# Patient Record
Sex: FEMALE | Race: WHITE | Employment: OTHER | URBAN - METROPOLITAN AREA
[De-identification: names, ages, dates, MRNs, and addresses within clinical notes are randomized per-mention and may not be internally consistent; named-entity substitution may affect disease eponyms.]

---

## 2017-02-02 ENCOUNTER — OFFICE VISIT (OUTPATIENT)
Dept: CARDIOLOGY CLINIC | Age: 82
End: 2017-02-02

## 2017-02-02 VITALS
WEIGHT: 107 LBS | OXYGEN SATURATION: 95 % | HEART RATE: 90 BPM | BODY MASS INDEX: 18.27 KG/M2 | SYSTOLIC BLOOD PRESSURE: 116 MMHG | HEIGHT: 64 IN | DIASTOLIC BLOOD PRESSURE: 72 MMHG

## 2017-02-02 DIAGNOSIS — R00.2 PALPITATIONS: Primary | ICD-10-CM

## 2017-02-02 RX ORDER — METOPROLOL TARTRATE 25 MG/1
25 TABLET, FILM COATED ORAL 2 TIMES DAILY
Qty: 60 TAB | Refills: 5 | Status: SHIPPED | OUTPATIENT
Start: 2017-02-02 | End: 2017-05-02 | Stop reason: SDUPTHER

## 2017-02-02 RX ORDER — ASPIRIN 81 MG/1
81 TABLET ORAL DAILY
Qty: 30 TAB | Refills: 6 | Status: SHIPPED | OUTPATIENT
Start: 2017-02-02 | End: 2018-11-09

## 2017-02-02 NOTE — LETTER
2/28/2017 Patient:  William Herndon YOB: 1927 Date of Visit: 2/2/2017 Dear Luis Aguilar MD 
[address] [facility] [id number] VIA In Basket 
 : Thank you for referring Ms. Deepti Quiles to me for evaluation/treatment. Below are the relevant portions of my assessment and plan of care. As you know, Rachel Biswas is a pleasant, 80 y.o. female with known history of coronary artery disease, non-ST-elevation myocardial infarction, status post left anterior descending and left circumflex bare-metal stents. She also has hypertension, hyperlipidemia, and ischemic cardiomyopathy, a.fib Ms. Adrian Yuan is here today for follow up appointment. She was doing quite well up until probably 2-3 weeks ago when she started having some shortness of breath. She did not have any lower extremity swelling, however she would feel short of breath with exertion and even at nighttime she was not able to sleep and she had to sleep in a recliner. She started taking Lasix initially twice a day with improvement in her symptoms. She did not have any significant chest pain or chest tightness. She denied any presyncope or syncope. She says that she feels like she occasionally has palpitations and heart racing whenever she exerts herself. Other than that she denies any specific complaint. Past Medical History Diagnosis Date  A-fib ([facility] 01/2017  Arthritis, degenerative  CAD (coronary artery disease) 04/2012 S/P 2.75 X 15 mm BMS of OM (04/2012), Two LAD BMS (07/2012)  Elevated liver enzymes Likely from statin  HLD (hyperlipidemia)  Hyperkalemia 06/2012 Likely from lisinopril  Ischemic cardiomyopathy LVEF  45% (11/2012) & 30% echo (04/2012)  NSTEMI (non-ST elevated myocardial infarction) ([facility] [address] 04/2012  Vertigo Past Surgical History Procedure Laterality Date  Hx coronary stent placement  4/23/12 bare metal stent to obtuse marginal branch  Hx heart catheterization Current Outpatient Prescriptions Medication Sig  Calcium-Vitamin D3-Vitamin K 299-252-06 mg-unit-mcg chew Take 2 Units by mouth daily.  furosemide (LASIX) 20 mg tablet Take 1 Tab by mouth daily as needed.  clopidogrel (PLAVIX) 75 mg tablet take 1 tablet by mouth once daily  CYANOCOBALAMIN, VITAMIN B-12, (VITAMIN B-12 PO) Take  by mouth daily.  mirtazapine (REMERON) 15 mg tablet Take  by mouth nightly as needed.  meclizine (ANTIVERT) 12.5 mg tablet Take  by mouth daily as needed.  ALPRAZolam (XANAX) 0.25 mg tablet Take  by mouth three (3) times daily as needed. No current facility-administered medications for this visit. Allergies and Sensitivities: 
Allergies Allergen Reactions  Codeine Anaphylaxis  Flu Vaccine 2011 (36 Mos+)(Pf) Anaphylaxis  Pcn [Penicillins] Hives Family History: 
Family History Problem Relation Age of Onset  Diabetes Mother Social History: 
Social History Substance Use Topics  Smoking status: Never Smoker  Smokeless tobacco: Never Used  Alcohol use No  
 
She  reports that she has never smoked. She has never used smokeless tobacco.  She  reports that she does not drink alcohol. Review of Systems: 
 
Physical Exam: 
BP Readings from Last 3 Encounters:  
02/02/17 116/72  
07/05/16 134/70  
04/26/16 135/64 Pulse Readings from Last 3 Encounters:  
02/02/17 90  
07/05/16 62  
04/26/16 (!) 57 Wt Readings from Last 3 Encounters:  
02/02/17 107 lb (48.5 kg) 07/05/16 110 lb (49.9 kg) 04/26/16 114 lb (51.7 kg) Constitutional: Oriented to person, place, and time. HENT: Head: Normocephalic and atraumatic. Neck: No JVD present. Cardiovascular: Irregular rhythm. No murmur, gallop or rubs appriciated Lung[de-identified] Breath sounds normal. No respiratory distress. No Rales appriciated Abdominal: No tenderness. No rebound and no guarding. Musculoskeletal: There is no edema. No cynosis Review of Data: 
LABS:  
Lab Results Component Value Date/Time Sodium 131 07/07/2012 03:21 AM  
 Potassium 4.5 07/07/2012 03:21 AM  
 Chloride 98 07/07/2012 03:21 AM  
 CO2 27 07/07/2012 03:21 AM  
 Glucose 88 07/07/2012 03:21 AM  
 BUN 24 07/07/2012 03:21 AM  
 Creatinine 1.3 07/07/2012 03:21 AM  
 
Lab Results Component Value Date/Time Cholesterol, total 165 07/07/2012 03:21 AM  
 HDL Cholesterol 42 07/07/2012 03:21 AM  
 LDL, calculated 114.2 07/07/2012 03:21 AM  
 Triglyceride 44 07/07/2012 03:21 AM  
 CHOL/HDL Ratio 3.9 07/07/2012 03:21 AM  
 
No results found for: GPT No results found for: HBA1C, [id number] 
 
EKG (07/2012) Sinus rhythm at 74 beats per minute. No dynamic ST-T changes of ischemia. No Q waves noted. (01/17) A.fib at CATHETERIZATION:(04/2012) 1. LM: Short, but angiographically normal.  
2. LAD: Proximal at D1 level, 40% to 50% moderate disease, mid left anterior descending at D2 level 80% tubular stenosis. 3. LCx/OM: Proximal diffuse 40%, Mid OM 95-99% subtotal occlusion with ruptured plaque and with NISH 2 flow. 4. RCA: Anatomically dominant; 30- 40%  proximal to mid, otherwise normal.  
5. Markedly reduced LVEF with estimated ejection fraction of 25% with evidence of severe hypokinesis of entire anterior apical and inferoapical wall. Diaphragmatic and inferobasilar wall is the most mary segment. Successful PCI and stenting of the OM, 95-99% subtotal occlusion with 2.75 x 15 mm bare metal Vision stent. CATH (07/2012) LM: Normal 
LAD: 70% calcified prox-mid, Mid LAD 90% LCx/OM1: proximal 50% moderate disease, Patent mid stent Successful PCI and stenting of LAD using 2.5 X 18 and 2.5X15 mm BMS 
 
ECHO (11/2012) 1. Left ventricular systolic function was mildly reduced with ejection fraction of 45 to 50%.   There was mild diffuse hypokinesis with more pronounced hypokinesis of inferolateral wall. Diastolic dysfunction present. 2.  Right ventricular function is normal.  It was mildly dilated. Estimated peak pressure of 40 mmHg. 3.  Mild mitral regurgitation and aortic regurgitation. No significant valvular pathology noted. 4.  Comparison was made with previous study in April 2012. Overall, left ventricular function has increased from 30 to 45%. Impression / Plan: 
 
Coronary artery disease:  She had an LAD and circumflex stent in 2012. Currently she is on Plavix. No angina. Ischemic cardiomyopathy:  Last ejection fraction was noted to be 45-50% in November, 2012. Will ask her to reduce lasix to once a day. Been taking twice a day Offered to repeat echo for a.fib and SOB, but does not want to repeat echo and want just medical management for symptoms. Hypertension: DC coreg and use metoprolol for a.fib. They will keep checking BP and HR at home A.fib: EKG confirmed this. new diagnosis. Change coreg to metoprolol. BP check in two weeks. Discussed about a.fib and stroke prophylaxis. ASA vs. Anticoagulation ( with coumadin / newer agent ) patient and daughter. RIsk, benefit, side effects of medications and alternatives were discussed with patient regarding each medications. After lengthy discussion, they would prefer ASA and plavix over coumadin or NOAC. Hyperlipidemia:  She has a history of statin induced elevation of LFTs every time she has tried different kinds of statins. She is currently not on any lipid lowering agent. Diet controlled. Defer to PCP This plan was discussed with Ms. Kb in detail who is in agreement. Thank you for allowing me to participate in the patient's care. Feel free to call me with any questions or concerns. Sincerely, Johnson Goodwin MD

## 2017-02-02 NOTE — MR AVS SNAPSHOT
Visit Information Date & Time Provider Department Dept. Phone Encounter #  
 2/2/2017  1:00 PM Hitesh Mascorro MD [address] [place] [address] Specialist at [facility] [phone] [id number] Follow-up Instructions Return in about 6 months (around 8/2/2017). Upcoming Health Maintenance Date Due DTaP/Tdap/Td series (1 - Tdap) 8/10/1948 ZOSTER VACCINE AGE 60> 8/10/1987 GLAUCOMA SCREENING Q2Y 8/10/1992 Pneumococcal 65+ Low/Medium Risk (1 of 2 - PCV13) 8/10/1992 MEDICARE YEARLY EXAM 8/10/1992 INFLUENZA AGE 9 TO ADULT 8/1/2016 Allergies as of 2/2/2017  Review Complete On: 2/2/2017 By: Ramila Champagne LPN Severity Noted Reaction Type Reactions Codeine  05/03/2012    Anaphylaxis Flu Vaccine 2011 (36 Mos+)(pf)  05/03/2012    Anaphylaxis Pcn [Penicillins]  05/03/2012    Hives Current Immunizations  Never Reviewed No immunizations on file. Not reviewed this visit You Were Diagnosed With   
  
 Codes Comments Palpitations    -  Primary ICD-10-CM: R00.2 ICD-9-CM: 785.1 Vitals BP Pulse Height(growth percentile) Weight(growth percentile) SpO2 BMI  
 116/72 90 5' 4\" (1.626 m) 107 lb (48.5 kg) 95% 18.37 kg/m2 OB Status Smoking Status Postmenopausal Never Smoker BMI and BSA Data Body Mass Index Body Surface Area  
 18.37 kg/m 2 1.48 m 2 Preferred Pharmacy Pharmacy Name Phone [facility] [address]  [facility] [phone] Your Updated Medication List  
  
   
This list is accurate as of: 2/2/17  1:50 PM.  Always use your most recent med list.  
  
  
  
  
 ANTIVERT 12.5 mg tablet Generic drug:  meclizine Take  by mouth daily as needed. Calcium-Vitamin D3-Vitamin K 356-116-36 mg-unit-mcg Chew Take 2 Units by mouth daily. clopidogrel 75 mg Tab Commonly known as:  PLAVIX  
take 1 tablet by mouth once daily  
  
 furosemide 20 mg tablet Commonly known as:  LASIX Take 1 Tab by mouth daily as needed. REMERON 15 mg tablet Generic drug:  mirtazapine Take  by mouth nightly as needed. VITAMIN B-12 PO Take  by mouth daily. XANAX 0.25 mg tablet Generic drug:  ALPRAZolam  
Take  by mouth three (3) times daily as needed. We Performed the Following AMB POC EKG ROUTINE W/ 12 LEADS, INTER & REP [76636 CPT(R)] Follow-up Instructions Return in about 6 months (around 8/2/2017). Patient Instructions Start Aspirin 81mg Daily Stop Coreg Start Lopressor 25mg twice per day Call [phone] with BP readings Introducing \[facility]\"" & HEALTH SERVICES! Dear Elba: Thank you for requesting a [employer] account. Our records indicate that you have previously registered for a [employer] account but its currently inactive. Please call our [employer] support line at [phone]. Additional Information If you have questions, please visit the Frequently Asked Questions section of the [employer] website at [email or web address] Remember, [employer] is NOT to be used for urgent needs. For medical emergencies, dial 911. Now available from your iPhone and Android! Please provide this summary of care documentation to your next provider. Your primary care clinician is listed as Lorelei Nation. If you have any questions after today's visit, please call [phone].

## 2017-02-02 NOTE — PATIENT INSTRUCTIONS
Start Aspirin 81mg Daily   Stop Coreg   Start Lopressor 25mg twice per day    Call [phone] with BP readings

## 2017-02-02 NOTE — PROGRESS NOTES
1. Have you been to the ER, urgent care clinic since your last visit? Hospitalized since your last visit? No    2. Have you seen or consulted any other health care providers outside of the [address] [place] [address] since your last visit? Include any pap smears or colon screening.  No

## 2017-02-02 NOTE — PROGRESS NOTES
As you know, Meryle Hayward is a pleasant, 80 y.o. female with known history of coronary artery disease, non-ST-elevation myocardial infarction, status post left anterior descending and left circumflex bare-metal stents. She also has hypertension, hyperlipidemia, and ischemic cardiomyopathy, a.fib    Ms. Myesha Henry is here today for follow up appointment. She was doing quite well up until probably 2-3 weeks ago when she started having some shortness of breath. She did not have any lower extremity swelling, however she would feel short of breath with exertion and even at nighttime she was not able to sleep and she had to sleep in a recliner. She started taking Lasix initially twice a day with improvement in her symptoms. She did not have any significant chest pain or chest tightness. She denied any presyncope or syncope. She says that she feels like she occasionally has palpitations and heart racing whenever she exerts herself. Other than that she denies any specific complaint. Past Medical History   Diagnosis Date    A-fib [facility]) 01/2017    Arthritis, degenerative     CAD (coronary artery disease) 04/2012     S/P 2.75 X 15 mm BMS of OM (04/2012), Two LAD BMS (07/2012)    Elevated liver enzymes      Likely from statin    HLD (hyperlipidemia)     Hyperkalemia 06/2012     Likely from lisinopril    Ischemic cardiomyopathy      LVEF  45% (11/2012) & 30% echo (04/2012)    NSTEMI (non-ST elevated myocardial infarction) ([facility] [address] 04/2012    Vertigo        Past Surgical History   Procedure Laterality Date    Hx coronary stent placement  4/23/12     bare metal stent to obtuse marginal branch    Hx heart catheterization         Current Outpatient Prescriptions   Medication Sig    Calcium-Vitamin D3-Vitamin K 500-500-40 mg-unit-mcg chew Take 2 Units by mouth daily.  furosemide (LASIX) 20 mg tablet Take 1 Tab by mouth daily as needed.     clopidogrel (PLAVIX) 75 mg tablet take 1 tablet by mouth once daily    CYANOCOBALAMIN, VITAMIN B-12, (VITAMIN B-12 PO) Take  by mouth daily.  mirtazapine (REMERON) 15 mg tablet Take  by mouth nightly as needed.  meclizine (ANTIVERT) 12.5 mg tablet Take  by mouth daily as needed.  ALPRAZolam (XANAX) 0.25 mg tablet Take  by mouth three (3) times daily as needed. No current facility-administered medications for this visit. Allergies and Sensitivities:  Allergies   Allergen Reactions    Codeine Anaphylaxis    Flu Vaccine 2011 (36 Mos+)(Pf) Anaphylaxis    Pcn [Penicillins] Hives     Family History:  Family History   Problem Relation Age of Onset    Diabetes Mother      Social History:  Social History   Substance Use Topics    Smoking status: Never Smoker    Smokeless tobacco: Never Used    Alcohol use No     She  reports that she has never smoked. She has never used smokeless tobacco.  She  reports that she does not drink alcohol. Review of Systems:    Physical Exam:  BP Readings from Last 3 Encounters:   02/02/17 116/72   07/05/16 134/70   04/26/16 135/64         Pulse Readings from Last 3 Encounters:   02/02/17 90   07/05/16 62   04/26/16 (!) 57          Wt Readings from Last 3 Encounters:   02/02/17 107 lb (48.5 kg)   07/05/16 110 lb (49.9 kg)   04/26/16 114 lb (51.7 kg)     Constitutional: Oriented to person, place, and time. HENT: Head: Normocephalic and atraumatic. Neck: No JVD present. Cardiovascular: Irregular rhythm. No murmur, gallop or rubs appriciated  Lung[de-identified] Breath sounds normal. No respiratory distress. No Rales appriciated  Abdominal: No tenderness. No rebound and no guarding. Musculoskeletal: There is no edema.  No cynosis    Review of Data:  LABS:   Lab Results   Component Value Date/Time    Sodium 131 07/07/2012 03:21 AM    Potassium 4.5 07/07/2012 03:21 AM    Chloride 98 07/07/2012 03:21 AM    CO2 27 07/07/2012 03:21 AM    Glucose 88 07/07/2012 03:21 AM    BUN 24 07/07/2012 03:21 AM    Creatinine 1.3 07/07/2012 03:21 AM     Lab Results   Component Value Date/Time    Cholesterol, total 165 07/07/2012 03:21 AM    HDL Cholesterol 42 07/07/2012 03:21 AM    LDL, calculated 114.2 07/07/2012 03:21 AM    Triglyceride 44 07/07/2012 03:21 AM    CHOL/HDL Ratio 3.9 07/07/2012 03:21 AM     No results found for: GPT  No results found for: HBA1C, [id number]    EKG (07/2012) Sinus rhythm at 74 beats per minute. No dynamic ST-T changes of ischemia. No Q waves noted. (01/17) A.fib at   CATHETERIZATION:(04/2012)  1. LM: Short, but angiographically normal.   2. LAD: Proximal at D1 level, 40% to 50% moderate disease, mid left anterior descending at D2 level 80% tubular stenosis. 3. LCx/OM: Proximal diffuse 40%, Mid OM 95-99% subtotal occlusion with ruptured plaque and with NISH 2 flow. 4. RCA: Anatomically dominant; 30- 40%  proximal to mid, otherwise normal.   5. Markedly reduced LVEF with estimated ejection fraction of 25% with evidence of severe hypokinesis of entire anterior apical and inferoapical wall. Diaphragmatic and inferobasilar wall is the most mary segment. Successful PCI and stenting of the OM, 95-99% subtotal occlusion with 2.75 x 15 mm bare metal Vision stent. CATH (07/2012)   LM: Normal  LAD: 70% calcified prox-mid, Mid LAD 90%  LCx/OM1: proximal 50% moderate disease, Patent mid stent  Successful PCI and stenting of LAD using 2.5 X 18 and 2.5X15 mm BMS    ECHO (11/2012)   1. Left ventricular systolic function was mildly reduced with ejection fraction of 45 to 50%. There was mild diffuse hypokinesis with more pronounced hypokinesis of inferolateral wall. Diastolic dysfunction present. 2.  Right ventricular function is normal.  It was mildly dilated. Estimated peak pressure of 40 mmHg. 3.  Mild mitral regurgitation and aortic regurgitation. No significant valvular pathology noted. 4.  Comparison was made with previous study in April 2012. Overall, left ventricular function has increased from 30 to 45%.     Impression / Plan:    Coronary artery disease:  She had an LAD and circumflex stent in 2012. Currently she is on Plavix. No angina. Ischemic cardiomyopathy:  Last ejection fraction was noted to be 45-50% in November, 2012. Will ask her to reduce lasix to once a day. Been taking twice a day    Offered to repeat echo for a.fib and SOB, but does not want to repeat echo and want just medical management for symptoms. Hypertension: DC coreg and use metoprolol for a.fib. They will keep checking BP and HR at home    A.fib: EKG confirmed this. new diagnosis. Change coreg to metoprolol. BP check in two weeks. Discussed about a.fib and stroke prophylaxis. ASA vs. Anticoagulation ( with coumadin / newer agent ) patient and daughter. RIsk, benefit, side effects of medications and alternatives were discussed with patient regarding each medications. After lengthy discussion, they would prefer ASA and plavix over coumadin or NOAC. Hyperlipidemia:  She has a history of statin induced elevation of LFTs every time she has tried different kinds of statins. She is currently not on any lipid lowering agent. Diet controlled. Defer to PCP    This plan was discussed with Ms. Quiles in detail who is in agreement. Thank you for allowing me to participate in the patient's care. Feel free to call me with any questions or concerns.

## 2017-02-03 ENCOUNTER — TELEPHONE (OUTPATIENT)
Dept: CARDIOLOGY CLINIC | Age: 82
End: 2017-02-03

## 2017-02-03 NOTE — TELEPHONE ENCOUNTER
Pt called office to advise she is not comfortable starting Lopressor that was prescribed yesterday over the weekend while our office is closed and is worried her b/p will drop too low after starting medication? Pt advised in the past when on blood pressure medication that her b/p will drop to 60's/40's. Pt would like to monitor b/p over weekend and start Lopressor on Monday. Pt advised she took 1 tab of Lopressor this morning, but would not take anymore until next week. I advised pt to monitor blood pressure over the weekend and call office on Monday with readings. Pt verbalized understanding at this time.

## 2017-02-28 NOTE — COMMUNICATION BODY
As you know, Diego Flower is a pleasant, 80 y.o. female with known history of coronary artery disease, non-ST-elevation myocardial infarction, status post left anterior descending and left circumflex bare-metal stents. She also has hypertension, hyperlipidemia, and ischemic cardiomyopathy, a.fib    Ms. Deion Vazquez is here today for follow up appointment. She was doing quite well up until probably 2-3 weeks ago when she started having some shortness of breath. She did not have any lower extremity swelling, however she would feel short of breath with exertion and even at nighttime she was not able to sleep and she had to sleep in a recliner. She started taking Lasix initially twice a day with improvement in her symptoms. She did not have any significant chest pain or chest tightness. She denied any presyncope or syncope. She says that she feels like she occasionally has palpitations and heart racing whenever she exerts herself. Other than that she denies any specific complaint. Past Medical History   Diagnosis Date    A-fib [facility]) 01/2017    Arthritis, degenerative     CAD (coronary artery disease) 04/2012     S/P 2.75 X 15 mm BMS of OM (04/2012), Two LAD BMS (07/2012)    Elevated liver enzymes      Likely from statin    HLD (hyperlipidemia)     Hyperkalemia 06/2012     Likely from lisinopril    Ischemic cardiomyopathy      LVEF  45% (11/2012) & 30% echo (04/2012)    NSTEMI (non-ST elevated myocardial infarction) ([facility] [address] 04/2012    Vertigo        Past Surgical History   Procedure Laterality Date    Hx coronary stent placement  4/23/12     bare metal stent to obtuse marginal branch    Hx heart catheterization         Current Outpatient Prescriptions   Medication Sig    Calcium-Vitamin D3-Vitamin K 500-500-40 mg-unit-mcg chew Take 2 Units by mouth daily.  furosemide (LASIX) 20 mg tablet Take 1 Tab by mouth daily as needed.     clopidogrel (PLAVIX) 75 mg tablet take 1 tablet by mouth once daily    CYANOCOBALAMIN, VITAMIN B-12, (VITAMIN B-12 PO) Take  by mouth daily.  mirtazapine (REMERON) 15 mg tablet Take  by mouth nightly as needed.  meclizine (ANTIVERT) 12.5 mg tablet Take  by mouth daily as needed.  ALPRAZolam (XANAX) 0.25 mg tablet Take  by mouth three (3) times daily as needed. No current facility-administered medications for this visit. Allergies and Sensitivities:  Allergies   Allergen Reactions    Codeine Anaphylaxis    Flu Vaccine 2011 (36 Mos+)(Pf) Anaphylaxis    Pcn [Penicillins] Hives     Family History:  Family History   Problem Relation Age of Onset    Diabetes Mother      Social History:  Social History   Substance Use Topics    Smoking status: Never Smoker    Smokeless tobacco: Never Used    Alcohol use No     She  reports that she has never smoked. She has never used smokeless tobacco.  She  reports that she does not drink alcohol. Review of Systems:    Physical Exam:  BP Readings from Last 3 Encounters:   02/02/17 116/72   07/05/16 134/70   04/26/16 135/64         Pulse Readings from Last 3 Encounters:   02/02/17 90   07/05/16 62   04/26/16 (!) 57          Wt Readings from Last 3 Encounters:   02/02/17 107 lb (48.5 kg)   07/05/16 110 lb (49.9 kg)   04/26/16 114 lb (51.7 kg)     Constitutional: Oriented to person, place, and time. HENT: Head: Normocephalic and atraumatic. Neck: No JVD present. Cardiovascular: Irregular rhythm. No murmur, gallop or rubs appriciated  Lung[de-identified] Breath sounds normal. No respiratory distress. No Rales appriciated  Abdominal: No tenderness. No rebound and no guarding. Musculoskeletal: There is no edema.  No cynosis    Review of Data:  LABS:   Lab Results   Component Value Date/Time    Sodium 131 07/07/2012 03:21 AM    Potassium 4.5 07/07/2012 03:21 AM    Chloride 98 07/07/2012 03:21 AM    CO2 27 07/07/2012 03:21 AM    Glucose 88 07/07/2012 03:21 AM    BUN 24 07/07/2012 03:21 AM    Creatinine 1.3 07/07/2012 03:21 AM     Lab Results   Component Value Date/Time    Cholesterol, total 165 07/07/2012 03:21 AM    HDL Cholesterol 42 07/07/2012 03:21 AM    LDL, calculated 114.2 07/07/2012 03:21 AM    Triglyceride 44 07/07/2012 03:21 AM    CHOL/HDL Ratio 3.9 07/07/2012 03:21 AM     No results found for: GPT  No results found for: HBA1C, [id number]    EKG (07/2012) Sinus rhythm at 74 beats per minute. No dynamic ST-T changes of ischemia. No Q waves noted. (01/17) A.fib at   CATHETERIZATION:(04/2012)  1. LM: Short, but angiographically normal.   2. LAD: Proximal at D1 level, 40% to 50% moderate disease, mid left anterior descending at D2 level 80% tubular stenosis. 3. LCx/OM: Proximal diffuse 40%, Mid OM 95-99% subtotal occlusion with ruptured plaque and with NISH 2 flow. 4. RCA: Anatomically dominant; 30- 40%  proximal to mid, otherwise normal.   5. Markedly reduced LVEF with estimated ejection fraction of 25% with evidence of severe hypokinesis of entire anterior apical and inferoapical wall. Diaphragmatic and inferobasilar wall is the most mary segment. Successful PCI and stenting of the OM, 95-99% subtotal occlusion with 2.75 x 15 mm bare metal Vision stent. CATH (07/2012)   LM: Normal  LAD: 70% calcified prox-mid, Mid LAD 90%  LCx/OM1: proximal 50% moderate disease, Patent mid stent  Successful PCI and stenting of LAD using 2.5 X 18 and 2.5X15 mm BMS    ECHO (11/2012)   1. Left ventricular systolic function was mildly reduced with ejection fraction of 45 to 50%. There was mild diffuse hypokinesis with more pronounced hypokinesis of inferolateral wall. Diastolic dysfunction present. 2.  Right ventricular function is normal.  It was mildly dilated. Estimated peak pressure of 40 mmHg. 3.  Mild mitral regurgitation and aortic regurgitation. No significant valvular pathology noted. 4.  Comparison was made with previous study in April 2012. Overall, left ventricular function has increased from 30 to 45%.     Impression / Plan:    Coronary artery disease:  She had an LAD and circumflex stent in 2012. Currently she is on Plavix. No angina. Ischemic cardiomyopathy:  Last ejection fraction was noted to be 45-50% in November, 2012. Will ask her to reduce lasix to once a day. Been taking twice a day    Offered to repeat echo for a.fib and SOB, but does not want to repeat echo and want just medical management for symptoms. Hypertension: DC coreg and use metoprolol for a.fib. They will keep checking BP and HR at home    A.fib: EKG confirmed this. new diagnosis. Change coreg to metoprolol. BP check in two weeks. Discussed about a.fib and stroke prophylaxis. ASA vs. Anticoagulation ( with coumadin / newer agent ) patient and daughter. RIsk, benefit, side effects of medications and alternatives were discussed with patient regarding each medications. After lengthy discussion, they would prefer ASA and plavix over coumadin or NOAC. Hyperlipidemia:  She has a history of statin induced elevation of LFTs every time she has tried different kinds of statins. She is currently not on any lipid lowering agent. Diet controlled. Defer to PCP    This plan was discussed with Ms. Quiles in detail who is in agreement. Thank you for allowing me to participate in the patient's care. Feel free to call me with any questions or concerns.

## 2017-04-17 ENCOUNTER — TELEPHONE (OUTPATIENT)
Dept: CARDIOLOGY CLINIC | Age: 82
End: 2017-04-17

## 2017-04-17 NOTE — TELEPHONE ENCOUNTER
Patient calling and states that since switching to metoprolol she is feeling so tired and not having an energy to do her usual activies blood pressure today is 91/50 heart rate 85. She feels like since starting metoprolol that she just hasn't felt well. Verbal order and read back per Mario Quesada MD  Decrease Metoprolol to 12.5mg twice per day for the next few days. Monitor blood pressure and heart rate. Call Thursday to report how she is feeling. If symptoms worsen or do not improve she may need to go to the ER. Patient verbalized understanding.

## 2017-05-02 ENCOUNTER — OFFICE VISIT (OUTPATIENT)
Dept: CARDIOLOGY CLINIC | Age: 82
End: 2017-05-02

## 2017-05-02 VITALS
WEIGHT: 112 LBS | SYSTOLIC BLOOD PRESSURE: 93 MMHG | DIASTOLIC BLOOD PRESSURE: 57 MMHG | BODY MASS INDEX: 19.84 KG/M2 | HEART RATE: 78 BPM | HEIGHT: 63 IN | OXYGEN SATURATION: 95 %

## 2017-05-02 DIAGNOSIS — I10 ESSENTIAL HYPERTENSION WITH GOAL BLOOD PRESSURE LESS THAN 140/90: ICD-10-CM

## 2017-05-02 DIAGNOSIS — E78.00 PURE HYPERCHOLESTEROLEMIA: ICD-10-CM

## 2017-05-02 DIAGNOSIS — I48.20 CHRONIC A-FIB (HCC): ICD-10-CM

## 2017-05-02 DIAGNOSIS — I48.0 PAF (PAROXYSMAL ATRIAL FIBRILLATION) (HCC): Primary | ICD-10-CM

## 2017-05-02 RX ORDER — METOPROLOL TARTRATE 25 MG/1
12.5 TABLET, FILM COATED ORAL 2 TIMES DAILY
Qty: 60 TAB | Refills: 5 | Status: SHIPPED | OUTPATIENT
Start: 2017-05-02 | End: 2017-07-24 | Stop reason: SDUPTHER

## 2017-05-02 RX ORDER — DIGOXIN 125 MCG
0.12 TABLET ORAL DAILY
Qty: 35 TAB | Refills: 6 | Status: SHIPPED | OUTPATIENT
Start: 2017-05-02 | End: 2017-07-24 | Stop reason: SDUPTHER

## 2017-05-02 RX ORDER — FUROSEMIDE 20 MG/1
20 TABLET ORAL 2 TIMES DAILY
COMMUNITY
End: 2017-10-31 | Stop reason: SDUPTHER

## 2017-05-02 NOTE — PATIENT INSTRUCTIONS
Decrease Lopressor to 12.5mg twice per day   Start Digoxin 0.125mg Daily - for the first 2 days take 2 tabs daily

## 2017-05-02 NOTE — MR AVS SNAPSHOT
Visit Information Date & Time Provider Department Dept. Phone Encounter #  
 5/2/2017  2:30 PM Hitesh Chapman MD Cardio Specialist at [facility] [phone] [id number] Follow-up Instructions Return in about 6 months (around 11/2/2017). Upcoming Health Maintenance Date Due DTaP/Tdap/Td series (1 - Tdap) 8/10/1948 ZOSTER VACCINE AGE 60> 8/10/1987 GLAUCOMA SCREENING Q2Y 8/10/1992 Pneumococcal 65+ Low/Medium Risk (1 of 2 - PCV13) 8/10/1992 MEDICARE YEARLY EXAM 8/10/1992 INFLUENZA AGE 9 TO ADULT 8/1/2017 Allergies as of 5/2/2017  Review Complete On: 5/2/2017 By: Jennifer Rosario LPN Severity Noted Reaction Type Reactions Codeine  05/03/2012    Anaphylaxis Flu Vaccine 2011 (36 Mos+)(pf)  05/03/2012    Anaphylaxis Pcn [Penicillins]  05/03/2012    Hives Current Immunizations  Never Reviewed No immunizations on file. Not reviewed this visit Vitals BP Pulse Height(growth percentile) Weight(growth percentile) SpO2 BMI  
 93/57 78 5' 2.5\" (1.588 m) 112 lb (50.8 kg) 95% 20.16 kg/m2 OB Status Smoking Status Postmenopausal Never Smoker BMI and BSA Data Body Mass Index Body Surface Area  
 20.16 kg/m 2 1.5 m 2 Preferred Pharmacy Pharmacy Name Phone [facility] [address] [facility] [phone] Your Updated Medication List  
  
   
This list is accurate as of: 5/2/17  3:03 PM.  Always use your most recent med list.  
  
  
  
  
 ANTIVERT 12.5 mg tablet Generic drug:  meclizine Take  by mouth daily as needed. aspirin delayed-release 81 mg tablet Take 1 Tab by mouth daily. Calcium-Vitamin D3-Vitamin K 527-005-06 mg-unit-mcg Chew Take 2 Units by mouth daily. clopidogrel 75 mg Tab Commonly known as:  PLAVIX  
take 1 tablet by mouth once daily LASIX 20 mg tablet Generic drug:  furosemide Take 20 mg by mouth two (2) times a day. metoprolol tartrate 25 mg tablet Commonly known as:  LOPRESSOR Take 1 Tab by mouth two (2) times a day. REMERON 15 mg tablet Generic drug:  mirtazapine Take  by mouth nightly as needed. VITAMIN B-12 PO Take  by mouth daily. XANAX 0.25 mg tablet Generic drug:  ALPRAZolam  
Take  by mouth three (3) times daily as needed. Follow-up Instructions Return in about 6 months (around 11/2/2017). Patient Instructions Decrease Lopressor to 12.5mg twice per day Start Digoxin 0.125mg Daily - for the first 2 days take 2 tabs daily Introducing [facility]! Dear Meño Morgan: Thank you for requesting a [employer]reet[employer] account. Our records indicate that you have previously registered for a Greetz account but its currently inactive. Please call our Greetz support line at [phone]. Additional Information If you have questions, please visit the Frequently Asked Questions section of the Greetz website at [email or web address] Remember, Greetz is NOT to be used for urgent needs. For medical emergencies, dial 911. Now available from your iPhone and Android! Please provide this summary of care documentation to your next provider. Your primary care clinician is listed as Stevie Portillo. If you have any questions after today's visit, please call [phone].

## 2017-05-02 NOTE — PROGRESS NOTES
1. Have you been to the ER, urgent care clinic since your last visit? Hospitalized since your last visit? No    2. Have you seen or consulted any other health care providers outside of the [address] [place] [address] since your last visit? Include any pap smears or colon screening.  No

## 2017-05-02 NOTE — PROGRESS NOTES
As you know, Gaby Bhatia is a pleasant, 80 y.o. female with known history of coronary artery disease, non-ST-elevation myocardial infarction, status post left anterior descending and left circumflex bare-metal stents. She also has hypertension, hyperlipidemia, and ischemic cardiomyopathy, a.fib    Ms. Leida Davidson is here today for follow up appointment. Ms. Leida Davidson had significant fatigue and tiredness after starting Metoprolol for the atrial fibrillation. Her blood pressure at home at times runs anywhere from 85 to 951 mmHg systolic. She has been less fatigued lately. She denies any presyncope or syncope. She denies any awareness of any palpitations. She denies any chest pain or chest tightness. She has been taking Lasix twice daily for the last 7-10 days because of ankle edema, which is getting better after taking Lasix according to patient. Past Medical History:   Diagnosis Date    A-fib [facility]) 01/2017    Arthritis, degenerative     CAD (coronary artery disease) 04/2012    S/P 2.75 X 15 mm BMS of OM (04/2012), Two LAD BMS (07/2012)    Elevated liver enzymes     Likely from statin    HLD (hyperlipidemia)     Hyperkalemia 06/2012    Likely from lisinopril    Ischemic cardiomyopathy     LVEF  45% (11/2012) & 30% echo (04/2012)    NSTEMI (non-ST elevated myocardial infarction) ([facility] [address] 04/2012    Vertigo        Past Surgical History:   Procedure Laterality Date    HX CORONARY STENT PLACEMENT  4/23/12    bare metal stent to obtuse marginal branch    HX HEART CATHETERIZATION         Current Outpatient Prescriptions   Medication Sig    metoprolol tartrate (LOPRESSOR) 25 mg tablet Take 1 Tab by mouth two (2) times a day.  aspirin delayed-release 81 mg tablet Take 1 Tab by mouth daily.  Calcium-Vitamin D3-Vitamin K 457-605-43 mg-unit-mcg chew Take 2 Units by mouth daily.  furosemide (LASIX) 20 mg tablet Take 1 Tab by mouth daily as needed.     clopidogrel (PLAVIX) 75 mg tablet take 1 tablet by mouth once daily    CYANOCOBALAMIN, VITAMIN B-12, (VITAMIN B-12 PO) Take  by mouth daily.  mirtazapine (REMERON) 15 mg tablet Take  by mouth nightly as needed.  meclizine (ANTIVERT) 12.5 mg tablet Take  by mouth daily as needed.  ALPRAZolam (XANAX) 0.25 mg tablet Take  by mouth three (3) times daily as needed. No current facility-administered medications for this visit. Allergies and Sensitivities:  Allergies   Allergen Reactions    Codeine Anaphylaxis    Flu Vaccine 2011 (36 Mos+)(Pf) Anaphylaxis    Pcn [Penicillins] Hives     Family History:  Family History   Problem Relation Age of Onset    Diabetes Mother      Social History:  Social History   Substance Use Topics    Smoking status: Never Smoker    Smokeless tobacco: Never Used    Alcohol use No     She  reports that she has never smoked. She has never used smokeless tobacco.  She  reports that she does not drink alcohol. Review of Systems:    Physical Exam:  BP Readings from Last 3 Encounters:   02/02/17 116/72   07/05/16 134/70   04/26/16 135/64         Pulse Readings from Last 3 Encounters:   02/02/17 90   07/05/16 62   04/26/16 (!) 57          Wt Readings from Last 3 Encounters:   02/02/17 107 lb (48.5 kg)   07/05/16 110 lb (49.9 kg)   04/26/16 114 lb (51.7 kg)     Constitutional: Oriented to person, place, and time. HENT: Head: Normocephalic and atraumatic. Neck: No JVD present. Cardiovascular: Irregular rhythm. No murmur, gallop or rubs appriciated  Lung[de-identified] Breath sounds normal. No respiratory distress. No Rales appriciated  Abdominal: No tenderness. No rebound and no guarding. Musculoskeletal: There is Trace ankle edema.  No cynosis    Review of Data:  LABS:   Lab Results   Component Value Date/Time    Sodium 131 07/07/2012 03:21 AM    Potassium 4.5 07/07/2012 03:21 AM    Chloride 98 07/07/2012 03:21 AM    CO2 27 07/07/2012 03:21 AM    Glucose 88 07/07/2012 03:21 AM    BUN 24 07/07/2012 03:21 AM    Creatinine 1.3 07/07/2012 03:21 AM     Lab Results   Component Value Date/Time    Cholesterol, total 165 07/07/2012 03:21 AM    HDL Cholesterol 42 07/07/2012 03:21 AM    LDL, calculated 114.2 07/07/2012 03:21 AM    Triglyceride 44 07/07/2012 03:21 AM    CHOL/HDL Ratio 3.9 07/07/2012 03:21 AM     No results found for: GPT  No results found for: HBA1C, [id number]    EKG (07/2012) Sinus rhythm at 74 beats per minute. No dynamic ST-T changes of ischemia. No Q waves noted. (01/17) A.fib at      CATHETERIZATION:(04/2012)  1. LM: Short, but angiographically normal.   2. LAD: Proximal at D1 level, 40% to 50% moderate disease, mid left anterior descending at D2 level 80% tubular stenosis. 3. LCx/OM: Proximal diffuse 40%, Mid OM 95-99% subtotal occlusion with ruptured plaque and with NISH 2 flow. 4. RCA: Anatomically dominant; 30- 40%  proximal to mid, otherwise normal.   5. Markedly reduced LVEF with estimated ejection fraction of 25% with evidence of severe hypokinesis of entire anterior apical and inferoapical wall. Diaphragmatic and inferobasilar wall is the most mary segment. Successful PCI and stenting of the OM, 95-99% subtotal occlusion with 2.75 x 15 mm bare metal Vision stent. CATH (07/2012)   LM: Normal  LAD: 70% calcified prox-mid, Mid LAD 90%  LCx/OM1: proximal 50% moderate disease, Patent mid stent  Successful PCI and stenting of LAD using 2.5 X 18 and 2.5X15 mm BMS    ECHO (11/2012)   1. Left ventricular systolic function was mildly reduced with ejection fraction of 45 to 50%. There was mild diffuse hypokinesis with more pronounced hypokinesis of inferolateral wall. Diastolic dysfunction present. 2.  Right ventricular function is normal.  It was mildly dilated. Estimated peak pressure of 40 mmHg. 3.  Mild mitral regurgitation and aortic regurgitation. No significant valvular pathology noted. 4.  Comparison was made with previous study in April 2012.   Overall, left ventricular function has increased from 30 to 45%. Impression / Plan:    Coronary artery disease:    She had an LAD and circumflex stent in 2012. Currently she is on asa, BB and Plavix. No angina. Ischemic cardiomyopathy:    Last ejection fraction was noted to be 45-50% in November, 2012. Ankle edema+ improving after taking lasix twice a day for last 7 days. Offered to repeat echo for a.fib and edema on last visit and today, but does not want to repeat echo and want just medical management for symptoms. Hypertension:   Fatigue and tiredness since starting metoprolol. Will Reduce metoprolol to 12.5 mg BID with home to improve BP reading and to improve symptoms. Start digoxin 0.125 mg daily for HR control for a.fib    A.fib:   Still in a.fib on exam. HR at home  bpm.  Will Reduce metoprolol to 12.5 mg BID with home to improve BP reading and to improve symptoms. Start digoxin 0.125 mg daily for HR control for a.fib  On DAPT for stroke prophylaxis. ASA and plavix. Has decided against coumadin or NOAC in past.     Hyperlipidemia:  She has a history of statin induced elevation of LFTs every time she has tried different kinds of statins. She is currently not on any lipid lowering agent. Diet controlled. Defer to PCP. Stable per patient and daughter    This plan was discussed with Ms. Quiles in detail who is in agreement. Thank you for allowing me to participate in the patient's care. Feel free to call me with any questions or concerns.

## 2017-05-02 NOTE — LETTER
5/9/2017 Patient:  Danisha Boston YOB: 1927 Date of Visit: 5/2/2017 Dear Zara Irwin MD 
[address] [facility] [address] [id number] VIA In Basket 
 : 
 
 
 
As you know, Bj Summers is a pleasant, 80 y.o. female with known history of coronary artery disease, non-ST-elevation myocardial infarction, status post left anterior descending and left circumflex bare-metal stents. She also has hypertension, hyperlipidemia, and ischemic cardiomyopathy, a.fib Ms. Miles Blankenship is here today for follow up appointment. Ms. Miles Blankenship had significant fatigue and tiredness after starting Metoprolol for the atrial fibrillation. Her blood pressure at home at times runs anywhere from 85 to 316 mmHg systolic. She has been less fatigued lately. She denies any presyncope or syncope. She denies any awareness of any palpitations. She denies any chest pain or chest tightness. She has been taking Lasix twice daily for the last 7-10 days because of ankle edema, which is getting better after taking Lasix according to patient. Past Medical History:  
Diagnosis Date  [facility] 01/2017  Arthritis, degenerative  CAD (coronary artery disease) 04/2012 S/P 2.75 X 15 mm BMS of OM (04/2012), Two LAD BMS (07/2012)  Elevated liver enzymes Likely from statin  HLD (hyperlipidemia)  Hyperkalemia 06/2012 Likely from lisinopril  Ischemic cardiomyopathy LVEF  45% (11/2012) & 30% echo (04/2012)  NSTEMI (non-ST elevated myocardial infarction) ([facility] 04/2012  Vertigo Past Surgical History:  
Procedure Laterality Date  HX CORONARY STENT PLACEMENT  4/23/12  
 bare metal stent to obtuse marginal branch  HX HEART CATHETERIZATION Current Outpatient Prescriptions Medication Sig  
 metoprolol tartrate (LOPRESSOR) 25 mg tablet Take 1 Tab by mouth two (2) times a day.  aspirin delayed-release 81 mg tablet Take 1 Tab by mouth daily.  Calcium-Vitamin D3-Vitamin K 972-091-88 mg-unit-mcg chew Take 2 Units by mouth daily.  furosemide (LASIX) 20 mg tablet Take 1 Tab by mouth daily as needed.  clopidogrel (PLAVIX) 75 mg tablet take 1 tablet by mouth once daily  CYANOCOBALAMIN, VITAMIN B-12, (VITAMIN B-12 PO) Take  by mouth daily.  mirtazapine (REMERON) 15 mg tablet Take  by mouth nightly as needed.  meclizine (ANTIVERT) 12.5 mg tablet Take  by mouth daily as needed.  ALPRAZolam (XANAX) 0.25 mg tablet Take  by mouth three (3) times daily as needed. No current facility-administered medications for this visit. Allergies and Sensitivities: 
Allergies Allergen Reactions  Codeine Anaphylaxis  Flu Vaccine 2011 (36 Mos+)(Pf) Anaphylaxis  Pcn [Penicillins] Hives Family History: 
Family History Problem Relation Age of Onset  Diabetes Mother Social History: 
Social History Substance Use Topics  Smoking status: Never Smoker  Smokeless tobacco: Never Used  Alcohol use No  
 
She  reports that she has never smoked. She has never used smokeless tobacco.  She  reports that she does not drink alcohol. Review of Systems: 
 
Physical Exam: 
BP Readings from Last 3 Encounters:  
02/02/17 116/72  
07/05/16 134/70  
04/26/16 135/64 Pulse Readings from Last 3 Encounters:  
02/02/17 90  
07/05/16 62  
04/26/16 (!) 57 Wt Readings from Last 3 Encounters:  
02/02/17 107 lb (48.5 kg) 07/05/16 110 lb (49.9 kg) 04/26/16 114 lb (51.7 kg) Constitutional: Oriented to person, place, and time. HENT: Head: Normocephalic and atraumatic. Neck: No JVD present. Cardiovascular: Irregular rhythm. No murmur, gallop or rubs appriciated Lung[de-identified] Breath sounds normal. No respiratory distress. No Rales appriciated Abdominal: No tenderness. No rebound and no guarding. Musculoskeletal: There is Trace ankle edema. No cynosis Review of Data: 
LABS:  
Lab Results Component Value Date/Time Sodium 131 07/07/2012 03:21 AM  
 Potassium 4.5 07/07/2012 03:21 AM  
 Chloride 98 07/07/2012 03:21 AM  
 CO2 27 07/07/2012 03:21 AM  
 Glucose 88 07/07/2012 03:21 AM  
 BUN 24 07/07/2012 03:21 AM  
 Creatinine 1.3 07/07/2012 03:21 AM  
 
Lab Results Component Value Date/Time Cholesterol, total 165 07/07/2012 03:21 AM  
 HDL Cholesterol 42 07/07/2012 03:21 AM  
 LDL, calculated 114.2 07/07/2012 03:21 AM  
 Triglyceride 44 07/07/2012 03:21 AM  
 CHOL/HDL Ratio 3.9 07/07/2012 03:21 AM  
 
No results found for: GPT No results found for: HBA1C, [id number] 
 
EKG (07/2012) Sinus rhythm at 74 beats per minute. No dynamic ST-T changes of ischemia. No Q waves noted. (01/17) A.fib at CATHETERIZATION:(04/2012) 1. LM: Short, but angiographically normal.  
2. LAD: Proximal at D1 level, 40% to 50% moderate disease, mid left anterior descending at D2 level 80% tubular stenosis. 3. LCx/OM: Proximal diffuse 40%, Mid OM 95-99% subtotal occlusion with ruptured plaque and with NISH 2 flow. 4. RCA: Anatomically dominant; 30- 40%  proximal to mid, otherwise normal.  
5. Markedly reduced LVEF with estimated ejection fraction of 25% with evidence of severe hypokinesis of entire anterior apical and inferoapical wall. Diaphragmatic and inferobasilar wall is the most mary segment. Successful PCI and stenting of the OM, 95-99% subtotal occlusion with 2.75 x 15 mm bare metal Vision stent. CATH (07/2012) LM: Normal 
LAD: 70% calcified prox-mid, Mid LAD 90% LCx/OM1: proximal 50% moderate disease, Patent mid stent Successful PCI and stenting of LAD using 2.5 X 18 and 2.5X15 mm BMS 
 
ECHO (11/2012) 1. Left ventricular systolic function was mildly reduced with ejection fraction of 45 to 50%. There was mild diffuse hypokinesis with more pronounced hypokinesis of inferolateral wall. Diastolic dysfunction present. 2.  Right ventricular function is normal.  It was mildly dilated. Estimated peak pressure of 40 mmHg. 3.  Mild mitral regurgitation and aortic regurgitation. No significant valvular pathology noted. 4.  Comparison was made with previous study in April 2012. Overall, left ventricular function has increased from 30 to 45%. Impression / Plan: 
 
Coronary artery disease: She had an LAD and circumflex stent in 2012. Currently she is on asa, BB and Plavix. No angina. Ischemic cardiomyopathy:   
Last ejection fraction was noted to be 45-50% in November, 2012. Ankle edema+ improving after taking lasix twice a day for last 7 days. Offered to repeat echo for a.fib and edema on last visit and today, but does not want to repeat echo and want just medical management for symptoms. Hypertension:  
Fatigue and tiredness since starting metoprolol. Will Reduce metoprolol to 12.5 mg BID with home to improve BP reading and to improve symptoms. Start digoxin 0.125 mg daily for HR control for a.fib 
 
A.fib:  
Still in a.fib on exam. HR at home  bpm. 
Will Reduce metoprolol to 12.5 mg BID with home to improve BP reading and to improve symptoms. Start digoxin 0.125 mg daily for HR control for a.fib On DAPT for stroke prophylaxis. ASA and plavix. Has decided against coumadin or NOAC in past.  
 
Hyperlipidemia:  She has a history of statin induced elevation of LFTs every time she has tried different kinds of statins. She is currently not on any lipid lowering agent. Diet controlled. Defer to PCP. Stable per patient and daughter This plan was discussed with Ms. Quiles in detail who is in agreement. Thank you for allowing me to participate in the patient's care. Feel free to call me with any questions or concerns. Sincerely, Guillermo Ling MD

## 2017-05-09 NOTE — COMMUNICATION BODY
As you know, Tomás Espinosa is a pleasant, 80 y.o. female with known history of coronary artery disease, non-ST-elevation myocardial infarction, status post left anterior descending and left circumflex bare-metal stents. She also has hypertension, hyperlipidemia, and ischemic cardiomyopathy, a.fib    Ms. Malcolm Calle is here today for follow up appointment. Ms. Malcolm Calle had significant fatigue and tiredness after starting Metoprolol for the atrial fibrillation. Her blood pressure at home at times runs anywhere from 85 to 388 mmHg systolic. She has been less fatigued lately. She denies any presyncope or syncope. She denies any awareness of any palpitations. She denies any chest pain or chest tightness. She has been taking Lasix twice daily for the last 7-10 days because of ankle edema, which is getting better after taking Lasix according to patient. Past Medical History:   Diagnosis Date    A-fib [facility]) 01/2017    Arthritis, degenerative     CAD (coronary artery disease) 04/2012    S/P 2.75 X 15 mm BMS of OM (04/2012), Two LAD BMS (07/2012)    Elevated liver enzymes     Likely from statin    HLD (hyperlipidemia)     Hyperkalemia 06/2012    Likely from lisinopril    Ischemic cardiomyopathy     LVEF  45% (11/2012) & 30% echo (04/2012)    NSTEMI (non-ST elevated myocardial infarction) ([facility] [address] 04/2012    Vertigo        Past Surgical History:   Procedure Laterality Date    HX CORONARY STENT PLACEMENT  4/23/12    bare metal stent to obtuse marginal branch    HX HEART CATHETERIZATION         Current Outpatient Prescriptions   Medication Sig    metoprolol tartrate (LOPRESSOR) 25 mg tablet Take 1 Tab by mouth two (2) times a day.  aspirin delayed-release 81 mg tablet Take 1 Tab by mouth daily.  Calcium-Vitamin D3-Vitamin K 165-299-33 mg-unit-mcg chew Take 2 Units by mouth daily.  furosemide (LASIX) 20 mg tablet Take 1 Tab by mouth daily as needed.     clopidogrel (PLAVIX) 75 mg tablet take 1 tablet by mouth once daily    CYANOCOBALAMIN, VITAMIN B-12, (VITAMIN B-12 PO) Take  by mouth daily.  mirtazapine (REMERON) 15 mg tablet Take  by mouth nightly as needed.  meclizine (ANTIVERT) 12.5 mg tablet Take  by mouth daily as needed.  ALPRAZolam (XANAX) 0.25 mg tablet Take  by mouth three (3) times daily as needed. No current facility-administered medications for this visit. Allergies and Sensitivities:  Allergies   Allergen Reactions    Codeine Anaphylaxis    Flu Vaccine 2011 (36 Mos+)(Pf) Anaphylaxis    Pcn [Penicillins] Hives     Family History:  Family History   Problem Relation Age of Onset    Diabetes Mother      Social History:  Social History   Substance Use Topics    Smoking status: Never Smoker    Smokeless tobacco: Never Used    Alcohol use No     She  reports that she has never smoked. She has never used smokeless tobacco.  She  reports that she does not drink alcohol. Review of Systems:    Physical Exam:  BP Readings from Last 3 Encounters:   02/02/17 116/72   07/05/16 134/70   04/26/16 135/64         Pulse Readings from Last 3 Encounters:   02/02/17 90   07/05/16 62   04/26/16 (!) 57          Wt Readings from Last 3 Encounters:   02/02/17 107 lb (48.5 kg)   07/05/16 110 lb (49.9 kg)   04/26/16 114 lb (51.7 kg)     Constitutional: Oriented to person, place, and time. HENT: Head: Normocephalic and atraumatic. Neck: No JVD present. Cardiovascular: Irregular rhythm. No murmur, gallop or rubs appriciated  Lung[de-identified] Breath sounds normal. No respiratory distress. No Rales appriciated  Abdominal: No tenderness. No rebound and no guarding. Musculoskeletal: There is Trace ankle edema.  No cynosis    Review of Data:  LABS:   Lab Results   Component Value Date/Time    Sodium 131 07/07/2012 03:21 AM    Potassium 4.5 07/07/2012 03:21 AM    Chloride 98 07/07/2012 03:21 AM    CO2 27 07/07/2012 03:21 AM    Glucose 88 07/07/2012 03:21 AM    BUN 24 07/07/2012 03:21 AM    Creatinine 1.3 07/07/2012 03:21 AM     Lab Results   Component Value Date/Time    Cholesterol, total 165 07/07/2012 03:21 AM    HDL Cholesterol 42 07/07/2012 03:21 AM    LDL, calculated 114.2 07/07/2012 03:21 AM    Triglyceride 44 07/07/2012 03:21 AM    CHOL/HDL Ratio 3.9 07/07/2012 03:21 AM     No results found for: GPT  No results found for: HBA1C, [id number]    EKG (07/2012) Sinus rhythm at 74 beats per minute. No dynamic ST-T changes of ischemia. No Q waves noted. (01/17) A.fib at      CATHETERIZATION:(04/2012)  1. LM: Short, but angiographically normal.   2. LAD: Proximal at D1 level, 40% to 50% moderate disease, mid left anterior descending at D2 level 80% tubular stenosis. 3. LCx/OM: Proximal diffuse 40%, Mid OM 95-99% subtotal occlusion with ruptured plaque and with NISH 2 flow. 4. RCA: Anatomically dominant; 30- 40%  proximal to mid, otherwise normal.   5. Markedly reduced LVEF with estimated ejection fraction of 25% with evidence of severe hypokinesis of entire anterior apical and inferoapical wall. Diaphragmatic and inferobasilar wall is the most mary segment. Successful PCI and stenting of the OM, 95-99% subtotal occlusion with 2.75 x 15 mm bare metal Vision stent. CATH (07/2012)   LM: Normal  LAD: 70% calcified prox-mid, Mid LAD 90%  LCx/OM1: proximal 50% moderate disease, Patent mid stent  Successful PCI and stenting of LAD using 2.5 X 18 and 2.5X15 mm BMS    ECHO (11/2012)   1. Left ventricular systolic function was mildly reduced with ejection fraction of 45 to 50%. There was mild diffuse hypokinesis with more pronounced hypokinesis of inferolateral wall. Diastolic dysfunction present. 2.  Right ventricular function is normal.  It was mildly dilated. Estimated peak pressure of 40 mmHg. 3.  Mild mitral regurgitation and aortic regurgitation. No significant valvular pathology noted. 4.  Comparison was made with previous study in April 2012.   Overall, left ventricular function has increased from 30 to 45%. Impression / Plan:    Coronary artery disease:    She had an LAD and circumflex stent in 2012. Currently she is on asa, BB and Plavix. No angina. Ischemic cardiomyopathy:    Last ejection fraction was noted to be 45-50% in November, 2012. Ankle edema+ improving after taking lasix twice a day for last 7 days. Offered to repeat echo for a.fib and edema on last visit and today, but does not want to repeat echo and want just medical management for symptoms. Hypertension:   Fatigue and tiredness since starting metoprolol. Will Reduce metoprolol to 12.5 mg BID with home to improve BP reading and to improve symptoms. Start digoxin 0.125 mg daily for HR control for a.fib    A.fib:   Still in a.fib on exam. HR at home  bpm.  Will Reduce metoprolol to 12.5 mg BID with home to improve BP reading and to improve symptoms. Start digoxin 0.125 mg daily for HR control for a.fib  On DAPT for stroke prophylaxis. ASA and plavix. Has decided against coumadin or NOAC in past.     Hyperlipidemia:  She has a history of statin induced elevation of LFTs every time she has tried different kinds of statins. She is currently not on any lipid lowering agent. Diet controlled. Defer to PCP. Stable per patient and daughter    This plan was discussed with Ms. Quiles in detail who is in agreement. Thank you for allowing me to participate in the patient's care. Feel free to call me with any questions or concerns.

## 2017-07-24 RX ORDER — CLOPIDOGREL BISULFATE 75 MG/1
75 TABLET ORAL DAILY
Qty: 90 TAB | Refills: 3 | Status: SHIPPED | OUTPATIENT
Start: 2017-07-24 | End: 2018-07-23 | Stop reason: SDUPTHER

## 2017-07-24 RX ORDER — DIGOXIN 125 MCG
0.12 TABLET ORAL DAILY
Qty: 90 TAB | Refills: 3 | Status: SHIPPED | OUTPATIENT
Start: 2017-07-24 | End: 2018-07-16 | Stop reason: SDUPTHER

## 2017-07-24 RX ORDER — METOPROLOL TARTRATE 25 MG/1
12.5 TABLET, FILM COATED ORAL 2 TIMES DAILY
Qty: 90 TAB | Refills: 3 | Status: SHIPPED | OUTPATIENT
Start: 2017-07-24 | End: 2017-10-31 | Stop reason: SDUPTHER

## 2017-09-13 ENCOUNTER — TELEPHONE (OUTPATIENT)
Dept: CARDIOLOGY CLINIC | Age: 82
End: 2017-09-13

## 2017-09-13 NOTE — TELEPHONE ENCOUNTER
Patient would like to speak with nurse regarding clarification of medication Metoprolol. Patient has been taking 2 tablets daily and was told by pharmacy she should be taking a 1/2 tablet 2 twice daily. Please confirm dosage and contact patient.

## 2017-09-13 NOTE — TELEPHONE ENCOUNTER
I called pt back to address the medication dose below and pt advised that she read label wrong on her Metoprolol 25 mg. She was taking 1 tab in the morning and 1 tab at night. I advised her Rx was written for Metoprolol 25 mg tab 1/2 tab am and 1/2 tab pm. Pt advised she will start the correct dose tomorrow. Pt had no other concerns at this time. I advised pt that if she had any other questions or concerns to call office. Pt verbalized understanding and agrees with plan at this time.

## 2017-10-31 ENCOUNTER — OFFICE VISIT (OUTPATIENT)
Dept: CARDIOLOGY CLINIC | Age: 82
End: 2017-10-31

## 2017-10-31 VITALS
WEIGHT: 103 LBS | HEART RATE: 72 BPM | HEIGHT: 63 IN | DIASTOLIC BLOOD PRESSURE: 67 MMHG | BODY MASS INDEX: 18.25 KG/M2 | SYSTOLIC BLOOD PRESSURE: 139 MMHG | OXYGEN SATURATION: 96 %

## 2017-10-31 DIAGNOSIS — I25.10 CORONARY ARTERY DISEASE DUE TO LIPID RICH PLAQUE: ICD-10-CM

## 2017-10-31 DIAGNOSIS — I10 ESSENTIAL HYPERTENSION WITH GOAL BLOOD PRESSURE LESS THAN 140/90: Primary | ICD-10-CM

## 2017-10-31 DIAGNOSIS — I48.0 PAF (PAROXYSMAL ATRIAL FIBRILLATION) (HCC): ICD-10-CM

## 2017-10-31 DIAGNOSIS — E78.00 PURE HYPERCHOLESTEROLEMIA: ICD-10-CM

## 2017-10-31 DIAGNOSIS — I25.83 CORONARY ARTERY DISEASE DUE TO LIPID RICH PLAQUE: ICD-10-CM

## 2017-10-31 RX ORDER — NITROGLYCERIN 0.4 MG/1
0.4 TABLET SUBLINGUAL
Qty: 25 TAB | Refills: 3 | Status: SHIPPED | OUTPATIENT
Start: 2017-10-31

## 2017-10-31 RX ORDER — FLUOXETINE 10 MG/1
CAPSULE ORAL
COMMUNITY
Start: 2017-09-18 | End: 2018-10-09

## 2017-10-31 RX ORDER — FUROSEMIDE 20 MG/1
20 TABLET ORAL
Qty: 90 TAB | Refills: 3 | Status: SHIPPED | OUTPATIENT
Start: 2017-10-31 | End: 2018-11-09

## 2017-10-31 RX ORDER — METOPROLOL TARTRATE 25 MG/1
12.5 TABLET, FILM COATED ORAL 2 TIMES DAILY
Qty: 180 TAB | Refills: 3 | Status: SHIPPED | OUTPATIENT
Start: 2017-10-31 | End: 2018-07-16 | Stop reason: SDUPTHER

## 2017-10-31 NOTE — PROGRESS NOTES
As you know, Ada Max is a pleasant, 80 y.o. female with known history of coronary artery disease, non-ST-elevation myocardial infarction, status post left anterior descending and left circumflex bare-metal stents. She also has hypertension, hyperlipidemia, and ischemic cardiomyopathy, a.fib    Ms. Yuri Esteves is here today for follow up appointment. She is doing very well since last visit. She denies any chest pain or chest tightness. She denies any anginal episodes. She denies any palpitations, presyncope or syncope. Overall she is doing okay. She has less strength than before, however she is able to perform ADLs without any problem. Past Medical History:   Diagnosis Date    A-fib [facility]) 01/2017    Arthritis, degenerative     CAD (coronary artery disease) 04/2012    S/P 2.75 X 15 mm BMS of OM (04/2012), Two LAD BMS (07/2012)    Elevated liver enzymes     Likely from statin    HLD (hyperlipidemia)     Hyperkalemia 06/2012    Likely from lisinopril    Ischemic cardiomyopathy     LVEF  45% (11/2012) & 30% echo (04/2012)    NSTEMI (non-ST elevated myocardial infarction) ([facility] [address] 04/2012    Vertigo        Past Surgical History:   Procedure Laterality Date    HX CORONARY STENT PLACEMENT  4/23/12    bare metal stent to obtuse marginal branch    HX HEART CATHETERIZATION         Current Outpatient Prescriptions   Medication Sig    FLUoxetine (PROZAC) 10 mg capsule     clopidogrel (PLAVIX) 75 mg tab Take 1 Tab by mouth daily.  digoxin (LANOXIN) 0.125 mg tablet Take 1 Tab by mouth daily.  metoprolol tartrate (LOPRESSOR) 25 mg tablet Take 0.5 Tabs by mouth two (2) times a day.  furosemide (LASIX) 20 mg tablet Take 20 mg by mouth two (2) times a day.  aspirin delayed-release 81 mg tablet Take 1 Tab by mouth daily.  Calcium-Vitamin D3-Vitamin K 401-175-80 mg-unit-mcg chew Take 2 Units by mouth daily.  CYANOCOBALAMIN, VITAMIN B-12, (VITAMIN B-12 PO) Take  by mouth daily.     mirtazapine (REMERON) 15 mg tablet Take  by mouth nightly as needed.  meclizine (ANTIVERT) 12.5 mg tablet Take  by mouth daily as needed.  ALPRAZolam (XANAX) 0.25 mg tablet Take  by mouth three (3) times daily as needed. No current facility-administered medications for this visit. Allergies and Sensitivities:  Allergies   Allergen Reactions    Codeine Anaphylaxis    Flu Vaccine 2011 (36 Mos+)(Pf) Anaphylaxis    Pcn [Penicillins] Hives     Family History:  Family History   Problem Relation Age of Onset    Diabetes Mother      Social History:  Social History   Substance Use Topics    Smoking status: Never Smoker    Smokeless tobacco: Never Used    Alcohol use No     She  reports that she has never smoked. She has never used smokeless tobacco.  She  reports that she does not drink alcohol. Review of Systems:    Physical Exam:  BP Readings from Last 3 Encounters:   10/31/17 139/67   05/02/17 93/57   02/02/17 116/72         Pulse Readings from Last 3 Encounters:   10/31/17 72   05/02/17 78   02/02/17 90          Wt Readings from Last 3 Encounters:   10/31/17 103 lb (46.7 kg)   05/02/17 112 lb (50.8 kg)   02/02/17 107 lb (48.5 kg)     Constitutional: Oriented to person, place, and time. HENT: Head: Normocephalic and atraumatic. Neck: No JVD present. Cardiovascular: Irregular rhythm. No murmur, gallop or rubs appriciated  Lung[de-identified] Breath sounds normal. No respiratory distress. No Rales appriciated  Abdominal: No tenderness. No rebound and no guarding. Musculoskeletal: There is no ankle edema.  No cynosis    Review of Data:  LABS:   Lab Results   Component Value Date/Time    Sodium 131 07/07/2012 03:21 AM    Potassium 4.5 07/07/2012 03:21 AM    Chloride 98 07/07/2012 03:21 AM    CO2 27 07/07/2012 03:21 AM    Glucose 88 07/07/2012 03:21 AM    BUN 24 07/07/2012 03:21 AM    Creatinine 1.3 07/07/2012 03:21 AM     Lab Results   Component Value Date/Time    Cholesterol, total 165 07/07/2012 03:21 AM    HDL Cholesterol 42 07/07/2012 03:21 AM    LDL, calculated 114.2 07/07/2012 03:21 AM    Triglyceride 44 07/07/2012 03:21 AM    CHOL/HDL Ratio 3.9 07/07/2012 03:21 AM     No results found for: GPT  No results found for: HBA1C, [id number]    EKG (07/2012) Sinus rhythm at 74 beats per minute. No dynamic ST-T changes of ischemia. No Q waves noted. (01/17) A.fib at      CATHETERIZATION:(04/2012)  1. LM: Short, but angiographically normal.   2. LAD: Proximal at D1 level, 40% to 50% moderate disease, mid left anterior descending at D2 level 80% tubular stenosis. 3. LCx/OM: Proximal diffuse 40%, Mid OM 95-99% subtotal occlusion with ruptured plaque and with NISH 2 flow. 4. RCA: Anatomically dominant; 30- 40%  proximal to mid, otherwise normal.   5. Markedly reduced LVEF with estimated ejection fraction of 25% with evidence of severe hypokinesis of entire anterior apical and inferoapical wall. Diaphragmatic and inferobasilar wall is the most mary segment. Successful PCI and stenting of the OM, 95-99% subtotal occlusion with 2.75 x 15 mm bare metal Vision stent. CATH (07/2012)   LM: Normal  LAD: 70% calcified prox-mid, Mid LAD 90%  LCx/OM1: proximal 50% moderate disease, Patent mid stent  Successful PCI and stenting of LAD using 2.5 X 18 and 2.5X15 mm BMS    ECHO (11/2012)   1. Left ventricular systolic function was mildly reduced with ejection fraction of 45 to 50%. There was mild diffuse hypokinesis with more pronounced hypokinesis of inferolateral wall. Diastolic dysfunction present. 2.  Right ventricular function is normal.  It was mildly dilated. Estimated peak pressure of 40 mmHg. 3.  Mild mitral regurgitation and aortic regurgitation. No significant valvular pathology noted. 4.  Comparison was made with previous study in April 2012. Overall, left ventricular function has increased from 30 to 45%. Impression / Plan:    Coronary artery disease:    She had an LAD and circumflex stent in 2012.   Currently she is on asa, BB and Plavix. No angina. Will provide S/L NTG as needed. Ischemic cardiomyopathy:    Last ejection fraction was noted to be 45-50% in November, 2012. Lasix only as needed. ALready on digoxin, lopressor. Hypertension:   Stable. BP today 139/67 mm Hg  Continue digoxin and BB    A.fib:   Still in a.fib on exam.   HR 72 bpm today. Appears a.fib  Continue BB and digoxin    Hyperlipidemia:  She has a history of statin induced elevation of LFTs every time she has tried different kinds of statins. She is currently not on any lipid lowering agent. Diet controlled. Defer to PCP. This plan was discussed with Ms. Quiles in detail who is in agreement. Thank you for allowing me to participate in the patient's care. Feel free to call me with any questions or concerns.

## 2017-10-31 NOTE — PROGRESS NOTES
1. Have you been to the ER, urgent care clinic since your last visit? Hospitalized since your last visit? No    2. Have you seen or consulted any other health care providers outside of the [address] [place] [address] since your last visit? Include any pap smears or colon screening.  No

## 2017-10-31 NOTE — MR AVS SNAPSHOT
Visit Information Date & Time Provider Department Dept. Phone Encounter #  
 10/31/2017  1:00 PM Hitesh Hernandez MD [address] [place] [address] Specialist at [facility] [place] [phone] [id number] Follow-up Instructions Return in about 6 months (around 4/30/2018). Your Appointments 4/3/2018  1:15 PM  
Follow Up with Hitesh Hernandez MD  
Cardio Specialist at [facility] Appt Note: 6 months [facility] [address] [address] [address] Upcoming Health Maintenance Date Due DTaP/Tdap/Td series (1 - Tdap) 8/10/1948 ZOSTER VACCINE AGE 60> 6/10/1987 GLAUCOMA SCREENING Q2Y 8/10/1992 Pneumococcal 65+ Low/Medium Risk (1 of 2 - PCV13) 8/10/1992 MEDICARE YEARLY EXAM 8/10/1992 INFLUENZA AGE 9 TO ADULT 8/1/2017 Allergies as of 10/31/2017  Review Complete On: 10/31/2017 By: Renea Porras LPN Severity Noted Reaction Type Reactions Codeine  05/03/2012    Anaphylaxis Flu Vaccine 2011 (36 Mos+)(pf)  05/03/2012    Anaphylaxis Pcn [Penicillins]  05/03/2012    Hives Current Immunizations  Never Reviewed No immunizations on file. Not reviewed this visit Vitals BP Pulse Height(growth percentile) Weight(growth percentile) SpO2 BMI  
 139/67 72 5' 2.5\" (1.588 m) 103 lb (46.7 kg) 96% 18.54 kg/m2 OB Status Smoking Status Postmenopausal Never Smoker BMI and BSA Data Body Mass Index Body Surface Area 18.54 kg/m 2 1.44 m 2 Preferred Pharmacy Pharmacy Name Phone [facility], [address] [facility] Vianney Rodriguez. [phone] Your Updated Medication List  
  
   
This list is accurate as of: 10/31/17  1:30 PM.  Always use your most recent med list.  
  
  
  
  
 ANTIVERT 12.5 mg tablet Generic drug:  meclizine Take  by mouth daily as needed. aspirin delayed-release 81 mg tablet Take 1 Tab by mouth daily. Calcium-Vitamin D3-Vitamin K [id number] mg-unit-mcg Chew Take 2 Units by mouth daily. clopidogrel 75 mg Tab Commonly known as:  PLAVIX Take 1 Tab by mouth daily. digoxin 0.125 mg tablet Commonly known as:  LANOXIN Take 1 Tab by mouth daily. FLUoxetine 10 mg capsule Commonly known as:  PROzac LASIX 20 mg tablet Generic drug:  furosemide Take 20 mg by mouth two (2) times a day. metoprolol tartrate 25 mg tablet Commonly known as:  LOPRESSOR Take 0.5 Tabs by mouth two (2) times a day. REMERON 15 mg tablet Generic drug:  mirtazapine Take  by mouth nightly as needed. VITAMIN B-12 PO Take  by mouth daily. XANAX 0.25 mg tablet Generic drug:  ALPRAZolam  
Take  by mouth three (3) times daily as needed. Follow-up Instructions Return in about 6 months (around 4/30/2018). Patient Instructions Lasix 20mg daily as needed Introducing \[facility] [place] [facility]\"" & [facility] SERVICES! Dear Sunday Friend: Thank you for requesting a [facility] account. Our records indicate that you have previously registered for a [facility] account but its currently inactive. Please call our [employer] support line at [phone]. Additional Information If you have questions, please visit the Frequently Asked Questions section of the [employer] website at [email or web address] Remember, [facility] is NOT to be used for urgent needs. For medical emergencies, dial 911. Now available from your iPhone and Android! Please provide this summary of care documentation to your next provider. Your primary care clinician is listed as Nany Rosales. If you have any questions after today's visit, please call [phone].

## 2018-04-30 ENCOUNTER — OFFICE VISIT (OUTPATIENT)
Dept: CARDIOLOGY CLINIC | Age: 83
End: 2018-04-30

## 2018-04-30 VITALS
WEIGHT: 100 LBS | DIASTOLIC BLOOD PRESSURE: 65 MMHG | SYSTOLIC BLOOD PRESSURE: 141 MMHG | OXYGEN SATURATION: 96 % | HEIGHT: 62 IN | HEART RATE: 60 BPM | BODY MASS INDEX: 18.4 KG/M2

## 2018-04-30 DIAGNOSIS — I25.10 CORONARY ARTERY DISEASE DUE TO LIPID RICH PLAQUE: Primary | ICD-10-CM

## 2018-04-30 DIAGNOSIS — I10 ESSENTIAL HYPERTENSION WITH GOAL BLOOD PRESSURE LESS THAN 140/90: ICD-10-CM

## 2018-04-30 DIAGNOSIS — E78.00 PURE HYPERCHOLESTEROLEMIA: ICD-10-CM

## 2018-04-30 DIAGNOSIS — I42.9 CARDIOMYOPATHY, UNSPECIFIED TYPE (HCC): ICD-10-CM

## 2018-04-30 DIAGNOSIS — I25.83 CORONARY ARTERY DISEASE DUE TO LIPID RICH PLAQUE: Primary | ICD-10-CM

## 2018-04-30 DIAGNOSIS — I48.0 PAF (PAROXYSMAL ATRIAL FIBRILLATION) (HCC): ICD-10-CM

## 2018-04-30 RX ORDER — ONDANSETRON 4 MG/1
TABLET, FILM COATED ORAL
Refills: 1 | COMMUNITY
Start: 2018-04-03 | End: 2018-10-09 | Stop reason: SDUPTHER

## 2018-04-30 NOTE — LETTER
Patient:  Yohana Christie YOB: 1927 Date of Visit: 4/30/2018 Dear Sima Gregg MD 
[address] [facility] [address] [address] VIA Facsimile: [phone] 
 : Thank you for referring Ms. Deepti Quiles to me for evaluation/treatment. Below are the relevant portions of my assessment and plan of care. As you know, Gertrudis Knox is a pleasant, 80 y.o. female with known history of coronary artery disease, non-ST-elevation myocardial infarction, status post left anterior descending and left circumflex bare-metal stents. She also has hypertension, hyperlipidemia, and ischemic cardiomyopathy, a.fib Ms. Claudia Flores is here today for follow up appointment. She is accompanied by the daughter. She has no new symptoms to report. She denies any chest pain or chest tightness. She has some stable dyspnea on moderate exertion. She denies any presyncope or syncope. She is taking her medication regularly. She takes Lasix only on an as needed basis. Overall she has no new symptoms to report. Past Medical History:  
Diagnosis Date  A-fib ([facility] 01/2017  Arthritis, degenerative  CAD (coronary artery disease) 04/2012 S/P 2.75 X 15 mm BMS of OM (04/2012), Two LAD BMS (07/2012)  Elevated liver enzymes Likely from statin  HLD (hyperlipidemia)  Hyperkalemia 06/2012 Likely from lisinopril  Ischemic cardiomyopathy LVEF  45% (11/2012) & 30% echo (04/2012)  NSTEMI (non-ST elevated myocardial infarction) ([facility] [address] 04/2012  Vertigo Past Surgical History:  
Procedure Laterality Date  HX CORONARY STENT PLACEMENT  4/23/12  
 bare metal stent to obtuse marginal branch  HX HEART CATHETERIZATION Current Outpatient Prescriptions Medication Sig  
 ondansetron hcl (ZOFRAN) 4 mg tablet  FLUoxetine (PROZAC) 10 mg capsule  furosemide (LASIX) 20 mg tablet Take 1 Tab by mouth daily as needed.  metoprolol tartrate (LOPRESSOR) 25 mg tablet Take 0.5 Tabs by mouth two (2) times a day.  nitroglycerin (NITROSTAT) 0.4 mg SL tablet 1 Tab by SubLINGual route every five (5) minutes as needed for Chest Pain.  clopidogrel (PLAVIX) 75 mg tab Take 1 Tab by mouth daily.  digoxin (LANOXIN) 0.125 mg tablet Take 1 Tab by mouth daily.  aspirin delayed-release 81 mg tablet Take 1 Tab by mouth daily.  Calcium-Vitamin D3-Vitamin K [id number] mg-unit-mcg chew Take 2 Units by mouth daily.  CYANOCOBALAMIN, VITAMIN B-12, (VITAMIN B-12 PO) Take  by mouth daily.  mirtazapine (REMERON) 15 mg tablet Take  by mouth nightly as needed.  meclizine (ANTIVERT) 12.5 mg tablet Take  by mouth daily as needed.  ALPRAZolam (XANAX) 0.25 mg tablet Take  by mouth three (3) times daily as needed. No current facility-administered medications for this visit. Allergies and Sensitivities: 
Allergies Allergen Reactions  Codeine Anaphylaxis  Flu Vaccine 2011 (36 Mos+)(Pf) Anaphylaxis  Pcn [Penicillins] Hives Family History: 
Family History Problem Relation Age of Onset  Diabetes Mother Social History: 
Social History Substance Use Topics  Smoking status: Never Smoker  Smokeless tobacco: Never Used  Alcohol use No  
 
She  reports that she has never smoked. She has never used smokeless tobacco.  She  reports that she does not drink alcohol. Review of Systems: 
 
Physical Exam: 
BP Readings from Last 3 Encounters:  
04/30/18 141/65  
10/31/17 139/67  
05/02/17 93/57 Pulse Readings from Last 3 Encounters:  
04/30/18 60  
10/31/17 72  
05/02/17 78 Wt Readings from Last 3 Encounters:  
04/30/18 100 lb (45.4 kg) 10/31/17 103 lb (46.7 kg) 05/02/17 112 lb (50.8 kg) Constitutional: Oriented to person, place, and time. HENT: Head: Normocephalic and atraumatic. Neck: No JVD present. Cardiovascular: Regular rhythm. No murmur, gallop or rubs appriciated Lung[de-identified] Breath sounds normal. No respiratory distress. No Rales appriciated Abdominal: No tenderness. No rebound and no guarding. Musculoskeletal: There is no ankle edema. No cynosis Review of Data: 
LABS:  
Lab Results Component Value Date/Time Sodium 131 (L) 07/07/2012 03:21 AM  
 Potassium 4.5 07/07/2012 03:21 AM  
 Chloride 98 (L) 07/07/2012 03:21 AM  
 CO2 27 07/07/2012 03:21 AM  
 Glucose 88 07/07/2012 03:21 AM  
 BUN 24 (H) 07/07/2012 03:21 AM  
 Creatinine 1.3 07/07/2012 03:21 AM  
 
Lab Results Component Value Date/Time Cholesterol, total 165 07/07/2012 03:21 AM  
 HDL Cholesterol 42 07/07/2012 03:21 AM  
 LDL, calculated 114.2 (H) 07/07/2012 03:21 AM  
 Triglyceride 44 07/07/2012 03:21 AM  
 CHOL/HDL Ratio 3.9 07/07/2012 03:21 AM  
 
No results found for: GPT No results found for: HBA1C, [id number] 
 
EKG (07/2012) Sinus rhythm at 74 beats per minute. No dynamic ST-T changes of ischemia. No Q waves noted. (01/17) A.fib at CATHETERIZATION:(04/2012) 1. LM: Short, but angiographically normal.  
2. LAD: Proximal at D1 level, 40% to 50% moderate disease, mid left anterior descending at D2 level 80% tubular stenosis. 3. LCx/OM: Proximal diffuse 40%, Mid OM 95-99% subtotal occlusion with ruptured plaque and with NSIH 2 flow. 4. RCA: Anatomically dominant; 30- 40%  proximal to mid, otherwise normal.  
5. Markedly reduced LVEF with estimated ejection fraction of 25% with evidence of severe hypokinesis of entire anterior apical and inferoapical wall. Diaphragmatic and inferobasilar wall is the most mary segment. Successful PCI and stenting of the OM, 95-99% subtotal occlusion with 2.75 x 15 mm bare metal Vision stent. CATH (07/2012) LM: Normal 
LAD: 70% calcified prox-mid, Mid LAD 90% LCx/OM1: proximal 50% moderate disease, Patent mid stent Successful PCI and stenting of LAD using 2.5 X 18 and 2.5X15 mm BMS 
 
ECHO (11/2012) 1.  Left ventricular systolic function was mildly reduced with ejection fraction of 45 to 50%. There was mild diffuse hypokinesis with more pronounced hypokinesis of inferolateral wall. Diastolic dysfunction present. 2.  Right ventricular function is normal.  It was mildly dilated. Estimated peak pressure of 40 mmHg. 3.  Mild mitral regurgitation and aortic regurgitation. No significant valvular pathology noted. 4.  Comparison was made with previous study in April 2012. Overall, left ventricular function has increased from 30 to 45%. Impression / Plan: 
 
Coronary artery disease: She had an LAD and circumflex stent in 2012. Currently she is on asa, BB and Plavix. No angina. Will provide S/L NTG as needed. No use of S/L NTG since last visit Ischemic cardiomyopathy:   
Last ejection fraction was noted to be 45-50% in November, 2012. Lasix only as needed. ALready on digoxin, lopressor. Continue same. Hypertension:  
Stable. BP today 140/65 mm Hg Continue digoxin and BB 
 
A.fib:  
Appears to be in sinus rhythm on exam today. HR 60 bpm today. Continue BB and digoxin On ASA and plavix for stroke prophylaxis. Hyperlipidemia:  She has a history of statin induced elevation of LFTs every time she has tried different kinds of statins. She is currently not on any lipid lowering agent. Diet controlled. Defer to PCP. This plan was discussed with Ms. Quiles in detail who is in agreement. Thank you for allowing me to participate in the patient's care. Feel free to call me with any questions or concerns. Sincerely, Sean De Leon MD

## 2018-04-30 NOTE — MR AVS SNAPSHOT
[address] [facility] [address] [place] [address] [address] 
[phone] Patient: Valeri Felder MRN: [id number] :8/10/1927 Visit Information Date & Time Provider Department Dept. Phone Encounter #  
 2018  1:30 PM Hitesh Robison  [facility] Specialist at [place] [phone] [id number] Your Appointments 10/30/2018  1:15 PM  
Follow Up with Hitesh Robison MD  
Cardio Special[facility] at [facility] Appt Note: 6 months [facility] [address] [address] [address] Upcoming Health Maintenance Date Due DTaP/Tdap/Td series (1 - Tdap) 8/10/1948 ZOSTER VACCINE AGE 60> 6/10/1987 GLAUCOMA SCREENING Q2Y 8/10/1992 Pneumococcal 65+ Low/Medium Risk (1 of 2 - PCV13) 8/10/1992 MEDICARE YEARLY EXAM 3/14/2018 Influenza Age 5 to Adult 2018 Allergies as of 2018  Review Complete On: 2018 By: Eloisa Tian RN Severity Noted Reaction Type Reactions Codeine  2012    Anaphylaxis Flu Vaccine  (36 Mos+)(pf)  2012    Anaphylaxis Pcn [Penicillins]  2012    Hives Current Immunizations  Never Reviewed No immunizations on file. Not reviewed this visit Vitals BP Pulse Height(growth percentile) Weight(growth percentile) SpO2 BMI  
 141/65 60 5' 2\" (1.575 m) 100 lb (45.4 kg) 96% 18.29 kg/m2 OB Status Smoking Status Postmenopausal Never Smoker Vitals History BMI and BSA Data Body Mass Index Body Surface Area  
 18.29 kg/m 2 1.41 m 2 Preferred Pharmacy Pharmacy Name Phone [facility], [facility] [phone] Your Updated Medication List  
  
   
This list is accurate as of 18  2:06 PM.  Always use your most recent med list.  
  
  
  
  
 ANTIVERT 12.5 mg tablet Generic drug:  meclizine Take  by mouth daily as needed. aspirin delayed-release 81 mg tablet Take 1 Tab by mouth daily. Calcium-Vitamin D3-Vitamin K [id number] mg-unit-mcg Chew Take 2 Units by mouth daily. clopidogrel 75 mg Tab Commonly known as:  PLAVIX Take 1 Tab by mouth daily. digoxin 0.125 mg tablet Commonly known as:  LANOXIN Take 1 Tab by mouth daily. FLUoxetine 10 mg capsule Commonly known as:  PROzac  
  
 furosemide 20 mg tablet Commonly known as:  LASIX Take 1 Tab by mouth daily as needed. metoprolol tartrate 25 mg tablet Commonly known as:  LOPRESSOR Take 0.5 Tabs by mouth two (2) times a day. nitroglycerin 0.4 mg SL tablet Commonly known as:  NITROSTAT  
1 Tab by SubLINGual route every five (5) minutes as needed for Chest Pain. ondansetron hcl 4 mg tablet Commonly known as:  ZOFRAN  
  
 REMERON 15 mg tablet Generic drug:  mirtazapine Take  by mouth nightly as needed. VITAMIN B-12 PO Take  by mouth daily. XANAX 0.25 mg tablet Generic drug:  ALPRAZolam  
Take  by mouth three (3) times daily as needed. Introducing [facility] & HEALTH SERVICES! Dear Nancy Covert: Thank you for requesting a [employer] account. Our records indicate that you have previously registered for a Mirimus account but its currently inactive. Please call our Mirimus support line at [phone]. Additional Information If you have questions, please visit the Frequently Asked Questions section of the [employer] website at [email or web address] Remember, [employer]imus is NOT to be used for urgent needs. For medical emergencies, dial 911. Now available from your iPhone and Android! Please provide this summary of care documentation to your next provider. Your primary care clinician is listed as Danielle Duane. If you have any questions after today's visit, please call [phone].

## 2018-04-30 NOTE — PROGRESS NOTES
1. Have you been to the ER, urgent care clinic since your last visit? Hospitalized since your last visit? No    2. Have you seen or consulted any other health care providers outside of the [address] [place] [address] since your last visit? Include any pap smears or colon screening.  No

## 2018-04-30 NOTE — PROGRESS NOTES
As you know, Jeaneth Sharma is a pleasant, 80 y.o. female with known history of coronary artery disease, non-ST-elevation myocardial infarction, status post left anterior descending and left circumflex bare-metal stents. She also has hypertension, hyperlipidemia, and ischemic cardiomyopathy, a.fib    Ms. Elisa Garza is here today for follow up appointment. She is accompanied by the daughter. She has no new symptoms to report. She denies any chest pain or chest tightness. She has some stable dyspnea on moderate exertion. She denies any presyncope or syncope. She is taking her medication regularly. She takes Lasix only on an as needed basis. Overall she has no new symptoms to report. Past Medical History:   Diagnosis Date    A-[facility]) 01/2017    Arthritis, degenerative     CAD (coronary artery disease) 04/2012    S/P 2.75 X 15 mm BMS of OM (04/2012), Two LAD BMS (07/2012)    Elevated liver enzymes     Likely from statin    HLD (hyperlipidemia)     Hyperkalemia 06/2012    Likely from lisinopril    Ischemic cardiomyopathy     LVEF  45% (11/2012) & 30% echo (04/2012)    NSTEMI (non-ST elevated myocardial infarction) ([facility] [address] 04/2012    Vertigo        Past Surgical History:   Procedure Laterality Date    HX CORONARY STENT PLACEMENT  4/23/12    bare metal stent to obtuse marginal branch    HX HEART CATHETERIZATION         Current Outpatient Prescriptions   Medication Sig    ondansetron hcl (ZOFRAN) 4 mg tablet     FLUoxetine (PROZAC) 10 mg capsule     furosemide (LASIX) 20 mg tablet Take 1 Tab by mouth daily as needed.  metoprolol tartrate (LOPRESSOR) 25 mg tablet Take 0.5 Tabs by mouth two (2) times a day.  nitroglycerin (NITROSTAT) 0.4 mg SL tablet 1 Tab by SubLINGual route every five (5) minutes as needed for Chest Pain.  clopidogrel (PLAVIX) 75 mg tab Take 1 Tab by mouth daily.  digoxin (LANOXIN) 0.125 mg tablet Take 1 Tab by mouth daily.     aspirin delayed-release 81 mg tablet Take 1 Tab by mouth daily.  Calcium-Vitamin D3-Vitamin K [id number] mg-unit-mcg chew Take 2 Units by mouth daily.  CYANOCOBALAMIN, VITAMIN B-12, (VITAMIN B-12 PO) Take  by mouth daily.  mirtazapine (REMERON) 15 mg tablet Take  by mouth nightly as needed.  meclizine (ANTIVERT) 12.5 mg tablet Take  by mouth daily as needed.  ALPRAZolam (XANAX) 0.25 mg tablet Take  by mouth three (3) times daily as needed. No current facility-administered medications for this visit. Allergies and Sensitivities:  Allergies   Allergen Reactions    Codeine Anaphylaxis    Flu Vaccine 2011 (36 Mos+)(Pf) Anaphylaxis    Pcn [Penicillins] Hives     Family History:  Family History   Problem Relation Age of Onset    Diabetes Mother      Social History:  Social History   Substance Use Topics    Smoking status: Never Smoker    Smokeless tobacco: Never Used    Alcohol use No     She  reports that she has never smoked. She has never used smokeless tobacco.  She  reports that she does not drink alcohol. Review of Systems:    Physical Exam:  BP Readings from Last 3 Encounters:   04/30/18 141/65   10/31/17 139/67   05/02/17 93/57         Pulse Readings from Last 3 Encounters:   04/30/18 60   10/31/17 72   05/02/17 78          Wt Readings from Last 3 Encounters:   04/30/18 100 lb (45.4 kg)   10/31/17 103 lb (46.7 kg)   05/02/17 112 lb (50.8 kg)     Constitutional: Oriented to person, place, and time. HENT: Head: Normocephalic and atraumatic. Neck: No JVD present. Cardiovascular: Regular rhythm. No murmur, gallop or rubs appriciated  Lung[de-identified] Breath sounds normal. No respiratory distress. No Rales appriciated  Abdominal: No tenderness. No rebound and no guarding. Musculoskeletal: There is no ankle edema.  No cynosis    Review of Data:  LABS:   Lab Results   Component Value Date/Time    Sodium 131 (L) 07/07/2012 03:21 AM    Potassium 4.5 07/07/2012 03:21 AM    Chloride 98 (L) 07/07/2012 03:21 AM    CO2 27 07/07/2012 03:21 AM    Glucose 88 07/07/2012 03:21 AM    BUN 24 (H) 07/07/2012 03:21 AM    Creatinine 1.3 07/07/2012 03:21 AM     Lab Results   Component Value Date/Time    Cholesterol, total 165 07/07/2012 03:21 AM    HDL Cholesterol 42 07/07/2012 03:21 AM    LDL, calculated 114.2 (H) 07/07/2012 03:21 AM    Triglyceride 44 07/07/2012 03:21 AM    CHOL/HDL Ratio 3.9 07/07/2012 03:21 AM     No results found for: GPT  No results found for: HBA1C, [id number]    EKG (07/2012) Sinus rhythm at 74 beats per minute. No dynamic ST-T changes of ischemia. No Q waves noted. (01/17) A.fib at      CATHETERIZATION:(04/2012)  1. LM: Short, but angiographically normal.   2. LAD: Proximal at D1 level, 40% to 50% moderate disease, mid left anterior descending at D2 level 80% tubular stenosis. 3. LCx/OM: Proximal diffuse 40%, Mid OM 95-99% subtotal occlusion with ruptured plaque and with NISH 2 flow. 4. RCA: Anatomically dominant; 30- 40%  proximal to mid, otherwise normal.   5. Markedly reduced LVEF with estimated ejection fraction of 25% with evidence of severe hypokinesis of entire anterior apical and inferoapical wall. Diaphragmatic and inferobasilar wall is the most mary segment. Successful PCI and stenting of the OM, 95-99% subtotal occlusion with 2.75 x 15 mm bare metal Vision stent. CATH (07/2012)   LM: Normal  LAD: 70% calcified prox-mid, Mid LAD 90%  LCx/OM1: proximal 50% moderate disease, Patent mid stent  Successful PCI and stenting of LAD using 2.5 X 18 and 2.5X15 mm BMS    ECHO (11/2012)   1. Left ventricular systolic function was mildly reduced with ejection fraction of 45 to 50%. There was mild diffuse hypokinesis with more pronounced hypokinesis of inferolateral wall. Diastolic dysfunction present. 2.  Right ventricular function is normal.  It was mildly dilated. Estimated peak pressure of 40 mmHg. 3.  Mild mitral regurgitation and aortic regurgitation. No significant valvular pathology noted.   4.  Comparison was made with previous study in April 2012. Overall, left ventricular function has increased from 30 to 45%. Impression / Plan:    Coronary artery disease:    She had an LAD and circumflex stent in 2012. Currently she is on asa, BB and Plavix. No angina. Will provide S/L NTG as needed. No use of S/L NTG since last visit    Ischemic cardiomyopathy:    Last ejection fraction was noted to be 45-50% in November, 2012. Lasix only as needed. ALready on digoxin, lopressor. Continue same. Hypertension:   Stable. BP today 140/65 mm Hg  Continue digoxin and BB    A.fib:   Appears to be in sinus rhythm on exam today. HR 60 bpm today. Continue BB and digoxin  On ASA and plavix for stroke prophylaxis. Hyperlipidemia:  She has a history of statin induced elevation of LFTs every time she has tried different kinds of statins. She is currently not on any lipid lowering agent. Diet controlled. Defer to PCP. This plan was discussed with Ms. Quiles in detail who is in agreement. Thank you for allowing me to participate in the patient's care. Feel free to call me with any questions or concerns.

## 2018-06-07 NOTE — COMMUNICATION BODY
As you know, Roel Palacios is a pleasant, 80 y.o. female with known history of coronary artery disease, non-ST-elevation myocardial infarction, status post left anterior descending and left circumflex bare-metal stents. She also has hypertension, hyperlipidemia, and ischemic cardiomyopathy, a.allison    Ms. Alyssa Tamayo is here today for follow up appointment. She is accompanied by the daughter. She has no new symptoms to report. She denies any chest pain or chest tightness. She has some stable dyspnea on moderate exertion. She denies any presyncope or syncope. She is taking her medication regularly. She takes Lasix only on an as needed basis. Overall she has no new symptoms to report. Past Medical History:   Diagnosis Date    A-allison [facility]) 01/2017    Arthritis, degenerative     CAD (coronary artery disease) 04/2012    S/P 2.75 X 15 mm BMS of OM (04/2012), Two LAD BMS (07/2012)    Elevated liver enzymes     Likely from statin    HLD (hyperlipidemia)     Hyperkalemia 06/2012    Likely from lisinopril    Ischemic cardiomyopathy     LVEF  45% (11/2012) & 30% echo (04/2012)    NSTEMI (non-ST elevated myocardial infarction) ([facility] [address] 04/2012    Vertigo        Past Surgical History:   Procedure Laterality Date    HX CORONARY STENT PLACEMENT  4/23/12    bare metal stent to obtuse marginal branch    HX HEART CATHETERIZATION         Current Outpatient Prescriptions   Medication Sig    ondansetron hcl (ZOFRAN) 4 mg tablet     FLUoxetine (PROZAC) 10 mg capsule     furosemide (LASIX) 20 mg tablet Take 1 Tab by mouth daily as needed.  metoprolol tartrate (LOPRESSOR) 25 mg tablet Take 0.5 Tabs by mouth two (2) times a day.  nitroglycerin (NITROSTAT) 0.4 mg SL tablet 1 Tab by SubLINGual route every five (5) minutes as needed for Chest Pain.  clopidogrel (PLAVIX) 75 mg tab Take 1 Tab by mouth daily.  digoxin (LANOXIN) 0.125 mg tablet Take 1 Tab by mouth daily.     aspirin delayed-release 81 mg tablet Take 1 Tab by mouth daily.  Calcium-Vitamin D3-Vitamin K 171-508-43 mg-unit-mcg chew Take 2 Units by mouth daily.  CYANOCOBALAMIN, VITAMIN B-12, (VITAMIN B-12 PO) Take  by mouth daily.  mirtazapine (REMERON) 15 mg tablet Take  by mouth nightly as needed.  meclizine (ANTIVERT) 12.5 mg tablet Take  by mouth daily as needed.  ALPRAZolam (XANAX) 0.25 mg tablet Take  by mouth three (3) times daily as needed. No current facility-administered medications for this visit. Allergies and Sensitivities:  Allergies   Allergen Reactions    Codeine Anaphylaxis    Flu Vaccine 2011 (36 Mos+)(Pf) Anaphylaxis    Pcn [Penicillins] Hives     Family History:  Family History   Problem Relation Age of Onset    Diabetes Mother      Social History:  Social History   Substance Use Topics    Smoking status: Never Smoker    Smokeless tobacco: Never Used    Alcohol use No     She  reports that she has never smoked. She has never used smokeless tobacco.  She  reports that she does not drink alcohol. Review of Systems:    Physical Exam:  BP Readings from Last 3 Encounters:   04/30/18 141/65   10/31/17 139/67   05/02/17 93/57         Pulse Readings from Last 3 Encounters:   04/30/18 60   10/31/17 72   05/02/17 78          Wt Readings from Last 3 Encounters:   04/30/18 100 lb (45.4 kg)   10/31/17 103 lb (46.7 kg)   05/02/17 112 lb (50.8 kg)     Constitutional: Oriented to person, place, and time. HENT: Head: Normocephalic and atraumatic. Neck: No JVD present. Cardiovascular: Regular rhythm. No murmur, gallop or rubs appriciated  Lung[de-identified] Breath sounds normal. No respiratory distress. No Rales appriciated  Abdominal: No tenderness. No rebound and no guarding. Musculoskeletal: There is no ankle edema.  No cynosis    Review of Data:  LABS:   Lab Results   Component Value Date/Time    Sodium 131 (L) 07/07/2012 03:21 AM    Potassium 4.5 07/07/2012 03:21 AM    Chloride 98 (L) 07/07/2012 03:21 AM    CO2 27 07/07/2012 03:21 AM    Glucose 88 07/07/2012 03:21 AM    BUN 24 (H) 07/07/2012 03:21 AM    Creatinine 1.3 07/07/2012 03:21 AM     Lab Results   Component Value Date/Time    Cholesterol, total 165 07/07/2012 03:21 AM    HDL Cholesterol 42 07/07/2012 03:21 AM    LDL, calculated 114.2 (H) 07/07/2012 03:21 AM    Triglyceride 44 07/07/2012 03:21 AM    CHOL/HDL Ratio 3.9 07/07/2012 03:21 AM     No results found for: GPT  No results found for: HBA1C, PPR4ASFF    EKG (07/2012) Sinus rhythm at 74 beats per minute. No dynamic ST-T changes of ischemia. No Q waves noted. (01/17) A.fib at      CATHETERIZATION:(04/2012)  1. LM: Short, but angiographically normal.   2. LAD: Proximal at D1 level, 40% to 50% moderate disease, mid left anterior descending at D2 level 80% tubular stenosis. 3. LCx/OM: Proximal diffuse 40%, Mid OM 95-99% subtotal occlusion with ruptured plaque and with NISH 2 flow. 4. RCA: Anatomically dominant; 30- 40%  proximal to mid, otherwise normal.   5. Markedly reduced LVEF with estimated ejection fraction of 25% with evidence of severe hypokinesis of entire anterior apical and inferoapical wall. Diaphragmatic and inferobasilar wall is the most mary segment. Successful PCI and stenting of the OM, 95-99% subtotal occlusion with 2.75 x 15 mm bare metal Vision stent. CATH (07/2012)   LM: Normal  LAD: 70% calcified prox-mid, Mid LAD 90%  LCx/OM1: proximal 50% moderate disease, Patent mid stent  Successful PCI and stenting of LAD using 2.5 X 18 and 2.5X15 mm BMS    ECHO (11/2012)   1. Left ventricular systolic function was mildly reduced with ejection fraction of 45 to 50%. There was mild diffuse hypokinesis with more pronounced hypokinesis of inferolateral wall. Diastolic dysfunction present. 2.  Right ventricular function is normal.  It was mildly dilated. Estimated peak pressure of 40 mmHg. 3.  Mild mitral regurgitation and aortic regurgitation. No significant valvular pathology noted.   4.  Comparison was made with previous study in April 2012. Overall, left ventricular function has increased from 30 to 45%. Impression / Plan:    Coronary artery disease:    She had an LAD and circumflex stent in 2012. Currently she is on asa, BB and Plavix. No angina. Will provide S/L NTG as needed. No use of S/L NTG since last visit    Ischemic cardiomyopathy:    Last ejection fraction was noted to be 45-50% in November, 2012. Lasix only as needed. ALready on digoxin, lopressor. Continue same. Hypertension:   Stable. BP today 140/65 mm Hg  Continue digoxin and BB    A.fib:   Appears to be in sinus rhythm on exam today. HR 60 bpm today. Continue BB and digoxin  On ASA and plavix for stroke prophylaxis. Hyperlipidemia:  She has a history of statin induced elevation of LFTs every time she has tried different kinds of statins. She is currently not on any lipid lowering agent. Diet controlled. Defer to PCP. This plan was discussed with Ms. Quiles in detail who is in agreement. Thank you for allowing me to participate in the patient's care. Feel free to call me with any questions or concerns.

## 2018-07-16 NOTE — TELEPHONE ENCOUNTER
Last appt: 4/30/2018  Future Appointments  Date Time Provider George Che   10/30/2018 1:15 PM Twyla Patel  [facility]       Requested Prescriptions     Pending Prescriptions Disp Refills    metoprolol tartrate (LOPRESSOR) 25 mg tablet 180 Tab 3     Sig: Take 0.5 Tabs by mouth two (2) times a day.  digoxin (LANOXIN) 0.125 mg tablet 90 Tab 3     Sig: Take 1 Tab by mouth daily.      Pharm[facility] confirmed: [facility]

## 2018-07-18 RX ORDER — DIGOXIN 125 MCG
0.12 TABLET ORAL DAILY
Qty: 90 TAB | Refills: 3 | Status: SHIPPED | OUTPATIENT
Start: 2018-07-18 | End: 2018-11-09

## 2018-07-18 RX ORDER — METOPROLOL TARTRATE 25 MG/1
12.5 TABLET, FILM COATED ORAL 2 TIMES DAILY
Qty: 180 TAB | Refills: 3 | Status: SHIPPED | OUTPATIENT
Start: 2018-07-18 | End: 2018-11-09

## 2018-07-23 RX ORDER — CLOPIDOGREL BISULFATE 75 MG/1
75 TABLET ORAL DAILY
Qty: 90 TAB | Refills: 3 | Status: SHIPPED | OUTPATIENT
Start: 2018-07-23 | End: 2019-10-04

## 2018-07-23 NOTE — TELEPHONE ENCOUNTER
Contacted pt on vm line. Two patient Identifiers confirmed. Pt request refill of plavix.          Last appt: 4/30/2018  Future Appointments  Date Time Provider George Buii   10/30/2018 1:15 PM Hitesh Alston MD [address] [place] [address]       Requested Prescriptions      No prescriptions requested or ordered in this encounter         Pharmacy: [facility]

## 2018-09-24 ENCOUNTER — OFFICE VISIT (OUTPATIENT)
Dept: INTERNAL MEDICINE CLINIC | Age: 83
End: 2018-09-24

## 2018-09-24 VITALS
OXYGEN SATURATION: 96 % | RESPIRATION RATE: 22 BRPM | HEART RATE: 67 BPM | BODY MASS INDEX: 19.21 KG/M2 | SYSTOLIC BLOOD PRESSURE: 146 MMHG | TEMPERATURE: 96.1 F | DIASTOLIC BLOOD PRESSURE: 82 MMHG | WEIGHT: 104.4 LBS | HEIGHT: 62 IN

## 2018-09-24 DIAGNOSIS — R63.0 DECREASED APPETITE: ICD-10-CM

## 2018-09-24 DIAGNOSIS — I10 ESSENTIAL HYPERTENSION: ICD-10-CM

## 2018-09-24 DIAGNOSIS — K58.0 IRRITABLE BOWEL SYNDROME WITH DIARRHEA: Primary | ICD-10-CM

## 2018-09-24 DIAGNOSIS — I25.10 CORONARY ARTERY DISEASE INVOLVING NATIVE CORONARY ARTERY OF NATIVE HEART WITHOUT ANGINA PECTORIS: ICD-10-CM

## 2018-09-24 RX ORDER — FLUTICASONE PROPIONATE 50 MCG
SPRAY, SUSPENSION (ML) NASAL
Refills: 5 | COMMUNITY
Start: 2018-07-18 | End: 2018-11-09

## 2018-09-24 RX ORDER — CHLORPHENIRAMINE MALEATE 4 MG
TABLET ORAL 2 TIMES DAILY
Qty: 15 G | Refills: 1 | Status: SHIPPED | OUTPATIENT
Start: 2018-09-24 | End: 2019-10-04

## 2018-09-24 NOTE — ACP (ADVANCE CARE PLANNING)
Advance Care Planning (ACP) Provider Conversation Snapshot    Date of ACP Conversation: 09/24/18  Persons included in Conversation:  patient and family  Length of ACP Conversation in minutes:  <16 minutes (Non-Billable)    Authorized Decision Maker (if patient is incapable of making informed decisions): This person is:   Healthcare Agent/Medical Power of  under Advance Directive          For Patients with Decision Making Capacity:   Values/Goals: Exploration of values, goals, and preferences if recovery is not expected, even with continued medical treatment in the event of:  Imminent death  Severe, permanent brain injury    Conversation Outcomes / Follow-Up Plan:   Recommended communicating the plan and making copies for the healthcare agent, personal physician, and others as appropriate (e.g., health system)  She has durable DNR at home. Would not desire CPR, artificial feedings, intubation.  Does not desire aggressive work ups

## 2018-09-24 NOTE — PATIENT INSTRUCTIONS
Apply barrier cream (diaper rash ointment) after each time you wipe. Twice a day, you can mix lotrimin cream with this and apply. Try metamucil daily and be sure to stay hydrated. Diet for Irritable Bowel Syndrome: Care Instructions Your Care Instructions Irritable bowel syndrome, or IBS, is a problem with the intestines. IBS can cause belly pain, bloating, gas, constipation, and diarrhea. Most people can control their symptoms by changing their diet and easing stress. No specific foods cause everyone with IBS to have symptoms. Doctors don't offer a specific diet to manage symptoms. But many people find that they feel better when they stop eating certain foods. A high-fiber diet may help if you have constipation. Follow-up care is a key part of your treatment and safety. Be sure to make and go to all appointments, and call your doctor if you are having problems. It's also a good idea to know your test results and keep a list of the medicines you take. How can you care for yourself at home? To reduce constipation · Include fruits, vegetables, beans, and whole grains in your diet each day. These foods are high in fiber. Slowly increase the amount of fiber you eat. This helps you avoid a lot of gas. · Drink plenty of fluids, enough so that your urine is light yellow or clear like water. If you have kidney, heart, or liver disease and have to limit fluids, talk with your doctor before you increase the amount of fluids you drink. · Get some exercise every day. Build up slowly to 30 to 60 minutes a day on 5 or more days of the week. · Take a fiber supplement, such as Citrucel or Metamucil, every day if needed. Read and follow all instructions on the label. · Schedule time each day for a bowel movement. Having a daily routine may help. Take your time and do not strain when having a bowel movement.  
· Check with your doctor before you increase the amount of fiber in your diet. For some people who have IBS, eating more fiber may make some symptoms worse. This includes bloating. To reduce diarrhea You may try giving up foods or drinks one at a time to see whether symptoms improve. Limit or avoid the following: · Alcohol · Caffeine, which is found in coffee, tea, cola drinks, and chocolate · Nicotine, from smoking or chewing tobacco 
· Gas-producing foods, such as beans, broccoli, cabbage, and apples · Dairy products that contain lactose (milk sugar), such as ice cream, milk, cheese, and sour cream 
· Foods and drinks high in sugar, especially fruit juice, soda, candy, and other packaged sweets (such as cookies) · Foods high in fat, including jackson, sausage, butter, oils, and anything deep-fried · Sorbitol and xylitol, artificial sweeteners found in some sugarless candies and chewing gum Keep track of foods · Some people with IBS use a daily food diary to keep track of what they eat and whether they have any symptoms after eating certain foods. The diary also can be a good way to record what is going on in your life. · Stress plays a role in IBS. So if you are aware that certain stresses bring on symptoms, you can try to reduce those stresses. Keep mealtimes pleasant · Try to maintain a pleasant environment when you eat. This may reduce stress that can make symptoms likely to occur. · Give yourself plenty of time to eat, rather than eating on the go. Chew your food slowly. Try not to swallow air, which can cause bloating. Where can you learn more? Go to http://colin-david.info/. Enter J392 in the search box to learn more about \"Diet for Irritable Bowel Syndrome: Care Instructions. \" Current as of: May 12, 2017 Content Version: 11.7 © 7758-9106 [employer], [employer]. Care instructions adapted under license by [employer] (which disclaims liability or warranty for this information).  If you have questions about a medical condition or this instruction, always ask your healthcare professional. [place] [address] any warranty or liability for your use of this information.

## 2018-09-24 NOTE — MR AVS SNAPSHOT
[facility] 
 
 
 [address] [address] 
[phone] Patient: Deanna Patrick MRN: [id number] :8/10/1927 Visit Information Date & Time Provider Department Dept. Phone Encounter #  
 2018  1:15 PM Jae Figueredo, [address] [phone] [id number] Follow-up Instructions Return in about 2 weeks (around 10/8/2018), or if symptoms worsen or fail to improve. Your Appointments 10/8/2018  2:00 PM  
New Patient with MD TRA [facility]) Appt Note: np est care please be sure to bring pic id insurance info and any list of meds [address]  
  
    
 10/30/2018  1:15 PM  
Follow Up with Nasreen King MD  
Cardio Specialist at [facility]) Appt Note: 6 months [facility] [address] Upcoming Health Maintenance Date Due DTaP/Tdap/Td series (1 - Tdap) 8/10/1948 Shingrix Vaccine Age 50> (1 of 2) 8/10/1977 GLAUCOMA SCREENING Q2Y 8/10/1992 Pneumococcal 65+ Low/Medium Risk (1 of 2 - PCV13) 8/10/1992 MEDICARE YEARLY EXAM 3/14/2018 Influenza Age 5 to Adult 2018 Allergies as of 2018  Review Complete On: 2018 By: Kevin Bartholomew Severity Noted Reaction Type Reactions Codeine  2012    Anaphylaxis Flu Vaccine  (36 Mos+)(pf)  2012    Anaphylaxis Pcn [Penicillins]  2012    Hives Current Immunizations  Never Reviewed No immunizations on file. Not reviewed this visit You Were Diagnosed With   
  
 Codes Comments Irritable bowel syndrome with diarrhea    -  Primary ICD-10-CM: K58.0 ICD-9-CM: 268.3 Coronary artery disease involving native coronary artery of native heart without angina pectoris     ICD-10-CM: I25.10 ICD-9-CM: 414.01 Essential hypertension     ICD-10-CM: I10 
ICD-9-CM: 401.9 Decreased appetite     ICD-10-CM: R63.0 ICD-9-CM: 516. 0 Vitals BP Pulse Temp Resp Height(growth percentile) Weight(growth percentile) 146/82 (BP 1 Location: Right arm, BP Patient Position: Sitting) 67 96.1 °F (35.6 °C) (Oral) 22 5' 2\" (1.575 m) 104 lb 6.4 oz (47.4 kg) SpO2 BMI OB Status Smoking Status 96% 19.1 kg/m2 Postmenopausal Never Smoker BMI and BSA Data Body Mass Index Body Surface Area  
 19.1 kg/m 2 1.44 m 2 Preferred Pharmacy Pharmacy Name Phone [facility]. [phone] Your Updated Medication List  
  
   
This list is accurate as of 9/24/18  2:18 PM.  Always use your most recent med list.  
  
  
  
  
 ANTIVERT 12.5 mg tablet Generic drug:  meclizine Take  by mouth daily as needed. aspirin delayed-release 81 mg tablet Take 1 Tab by mouth daily. Calcium-Vitamin D3-Vitamin K 897-993-72 mg-unit-mcg Chew Take 2 Units by mouth daily. clopidogrel 75 mg Tab Commonly known as:  PLAVIX Take 1 Tab by mouth daily. clotrimazole 1 % topical cream  
Commonly known as:  Debria Keely Apply  to affected area two (2) times a day. Apply with barrier cream to affected area. digoxin 0.125 mg tablet Commonly known as:  LANOXIN Take 1 Tab by mouth daily. FLUoxetine 10 mg capsule Commonly known as:  PROzac  
  
 fluticasone 50 mcg/actuation nasal spray Commonly known as:  FLONASE  
  
 furosemide 20 mg tablet Commonly known as:  LASIX Take 1 Tab by mouth daily as needed. metoprolol tartrate 25 mg tablet Commonly known as:  LOPRESSOR Take 0.5 Tabs by mouth two (2) times a day. nitroglycerin 0.4 mg SL tablet Commonly known as:  NITROSTAT 1 Tab by SubLINGual route every five (5) minutes as needed for Chest Pain. ondansetron hcl 4 mg tablet Commonly known as:  ZOFRAN  
  
 psyllium Powd Commonly known as:  METAMUCIL Use as directed daily  Indications: Irritable Bowel Syndrome REMERON 15 mg tablet Generic drug:  mirtazapine Take  by mouth nightly as needed. VITAMIN B-12 PO Take  by mouth daily. Prescriptions Sent to Pharmacy Refills  
 psyllium (METAMUCIL) powd 1 Sig: Use as directed daily  Indications: Irritable Bowel Syndrome Class: Normal  
 Pharmacy: [address] [place] [address]. Ph #: [phone]  
 clotrimazole (LOTRIMIN) 1 % topical cream 1 Sig: Apply  to affected area two (2) times a day. Apply with barrier cream to affected area. Class: Normal  
 Pharmacy: [address] [place] [address]. Ph #: [phone] Route: Topical  
  
Follow-up Instructions Return in about 2 weeks (around 10/8/2018), or if symptoms worsen or fail to improve. Patient Instructions Apply barrier cream (diaper rash ointment) after each time you wipe. Twice a day, you can mix lotrimin cream with this and apply. Try metamucil daily and be sure to stay hydrated. Diet for Irritable Bowel Syndrome: Care Instructions Your Care Instructions Irritable bowel syndrome, or IBS, is a problem with the intestines. IBS can cause belly pain, bloating, gas, constipation, and diarrhea. Most people can control their symptoms by changing their diet and easing stress. No specific foods cause everyone with IBS to have symptoms. Doctors don't offer a specific diet to manage symptoms. But many people find that they feel better when they stop eating certain foods. A high-fiber diet may help if you have constipation. Follow-up care is a key part of your treatment and safety.  Be sure to make and go to all appointments, and call your doctor if you are having problems. It's also a good idea to know your test results and keep a list of the medicines you take. How can you care for yourself at home? To reduce constipation · Include fruits, vegetables, beans, and whole grains in your diet each day. These foods are high in fiber. Slowly increase the amount of fiber you eat. This helps you avoid a lot of gas. · Drink plenty of fluids, enough so that your urine is light yellow or clear like water. If you have kidney, heart, or liver disease and have to limit fluids, talk with your doctor before you increase the amount of fluids you drink. · Get some exercise every day. Build up slowly to 30 to 60 minutes a day on 5 or more days of the week. · Take a fiber supplement, such as Citrucel or Metamucil, every day if needed. Read and follow all instructions on the label. · Schedule time each day for a bowel movement. Having a daily routine may help. Take your time and do not strain when having a bowel movement. · Check with your doctor before you increase the amount of fiber in your diet. For some people who have IBS, eating more fiber may make some symptoms worse. This includes bloating. To reduce diarrhea You may try giving up foods or drinks one at a time to see whether symptoms improve. Limit or avoid the following: · Alcohol · Caffeine, which is found in coffee, tea, cola drinks, and chocolate · Nicotine, from smoking or chewing tobacco 
· Gas-producing foods, such as beans, broccoli, cabbage, and apples · Dairy products that contain lactose (milk sugar), such as ice cream, milk, cheese, and sour cream 
· Foods and drinks high in sugar, especially fruit juice, soda, candy, and other packaged sweets (such as cookies) · Foods high in fat, including jackson, sausage, butter, oils, and anything deep-fried · Sorbitol and xylitol, artificial sweeteners found in some sugarless candies and chewing gum Keep track of foods · Some people with IBS use a daily food diary to keep track of what they eat and whether they have any symptoms after eating certain foods. The diary also can be a good way to record what is going on in your life. · Stress plays a role in IBS. So if you are aware that certain stresses bring on symptoms, you can try to reduce those stresses. Keep mealtimes pleasant · Try to maintain a pleasant environment when you eat. This may reduce stress that can make symptoms likely to occur. · Give yourself plenty of time to eat, rather than eating on the go. Chew your food slowly. Try not to swallow air, which can cause bloating. Where can you learn more? Go to http://colin-david.info/. Enter E738 in the search box to learn more about \"Diet for Irritable Bowel Syndrome: Care Instructions. \" Current as of: May 12, 2017 Content Version: 11.7 © 7841-1618 [employer]. Care instructions adapted under license by [employer] (which disclaims liability or warranty for this information). If you have questions about a medical condition or this instruction, always ask your healthcare professional. [place] [address] any warranty or liability for your use of this information. Introducing [facility]! Dear [place]: Thank you for requesting a [employer] account. Our records indicate that you have previously registered for a [employer] account but its currently inactive. Please call our [employer] support line at [phone]. Additional Information If you have questions, please visit the Frequently Asked Questions section of the [employer] website at [email or web address] Remember, [employer] is NOT to be used for urgent needs. For medical emergencies, dial 911. Now available from your iPhone and Android! Please provide this summary of care documentation to your next provider. Your primary care clinician is listed as Adrian Summers. If you have any questions after today's visit, please call [phone].

## 2018-09-24 NOTE — PROGRESS NOTES
ROOM #  [id number] Identified pt with two pt identifiers(name and ). Reviewed record in preparation for visit and have obtained necessary documentation. Chief Complaint Patient presents with [place] [facility] Establish Care Deepti Quiles preferred language for health care discussion is english/other. Is the patient using any DME equipment during OV? NO Essence Armstrong is due for: 
Health Maintenance Due Topic  DTaP/Tdap/Td series (1 - Tdap)  Shingrix Vaccine Age 50> (1 of 2)  GLAUCOMA SCREENING Q2Y  Pneumococcal 65+ Low/Medium Risk (1 of 2 - PCV13)  MEDICARE YEARLY EXAM   
 Influenza Age 5 to Adult Health Maintenance reviewed and discussed per provider Please order/place referral if appropriate. Advance Directive: 1. Do you have an advance directive in place? Patient Reply: NO 
 
2. If not, would you like material regarding how to put one in place? NO Coordination of Care: 1. Have you been to the ER, urgent care clinic since your last visit? Hospitalized since your last visit? NO 
 
2. Have you seen or consulted any other health care providers outside of the [address] [place] [address] since your last visit? Include any pap smears or colon screening. YES Patient is accompanied by son in law I have received verbal consent from Essence Armstrong to discuss any/all medical information while they are present in the room. Learning Assessment: 
Learning Assessment 2016 3/27/2014 PRIMARY LEARNER Patient Patient PRIMARY LANGUAGE ENGLISH ENGLISH  
LEARNER PREFERENCE PRIMARY READING READING  
ANSWERED BY Patient -  
RELATIONSHIP SELF -  
 
Depression Screening: PHQ over the last two weeks 2018 3/27/2014 Little interest or pleasure in doing things Not at all Not at all Feeling down, depressed, irritable, or hopeless Not at all Not at all Total Score PHQ 2 0 0 Abuse Screening: No flowsheet data found. Fall Risk Fall Risk Assessment, last 12 mths 2018 3/27/2014 Able to walk? Yes Yes Fall in past 12 months?  No No

## 2018-09-24 NOTE — PROGRESS NOTES
HISTORY OF PRESENT ILLNESS Laz Hatfield is a 80 y.o. female. HPI Comments: 81 yo female here to establish care, evaluation of rectal pain, fecal leakage. She has h/o IBS diarrhea predominant (though no prior GI evaluation or colo). She reports last week having constipation which was unusual for her. She took stool softener x 3. Since that time she has had increased diarrhea. Has been wearing depends due to leakage. Perianal area has become painful. Trying preparation H pads which she puts in place in depends. She has chronic lump in area after episiotomy 70 years ago but feels area is more swollen. Denies melena, BRBPR. Decreased appetite is chronic. Some weight loss. Bread hard to swallow 'gums up' but otherwise denies dysphagia or odynophagia. Lives alone with grandchildren living close by. No longer drives. Accompanied by her son-in-law today. CAD MI age 80. H/o stents 4 total. Denies CP. BP stable She reports she has DDNR on her fridge at home. Is adamant that she would not want artificial feeding, CPR, intubation. Review of Systems Constitutional: Negative for chills, fever and weight loss. HENT: Negative for congestion and ear pain. Eyes: Negative for blurred vision and pain. Respiratory: Negative for cough and shortness of breath. Cardiovascular: Negative for chest pain, palpitations and leg swelling. Gastrointestinal: Positive for diarrhea. Negative for abdominal pain, blood in stool, melena, nausea and vomiting. Genitourinary: Negative for frequency and urgency. Musculoskeletal: Negative for joint pain and myalgias. Skin: Negative for itching and rash. Neurological: Negative for dizziness, tingling and headaches. Psychiatric/Behavioral: Negative for depression. The patient is not nervous/anxious. Past Medical History:  
Diagnosis Date  [facility]  Arthritis, degenerative  CAD (coronary artery disease) 04/2012 S/P 2.75 X 15 mm BMS of OM (04/2012), Two LAD BMS (07/2012)  Elevated liver enzymes Likely from statin  HLD (hyperlipidemia)  Hyperkalemia 06/2012 Likely from lisinopril  Ischemic cardiomyopathy LVEF  45% (11/2012) & 30% echo (04/2012)  NSTEMI (non-ST elevated myocardial infarction) ([facility] 04/2012  Vertigo Past Surgical History:  
Procedure Laterality Date  HX CORONARY STENT PLACEMENT  4/23/12  
 bare metal stent to obtuse marginal branch  HX HEART CATHETERIZATION Current Outpatient Prescriptions on File Prior to Visit Medication Sig Dispense Refill  clopidogrel (PLAVIX) 75 mg tab Take 1 Tab by mouth daily. 90 Tab 3  
 metoprolol tartrate (LOPRESSOR) 25 mg tablet Take 0.5 Tabs by mouth two (2) times a day. 180 Tab 3  
 digoxin (LANOXIN) 0.125 mg tablet Take 1 Tab by mouth daily. 90 Tab 3  
 ondansetron hcl (ZOFRAN) 4 mg tablet   1  
 FLUoxetine (PROZAC) 10 mg capsule  furosemide (LASIX) 20 mg tablet Take 1 Tab by mouth daily as needed. 90 Tab 3  
 nitroglycerin (NITROSTAT) 0.4 mg SL tablet 1 Tab by SubLINGual route every five (5) minutes as needed for Chest Pain. 25 Tab 3  
 aspirin delayed-release 81 mg tablet Take 1 Tab by mouth daily. 30 Tab 6  
 Calcium-Vitamin D3-Vitamin K 683-326-93 mg-unit-mcg chew Take 2 Units by mouth daily.  CYANOCOBALAMIN, VITAMIN B-12, (VITAMIN B-12 PO) Take  by mouth daily.  mirtazapine (REMERON) 15 mg tablet Take  by mouth nightly as needed.  meclizine (ANTIVERT) 12.5 mg tablet Take  by mouth daily as needed.  ALPRAZolam (XANAX) 0.25 mg tablet Take  by mouth three (3) times daily as needed. No current facility-administered medications on file prior to visit. Allergies Allergen Reactions  Codeine Anaphylaxis  Flu Vaccine 2011 (36 Mos+)(Pf) Anaphylaxis  Pcn [Penicillins] Hives Family History Problem Relation Age of Onset  Diabetes Mother Social History Substance Use Topics  Smoking status: Never Smoker  Smokeless tobacco: Never Used  Alcohol use No  
 
Physical Exam  
Constitutional: She appears well-developed and well-nourished. No distress. /82 (BP 1 Location: Right arm, BP Patient Position: Sitting)  Pulse 67  Temp 96.1 °F (35.6 °C) (Oral)   Resp 22  Ht 5' 2\" (1.575 m)  Wt 104 lb 6.4 oz (47.4 kg)  SpO2 96%  BMI 19.1 kg/m2 HENT:  
+ cerumen debris B Eyes: EOM are normal. Right eye exhibits no discharge. Left eye exhibits no discharge. No scleral icterus. Neck: Neck supple. Cardiovascular: Normal rate, regular rhythm and normal heart sounds. Exam reveals no gallop and no friction rub. No murmur heard. Pulmonary/Chest: Effort normal and breath sounds normal. No respiratory distress. She has no wheezes. She has no rales. Abdominal: Soft. She exhibits no distension. There is no tenderness. There is no rebound and no guarding. Genitourinary: Rectal exam shows external hemorrhoid and tenderness. Genitourinary Comments: Perianal erythema Musculoskeletal: She exhibits no edema or tenderness. Lymphadenopathy:  
  She has no cervical adenopathy. Neurological: She is alert. She exhibits normal muscle tone. Skin: Skin is warm and dry. Psychiatric: She has a normal mood and affect. Lab Results Component Value Date/Time Sodium 131 (L) 07/07/2012 03:21 AM  
 Potassium 4.5 07/07/2012 03:21 AM  
 Chloride 98 (L) 07/07/2012 03:21 AM  
 CO2 27 07/07/2012 03:21 AM  
 Anion gap 6 07/07/2012 03:21 AM  
 Glucose 88 07/07/2012 03:21 AM  
 BUN 24 (H) 07/07/2012 03:21 AM  
 Creatinine 1.3 07/07/2012 03:21 AM  
 BUN/Creatinine ratio 18 07/07/2012 03:21 AM  
 GFR est AA 50 (L) 07/07/2012 03:21 AM  
 GFR est non-AA 41 (L) 07/07/2012 03:21 AM  
 Calcium 8.5 07/07/2012 03:21 AM  
 Bilirubin, total 0.3 06/28/2012 12:00 AM  
 AST (SGOT) 30 06/28/2012 12:00 AM  
 Alk.  phosphatase 371 (H) 06/28/2012 12:00 AM  
 Protein, total 6.6 06/28/2012 12:00 AM  
 Albumin 3.9 06/28/2012 12:00 AM  
 Globulin 3.4 06/15/2010 12:16 PM  
 A-G Ratio 1.4 06/28/2012 12:00 AM  
 ALT (SGPT) 52 (H) 06/28/2012 12:00 AM  
 
Lab Results Component Value Date/Time WBC 9.3 07/07/2012 03:21 AM  
 WBC 9.4 06/28/2012 12:00 AM  
 HGB 11.8 (L) 07/07/2012 03:21 AM  
 HCT 35.8 07/07/2012 03:21 AM  
 PLATELET 486 32/03/5036 03:21 AM  
 MCV 86.5 07/07/2012 03:21 AM  
 
 
ASSESSMENT and PLAN 
  ICD-10-CM ICD-9-CM 1. Irritable bowel syndrome with diarrhea K58.0 564.1 2. Coronary artery disease involving native coronary artery of native heart without angina pectoris I25.10 414.01   
3. Essential hypertension I10 401.9 4. Decreased appetite R63.0 783.0 Will obtain old records. Discussed possibility of obstruction. No prolapse noted. Will try metamucil daily to add bulk to stool. Discussed barrier cream with lotrimin BID. Discussed maintaining hydration. Okay to drink Ensure if desired, but after meals. F/u as scheduled in 2 weeks Continue with current medication for now.

## 2018-10-09 ENCOUNTER — OFFICE VISIT (OUTPATIENT)
Dept: INTERNAL MEDICINE CLINIC | Age: 83
End: 2018-10-09

## 2018-10-09 ENCOUNTER — HOSPITAL ENCOUNTER (OUTPATIENT)
Dept: LAB | Age: 83
Discharge: HOME | End: 2018-10-09
Payer: MEDICARE

## 2018-10-09 VITALS
BODY MASS INDEX: 19.03 KG/M2 | RESPIRATION RATE: 18 BRPM | SYSTOLIC BLOOD PRESSURE: 133 MMHG | DIASTOLIC BLOOD PRESSURE: 49 MMHG | OXYGEN SATURATION: 96 % | HEART RATE: 70 BPM | HEIGHT: 62 IN | TEMPERATURE: 96.6 F | WEIGHT: 103.4 LBS

## 2018-10-09 DIAGNOSIS — R19.7 DIARRHEA, UNSPECIFIED TYPE: ICD-10-CM

## 2018-10-09 DIAGNOSIS — H61.23 CERUMEN DEBRIS ON TYMPANIC MEMBRANE, BILATERAL: ICD-10-CM

## 2018-10-09 DIAGNOSIS — R53.82 CHRONIC FATIGUE: ICD-10-CM

## 2018-10-09 DIAGNOSIS — R63.0 DECREASED APPETITE: ICD-10-CM

## 2018-10-09 DIAGNOSIS — R35.0 URINARY FREQUENCY: ICD-10-CM

## 2018-10-09 DIAGNOSIS — F32.89 OTHER DEPRESSION: ICD-10-CM

## 2018-10-09 DIAGNOSIS — R53.82 CHRONIC FATIGUE: Primary | ICD-10-CM

## 2018-10-09 LAB
ALBUMIN SERPL-MCNC: 2.6 G/DL (ref 3.4–5)
ALBUMIN/GLOB SERPL: 0.7 {RATIO} (ref 0.8–1.7)
ALP SERPL-CCNC: 150 U/L (ref 45–117)
ALT SERPL-CCNC: 22 U/L (ref 13–56)
ANION GAP SERPL CALC-SCNC: 7 MMOL/L (ref 3–18)
APPEARANCE UR: CLEAR
AST SERPL-CCNC: 19 U/L (ref 15–37)
BACTERIA URNS QL MICRO: ABNORMAL /HPF
BASOPHILS # BLD: 0.1 K/UL (ref 0–0.1)
BASOPHILS NFR BLD: 1 % (ref 0–2)
BILIRUB SERPL-MCNC: 0.4 MG/DL (ref 0.2–1)
BILIRUB UR QL: NEGATIVE
BUN SERPL-MCNC: 21 MG/DL (ref 7–18)
BUN/CREAT SERPL: 18 (ref 12–20)
CALCIUM SERPL-MCNC: 8.3 MG/DL (ref 8.5–10.1)
CHLORIDE SERPL-SCNC: 95 MMOL/L (ref 100–108)
CO2 SERPL-SCNC: 30 MMOL/L (ref 21–32)
COLOR UR: YELLOW
CREAT SERPL-MCNC: 1.14 MG/DL (ref 0.6–1.3)
DIFFERENTIAL METHOD BLD: ABNORMAL
EOSINOPHIL # BLD: 0.9 K/UL (ref 0–0.4)
EOSINOPHIL NFR BLD: 8 % (ref 0–5)
EPITH CASTS URNS QL MICRO: ABNORMAL /LPF (ref 0–5)
ERYTHROCYTE [DISTWIDTH] IN BLOOD BY AUTOMATED COUNT: 15.5 % (ref 11.6–14.5)
GLOBULIN SER CALC-MCNC: 4 G/DL (ref 2–4)
GLUCOSE SERPL-MCNC: 84 MG/DL (ref 74–99)
GLUCOSE UR STRIP.AUTO-MCNC: NEGATIVE MG/DL
HCT VFR BLD AUTO: 36.7 % (ref 35–45)
HGB BLD-MCNC: 11.4 G/DL (ref 12–16)
HGB UR QL STRIP: NEGATIVE
KETONES UR QL STRIP.AUTO: NEGATIVE MG/DL
LEUKOCYTE ESTERASE UR QL STRIP.AUTO: ABNORMAL
LYMPHOCYTES # BLD: 1.3 K/UL (ref 0.9–3.6)
LYMPHOCYTES NFR BLD: 11 % (ref 21–52)
MCH RBC QN AUTO: 27.6 PG (ref 24–34)
MCHC RBC AUTO-ENTMCNC: 31.1 G/DL (ref 31–37)
MCV RBC AUTO: 88.9 FL (ref 74–97)
MONOCYTES # BLD: 1.5 K/UL (ref 0.05–1.2)
MONOCYTES NFR BLD: 13 % (ref 3–10)
NEUTS SEG # BLD: 7.5 K/UL (ref 1.8–8)
NEUTS SEG NFR BLD: 67 % (ref 40–73)
NITRITE UR QL STRIP.AUTO: POSITIVE
PH UR STRIP: 5.5 [PH] (ref 5–8)
PLATELET # BLD AUTO: 400 K/UL (ref 135–420)
PMV BLD AUTO: 9.5 FL (ref 9.2–11.8)
POTASSIUM SERPL-SCNC: 5.3 MMOL/L (ref 3.5–5.5)
PROT SERPL-MCNC: 6.6 G/DL (ref 6.4–8.2)
PROT UR STRIP-MCNC: NEGATIVE MG/DL
RBC # BLD AUTO: 4.13 M/UL (ref 4.2–5.3)
RBC #/AREA URNS HPF: 0 /HPF (ref 0–5)
SODIUM SERPL-SCNC: 132 MMOL/L (ref 136–145)
SP GR UR REFRACTOMETRY: 1.01 (ref 1–1.03)
T4 FREE SERPL-MCNC: 1.3 NG/DL (ref 0.7–1.5)
TSH SERPL DL<=0.05 MIU/L-ACNC: 1.5 UIU/ML (ref 0.36–3.74)
UROBILINOGEN UR QL STRIP.AUTO: 0.2 EU/DL (ref 0.2–1)
WBC # BLD AUTO: 11.2 K/UL (ref 4.6–13.2)
WBC URNS QL MICRO: ABNORMAL /HPF (ref 0–4)

## 2018-10-09 PROCEDURE — 36415 COLL VENOUS BLD VENIPUNCTURE: CPT | Performed by: INTERNAL MEDICINE

## 2018-10-09 PROCEDURE — 85025 COMPLETE CBC W/AUTO DIFF WBC: CPT | Performed by: INTERNAL MEDICINE

## 2018-10-09 PROCEDURE — 81001 URINALYSIS AUTO W/SCOPE: CPT | Performed by: INTERNAL MEDICINE

## 2018-10-09 PROCEDURE — 84439 ASSAY OF FREE THYROXINE: CPT | Performed by: INTERNAL MEDICINE

## 2018-10-09 PROCEDURE — 80053 COMPREHEN METABOLIC PANEL: CPT | Performed by: INTERNAL MEDICINE

## 2018-10-09 PROCEDURE — 84443 ASSAY THYROID STIM HORMONE: CPT | Performed by: INTERNAL MEDICINE

## 2018-10-09 RX ORDER — ONDANSETRON 4 MG/1
4 TABLET, FILM COATED ORAL
Qty: 30 TAB | Refills: 1 | Status: SHIPPED | OUTPATIENT
Start: 2018-10-09 | End: 2019-02-19

## 2018-10-09 RX ORDER — SERTRALINE HYDROCHLORIDE 50 MG/1
50 TABLET, FILM COATED ORAL DAILY
Qty: 90 TAB | Refills: 0 | Status: SHIPPED | OUTPATIENT
Start: 2018-10-09 | End: 2018-10-20

## 2018-10-09 NOTE — MR AVS SNAPSHOT
[address] [address] 
[phone] Patient: Florencio Villarreal MRN: [id number] :8/10/1927 Visit Information Date & Time Provider Department Dept. Phone Encounter #  
 10/9/2018 12:45 PM [address] [phone] [id number] Follow-up Instructions Return in about 3 months (around 2019), or if symptoms worsen or fail to improve. Your Appointments 10/30/2018  1:15 PM  
Follow Up with Hitesh Cassidy MD  
Cardio Specialist at [facility]) Appt Note: 6 months [id number] [facility] [address]  
  
   
 [id number] [place] [address] [facility] Upcoming Health Maintenance Date Due DTaP/Tdap/Td series (1 - Tdap) 8/10/1948 Shingrix Vaccine Age 50> (1 of 2) 8/10/1977 GLAUCOMA SCREENING Q2Y 8/10/1992 Pneumococcal 65+ Low/Medium Risk (1 of 2 - PCV13) 8/10/1992 MEDICARE YEARLY EXAM 3/14/2018 Influenza Age 5 to Adult 2018 Allergies as of 10/9/2018  Review Complete On: 10/9/2018 By: Stacy Eye Severity Noted Reaction Type Reactions Codeine  2012    Anaphylaxis Flu Vaccine  (36 Mos+)(pf)  2012    Anaphylaxis Pcn [Penicillins]  2012    Hives Current Immunizations  Never Reviewed No immunizations on file. Not reviewed this visit You Were Diagnosed With   
  
 Codes Comments Chronic fatigue    -  Primary ICD-10-CM: R53.82 
ICD-9-CM: 780.79 Diarrhea, unspecified type     ICD-10-CM: R19.7 ICD-9-CM: 787.91 Decreased appetite     ICD-10-CM: R63.0 ICD-9-CM: 783.0 Other depression     ICD-10-CM: F32.89 ICD-9-CM: 982 Urinary frequency     ICD-10-CM: R35.0 ICD-9-CM: 788.41 Cerumen debris on tympanic membrane, bilateral     ICD-10-CM: H61.23 
ICD-9-CM: 380.4 Vitals BP Pulse Temp Resp Height(growth percentile) Weight(growth percentile) 133/49 (BP 1 Location: Left arm, BP Patient Position: Sitting) 70 96.6 °F (35.9 °C) (Oral) 18 5' 2\" (1.575 m) 103 lb 6.4 oz (46.9 kg) SpO2 BMI OB Status Smoking Status 96% 18.91 kg/m2 Postmenopausal Never Smoker BMI and BSA Data Body Mass Index Body Surface Area  
 18.91 kg/m 2 1.43 m 2 Preferred Pharmacy Pharmacy Name Phone [facility] [address] [facility] [place]. [phone] Your Updated Medication List  
  
   
This list is accurate as of 10/9/18  1:19 PM.  Always use your most recent med list.  
  
  
  
  
 ANTIVERT 12.5 mg tablet Generic drug:  meclizine Take  by mouth daily as needed. aspirin delayed-release 81 mg tablet Take 1 Tab by mouth daily. Calcium-Vitamin D3-Vitamin K 311-884-22 mg-unit-mcg Chew Take 2 Units by mouth daily. clopidogrel 75 mg Tab Commonly known as:  PLAVIX Take 1 Tab by mouth daily. clotrimazole 1 % topical cream  
Commonly known as:  Lennette Castor Apply  to affected area two (2) times a day. Apply with barrier cream to affected area. digoxin 0.125 mg tablet Commonly known as:  LANOXIN Take 1 Tab by mouth daily. fluticasone 50 mcg/actuation nasal spray Commonly known as:  FLONASE  
  
 furosemide 20 mg tablet Commonly known as:  LASIX Take 1 Tab by mouth daily as needed. metoprolol tartrate 25 mg tablet Commonly known as:  LOPRESSOR Take 0.5 Tabs by mouth two (2) times a day. nitroglycerin 0.4 mg SL tablet Commonly known as:  NITROSTAT  
1 Tab by SubLINGual route every five (5) minutes as needed for Chest Pain. ondansetron hcl 4 mg tablet Commonly known as:  Lyle Pont Take 1 Tab by mouth every eight (8) hours as needed for Nausea. psyllium Powd Commonly known as:  METAMUCIL Use as directed daily  Indications: Irritable Bowel Syndrome REMERON 15 mg tablet Generic drug:  mirtazapine Take  by mouth nightly as needed. sertraline 50 mg tablet Commonly known as:  ZOLOFT Take 1 Tab by mouth daily. Indications: ANXIETY WITH DEPRESSION  
  
 VITAMIN B-12 PO Take  by mouth daily. Prescriptions Sent to Pharmacy Refills  
 ondansetron hcl (ZOFRAN) 4 mg tablet 1 Sig: Take 1 Tab by mouth every eight (8) hours as needed for Nausea. Class: Normal  
 Pharmacy: [address] [place] [address]. Ph #: [phone] Route: Oral  
 sertraline (ZOLOFT) 50 mg tablet 0 Sig: Take 1 Tab by mouth daily. Indications: ANXIETY WITH DEPRESSION Class: Normal  
 Pharmacy: [address] [place] [address]. Ph #: [phone] Route: Oral  
  
We Performed the Following REMOVAL IMPACTED CERUMEN IRRIGATION/LVG UNILAT [id number] CPT(R)] Follow-up Instructions Return in about 3 months (around 1/9/2019), or if symptoms worsen or fail to improve. To-Do List   
 10/09/2018 Lab:  CBC WITH AUTOMATED DIFF   
  
 10/09/2018 Lab:  METABOLIC PANEL, COMPREHENSIVE   
  
 10/09/2018 Lab:  T4, FREE   
  
 10/09/2018 Lab:  TSH 3RD GENERATION   
  
 10/09/2018 Lab:  URINALYSIS W/ RFLX MICROSCOPIC Patient Instructions Stop Prozac and start Zoloft (sertraline). Please monitor your food intake and let me know if this continues to be poor. We can consider adding a medication to stimulate this. Fatigue: Care Instructions Your Care Instructions Fatigue is a feeling of tiredness, exhaustion, or lack of energy. You may feel fatigue because of too much or not enough activity. It can also come from stress, lack of sleep, boredom, and poor diet. Many medical problems, such as viral infections, can cause fatigue. Emotional problems, especially depression, are often the cause of fatigue. Fatigue is most often a symptom of another problem. Treatment for fatigue depends on the cause. For example, if you have fatigue because you have a certain health problem, treating this problem also treats your fatigue. If depression or anxiety is the cause, treatment may help. Follow-up care is a key part of your treatment and safety. Be sure to make and go to all appointments, and call your doctor if you are having problems. It's also a good idea to know your test results and keep a list of the medicines you take. How can you care for yourself at home? · Get regular exercise. But don't overdo it. Go back and forth between rest and exercise. · Get plenty of rest. 
· Eat a healthy diet. Do not skip meals, especially breakfast. 
· Reduce your use of caffeine, tobacco, and alcohol. Caffeine is most often found in coffee, tea, cola drinks, and chocolate. · Limit medicines that can cause fatigue. This includes tranquilizers and cold and allergy medicines. When should you call for help? Watch closely for changes in your health, and be sure to contact your doctor if: 
  · You have new symptoms such as fever or a rash.  
  · Your fatigue gets worse.  
  · You have been feeling down, depressed, or hopeless. Or you may have lost interest in things that you usually enjoy.  
  · You are not getting better as expected. Where can you learn more? Go to http://colin-david.info/. Enter A773 in the search box to learn more about \"Fatigue: Care Instructions. \" Current as of: November 20, 2017 Content Version: 11.8 © 8138-3176 [employer]. Care instructions adapted under license by [employer] (which disclaims liability or warranty for this information). If you have questions about a medical condition or this instruction, always ask your healthcare professional. [address] any warranty or liability for your use of this information. Introducing [facility]! Dear Jarek Best: Thank you for requesting a [employer] account. Our records indicate that you already have an active [employer] account. You can access your account anytime at [email or web address] Did you know that you can access your hospital and ER discharge instructions at any time in [employer]? You can also review all of your test results from your hospital stay or ER visit. Additional Information If you have questions, please visit the Frequently Asked Questions section of the [employer] website at [email or web address] Remember, [employer] is NOT to be used for urgent needs. For medical emergencies, dial 911. Now available from your iPhone and Android! Please provide this summary of care documentation to your next provider. Your primary care clinician is listed as Adrian Summers. If you have any questions after today's visit, please call [phone].

## 2018-10-09 NOTE — PROGRESS NOTES
HISTORY OF PRESENT ILLNESS Gal Gates is a 80 y.o. female. HPI Comments: 81 yo female here for f/u of diarrhea, decreased energy. She reports poor appetite. Family prepares meals that are kept in fridge/freezer for reheating. Stress/anxiety: noted to be increased by pt and her family. No energy 3-4 month (Aug) when diarrhea started [employer] 2 times a week started recently to assist with home needs Diarrhea much better and perianal rash improved. Abdominal Pain Associated symptoms include abdominal pain. Pertinent negatives include no chest pain, no headaches and no shortness of breath. Review of Systems Constitutional: Negative for chills, fever and weight loss. HENT: Negative for congestion and ear pain. Eyes: Negative for blurred vision and pain. Respiratory: Negative for cough and shortness of breath. Cardiovascular: Negative for chest pain, palpitations and leg swelling. Gastrointestinal: Positive for abdominal pain. Negative for nausea and vomiting. Genitourinary: Negative for frequency and urgency. Musculoskeletal: Negative for joint pain and myalgias. Skin: Negative for itching and rash. Neurological: Negative for dizziness, tingling and headaches. Psychiatric/Behavioral: Negative for depression and suicidal ideas. The patient is nervous/anxious. Past Medical History:  
Diagnosis Date  [facility]  Arthritis, degenerative  CAD (coronary artery disease) 04/2012 S/P 2.75 X 15 mm BMS of OM (04/2012), Two LAD BMS (07/2012)  Elevated liver enzymes Likely from statin  HLD (hyperlipidemia)  Hyperkalemia 06/2012 Likely from lisinopril  Ischemic cardiomyopathy LVEF  45% (11/2012) & 30% echo (04/2012)  NSTEMI (non-ST elevated myocardial infarction) ([facility] 04/2012  Vertigo Current Outpatient Prescriptions on File Prior to Visit Medication Sig Dispense Refill  fluticasone (FLONASE) 50 mcg/actuation nasal spray   5  psyllium (METAMUCIL) powd Use as directed daily  Indications: Irritable Bowel Syndrome 1 Bottle 1  
 clotrimazole (LOTRIMIN) 1 % topical cream Apply  to affected area two (2) times a day. Apply with barrier cream to affected area. 15 g 1  
 clopidogrel (PLAVIX) 75 mg tab Take 1 Tab by mouth daily. 90 Tab 3  
 metoprolol tartrate (LOPRESSOR) 25 mg tablet Take 0.5 Tabs by mouth two (2) times a day. 180 Tab 3  
 digoxin (LANOXIN) 0.125 mg tablet Take 1 Tab by mouth daily. 90 Tab 3  furosemide (LASIX) 20 mg tablet Take 1 Tab by mouth daily as needed. 90 Tab 3  
 nitroglycerin (NITROSTAT) 0.4 mg SL tablet 1 Tab by SubLINGual route every five (5) minutes as needed for Chest Pain. 25 Tab 3  
 aspirin delayed-release 81 mg tablet Take 1 Tab by mouth daily. 30 Tab 6  
 Calcium-Vitamin D3-Vitamin K [id number] mg-unit-mcg chew Take 2 Units by mouth daily.  CYANOCOBALAMIN, VITAMIN B-12, (VITAMIN B-12 PO) Take  by mouth daily.  mirtazapine (REMERON) 15 mg tablet Take  by mouth nightly as needed.  meclizine (ANTIVERT) 12.5 mg tablet Take  by mouth daily as needed. No current facility-administered medications on file prior to visit. Physical Exam  
Constitutional: She appears well-developed and well-nourished. No distress. /49 (BP 1 Location: Left arm, BP Patient Position: Sitting)  Pulse 70  Temp 96.6 °F (35.9 °C) (Oral)   Resp 18  Ht 5' 2\" (1.575 m)  Wt 103 lb 6.4 oz (46.9 kg)  SpO2 96%  BMI 18.91 kg/m2 Eyes: EOM are normal. Right eye exhibits no discharge. Left eye exhibits no discharge. No scleral icterus. Neck: Neck supple. Cardiovascular: Normal rate, regular rhythm and normal heart sounds. Exam reveals no gallop and no friction rub. No murmur heard. Pulmonary/Chest: Effort normal and breath sounds normal. No respiratory distress. She has no wheezes. She has no rales. Musculoskeletal: She exhibits no edema or tenderness. Lymphadenopathy:  
  She has no cervical adenopathy. Neurological: She is alert. She exhibits normal muscle tone. Skin: Skin is warm and dry. Psychiatric: She has a normal mood and affect. Lab Results Component Value Date/Time Sodium 131 (L) 07/07/2012 03:21 AM  
 Potassium 4.5 07/07/2012 03:21 AM  
 Chloride 98 (L) 07/07/2012 03:21 AM  
 CO2 27 07/07/2012 03:21 AM  
 Anion gap 6 07/07/2012 03:21 AM  
 Glucose 88 07/07/2012 03:21 AM  
 BUN 24 (H) 07/07/2012 03:21 AM  
 Creatinine 1.3 07/07/2012 03:21 AM  
 BUN/Creatinine ratio 18 07/07/2012 03:21 AM  
 GFR est AA 50 (L) 07/07/2012 03:21 AM  
 GFR est non-AA 41 (L) 07/07/2012 03:21 AM  
 Calcium 8.5 07/07/2012 03:21 AM  
 Bilirubin, total 0.3 06/28/2012 12:00 AM  
 AST (SGOT) 30 06/28/2012 12:00 AM  
 Alk. phosphatase 371 (H) 06/28/2012 12:00 AM  
 Protein, total 6.6 06/28/2012 12:00 AM  
 Albumin 3.9 06/28/2012 12:00 AM  
 Globulin 3.4 06/15/2010 12:16 PM  
 A-G Ratio 1.4 06/28/2012 12:00 AM  
 ALT (SGPT) 52 (H) 06/28/2012 12:00 AM  
 
Lab Results Component Value Date/Time WBC 9.3 07/07/2012 03:21 AM  
 WBC 9.4 06/28/2012 12:00 AM  
 HGB 11.8 (L) 07/07/2012 03:21 AM  
 HCT 35.8 07/07/2012 03:21 AM  
 PLATELET 427 99/39/9987 03:21 AM  
 MCV 86.5 07/07/2012 03:21 AM  
 
ASSESSMENT and PLAN 
  ICD-10-CM ICD-9-CM 1. Chronic fatigue R53.82 780.79 CBC WITH AUTOMATED DIFF  
   METABOLIC PANEL, COMPREHENSIVE  
   TSH 3RD GENERATION  
   T4, FREE 2. Diarrhea, unspecified type R19.7 787.91 CBC WITH AUTOMATED DIFF  
   METABOLIC PANEL, COMPREHENSIVE 3. Decreased appetite R63.0 783.0 4. Other depression F32.89 311   
5. Urinary frequency R35.0 788.41 URINALYSIS W/ RFLX MICROSCOPIC 6. Cerumen debris on tympanic membrane, bilateral H61.23 380.4 REMOVAL IMPACTED CERUMEN IRRIGATION/LVG UNILAT Will repeat labs today Diarrhea improved at this time. Discussed using metamucil if needed Discussed liberal diet, supplemental drinks, bars between meals. Continue remeron and will try switching prozac to zoloft. Cerumen irrigated today.

## 2018-10-09 NOTE — PROGRESS NOTES
ROOM # 17 Identified pt with two pt identifiers(name and ). Reviewed record in preparation for visit and have obtained necessary documentation. Chief Complaint Patient presents with  Irritable Bowel Syndrome Deepti Quiles preferred language for health care discussion is english/other. Is the patient using any DME equipment during OV? YES Carlyn Fee is due for: 
Health Maintenance Due Topic  DTaP/Tdap/Td series (1 - Tdap)  Shingrix Vaccine Age 50> (1 of 2)  GLAUCOMA SCREENING Q2Y  Pneumococcal 65+ Low/Medium Risk (1 of 2 - PCV13)  MEDICARE YEARLY EXAM   
 Influenza Age 5 to Adult Health Maintenance reviewed and discussed per provider Please order/place referral if appropriate. Advance Directive: 1. Do you have an advance directive in place? Patient Reply: NO 
 
2. If not, would you like material regarding how to put one in place? NO Coordination of Care: 1. Have you been to the ER, urgent care clinic since your last visit? Hospitalized since your last visit? NO 
 
2. Have you seen or consulted any other health care providers outside of the [address] [place] [address] since your last visit? Include any pap smears or colon screening. NO Patient is accompanied by son and daughter I have received verbal consent from Carlyn Shaw to discuss any/all medical information while they are present in the room. Learning Assessment: 
Learning Assessment 2016 3/27/2014 PRIMARY LEARNER Patient Patient PRIMARY LANGUAGE ENGLISH ENGLISH  
LEARNER PREFERENCE PRIMARY READING READING  
ANSWERED BY Patient -  
RELATIONSHIP SELF -  
 
Depression Screening: PHQ over the last two weeks 10/9/2018 2018 3/27/2014 Little interest or pleasure in doing things Not at all Not at all Not at all Feeling down, depressed, irritable, or hopeless Not at all Not at all Not at all Total Score PHQ 2 0 0 0 Abuse Screening: No flowsheet data found. Fall Risk Fall Risk Assessment, last 12 mths 10/9/2018 9/24/2018 3/27/2014 Able to walk? Yes Yes Yes Fall in past 12 months?  No No No

## 2018-10-09 NOTE — PATIENT INSTRUCTIONS
Stop Prozac and start Zoloft (sertraline). Please monitor your food intake and let me know if this continues to be poor. We can consider adding a medication to stimulate this. Fatigue: Care Instructions Your Care Instructions Fatigue is a feeling of tiredness, exhaustion, or lack of energy. You may feel fatigue because of too much or not enough activity. It can also come from stress, lack of sleep, boredom, and poor diet. Many medical problems, such as viral infections, can cause fatigue. Emotional problems, especially depression, are often the cause of fatigue. Fatigue is most often a symptom of another problem. Treatment for fatigue depends on the cause. For example, if you have fatigue because you have a certain health problem, treating this problem also treats your fatigue. If depression or anxiety is the cause, treatment may help. Follow-up care is a key part of your treatment and safety. Be sure to make and go to all appointments, and call your doctor if you are having problems. It's also a good idea to know your test results and keep a list of the medicines you take. How can you care for yourself at home? · Get regular exercise. But don't overdo it. Go back and forth between rest and exercise. · Get plenty of rest. 
· Eat a healthy diet. Do not skip meals, especially breakfast. 
· Reduce your use of caffeine, tobacco, and alcohol. Caffeine is most often found in coffee, tea, cola drinks, and chocolate. · Limit medicines that can cause fatigue. This includes tranquilizers and cold and allergy medicines. When should you call for help? Watch closely for changes in your health, and be sure to contact your doctor if: 
  · You have new symptoms such as fever or a rash.  
  · Your fatigue gets worse.  
  · You have been feeling down, depressed, or hopeless. Or you may have lost interest in things that you usually enjoy.  
  · You are not getting better as expected. Where can you learn more? Go to http://colin-david.info/. Enter N992 in the search box to learn more about \"Fatigue: Care Instructions. \" Current as of: November 20, 2017 Content Version: 11.8 © 2724-1437 [employer]wise, [employer]. Care instructions adapted under license by [employer] (which disclaims liability or warranty for this information). If you have questions about a medical condition or this instruction, always ask your healthcare professional. [address] any warranty or liability for your use of this information.

## 2018-10-10 RX ORDER — CIPROFLOXACIN 500 MG/1
500 TABLET ORAL 2 TIMES DAILY
Qty: 6 TAB | Refills: 0 | Status: SHIPPED | OUTPATIENT
Start: 2018-10-10 | End: 2018-10-13

## 2018-10-10 NOTE — PROGRESS NOTES
Please let her know her urine study is consistent with a UTI. I would like her to take an antibiotic for this which I have ordered.

## 2018-10-12 ENCOUNTER — DOCUMENTATION ONLY (OUTPATIENT)
Dept: INTERNAL MEDICINE CLINIC | Age: 83
End: 2018-10-12

## 2018-10-12 NOTE — PROGRESS NOTES
Reviewed old records. Complaint of decreased appetite, weight loss dating back to [facility] 2016. Marinol tried in the past. C/o fatigue also noted in APR 2017.

## 2018-10-20 ENCOUNTER — APPOINTMENT (OUTPATIENT)
Dept: GENERAL RADIOLOGY | Age: 83
DRG: 193 | End: 2018-10-20
Attending: EMERGENCY MEDICINE
Payer: MEDICARE

## 2018-10-20 ENCOUNTER — HOSPITAL ENCOUNTER (INPATIENT)
Age: 83
LOS: 3 days | Discharge: NURSING, REHAB OR LONG-TERM CARE | DRG: 193 | End: 2018-10-23
Attending: EMERGENCY MEDICINE | Admitting: INTERNAL MEDICINE
Payer: MEDICARE

## 2018-10-20 ENCOUNTER — TELEPHONE (OUTPATIENT)
Dept: INTERNAL MEDICINE CLINIC | Age: 83
End: 2018-10-20

## 2018-10-20 DIAGNOSIS — R41.82 ALTERED MENTAL STATUS, UNSPECIFIED ALTERED MENTAL STATUS TYPE: Primary | ICD-10-CM

## 2018-10-20 DIAGNOSIS — N39.0 URINARY TRACT INFECTION WITHOUT HEMATURIA, SITE UNSPECIFIED: ICD-10-CM

## 2018-10-20 DIAGNOSIS — E86.0 DEHYDRATION: ICD-10-CM

## 2018-10-20 DIAGNOSIS — E87.1 HYPONATREMIA: ICD-10-CM

## 2018-10-20 LAB
ALBUMIN SERPL-MCNC: 2.5 G/DL (ref 3.4–5)
ALBUMIN/GLOB SERPL: 0.7 {RATIO} (ref 0.8–1.7)
ALP SERPL-CCNC: 139 U/L (ref 45–117)
ALT SERPL-CCNC: 18 U/L (ref 13–56)
ANION GAP SERPL CALC-SCNC: 7 MMOL/L (ref 3–18)
APPEARANCE UR: CLEAR
AST SERPL-CCNC: 15 U/L (ref 15–37)
BACTERIA URNS QL MICRO: ABNORMAL /HPF
BASOPHILS # BLD: 0 K/UL (ref 0–0.1)
BASOPHILS NFR BLD: 0 % (ref 0–2)
BILIRUB SERPL-MCNC: 0.3 MG/DL (ref 0.2–1)
BILIRUB UR QL: NEGATIVE
BNP SERPL-MCNC: 4123 PG/ML (ref 0–1800)
BUN SERPL-MCNC: 19 MG/DL (ref 7–18)
BUN/CREAT SERPL: 18 (ref 12–20)
CALCIUM SERPL-MCNC: 8 MG/DL (ref 8.5–10.1)
CHLORIDE SERPL-SCNC: 86 MMOL/L (ref 100–108)
CO2 SERPL-SCNC: 30 MMOL/L (ref 21–32)
COLOR UR: YELLOW
CREAT SERPL-MCNC: 1.07 MG/DL (ref 0.6–1.3)
DIFFERENTIAL METHOD BLD: ABNORMAL
DIGOXIN SERPL-MCNC: 1.4 NG/ML (ref 0.9–2)
EOSINOPHIL # BLD: 0.4 K/UL (ref 0–0.4)
EOSINOPHIL NFR BLD: 5 % (ref 0–5)
EPITH CASTS URNS QL MICRO: ABNORMAL /LPF (ref 0–5)
ERYTHROCYTE [DISTWIDTH] IN BLOOD BY AUTOMATED COUNT: 15.4 % (ref 11.6–14.5)
GLOBULIN SER CALC-MCNC: 3.6 G/DL (ref 2–4)
GLUCOSE SERPL-MCNC: 122 MG/DL (ref 74–99)
GLUCOSE UR STRIP.AUTO-MCNC: NEGATIVE MG/DL
HCT VFR BLD AUTO: 31.7 % (ref 35–45)
HGB BLD-MCNC: 10.2 G/DL (ref 12–16)
HGB UR QL STRIP: NEGATIVE
KETONES UR QL STRIP.AUTO: NEGATIVE MG/DL
LACTATE BLD-SCNC: 1.2 MMOL/L (ref 0.4–2)
LEUKOCYTE ESTERASE UR QL STRIP.AUTO: ABNORMAL
LYMPHOCYTES # BLD: 1.6 K/UL (ref 0.9–3.6)
LYMPHOCYTES NFR BLD: 18 % (ref 21–52)
MCH RBC QN AUTO: 27.6 PG (ref 24–34)
MCHC RBC AUTO-ENTMCNC: 32.2 G/DL (ref 31–37)
MCV RBC AUTO: 85.7 FL (ref 74–97)
MONOCYTES # BLD: 1.4 K/UL (ref 0.05–1.2)
MONOCYTES NFR BLD: 15 % (ref 3–10)
NEUTS SEG # BLD: 5.7 K/UL (ref 1.8–8)
NEUTS SEG NFR BLD: 62 % (ref 40–73)
NITRITE UR QL STRIP.AUTO: POSITIVE
PH UR STRIP: 5 [PH] (ref 5–8)
PLATELET # BLD AUTO: 338 K/UL (ref 135–420)
PMV BLD AUTO: 8.2 FL (ref 9.2–11.8)
POTASSIUM SERPL-SCNC: 4.8 MMOL/L (ref 3.5–5.5)
PROT SERPL-MCNC: 6.1 G/DL (ref 6.4–8.2)
PROT UR STRIP-MCNC: NEGATIVE MG/DL
RBC # BLD AUTO: 3.7 M/UL (ref 4.2–5.3)
RBC #/AREA URNS HPF: 0 /HPF (ref 0–5)
SODIUM SERPL-SCNC: 123 MMOL/L (ref 136–145)
SP GR UR REFRACTOMETRY: 1.01 (ref 1–1.03)
UROBILINOGEN UR QL STRIP.AUTO: 0.2 EU/DL (ref 0.2–1)
WBC # BLD AUTO: 9.2 K/UL (ref 4.6–13.2)
WBC URNS QL MICRO: ABNORMAL /HPF (ref 0–4)

## 2018-10-20 PROCEDURE — 85025 COMPLETE CBC W/AUTO DIFF WBC: CPT | Performed by: NURSE PRACTITIONER

## 2018-10-20 PROCEDURE — 99285 EMERGENCY DEPT VISIT HI MDM: CPT

## 2018-10-20 PROCEDURE — 81001 URINALYSIS AUTO W/SCOPE: CPT | Performed by: NURSE PRACTITIONER

## 2018-10-20 PROCEDURE — 65660000000 HC RM CCU STEPDOWN

## 2018-10-20 PROCEDURE — 87186 SC STD MICRODIL/AGAR DIL: CPT | Performed by: EMERGENCY MEDICINE

## 2018-10-20 PROCEDURE — 96365 THER/PROPH/DIAG IV INF INIT: CPT

## 2018-10-20 PROCEDURE — 74011000258 HC RX REV CODE- 258: Performed by: EMERGENCY MEDICINE

## 2018-10-20 PROCEDURE — 80053 COMPREHEN METABOLIC PANEL: CPT | Performed by: NURSE PRACTITIONER

## 2018-10-20 PROCEDURE — 74018 RADEX ABDOMEN 1 VIEW: CPT

## 2018-10-20 PROCEDURE — 83605 ASSAY OF LACTIC ACID: CPT

## 2018-10-20 PROCEDURE — 87086 URINE CULTURE/COLONY COUNT: CPT | Performed by: EMERGENCY MEDICINE

## 2018-10-20 PROCEDURE — 71045 X-RAY EXAM CHEST 1 VIEW: CPT

## 2018-10-20 PROCEDURE — 83880 ASSAY OF NATRIURETIC PEPTIDE: CPT | Performed by: EMERGENCY MEDICINE

## 2018-10-20 PROCEDURE — 51701 INSERT BLADDER CATHETER: CPT

## 2018-10-20 PROCEDURE — 96368 THER/DIAG CONCURRENT INF: CPT

## 2018-10-20 PROCEDURE — 74011250636 HC RX REV CODE- 250/636: Performed by: EMERGENCY MEDICINE

## 2018-10-20 PROCEDURE — 80162 ASSAY OF DIGOXIN TOTAL: CPT | Performed by: EMERGENCY MEDICINE

## 2018-10-20 PROCEDURE — 87040 BLOOD CULTURE FOR BACTERIA: CPT | Performed by: EMERGENCY MEDICINE

## 2018-10-20 PROCEDURE — 87077 CULTURE AEROBIC IDENTIFY: CPT | Performed by: EMERGENCY MEDICINE

## 2018-10-20 PROCEDURE — 87493 C DIFF AMPLIFIED PROBE: CPT | Performed by: EMERGENCY MEDICINE

## 2018-10-20 PROCEDURE — 93005 ELECTROCARDIOGRAM TRACING: CPT

## 2018-10-20 RX ORDER — FUROSEMIDE 10 MG/ML
20 INJECTION INTRAMUSCULAR; INTRAVENOUS ONCE
Status: DISCONTINUED | OUTPATIENT
Start: 2018-10-21 | End: 2018-10-21

## 2018-10-20 RX ORDER — ONDANSETRON 2 MG/ML
4 INJECTION INTRAMUSCULAR; INTRAVENOUS
Status: DISCONTINUED | OUTPATIENT
Start: 2018-10-20 | End: 2018-10-23 | Stop reason: HOSPADM

## 2018-10-20 RX ORDER — METOPROLOL TARTRATE 25 MG/1
12.5 TABLET, FILM COATED ORAL 2 TIMES DAILY
Status: DISCONTINUED | OUTPATIENT
Start: 2018-10-21 | End: 2018-10-23 | Stop reason: HOSPADM

## 2018-10-20 RX ORDER — CLOPIDOGREL BISULFATE 75 MG/1
75 TABLET ORAL DAILY
Status: DISCONTINUED | OUTPATIENT
Start: 2018-10-21 | End: 2018-10-23 | Stop reason: HOSPADM

## 2018-10-20 RX ORDER — MIRTAZAPINE 15 MG/1
15 TABLET, FILM COATED ORAL
Status: DISCONTINUED | OUTPATIENT
Start: 2018-10-21 | End: 2018-10-23 | Stop reason: HOSPADM

## 2018-10-20 RX ORDER — ENOXAPARIN SODIUM 100 MG/ML
30 INJECTION SUBCUTANEOUS EVERY 24 HOURS
Status: DISCONTINUED | OUTPATIENT
Start: 2018-10-21 | End: 2018-10-23 | Stop reason: HOSPADM

## 2018-10-20 RX ORDER — SODIUM CHLORIDE 0.9 % (FLUSH) 0.9 %
5-10 SYRINGE (ML) INJECTION AS NEEDED
Status: DISCONTINUED | OUTPATIENT
Start: 2018-10-20 | End: 2018-10-23 | Stop reason: HOSPADM

## 2018-10-20 RX ORDER — ASPIRIN 81 MG/1
81 TABLET ORAL DAILY
Status: DISCONTINUED | OUTPATIENT
Start: 2018-10-21 | End: 2018-10-23 | Stop reason: HOSPADM

## 2018-10-20 RX ORDER — DIGOXIN 125 MCG
0.12 TABLET ORAL DAILY
Status: DISCONTINUED | OUTPATIENT
Start: 2018-10-21 | End: 2018-10-23 | Stop reason: HOSPADM

## 2018-10-20 RX ADMIN — SODIUM CHLORIDE 500 MG: 900 INJECTION, SOLUTION INTRAVENOUS at 19:21

## 2018-10-20 RX ADMIN — SODIUM CHLORIDE 1000 ML: 900 INJECTION, SOLUTION INTRAVENOUS at 19:33

## 2018-10-20 RX ADMIN — CEFTRIAXONE SODIUM 2 G: 2 INJECTION, POWDER, FOR SOLUTION INTRAMUSCULAR; INTRAVENOUS at 19:42

## 2018-10-20 NOTE — ED PROVIDER NOTES
EMERGENCY DEPARTMENT HISTORY AND PHYSICAL EXAM 
 
6:33 PM 
 
 
Date: 10/20/2018 Patient Name: Genoveva Motley History of Presenting Illness Chief Complaint Patient presents with  Diarrhea  Fatigue  Anorexia  Altered mental status History Provided By: patient and family Chief Complaint: nausea Duration: 2 days Timing: constant Location: GI  
Quality: cannot keep anything down Severity: not reported Modifying Factors: 10 days worth of Zofran she took within the last 48 hours with no relief Associated Symptoms: diarrhea, mild shortness of breath. Additional History (Context): Genoveva Motley is a 80 y.o. female presenting to the ED for the evaluation of constant nausea over the last 2 days. Patient states that she is not able to keep anything down and hs taken 10 days worth of Zofran within the last 48 hours without relief per family. Patient also noted to have a history of IBS and is complaining of 5-10 episodes of diarrhea a day. She also reports mild shortness of breath. No vomiting, abdominal pain, melena, hematochezia, fevers, chills, dysuria, recent falls or head injury. Patient is noted to be a DNR. No other complaints or concerns at this time, PCP: Lacy Calhoun MD 
 
Current Facility-Administered Medications Medication Dose Route Frequency Provider Last Rate Last Dose  sodium chloride (NS) flush 5-10 mL  5-10 mL IntraVENous PRN Karina Acevedo MD      
 cefTRIAXone (ROCEPHIN) 2 g in 0.9% sodium chloride (MBP/ADV) 50 mL MBP  2 g IntraVENous Q24H Karina Acevedo  mL/hr at 10/20/18 1942 2 g at 10/20/18 1942  
 azithromycin (ZITHROMAX) 500 mg in 0.9% sodium chloride (MBP/ADV) 250 mL adv  500 mg IntraVENous Q24H Karina Acevedo  mL/hr at 10/20/18 1921 500 mg at 10/20/18 1921 Current Outpatient Medications Medication Sig Dispense Refill  ondansetron hcl (ZOFRAN) 4 mg tablet Take 1 Tab by mouth every eight (8) hours as needed for Nausea. 30 Tab 1  
 sertraline (ZOLOFT) 50 mg tablet Take 1 Tab by mouth daily. Indications: ANXIETY WITH DEPRESSION 90 Tab 0  
 fluticasone (FLONASE) 50 mcg/actuation nasal spray   5  psyllium (METAMUCIL) powd Use as directed daily  Indications: Irritable Bowel Syndrome 1 Bottle 1  
 clotrimazole (LOTRIMIN) 1 % topical cream Apply  to affected area two (2) times a day. Apply with barrier cream to affected area. 15 g 1  
 clopidogrel (PLAVIX) 75 mg tab Take 1 Tab by mouth daily. 90 Tab 3  
 metoprolol tartrate (LOPRESSOR) 25 mg tablet Take 0.5 Tabs by mouth two (2) times a day. 180 Tab 3  
 digoxin (LANOXIN) 0.125 mg tablet Take 1 Tab by mouth daily. 90 Tab 3  furosemide (LASIX) 20 mg tablet Take 1 Tab by mouth daily as needed. 90 Tab 3  
 nitroglycerin (NITROSTAT) 0.4 mg SL tablet 1 Tab by SubLINGual route every five (5) minutes as needed for Chest Pain. 25 Tab 3  
 aspirin delayed-release 81 mg tablet Take 1 Tab by mouth daily. 30 Tab 6  
 Calcium-Vitamin D3-Vitamin K 921-979-90 mg-unit-mcg chew Take 2 Units by mouth daily.  CYANOCOBALAMIN, VITAMIN B-12, (VITAMIN B-12 PO) Take  by mouth daily.  mirtazapine (REMERON) 15 mg tablet Take  by mouth nightly as needed.  meclizine (ANTIVERT) 12.5 mg tablet Take  by mouth daily as needed. Past History Past Medical History: 
Past Medical History:  
Diagnosis Date  [facility]2017  Arthritis, degenerative  CAD (coronary artery disease) 04/2012 S/P 2.75 X 15 mm BMS of OM (04/2012), Two LAD BMS (07/2012)  Elevated liver enzymes Likely from statin  HLD (hyperlipidemia)  Hyperkalemia 06/2012 Likely from lisinopril  Ischemic cardiomyopathy LVEF  45% (11/2012) & 30% echo (04/2012)  NSTEMI (non-ST elevated myocardial infarction) ([facility] 04/2012  Vertigo Past Surgical History: 
Past Surgical History:  
Procedure Laterality Date  HX CORONARY STENT PLACEMENT  4/23/12 bare metal stent to obtuse marginal branch  HX HEART CATHETERIZATION Family History: 
Family History Problem Relation Age of Onset  Diabetes Mother Social History: 
Social History Tobacco Use  Smoking status: Never Smoker  Smokeless tobacco: Never Used Substance Use Topics  Alcohol use: No  
 Drug use: No  
 
 
Allergies: Allergies Allergen Reactions  Codeine Anaphylaxis  Flu Vaccine 2011 (36 Mos+)(Pf) Anaphylaxis  Pcn [Penicillins] Hives Review of Systems Review of Systems Constitutional: Negative for chills and fever. Respiratory: Positive for shortness of breath. Gastrointestinal: Positive for diarrhea and nausea. Negative for abdominal pain, blood in stool and vomiting. Genitourinary: Negative for dysuria and hematuria. All other systems reviewed and are negative. Visit Vitals /80 Pulse 89 Temp 98.5 °F (36.9 °C) Resp 18 SpO2 (!) 87% 95% on RA. Interpretation: non-hypoxic Physical Exam / Medical Decision Making I am the first provider for this patient. I reviewed the vital signs, available nursing notes, past medical history, past surgical history, family history and social history. Vital Signs-Reviewed the patient's vital signs. Physical exam: 
General:  Well-developed, well-nourished, no apparent distress. Head:  Normocephalic atraumatic. Eyes:  Pupils midrange extraocular movements intact. No pallor or conjunctival injection. Nose:  No rhinorrhea, inspection grossly normal.   
Ears:  Grossly normal to inspection, no discharge. Mouth:  Mucous membranes dry , no appreciable intraoral lesion. Neck/Back:  Trachea midline, no asymmetry. Chest:  Grossly normal inspection, symmetric chest rise. Pulmonary: Crackles LEFT lower purse lip breathing. Cardiovascular:  S1-S2 no murmurs rubs or gallops.    
Abdomen: Soft, nontender, nondistended no guarding rebound or peritoneal signs.   
Extremities:  Grossly normal to inspection, peripheral pulses intact Neurologic:  Alert and oriented ×3, slight difficulty remembering the proper date  no appreciable focal neurologic deficit Skin:  Warm and dry Psychiatric:  Grossly normal mood and affect Nursing note reviewed, vital signs reviewed. ED course: 
Patient presents with altered mental status, recently treated for urinary tract infection. She does have similar shortness of breath and a oxygen saturation 89% in triage primary consideration for pneumonia decompensated heart failure rule out atypical ACS, persistent nausea but abdomen is benign we'll proceed with the x-ray she does have a recent exposure to antibiotic and persistent diarrhea although this seems to be constant with her IBS will rule out C. difficile and urinary tract infection. Clinically dehydrated we'll hydrate her here she is not tachycardic not tachypneic not febrile. She did take a large amount of Zofran over the last few days, will evaluate her QT interval 
 
 
EKG done in 1924 hrs. atrial fibrillation heart rate 74 intervals within normal limits no ST changes no ectopy. Urinalysis concerning for urinary tract infection Chest x-ray with no pneumothorax, questionable LEFT retro cardiac infiltrate Started on ceftriaxone and azithromycin urine culture pending, blood cultures pending, no SIRS criteria, she had negative lactic acid, she is monitored here, remains tachypneic, borderline tachycardic saturation is normal on supplemental oxygen, she has an elevated BNP but was quickly dehydrated and mildly altered with a low sodium, we will monitor closely and hospital family inpatient workup with admission Patient's presentation, history, physical exam and laboratory evaluations were reviewed. I felt the patient would benefit from inpatient management and treatment. Consult:  Discussed care with Dr. Gold.  Standard discussion; including history of patients chief complaint, available diagnostic results, and treatment course. Patient was accepted to their service. Disposition: 
 
Admitted to medicine service Portions of this chart were created with Dragon medical speech to text program.   Unrecognized errors may be present. Diagnostic Study Results Labs - Recent Results (from the past 12 hour(s)) NT-PRO BNP Collection Time: 10/20/18  5:40 PM  
Result Value Ref Range NT pro-BNP 4,123 (H) 0 - 1,800 PG/ML  
CBC WITH AUTOMATED DIFF Collection Time: 10/20/18  5:50 PM  
Result Value Ref Range WBC 9.2 4.6 - 13.2 K/uL  
 RBC 3.70 (L) 4.20 - 5.30 M/uL  
 HGB 10.2 (L) 12.0 - 16.0 g/dL HCT 31.7 (L) 35.0 - 45.0 % MCV 85.7 74.0 - 97.0 FL  
 MCH 27.6 24.0 - 34.0 PG  
 MCHC 32.2 31.0 - 37.0 g/dL  
 RDW 15.4 (H) 11.6 - 14.5 % PLATELET 555 131 - 292 K/uL MPV 8.2 (L) 9.2 - 11.8 FL  
 NEUTROPHILS 62 40 - 73 % LYMPHOCYTES 18 (L) 21 - 52 % MONOCYTES 15 (H) 3 - 10 % EOSINOPHILS 5 0 - 5 % BASOPHILS 0 0 - 2 %  
 ABS. NEUTROPHILS 5.7 1.8 - 8.0 K/UL  
 ABS. LYMPHOCYTES 1.6 0.9 - 3.6 K/UL  
 ABS. MONOCYTES 1.4 (H) 0.05 - 1.2 K/UL  
 ABS. EOSINOPHILS 0.4 0.0 - 0.4 K/UL  
 ABS. BASOPHILS 0.0 0.0 - 0.1 K/UL  
 DF AUTOMATED METABOLIC PANEL, COMPREHENSIVE Collection Time: 10/20/18  5:50 PM  
Result Value Ref Range Sodium 123 (L) 136 - 145 mmol/L Potassium 4.8 3.5 - 5.5 mmol/L Chloride 86 (L) 100 - 108 mmol/L  
 CO2 30 21 - 32 mmol/L Anion gap 7 3.0 - 18 mmol/L Glucose 122 (H) 74 - 99 mg/dL BUN 19 (H) 7.0 - 18 MG/DL Creatinine 1.07 0.6 - 1.3 MG/DL  
 BUN/Creatinine ratio 18 12 - 20 GFR est AA 58 (L) >60 ml/min/1.73m2 GFR est non-AA 48 (L) >60 ml/min/1.73m2 Calcium 8.0 (L) 8.5 - 10.1 MG/DL Bilirubin, total 0.3 0.2 - 1.0 MG/DL  
 ALT (SGPT) 18 13 - 56 U/L  
 AST (SGOT) 15 15 - 37 U/L Alk. phosphatase 139 (H) 45 - 117 U/L Protein, total 6.1 (L) 6.4 - 8.2 g/dL Albumin 2.5 (L) 3.4 - 5.0 g/dL Globulin 3.6 2.0 - 4.0 g/dL A-G Ratio 0.7 (L) 0.8 - 1.7 URINALYSIS W/ RFLX MICROSCOPIC Collection Time: 10/20/18  6:52 PM  
Result Value Ref Range Color YELLOW Appearance CLEAR Specific gravity 1.015 1.005 - 1.030    
 pH (UA) 5.0 5.0 - 8.0 Protein NEGATIVE  NEG mg/dL Glucose NEGATIVE  NEG mg/dL Ketone NEGATIVE  NEG mg/dL Bilirubin NEGATIVE  NEG Blood NEGATIVE  NEG Urobilinogen 0.2 0.2 - 1.0 EU/dL Nitrites POSITIVE (A) NEG Leukocyte Esterase SMALL (A) NEG URINE MICROSCOPIC ONLY Collection Time: 10/20/18  6:52 PM  
Result Value Ref Range WBC 4 to 6 0 - 4 /hpf  
 RBC 0 0 - 5 /hpf Epithelial cells FEW 0 - 5 /lpf Bacteria 2+ (A) NEG /hpf POC LACTIC ACID Collection Time: 10/20/18  7:16 PM  
Result Value Ref Range Lactic Acid (POC) 1.2 0.4 - 2.0 mmol/L  
EKG, 12 LEAD, INITIAL Collection Time: 10/20/18  7:24 PM  
Result Value Ref Range Ventricular Rate 74 BPM  
 Atrial Rate 91 BPM  
 QRS Duration 88 ms Q-T Interval 336 ms  
 QTC Calculation (Bezet) 372 ms Calculated R Axis 78 degrees Calculated T Axis 89 degrees Diagnosis Atrial fibrillation Abnormal ECG When compared with ECG of 07-JUL-2012 05:04, Atrial fibrillation has replaced Sinus rhythm Nonspecific T wave abnormality has replaced inverted T waves in Lateral leads Radiologic Studies -  
XR CHEST PORT    (Results Pending) XR ABD PORT  1 V    (Results Pending) Diagnosis Clinical Impression: 1. Altered mental status, unspecified altered mental status type 2. Urinary tract infection without hematuria, site unspecified 3. Hyponatremia 4. Dehydration Follow-up Information None Medication List  
  
ASK your doctor about these medications ANTIVERT 12.5 mg tablet Generic drug:  meclizine 
  
aspirin delayed-release 81 mg tablet Take 1 Tab by mouth daily. Calcium-Vitamin D3-Vitamin K 408-230-69 mg-unit-mcg Chew 
  
clopidogrel 75 mg Tab Commonly known as:  PLAVIX Take 1 Tab by mouth daily. clotrimazole 1 % topical cream 
Commonly known as:  Kimani Mod[place] Apply  to affected area two (2) times a day. Apply with barrier cream to affected area. digoxin 0.125 mg tablet Commonly known as:  LANOXIN Take 1 Tab by mouth daily. fluticasone 50 mcg/actuation nasal spray Commonly known as:  FLONASE 
  
furosemide 20 mg tablet Commonly known as:  LASIX Take 1 Tab by mouth daily as needed. metoprolol tartrate 25 mg tablet Commonly known as:  LOPRESSOR Take 0.5 Tabs by mouth two (2) times a day. nitroglycerin 0.4 mg SL tablet Commonly known as:  NITROSTAT 
1 Tab by SubLINGual route every five (5) minutes as needed for Chest Pain. ondansetron hcl 4 mg tablet Commonly known as:  Nadyne Stark Take 1 Tab by mouth every eight (8) hours as needed for Nausea. psyllium Powd Commonly known as:  METAMUCIL Use as directed daily  Indications: Irritable Bowel Syndrome REMERON 15 mg tablet Generic drug:  mirtazapine 
  
sertraline 50 mg tablet Commonly known as:  ZOLOFT Take 1 Tab by mouth daily. Indications: ANXIETY WITH DEPRESSION 
  
VITAMIN B-12 PO 
  
  
 
_______________________________ Attestations: 
Scribe Attestation Blane Lou acting as a scribe for and in the presence of Kirstin Leal MD     
October 20, 2018 at 6:50 PM 
    
Provider Attestation:     
I personally performed the services described in the documentation, reviewed the documentation, as recorded by the scribe in my presence, and it accurately and completely records my words and actions. October 20, 2018 at 6:50 PM - Kirstin Leal MD 
_______________________________

## 2018-10-20 NOTE — TELEPHONE ENCOUNTER
Patient's son-in-law Jovan Whitehead called the office. Jovan Whitehead stated the patient was diagnosed with a UTI at her last office visit and was prescribed Cipro for 3 days. Jovan Whitehead stated the patient completed the Cipro and possibly still has a UTI after trying an over-the-counter UTI test. Jovan Whitehead states the patient also has a loss of appetite and nausea. Jovan Whitehead was advised Dr. Stiven Moseley is currently out of the office and will return on Monday. Jovan Whitehead was advised he can go to an urgent care for immediate treatment or he can wait until Monday when Dr. Stiven Moseley returns for her recommendations. Jovan Whitehead verbalized understanding and stated he would probably go to [facility].

## 2018-10-21 ENCOUNTER — APPOINTMENT (OUTPATIENT)
Dept: GENERAL RADIOLOGY | Age: 83
DRG: 193 | End: 2018-10-21
Attending: INTERNAL MEDICINE
Payer: MEDICARE

## 2018-10-21 PROBLEM — G93.41 ACUTE METABOLIC ENCEPHALOPATHY: Status: ACTIVE | Noted: 2018-10-21

## 2018-10-21 LAB
ANION GAP SERPL CALC-SCNC: 5 MMOL/L (ref 3–18)
ANION GAP SERPL CALC-SCNC: 5 MMOL/L (ref 3–18)
ANION GAP SERPL CALC-SCNC: 7 MMOL/L (ref 3–18)
ATRIAL RATE: 91 BPM
BUN SERPL-MCNC: 14 MG/DL (ref 7–18)
BUN SERPL-MCNC: 14 MG/DL (ref 7–18)
BUN SERPL-MCNC: 15 MG/DL (ref 7–18)
BUN/CREAT SERPL: 16 (ref 12–20)
BUN/CREAT SERPL: 18 (ref 12–20)
BUN/CREAT SERPL: 19 (ref 12–20)
CALCIUM SERPL-MCNC: 7.2 MG/DL (ref 8.5–10.1)
CALCIUM SERPL-MCNC: 7.3 MG/DL (ref 8.5–10.1)
CALCIUM SERPL-MCNC: 7.4 MG/DL (ref 8.5–10.1)
CALCULATED R AXIS, ECG10: 78 DEGREES
CALCULATED T AXIS, ECG11: 89 DEGREES
CHLORIDE SERPL-SCNC: 91 MMOL/L (ref 100–108)
CHLORIDE SERPL-SCNC: 91 MMOL/L (ref 100–108)
CHLORIDE SERPL-SCNC: 95 MMOL/L (ref 100–108)
CO2 SERPL-SCNC: 26 MMOL/L (ref 21–32)
CO2 SERPL-SCNC: 27 MMOL/L (ref 21–32)
CO2 SERPL-SCNC: 28 MMOL/L (ref 21–32)
CREAT SERPL-MCNC: 0.77 MG/DL (ref 0.6–1.3)
CREAT SERPL-MCNC: 0.8 MG/DL (ref 0.6–1.3)
CREAT SERPL-MCNC: 0.85 MG/DL (ref 0.6–1.3)
DIAGNOSIS, 93000: NORMAL
ERYTHROCYTE [DISTWIDTH] IN BLOOD BY AUTOMATED COUNT: 15.3 % (ref 11.6–14.5)
GLUCOSE SERPL-MCNC: 73 MG/DL (ref 74–99)
GLUCOSE SERPL-MCNC: 82 MG/DL (ref 74–99)
GLUCOSE SERPL-MCNC: 99 MG/DL (ref 74–99)
HCT VFR BLD AUTO: 31 % (ref 35–45)
HGB BLD-MCNC: 9.8 G/DL (ref 12–16)
MCH RBC QN AUTO: 26.9 PG (ref 24–34)
MCHC RBC AUTO-ENTMCNC: 31.6 G/DL (ref 31–37)
MCV RBC AUTO: 85.2 FL (ref 74–97)
PLATELET # BLD AUTO: 356 K/UL (ref 135–420)
PMV BLD AUTO: 8.4 FL (ref 9.2–11.8)
POTASSIUM SERPL-SCNC: 4.6 MMOL/L (ref 3.5–5.5)
POTASSIUM SERPL-SCNC: 4.7 MMOL/L (ref 3.5–5.5)
POTASSIUM SERPL-SCNC: 4.7 MMOL/L (ref 3.5–5.5)
Q-T INTERVAL, ECG07: 336 MS
QRS DURATION, ECG06: 88 MS
QTC CALCULATION (BEZET), ECG08: 372 MS
RBC # BLD AUTO: 3.64 M/UL (ref 4.2–5.3)
SODIUM SERPL-SCNC: 122 MMOL/L (ref 136–145)
SODIUM SERPL-SCNC: 125 MMOL/L (ref 136–145)
SODIUM SERPL-SCNC: 128 MMOL/L (ref 136–145)
VENTRICULAR RATE, ECG03: 74 BPM
WBC # BLD AUTO: 10.6 K/UL (ref 4.6–13.2)

## 2018-10-21 PROCEDURE — 74011250637 HC RX REV CODE- 250/637: Performed by: INTERNAL MEDICINE

## 2018-10-21 PROCEDURE — 74011250636 HC RX REV CODE- 250/636: Performed by: HOSPITALIST

## 2018-10-21 PROCEDURE — 97162 PT EVAL MOD COMPLEX 30 MIN: CPT

## 2018-10-21 PROCEDURE — 65660000000 HC RM CCU STEPDOWN

## 2018-10-21 PROCEDURE — 80048 BASIC METABOLIC PNL TOTAL CA: CPT | Performed by: HOSPITALIST

## 2018-10-21 PROCEDURE — 85027 COMPLETE CBC AUTOMATED: CPT | Performed by: INTERNAL MEDICINE

## 2018-10-21 PROCEDURE — 74011250636 HC RX REV CODE- 250/636: Performed by: INTERNAL MEDICINE

## 2018-10-21 PROCEDURE — 97530 THERAPEUTIC ACTIVITIES: CPT

## 2018-10-21 PROCEDURE — 74011250636 HC RX REV CODE- 250/636: Performed by: EMERGENCY MEDICINE

## 2018-10-21 PROCEDURE — 74011000258 HC RX REV CODE- 258: Performed by: EMERGENCY MEDICINE

## 2018-10-21 PROCEDURE — 80048 BASIC METABOLIC PNL TOTAL CA: CPT | Performed by: INTERNAL MEDICINE

## 2018-10-21 PROCEDURE — 71046 X-RAY EXAM CHEST 2 VIEWS: CPT

## 2018-10-21 PROCEDURE — 36415 COLL VENOUS BLD VENIPUNCTURE: CPT | Performed by: INTERNAL MEDICINE

## 2018-10-21 RX ORDER — SODIUM CHLORIDE 9 MG/ML
75 INJECTION, SOLUTION INTRAVENOUS CONTINUOUS
Status: DISPENSED | OUTPATIENT
Start: 2018-10-21 | End: 2018-10-22

## 2018-10-21 RX ORDER — SODIUM CHLORIDE AND POTASSIUM CHLORIDE .9; .15 G/100ML; G/100ML
SOLUTION INTRAVENOUS CONTINUOUS
Status: DISCONTINUED | OUTPATIENT
Start: 2018-10-21 | End: 2018-10-21

## 2018-10-21 RX ADMIN — ONDANSETRON 4 MG: 2 INJECTION INTRAMUSCULAR; INTRAVENOUS at 03:05

## 2018-10-21 RX ADMIN — CLOPIDOGREL BISULFATE 75 MG: 75 TABLET ORAL at 09:46

## 2018-10-21 RX ADMIN — MIRTAZAPINE 15 MG: 15 TABLET, FILM COATED ORAL at 01:56

## 2018-10-21 RX ADMIN — CEFTRIAXONE SODIUM 2 G: 2 INJECTION, POWDER, FOR SOLUTION INTRAMUSCULAR; INTRAVENOUS at 20:52

## 2018-10-21 RX ADMIN — SODIUM CHLORIDE 75 ML/HR: 900 INJECTION, SOLUTION INTRAVENOUS at 11:24

## 2018-10-21 RX ADMIN — METOPROLOL TARTRATE 12.5 MG: 25 TABLET, FILM COATED ORAL at 18:49

## 2018-10-21 RX ADMIN — ENOXAPARIN SODIUM 30 MG: 100 INJECTION SUBCUTANEOUS at 23:37

## 2018-10-21 RX ADMIN — ASPIRIN 81 MG: 81 TABLET, COATED ORAL at 09:46

## 2018-10-21 RX ADMIN — MIRTAZAPINE 15 MG: 15 TABLET, FILM COATED ORAL at 22:00

## 2018-10-21 RX ADMIN — ENOXAPARIN SODIUM 30 MG: 100 INJECTION SUBCUTANEOUS at 01:56

## 2018-10-21 RX ADMIN — DIGOXIN 0.12 MG: 125 TABLET ORAL at 09:46

## 2018-10-21 RX ADMIN — SODIUM CHLORIDE 500 MG: 900 INJECTION, SOLUTION INTRAVENOUS at 18:49

## 2018-10-21 RX ADMIN — FUROSEMIDE 20 MG: 10 INJECTION, SOLUTION INTRAMUSCULAR; INTRAVENOUS at 09:46

## 2018-10-21 RX ADMIN — METOPROLOL TARTRATE 12.5 MG: 25 TABLET, FILM COATED ORAL at 09:46

## 2018-10-21 NOTE — PROGRESS NOTES
Reason for Admission:  Hyponatermia RRAT Score:  14 Do you (patient/family) have any concerns for transition/discharge? Plan for utilizing home health:  As indicated Likelihood of readmission?   mod Transition of Care Plan:    HH vs SNF Interviewed patient, she agrees to share her discharge information with Kaley Sotelodevaughn , her daughter. Demographic information verified. She was moderately indepndent prior to admission has a walker and sees Dr Kwesi Gilbert for her primary care needs. Discussed rehab, provided SNF list. Daughter visiting. SNF list provided. Daughter stated she would rather have the patient return home with home care. Her discharge plan is home with home care. Care Management Interventions PCP Verified by CM: Yes 
Palliative Care Criteria Met (RRAT>21 & CHF Dx)?: No 
Mode of Transport at Discharge: Other (see comment)(family) Transition of Care Consult (CM Consult): Discharge Planning MyChart Signup: No 
Discharge Durable Medical Equipment: No 
Health Maintenance Reviewed: Yes Physical Therapy Consult: Yes Occupational Therapy Consult: Yes Speech Therapy Consult: No 
Current Support Network: Lives Alone Confirm Follow Up Transport: Family Plan discussed with Pt/Family/Caregiver: Yes Freedom of Choice Offered: Yes [employer] Information Provided?: No 
Discharge Location Discharge Placement: Other:(HH vs home)

## 2018-10-21 NOTE — PROGRESS NOTES
(3521) Received report from Nae Moore, [facility]. Assumed care of patient.  
 
(6449) Spoke with xray about needing the morning xray. They are sending for transport now. (3535) Spoke with patient's granddaughter at bedside. Updated on care and questions answered. [phone] with patient's family at beside. Updated on care and questions answered. (0572) Physical therapy at bedside. Patient participated well. (5840) Bedside and Verbal shift change report given to Nae Moore RN (oncoming nurse) by Andria Espinal RN (offgoing nurse). Report included the following information SBAR, Intake/Output, MAR, Recent Results, Cardiac Rhythm AFib and Alarm Parameters .

## 2018-10-21 NOTE — ED NOTES
TRANSFER - ED to INPATIENT REPORT: 
 
SBAR report made available to receiving floor on this patient being transferred to [address] [place] [address] (TELE)  for routine progression of care Admitting diagnosis Hyponatremia Information from the following report(s) SBAR, Kardex, ED Summary and MAR was made available to receiving floor. Lines:  
Peripheral IV 10/20/18 Anterior;Right Antecubital (Active) Site Assessment Clean, dry, & intact 10/20/2018  6:00 PM  
Phlebitis Assessment 0 10/20/2018  6:00 PM  
Infiltration Assessment 0 10/20/2018  6:00 PM  
Dressing Status Clean, dry, & intact 10/20/2018  6:00 PM  
Dressing Type Transparent 10/20/2018  6:00 PM  
Hub Color/Line Status Green;Patent 10/20/2018  6:00 PM  
  
 
Medication list unable to confirm Opportunity for questions and clarification was provided. Patient is oriented to time, place, person and situation Patient is  continent and non-ambulatory Valuables transported with patient Patient transported with: 
 Monitor O2 @ 2 liters Registered Nurse

## 2018-10-21 NOTE — PROGRESS NOTES
Nutrition initial assessment/Plan of care RECOMMENDATIONS:  
1. Regular Diet (liberalized to increase PO intake) 2. SF CIB TID 3. Monitor weight, labs and PO intake 4. RD to follow GOALS:  
1. PO intake meets >75% of protein/calorie needs by 10/26 2. Weight Maintenance (+/- 1-2 lb) by 10/28 ASSESSMENT:  
Wt status is classified as normal per BMI of 20.1. However Pt at nutrition risk d/t BMI <23 and age >65 years. Poor PO intake x 3 days. SF CIB TID for additional calories/protein. Labs noted. Pt w/ hyponatremia, hypocalcemia and hypoalbuminemia. Nutrition recommendations listed. RD to follow. Nutrition Diagnoses:  
Inadequate energy intake related to decreased appetite with N/V/D  as evidenced by <10% energy needs met x 3 days. Nutrition Risk:  [] High  [x] Moderate []  Low SUBJECTIVE/OBJECTIVE:  
 Pt admitted for hyponatremia. PMHx including CHF, Afib, CAD and IBS-d. Noted Pt w/ N/V/D and poor PO intake x 3 days. Pt seen in room with family at bedside. Denies having any problems chewing/swallowing and unsure of weight loss. Per documented weight records weight is stable. Stated she has an allergy to eggs. Reports normally having a good appetite but family stated poor x 3-4 days now. Stated she consumes strawberry CIB at home. Consumed ~25% of lunch per family but didn't like the soup at dinner. Provided a menu to assist with food choice selection and obtained some preferences as well. Liberalized diet to increase intake and ordered SF CIB TID for additional calories/protein. Encouraged intake and will monitor. Information Obtained from:  
 [x] Chart Review [x] Patient [x] Family/Caregiver 
 [] Nurse/Physician 
 [] Interdisciplinary Meeting/Rounds Diet: Cardiac Diet Medications: [x] Reviewed IV: NS @75 mL/hr Plavix, Lopressor, Remeron Allergies: [x] Reviewed (Eggs) Encounter Diagnoses ICD-10-CM ICD-9-CM 1. Altered mental status, unspecified altered mental status type R41.82 780.97  
2. Urinary tract infection without hematuria, site unspecified N39.0 599.0 3. Hyponatremia E87.1 276.1 4. Dehydration E86.0 276.51 Past Medical History:  
Diagnosis Date  A-fib ([facility] 01/2017  Arthritis, degenerative  CAD (coronary artery disease) 04/2012 S/P 2.75 X 15 mm BMS of OM (04/2012), Two LAD BMS (07/2012)  Elevated liver enzymes Likely from statin  HLD (hyperlipidemia)  Hyperkalemia 06/2012 Likely from lisinopril  Ischemic cardiomyopathy LVEF  45% (11/2012) & 30% echo (04/2012)  NSTEMI (non-ST elevated myocardial infarction) ([facility] 04/2012  Vertigo Labs:   
Lab Results Component Value Date/Time Sodium 122 (L) 10/21/2018 05:30 AM  
 Potassium 4.7 10/21/2018 05:30 AM  
 Chloride 91 (L) 10/21/2018 05:30 AM  
 CO2 26 10/21/2018 05:30 AM  
 Anion gap 5 10/21/2018 05:30 AM  
 Glucose 82 10/21/2018 05:30 AM  
 BUN 15 10/21/2018 05:30 AM  
 Creatinine 0.77 10/21/2018 05:30 AM  
 Calcium 7.2 (L) 10/21/2018 05:30 AM  
 Magnesium 1.7 (L) 04/24/2012 05:00 AM  
 Phosphorus 2.2 (L) 04/24/2012 05:00 AM  
 Albumin 2.5 (L) 10/20/2018 05:50 PM  
 
Anthropometrics: BMI (calculated): 20.1 Last 3 Recorded Weights in this Encounter 10/21/18 0421 Weight: 49.9 kg (110 lb) Ht Readings from Last 1 Encounters:  
10/09/18 5' 2\" (1.575 m) Weight Metrics 10/21/2018 10/20/2018 10/9/2018 9/24/2018 4/30/2018 10/31/2017 5/2/2017 Weight 110 lb - 103 lb 6.4 oz 104 lb 6.4 oz 100 lb 103 lb 112 lb BMI - 20.12 kg/m2 18.91 kg/m2 19.1 kg/m2 18.29 kg/m2 18.54 kg/m2 20.16 kg/m2 Patient Vitals for the past 100 hrs: 
 % Diet Eaten 10/21/18 1431 10 % 10/21/18 0959 0 % IBW: 110 lb %IBW: 100% UBW: 104-110 lb 
[] Weight Loss [] Weight Gain [x] Weight Stable Estimated Nutrition Needs: [x] MSJ  [] Other: 
Calories: 8025-0219 kcal Based on:   [x] Actual BW   
 Protein:   60-65g Based on:   [x] Actual BW Fluid:       4370-1056 ml Based on:   [x] Actual BW  
 
 [x] No Cultural, Pentecostal or ethnic dietary need identified. [] Cultural, Pentecostal and ethnic food preferences identified and addressed Wt Status:  [x] Normal (18.6 - 24.9) [] Underweight (<18.5) [] Overweight (25 - 29.9) [] Mild Obesity (30 - 34.9)  [] Moderate Obesity (35 - 39.9) [] Morbid Obesity (40+) Nutrition Problems Identified:  
[x] Suboptimal PO intake [x] Food Allergies (Eggs) [] Difficulty chewing/swallowing/poor dentition 
[x] Constipation/Diarrhea [x] Nausea/Vomiting  
[] None 
[] Other:  
 
Plan:  
[x] Therapeutic Diet [x]  Obtained/adjusted food preferences/tolerances and/or snacks options [x]  Supplements added  
[] Occupational therapy following for feeding techniques []  HS snack added  
[]  Modify diet texture  
[x]  Modify diet for food allergies []  Educate patient  
[]  Assist with menu selection  
[x]  Monitor PO intake on meal rounds  
[x]  Continue inpatient monitoring and intervention  
[]  Participated in discharge planning/Interdisciplinary rounds/Team meetings  
[]  Other:  
 
Education Needs: 
 [] Not appropriate for teaching at this time due to: 
 [x] Identified and addressed Nutrition Monitoring and Evaluation: 
[x] Continue ongoing monitoring and intervention 
[] Other [facility]

## 2018-10-21 NOTE — PROGRESS NOTES
Internal Medicine Progress Note Patient's Name: Holden Abdalla Admit Date: 10/20/2018 Length of Stay: 1 Assessment/Plan Active Hospital Problems Diagnosis Date Noted  Hyponatremia 10/20/2018  Irritable bowel syndrome with diarrhea 09/24/2018  Chronic a-fib ([address] 05/02/2017  CAD (coronary artery disease) S/P 2.75 X 15 mm BMS of Obtuse marginal 
  
 HLD (hyperlipidemia) - Na down this w/o any fluids, will start NS w/ thought process this is hypovolemic hyponatremia 
- Urine Na, osm pending - Trend BMP q4h - Keep Na from increasing > 8mEq/24h 
- Cont rocephin/azithro for now for possible CAP/UTI - F/u PA/Lat CXR 
- Will hold on lasix dose for now as pt tachy w/ BP overnight 111/63 and attempt low flow fluid resuscitation 
- PT/OT 
- Cont acceptable home medications for chronic conditions  
- DVT protocol I have personally reviewed all pertinent labs and films that have officially resulted over the last 24 hours. I have personally checked for all pending labs that are awaiting final results. Subjective Pt s/e @ bedside No major events overnight Feeling a little better today Hasn't had much diarrhea this morning Mild SOB Denies CP Objective Visit Vitals /72 (BP 1 Location: Left arm, BP Patient Position: At rest) Pulse (!) 108 Temp 97.5 °F (36.4 °C) Resp 18 Wt 49.9 kg (110 lb) SpO2 98% BMI 20.12 kg/m² Physical Exam: 
General Appearance: NAD, conversant, frail appearing Lungs: CTA with normal respiratory effort CV: IRIR, no m/r/g Abdomen: soft, non-tender, normal bowel sounds Extremities: no cyanosis, no peripheral edema Neuro: No focal deficits, motor/sensory intact Lab/Data Reviewed: 
BMP:  
Lab Results Component Value Date/Time   (L) 10/21/2018 05:30 AM  
 K 4.7 10/21/2018 05:30 AM  
 CL 91 (L) 10/21/2018 05:30 AM  
 CO2 26 10/21/2018 05:30 AM  
 AGAP 5 10/21/2018 05:30 AM  
 GLU 82 10/21/2018 05:30 AM  
 BUN 15 10/21/2018 05:30 AM  
 CREA 0.77 10/21/2018 05:30 AM  
 GFRAA >60 10/21/2018 05:30 AM  
 GFRNA >60 10/21/2018 05:30 AM  
 
CBC:  
Lab Results Component Value Date/Time WBC 10.6 10/21/2018 05:30 AM  
 HGB 9.8 (L) 10/21/2018 05:30 AM  
 HCT 31.0 (L) 10/21/2018 05:30 AM  
  10/21/2018 05:30 AM  
 
 
Imaging Reviewed: 
[facility] Result Date: 10/21/2018 EXAM: XR CHEST PORT CLINICAL INDICATION/HISTORY: meets SIRS criteria   > Additional: Sepsis, altered mental status, confusion and loss of appetite COMPARISON: 4/1/2015   > Reference Exam: None. TECHNIQUE: Portable upright AP view of the chest was obtained. _______________ FINDINGS: SUPPORT DEVICES: None. HEART AND MEDIASTINUM: Cardiomediastinal silhouette appears within normal limits. Acro aorta LUNGS AND PLEURAL SPACES: Left and right pleural effusions, each with adjacent basilar patchy opacity. Unchanged asymmetric right hilar convexity. No pneumothorax. BONY THORAX AND SOFT TISSUES: No acute osseous abnormality. _______________ IMPRESSION: 1. Small left and right pleural effusions, each with adjacent basilar atelectasis/infiltrate. 2. Unchanged asymmetric right hilar convexity, typically either lymphadenopathy or secondary findings of chronic pulmonary arterial hypertension. Xr Abd Port  1 V Result Date: 10/21/2018 Abdomen AP single view History: Abdominal pain and loss of appetite Comparison: Abdominal ultrasound 6/20/2012 Findings: There is normal bowel gas pattern. No evidence of bowel obstruction or bowel dilatation. The soft tissue planes and bony structures appear normal.  
 
Impression: No bowel obstruction or other acute findings seen. Medications Reviewed: 
Current Facility-Administered Medications Medication Dose Route Frequency  0.9% sodium chloride with KCl 20 mEq/L infusion   IntraVENous CONTINUOUS  
 0.9% sodium chloride infusion  75 mL/hr IntraVENous CONTINUOUS  
  sodium chloride (NS) flush 5-10 mL  5-10 mL IntraVENous PRN  
 cefTRIAXone (ROCEPHIN) 2 g in 0.9% sodium chloride (MBP/ADV) 50 mL MBP  2 g IntraVENous Q24H  
 azithromycin (ZITHROMAX) 500 mg in 0.9% sodium chloride (MBP/ADV) 250 mL adv  500 mg IntraVENous Q24H  
 ondansetron (ZOFRAN) injection 4 mg  4 mg IntraVENous Q4H PRN  
 aspirin delayed-release tablet 81 mg  81 mg Oral DAILY  clopidogrel (PLAVIX) tablet 75 mg  75 mg Oral DAILY  digoxin (LANOXIN) tablet 0.125 mg  0.125 mg Oral DAILY  metoprolol tartrate (LOPRESSOR) tablet 12.5 mg  12.5 mg Oral BID  acetaminophen (TYLENOL) solution 650 mg  650 mg Oral Q6H PRN  
 enoxaparin (LOVENOX) injection 30 mg  30 mg SubCUTAneous Q24H  
 mirtazapine (REMERON) tablet 15 mg  15 mg Oral QHS Chayo Mariee DO Internal Medicine, Hospitalist 
Pager: [phone] [facility]

## 2018-10-21 NOTE — PROGRESS NOTES
2215 - Pt arrived from the ED. Pt in bed, alert and oriented x 4. Not in any form of distress. Denies pain. Frequent use items and call bell within reach. Verbalized understanding to call for assistance. Bed locked in lowest position. 0480 - Pt has clear emesis and nausea. Administered 4 mg Zofran IV.  
 
0320 - Pt denies any nausea. 18 - Discussed PTA med list with pt's son in law. 1252 - Bedside and Verbal shift change report given to CINTHYA Payton (oncoming nurse) by Lula Scruggs RN (offgoing nurse). Report included the following information SBAR, Kardex, Intake/Output, MAR and Recent Results.

## 2018-10-21 NOTE — PROGRESS NOTES
Problem: Falls - Risk of 
Goal: *Absence of Falls Document Edis Bright Fall Risk and appropriate interventions in the flowsheet. Outcome: Progressing Towards Goal 
Fall Risk Interventions: 
  
 
  
 
Medication Interventions: Assess postural VS orthostatic hypotension Elimination Interventions: Patient to call for help with toileting needs Problem: Pressure Injury - Risk of 
Goal: *Prevention of pressure injury Document Allen Scale and appropriate interventions in the flowsheet. Outcome: Progressing Towards Goal 
Pressure Injury Interventions: 
Sensory Interventions: Assess changes in LOC Activity Interventions: Increase time out of bed Mobility Interventions: HOB 30 degrees or less Nutrition Interventions: Document food/fluid/supplement intake Friction and Shear Interventions: Foam dressings/transparent film/skin sealants

## 2018-10-21 NOTE — H&P
History and Physical 
 
Patient: Krishan Abraham               Sex: female          DOA: 10/20/2018 YOB: 1927      Age:  80 y.o. Assessment/Plan Acute: AMS- likely infection and low NA Hyponatremia-acute on chronic- Na123-check q8h x3, urine Na, Osm - likely SIADH from nausea, not eating. SOB w/ mild hypoxia-CAP vs ADHF. Anxiety playing a role with tachypnea A. On rocephin + Azithro w/ LLL crackles, need PA/LAT in am 
B. ADHF - baseline NTBNP unk. 4123 on admission, check xray in am likely need to start lasix UTI-Rocephin. Recent UTI on Cipro only tolerated 2d. Some leukouria, would complete 5d course. Chronic Afib-on BB, Dig 
CAD/ NSTEMI - on BB, Plavix HLD - not on statin 
combined HF - baseline EF 45% in 2012 w/dd, on lasix only prn Anxiety - failed zoloft IBS- may need prn lomotil in hospital 
 
Code status: DNR confirmed PPX:  DVT: LMWH low dose  GI: None indicated HPI:  
 
Chief Complaint Patient presents with  Diarrhea  Fatigue  Anorexia  Altered mental status Krishan Abraham is a 80 y.o. female with PMHX CHF, Afib, CAD, who presents via son in law for AMS, persistent N/V/D and dehydration. She has been slightly altered today. She lives at home but family come check on her 3-4x week. She had a UTI 1 week ago and was given Cipro but had N/V with it and only tolerated 2 days. She also has IBS-d and it was inclear whether her IBS was flaring or intolerance to the cipro. She has not eat for nearly 3 days. She is not hungry. She was mildly hypoxic in ER, tachypnic even after 02 given. CXR did not show sangeetha pulm edema, UA few leukocytes Na123, NTBNP 4123. Given IVF and Rocephin + Azithro in ER. She appears dehydrated but she also has faint b/l pulm crackles and trace LE edema. Past Medical History:  
Diagnosis Date  A-fib ([facility] 01/2017  Arthritis, degenerative  CAD (coronary artery disease) 04/2012 S/P 2.75 X 15 mm BMS of OM (2012), Two LAD BMS (2012)  Elevated liver enzymes Likely from statin  HLD (hyperlipidemia)  Hyperkalemia 2012 Likely from lisinopril  Ischemic cardiomyopathy LVEF  45% (2012) & 30% echo (2012)  NSTEMI (non-ST elevated myocardial infarction) ([address] 2012  Vertigo Prior to Admission Medications Prescriptions Last Dose Informant Patient Reported? Taking? CYANOCOBALAMIN, VITAMIN B-12, (VITAMIN B-12 PO)   Yes No  
Sig: Take  by mouth daily. Calcium-Vitamin D3-Vitamin K [id number] mg-unit-mcg chew   Yes No  
Sig: Take 2 Units by mouth daily. aspirin delayed-release 81 mg tablet   No No  
Sig: Take 1 Tab by mouth daily. clopidogrel (PLAVIX) 75 mg tab   No No  
Sig: Take 1 Tab by mouth daily. clotrimazole (LOTRIMIN) 1 % topical cream   No No  
Sig: Apply  to affected area two (2) times a day. Apply with barrier cream to affected area. digoxin (LANOXIN) 0.125 mg tablet   No No  
Sig: Take 1 Tab by mouth daily. fluticasone (FLONASE) 50 mcg/actuation nasal spray   Yes No  
furosemide (LASIX) 20 mg tablet   No No  
Sig: Take 1 Tab by mouth daily as needed. meclizine (ANTIVERT) 12.5 mg tablet   Yes No  
Sig: Take  by mouth daily as needed. metoprolol tartrate (LOPRESSOR) 25 mg tablet   No No  
Sig: Take 0.5 Tabs by mouth two (2) times a day. mirtazapine (REMERON) 15 mg tablet   Yes No  
Sig: Take  by mouth nightly as needed. nitroglycerin (NITROSTAT) 0.4 mg SL tablet   No No  
Si Tab by SubLINGual route every five (5) minutes as needed for Chest Pain. ondansetron hcl (ZOFRAN) 4 mg tablet   No No  
Sig: Take 1 Tab by mouth every eight (8) hours as needed for Nausea. psyllium (METAMUCIL) powd   No No  
Sig: Use as directed daily  Indications: Irritable Bowel Syndrome  
sertraline (ZOLOFT) 50 mg tablet   No No  
Sig: Take 1 Tab by mouth daily.  Indications: ANXIETY WITH DEPRESSION  
  
 Facility-Administered Medications: None Social History: 
Social History Socioeconomic History  Marital status:  Spouse name: Not on file  Number of children: Not on file  Years of education: Not on file  Highest education level: Not on file Social Needs  Financial resource strain: Not on file  Food insecurity - worry: Not on file  Food insecurity - inability: Not on file  Transportation needs - medical: Not on file  Transportation needs - non-medical: Not on file Occupational History  Not on file Tobacco Use  Smoking status: Never Smoker  Smokeless tobacco: Never Used Substance and Sexual Activity  Alcohol use: No  
 Drug use: No  
 Sexual activity: No  
Other Topics Concern  Not on file Social History Narrative  Not on file Family History: 
Family History Problem Relation Age of Onset  Diabetes Mother Surgical History: 
Past Surgical History:  
Procedure Laterality Date  HX CORONARY STENT PLACEMENT  4/23/12  
 bare metal stent to obtuse marginal branch  HX HEART CATHETERIZATION Review of Systems Constitutional:  No fever or weight loss HEENT:  No headache or visual changes Cardiovascular:  No chest pain or diap+ shortness of breath. GI:  No nausea or vomitting. No diarrhea :  No hematuria or dysuria Skin:  No rashes or moles Neuro:  No seizures or syncope Hematological:  No bruising or bleeding Endocrine:  No diabetes or thyroid disease Physical Exam:  
  
Visit Vitals /80 Pulse 89 Temp 98.5 °F (36.9 °C) Resp 18 SpO2 (!) 87% Physical Exam: 
Gen:  No distress, alert Ox2 HEENT:  Normal cephalic atraumatic, extra-occular movements are intact. Neck:  Supple, No JVD Lungs:  Bibasilar crackles, faint exp wheeze, tachypnea Heart:  Regular Rate and Rhythm, normal S1 and S2, Abdomen:  Soft, non tender, normal bowel sounds, no guarding. Extremities:  Well perfused, no cyanosis , trace edema Neurological:  CN's are intact, normal strength throughout extremities Skin:  No rashes or moles, dry skin all over. Psych:  anxious Laboratory Studies: All lab results for the last 24 hours reviewed. Recent Results (from the past 12 hour(s)) NT-PRO BNP Collection Time: 10/20/18  5:40 PM  
Result Value Ref Range NT pro-BNP 4,123 (H) 0 - 1,800 PG/ML  
CBC WITH AUTOMATED DIFF Collection Time: 10/20/18  5:50 PM  
Result Value Ref Range WBC 9.2 4.6 - 13.2 K/uL  
 RBC 3.70 (L) 4.20 - 5.30 M/uL  
 HGB 10.2 (L) 12.0 - 16.0 g/dL HCT 31.7 (L) 35.0 - 45.0 % MCV 85.7 74.0 - 97.0 FL  
 MCH 27.6 24.0 - 34.0 PG  
 MCHC 32.2 31.0 - 37.0 g/dL  
 RDW 15.4 (H) 11.6 - 14.5 % PLATELET 102 564 - 645 K/uL MPV 8.2 (L) 9.2 - 11.8 FL  
 NEUTROPHILS 62 40 - 73 % LYMPHOCYTES 18 (L) 21 - 52 % MONOCYTES 15 (H) 3 - 10 % EOSINOPHILS 5 0 - 5 % BASOPHILS 0 0 - 2 %  
 ABS. NEUTROPHILS 5.7 1.8 - 8.0 K/UL  
 ABS. LYMPHOCYTES 1.6 0.9 - 3.6 K/UL  
 ABS. MONOCYTES 1.4 (H) 0.05 - 1.2 K/UL  
 ABS. EOSINOPHILS 0.4 0.0 - 0.4 K/UL  
 ABS. BASOPHILS 0.0 0.0 - 0.1 K/UL  
 DF AUTOMATED METABOLIC PANEL, COMPREHENSIVE Collection Time: 10/20/18  5:50 PM  
Result Value Ref Range Sodium 123 (L) 136 - 145 mmol/L Potassium 4.8 3.5 - 5.5 mmol/L Chloride 86 (L) 100 - 108 mmol/L  
 CO2 30 21 - 32 mmol/L Anion gap 7 3.0 - 18 mmol/L Glucose 122 (H) 74 - 99 mg/dL BUN 19 (H) 7.0 - 18 MG/DL Creatinine 1.07 0.6 - 1.3 MG/DL  
 BUN/Creatinine ratio 18 12 - 20 GFR est AA 58 (L) >60 ml/min/1.73m2 GFR est non-AA 48 (L) >60 ml/min/1.73m2 Calcium 8.0 (L) 8.5 - 10.1 MG/DL Bilirubin, total 0.3 0.2 - 1.0 MG/DL  
 ALT (SGPT) 18 13 - 56 U/L  
 AST (SGOT) 15 15 - 37 U/L Alk. phosphatase 139 (H) 45 - 117 U/L Protein, total 6.1 (L) 6.4 - 8.2 g/dL Albumin 2.5 (L) 3.4 - 5.0 g/dL Globulin 3.6 2.0 - 4.0 g/dL A-G Ratio 0.7 (L) 0.8 - 1.7 URINALYSIS W/ RFLX MICROSCOPIC Collection Time: 10/20/18  6:52 PM  
Result Value Ref Range Color YELLOW Appearance CLEAR Specific gravity 1.015 1.005 - 1.030    
 pH (UA) 5.0 5.0 - 8.0 Protein NEGATIVE  NEG mg/dL Glucose NEGATIVE  NEG mg/dL Ketone NEGATIVE  NEG mg/dL Bilirubin NEGATIVE  NEG Blood NEGATIVE  NEG Urobilinogen 0.2 0.2 - 1.0 EU/dL Nitrites POSITIVE (A) NEG Leukocyte Esterase SMALL (A) NEG URINE MICROSCOPIC ONLY Collection Time: 10/20/18  6:52 PM  
Result Value Ref Range WBC 4 to 6 0 - 4 /hpf  
 RBC 0 0 - 5 /hpf Epithelial cells FEW 0 - 5 /lpf Bacteria 2+ (A) NEG /hpf POC LACTIC ACID Collection Time: 10/20/18  7:16 PM  
Result Value Ref Range Lactic Acid (POC) 1.2 0.4 - 2.0 mmol/L  
EKG, 12 LEAD, INITIAL Collection Time: 10/20/18  7:24 PM  
Result Value Ref Range Ventricular Rate 74 BPM  
 Atrial Rate 91 BPM  
 QRS Duration 88 ms Q-T Interval 336 ms  
 QTC Calculation (Bezet) 372 ms Calculated R Axis 78 degrees Calculated T Axis 89 degrees Diagnosis Atrial fibrillation Abnormal ECG When compared with ECG of 07-JUL-2012 05:04, Atrial fibrillation has replaced Sinus rhythm Nonspecific T wave abnormality has replaced inverted T waves in Lateral leads Rad: CXR - no sangeetha opacities or pulm edema

## 2018-10-21 NOTE — PROGRESS NOTES
Problem: Mobility Impaired (Adult and Pediatric) Goal: *Acute Goals and Plan of Care (Insert Text) Physical Therapy Goals Initiated 10/21/2018 and to be accomplished within 7 day(s) 1. Patient will move from supine to sit and sit to supine , scoot up and down and roll side to side in bed with modified independence. 2.  Patient will transfer from bed to chair and chair to bed with modified independence using the least restrictive device. 3.  Patient will perform sit to stand with modified independence. 4.  Patient will ambulate with modified independence for >/= 150 feet with the least restrictive device. 5.  Patient will ascend/descend 4 stairs with handrail(s) with modified independence. physical Therapy EVALUATION Patient: Sadaf Gamez (80 y.o. female) Date: 10/21/2018 Primary Diagnosis: Hyponatremia Precautions:   
 PLOF: Independent gait with no device. Caregiver comes 4 hours per day to assist as needed. ASSESSMENT : 
Patient requires between minimal assistance/contact guard assist for bed mobility, transfers and ambulation. Will benefit from skilled PT intervention to increase overall functional mobility independence and safety. Per grandchildren present, patient has enough family members who can stay with her 24/7 if discharged to home with home health PT Patient presents with deficits in:  
Bed Mobility, Transfers, Gait, Strength and Balance Patient will benefit from skilled intervention to address the above impairments. Patients rehabilitation potential is considered to be Good Factors which may influence rehabilitation potential include:  
[]         None noted 
[]         Mental ability/status [x]         Medical condition 
[]         Home/family situation and support systems 
[]         Safety awareness 
[]         Pain tolerance/management 
[]         Other:  
 
Recommendations for nursing:  
Written on communication board: OOB with assist of 1 
 Verbally communicated to: nurse Coley PLAN : 
Recommendations and Planned Interventions: 
[x]           Bed Mobility Training             []    Neuromuscular Re-Education 
[x]           Transfer Training                   []    Orthotic/Prosthetic Training 
[x]           Gait Training                          []    Modalities []           Therapeutic Exercises          []    Edema Management/Control 
[]           Therapeutic Activities            [x]    Patient and Family Training/Education* [x]           Other (comment): Plan of care, bed mobility, transfers and gait with rolling walker Frequency/Duration: Patient will be followed by physical therapy 1 time per day/3-5 days per week to address goals. Discharge Recommendations: Home Health Further Equipment Recommendations for Discharge: NA, has a rollator at home but was not using it. SUBJECTIVE:  
Patient stated \"I am hard of hearing. \"  I need to go to the restroom to pee.  OBJECTIVE DATA SUMMARY:  
 
Past Medical History:  
Diagnosis Date  [facility]2017  Arthritis, degenerative  CAD (coronary artery disease) 04/2012 S/P 2.75 X 15 mm BMS of OM (04/2012), Two LAD BMS (07/2012)  Elevated liver enzymes Likely from statin  HLD (hyperlipidemia)  Hyperkalemia 06/2012 Likely from lisinopril  Ischemic cardiomyopathy LVEF  45% (11/2012) & 30% echo (04/2012)  NSTEMI (non-ST elevated myocardial infarction) ([facility] 04/2012  Vertigo Past Surgical History:  
Procedure Laterality Date  HX CORONARY STENT PLACEMENT  4/23/12  
 bare metal stent to obtuse marginal branch  HX HEART CATHETERIZATION Barriers to Learning/Limitations: yes;  sensory deficits-vision/hearing/speech Compensate with: visual, verbal, tactile, kinesthetic cues/model G CODE:Mobility [id number] Current  CL= 60-79%   Goal  CJ= 20-39%. The severity rating is based on the Other [facility] Balance Scale Eval Complexity: History: MEDIUM  Complexity : 1-2 comorbidities / personal factors will impact the outcome/ POC Exam:MEDIUM Complexity : 3 Standardized tests and measures addressing body structure, function, activity limitation and / or participation in recreation  Presentation: MEDIUM Complexity : Evolving with changing characteristics  Clinical Decision Making:Medium Complexity [facility] Standing Balance Scale Overall Complexity:MEDIUM [address] Standing Balance Scale 
0: Pt performs 25% or less of standing activity (Max assist) CN, 100% impaired. 1: Pt supports self with upper extremities but requires therapist assistance. Pt performs 25-50% of effort (Mod assist) CM, 80% to <100% impaired. 1+: Pt supports self with upper extremities but requires therapist assistance. Pt performs >50% effort. (Min assist). CL, 60% to <80% impaired. 2: Pt supports self independently with both upper extremities (walker, crutches, parallel bars). CL, 60% to <80% impaired. 2+: Pt support self independently with 1 upper extremity (cane, crutch, 1 parallel bar). CK, 40% to <60% impaired. 3: Pt stands without upper extremity support for up to 30 seconds. CK, 40% to <60% impaired. 3+: Pt stands without upper extremity support for 30 seconds or greater. CJ, 20% to <40% impaired. 4: Pt independently moves and returns center of gravity 1-2 inches in one plane. CJ, 20% to <40% impaired. 4+: Pt independently moves and returns center of gravity 1-2 inches in multiple planes. CI, 1% to <20% impaired. 5: Pt independently moves and returns center of gravity in all planes greater than 2 inches. CH, 0% impaired. Prior Level of Function/Home Situation: Independent gait with no device. Caregiver comes 4 hours per day to assist as needed. Home Situation Home Environment: Private residence # Steps to Enter: 4 Rails to Enter: Yes Hand Rails : Bilateral 
One/Two Story Residence: Two story Living Alone:  Yes 
 Support Systems: Family member(s), Child(lola) Patient Expects to be Discharged to[de-identified] Private residence Current DME Used/Available at Home: tarun NolanatorCritical Behavior: 
Neurologic State: Alert Orientation Level: Oriented X4 Cognition: Follows commands Safety/Judgement: Awareness of environment; Fall prevention Psychosocial 
Patient Behaviors: Calm; Cooperative Family  Behaviors: Calm;Supportive Purposeful Interaction: Yes Pt Identified Daily Priority: Clinical issues (comment) Caritas Process: Nurture loving kindness;Nurture spiritual self;Teaching/learning; Attend basic human needs Caring Interventions: Reassure; Therapeutic modalities Reassure: Informing Therapeutic Modalities: Intentional therapeutic touch;HumorSkin Condition/Temp: Warm Family  Behaviors: Calm;Supportive Skin Integrity: Intact Skin Integumentary Skin Color: Appropriate for ethnicity Skin Condition/Temp: Warm Skin Integrity: Intact Turgor: Non-tenting Hair Growth: Sparce Varicosities: Absent Strength:   
Strength: Generally decreased, functional(both LE) Tone & Sensation:  
Tone: Normal(both LE) Range Of Motion: 
AROM: Generally decreased, functional(both LE) Functional Mobility: 
Bed Mobility: 
Supine to Sit: Contact guard assistance;Minimum assistance Sit to Supine: Stand-by assistance Transfers: 
Sit to Stand: Minimum assistance from bed, min/mod assist from low toilet seat. Stand to Sit: Contact guard assistance Balance:  
Sitting - Static: Good (unsupported) Sitting - Dynamic: Good (unsupported) Standing: With support Standing - Static: Fair Standing - Dynamic : FairAmbulation/Gait Training: 
Distance (ft): 7 Feet (ft)(x 2 sit rest between) Assistive Device: Cane, straight;Walker, rolling Ambulation - Level of Assistance: Contact guard assistance Gait Abnormalities: Decreased step clearance;Trunk sway increased Base of Support: Center of gravity altered;Narrowed Speed/Sarah: Pace decreased (<100 feet/min) Step Length: Left shortened;Right shortened Pain: 
Pre treatment pain level:  0Post treatment pain level:  0 Pain Scale 1: Numeric (0 - 10) Pain Intensity 1: 0 Activity Tolerance: 
Fair Please refer to the flowsheet for vital signs taken during this treatment. After treatment:  
[]         Patient left in no apparent distress sitting up in chair 
[x]         Patient left in no apparent distress in bed 
[x]         Call bell left within reach [x]         Nursing notified 
[]         Caregiver present 
[]         Bed alarm activated COMMUNICATION/EDUCATION:  
[x]         Fall prevention education was provided and the patient/caregiver indicated understanding. [x]         Patient/family have participated as able in goal setting and plan of care. [x]         Patient/family agree to work toward stated goals and plan of care. []         Patient understands intent and goals of therapy, but is neutral about his/her participation. []         Patient is unable to participate in goal setting and plan of care. Thank you for this referral. 
Karla Persaud, PT Time Calculation: 26 mins

## 2018-10-22 LAB
ANION GAP SERPL CALC-SCNC: 8 MMOL/L (ref 3–18)
ANION GAP SERPL CALC-SCNC: 9 MMOL/L (ref 3–18)
BACTERIA SPEC CULT: NORMAL
BUN SERPL-MCNC: 12 MG/DL (ref 7–18)
BUN SERPL-MCNC: 15 MG/DL (ref 7–18)
BUN/CREAT SERPL: 15 (ref 12–20)
BUN/CREAT SERPL: 19 (ref 12–20)
CALCIUM SERPL-MCNC: 7.3 MG/DL (ref 8.5–10.1)
CALCIUM SERPL-MCNC: 7.6 MG/DL (ref 8.5–10.1)
CHLORIDE SERPL-SCNC: 97 MMOL/L (ref 100–108)
CHLORIDE SERPL-SCNC: 98 MMOL/L (ref 100–108)
CO2 SERPL-SCNC: 24 MMOL/L (ref 21–32)
CO2 SERPL-SCNC: 26 MMOL/L (ref 21–32)
CREAT SERPL-MCNC: 0.8 MG/DL (ref 0.6–1.3)
CREAT SERPL-MCNC: 0.81 MG/DL (ref 0.6–1.3)
ERYTHROCYTE [DISTWIDTH] IN BLOOD BY AUTOMATED COUNT: 15.5 % (ref 11.6–14.5)
GLUCOSE SERPL-MCNC: 58 MG/DL (ref 74–99)
GLUCOSE SERPL-MCNC: 76 MG/DL (ref 74–99)
HCT VFR BLD AUTO: 33.1 % (ref 35–45)
HGB BLD-MCNC: 9.9 G/DL (ref 12–16)
MCH RBC QN AUTO: 25.9 PG (ref 24–34)
MCHC RBC AUTO-ENTMCNC: 29.9 G/DL (ref 31–37)
MCV RBC AUTO: 86.6 FL (ref 74–97)
PLATELET # BLD AUTO: 405 K/UL (ref 135–420)
PMV BLD AUTO: 8.8 FL (ref 9.2–11.8)
POTASSIUM SERPL-SCNC: 4.6 MMOL/L (ref 3.5–5.5)
POTASSIUM SERPL-SCNC: 5 MMOL/L (ref 3.5–5.5)
RBC # BLD AUTO: 3.82 M/UL (ref 4.2–5.3)
SERVICE CMNT-IMP: NORMAL
SODIUM SERPL-SCNC: 131 MMOL/L (ref 136–145)
SODIUM SERPL-SCNC: 131 MMOL/L (ref 136–145)
WBC # BLD AUTO: 7.6 K/UL (ref 4.6–13.2)

## 2018-10-22 PROCEDURE — 80048 BASIC METABOLIC PNL TOTAL CA: CPT | Performed by: HOSPITALIST

## 2018-10-22 PROCEDURE — 36415 COLL VENOUS BLD VENIPUNCTURE: CPT | Performed by: HOSPITALIST

## 2018-10-22 PROCEDURE — 74011250636 HC RX REV CODE- 250/636: Performed by: HOSPITALIST

## 2018-10-22 PROCEDURE — 74011250637 HC RX REV CODE- 250/637: Performed by: INTERNAL MEDICINE

## 2018-10-22 PROCEDURE — 85027 COMPLETE CBC AUTOMATED: CPT | Performed by: INTERNAL MEDICINE

## 2018-10-22 PROCEDURE — 74011000258 HC RX REV CODE- 258: Performed by: EMERGENCY MEDICINE

## 2018-10-22 PROCEDURE — 77010033678 HC OXYGEN DAILY

## 2018-10-22 PROCEDURE — 74011250636 HC RX REV CODE- 250/636: Performed by: EMERGENCY MEDICINE

## 2018-10-22 PROCEDURE — 65660000000 HC RM CCU STEPDOWN

## 2018-10-22 RX ADMIN — METOPROLOL TARTRATE 12.5 MG: 25 TABLET, FILM COATED ORAL at 05:15

## 2018-10-22 RX ADMIN — MIRTAZAPINE 15 MG: 15 TABLET, FILM COATED ORAL at 21:01

## 2018-10-22 RX ADMIN — SODIUM CHLORIDE 75 ML/HR: 900 INJECTION, SOLUTION INTRAVENOUS at 03:45

## 2018-10-22 RX ADMIN — ASPIRIN 81 MG: 81 TABLET, COATED ORAL at 08:32

## 2018-10-22 RX ADMIN — SODIUM CHLORIDE 500 MG: 900 INJECTION, SOLUTION INTRAVENOUS at 19:21

## 2018-10-22 RX ADMIN — METOPROLOL TARTRATE 12.5 MG: 25 TABLET, FILM COATED ORAL at 17:03

## 2018-10-22 RX ADMIN — CLOPIDOGREL BISULFATE 75 MG: 75 TABLET ORAL at 08:32

## 2018-10-22 RX ADMIN — DIGOXIN 0.12 MG: 125 TABLET ORAL at 05:16

## 2018-10-22 RX ADMIN — CEFTRIAXONE SODIUM 2 G: 2 INJECTION, POWDER, FOR SOLUTION INTRAMUSCULAR; INTRAVENOUS at 18:35

## 2018-10-22 NOTE — PROGRESS NOTES
0800-Received pt resting in bed with eyes closed, easily awakened, A+Ox4, calm and cooperative, assisted to bathroom, one person assist, son in law at bedside, pt requesting nasal cannula after getting back to bed, SOB with exertion, 2LNC applied, will continue to monitor 1930-Bedside and Verbal shift change report given to Chema Lau (oncoming nurse) by Cathy Solomon RN (offgoing nurse). Report included the following information SBAR, Kardex, Intake/Output and MAR.

## 2018-10-22 NOTE — PROGRESS NOTES
3700 [facility] pt care from [facility]. Pt in bed, alert and oriented x 4. Pt needs redirection. Not in any form of distress. Denies pain. Frequent use items and call bell within reach. Verbalized understanding to call for assistance. Bed locked in lowest position. 1950 - Stool specimen for C.diff test sent to Lab. 
 
2330 - Bedside and Verbal shift change report given to [place] Saniya (oncoming nurse) by Chapo Wolff RN (offgoing nurse). Report included the following information SBAR, Kardex, Intake/Output, MAR and Recent Results.

## 2018-10-22 NOTE — ROUTINE PROCESS
Bedside and Verbal shift change report given to Danitza Palm (oncoming nurse) by Sonia Dexter RN 
 (offgoing nurse). Report included the following information SBAR, Kardex, MAR and Recent Results.

## 2018-10-22 NOTE — PROGRESS NOTES
Internal Medicine Progress Note Patient's Name: Vincenzo Quach Admit Date: 10/20/2018 Length of Stay: 2 Assessment/Plan Active Hospital Problems Diagnosis Date Noted  Acute metabolic encephalopathy 11/94/8801  Hyponatremia 10/20/2018  Irritable bowel syndrome with diarrhea 09/24/2018  Chronic a-fib ([address] 05/02/2017  CAD (coronary artery disease) S/P 2.75 X 15 mm BMS of Obtuse marginal 
  
 HLD (hyperlipidemia) - Na down this w/o any fluids, will start NS w/ thought process this is hypovolemic hyponatremia - Trend BMP q4h - Keep Na from increasing > 8mEq/24h 
- Cont rocephin/azithro for now for possible CAP/UTI - F/u PA/Lat CXR 
- Will hold on lasix dose for now as pt tachy w/ BP overnight 111/63 and attempt low flow fluid resuscitation 
- PT/OT recommend HH 
- Cont acceptable home medications for chronic conditions  
- DVT protocol - Disposition - home with HH x 1- 2 days I have personally reviewed all pertinent labs and films that have officially resulted over the last 24 hours. I have personally checked for all pending labs that are awaiting final results. Subjective Patient improved at baseline . Afebrile , no CP/SOB. Objective Visit Vitals /51 (BP 1 Location: Left arm, BP Patient Position: Head of bed elevated (Comment degrees)) Pulse 93 Temp 97.4 °F (36.3 °C) Resp 18 Ht 5' 2\" (1.575 m) Wt 49.9 kg (110 lb) SpO2 96% BMI 20.12 kg/m² Physical Exam: 
General Appearance: NAD, conversant Lungs: CTAB 
CV: IRIR, no m/r/g Abdomen: soft, non-tender, normal bowel sounds Extremities: no cyanosis, no peripheral edema Neuro: No focal deficits, motor/sensory intact Lab/Data Reviewed: 
BMP:  
Lab Results Component Value Date/Time   (L) 10/22/2018 09:55 AM  
 K 4.6 10/22/2018 09:55 AM  
 CL 98 (L) 10/22/2018 09:55 AM  
 CO2 24 10/22/2018 09:55 AM  
 AGAP 9 10/22/2018 09:55 AM  
 GLU 76 10/22/2018 09:55 AM  
 BUN 15 10/22/2018 09:55 AM  
 CREA 0.81 10/22/2018 09:55 AM  
 GFRAA >60 10/22/2018 09:55 AM  
 GFRNA >60 10/22/2018 09:55 AM  
 
CBC:  
Lab Results Component Value Date/Time WBC 7.6 10/22/2018 06:19 AM  
 HGB 9.9 (L) 10/22/2018 06:19 AM  
 HCT 33.1 (L) 10/22/2018 06:19 AM  
  10/22/2018 06:19 AM  
 
 
Imaging Reviewed: 
No results found. Medications Reviewed: 
Current Facility-Administered Medications Medication Dose Route Frequency  sodium chloride (NS) flush 5-10 mL  5-10 mL IntraVENous PRN  
 cefTRIAXone (ROCEPHIN) 2 g in 0.9% sodium chloride (MBP/ADV) 50 mL MBP  2 g IntraVENous Q24H  
 azithromycin (ZITHROMAX) 500 mg in 0.9% sodium chloride (MBP/ADV) 250 mL adv  500 mg IntraVENous Q24H  
 ondansetron (ZOFRAN) injection 4 mg  4 mg IntraVENous Q4H PRN  
 aspirin delayed-release tablet 81 mg  81 mg Oral DAILY  clopidogrel (PLAVIX) tablet 75 mg  75 mg Oral DAILY  digoxin (LANOXIN) tablet 0.125 mg  0.125 mg Oral DAILY  metoprolol tartrate (LOPRESSOR) tablet 12.5 mg  12.5 mg Oral BID  acetaminophen (TYLENOL) solution 650 mg  650 mg Oral Q6H PRN  
 enoxaparin (LOVENOX) injection 30 mg  30 mg SubCUTAneous Q24H  
 mirtazapine (REMERON) tablet 15 mg  15 mg Oral QHS

## 2018-10-22 NOTE — ROUTINE PROCESS
Bedside and Verbal shift change report given to Danitza Palm (oncoming nurse) by Eliud Appiah RN 
 (offgoing nurse). Report included the following information SBAR, Kardex, MAR and Recent Results.

## 2018-10-22 NOTE — PROGRESS NOTES
Spoke to MD Bonnie Watts about patient's heart rate going from 90\"s to 130. Instructed to give digoxin and lopressor dose early. 6:54 AM 
Patient continues to rest. No c/o chest pain, some shortness of breath when ambulating to bathroom. Remains on telemetry, call bell within reach. Patient Vitals for the past 12 hrs: 
 Temp Pulse Resp BP SpO2  
10/22/18 0447 97.8 °F (36.6 °C) 93 18 120/53 97 % 10/21/18 2123 97.8 °F (36.6 °C) 80 18 121/61 98 % 10/21/18 1914 98 °F (36.7 °C) 96 18 114/53 96 %

## 2018-10-22 NOTE — CDMP QUERY
Please clarify if pt is being treated for  
 
=>Metabolic Encephalopathy sec to infection and Hyponatremia 
=>Other Explanation of clinical findings =>Unable to Determine (no explanation of clinical findings) The medical record reflects the following: 
 
Risk: age 80, hyponatremia, sats on admission 87%, uti, Clinical Indicators:  pt brought in by family with confusion Treatment:  abx, iv ns, Please clarify and document your clinical opinion in the progress notes and discharge summary including the definitive and/or presumptive diagnosis, (suspected or probable), related to the above clinical findings. Please include clinical findings supporting your diagnosis. If you DECLINE this query or would like to communicate with [facility], please utilize the \"[facility] message box\" at the TOP of the Progress Note on the right.    
 
Thank you, 
Brittany Oviedo RN/JOSIASS

## 2018-10-22 NOTE — MANAGEMENT PLAN
Discharge/Transition Planning Spoke with patient with permission of family to be in room and participate. Pt lives alone in single family home. Has Life Alert button with four stations in home with button as well. Home is handicap accessible. 2 grandsons live in neighborhood and other family nearby. Has [employer] coming 8 hours a week. Goal is to keep pt independent at home and use outside services as needed. Gave [facility] Services brochure, son in law stated he will check with any insurance for any other aid service. Discharge Plan : SNF rehab at [facility] or [facility]. Son in law states family will pay any difference for private room. Matched in CC link and [employer]Knows to lani Thrasher RN BSN Outcomes Manager Pager # [phone]

## 2018-10-23 ENCOUNTER — HOSPITAL ENCOUNTER (INPATIENT)
Age: 83
LOS: 17 days | Discharge: NURSING, REHAB OR LONG-TERM CARE | End: 2018-11-09
Attending: INTERNAL MEDICINE | Admitting: INTERNAL MEDICINE

## 2018-10-23 VITALS
RESPIRATION RATE: 18 BRPM | HEART RATE: 72 BPM | TEMPERATURE: 98.4 F | HEIGHT: 62 IN | DIASTOLIC BLOOD PRESSURE: 68 MMHG | BODY MASS INDEX: 20.24 KG/M2 | OXYGEN SATURATION: 90 % | WEIGHT: 110 LBS | SYSTOLIC BLOOD PRESSURE: 138 MMHG

## 2018-10-23 PROBLEM — E87.5 HYPERKALEMIA: Status: ACTIVE | Noted: 2018-10-23

## 2018-10-23 LAB
ANION GAP SERPL CALC-SCNC: 6 MMOL/L (ref 3–18)
BACTERIA SPEC CULT: ABNORMAL
BUN SERPL-MCNC: 17 MG/DL (ref 7–18)
BUN/CREAT SERPL: 18 (ref 12–20)
CALCIUM SERPL-MCNC: 7.7 MG/DL (ref 8.5–10.1)
CHLORIDE SERPL-SCNC: 99 MMOL/L (ref 100–108)
CO2 SERPL-SCNC: 25 MMOL/L (ref 21–32)
CREAT SERPL-MCNC: 0.93 MG/DL (ref 0.6–1.3)
ERYTHROCYTE [DISTWIDTH] IN BLOOD BY AUTOMATED COUNT: 15.7 % (ref 11.6–14.5)
GLUCOSE SERPL-MCNC: 66 MG/DL (ref 74–99)
HCT VFR BLD AUTO: 33.8 % (ref 35–45)
HGB BLD-MCNC: 10.5 G/DL (ref 12–16)
MCH RBC QN AUTO: 27.3 PG (ref 24–34)
MCHC RBC AUTO-ENTMCNC: 31.1 G/DL (ref 31–37)
MCV RBC AUTO: 88 FL (ref 74–97)
PLATELET # BLD AUTO: 382 K/UL (ref 135–420)
PMV BLD AUTO: 8.7 FL (ref 9.2–11.8)
POTASSIUM SERPL-SCNC: 5.7 MMOL/L (ref 3.5–5.5)
RBC # BLD AUTO: 3.84 M/UL (ref 4.2–5.3)
SERVICE CMNT-IMP: ABNORMAL
SODIUM SERPL-SCNC: 130 MMOL/L (ref 136–145)
TSH SERPL DL<=0.05 MIU/L-ACNC: 1.2 UIU/ML (ref 0.36–3.74)
WBC # BLD AUTO: 9.4 K/UL (ref 4.6–13.2)

## 2018-10-23 PROCEDURE — 97116 GAIT TRAINING THERAPY: CPT

## 2018-10-23 PROCEDURE — 74011250637 HC RX REV CODE- 250/637: Performed by: INTERNAL MEDICINE

## 2018-10-23 PROCEDURE — 80048 BASIC METABOLIC PNL TOTAL CA: CPT | Performed by: FAMILY MEDICINE

## 2018-10-23 PROCEDURE — 97165 OT EVAL LOW COMPLEX 30 MIN: CPT

## 2018-10-23 PROCEDURE — 85027 COMPLETE CBC AUTOMATED: CPT | Performed by: FAMILY MEDICINE

## 2018-10-23 PROCEDURE — 36415 COLL VENOUS BLD VENIPUNCTURE: CPT | Performed by: FAMILY MEDICINE

## 2018-10-23 PROCEDURE — 74011250636 HC RX REV CODE- 250/636: Performed by: INTERNAL MEDICINE

## 2018-10-23 PROCEDURE — 84443 ASSAY THYROID STIM HORMONE: CPT | Performed by: FAMILY MEDICINE

## 2018-10-23 PROCEDURE — 97535 SELF CARE MNGMENT TRAINING: CPT

## 2018-10-23 RX ORDER — SODIUM POLYSTYRENE SULFONATE 15 G/60ML
15 SUSPENSION ORAL; RECTAL ONCE
Status: COMPLETED | OUTPATIENT
Start: 2018-10-24 | End: 2018-10-24

## 2018-10-23 RX ORDER — CHLORPHENIRAMINE MALEATE 4 MG
TABLET ORAL 2 TIMES DAILY
Status: DISCONTINUED | OUTPATIENT
Start: 2018-10-23 | End: 2018-11-09 | Stop reason: HOSPADM

## 2018-10-23 RX ORDER — POLYETHYLENE GLYCOL 3350 17 G/17G
17 POWDER, FOR SOLUTION ORAL DAILY
Status: DISCONTINUED | OUTPATIENT
Start: 2018-10-24 | End: 2018-11-09 | Stop reason: HOSPADM

## 2018-10-23 RX ORDER — NITROGLYCERIN 0.4 MG/1
0.4 TABLET SUBLINGUAL AS NEEDED
Status: DISCONTINUED | OUTPATIENT
Start: 2018-10-23 | End: 2018-11-09 | Stop reason: HOSPADM

## 2018-10-23 RX ORDER — FLUTICASONE PROPIONATE 50 MCG
1 SPRAY, SUSPENSION (ML) NASAL
Status: DISCONTINUED | OUTPATIENT
Start: 2018-10-23 | End: 2018-11-09 | Stop reason: HOSPADM

## 2018-10-23 RX ORDER — GUAIFENESIN 100 MG/5ML
81 LIQUID (ML) ORAL DAILY
Status: DISCONTINUED | OUTPATIENT
Start: 2018-10-24 | End: 2018-11-09 | Stop reason: HOSPADM

## 2018-10-23 RX ORDER — FERROUS SULFATE, DRIED 160(50) MG
1 TABLET, EXTENDED RELEASE ORAL DAILY
Status: DISCONTINUED | OUTPATIENT
Start: 2018-10-24 | End: 2018-11-09 | Stop reason: HOSPADM

## 2018-10-23 RX ORDER — UREA 10 %
100 LOTION (ML) TOPICAL DAILY
Status: DISCONTINUED | OUTPATIENT
Start: 2018-10-24 | End: 2018-11-09 | Stop reason: HOSPADM

## 2018-10-23 RX ORDER — MIRTAZAPINE 15 MG/1
15 TABLET, FILM COATED ORAL
Status: DISCONTINUED | OUTPATIENT
Start: 2018-10-23 | End: 2018-11-09 | Stop reason: HOSPADM

## 2018-10-23 RX ORDER — CEFDINIR 300 MG/1
300 CAPSULE ORAL 2 TIMES DAILY
Qty: 8 CAP | Refills: 0 | Status: SHIPPED | OUTPATIENT
Start: 2018-10-23 | End: 2018-11-09

## 2018-10-23 RX ORDER — FUROSEMIDE 20 MG/1
20 TABLET ORAL DAILY
Status: DISCONTINUED | OUTPATIENT
Start: 2018-10-24 | End: 2018-10-31

## 2018-10-23 RX ORDER — CEFUROXIME AXETIL 250 MG/1
300 TABLET ORAL EVERY 12 HOURS
Status: CANCELLED | OUTPATIENT
Start: 2018-10-23 | End: 2018-10-27

## 2018-10-23 RX ORDER — ONDANSETRON 4 MG/1
4 TABLET, ORALLY DISINTEGRATING ORAL
Status: DISCONTINUED | OUTPATIENT
Start: 2018-10-23 | End: 2018-11-09 | Stop reason: HOSPADM

## 2018-10-23 RX ORDER — DIGOXIN 125 MCG
0.12 TABLET ORAL DAILY
Status: DISCONTINUED | OUTPATIENT
Start: 2018-10-24 | End: 2018-11-09 | Stop reason: HOSPADM

## 2018-10-23 RX ORDER — CLOPIDOGREL BISULFATE 75 MG/1
75 TABLET ORAL DAILY
Status: DISCONTINUED | OUTPATIENT
Start: 2018-10-24 | End: 2018-11-09 | Stop reason: HOSPADM

## 2018-10-23 RX ORDER — MECLIZINE HCL 12.5 MG 12.5 MG/1
12.5 TABLET ORAL DAILY PRN
Status: DISCONTINUED | OUTPATIENT
Start: 2018-10-23 | End: 2018-10-25

## 2018-10-23 RX ORDER — METOPROLOL TARTRATE 25 MG/1
12.5 TABLET, FILM COATED ORAL EVERY 12 HOURS
Status: DISCONTINUED | OUTPATIENT
Start: 2018-10-23 | End: 2018-11-09 | Stop reason: HOSPADM

## 2018-10-23 RX ORDER — CEFUROXIME AXETIL 250 MG/1
250 TABLET ORAL EVERY 12 HOURS
Status: COMPLETED | OUTPATIENT
Start: 2018-10-23 | End: 2018-10-27

## 2018-10-23 RX ORDER — SODIUM POLYSTYRENE SULFONATE 4.1 MEQ/G
15 POWDER, FOR SUSPENSION ORAL; RECTAL
Qty: 15 G | Refills: 0 | Status: SHIPPED | OUTPATIENT
Start: 2018-10-23 | End: 2018-11-09

## 2018-10-23 RX ADMIN — MIRTAZAPINE 15 MG: 15 TABLET, FILM COATED ORAL at 21:40

## 2018-10-23 RX ADMIN — DIGOXIN 0.12 MG: 125 TABLET ORAL at 10:12

## 2018-10-23 RX ADMIN — ENOXAPARIN SODIUM 30 MG: 100 INJECTION SUBCUTANEOUS at 00:01

## 2018-10-23 RX ADMIN — CLOTRIMAZOLE: 1 CREAM TOPICAL at 22:00

## 2018-10-23 RX ADMIN — ASPIRIN 81 MG: 81 TABLET, COATED ORAL at 10:12

## 2018-10-23 RX ADMIN — CLOPIDOGREL BISULFATE 75 MG: 75 TABLET ORAL at 10:12

## 2018-10-23 RX ADMIN — METOPROLOL TARTRATE 12.5 MG: 25 TABLET, FILM COATED ORAL at 21:41

## 2018-10-23 RX ADMIN — METOPROLOL TARTRATE 12.5 MG: 25 TABLET, FILM COATED ORAL at 10:12

## 2018-10-23 RX ADMIN — CEFUROXIME AXETIL 250 MG: 250 TABLET ORAL at 21:40

## 2018-10-23 NOTE — PROGRESS NOTES
Problem: Falls - Risk of 
Goal: *Absence of Falls Document April Divina Fall Risk and appropriate interventions in the flowsheet. Outcome: Progressing Towards Goal 
Fall Risk Interventions: 
Mobility Interventions: Patient to call before getting OOB Medication Interventions: Patient to call before getting OOB, Teach patient to arise slowly Elimination Interventions: Call light in reach, Patient to call for help with toileting needs, Toilet paper/wipes in reach Problem: Pressure Injury - Risk of 
Goal: *Prevention of pressure injury Document Allen Scale and appropriate interventions in the flowsheet. Outcome: Progressing Towards Goal 
Pressure Injury Interventions: 
Sensory Interventions: Pressure redistribution bed/mattress (bed type) Moisture Interventions: Limit adult briefs, Absorbent underpads Activity Interventions: Increase time out of bed, Pressure redistribution bed/mattress(bed type), PT/OT evaluation Mobility Interventions: Pressure redistribution bed/mattress (bed type), Turn and reposition approx. every two hours(pillow and wedges) Nutrition Interventions: Document food/fluid/supplement intake Friction and Shear Interventions: HOB 30 degrees or less

## 2018-10-23 NOTE — DISCHARGE INSTRUCTIONS
DISCHARGE SUMMARY from Nurse    PATIENT INSTRUCTIONS:    After general anesthesia or intravenous sedation, for 24 hours or while taking prescription Narcotics:  · Limit your activities  · Do not drive and operate hazardous machinery  · Do not make important personal or business decisions  · Do  not drink alcoholic beverages  · If you have not urinated within 8 hours after discharge, please contact your surgeon on call. Report the following to your surgeon:  · Excessive pain, swelling, redness or odor of or around the surgical area  · Temperature over 100.5  · Nausea and vomiting lasting longer than 4 hours or if unable to take medications  · Any signs of decreased circulation or nerve impairment to extremity: change in color, persistent  numbness, tingling, coldness or increase pain  · Any questions    What to do at Home:  Recommended activity: Activity as tolerated. If you experience any of the following symptoms chest pain or shortness of breath or any concerns, please follow up with Primary Care Physician. .    *  Please give a list of your current medications to your Primary Care Provider. *  Please update this list whenever your medications are discontinued, doses are      changed, or new medications (including over-the-counter products) are added. *  Please carry medication information at all times in case of emergency situations. These are general instructions for a healthy lifestyle:    No smoking/ No tobacco products/ Avoid exposure to second hand smoke  Surgeon General's Warning:  Quitting smoking now greatly reduces serious risk to your health.     Obesity, smoking, and sedentary lifestyle greatly increases your risk for illness    A healthy diet, regular physical exercise & weight monitoring are important for maintaining a healthy lifestyle    You may be retaining fluid if you have a history of heart failure or if you experience any of the following symptoms:  Weight gain of 3 pounds or more overnight or 5 pounds in a week, increased swelling in our hands or feet or shortness of breath while lying flat in bed. Please call your doctor as soon as you notice any of these symptoms; do not wait until your next office visit. Recognize signs and symptoms of STROKE:    F-face looks uneven    A-arms unable to move or move unevenly    S-speech slurred or non-existent    T-time-call 911 as soon as signs and symptoms begin-DO NOT go       Back to bed or wait to see if you get better-TIME IS BRAIN. Warning Signs of HEART ATTACK     Call 911 if you have these symptoms:   Chest discomfort. Most heart attacks involve discomfort in the center of the chest that lasts more than a few minutes, or that goes away and comes back. It can feel like uncomfortable pressure, squeezing, fullness, or pain.  Discomfort in other areas of the upper body. Symptoms can include pain or discomfort in one or both arms, the back, neck, jaw, or stomach.  Shortness of breath with or without chest discomfort.  Other signs may include breaking out in a cold sweat, nausea, or lightheadedness. Don't wait more than five minutes to call 911 - MINUTES MATTER! Fast action can save your life. Calling 911 is almost always the fastest way to get lifesaving treatment. Emergency Medical Services staff can begin treatment when they arrive -- up to an hour sooner than if someone gets to the hospital by car. The discharge information has been reviewed with the patient. The patient verbalized understanding. Discharge medications reviewed with the patient and appropriate educational materials and side effects teaching were provided. Patient armband removed and shredded    MyChart Activation    Thank you for requesting access to [employer]. Please follow the instructions below to securely access and download your online medical record.  [facility] allows you to send messages to your doctor, view your test results, renew your prescriptions, schedule appointments, and more. How Do I Sign Up? 1. In your internet browser, go to [email or web address]  2. Click on the First Time User? Click Here link in the Sign In box. You will be redirect to the New Member Sign Up page. 3. Enter your [employer] Access Code exactly as it appears below. You will not need to use this code after youve completed the sign-up process. If you do not sign up before the expiration date, you must request a new code. [employer] Access Code: Activation code not generated  Current [employer] Status: Active (This is the date your [employer] access code will )    4. Enter the last four digits of your Social Security Number (xxxx) and Date of Birth (mm/dd/yyyy) as indicated and click Submit. You will be taken to the next sign-up page. 5. Create a [employer] ID. This will be your [employer] login ID and cannot be changed, so think of one that is secure and easy to remember. 6. Create a [employer] password. You can change your password at any time. 7. Enter your Password Reset Question and Answer. This can be used at a later time if you forget your password. 8. Enter your e-mail address. You will receive e-mail notification when new information is available in [address] 9. Click Sign Up. You can now view and download portions of your medical record. 10. Click the Download Summary menu link to download a portable copy of your medical information. Additional Information    If you have questions, please visit the Frequently Asked Questions section of the [employer] website at [email or web address] Remember, [employer] is NOT to be used for urgent needs.  For medical emergencies, dial 911.      ___________________________________________________________________________________________________________________________________

## 2018-10-23 NOTE — PROGRESS NOTES
At approximately [phone], this  made our second attempt to conduct an initial consultation and spiritual assessment for [address], who is a 80 y. o.female. However, patient was again unavailable. Patients primary language is: [place]. According to the patients EMR, her Baptism affiliation is: Soledad Olivares.  
 
The  provided the following interventions: 
Offered silent prayer on patient's behalf. Reviewed chart. Plan: 
Chaplains will continue our attempts to connect with patient and then provide pastoral care on an as-needed/requested basis. [facility] 
Board Certified  [address] [facility]  
[phone]

## 2018-10-23 NOTE — DISCHARGE SUMMARY
DISCHARGE SUMMARY        PATIENT ID: Anali Quiles  MRN: [id number]   YOB: 1927   AGE: 80 y.o. DATE OF ADMISSION: 10/20/2018  5:34 PM    DATE OF DISCHARGE: 10/23/2018  PRIMARY CARE PROVIDER: Xavier Wolfe MD     Procedure Performed:  NONE       DISCHARGING PHYSICIAN: Contreras Castaneda MD    Call hospitalist office [phone]. Discharge Diagnosis:   Patient Active Problem List    Diagnosis Date Noted    Hyperkalemia 10/23/2018    Acute metabolic encephalopathy 55/85/3994    Hyponatremia 10/20/2018    Chronic fatigue 10/09/2018    Irritable bowel syndrome with diarrhea 09/24/2018    Chronic a-fib ([facility] [address] 05/02/2017    CAD (coronary artery disease)     Ischemic cardiomyopathy     Arthritis, degenerative     Vertigo     HLD (hyperlipidemia)     SOB (shortness of breath) 04/27/2012    NSTEMI (non-ST elevated myocardial infarction) ([facility] [address] 04/01/2012              CONSULTATIONS: IP CONSULT TO HOSPITALIST    History of Present Ilness:  Marissa Stubbs is a 80 y.o. female with PMHX CHF, Afib, CAD, who presents via son in law for AMS, persistent N/V/D and dehydration. She has been slightly altered today. She lives at home but family come check on her 3-4x week. She had a UTI 1 week ago and was given Cipro but had N/V with it and only tolerated 2 days. She also has IBS-d and it was inclear whether her IBS was flaring or intolerance to the cipro. She has not eat for nearly 3 days. She is not hungry. She was mildly hypoxic in ER, tachypnic even after 02 given. CXR did not show sangeetha pulm edema, UA few leukocytes Na123, NTBNP 4123. Given IVF and Rocephin + Azithro in ER. She appears dehydrated but she also has faint b/l pulm crackles and trace LE edema.              Hospital Course:     Patient was admitted for AMS 2/2 hyponatremia and pneumonia and UTI. She was started on IV Zithromycin and rocephin and blood and urine cx followed. She also was treated for hyponatremia.  Her Na level improved and plateaued at 323. Her blood cx was negative for growth but urine cx + E coli. She reports improvement and is now at her baseline. She denies any concerns now except poor appetite . She is on supplements with meals. Patient was seen by PT and recommended [facility] however family preferred snf and will pay difference if any. Patient K 5.7 this morning , Na 130. At time of discharge she was stable. Will be discharged with oral Omnicef and a dose of Kayexalate. Patient to have K and Na level rechecked on discharge tomorrow with a bmp     Physical Exam on Discharge:  Visit Vitals  /66   Pulse 74   Temp 97.6 °F (36.4 °C)   Resp 18   Ht 5' 2\" (1.575 m)   Wt 49.9 kg (110 lb)   SpO2 90%   BMI 20.12 kg/m²       General:  Alert, cooperative, no distress, appears stated age. Lungs:   Clear to auscultation bilaterally. Chest wall:  No tenderness or deformity. Heart:  Regular rate and rhythm, S1, S2 normal, no murmur, click, rub or gallop. Abdomen:   Soft, non-tender. Bowel sounds normal. No masses,  No organomegaly. Extremities: Extremities normal, atraumatic, no cyanosis or edema. Pulses: 2+ and symmetric all extremities. Skin: Skin color, texture, turgor normal. No rashes or lesions   Neurologic: CNII-XII intact.         Pertinent Results:    Recent Labs     10/23/18  0550   BUN 17   *   CO2 25     Recent Labs     10/23/18  0550   WBC 9.4   RBC 3.84*   HCT 33.8*   MCV 88.0   MCH 27.3   MCHC 31.1   RDW 15.7*     All Micro Results     Procedure Component Value Units Date/Time    CULTURE, BLOOD [[id number]] Collected:  10/20/18 1900    Order Status:  Completed Specimen:  Blood Updated:  10/23/18 0945     Special Requests: NO SPECIAL REQUESTS        Culture result: NO GROWTH 3 DAYS       CULTURE, BLOOD [[id number]] Collected:  10/20/18 1919    Order Status:  Completed Specimen:  Blood Updated:  10/23/18 0945     Special Requests: NO SPECIAL REQUESTS        Culture result: NO GROWTH 3 DAYS CULTURE, URINE [[id number]]  (Abnormal)  (Susceptibility) Collected:  10/20/18 1852    Order Status:  Completed Specimen:  Clean catch Updated:  10/23/18 0950     Special Requests: NO SPECIAL REQUESTS        Culture result:       >100,000 COLONIES/mL ESCHERICHIA COLI          C. DIFFICILE/EPI PCR [[id number]] Collected:  10/21/18 1928    Order Status:  Completed Specimen:  Stool Updated:  10/22/18 0804     Special Requests: NO SPECIAL REQUESTS        Culture result:       Toxigenic C. difficile NEGATIVE                         C. difficile target DNA sequences are not detected. CT Results  (Last 48 hours)    None          EXAM: XR CHEST PA LAT     CLINICAL INDICATION/HISTORY: PNA vs  CHF     > Additional: Shortness of breath, cough     COMPARISON: 10/20/2018     > Reference Exam: None. TECHNIQUE: Upright PA and lateral views of the chest was obtained.      _______________     FINDINGS:     SUPPORT DEVICES: None. HEART AND MEDIASTINUM: Cardiomediastinal silhouette appears within normal   limits.  Acro aorta     LUNGS AND PLEURAL SPACES: Mild central bronchiectasis. Patchy right greater than   left perihilar opacity. There is also retrocardiac left lower lobe patchy   opacity.  Small layering left and right pleural effusions. No pneumothorax.       BONY THORAX AND SOFT TISSUES: No acute osseous abnormality. Degenerative changes   of the thoracic spine.     _______________      Impression     IMPRESSION:     Mild central bronchiectasis and adjacent perihilar atelectasis. Small left and   right layering pleural effusions with retrocardiac left lower lobe            DISCHARGE MEDICATIONS:   Current Discharge Medication List      START taking these medications    Details   cefdinir (OMNICEF) 300 mg capsule Take 1 Cap by mouth two (2) times a day.   Qty: 8 Cap, Refills: 0    Associated Diagnoses: Urinary tract infection without hematuria, site unspecified      sodium polystyrene (KAYEXALATE) powder Take 15 g by mouth now for 1 dose. Qty: 15 g, Refills: 0         CONTINUE these medications which have NOT CHANGED    Details   ondansetron hcl (ZOFRAN) 4 mg tablet Take 1 Tab by mouth every eight (8) hours as needed for Nausea. Qty: 30 Tab, Refills: 1      clopidogrel (PLAVIX) 75 mg tab Take 1 Tab by mouth daily. Qty: 90 Tab, Refills: 3      metoprolol tartrate (LOPRESSOR) 25 mg tablet Take 0.5 Tabs by mouth two (2) times a day. Qty: 180 Tab, Refills: 3    Comments: Please deliver all 3 medications to patient. She is requesting a 90 day supply. digoxin (LANOXIN) 0.125 mg tablet Take 1 Tab by mouth daily. Qty: 90 Tab, Refills: 3      furosemide (LASIX) 20 mg tablet Take 1 Tab by mouth daily as needed. Qty: 90 Tab, Refills: 3      aspirin delayed-release 81 mg tablet Take 1 Tab by mouth daily. Qty: 30 Tab, Refills: 6      meclizine (ANTIVERT) 12.5 mg tablet Take  by mouth daily as needed. fluticasone (FLONASE) 50 mcg/actuation nasal spray Refills: 5      psyllium (METAMUCIL) powd Use as directed daily  Indications: Irritable Bowel Syndrome  Qty: 1 Bottle, Refills: 1      clotrimazole (LOTRIMIN) 1 % topical cream Apply  to affected area two (2) times a day. Apply with barrier cream to affected area. Qty: 15 g, Refills: 1      nitroglycerin (NITROSTAT) 0.4 mg SL tablet 1 Tab by SubLINGual route every five (5) minutes as needed for Chest Pain. Qty: 25 Tab, Refills: 3      Calcium-Vitamin D3-Vitamin K 847-337-21 mg-unit-mcg chew Take 2 Units by mouth daily. CYANOCOBALAMIN, VITAMIN B-12, (VITAMIN B-12 PO) Take  by mouth daily. mirtazapine (REMERON) 15 mg tablet Take  by mouth nightly as needed.              Condition at discharge:  Afrebrile  Eating, Drinking, Voiding  Improved  Stable    FOLLOW UP APPOINTMENTS:    Follow-up Information     Follow up With Specialties Details Why Contact Info    Josette Holliday MD Internal Medicine   [facility]   [facility] [address] [id number]  [phone] Disposition:  SNF      Total discharge preparation time was  30  minutes.

## 2018-10-23 NOTE — PROGRESS NOTES
Problem: Mobility Impaired (Adult and Pediatric) Goal: *Acute Goals and Plan of Care (Insert Text) Physical Therapy Goals Initiated 10/21/2018 and to be accomplished within 7 day(s) 1. Patient will move from supine to sit and sit to supine , scoot up and down and roll side to side in bed with modified independence. 2.  Patient will transfer from bed to chair and chair to bed with modified independence using the least restrictive device. 3.  Patient will perform sit to stand with modified independence. 4.  Patient will ambulate with modified independence for >/= 150 feet with the least restrictive device. 5.  Patient will ascend/descend 4 stairs with handrail(s) with modified independence. Outcome: Progressing Towards Goal 
 
PHYSICAL THERAPY: Daily TREATMENT Note INPATIENT: Medicare: Hospital Day: 4 Patient: Vincenzo Quach (67 y.o. female)    Date: 10/23/2018 Primary Diagnosis: Hyponatremia Precautions: Fall Chart, physical therapy assessment, plan of care and goals were reviewed. PLOF: Independent ASSESSMENT: 
Supervision for supine to sit. Min A for sit to stand. Handheld assistance for amb 8ft in/from bathroom. Completed low toilet transfer with min A. Supervision for self care post toileting. Donned housecoat with supervision for dynamic balance. Amb 50ft x2 with supervision and ww. Kyphotic posture and downward gaze with amb. Decreased gait speed. Returned to seated in chair with call bell. Educated on need for RN assistance with mobility. All needs in reach. Supportive family at bedside. Discharge rec to rehab d/t increased level of physical assistance and set-up required for mobility as patient lives alone. Progression toward goals: 
      Improving appropriately and progressing toward goals PLAN: 
Patient continues to benefit from skilled intervention to address the above impairments. Continue treatment per established plan of care.  
 
EDUCATION:  
 Education:  Patient was educated on the following topics: Bed mobility, transfers, ADLs, balance, amb, safety, exercise, role of PT, plan of care, cognition, skin integrity, vitals Barriers to Learning/Limitations: None Compensate with: visual, verbal, tactile, kinesthetic cues/model Discharge Recommendations:  Rehab Further Equipment Recommendations for Discharge:  rolling walker Factors which may impact discharge planning: Lives alone SUBJECTIVE:  
Patient stated I have to walk to go home.  OBJECTIVE DATA SUMMARY:  
Critical Behavior: 
Neurologic State: Alert Orientation Level: Oriented to person, Oriented to place Cognition: Follows commands Safety/Judgement: Awareness of environment, Fall prevention G CODE:Mobility [id number] Current  CK= 40-59%   Goal  CI= 1-19%. The severity rating is based on the Other [facility] Balance Scale 3/5 [address] Standing Balance Scale 3/5 
0: Pt performs 25% or less of standing activity (Max assist) CN, 100% impaired. 1: Pt supports self with upper extremities but requires therapist assistance. Pt performs 25-50% of effort (Mod assist) CM, 80% to <100% impaired. 1+: Pt supports self with upper extremities but requires therapist assistance. Pt performs >50% effort. (Min assist). CL, 60% to <80% impaired. 2: Pt supports self independently with both upper extremities (walker, crutches, parallel bars). CL, 60% to <80% impaired. 2+: Pt support self independently with 1 upper extremity (cane, crutch, 1 parallel bar). CK, 40% to <60% impaired. 3: Pt stands without upper extremity support for up to 30 seconds. CK, 40% to <60% impaired. 3+: Pt stands without upper extremity support for 30 seconds or greater. CJ, 20% to <40% impaired. 4: Pt independently moves and returns center of gravity 1-2 inches in one plane. CJ, 20% to <40% impaired.  
4+: Pt independently moves and returns center of gravity 1-2 inches in multiple planes. CI, 1% to <20% impaired. 5: Pt independently moves and returns center of gravity in all planes greater than 2 inches. CH, 0% impaired. Functional Mobility: 
 
 
Functional Status Indep (I) Mod I Super-vision Min A Mod A Max A Total A Assist x2 Verbal cues Additional time Not tested Comments Rolling []  []  [] []    []    []  []  [] [] [] [x] Supine to sit []  []  [x] []  []  []  []  [] [] [] [] Sit to supine []  []  [] []  []  []  []  [] [] [] [x] Sit to stand []  []  [] [x]  []  []  []  [] [] [] []   
Stand to sit []  []  [x] []  []  []  []  [] [] [] [] Bed to chair transfers []  []  [] []  []  []  []  [] [] [] [x] Balance Good Simeon Lauren Poor Unable Not tested Comments Sitting static [x]  []  []  []  [] Sitting dynamic [x]  []  []  []  []   
Standing static [x]  []  []  []  []   
Standing dynamic [x]  []  []  []  [] Mobility/Gait:  
Level of Assistance: Supervision Assistive Device: rolling walker Distance Ambulated: 50 feet x2 Neuro Re-Education: 
Dynamic standing 3 minutesTherapeutic Exercises: 
Sit to stand x2 Pain:Pre treatment pain level: 0 Post treatment pain level: 0Pain Scale 1: Numeric (0 - 10) Activity Tolerance:  
Fair After treatment:  
Patient left in no apparent distress sitting up in chair Call bell left within reach Nursing notified Justin Shore PT Time Calculation: 17 mins

## 2018-10-23 NOTE — PROGRESS NOTES
9231- Received report from off going RN. Oxygen at 2L n/c. Taken off oxygen to go to bathroom. Will check oxygen saturation on room air. IV lock intact right arm . Patient assisted to bathroom to have BM. Moves fairly well with assist. Back to bed. Callbell within reach. 1010- AM meds given. Patient assisted OOB to chair. Family at bedside. 1130- Patient back to bed. Moves fairly well. [address] Boyce Speaks to inquire if kaexylate to be given prior to discharge. States patient can take after discharge. Will call report to [address] [facility]- Patient discharged to [facility] via wheelchair with belongings and son in law. IV lock and telemetry discontinued. Report called to Patricia Mercado LPN earlier with questions answered.

## 2018-10-23 NOTE — PROGRESS NOTES
conducted an initial consultation and Spiritual Assessment for [address] [facility] [address], who is a 80 y.o.,female. Patients Primary Language is: [place]. According to the patients EMR Confucianism Affiliation is: Neelam Ortiz.  
 
The reason the Patient came to the hospital is:  
Patient Active Problem List  
 Diagnosis Date Noted  Hyperkalemia 10/23/2018  Acute metabolic encephalopathy 61/60/0834  Hyponatremia 10/20/2018  Chronic fatigue 10/09/2018  Irritable bowel syndrome with diarrhea 09/24/2018  Chronic a-fib ([facility] [address] 05/02/2017  CAD (coronary artery disease)  Ischemic cardiomyopathy  Arthritis, degenerative  Vertigo  HLD (hyperlipidemia)  SOB (shortness of breath) 04/27/2012  
 NSTEMI (non-ST elevated myocardial infarction) ([facility] 04/01/2012 The  provided the following Interventions: 
Initiated a relationship of care and support. Explored issues of sandra, spirituality and/or Mosque needs while hospitalized. Listened empathically. Provided chaplaincy education. Provided information about Spiritual Care Services. Offered prayer and assurance of continued prayers on patient's behalf. Chart reviewed. The following outcomes were achieved: 
Patient shared some information about their medical narrative and spiritual journey/beliefs. Patient processed feeling about current hospitalization. Patient expressed gratitude for the 's visit. Assessment: 
Patient did not indicate any spiritual or Mosque issues which require Spiritual Care Services interventions at this time. Patient does not have any Mosque/cultural needs that will affect patients preferences in health care. Plan: 
Chaplains will continue to follow and will provide pastoral care on an as needed or requested basis.  recommends bedside caregivers page  on duty if patient shows signs of acute spiritual or emotional distress. [facility] Staff  Spiritual Care  
[phone]

## 2018-10-23 NOTE — PROGRESS NOTES
Problem: Falls - Risk of 
Goal: *Absence of Falls Document Terence Viksara Fall Risk and appropriate interventions in the flowsheet. Outcome: Progressing Towards Goal 
Fall Risk Interventions: 
Mobility Interventions: Patient to call before getting OOB Medication Interventions: Evaluate medications/consider consulting pharmacy, Patient to call before getting OOB Elimination Interventions: Call light in reach Problem: Pressure Injury - Risk of 
Goal: *Prevention of pressure injury Document Allen Scale and appropriate interventions in the flowsheet. Pressure Injury Interventions: 
Sensory Interventions: Pressure redistribution bed/mattress (bed type) Moisture Interventions: Absorbent underpads Activity Interventions: Pressure redistribution bed/mattress(bed type), PT/OT evaluation Mobility Interventions: Pressure redistribution bed/mattress (bed type) Nutrition Interventions: Document food/fluid/supplement intake Friction and Shear Interventions: Apply protective barrier, creams and emollients

## 2018-10-23 NOTE — MANAGEMENT PLAN
Discharge/Transition Planning 1330: Notified by Dr Daquan Tovar pt medically ready for discharge. Called [facility] admission director to see if pt can be accepted today. T[facility] will review. 1400: Pt accepted to [facility] for SNF rehab. [facility] letter signed by pt and copy given. Updated family. Confirmed placement in [place]. RN to call report. Care Management Interventions PCP Verified by CM: Yes 
Palliative Care Criteria Met (RRAT>21 & CHF Dx)?: No 
Mode of Transport at Discharge: Other (see comment)(family) Transition of Care Consult (CM Consult): SNF Partner SNF: Yes MyChart Signup: No 
Discharge Durable Medical Equipment: No 
Health Maintenance Reviewed: Yes Physical Therapy Consult: Yes Occupational Therapy Consult: Yes Speech Therapy Consult: No 
Current Support Network: Has Personal Caregivers, Family Lives [place], Own Home, Lives Alone Confirm Follow Up Transport: Family Plan discussed with Pt/Family/Caregiver: Yes Freedom of Choice Offered: Yes [employer] Information Provided?: No 
Discharge Location Discharge Placement: Skilled nursing facility Gerardo Cat RN BSN Outcomes Manager Pager # [phone]

## 2018-10-23 NOTE — PROGRESS NOTES
Problem: Self Care Deficits Care Plan (Adult) Goal: *Acute Goals and Plan of Care (Insert Text) Occupational Therapy Goals Initiated 10/23/2018 within 7 day(s). 1.  Patient will perform grooming with supervision/set-up standing at sink. 2.  Patient will perform bathing with supervision/set-up. 3.  Patient will perform upper body dressing and lower body dressing with supervision/set-up. 4.  Patient will perform toilet transfers with supervision/set-up. 5.  Patient will perform all aspects of toileting with supervision/set-up. 6.  Patient will participate in upper extremity therapeutic exercise/activities with supervision/set-up for 10 minutes. 7.  Patient will utilize energy conservation techniques during functional activities with verbal, visual and tactile cues. Occupational Therapy EVALUATION Patient: Nasir Alfred (80 y.o. female) Date: 10/23/2018 Primary Diagnosis: Hyponatremia Precautions:   Fall PLOF: independent with ADLs and transfers ASSESSMENT : 
Based on the objective data described below, the patient presents with decreased strength, endurance and balance for carryover of ADLs and transfers. Pt participated with toilet transfers and LB adls this session. Pt was left seated in chair at the end of session in NAD. Patient will benefit from skilled intervention to address the above impairments. Patients rehabilitation potential is considered to be Good Factors which may influence rehabilitation potential include:  
[]             None noted []             Mental ability/status [x]             Medical condition []             Home/family situation and support systems []             Safety awareness []             Pain tolerance/management 
[]             Other:  
 
Recommendations for nursing: 
Written on communication board:  
Verbally communicated to: PLAN : 
Recommendations and Planned Interventions: [x]               Self Care Training                  [x]        Therapeutic Activities [x]               Functional Mobility Training    []        Cognitive Retraining 
[x]               Therapeutic Exercises           [x]        Endurance Activities [x]               Balance Training                   []        Neuromuscular Re-Education []               Visual/Perceptual Training     [x]   Home Safety Training 
[x]               Patient Education                 []        Family Training/Education []               Other (comment): Frequency/Duration: Patient will be followed by occupational therapy 1-2 times per day/4-7 days per week to address goals. Discharge Recommendations: Home Health and [place] Further Equipment Recommendations for Discharge: rolling walker and TBD SUBJECTIVE:  
Patient stated I am doing alright.  OBJECTIVE DATA SUMMARY:  
 
Past Medical History:  
Diagnosis Date  A-fib ([facility] [address] 01/2017  Arthritis, degenerative  CAD (coronary artery disease) 04/2012 S/P 2.75 X 15 mm BMS of OM (04/2012), Two LAD BMS (07/2012)  Elevated liver enzymes Likely from statin  HLD (hyperlipidemia)  Hyperkalemia 06/2012 Likely from lisinopril  Ischemic cardiomyopathy LVEF  45% (11/2012) & 30% echo (04/2012)  NSTEMI (non-ST elevated myocardial infarction) ([facility] [address] 04/2012  Vertigo Past Surgical History:  
Procedure Laterality Date  HX CORONARY STENT PLACEMENT  4/23/12  
 bare metal stent to obtuse marginal branch  HX HEART CATHETERIZATION Barriers to Learning/Limitations: None Compensate with: visual, verbal, tactile, kinesthetic cues/model GCODES:  Self Care  Current  CK= 40-59%  Goal  CI= 1-19%. The severity rating is based on the Other participation with ADLs and transfers. Eval Complexity: History: LOW Complexity : Brief history review ;  Examination: LOW Complexity : 1-3 performance deficits relating to physical, cognitive , or psychosocial skils that result in activity limitations and / or participation restrictions ; Decision Making:MEDIUM Complexity : Patient may present with comorbidities that affect occupational performnce. Miniml to moderate modification of tasks or assistance (eg, physical or verbal ) with assesment(s) is necessary to enable patient to complete evaluation Prior Level of Function/Home Situation:  
Home Situation Home Environment: Private residence # Steps to Enter: 4 Rails to Enter: Yes Hand Rails : Bilateral 
One/Two Story Residence: One story Living Alone: Yes Support Systems: Child(lola), Family member(s) Patient Expects to be Discharged to[de-identified] Rehabilitation facility Current DME Used/Available at Home: None Tub or Shower Type: Tub/Shower combination 
[x]  Right hand dominant   []  Left hand dominantCognitive/Behavioral Status: 
Neurologic State: Alert Orientation Level: Oriented to person;Oriented to place;Oriented to situation Cognition: Follows commands Safety/Judgement: Fall prevention Skin: intact Edema: none Vision/Perceptual:   
Tracking: Able to track stimulus in all quadrants w/o difficulty Coordination: 
Coordination: Within functional limits Fine Motor Skills-Upper: Left Intact; Right Intact Gross Motor Skills-Upper: Left Intact; Right Intact Balance: 
Sitting: Intact Sitting - Static: Good (unsupported) Sitting - Dynamic: Fair (occasional) Standing: With support Standing - Static: Good Standing - Dynamic : FairStrength: 
Strength: Generally decreased, functional 
Tone & Sensation: 
Tone: Normal 
Sensation: Intact Range of Motion: 
AROM: Generally decreased, functional 
Functional Mobility and Transfers for ADLs: 
Bed Mobility: 
Transfers: 
Sit to Stand: Minimum assistance Toilet Transfer : Minimum assistance;Contact guard assistance ADL Assessment: 
Feeding: Independent Oral Facial Hygiene/Grooming: Setup Bathing: Minimum assistance Upper Body Dressing: Minimum assistance Lower Body Dressing: Minimum assistance Toileting: Minimum assistance ADL Intervention: Lower Body Dressing Assistance Dressing Assistance: Minimum assistance Socks: Minimum assistance Leg Crossed Method Used: Yes Position Performed: Seated in chair Toileting Toileting Assistance: Minimum assistance Bladder Hygiene: Minimum assistance Clothing Management: Minimum assistance Cognitive Retraining Safety/Judgement: Fall prevention Therapeutic Exercise: 
 
Pain: 
Pre treatment pain level:   
Post treatment pain level:  
Pain Scale 1: Numeric (0 - 10) Pain Intensity 1: 0 Activity Tolerance:  
Fair Please refer to the flowsheet for vital signs taken during this treatment. After treatment:  
[x] Patient left in no apparent distress sitting up in chair 
[] Patient left in no apparent distress in bed 
[x] Call bell left within reach 
[] Nursing notified 
[x] Caregiver present 
[] Bed alarm activated COMMUNICATION/EDUCATION:  
[x] Home safety education was provided and the patient/caregiver indicated understanding. [x] Patient/family have participated as able in goal setting and plan of care. [x] Patient/family agree to work toward stated goals and plan of care. [] Patient understands intent and goals of therapy, but is neutral about his/her participation. [] Patient is unable to participate in goal setting and plan of care. Thank you for this referral. 
Rimma Valencia, OTR/L Time Calculation: 20 mins

## 2018-10-24 ENCOUNTER — PATIENT OUTREACH (OUTPATIENT)
Dept: INTERNAL MEDICINE CLINIC | Age: 83
End: 2018-10-24

## 2018-10-24 PROCEDURE — 74011250637 HC RX REV CODE- 250/637: Performed by: INTERNAL MEDICINE

## 2018-10-24 PROCEDURE — 74011000250 HC RX REV CODE- 250: Performed by: INTERNAL MEDICINE

## 2018-10-24 RX ORDER — SODIUM POLYSTYRENE SULFONATE 15 G/60ML
30 SUSPENSION ORAL; RECTAL
Status: DISCONTINUED | OUTPATIENT
Start: 2018-10-24 | End: 2018-10-24

## 2018-10-24 RX ADMIN — CLOTRIMAZOLE: 1 CREAM TOPICAL at 13:52

## 2018-10-24 RX ADMIN — ASPIRIN 81 MG CHEWABLE TABLET 81 MG: 81 TABLET CHEWABLE at 10:25

## 2018-10-24 RX ADMIN — CEFUROXIME AXETIL 250 MG: 250 TABLET ORAL at 10:25

## 2018-10-24 RX ADMIN — METOPROLOL TARTRATE 12.5 MG: 25 TABLET, FILM COATED ORAL at 21:11

## 2018-10-24 RX ADMIN — METOPROLOL TARTRATE 12.5 MG: 25 TABLET, FILM COATED ORAL at 10:24

## 2018-10-24 RX ADMIN — CLOPIDOGREL BISULFATE 75 MG: 75 TABLET ORAL at 10:24

## 2018-10-24 RX ADMIN — CLOTRIMAZOLE: 1 CREAM TOPICAL at 21:13

## 2018-10-24 RX ADMIN — DIGOXIN 0.12 MG: 125 TABLET ORAL at 10:30

## 2018-10-24 RX ADMIN — MIRTAZAPINE 15 MG: 15 TABLET, FILM COATED ORAL at 21:11

## 2018-10-24 RX ADMIN — CALCIUM CARBONATE-VITAMIN D TAB 500 MG-200 UNIT 1 TABLET: 500-200 TAB at 10:25

## 2018-10-24 RX ADMIN — CEFUROXIME AXETIL 250 MG: 250 TABLET ORAL at 21:11

## 2018-10-24 RX ADMIN — VITAM B12 100 MCG: 100 TAB at 10:24

## 2018-10-24 RX ADMIN — SODIUM POLYSTYRENE SULFONATE 15 G: 15 SUSPENSION ORAL; RECTAL at 10:25

## 2018-10-24 RX ADMIN — FUROSEMIDE 20 MG: 20 TABLET ORAL at 10:24

## 2018-10-24 RX ADMIN — POLYETHYLENE GLYCOL 3350 17 G: 17 POWDER, FOR SOLUTION ORAL at 10:26

## 2018-10-24 RX ADMIN — ONDANSETRON 4 MG: 4 TABLET, ORALLY DISINTEGRATING ORAL at 18:06

## 2018-10-24 NOTE — ROUTINE PROCESS
X  Physical Therapy Functional goals: ?   Maintain current functional status X    Improvement Functional interventions: 
              - to improve walking - to improve strength 
              - to improve balance - to improve endurance - to improve ability to use stairs - to improve general movement Frequency:  1-2X/ day for 3-6 visits/ week ? Occupational Therapy Functional goals: ?  Maintain current functional status 
           ?  ]   Improvement Functional interventions: 
  
        Frequency:   
 
 
 ?    Speech Therapy Functional goals: 
           ?  ] Maintain current functional status ? Improvement Functional interventions: 
  
        Frequency:

## 2018-10-24 NOTE — PROGRESS NOTES
Nurse Navigator Hospital Follow Up Note 
-10/20/18 Admitted at [facility]/[address] for [address], UTI 
-10/23/18 Discharged to [place] [id number]. Transition of Care Coordination/Hospital to Post Acute Facility: 
  
Date/Time:  10/24/2018 9:51 AM 
 
Patient was admitted to [facility] on 10/20/18 and discharged on 10/23/18 for AMS, UTI. Patient was transferred to [address] for continuation of care. Inpatient RRAT score: 14 Top Challenges reviewed   
-UTI, on ABx 
-lab draw order Na/K/bmp Method of communication with care team :none Reviewed inpatient notes/meds. ACP:  
Does the patient have a current ACP (including DDNR):  yes , noted per note patient and a current order for DNR Does the post acute facility have a copy of the patients ACP:  Unable to view in patient's chart patient's advance directives/DNR Called [facility] ([phone]) and spoke to [place]. Introduced self. Informed her regarding active order for DNR. Upon checking she reports they do not have a copy of advance directives. She reports she will inform the nurse manager. I verbalized understanding. Medication(s):  
New Medications at Discharge: omnicef, ceftin, kayexalate Changed Medications at Discharge:  
Discontinued Medications at Discharge: PCP/Specialist follow up:  
Future Appointments Date Time Provider George Che 11/20/2018  1:30 PM Chapin Sullivan MD [address] [place] [address] Will continue to monitor

## 2018-10-24 NOTE — ROUTINE PROCESS
Bedside and Verbal shift change report given to Rosey Gibbs RN (oncoming nurse) by Blanca Fonseca LPN (offgoing nurse). Report included the following information SBAR, Kardex, MAR and Recent Results.

## 2018-10-24 NOTE — PROGRESS NOTES
I have reviewed this patient's current medication list and recent laboratory results. At this time, I do not suggest any drug therapy adjustments or additional laboratory monitoring. Thank you, 
Pancho BASSETT Ph. M. S. 
10/24/2018

## 2018-10-24 NOTE — H&P
[facility] DIVISION HISTORY AND PHYSICAL Name:Lyle QUEEN 
MR#: [id number] : 08/10/1927 ACCOUNT #: [de-identified] ADMIT DATE: 10/23/2018 REASON FOR ADMISSION:  Pneumonia, UTI and hyponatremia. HISTORY OF PRESENT ILLNESS:  The patient is a pleasant 77-year-old female with past medical history significant for congestive heart failure, coronary artery disease, AFib, who is admitted to the hospital secondary to nausea, vomiting and dehydration. Patient was recently treated with Cipro for UTI. She does have chronic history of irritable bowel syndrome with diarrhea. Patient was diagnosed with hyponatremia and pneumonia and UTI, initially started on Rocephin and Zithromax with blood and urine cultures pending. Blood culture was negative. Urine culture showed E. coli. The patient returned to baseline after treatment with antibiotics. The patient was noted to have hyponatremia that improved slowly. She had a potassium of 5.7 and a dose of Kayexalate was received. At time of my evaluation, the patient is alert, awake, oriented to place, person and year. She denies any chest pain, shortness of breath or palpitation. She states she does not have very good p.o. intake, but she denies any nausea and vomiting. She denies any dysuria, polyuria or cough, has occasional dyspnea with exertion. PAST MEDICAL HISTORY:  AFib, coronary artery disease, congestive heart failure, hypertension, hyperlipidemia, irritable bowel syndrome, osteoarthritis, hyperkalemia, hyponatremia. PAST SURGICAL HISTORY:  Cardiac catheterization with stent placement. SOCIAL HISTORY:  She is a nonsmoker, nondrinker. FAMILY HISTORY:  Positive for diabetes. ALLERGIES:  CODEINE, EGGS, FLU SHOT AND PENICILLIN. MEDICATIONS:  List reviewed. REVIEW OF SYSTEMS:  No fever or chills, no weight loss, no headache. No sore throat. No chest wall palpitation, no shortness of breath or cough. Occasional dyspnea with exertion. No nausea, vomiting, occasional diarrhea. No dysuria or polyuria. No back pain or leg pain. No heat or cold intolerance. No anxiety or depression. PHYSICAL EXAMINATION: 
GENERAL:  This is a thin female, alert and oriented, in no distress. VITAL SIGNS:  Temperature is 96.8, heart rate 88, respiratory rate is 15, blood pressure is 146/65, satting 94%. HEENT:  Normocephalic, atraumatic. Sclerae are anicteric. Oropharynx is clear. Mucous membrane moist. 
NECK:  No JVD or thyromegaly. HEART:  S1, S2, irregular rhythm. Positive for III/VI systolic ejection murmur. LUNGS:  Clear to auscultation bilaterally. No wheezing. ABDOMEN:  Nontender, nondistended, normoactive bowel sounds. EXTREMITIES:  No edema or clubbing. Motor strength is 5/5 in all 4 extremities. NEUROLOGIC:  Cranial nerves are grossly intact. ASSESSMENT AND PLAN: 
1. Pneumonia. Patient has been switched to Ceftin for additional 4 days. 2.  Urinary tract infection as above. 3.  Hyponatremia. Continue monitoring. The patient may need to be placed on fluid restriction. 4.  Hyperkalemia in the setting of hyponatremia. We will repeat BMP. Also add random cortisol level to rule out adrenal insufficiency. 5.  Atrial fibrillation, currently on beta blocker and digoxin for rate control, not on  anticoagulation; however, she is on aspirin daily. 6.  Coronary artery disease. 7.  Congestive heart failure. Continue with beta blocker and Lasix. ACE inhibitor has been discontinued secondary to hyperkalemia. 8.  Deconditioning. Continue physical therapy and occupational therapy. Patient received a dose of Kayexalate for potassium of 5.7 prior to discharge. We will repeat BMP tomorrow in addition to random cortisol level. MD MOSHE Joshi/GWEN 
D: 10/24/2018 18:53    
T: 10/24/2018 19:48 JOB #: [id number]

## 2018-10-24 NOTE — PROGRESS NOTES
The patient was offered 2 activities on October 24, 2018. The patient was offered a group activity of working on a jigsaw puzzle at 11:00 and a group activity of coloring at 1:30. The patient declined both activities. The Recreation Therapist will continue to offer the patient group and 1:1 activities.

## 2018-10-24 NOTE — ROUTINE PROCESS
?  Physical Therapy Functional goals: ?   Maintain current functional status ? Improvement Functional interventions: 
  
        Frequency:   
 
 x? Occupational Therapy Functional goals: ?  Maintain current functional status ?x  ]   Improvement Functional interventions:        Functional interventions: Improve bathing, dressing, self feeding, toileting and functional transfers during self care tasks. Frequency:   3 to 6 times per week ? Speech Therapy Functional goals: 
           ?  ] Maintain current functional status ? Improvement Functional interventions: 
  
        Frequency:

## 2018-10-24 NOTE — PROGRESS NOTES
Nutrition initial assessment/Plan of care tcc RECOMMENDATIONS:  
1. Regular Diet (liberalized to increase PO intake) 2. SF CIB TID 3. Monitor weight, labs and PO intake 4. RD to follow GOALS:  
1. PO intake meets >75% of protein/calorie needs by 10/31 2. Weight Maintenance (+/- 1-2 lb) by 10/31 ASSESSMENT: 
Wt status is classified as normal per BMI of 21. However Pt at nutrition risk d/t BMI <23 and age >65 years. Poor PO intake slowly improving. SF CIB TID for additional calories/protein. Labs noted. Pt w/ hyponatremia, hypocalcemia and hypokalemia. Nutrition recommendations listed. RD to follow. Nutrition Diagnoses:  
Inadequate energy intake related to decreased appetite  as evidenced by <50% of lunch tray consumed Nutrition Risk:  [] High  [x] Moderate []  Low SUBJECTIVE/OBJECTIVE:  
(10/21):  Pt admitted for hyponatremia. PMHx including CHF, Afib, CAD and IBS-d. Noted Pt w/ N/V/D and poor PO intake x 3 days. Pt seen in room with family at bedside. Denies having any problems chewing/swallowing and unsure of weight loss. Per documented weight records weight is stable. Stated she has an allergy to eggs. Reports normally having a good appetite but family stated poor x 3-4 days now. Stated she consumes strawberry CIB at home. Consumed ~25% of lunch per family but didn't like the soup at dinner. Provided a menu to assist with food choice selection and obtained some preferences as well. Liberalized diet to increase intake and ordered SF CIB TID for additional calories/protein. Encouraged intake and will monitor.   
 
(10/24): Pt transferred from  to [address] [place] [address],[address] on 10/23/2018. Pt seen in dining room this morning with dietary assisting with menu selection choices. PO intake improving over past couple of days per vitals (%). Last BM (10/23). Pt seen in room after lunch; observed only 40% of meal consumed. Reports she just doesn't have much of an appetite.  Stated she is consuming her supplements. Encouraged intake and involvement with menu selections. Will continue to monitor. Information Obtained from:  
 [x] Chart Review [x] Patient [x] Family/Caregiver 
 [] Nurse/Physician 
 [] Interdisciplinary Meeting/Rounds Diet: Regular Diet Medications: [x] Reviewed Plavix, Lopressor, Remeron Allergies: [x] Reviewed (Eggs) Patient Active Problem List  
Diagnosis Code  SOB (shortness of breath) R06.02  
 NSTEMI (non-ST elevated myocardial infarction) ([facility]) I21.4  CAD (coronary artery disease) I25.10  
 Ischemic cardiomyopathy I25.5  Arthritis, degenerative M19.90  Vertigo R42  
 HLD (hyperlipidemia) E78.5  Chronic a-fib ([facility]) I48.2  Irritable bowel syndrome with diarrhea K58.0  Chronic fatigue R53.82  
 Hyponatremia E87.1  Acute metabolic encephalopathy V03.16  
 Hyperkalemia E87.5 Past Medical History:  
Diagnosis Date  A-fib ([facility] 01/2017  Arthritis, degenerative  CAD (coronary artery disease) 04/2012 S/P 2.75 X 15 mm BMS of OM (04/2012), Two LAD BMS (07/2012)  Elevated liver enzymes Likely from statin  HLD (hyperlipidemia)  Hyperkalemia 06/2012 Likely from lisinopril  Ischemic cardiomyopathy LVEF  45% (11/2012) & 30% echo (04/2012)  NSTEMI (non-ST elevated myocardial infarction) ([facility] 04/2012  Vertigo Labs:   
Lab Results Component Value Date/Time Sodium 130 (L) 10/23/2018 05:50 AM  
 Potassium 5.7 (H) 10/23/2018 05:50 AM  
 Chloride 99 (L) 10/23/2018 05:50 AM  
 CO2 25 10/23/2018 05:50 AM  
 Anion gap 6 10/23/2018 05:50 AM  
 Glucose 66 (L) 10/23/2018 05:50 AM  
 BUN 17 10/23/2018 05:50 AM  
 Creatinine 0.93 10/23/2018 05:50 AM  
 Calcium 7.7 (L) 10/23/2018 05:50 AM  
 Magnesium 1.7 (L) 04/24/2012 05:00 AM  
 Phosphorus 2.2 (L) 04/24/2012 05:00 AM  
 Albumin 2.5 (L) 10/20/2018 05:50 PM  
 
Anthropometrics: BMI (calculated): 21 Last 3 Recorded Weights in this Encounter 10/23/18 0145 Weight: 52 kg (114 lb 9.6 oz) Ht Readings from Last 1 Encounters:  
10/23/18 5' 2\" (1.575 m) Weight Metrics 10/23/2018 10/21/2018 10/20/2018 10/9/2018 9/24/2018 4/30/2018 10/31/2017 Weight 114 lb 9.6 oz 110 lb - 103 lb 6.4 oz 104 lb 6.4 oz 100 lb 103 lb BMI 20.96 kg/m2 - 20.12 kg/m2 18.91 kg/m2 19.1 kg/m2 18.29 kg/m2 18.54 kg/m2 No data found. IBW: 110 lb %IBW: 100% UBW: 104-110 lb 
[] Weight Loss [] Weight Gain [x] Weight Stable Estimated Nutrition Needs: [x] MSJ  [] Other: 
Calories: 7315-5249 kcal Based on:   [x] Actual BW   
Protein:   60-65g Based on:   [x] Actual BW Fluid:       3611-6375 ml Based on:   [x] Actual BW  
 
 [x] No Cultural, Yarsani or ethnic dietary need identified. [] Cultural, Yarsani and ethnic food preferences identified and addressed Wt Status:  [x] Normal (18.6 - 24.9) [] Underweight (<18.5) [] Overweight (25 - 29.9) [] Mild Obesity (30 - 34.9)  [] Moderate Obesity (35 - 39.9) [] Morbid Obesity (40+) Nutrition Problems Identified:  
[x] Suboptimal PO intake (improving) [x] Food Allergies (Eggs) [] Difficulty chewing/swallowing/poor dentition 
[] Constipation/Diarrhea  
[] Nausea/Vomiting  
[] None 
[] Other:  
 
Plan:  
[x] Therapeutic Diet [x]  Obtained/adjusted food preferences/tolerances and/or snacks options [x]  Supplements added  
[] Occupational therapy following for feeding techniques []  HS snack added  
[]  Modify diet texture  
[x]  Modify diet for food allergies []  Educate patient  
[]  Assist with menu selection  
[x]  Monitor PO intake on meal rounds  
[x]  Continue inpatient monitoring and intervention  
[x]  Participated in discharge planning/Interdisciplinary rounds/Team meetings  
[]  Other:  
 
Education Needs: 
 [] Not appropriate for teaching at this time due to: 
 [x] Identified and addressed Nutrition Monitoring and Evaluation: 
[x] Continue ongoing monitoring and intervention 
[] Other Gwendlyn Sizer

## 2018-10-24 NOTE — PROGRESS NOTES
Problem: Mobility Impaired (Adult and Pediatric) Goal: *Acute Goals and Plan of Care (Insert Text) PHYSICAL THERAPY STG GOALS : 
Initiated 10/24/2018 and to be accomplished within 1-2 Weeks 1. Patient will move from supine to sit and sit to supine  and roll side to side in bed with modified independence. 2.  Patient will transfer from bed to chair and chair to bed with supervision/set-up using LRAD. 3.  Patient will perform sit to stand with supervision/set-up with Good balance and safety awareness. 4.  Patient will ambulate with supervision/set-up for 125 feet with LRAD on level surfaces with 2 turns. 5.  Patient will ascend/descend 5 stairs with left handrail(s) with stand by assistance to allow for safe home access/exit. 6.  Patient will improve standardized test score for [place] Standing Balance Scale score of 4 to reduce fall risk. PHYSICAL THERAPY LTG GOALS : 
Initiated 10/24/2018 and to be accomplished within 3-4 Weeks 1. Patient will transfer from bed to chair and chair to bed with modified independence using LRAD. 2.  Patient will perform sit to stand with modified independence with Good balance and safety awareness. 3.  Patient will ambulate with modified independence for 250 feet with LRAD on level surfaces and be able to maneuver through narrow spaces and obstacles without loss of balance. 4.  Patient will ascend/descend 5 stairs with left handrail(s) with supervision/set-up to allow for safe home access/exit. 5.  Patient will improve standardized test score for [place] Standing Balance Scale score of 5 to reduce fall risk. Physical Therapist:   Sophia Harrell PT  [facility]  
physical Therapy EVALUATION Patient: Jackie Haney (80 y.o. female)                Start of Care Date: 10/24/2018 Referred by : Epi Bettencourt MD                
Precautions: Arnold Grayson Treatment Diagnosis: Difficulty in Walking, Muscle Weakness History:  Patient was admitted to [facility] on 10/20/18 with c/o loss of appetite, weakness, and confusion. Dx of hypokalemina, dehydration, and UTI. Upon acute d/c, she was unsafe to return home and was transferred to [facility]. History of present problem reveals HIGH Complexity :3+ comorbidities / personal factors will impact the outcome/ POC . Past Medical history includes:  
Past Medical History:  
Diagnosis Date  A-fib ([facility] [address] 01/2017  Arthritis, degenerative  CAD (coronary artery disease) 04/2012 S/P 2.75 X 15 mm BMS of OM (04/2012), Two LAD BMS (07/2012)  Elevated liver enzymes Likely from statin  HLD (hyperlipidemia)  Hyperkalemia 06/2012 Likely from lisinopril  Ischemic cardiomyopathy LVEF  45% (11/2012) & 30% echo (04/2012)  NSTEMI (non-ST elevated myocardial infarction) ([facility] [address] 04/2012  Vertigo Past Surgical History:  
Procedure Laterality Date  HX CORONARY STENT PLACEMENT  4/23/12  
 bare metal stent to obtuse marginal branch  HX HEART CATHETERIZATION Cognitive Status:Mental Status Neurologic State: Alert Orientation Level: Oriented to time;Oriented to person Cognition: Follows commands Perception: Appears intact Perseveration: No perseveration noted Safety/Judgement: Fall prevention Barriers to Learning/Limitations: yes;  sensory deficits-vision/hearing/speech and altered mental status (i.e.Sedation, Confusion) Compensate with: visual, verbal, tactile, kinesthetic cues/model Prior Level of Function/Home Situation and Assitance recieved:Home Situation Home Environment: Private residence # Steps to Enter: 4 Rails to Enter: Yes Hand Rails : Bilateral(uses one rail to ascend/descend) One/Two Story Residence: One story Living Alone: Yes Support Systems: Child(lola), Family member(s) Patient Expects to be Discharged to[de-identified] Private residence Current DME Used/Available at Home: Grab bars, Shower chair, Robbin Sins, straight, Raised toilet seat, Walker, rollator Tub or Shower Type: Shower Level of Assistance received at Home:   Prior to this current hospitalization, the patient required hired assistance for grocery shopping and household chores, otherwise mod (I) without use of AD. Justification for Evaluation complexity:  Patient is referred to Skilled Physical Therapy services due a functional decline in strength, endurance, mobility, gait, balance and required an overall HIGH  complexity evaluation examination. Eval Complexity: History: HIGH Complexity :3+ comorbidities / personal factors will impact the outcome/ POC Exam:HIGH Complexity : 4+ Standardized tests and measures addressing body structure, function, activity limitation and / or participation in recreation  Presentation: MEDIUM Complexity : Evolving with changing characteristics OBJECTIVE DATA SUMMARY:  
 
Vital Signs: 
Resting BP:  BP: 146/65  HR: Pulse (Heart Rate): 88   
Pain: Gross Assessment: 
Gross Assessment AROM: Generally decreased, functional 
Strength: Generally decreased, functional(BLEs grossly 4-/5) Coordination: Generally decreased, functional 
Tone: Normal 
Sensation: Intact(BUEs) Bed Mobility: 
Bed Mobility Supine to Sit: Supervision Sit to Supine: Supervision Scooting: Contact guard assistance Balance:Balance Sitting: Without support Sitting - Static: Good (unsupported) Sitting - Dynamic: Fair (occasional)(+) Standing: Without support Standing - Static: Fair(+) Standing - Dynamic : Fair Transfers: 
Sit to Stand: Contact guard assistance Gait: 
Gait Base of Support: Center of gravity altered Speed/Sarah: Pace decreased (<100 feet/min) Step Length: Left shortened;Right shortened Gait Abnormalities: Decreased step clearance Distance (ft): 58 Feet (ft) Assistive Device: Gait belt(no AD) Gait Description (WDL): Exceptions to [facility] With 4 turns. Wheelchair Management:  N/A Assessment: Clinical Decision Making:Medium Complexity , using standardized patient assessment instrument and /or measurable assessement of functional outcome of [facility] Standing Balance Scale, score of 3+.  
0: Pt performs 25% or less of standing activity (Max assist) CN, 100% impaired. 1: Pt supports self with upper extremities but requires therapist assistance. Pt performs 25-50% of effort (Mod assist) CM, 80% to <100% impaired. 1+: Pt supports self with upper extremities but requires therapist assistance. Pt performs >50% effort. (Min assist). CL, 60% to <80% impaired. 2: Pt supports self independently with both upper extremities (walker, crutches, parallel bars). CL, 60% to <80% impaired. 2+: Pt support self independently with 1 upper extremity (cane, crutch, 1 parallel bar). CK, 40% to <60% impaired. 3: Pt stands without upper extremity support for up to 30 seconds. CK, 40% to <60% impaired. 3+: Pt stands without upper extremity support for 30 seconds or greater. CJ, 20% to <40% impaired. 4: Pt independently moves and returns center of gravity 1-2 inches in one plane. CJ, 20% to <40% impaired. 4+: Pt independently moves and returns center of gravity 1-2 inches in multiple planes. CI, 1% to <20% impaired. 5: Pt independently moves and returns center of gravity in all planes greater than 2 inches. CH, 0% impaired. Raymond Hyde Positioning needs/Additional Comments :  Pt presents in bed, alert and oriented to self and time. Bed mobility with supervision, transfers with CGA. Gait training x 58 ft without AD, decreased arm swing and floor clearance noted; CGA required. TE rendered to address strength, endurance, mobility and included: heel raises, toe raises, LAQ, ankle pumps x 10 reps each with visual and verbal cueing for proper techniques. HEP administered to include ankle pumps to address circulation. Pt requested to return to bed as she was cold. Daughter and son-in-law presented.  Education on role of PT, pt and family denied having any questions. Required clarification when asked about her personal goals, agreeable to work on balance and walking and stated, \"I want to stay as far away as possible from stair wells. \" Education on safety awareness, pt verbalized understanding. Recommend skilled physical therapy services to address deficits, restore function, and for safe return home. TREATMENT PLAN: Rehab Potential : Good Skilled Physical Therapy Services may include:  
[x]           Bed Mobility Training             [x]    Neuromuscular Re-Education 
[x]           Transfer Training                   [x]    Orthotic/Prosthetic Training 
[x]           Gait Training                          [x]    Modalities [x]           Therapeutic Procedures        [x]    Manual Therapy [x]           Therapeutic Activities            [x]    Patient and Family Training/Education 
[]           Other (comment): Frequency/Duration: Patient will be followed by physical therapy for 1-2 times a day for a minimum of 3 days per week for 4 weeks to address goals. Discharge Recommendations: Home Health Patient's expected Discharge Location:  [x]Private Residence  [] correction/ILF  []LTC  []Other: COMMUNICATION/EDUCATION:  Patient/Family Agreement with Treatment Plan :  
 
[x]         The PT evaluation/POC has been reviewed with PT assistant. [x]         Fall prevention education was provided and the patient/caregiver indicated understanding. [x]         Patient/family have participated as able in goal setting and plan of care and Patient/family agree to work toward stated goals and plan of care. []         Patient is unable to participate in goal setting and plan of care. Therapist/SW will contact family/POA. Treatment session: 
Overall Complexity: MEDIUM Evaluation:  19 mins./ Treatment:  37 mins. Physical Therapist:   Walt Whittington, PT       10/24/2018 Thank you for this referral.

## 2018-10-24 NOTE — ROUTINE PROCESS
Skin Assessment: Bruise left anticubital, skin is intact overall shiny and flaky appearance. Mole on left side buttocks raised.

## 2018-10-24 NOTE — PROGRESS NOTES
Dietary Orders: X    Regular ? Diabetic: ?    Cardiac: 
   X    CIB TID (strawberry) ? Tube feeding ? IV fluids for hydration ? Fluid restrictions:  
 
Residents dietary preferences: 
Likes soups, english muffins, grits, mandarin oranges and hot tea. Reason for modified diet: 
 
 Dietary Risks X     Weight loss ? Swallowing problems ? Chewing problems ? Missing teeth/poor dentition ? Edentulous ? Mouth pain/discomfort    
    ? Other Residents dietary goal: X      Maintain current weight ? Prevent weight loss 
   ? Other Dietary interventions ? Eats in dining room ? Eats in room ? Dentures/partials ? Specialty utensils or devices: 
   ?     Other

## 2018-10-24 NOTE — PROGRESS NOTES
Problem: Self Care Deficits Care Plan (Adult) Goal: *Acute Goals and Plan of Care (Insert Text) OCCUPATIONAL THERAPY SHORT TERM GOALS Initiated 10/24/2018 and to be accomplished within 2 [facility]) 1. Patient will perform Upper body ADL's without adaptive equipment with Supv-set up. 2.  Patient will perform Lower body ADL's without adaptive equipment with standby assist.. 3.  Patient will perform toileting task with standby assist with Good safety to reduce falls risk. 4.  Patient will perform toilet transfers with Rolling Walker and contact guard assist. 
5.  Patient will perform standing static/dynamic balance activities for improved ADL/IADL function with contact guard assist and Good balance and safety awareness. 6.  Patient will improve Barthel index scores to atleast 55/100 to improve functional mobility. 7.  Patient will utilize energy conservation techniques during functional activities with minimal verbal cues. OCCUPATIONAL THERAPY LONG TERM GOALS Initiated 10/24/2018 and to be accomplished within 4 Week(s) 1. Patient will perform ADL's & IADLs with adaptive equipment as needed with modified independence. 2.  Patient will perform toileting task with modified independence with Good safety to reduce falls risk. 3.  Patient will perform all functional transfers utilizing least restrictive device and durable medical equipment as needed with modified independence and Good balance and safety awareness. 4.  Patient will perform standing static/dynamic activity for improved ADL/IADL function with modified independence and Good balance and safety awareness. 5.  Patient will improve Barthel index score to 95/100 to improve independence with mobility. 6. Patient will perform home making tasks and simple meal prep Mod I utilizing energy conservation techniques and good safety awareness. Therapist: Mike Lance    10/24/2018 [address] [place] [address] Occupational Therapy EVALUATION Patient: Marissa Stubbs (80 y.o. female) Start of Care Date: 10/24/2018                         Onset Date: 10/20/18 Referred by : Nahomy Ambrosio MD        
Primary Diagnosis: hypokalemina      Treatment Diagnosis Treatment Diagnosis: increased need for assist with self care Treatment Diagnosis 2: muscle weakness Patient is a 80 y.o. Female admitted to [facility] 10/20/18 with c/o weakness, loss of appetite/anorexia, nausea, diarrhea with IBS, confusion/AMS-patient took 10 days worth of Zofran in 48 hours with relief per family report. Patient diagnosed with UTI, dehydration, metabolic encephalopathy and hyponatremia. Patient unsafe to discharge home and transferred to [facility] and referred to skilled Occupational therapy in order to reach maximal functional potential for safe discharge home at [facility]. Precautions: Fall Eval Complexity: History: HIGH Complexity : Extensive review of history including physical, cognitive and psychosocial history . History of present problem reveals HIGH Complexity :3+ comorbidities / personal factors will impact the outcome/ POC . Past Medical history includes:  
Past Medical History:  
Diagnosis Date  A-fib ([facility] [address] 01/2017  Arthritis, degenerative  CAD (coronary artery disease) 04/2012 S/P 2.75 X 15 mm BMS of OM (04/2012), Two LAD BMS (07/2012)  Elevated liver enzymes Likely from statin  HLD (hyperlipidemia)  Hyperkalemia 06/2012 Likely from lisinopril  Ischemic cardiomyopathy LVEF  45% (11/2012) & 30% echo (04/2012)  NSTEMI (non-ST elevated myocardial infarction) ([facility] [address] 04/2012  Vertigo Past Surgical History:  
Procedure Laterality Date  HX CORONARY STENT PLACEMENT  4/23/12  
 bare metal stent to obtuse marginal branch  HX HEART CATHETERIZATION Cognitive Status:Mental Status Neurologic State: Alert Orientation Level: Oriented to time;Oriented to person Cognition: Follows commands Perception: Appears intact Perseveration: No perseveration noted Safety/Judgement: Fall prevention Barriers to Learning/Limitations: yes;  cognitive and sensory deficits-vision/hearing/speech Compensate with: visual, verbal, tactile, kinesthetic cues/model Justification for Evaluation complexity:   HIGH Complexity : Patient presents with comorbidities that affect occupational performance. Signifigant modification of tasks or assistance (eg, physical or verbal) with assessment (s) is necessary to enable patient to complete evaluation . Patient is referred to [address] [place] [address] due to a functional decline in strength, balance, coordination, safety awareness and functional activity tolerance, which is inhibiting independence w/ self-care performance skills and functional mobility. Prior Level of Function/Home Situation:  
Home Situation Home Environment: Private residence # Steps to Enter: 4 Rails to Enter: Yes Hand Rails : Bilateral(uses one rail to ascend/descend) One/Two Story Residence: One story Living Alone: Yes Support Systems: Child(lola), Family member(s) Patient Expects to be Discharged to[de-identified] Private residence Current DME Used/Available at Home: Grab bars, Shower chair, Cane, straight, Raised toilet seat, Walker, rollator Tub or Shower Type: Shower Level of Assistance received at Home: Prior to this current hospitalization, the patient reports she was Mod I w/ ADLs, IADLs, meal prep and (I) w/o AD for functional mobility. Hired . OBJECTIVE DATA SUMMARY:  
Vital Signs: 
Resting BP:  BP: 146/65  HR: Pulse (Heart Rate): 88    
Pain: Auditory:Auditory Auditory Impairment: Hard of hearing, bilateral 
Examination:  
HIGH Complexity : 5 or more performance deficits relating to physical, cognitive , or psychosocial skils that result in activity limitations and / or participation restrictions; Tone and Sensation: 
Tone: Normal(BUEs) Sensation: Intact(BUEs) Visual Perceptual:Vision Corrective Lenses: Glasses Coordination:Coordination Fine Motor Skills-Upper: Left Intact; Right Intact Gross Motor Skills-Upper: Left Intact; Right Intact Coordination: Within functional limits(BUEs) Fine Motor Skills-Upper: Left Intact; Right Intact Gross Motor Skills-Upper: Left Intact; Right IntactBed Mobility:Bed Mobility Supine to Sit: Supervision Sit to Supine: Supervision Scooting: Contact guard assistance Transfers:Functional Transfers Sit to Stand: Contact guard assistance Toilet Transfer : Minimum assistance(BSC transfer and toilet transfer) Balance:Balance Sitting: Intact Sitting - Static: Good (unsupported) Sitting - Dynamic: Fair (occasional) Standing: With support Standing - Static: Good Standing - Dynamic : Fair Gross Assesment: 
Gross Assessment AROM: Within functional limits(BUEs) Strength: (BUEs 3+/5) Coordination: Within functional limits(BUEs) Tone: Normal(BUEs) Sensation: Intact(BUEs) ADL self care: ADL Intervention:Upper Body Bathing Bathing Assistance: Stand-by assistance Upper Body Dressing Assistance Dressing Assistance: Stand-by assistance Lower Body Bathing Bathing Assistance: Contact guard assistance Toileting Toileting Assistance: Contact guard assistance Bladder Hygiene: Contact guard assistance Bowel Hygiene: Contact guard assistance Clothing Management: Contact guard assistance Adaptive Equipment: [place] Grooming Grooming Assistance: Supervision/set up Feeding Feeding Assistance: Supervision/set-up Wheelchair Management:  not assessed Instrumental ADL's: 
   not assessedASSESSMENT:  
A clinical decision making of HIGH  complexity was conducted, which includes an analysis of the Occupational profile, standardized assessments, data and treatment options. THE BARTHEL INDEXACTIVITY 
 SCORE FEEDING 
0=unable 5=needs help cutting,spreading butter,etc., or modified diet 10= independent  
5 BATHING 
0=dependent 5=independent (or in shower  
0  
GROOMING 
0=needs help 5=independent face/hair/teeth/shaving (implements provided) 5 DRESSING 
0=dependent 5=needs help but can do about half unaided 10=independent(including buttons, zips,laces etc.) 5 BOWELS 
0=incontinent 5=occasional accident 10=continent 5  
BLADDER 
0=incontinent, or catheterized and unable to manage alone 5=occasional accident 10=continent  
5  
TOILET USE 
0=dependent 5=needs some help, but can do something alone 10=independent (on and off, dressing, wiping) 5 TRANSFER (BED TO CHAIR AND BACK) 0=unable, no sitting balance 5=major help(one or two people,physical), can sit 10=minor help(verbal or physical) 15=independent 10 MOBILITY (ON LEVEL SURFACES) 0=immobile or <50 yards 5=wheelchair independent,including corners,>50 yards 10=walkes with help of one person (verbal or physical) >50 yards 15=independent(but may use any aid; for example, stick) >50 yards  
0 STAIRS 
0=unable 5=needs help (verbal, physical, carrying aid) 10=independent  
0  
   
      TOTAL:             40/100 Additional comments:  Patient A & O x 2- self & date, re-orientated to place & situation, no c/o pain. Patient reports incontinence of B/B and chronic 2/2 IBS. Supv supine <-> sit eob. CGA toilet tasks: perihygeine and clothing mgmt in stance w/ RW and seated on edge of bed. BSC transfer and toilet transfer: Stevie Steveata w/ RW. Patient perseverative re: dtr;s whereabouts-nursing notified, attempt to call son in law (phone # on dry erase communication board)-unsuccessful & notified nursing, nursing reports they will assist.Patient educated on role of OT and POC; patient verbalized understanding. Pt.  Instructed on safety awareness with importance to utilize call bell to request assist with functional transfers to prevent falls, patient verbalized understanding and provided accurate demonstration. Dry erase communication board updated for accuracy w/ carryover for toilet & BSC transfers: Min A w/ RW & gait belt, therapist placed  3 in 1 commode over toilet. TREATMENT PLAN : Rehab Potential : Good Skilled Occupational Therapy Services may include:  
[x] Self Care/Home Mgmt                    []Cognitive Perceptual Re-training                             
[x] Adaptive Equip Training                 [x]Therapeutic Exercise []Sensory Integration                           []Community Work Integration 
[x]Therapeutic Activities                     []Other/modalities [x]Neuromuscular Re-education         [x] Wheelchair Mgmt/propulsion   
[x]Patient/Family/Staff Instruction      : 
[]Orthotics/Prosthetic Fitting & training Frequency/Duration: Patient will be followed by Occupational therapy for 1-2 times a day for a minimum of 3 days per week for 4 weeks to address goals. Discharge Recommendations: Home Health Patient expected Discharge Location: [x]Private Residence  [] [facility]  []LTC  [] [facility] COMMUNICATION/EDUCATION:  Patient/Family Agreement with Treatment Plan :  
 
[x]   OT Evaluation/POC has been reviewed with the [facility]. [x]        Fall prevention education was provided and the patient/caregiver indicated understanding 
[x]        Patient/family have participated as able in goal setting and plan of care. Patient/family agree to work toward stated goals and plan of care. []        Patient is unable to participate in goal setting and plan of care. Therapist will contact SW/POA. Treatment session:  
Overall Complexity:HIGH  Evaluation: 38 mins. Treatment :  50 mins. Occupational Therapist:   Debbie Murillo          10/24/2018 Thank You for this referral.

## 2018-10-24 NOTE — ROUTINE PROCESS
Bedside and verbal shift report given to JOE Choi LPN (oncoming nurse) by YUMIKO Barrera LPN (off going nurse). Report includes SBAR, MAR and Kardex.

## 2018-10-25 ENCOUNTER — PATIENT OUTREACH (OUTPATIENT)
Dept: INTERNAL MEDICINE CLINIC | Age: 83
End: 2018-10-25

## 2018-10-25 LAB
ANION GAP SERPL CALC-SCNC: 7 MMOL/L (ref 3–18)
BUN SERPL-MCNC: 12 MG/DL (ref 7–18)
BUN/CREAT SERPL: 17 (ref 12–20)
CALCIUM SERPL-MCNC: 7.8 MG/DL (ref 8.5–10.1)
CHLORIDE SERPL-SCNC: 99 MMOL/L (ref 100–108)
CO2 SERPL-SCNC: 28 MMOL/L (ref 21–32)
CORTIS SERPL-MCNC: 18.7 UG/DL (ref 3.09–22.4)
CREAT SERPL-MCNC: 0.72 MG/DL (ref 0.6–1.3)
GLUCOSE SERPL-MCNC: 79 MG/DL (ref 74–99)
POTASSIUM SERPL-SCNC: 4.6 MMOL/L (ref 3.5–5.5)
SODIUM SERPL-SCNC: 134 MMOL/L (ref 136–145)

## 2018-10-25 PROCEDURE — 80048 BASIC METABOLIC PNL TOTAL CA: CPT | Performed by: INTERNAL MEDICINE

## 2018-10-25 PROCEDURE — 36415 COLL VENOUS BLD VENIPUNCTURE: CPT | Performed by: INTERNAL MEDICINE

## 2018-10-25 PROCEDURE — 74011250637 HC RX REV CODE- 250/637: Performed by: INTERNAL MEDICINE

## 2018-10-25 PROCEDURE — 82533 TOTAL CORTISOL: CPT | Performed by: INTERNAL MEDICINE

## 2018-10-25 RX ORDER — MECLIZINE HCL 12.5 MG 12.5 MG/1
12.5 TABLET ORAL DAILY
Status: DISCONTINUED | OUTPATIENT
Start: 2018-10-26 | End: 2018-11-09 | Stop reason: HOSPADM

## 2018-10-25 RX ADMIN — CLOTRIMAZOLE: 1 CREAM TOPICAL at 18:00

## 2018-10-25 RX ADMIN — MIRTAZAPINE 15 MG: 15 TABLET, FILM COATED ORAL at 21:09

## 2018-10-25 RX ADMIN — CALCIUM CARBONATE-VITAMIN D TAB 500 MG-200 UNIT 1 TABLET: 500-200 TAB at 09:01

## 2018-10-25 RX ADMIN — CEFUROXIME AXETIL 250 MG: 250 TABLET ORAL at 21:09

## 2018-10-25 RX ADMIN — FUROSEMIDE 20 MG: 20 TABLET ORAL at 08:59

## 2018-10-25 RX ADMIN — CLOPIDOGREL BISULFATE 75 MG: 75 TABLET ORAL at 08:59

## 2018-10-25 RX ADMIN — CEFUROXIME AXETIL 250 MG: 250 TABLET ORAL at 09:01

## 2018-10-25 RX ADMIN — FLUTICASONE PROPIONATE 1 SPRAY: 50 SPRAY, METERED NASAL at 09:04

## 2018-10-25 RX ADMIN — DIGOXIN 0.12 MG: 125 TABLET ORAL at 08:59

## 2018-10-25 RX ADMIN — ASPIRIN 81 MG CHEWABLE TABLET 81 MG: 81 TABLET CHEWABLE at 08:59

## 2018-10-25 RX ADMIN — METOPROLOL TARTRATE 12.5 MG: 25 TABLET, FILM COATED ORAL at 21:08

## 2018-10-25 RX ADMIN — VITAM B12 100 MCG: 100 TAB at 09:00

## 2018-10-25 RX ADMIN — FLUTICASONE PROPIONATE 1 SPRAY: 50 SPRAY, METERED NASAL at 19:32

## 2018-10-25 RX ADMIN — METOPROLOL TARTRATE 12.5 MG: 25 TABLET, FILM COATED ORAL at 09:00

## 2018-10-25 RX ADMIN — CLOTRIMAZOLE: 1 CREAM TOPICAL at 09:06

## 2018-10-25 NOTE — ROUTINE PROCESS
Bedside and Verbal shift change report given to agustina rn (oncoming nurse) by Rogelio Henderson (offgoing nurse). Report included the following information SBAR, Intake/Output and Recent Results.

## 2018-10-25 NOTE — PROGRESS NOTES
Late entry:  MACK met with pt, daughter Douglas Roberto and her  to complete the social evaluation. Pt lives alone  And was independent with mobility, adl's , meals and medications. Her family members live in [facility] and would come to visit pt  every 10 days. Pt was receiving care from [facility] 4 hours a day 2 days per  week an will resume at d/c. Pt's family stated that the services can be increased if needed. MACK informed pt and family of the rehab process and MD visits. Pt's goal for rehab is\" to get stronger\". MACK informed pt and family that pt has 30 days for rehab and will have a copayment of $ 167.50 per day if she stays beyond 30 days. Pt family spoke with NP re: medical concerns. MACK will follow.

## 2018-10-25 NOTE — ROUTINE PROCESS
Bedside and Verbal shift change report given to Yuli Pollack RN (oncoming nurse) by SERJIO Grove RN (offgoing nurse). Report included the following information SBAR, Kardex and MAR.

## 2018-10-25 NOTE — PROGRESS NOTES
Problem: Self Care Deficits Care Plan (Adult) Goal: *Acute Goals and Plan of Care (Insert Text) OCCUPATIONAL THERAPY SHORT TERM GOALS Initiated 10/24/2018 and to be accomplished within 2 [facility]) 1. Patient will perform Upper body ADL's without adaptive equipment with Supv-set up. 2.  Patient will perform Lower body ADL's without adaptive equipment with standby assist.. 3.  Patient will perform toileting task with standby assist with Good safety to reduce falls risk. 4.  Patient will perform toilet transfers with Rolling Walker and contact guard assist. 
5.  Patient will perform standing static/dynamic balance activities for improved ADL/IADL function with contact guard assist and Good balance and safety awareness. 6.  Patient will improve Barthel index scores to atleast 55/100 to improve functional mobility. 7.  Patient will utilize energy conservation techniques during functional activities with minimal verbal cues. OCCUPATIONAL THERAPY LONG TERM GOALS Initiated 10/24/2018 and to be accomplished within 4 Week(s) 1. Patient will perform ADL's & IADLs with adaptive equipment as needed with modified independence. 2.  Patient will perform toileting task with modified independence with Good safety to reduce falls risk. 3.  Patient will perform all functional transfers utilizing least restrictive device and durable medical equipment as needed with modified independence and Good balance and safety awareness. 4.  Patient will perform standing static/dynamic activity for improved ADL/IADL function with modified independence and Good balance and safety awareness. 5.  Patient will improve Barthel index score to 95/100 to improve independence with mobility. 6. Patient will perform home making tasks and simple meal prep Mod I utilizing energy conservation techniques and good safety awareness. Therapist: Lorena Mayer    10/24/2018 [address] [place] [address] Occupational Therapy Daily Treatment note Patient: Alexandra Hernandez (81 y.o. female)       Date: 10/25/2018 Attending Physician: Laura Reece MD 
Primary Diagnosis: hypokalemina Treatment Diagnosis Treatment Diagnosis: difficulty in walking Treatment Diagnosis 2: muscle weakness Precautions : Precautions at Admission: Fall Vital Signs: 
Vital Signs Temp: 97.1 °F (36.2 °C) Temp Source: Oral 
Pulse (Heart Rate): 98 
Resp Rate: 14 
O2 Sat (%): 90 % BP: 155/80 MAP (Calculated): 105 Cognitive Status: 
Mental Status Neurologic State: Confused Orientation Level: Oriented to person;Oriented to place Cognition: Follows commands Pain:Pain Screen Pain Scale 1: Visual 
Pain Intensity 1: 0 Patient Stated Pain Goal: 0 Pain Reassessment 1: Patient sleeping Pain Scale 1: Visual 
Transfers:Functional Transfers Sit to Stand: Contact guard assistance Stand to Sit: Contact guard assistance Balance:Balance Sitting: With support Sitting - Static: Good (unsupported) Sitting - Dynamic: Fair (occasional)(+) Standing: Without support Standing - Static: Fair(+) Standing - Dynamic : Fair Therapeutic Activities: CGA fnxl hallway mobility with no AD ~ 65 ft to increase activity tolerance and endurance, pt noted to wait to hold on to hand rails in hallway. Pt completed FM game with 2# dowel bar to increase dexterity and shoulder ROM to improve overall fnxl performance. Pt requires much encouragement for progress with therapy reporting she did a lot for herself at home. Discussed with pt's daughter and son in law about PLOF, recommend shower chair. Pt reported feeling dizzy majority of tx session 2/2 vertigo, BP taken 151/78 HR fluctuating 64-84 bpm. Returned pt to dining room area to have meeting with SW. Therapeutic Exercises: BUE ex with arm bike x 3 mins and 1# dowel bar 2 x 10 chest press and curls to increase strength and endurance for ADL carryover. Patient/Caregiver Education: Pt educated on fall prevention and safety to reduce fall risk. ASSESSMENT: 
Patient continues to demonstrate the need for skilled Occupational Therapy services to improve strength, balance, and endurance. Progression toward goals: 
[]      Improving appropriately and progressing toward goals [x]      Improving slowly and progressing toward goals 
[]      Not making progress toward goals and plan of care will be adjusted Treatment session:   50 minutes, partial PT co tx to maximize safety and fnxl gains. Therapist:    EMANUEL Jefferson,  10/25/2018

## 2018-10-25 NOTE — PROGRESS NOTES
Follow up Called [facility] rehab ([phone]). Spoke to Wendy. She updated that patient's daughter has a copy of patient's advance directives and will be providing a copy to the [facility] facility later today. I thanked her for the information. NN contact info provided Will continue to monitor

## 2018-10-25 NOTE — PROGRESS NOTES
SLP screened pt upon admission to [facility]. Chart reviewed and upon initial interview, it is deemed that a formal evaluation is not indicated. Pt A&Ox4; effective communicator. Pt's basic cognitive-linguistic function appears intact at this time. Observed pt with serial swallows of thin liquids; 0 overt distress noted. SLP available for formal evaluation if further indicated by MD. 
 
SLP educated pt on role of speech therapist in current setting with re: speech/swallow; verbalized comprehension. Clementina Kowalski M.S. CF-SLP Office: [phone]

## 2018-10-25 NOTE — ROUTINE PROCESS
Bedside and Verbal shift change report given to Junaid Landis rn (oncoming nurse) by edgard arteaga (offgoing nurse). Report included the following information Kardex, MAR and Recent Results.

## 2018-10-25 NOTE — PROGRESS NOTES
[facility] Daily progress Note Patient: Brayden Cat MRN: [id number]  CSN: [id number] YOB: 1927  Age: 80 y.o. Sex: female DOA: 10/23/2018 LOS:  LOS: 2 days Subjective:  
 
CC: nurse ask to see patient regarding c/o of dizziness Ms Tasha Youssef is  A 80 yr  Old pleasant elderly patient that was recently hospitalized between 10/20-10/23 for eval of AMS and persistent NVD and dehydration. Noted patient was tx 1 week prior to hospitalization for UTI w/ Cipro, however wasn't able to tolerate med due to N/V. She reported that she does had IBS-d, probable intolerant to Cipro. Report poor po intake. Patient had work up, she was found to have mild hyponatremia and hyperkalemia. Patient improved and was sent to [facility] to complete her Omnicef and to have bmp check for eval of K and NA level. Orders also for patient to received a dose of Kayexalate. Since admitted to facility, dietitian is still following patient, she does have poor po intake. Today patient reported that she does have dizziness, family report that she does take Antivert at home scheduled. Patient also report that its appears that's he is having flare of IBS, no report of diarrhea or constipation, report of nausea, in which Zofran is helping. Family report that she as seen by GI in past for this c/o. No report of NVD, fever or chills. PAST MEDICAL Hx: Afib, CAD, NSTEMI, HLD, Combined HF, IBS, UTI, hyponatremia, Ischemic Cardiomyopathy, Vertigo PAST SURGICAL hx : Hx of Coronary stent placement, hx of hr cath SOCIAL hx : , lives with family, never smoked, never drink FAMILY hx: DM - mother ALLERGIES. Codeine, eggs Review of Systems Constitutional:  No fever or weight loss HEENT:  No headache or visual changes Cardiovascular:  No chest pain or diap+ shortness of breath. GI:  + N no vomiting, no diarrhea no constipation. Last bm 10/24/2018 :  No hematuria or dysuria Skin:  No rashes or moles Neuro:  No seizures or syncope Hematological:  No bruising or bleeding Endocrine:  No diabetes or thyroid disease Objective:  
  
Visit Vitals /80 Pulse 98 Temp 97.1 °F (36.2 °C) Resp 14 Ht 5' 2\" (1.575 m) Wt 52 kg (114 lb 9.6 oz) SpO2 90% Breastfeeding? No  
BMI 20.96 kg/m² Physical Exam: 
General appearance: alert, cooperative, no distress, appears stated age. Thin frail HEENT: negative Neck: supple, symmetrical, trachea midline, no adenopathy, thyroid: not enlarged, symmetric, no tenderness/mass/nodules, no carotid bruit and no JVD Lungs: clear to auscultation bilaterally Heart: regular rate and rhythm, S1, S2 normal, no murmur, click, rub or gallop Abdomen: soft, non-tender. Bowel sounds normal. No masses,  no organomegaly Pulses: 2+ and symmetric Skin: Skin color, texture, turgor normal. No rashes or lesions Intake and Output: 
Current Shift:  No intake/output data recorded. Last three shifts:  No intake/output data recorded. Recent Results (from the past 24 hour(s)) METABOLIC PANEL, BASIC Collection Time: 10/25/18  2:30 AM  
Result Value Ref Range Sodium 134 (L) 136 - 145 mmol/L Potassium 4.6 3.5 - 5.5 mmol/L Chloride 99 (L) 100 - 108 mmol/L  
 CO2 28 21 - 32 mmol/L Anion gap 7 3.0 - 18 mmol/L Glucose 79 74 - 99 mg/dL BUN 12 7.0 - 18 MG/DL Creatinine 0.72 0.6 - 1.3 MG/DL  
 BUN/Creatinine ratio 17 12 - 20 GFR est AA >60 >60 ml/min/1.73m2 GFR est non-AA >60 >60 ml/min/1.73m2 Calcium 7.8 (L) 8.5 - 10.1 MG/DL CORTISOL Collection Time: 10/25/18  2:30 AM  
Result Value Ref Range Cortisol, random 18.7 3.09 - 22.40 ug/dL Current Facility-Administered Medications:  
  cefUROXime (CEFTIN) tablet 250 mg, 250 mg, Oral, Q12H, Dalia Helms MD, 250 mg at 10/25/18 0901 
  ondansetron (ZOFRAN ODT) tablet 4 mg, 4 mg, Oral, Q8H PRN, Rosaura Madrigal MD, 4 mg at 10/24/18 1801   clopidogrel (PLAVIX) tablet 75 mg, 75 mg, Oral, DAILY, Dalia Helms MD, 75 mg at 10/25/18 3046   metoprolol tartrate (LOPRESSOR) tablet 12.5 mg, 12.5 mg, Oral, Q12H, Dalia Helms MD, 12.5 mg at 10/25/18 0900   digoxin (LANOXIN) tablet 0.125 mg, 0.125 mg, Oral, DAILY, Dalia Helms MD, 0.125 mg at 10/25/18 5030   furosemide (LASIX) tablet 20 mg, 20 mg, Oral, DAILY, Dalia Helms MD, 20 mg at 10/25/18 7906   aspirin chewable tablet 81 mg, 81 mg, Oral, DAILY, Dalia Helms MD, 81 mg at 10/25/18 0859 
  meclizine (ANTIVERT) tablet 12.5 mg, 12.5 mg, Oral, DAILY PRN, Dalia Helms MD 
  fluticasone (FLONASE) 50 mcg/actuation nasal spray 1 Spray, 1 Spray, Both Nostrils, DAILY PRN, Nu Brannon MD, 1 [place] at 10/25/18 9022   polyethylene glycol (MIRALAX) packet 17 g, 17 g, Oral, DAILY, Dalia Helms MD, 17 g at 10/24/18 1026   clotrimazole (LOTRIMIN) 1 % cream, , Topical, BID, Dalia Helms MD 
  nitroglycerin (NITROSTAT) tablet 0.4 mg, 0.4 mg, SubLINGual, PRN, Nu Brannon MD 
  cyanocobalamin (VITAMIN B12) tablet 100 mcg, 100 mcg, Oral, DAILY, Dalia Helms MD, 100 mcg at 10/25/18 0900   mirtazapine (REMERON) tablet 15 mg, 15 mg, Oral, QHS, Dalia Helms MD, 15 mg at 10/24/18 2111   calcium-vitamin D (OS-JOVAN) 500 mg-200 unit tablet, 1 Tab, Oral, DAILY, Nu Brannon MD, 1 Tab at 10/25/18 7400 Lab Results Component Value Date/Time Glucose 79 10/25/2018 02:30 AM  
 Glucose 66 (L) 10/23/2018 05:50 AM  
 Glucose 76 10/22/2018 09:55 AM  
 Glucose 58 (L) 10/22/2018 06:19 AM  
 Glucose 99 10/21/2018 07:35 PM  
  
 
Assessment/Plan 1. PNA: she has po abx 2. UTI: completed abx 3. Hyperkalemia: her k has improved 4. AFIB: she is on ASA and Plavix 5. Dizziness: she states that she takes Antivert at home, will monitor urinary retention. 6.CHF : continue diuretic + BB, no signs of fluid overload 7. IBS w/d: she denies any diarrhea at this time. will restart her Metamucil 8. CAD: continue ASA 9. Hyponatremia: improving, will continue encourage increase po fluids. 10. Poor po intake, will closely monitor weight, dietitian following, continue appetite stimulant 11. [place]: continue PT/OT Labs reviewed and abnormal noted Win Robles NP 
10/25/2018, 12:26 PM

## 2018-10-25 NOTE — PROGRESS NOTES
Problem: Mobility Impaired (Adult and Pediatric) Goal: *Acute Goals and Plan of Care (Insert Text) PHYSICAL THERAPY STG GOALS : 
Initiated 10/24/2018 and to be accomplished within 1-2 Weeks 1. Patient will move from supine to sit and sit to supine  and roll side to side in bed with modified independence. 2.  Patient will transfer from bed to chair and chair to bed with supervision/set-up using LRAD. 3.  Patient will perform sit to stand with supervision/set-up with Good balance and safety awareness. 4.  Patient will ambulate with supervision/set-up for 125 feet with LRAD on level surfaces with 2 turns. 5.  Patient will ascend/descend 5 stairs with left handrail(s) with stand by assistance to allow for safe home access/exit. 6.  Patient will improve standardized test score for [place] Standing Balance Scale score of 4 to reduce fall risk. PHYSICAL THERAPY LTG GOALS : 
Initiated 10/24/2018 and to be accomplished within 3-4 Weeks 1. Patient will transfer from bed to chair and chair to bed with modified independence using LRAD. 2.  Patient will perform sit to stand with modified independence with Good balance and safety awareness. 3.  Patient will ambulate with modified independence for 250 feet with LRAD on level surfaces and be able to maneuver through narrow spaces and obstacles without loss of balance. 4.  Patient will ascend/descend 5 stairs with left handrail(s) with supervision/set-up to allow for safe home access/exit. 5.  Patient will improve standardized test score for [place] Standing Balance Scale score of 5 to reduce fall risk. Physical Therapist:   Sohail Gunn PT  on 10/24/2018 [facility]  
physical Therapy Daily Treatment note Patient: Steph Hansen (18 y.o. female)               Date: 10/25/2018 Physician: Elvia Charles MD 
Primary Diagnosis: hypokalemina          Treatment Diagnosis Treatment Diagnosis: difficulty in walking Treatment Diagnosis 2: muscle weakness Precautions: Fall Vital SignsVital Signs Temp: 97.1 °F (36.2 °C) Temp Source: Oral 
Pulse (Heart Rate): 98 
Resp Rate: 14 
O2 Sat (%): 90 % BP: 155/80 MAP (Calculated): 105 Cognitive Status:Mental Status Neurologic State: Confused Orientation Level: Oriented to person;Oriented to place Cognition: Follows commands PainPain Screen Pain Scale 1: Visual 
Pain Intensity 1: 0 Patient Stated Pain Goal: 0 Pain Reassessment 1: Patient sleeping Bed Mobility TrainingBalanceSitting: With support Sitting - Static: Good (unsupported) Sitting - Dynamic: Fair (occasional)(((+))) Standing: Without support Standing - Static: Fair((+)) Standing - Dynamic : Fair Transfer Training Transfer Training Sit to Stand: Contact guard assistance Stand to Sit: Contact guard assistance Sit to Stand: Contact guard assistance Gait TrainingGait Base of Support: Center of gravity altered Speed/Sarah: Pace decreased (<100 feet/min) Step Length: Right shortened;Left shortened Gait Abnormalities: Decreased step clearance Ambulation - Level of Assistance: Contact guard assistance Distance (ft): 65 Feet (ft) Assistive Device: Gait belt; Other (comment)(No AD ) Gait Abnormalities: Decreased step clearance With 1 turns. Therapeutic Exercise: Co-treat with OT to maximize functioinal gains with sit<>stand, standing balance and ambulatory activities. Pt completed sit<>stand from w/c with CGA. Pt reported dizziness. Pt stated \"I have vertigo and have for years\". BP: 151/78, HR:64-84 bpm, SpO2 98% (On room air). Pt had just finished UE TE's with OT and may be why systolic BP slightly elevated. Pt agreeable to gait training, but declined use of RW. Pt stated \"I don't use one at home and I don't want to start the habit\". Gait training with abovementioned details. While walking in hallway, pt attempted holding onto hand rail.  Pt stated \"Sometimes I have to hold onto things to steady myself\". Pt again declined ambulation with RW. Seated B LE TE's rendered to promote strength and endurance for improved functional mobility. Pt completed 2 sets of 10 LAQ and stated \"I'm done\". I don't thing you'll get anymore out of me today\". Therapist discussed pt's progress with pt's progress and limitations with pt's son-in-law. Per pt's son-in-law pt's did not use RW, but did some furniture walking at home. Pt is mostly alone during the day, but was more independent at home. Pt's son-in-law reported pt even walked outside to do her gardening. Therapist explained when pt is doing better and the weather is cooperative that therapist can assess how pt ambulates outdoors. Pt's son-in-law also reported that pt doesn't eat much and has IBS that effects her energy levels. Therapist advised to discuss with dietician in regards to eating. Patient/Caregiver Education:  
Pt /Caregiver Education on safety and fall prevention, and discussed safety at home with pt's Son-in-law ( see note above) was provided to reduce risk of falls. ASSESSMENT: 
Patient continues to benefit from Skilled PT services to improve bed mobility, sit<>stand, strength, balance, transfers, gait and stair negotiation. Progression toward goals: 
[]      Improving appropriately and progressing toward goals [x]      Improving slowly and progressing toward goals 
[]      Not making progress toward goals and plan of care will be adjusted Treatment session: 38 minutes. Therapist:   Lennie Guzman PTA,          10/25/2018

## 2018-10-26 LAB
BACTERIA SPEC CULT: NORMAL
BACTERIA SPEC CULT: NORMAL
SERVICE CMNT-IMP: NORMAL
SERVICE CMNT-IMP: NORMAL

## 2018-10-26 PROCEDURE — 74011250637 HC RX REV CODE- 250/637: Performed by: NURSE PRACTITIONER

## 2018-10-26 PROCEDURE — 74011250637 HC RX REV CODE- 250/637: Performed by: INTERNAL MEDICINE

## 2018-10-26 PROCEDURE — 74011000250 HC RX REV CODE- 250: Performed by: INTERNAL MEDICINE

## 2018-10-26 PROCEDURE — 74011250636 HC RX REV CODE- 250/636: Performed by: NURSE PRACTITIONER

## 2018-10-26 RX ADMIN — ASPIRIN 81 MG CHEWABLE TABLET 81 MG: 81 TABLET CHEWABLE at 08:35

## 2018-10-26 RX ADMIN — CLOPIDOGREL BISULFATE 75 MG: 75 TABLET ORAL at 08:35

## 2018-10-26 RX ADMIN — MECLIZINE 12.5 MG: 12.5 TABLET ORAL at 09:05

## 2018-10-26 RX ADMIN — METOPROLOL TARTRATE 12.5 MG: 25 TABLET, FILM COATED ORAL at 08:35

## 2018-10-26 RX ADMIN — MIRTAZAPINE 15 MG: 15 TABLET, FILM COATED ORAL at 23:05

## 2018-10-26 RX ADMIN — VITAM B12 100 MCG: 100 TAB at 08:35

## 2018-10-26 RX ADMIN — DIGOXIN 0.12 MG: 125 TABLET ORAL at 08:35

## 2018-10-26 RX ADMIN — CALCIUM CARBONATE-VITAMIN D TAB 500 MG-200 UNIT 1 TABLET: 500-200 TAB at 08:35

## 2018-10-26 RX ADMIN — CEFUROXIME AXETIL 250 MG: 250 TABLET ORAL at 23:05

## 2018-10-26 RX ADMIN — CEFUROXIME AXETIL 250 MG: 250 TABLET ORAL at 08:35

## 2018-10-26 RX ADMIN — METOPROLOL TARTRATE 12.5 MG: 25 TABLET, FILM COATED ORAL at 23:06

## 2018-10-26 RX ADMIN — FUROSEMIDE 20 MG: 20 TABLET ORAL at 08:35

## 2018-10-26 RX ADMIN — PSYLLIUM HUSK 1 PACKET: 3.4 POWDER ORAL at 09:05

## 2018-10-26 RX ADMIN — POLYETHYLENE GLYCOL 3350 17 G: 17 POWDER, FOR SOLUTION ORAL at 08:35

## 2018-10-26 NOTE — PROGRESS NOTES
Problem: Mobility Impaired (Adult and Pediatric) Goal: *Acute Goals and Plan of Care (Insert Text) PHYSICAL THERAPY STG GOALS : 
Initiated 10/24/2018 and to be accomplished within 1-2 Weeks 1. Patient will move from supine to sit and sit to supine  and roll side to side in bed with modified independence. 2.  Patient will transfer from bed to chair and chair to bed with supervision/set-up using LRAD. 3.  Patient will perform sit to stand with supervision/set-up with Good balance and safety awareness. 4.  Patient will ambulate with supervision/set-up for 125 feet with LRAD on level surfaces with 2 turns. 5.  Patient will ascend/descend 5 stairs with left handrail(s) with stand by assistance to allow for safe home access/exit. 6.  Patient will improve standardized test score for [place] Standing Balance Scale score of 4 to reduce fall risk. PHYSICAL THERAPY LTG GOALS : 
Initiated 10/24/2018 and to be accomplished within 3-4 Weeks 1. Patient will transfer from bed to chair and chair to bed with modified independence using LRAD. 2.  Patient will perform sit to stand with modified independence with Good balance and safety awareness. 3.  Patient will ambulate with modified independence for 250 feet with LRAD on level surfaces and be able to maneuver through narrow spaces and obstacles without loss of balance. 4.  Patient will ascend/descend 5 stairs with left handrail(s) with supervision/set-up to allow for safe home access/exit. 5.  Patient will improve standardized test score for [place] Standing Balance Scale score of 5 to reduce fall risk. Physical Therapist:   Navedep Moreau PT  on 10/24/2018 [facility]  
physical Therapy Daily Treatment note Patient: Brayden Cat (68 y.o. female)               Date: 10/26/2018 Physician: Jaci Alas MD 
Primary Diagnosis: hypokalemina          Treatment Diagnosis Treatment Diagnosis: difficulty in walking Treatment Diagnosis 2: muscle weakness Precautions: Fall Vital SignsVital Signs Cardiac Rhythm: Atrial fibrillation Cognitive Status:Mental Status Neurologic State: Alert;Confused Orientation Level: Oriented to person Cognition: Follows commands;Decreased attention/concentration; Impulsive;Poor safety awareness; Impaired decision making PainPain Screen Pain Scale 1: Visual 
Pain Intensity 1: 0 Patient Stated Pain Goal: 0 Pain Reassessment 1: Patient sleeping Bed Mobility TrainingBed Mobility Training Supine to Sit: Supervision Scooting: Stand-by assistance BalanceSitting: With support Sitting - Static: Good (unsupported) Sitting - Dynamic: Fair (occasional)(((+))) Standing: With support Standing - Static: Good Standing - Dynamic : Fair Transfer Training Transfer Training Sit to Stand: Stand-by assistance Stand to Sit: Stand-by assistance Sit to Stand: Stand-by assistance Gait TrainingGait Base of Support: Center of gravity altered Speed/Sarah: Slow Step Length: Right shortened;Left shortened Gait Abnormalities: Decreased step clearance Ambulation - Level of Assistance: Stand-by assistance Distance (ft): 105 Feet (ft) Assistive Device: Gait belt;Walker, rolling Rail Use: Both Stairs - Level of Assistance: Stand-by assistance Number of Stairs Trained: 5 Interventions: Safety awareness training;Verbal cues Gait Abnormalities: Decreased step clearance With 1 turns. Therapeutic Exercise: Pt presented supine in bed. Pt agreeable to OOB therapy session. Bed mobility as noted above. Pt demonstrated good unsupported seated balance at EOB. Pt with intermittent UE support with dynamic seated balance. Sit<>stand throughout session with SBA. Gait training with abovementioned details and w/c follow for safety. Pt with no report of dizziness today. Pt appeared SOB following ambulation, SpO2 96%.  Pt stated \"I always breath like this, I think it's just a habit\" Stair training provided as noted above with verbal cues for sequencing and safety. Dynamic standing balance challenged with balloon toss game. Pt initially stood with 1 UE support and progressed to no UE support with SBA. Pt with no noted LOB or sway with reaching slightly out of SAM, widened SAM noted. Patient/Caregiver Education:  
Pt /Caregiver Education on safety and fall prevention, and use of RW to minimize furniture walking for reduced risk of falls. Pt stated \"I did walk pretty good with it\". ASSESSMENT: 
Patient continues to benefit from Skilled PT services to improve sit<>stand, strength, balance, tranfers, gait and stair negotiation. Progression toward goals: 
[x]      Improving appropriately and progressing toward goals 
[]      Improving slowly and progressing toward goals 
[]      Not making progress toward goals and plan of care will be adjusted Treatment session: 52 minutes. Therapist:   Vishal Perez PTA,          10/26/2018

## 2018-10-26 NOTE — PROGRESS NOTES
The patient was offered group and 1:1 activities on October 26, 2018. The patient attended the group activity and participated in the game being played for 15 minutes of the group. The patient seemed confused while she was in attendance. The patient did not participate in any 1:1 activities. The patient will continue to be offered 1:1 and group activities and encouraged to participate.

## 2018-10-26 NOTE — ROUTINE PROCESS
Bedside and Verbal shift change report given to Ignacio Stewart RN (oncoming nurse) by Kendrick Dexter LPN  (offgoing nurse). Report included the following information SBAR, Kardex, MAR and Recent Results.

## 2018-10-26 NOTE — ROUTINE PROCESS
Bedside and Verbal shift change report given to Jodi (oncoming nurse) by Virginia Vila RN (offgoing nurse). Report included the following information SBAR, Kardex and MAR. Qh visual checks and 24h chart checks done.

## 2018-10-27 PROCEDURE — 74011250636 HC RX REV CODE- 250/636: Performed by: NURSE PRACTITIONER

## 2018-10-27 PROCEDURE — 74011250637 HC RX REV CODE- 250/637: Performed by: NURSE PRACTITIONER

## 2018-10-27 PROCEDURE — 74011250637 HC RX REV CODE- 250/637: Performed by: INTERNAL MEDICINE

## 2018-10-27 RX ADMIN — CLOTRIMAZOLE: 1 CREAM TOPICAL at 18:31

## 2018-10-27 RX ADMIN — CLOPIDOGREL BISULFATE 75 MG: 75 TABLET ORAL at 11:49

## 2018-10-27 RX ADMIN — CALCIUM CARBONATE-VITAMIN D TAB 500 MG-200 UNIT 1 TABLET: 500-200 TAB at 11:48

## 2018-10-27 RX ADMIN — METOPROLOL TARTRATE 12.5 MG: 25 TABLET, FILM COATED ORAL at 22:12

## 2018-10-27 RX ADMIN — MIRTAZAPINE 15 MG: 15 TABLET, FILM COATED ORAL at 22:12

## 2018-10-27 RX ADMIN — PSYLLIUM HUSK 1 PACKET: 3.4 POWDER ORAL at 09:00

## 2018-10-27 RX ADMIN — MECLIZINE 12.5 MG: 12.5 TABLET ORAL at 11:51

## 2018-10-27 RX ADMIN — ASPIRIN 81 MG CHEWABLE TABLET 81 MG: 81 TABLET CHEWABLE at 11:49

## 2018-10-27 RX ADMIN — DIGOXIN 0.12 MG: 125 TABLET ORAL at 11:50

## 2018-10-27 RX ADMIN — FUROSEMIDE 20 MG: 20 TABLET ORAL at 11:51

## 2018-10-27 RX ADMIN — METOPROLOL TARTRATE 12.5 MG: 25 TABLET, FILM COATED ORAL at 11:50

## 2018-10-27 RX ADMIN — VITAM B12 100 MCG: 100 TAB at 09:00

## 2018-10-27 RX ADMIN — CEFUROXIME AXETIL 250 MG: 250 TABLET ORAL at 09:00

## 2018-10-27 NOTE — ROUTINE PROCESS
Bedside and Verbal shift change report given to Ritchie thrasher (oncoming nurse) by Cj George (offgoing nurse). Report included the following information SBAR, Intake/Output and Recent Results.

## 2018-10-27 NOTE — PROGRESS NOTES
conducted a Follow up consultation and Spiritual Assessment for [address] [facility] [address], who is a 80 y.o.,female. The  provided the following Interventions: 
Continued the relationship of care and support. Listened empathically. Offered prayer and assurance of continued prayer on patients behalf. Chart reviewed. The following outcomes were achieved: 
Patient expressed gratitude for 's visit. Assessment: 
There are no spiritual or Yazidi issues which require intervention at this time. Plan: 
Chaplains will continue to follow and will provide pastoral care on an as needed/requested basis.  recommends bedside caregivers page  on duty if patient shows signs of acute spiritual or emotional distress. Demarcus Harp M.Div. , [address] [address]: [phone]/EPW: [phone]

## 2018-10-27 NOTE — PROGRESS NOTES
Bedside and Verbal shift change report given to [facility] lpn (oncoming nurse) by Dayron Arredondo (offgoing nurse). Report included the following information SBAR, Intake/Output and Recent Results.

## 2018-10-27 NOTE — ROUTINE PROCESS
Bedside and Verbal shift change report given to Jm Garnica LPN (oncoming nurse) by Ede Hunter LPN (offgoing nurse). Report included the following information SBAR, Kardex and MAR.

## 2018-10-28 PROCEDURE — 74011250636 HC RX REV CODE- 250/636: Performed by: NURSE PRACTITIONER

## 2018-10-28 PROCEDURE — 74011250637 HC RX REV CODE- 250/637: Performed by: INTERNAL MEDICINE

## 2018-10-28 PROCEDURE — 74011000250 HC RX REV CODE- 250: Performed by: INTERNAL MEDICINE

## 2018-10-28 PROCEDURE — 74011250637 HC RX REV CODE- 250/637: Performed by: NURSE PRACTITIONER

## 2018-10-28 RX ADMIN — CLOTRIMAZOLE: 1 CREAM TOPICAL at 21:40

## 2018-10-28 RX ADMIN — ASPIRIN 81 MG CHEWABLE TABLET 81 MG: 81 TABLET CHEWABLE at 11:32

## 2018-10-28 RX ADMIN — METOPROLOL TARTRATE 12.5 MG: 25 TABLET, FILM COATED ORAL at 21:40

## 2018-10-28 RX ADMIN — CLOTRIMAZOLE: 1 CREAM TOPICAL at 09:00

## 2018-10-28 RX ADMIN — CALCIUM CARBONATE-VITAMIN D TAB 500 MG-200 UNIT 1 TABLET: 500-200 TAB at 11:31

## 2018-10-28 RX ADMIN — DIGOXIN 0.12 MG: 125 TABLET ORAL at 11:31

## 2018-10-28 RX ADMIN — FUROSEMIDE 20 MG: 20 TABLET ORAL at 11:31

## 2018-10-28 RX ADMIN — PSYLLIUM HUSK 1 PACKET: 3.4 POWDER ORAL at 11:32

## 2018-10-28 RX ADMIN — CLOPIDOGREL BISULFATE 75 MG: 75 TABLET ORAL at 11:31

## 2018-10-28 RX ADMIN — VITAM B12 100 MCG: 100 TAB at 11:31

## 2018-10-28 RX ADMIN — MECLIZINE 12.5 MG: 12.5 TABLET ORAL at 11:31

## 2018-10-28 RX ADMIN — METOPROLOL TARTRATE 12.5 MG: 25 TABLET, FILM COATED ORAL at 11:33

## 2018-10-28 RX ADMIN — MIRTAZAPINE 15 MG: 15 TABLET, FILM COATED ORAL at 21:39

## 2018-10-28 RX ADMIN — POLYETHYLENE GLYCOL 3350 17 G: 17 POWDER, FOR SOLUTION ORAL at 11:32

## 2018-10-28 NOTE — PROGRESS NOTES
Problem: Mobility Impaired (Adult and Pediatric) Goal: *Acute Goals and Plan of Care (Insert Text) PHYSICAL THERAPY STG GOALS : 
Initiated 10/24/2018 and to be accomplished within 1-2 Weeks 1. Patient will move from supine to sit and sit to supine  and roll side to side in bed with modified independence. 2.  Patient will transfer from bed to chair and chair to bed with supervision/set-up using LRAD. 3.  Patient will perform sit to stand with supervision/set-up with Good balance and safety awareness. 4.  Patient will ambulate with supervision/set-up for 125 feet with LRAD on level surfaces with 2 turns. 5.  Patient will ascend/descend 5 stairs with left handrail(s) with stand by assistance to allow for safe home access/exit. 6.  Patient will improve standardized test score for [place] Standing Balance Scale score of 4 to reduce fall risk. PHYSICAL THERAPY LTG GOALS : 
Initiated 10/24/2018 and to be accomplished within 3-4 Weeks 1. Patient will transfer from bed to chair and chair to bed with modified independence using LRAD. 2.  Patient will perform sit to stand with modified independence with Good balance and safety awareness. 3.  Patient will ambulate with modified independence for 250 feet with LRAD on level surfaces and be able to maneuver through narrow spaces and obstacles without loss of balance. 4.  Patient will ascend/descend 5 stairs with left handrail(s) with supervision/set-up to allow for safe home access/exit. 5.  Patient will improve standardized test score for [place] Standing Balance Scale score of 5 to reduce fall risk. Physical Therapist:   Dang Flores PT  on 10/24/2018 Outcome: [address]   
physical Therapy Daily Treatment note Patient: Genoveva Motley (27 y.o. female)               Date: 10/28/2018 Physician: Shelley Mendoza MD 
Primary Diagnosis: hypokalemina          Treatment Diagnosis Treatment Diagnosis: difficulty in walking Treatment Diagnosis 2: muscle weakness Precautions: Fall Vital SignsVital Signs Pulse (Heart Rate): 90 
BP: 166/89 Cognitive Status:Mental Status Neurologic State: Alert;Confused Orientation Level: Oriented to person Cognition: Impaired decision making PainPain Screen Pain Scale 1: Visual 
Pain Intensity 1: 0 Patient Stated Pain Goal: 0 Bed Mobility TrainingBed Mobility Training Supine to Sit: Stand-by assistance Sit to Supine: Supervision Scooting: Stand-by assistance BalanceSitting: With support Sitting - Static: Good (unsupported) Sitting - Dynamic: Fair (occasional) Standing: With support Standing - Static: Good Standing - Dynamic : Fair Transfer Training Transfer Training Sit to Stand: Contact guard assistance Stand to Sit: Contact guard assistance Sit to Stand: Contact guard assistance Gait TrainingGait Base of Support: Center of gravity altered Speed/Nitin: Slow Step Length: Left shortened;Right shortened Gait Abnormalities: Decreased step clearance Ambulation - Level of Assistance: Stand-by assistance Distance (ft): 126 Feet (ft)(with 1-short standing rest break) Assistive Device: Gait belt;Walker, rolling Gait Abnormalities: Decreased step clearance Therapeutic Exercise:  
Pt tolerated treatment session well and amb 126ft (S) using FWW, demo slow nitin but with steady pace and no c/o SOB. Vitals after gait: /65, HR: 89 bpm, O2: 97%. Pt performed dynamic standing without B UE support during toileting tasks without LOB, requiring min VC's for safety sequencing and correct technique. Pt completed seated TE AROM BLE's 1#: ankle pumps, LAQ's, and hip marches 2x15 reps to improve strength/endurance.   
 
Patient/Caregiver Education:  
Pt /Caregiver Education on safety sequencing techniques during transfers/gait, importance of activity pacing, and benefits of exercises to improve overall LE strength and functional mobility upon d/c to home. Pt verbalized understanding. ASSESSMENT: 
Patient continues to benefit from Skilled PT services to improve strength, endurance, balance, and functional mobility upon return to home. Progression toward goals: 
[x]      Improving appropriately and progressing toward goals 
[]      Improving slowly and progressing toward goals 
[]      Not making progress toward goals and plan of care will be adjusted Treatment session: 31 minutes. Therapist:   Jovani Will PTA,          10/28/2018

## 2018-10-28 NOTE — ROUTINE PROCESS
Bedside and verbal shift report given to EMILY Etienne (oncoming nursing) by YUMIKO Elizabeth LPN (off going nurse). Report included SBAR, MAR and Kardex.

## 2018-10-28 NOTE — PROGRESS NOTES
Problem: Self Care Deficits Care Plan (Adult) Goal: *Acute Goals and Plan of Care (Insert Text) OCCUPATIONAL THERAPY SHORT TERM GOALS Initiated 10/24/2018 and to be accomplished within 2 [facility]) 1. Patient will perform Upper body ADL's without adaptive equipment with Supv-set up. 2.  Patient will perform Lower body ADL's without adaptive equipment with standby assist.. 3.  Patient will perform toileting task with standby assist with Good safety to reduce falls risk. 4.  Patient will perform toilet transfers with Rolling Walker and contact guard assist. 
5.  Patient will perform standing static/dynamic balance activities for improved ADL/IADL function with contact guard assist and Good balance and safety awareness. 6.  Patient will improve Barthel index scores to atleast 55/100 to improve functional mobility. 7.  Patient will utilize energy conservation techniques during functional activities with minimal verbal cues. OCCUPATIONAL THERAPY LONG TERM GOALS Initiated 10/24/2018 and to be accomplished within 4 Week(s) 1. Patient will perform ADL's & IADLs with adaptive equipment as needed with modified independence. 2.  Patient will perform toileting task with modified independence with Good safety to reduce falls risk. 3.  Patient will perform all functional transfers utilizing least restrictive device and durable medical equipment as needed with modified independence and Good balance and safety awareness. 4.  Patient will perform standing static/dynamic activity for improved ADL/IADL function with modified independence and Good balance and safety awareness. 5.  Patient will improve Barthel index score to 95/100 to improve independence with mobility. 6. Patient will perform home making tasks and simple meal prep Mod I utilizing energy conservation techniques and good safety awareness. Therapist: Rosalinda Bueno    10/24/2018 [address] [place] [address] Occupational Therapy Daily Treatment note Patient: Rosio Gardner (47 y.o. female)       Date: 10/28/2018 Attending Physician: Aminata Freeman MD 
Primary Diagnosis: hypokalemina Treatment Diagnosis Treatment Diagnosis: difficulty in walking Treatment Diagnosis 2: muscle weakness Precautions : Precautions at Admission: Fall Vital Signs: 
Vital Signs Cardiac Rhythm: Atrial fibrillation Cognitive Status: 
Mental Status Neurologic State: Alert;Confused Orientation Level: Oriented to person Cognition: Impaired decision making Pain:Pain Screen Pain Scale 1: Visual 
Pain Intensity 1: 0 Patient Stated Pain Goal: 0 Pain Scale 1: Visual 
Bed Mobility:Bed Mobility Supine to Sit: Stand-by assistance Sit to Supine: Minimum assistance(A with B LE's) Scooting: Stand-by assistance Transfers:Functional Transfers Sit to Stand: Contact guard assistance Stand to Sit: Contact guard assistance Balance:Balance Sitting: With support Sitting - Static: Good (unsupported) Sitting - Dynamic: Fair (occasional) Standing: With support Standing - Static: Fair Standing - Dynamic : Fair Therapeutic Activities: Pt presented supine in bed upon therapist arrival. Pt performed supine-sit SBA with increased time due to fatigue. CGA fnxl room and hallway mobility with RW ~ 70 ft to increase activity tolerance and endurance. Pt reported feeling dizzy 2/2 to vertigo, BP taken 159/79 . Graded clothespin activity with B UE's to increase bilateral coordination and pinch strength to improve (I) with clothing mgmt tasks. Therapeutic Exercises: BUE ex with arm bike x 6 mins to increase strength and endurance for ADL carryover. Patient/Caregiver Education:   
Pt educated on importance of OOB activity for increased strength and endurance. ASSESSMENT: 
Patient continues to demonstrate the need for skilled Occupational Therapy services to improve strength, balance, and endurance. Progression toward goals: 
[x]      Improving appropriately and progressing toward goals 
[]      Improving slowly and progressing toward goals 
[]      Not making progress toward goals and plan of care will be adjusted Treatment session:   41 minutes Therapist:    EMANUEL Lind,  10/28/2018

## 2018-10-28 NOTE — ROUTINE PROCESS
Bedside and Verbal shift change report given to JULIETA Perez LPN (oncoming nurse) by Ade Pearson RN (offgoing nurse). Report included the following information SBAR, Kardex and MAR. Qh visual checks and 24h chartchecks done.

## 2018-10-29 ENCOUNTER — PATIENT OUTREACH (OUTPATIENT)
Dept: INTERNAL MEDICINE CLINIC | Age: 83
End: 2018-10-29

## 2018-10-29 LAB
ANION GAP SERPL CALC-SCNC: 7 MMOL/L (ref 3–18)
BASOPHILS # BLD: 0.1 K/UL (ref 0–0.1)
BASOPHILS NFR BLD: 1 % (ref 0–2)
BUN SERPL-MCNC: 17 MG/DL (ref 7–18)
BUN/CREAT SERPL: 24 (ref 12–20)
CALCIUM SERPL-MCNC: 7.9 MG/DL (ref 8.5–10.1)
CHLORIDE SERPL-SCNC: 96 MMOL/L (ref 100–108)
CO2 SERPL-SCNC: 29 MMOL/L (ref 21–32)
CREAT SERPL-MCNC: 0.72 MG/DL (ref 0.6–1.3)
DIFFERENTIAL METHOD BLD: ABNORMAL
EOSINOPHIL # BLD: 0.6 K/UL (ref 0–0.4)
EOSINOPHIL NFR BLD: 5 % (ref 0–5)
ERYTHROCYTE [DISTWIDTH] IN BLOOD BY AUTOMATED COUNT: 16.1 % (ref 11.6–14.5)
GLUCOSE SERPL-MCNC: 69 MG/DL (ref 74–99)
HCT VFR BLD AUTO: 33.4 % (ref 35–45)
HGB BLD-MCNC: 10.7 G/DL (ref 12–16)
LYMPHOCYTES # BLD: 1.7 K/UL (ref 0.9–3.6)
LYMPHOCYTES NFR BLD: 15 % (ref 21–52)
MCH RBC QN AUTO: 26.9 PG (ref 24–34)
MCHC RBC AUTO-ENTMCNC: 32 G/DL (ref 31–37)
MCV RBC AUTO: 83.9 FL (ref 74–97)
MONOCYTES # BLD: 1.3 K/UL (ref 0.05–1.2)
MONOCYTES NFR BLD: 11 % (ref 3–10)
NEUTS SEG # BLD: 7.8 K/UL (ref 1.8–8)
NEUTS SEG NFR BLD: 68 % (ref 40–73)
PLATELET # BLD AUTO: 329 K/UL (ref 135–420)
PMV BLD AUTO: 8.5 FL (ref 9.2–11.8)
POTASSIUM SERPL-SCNC: 4.5 MMOL/L (ref 3.5–5.5)
RBC # BLD AUTO: 3.98 M/UL (ref 4.2–5.3)
SODIUM SERPL-SCNC: 132 MMOL/L (ref 136–145)
WBC # BLD AUTO: 11.3 K/UL (ref 4.6–13.2)

## 2018-10-29 PROCEDURE — 74011250637 HC RX REV CODE- 250/637: Performed by: INTERNAL MEDICINE

## 2018-10-29 PROCEDURE — 74011250637 HC RX REV CODE- 250/637: Performed by: NURSE PRACTITIONER

## 2018-10-29 PROCEDURE — 74011000250 HC RX REV CODE- 250: Performed by: INTERNAL MEDICINE

## 2018-10-29 PROCEDURE — 74011250636 HC RX REV CODE- 250/636: Performed by: NURSE PRACTITIONER

## 2018-10-29 PROCEDURE — 36415 COLL VENOUS BLD VENIPUNCTURE: CPT | Performed by: INTERNAL MEDICINE

## 2018-10-29 PROCEDURE — 85025 COMPLETE CBC W/AUTO DIFF WBC: CPT | Performed by: INTERNAL MEDICINE

## 2018-10-29 PROCEDURE — 80048 BASIC METABOLIC PNL TOTAL CA: CPT | Performed by: INTERNAL MEDICINE

## 2018-10-29 RX ADMIN — POLYETHYLENE GLYCOL 3350 17 G: 17 POWDER, FOR SOLUTION ORAL at 09:21

## 2018-10-29 RX ADMIN — ASPIRIN 81 MG CHEWABLE TABLET 81 MG: 81 TABLET CHEWABLE at 09:20

## 2018-10-29 RX ADMIN — DIGOXIN 0.12 MG: 125 TABLET ORAL at 09:20

## 2018-10-29 RX ADMIN — METOPROLOL TARTRATE 12.5 MG: 25 TABLET, FILM COATED ORAL at 09:15

## 2018-10-29 RX ADMIN — FUROSEMIDE 20 MG: 20 TABLET ORAL at 09:20

## 2018-10-29 RX ADMIN — PSYLLIUM HUSK 1 PACKET: 3.4 POWDER ORAL at 09:21

## 2018-10-29 RX ADMIN — MIRTAZAPINE 15 MG: 15 TABLET, FILM COATED ORAL at 21:16

## 2018-10-29 RX ADMIN — METOPROLOL TARTRATE 12.5 MG: 25 TABLET, FILM COATED ORAL at 21:16

## 2018-10-29 RX ADMIN — MECLIZINE 12.5 MG: 12.5 TABLET ORAL at 09:21

## 2018-10-29 RX ADMIN — CLOPIDOGREL BISULFATE 75 MG: 75 TABLET ORAL at 09:20

## 2018-10-29 RX ADMIN — CLOTRIMAZOLE: 1 CREAM TOPICAL at 18:00

## 2018-10-29 RX ADMIN — CLOTRIMAZOLE: 1 CREAM TOPICAL at 12:16

## 2018-10-29 RX ADMIN — CALCIUM CARBONATE-VITAMIN D TAB 500 MG-200 UNIT 1 TABLET: 500-200 TAB at 09:15

## 2018-10-29 RX ADMIN — VITAM B12 100 MCG: 100 TAB at 09:20

## 2018-10-29 NOTE — PROGRESS NOTES
Problem: Mobility Impaired (Adult and Pediatric) Goal: *Acute Goals and Plan of Care (Insert Text) PHYSICAL THERAPY STG GOALS : 
Initiated 10/24/2018 and to be accomplished within 1-2 Weeks 1. Patient will move from supine to sit and sit to supine  and roll side to side in bed with modified independence. 2.  Patient will transfer from bed to chair and chair to bed with supervision/set-up using LRAD. 3.  Patient will perform sit to stand with supervision/set-up with Good balance and safety awareness. 4.  Patient will ambulate with supervision/set-up for 125 feet with LRAD on level surfaces with 2 turns. 5.  Patient will ascend/descend 5 stairs with left handrail(s) with stand by assistance to allow for safe home access/exit. 6.  Patient will improve standardized test score for [place] Standing Balance Scale score of 4 to reduce fall risk. PHYSICAL THERAPY LTG GOALS : 
Initiated 10/24/2018 and to be accomplished within 3-4 Weeks 1. Patient will transfer from bed to chair and chair to bed with modified independence using LRAD. 2.  Patient will perform sit to stand with modified independence with Good balance and safety awareness. 3.  Patient will ambulate with modified independence for 250 feet with LRAD on level surfaces and be able to maneuver through narrow spaces and obstacles without loss of balance. 4.  Patient will ascend/descend 5 stairs with left handrail(s) with supervision/set-up to allow for safe home access/exit. 5.  Patient will improve standardized test score for [place] Standing Balance Scale score of 5 to reduce fall risk. Physical Therapist:   Ronald Ku, PT  on 10/24/2018 [facility]  
physical Therapy Daily Treatment note Patient: Tricia Armendariz (55 y.o. female)               Date: 10/29/2018 Physician: Marianela Leal MD 
Primary Diagnosis: hypokalemina          Treatment Diagnosis Treatment Diagnosis: difficulty in walking Treatment Diagnosis 2: muscle weakness Precautions: Fall Vital SignsVital Signs Pulse (Heart Rate): 90 Resp Rate: 18 
BP: 144/82 MAP (Calculated): 103 Cognitive Status:Mental Status Neurologic State: Alert;Confused Orientation Level: Oriented to person Cognition: Decreased attention/concentration;Poor safety awareness PainPain Screen Pain Scale 1: Visual 
Pain Intensity 1: 0 Patient Stated Pain Goal: 0 Bed Mobility TrainingBed Mobility Training Supine to Sit: Supervision Scooting: Stand-by assistance BalanceSitting: With support Sitting - Static: Good (unsupported) Sitting - Dynamic: Fair (occasional) Standing: With support Standing - Static: Good Standing - Dynamic : Fair Transfer Training Transfer Training Sit to Stand: Stand-by assistance Stand to Sit: Stand-by assistance Sit to Stand: Stand-by assistance Gait TrainingGait Base of Support: Center of gravity altered Step Length: Right shortened;Left shortened Gait Abnormalities: Decreased step clearance Ambulation - Level of Assistance: Stand-by assistance Distance (ft): 228 Feet (ft) Assistive Device: Gait belt;Walker, rolling Rail Use: Both Stairs - Level of Assistance: Stand-by assistance Number of Stairs Trained: 5 Interventions: Safety awareness training;Verbal cues Gait Abnormalities: Decreased step clearance With 3 turns. Therapeutic Exercise: Pt presented supine in bed. Pt reported needing to use the toilet. Supine>sit with (S) and ease. Pt completed sit<>stand throughout session with SBA, intermittent verbal cues for hand placement. Gait training rendered with abovementioned details and w/c follow for safety. Pt demonstrated improved step length with cues. Following prolonged seated rest break, pt negotiated steps as noted above with verbal cues for safety.  Seated B LE TE's rendered with 1# AW to promote strength and endurance for improved functional mobility: HR/TR 2x20, LAQ, hip flexion, ball squeezes, resisted hip abd x15. Pt's HR ranged between 66-78 with TE's. Pt with several rest breaks due to fatigue. Pt confessed throughout session as evidenced by continuing to ask the same question over and over and being unsure of why she was here. Patient/Caregiver Education:  
Pt /Caregiver Education on safety and fall prevention, and progress with therapy, pt continually asking if she was doing ok, was provided to maximize functional gains. ASSESSMENT: 
Patient continues to benefit from Skilled PT services to improve sit<>stand, transfers, strength, balance, gait and stair negotiation. Progression toward goals: 
[x]      Improving appropriately and progressing toward goals 
[]      Improving slowly and progressing toward goals 
[]      Not making progress toward goals and plan of care will be adjusted Treatment session: 51 minutes. Therapist:   Tadeo Lilly PTA,          10/29/2018

## 2018-10-29 NOTE — PROGRESS NOTES
I have reviewed this patient's current medication list and recent laboratory results. At this time, I do not suggest any drug therapy adjustments or additional laboratory monitoring. Thank you, 
Gucci BASSETT Ph. M. S. 
10/29/2018

## 2018-10-29 NOTE — ROUTINE PROCESS
Anne Marie Dacosta ;pn (offgoing nurse). Report included the following information Kardex, MAR and Recent Results.   
Bedside and Verbal shift change report given to Petra Ordonez (oncoming nurse) by

## 2018-10-29 NOTE — PROGRESS NOTES
Follow up Noted patient currently admitted [facility] rehab Noted patient's DNR scanned and available in Media Will continue to follow

## 2018-10-29 NOTE — PROGRESS NOTES
Problem: Self Care Deficits Care Plan (Adult) Goal: *Acute Goals and Plan of Care (Insert Text) OCCUPATIONAL THERAPY SHORT TERM GOALS Initiated 10/24/2018 and to be accomplished within 2 [facility]) 1. Patient will perform Upper body ADL's without adaptive equipment with Supv-set up. 2.  Patient will perform Lower body ADL's without adaptive equipment with standby assist.. 3.  Patient will perform toileting task with standby assist with Good safety to reduce falls risk. 4.  Patient will perform toilet transfers with Rolling Walker and contact guard assist. 
5.  Patient will perform standing static/dynamic balance activities for improved ADL/IADL function with contact guard assist and Good balance and safety awareness. 6.  Patient will improve Barthel index scores to atleast 55/100 to improve functional mobility. 7.  Patient will utilize energy conservation techniques during functional activities with minimal verbal cues. OCCUPATIONAL THERAPY LONG TERM GOALS Initiated 10/24/2018 and to be accomplished within 4 Week(s) 1. Patient will perform ADL's & IADLs with adaptive equipment as needed with modified independence. 2.  Patient will perform toileting task with modified independence with Good safety to reduce falls risk. 3.  Patient will perform all functional transfers utilizing least restrictive device and durable medical equipment as needed with modified independence and Good balance and safety awareness. 4.  Patient will perform standing static/dynamic activity for improved ADL/IADL function with modified independence and Good balance and safety awareness. 5.  Patient will improve Barthel index score to 95/100 to improve independence with mobility. 6. Patient will perform home making tasks and simple meal prep Mod I utilizing energy conservation techniques and good safety awareness. Therapist: Debbie uMrillo    10/24/2018 [address] [place] [address] Occupational Therapy Daily Treatment note Patient: Vince Fairbanks (74 y.o. female)       Date: 10/29/2018 Attending Physician: John Feliciano MD 
Primary Diagnosis: hypokalemina Treatment Diagnosis Treatment Diagnosis: difficulty in walking Treatment Diagnosis 2: muscle weakness Precautions : Precautions at Admission: Fall Vital Signs: 
Vital Signs Pulse (Heart Rate): 90 Resp Rate: 18 
BP: 144/82 MAP (Calculated): 103 Cognitive Status: 
Mental Status Neurologic State: Alert;Confused Orientation Level: Oriented to person Cognition: Decreased attention/concentration;Poor safety awareness Pain:Pain Screen Pain Scale 1: Visual 
Pain Intensity 1: 0 Patient Stated Pain Goal: 0 Pain Scale 1: Visual 
Gross Assessment:  
Coordination:  
Bed Mobility:  
Transfers:Functional Transfers Sit to Stand: Stand-by assistance Balance:Balance Sitting: With support Sitting - Static: Good (unsupported) Sitting - Dynamic: Fair (occasional) Standing: With support Standing - Static: Good Standing - Dynamic : Fair ADL Self Care: 
Basic ADL Type of Bath: Basin/Soap/Water Therapeutic Activities: 
Static standing activity with one and two hand release in order to assess safety and independence during task as well as increase standing balance and tolerance needed for ADLS. Patient was able to tolerate standing for 15 mins with SBA prior to requesting to sit. CASTELLANOS spoke with patient's daughter and son in law regarding patient's desire to return home, prior level of function and discharge planning. Patient's son in law stated they are currently deciding what to do however would like patient to be as independent as she can be. CASTELLANOS informed patient's family, she is currently progressing well and requires assistance for SBA due to occasional confusion. Patient's son in law argeed and understand. Patient/Caregiver Education:   
Richard Garcia Education on see above.    
 
ASSESSMENT: 
 Patient continues to demonstrate the need for skilled Occupational Therapy services to improve dynamic standing balance needed for simple home management Progression toward goals: 
[x]      Improving appropriately and progressing toward goals 
[]      Improving slowly and progressing toward goals 
[]      Not making progress toward goals and plan of care will be adjusted Treatment session:   55 minutes Therapist:    Benny Kilgore,  10/29/2018

## 2018-10-29 NOTE — PROGRESS NOTES
The patient was offered group and 1:1 activities but did not wish to participate in either due to having visitors. The Recreation Therapist will continue to offer the patient activities and encourage the patient to participate.

## 2018-10-30 LAB
APPEARANCE UR: CLEAR
BACTERIA URNS QL MICRO: ABNORMAL /HPF
BILIRUB UR QL: NEGATIVE
COLOR UR: YELLOW
EPITH CASTS URNS QL MICRO: ABNORMAL /LPF (ref 0–5)
GLUCOSE UR STRIP.AUTO-MCNC: NEGATIVE MG/DL
HGB UR QL STRIP: NEGATIVE
KETONES UR QL STRIP.AUTO: NEGATIVE MG/DL
LEUKOCYTE ESTERASE UR QL STRIP.AUTO: ABNORMAL
NITRITE UR QL STRIP.AUTO: NEGATIVE
PH UR STRIP: 5 [PH] (ref 5–8)
PROT UR STRIP-MCNC: NEGATIVE MG/DL
RBC #/AREA URNS HPF: 0 /HPF (ref 0–5)
SP GR UR REFRACTOMETRY: 1.01 (ref 1–1.03)
UROBILINOGEN UR QL STRIP.AUTO: 0.2 EU/DL (ref 0.2–1)
WBC URNS QL MICRO: ABNORMAL /HPF (ref 0–4)

## 2018-10-30 PROCEDURE — 74011250637 HC RX REV CODE- 250/637: Performed by: INTERNAL MEDICINE

## 2018-10-30 PROCEDURE — 74011000250 HC RX REV CODE- 250: Performed by: INTERNAL MEDICINE

## 2018-10-30 PROCEDURE — 81001 URINALYSIS AUTO W/SCOPE: CPT | Performed by: INTERNAL MEDICINE

## 2018-10-30 PROCEDURE — 74011250637 HC RX REV CODE- 250/637: Performed by: NURSE PRACTITIONER

## 2018-10-30 PROCEDURE — 87086 URINE CULTURE/COLONY COUNT: CPT | Performed by: INTERNAL MEDICINE

## 2018-10-30 PROCEDURE — 74011250636 HC RX REV CODE- 250/636: Performed by: NURSE PRACTITIONER

## 2018-10-30 RX ADMIN — DIGOXIN 0.12 MG: 125 TABLET ORAL at 09:48

## 2018-10-30 RX ADMIN — PSYLLIUM HUSK 1 PACKET: 3.4 POWDER ORAL at 09:46

## 2018-10-30 RX ADMIN — METOPROLOL TARTRATE 12.5 MG: 25 TABLET, FILM COATED ORAL at 21:26

## 2018-10-30 RX ADMIN — POLYETHYLENE GLYCOL 3350 17 G: 17 POWDER, FOR SOLUTION ORAL at 09:44

## 2018-10-30 RX ADMIN — MECLIZINE 12.5 MG: 12.5 TABLET ORAL at 09:46

## 2018-10-30 RX ADMIN — MIRTAZAPINE 15 MG: 15 TABLET, FILM COATED ORAL at 21:26

## 2018-10-30 RX ADMIN — VITAM B12 100 MCG: 100 TAB at 09:47

## 2018-10-30 RX ADMIN — CLOTRIMAZOLE: 1 CREAM TOPICAL at 18:00

## 2018-10-30 RX ADMIN — ASPIRIN 81 MG CHEWABLE TABLET 81 MG: 81 TABLET CHEWABLE at 09:46

## 2018-10-30 RX ADMIN — FUROSEMIDE 20 MG: 20 TABLET ORAL at 09:47

## 2018-10-30 RX ADMIN — CLOPIDOGREL BISULFATE 75 MG: 75 TABLET ORAL at 09:47

## 2018-10-30 RX ADMIN — CLOTRIMAZOLE: 1 CREAM TOPICAL at 09:44

## 2018-10-30 RX ADMIN — CALCIUM CARBONATE-VITAMIN D TAB 500 MG-200 UNIT 1 TABLET: 500-200 TAB at 09:48

## 2018-10-30 RX ADMIN — METOPROLOL TARTRATE 12.5 MG: 25 TABLET, FILM COATED ORAL at 09:00

## 2018-10-30 NOTE — PROGRESS NOTES
Problem: Mobility Impaired (Adult and Pediatric) Goal: *Acute Goals and Plan of Care (Insert Text) PHYSICAL THERAPY STG GOALS : 
Initiated 10/24/2018 and to be accomplished within 1-2 Weeks (Updated 10/30/18) 1. Patient will move from supine to sit and sit to supine  and roll side to side in bed with modified independence. (Progressing; (S)) 2.  Patient will transfer from bed to chair and chair to bed with supervision/set-up using LRAD. (Progressing; SBA) 3. Patient will perform sit to stand with supervision/set-up with Good balance and safety awareness. (Progressing; close (S)) 4. Patient will ambulate with supervision/set-up for 125 feet with LRAD on level surfaces with 2 turns. (Progressing; 228ft with close (S) with RW) 5.  Patient will ascend/descend 5 stairs with left handrail(s) with stand by assistance to allow for safe home access/exit. (Progressing; 5 steps with B HR and close (S)) 6. Patient will improve standardized test score for [place] Standing Balance Scale score of 4 to reduce fall risk. (Progressing; 3) PHYSICAL THERAPY LTG GOALS : 
Initiated 10/24/2018 and to be accomplished within 3-4 Weeks 1. Patient will transfer from bed to chair and chair to bed with modified independence using LRAD. 2.  Patient will perform sit to stand with modified independence with Good balance and safety awareness. 3.  Patient will ambulate with modified independence for 250 feet with LRAD on level surfaces and be able to maneuver through narrow spaces and obstacles without loss of balance. 4.  Patient will ascend/descend 5 stairs with left handrail(s) with supervision/set-up to allow for safe home access/exit. 5.  Patient will improve standardized test score for [place] Standing Balance Scale score of 5 to reduce fall risk. Physical Therapist:   Belkys Spears PT  on 10/24/2018 [address] [facility] PHYSICAL THERAPY WEEKLY PROGRESS REPORT 
 Reporting Period:  Date:  10/24/18  to 10/30/18 Patient: Ingrid Parikh (84 y.o. female)                         Date: 10/30/2018 Primary Diagnosis: hypokalemina Attending Physician: Justin Avila MD 
 Treatment Diagnosis Treatment Diagnosis: difficulty in walking Treatment Diagnosis 2: muscle weakness Precautions:  Fall Rehab Potential : Fair: 
 
Skill interventions and education provided with clinical rationale (include individualized treatment techniques and standardized tests):  
Skilled Physical Therapy services were provided with TA to promote Mod (I) with bed mobility and transfers,  
TE to build strength and endurance for improved functional mobility Gait training to address deficits and allow pt to return to PLOF Neuromuscular reeducation of movement, coordination and balance for reduced risk of falls Stair training to safely return home upon DC Using a comparative statement, summarize significant progress toward goals as a result of skilled intervention provided: 
Patient has made Good progress towards their Physical Therapy goals in the areas of bed mobility, sit<>stand, transfers, gait and stair negotiation. Identify remaining functional areas, impairments limiting progress and/or barriers to improvement: 
Patient would benefit from continues PT services to address the following functional deficits in dynamic balance. Pt is progressing well with therapy but is limited by decreased endurance and times of confusion. OBJECTIVE DATA SUMMARY:  
 
INITIAL ASSESSMENT WEEKLY ASSESSMENT  
COGNITIVE STATUS COGNITIVE STATUS Neurologic State: Alert Orientation Level: Oriented to situation, Oriented to place, Oriented to person Cognition: Follows commands Perception: Appears intact Perseveration: No perseveration noted Safety/Judgement: Fall preventionNeurologic State: Alert, Confused Orientation Level: Oriented to person Cognition: Decreased attention/concentration, Impaired decision making, Impulsive, Poor safety awareness, Memory loss Perception: Appears intact Perseveration: No perseveration noted Safety/Judgement: Fall prevention PAIN PAIN Pain Scale 1: Numeric (0 - 10) Pain Intensity 1: 0 Pain Onset 1: sudden 
Pain Location 1: Back Pain Orientation 1: Posterior Pain Description 1: Aching Pain Intervention(s) 1: Back rub Patient Stated Pain Goal: 0 Pain Reassessment 1: YesPain Scale 1: Visual 
Pain Intensity 1: 0 Pain Onset 1: chronic Pain Location 1: Back Pain Orientation 1: Posterior Pain Description 1: Aching Pain Intervention(s) 1: Repositioned, Therapeutic touch Patient Stated Pain Goal: 0 Pain Reassessment 1: Patient sleeping GROSS ASSESSMENT GROSS ASSESSMENT  
AROM: Within functional limits(BUEs) Strength: (BUEs 3+/5) Coordination: Within functional limits(BUEs) Tone: Normal(BUEs) Sensation: Intact(BUEs)AROM: Generally decreased, functional 
Strength: Generally decreased, functional(BLEs grossly 4-/5) Coordination: Generally decreased, functional 
Tone: Normal 
Sensation: Intact(BUEs) BED MOBILITY BED MOBILITY Supine to Sit: Supervision Sit to Supine: Supervision Scooting: Contact guard assistance Supine to Sit: Supervision Sit to Supine: Supervision Scooting: Stand-by assistance GAIT GAIT Base of Support: Center of gravity altered Speed/Sarah: Pace decreased (<100 feet/min) Step Length: Left shortened, Right shortened Gait Abnormalities: Decreased step clearance Ambulation - Level of Assistance: Contact guard assistance Distance (ft): 58 Feet (ft) Assistive Device: Gait belt(no AD) Rail Use: Both Stairs - Level of Assistance: Stand-by assistance Number of Stairs Trained: 5 Interventions: Safety awareness training, Verbal cues Base of Support: Center of gravity altered Speed/Sarah: Slow Step Length: Right shortened, Left shortened Gait Abnormalities: Decreased step clearance Ambulation - Level of Assistance: (close (S)) Distance (ft): 149 Feet (ft)(+80ft) Assistive Device: Gait belt, Walker, rolling Rail Use: Both Stairs - Level of Assistance: (close (S)) Number of Stairs Trained: 5 Interventions: Safety awareness training Exceptions to WDLExceptions to [facility] TRANSFERS TRANSFERS Sit to Stand: Contact guard assistance Stand to Sit: Contact guard assistance Sit to Stand: Stand-by assistance Stand to Sit: Stand-by assistance BALANCE BALANCE Sitting: Intact Sitting - Static: Good (unsupported) Sitting - Dynamic: Fair (occasional) Standing: With support Standing - Static: Good Standing - Dynamic : Fair Sitting: Without support Sitting - Static: Good (unsupported) Sitting - Dynamic: Fair (occasional) Standing: With support Standing - Static: Good Standing - Dynamic : Fair WHEELCHAIR MOBILITY/MGMT WHEELCHAIR MOBILITY/MGMT Activity Tolerance:  Fair Activity Tolerance: Fair Visual/Perceptual  
Corrective Lenses: Glasses Visual/Perceptual  
Vision Corrective Lenses: Glasses Auditory: Auditory Impairment: Hard of hearing, bilateral  
 Auditory: Auditory Auditory Impairment: Hard of hearing, bilateral  
  
 Steps: both Clinical Decision making:  [place] standing balance score: 3Clinical Decision making:  [place] standing balance score:3 Treatment:   Pt presented sitting in w/c at bedside. Pt requested assistance with ambulating to toilet. Pt ambulated ~10ft with RW to include doorway negotiation. Pt stood with close (S) for all self care. Pt stood at sink with no UE support and SBA for washing hands with no noted LOB. Gait training rendered with trails of 80ft and 142ft with RW and close (S). Pt required one seated rest break due to fatigue. Pt negotiated 5 steps with B HR and close (S), verbal cues for safety.  Pt more confused throughout session today as evidenced by pt asking therapist if they were \"christopher\". Pt also repeating same questions over and over. Seated B LE TE's with 1# AW to promote strength and endurance for improved functional mobility: LAQ, hip flexion, ball squeezes, resisted hip abd with orange band, HR/TR x15. Pt required extensive verbal and tactile cues for proper technique and to stay on task for TE's. Therapist informed NSG of pt's increased confusion. Patient's response to treatment rendered:  [place] Patient expected Discharge Location:  [x]Private Residence  [] INGRID/ILF  []LTC  []Other: 
 
Plan: Continue Skilled PT services as established by the Plan of Care for 3-6 times a week. PT and Assistant have had a weekly case conference regarding the above treatment:  [x] Yes     [] No    
 
Treatment session:  53 minutes. Therapist: Jacklyn Iglesias, PTA       Date:10/30/2018 Forward to PT for co-signature when completed.

## 2018-10-30 NOTE — ROUTINE PROCESS
Bedside and Verbal shift change report given to Bogdan Haile (oncoming nurse) by Swapna Chadwick RN (offgoing nurse). Report included the following information SBAR, Kardex and MAR. Qh visual checks and 24h chart checks done

## 2018-10-30 NOTE — PROGRESS NOTES
The patient participated in 2 group activities. The patient participated in a social group activity of discussing the news and an active group of playing [place] tennis. The patient was alert but seemed to be confused at times.

## 2018-10-30 NOTE — ROUTINE PROCESS
Bedside and Verbal shift change report given to Petra Ordonez (oncoming nurse) by Glenroy Alvarado (offgoing nurse). Report included the following information SBAR, Kardex, MAR and Recent Results.

## 2018-10-30 NOTE — PROGRESS NOTES
Pt's family approach this nurse to notify that they notify pt has an increased of confusion, and requested an urine analysis NP notified, received verbal order for U/A C&S, may straight cath.

## 2018-10-30 NOTE — PROGRESS NOTES
Nutrition follow-up/Plan of care tcc RECOMMENDATIONS:  
1. Regular Diet (liberalized to increase PO intake) 2. SF CIB TID 3. Monitor weight, labs and PO intake 4. RD to follow GOALS:  
1. PO intake meets >75% of protein/calorie needs by 11/6 
2. Weight Maintenance (+/- 1-2 lb) by 11/6 ASSESSMENT: 
Wt status is classified as normal per BMI of 19.3. However Pt at nutrition risk d/t BMI <23 and age >65 years. Poor PO intake, but slowly improving. SF CIB TID for additional calories/protein. Labs noted. Pt w/ hyponatremia and hypocalcemia. Nutrition recommendations listed. RD to follow. Nutrition Risk:  [] High  [x] Moderate []  Low SUBJECTIVE/OBJECTIVE: 
(10/21):  Pt admitted for hyponatremia. PMHx including CHF, Afib, CAD and IBS-d. Noted Pt w/ N/V/D and poor PO intake x 3 days. Pt seen in room with family at bedside. Denies having any problems chewing/swallowing and unsure of weight loss. Per documented weight records weight is stable. Stated she has an allergy to eggs. Reports normally having a good appetite but family stated poor x 3-4 days now. Stated she consumes strawberry CIB at home. Consumed ~25% of lunch per family but didn't like the soup at dinner. Provided a menu to assist with food choice selection and obtained some preferences as well. Liberalized diet to increase intake and ordered SF CIB TID for additional calories/protein. Encouraged intake and will monitor.   
 
(10/24): Pt transferred from [address] [place] [address],[address] on 10/23/2018. Pt seen in dining room this morning with dietary assisting with menu selection choices. PO intake improving over past couple of days per vitals (%). Last BM (10/23). Pt seen in room after lunch; observed only 40% of meal consumed. Reports she just doesn't have much of an appetite. Stated she is consuming her supplements. Encouraged intake and involvement with menu selections. Will continue to monitor. (10/30): Pt seen in room OOB in chair after lunch; seems more confused today. Pt stated: \"Well Srini Benitez said I have so much money I can have the TV on all of the time. \" Appetite appears to be slowly improving, but still variable; observed 35-40% of meal consumed. Noted 8.4 lb or 7.4% loss from admission weight recorded, but stable per documented weight record PTA. Encouraged intake and will monitor. Information Obtained from:  
 [x] Chart Review [x] Patient [x] Family/Caregiver 
 [] Nurse/Physician 
 [] Interdisciplinary Meeting/Rounds Diet: Regular Diet Medications: [x] Reviewed Plavix, Lopressor, Remeron Allergies: [x] Reviewed (Eggs) Patient Active Problem List  
Diagnosis Code  SOB (shortness of breath) R06.02  
 NSTEMI (non-ST elevated myocardial infarction) (HCC) I21.4  CAD (coronary artery disease) I25.10  
 Ischemic cardiomyopathy I25.5  Arthritis, degenerative M19.90  Vertigo R42  
 HLD (hyperlipidemia) E78.5  Chronic a-fib ([facility]) I48.2  Irritable bowel syndrome with diarrhea K58.0  Chronic fatigue R53.82  
 Hyponatremia E87.1  Acute metabolic encephalopathy H43.05  
 Hyperkalemia E87.5 Past Medical History:  
Diagnosis Date  [facility]  Arthritis, degenerative  CAD (coronary artery disease) 04/2012 S/P 2.75 X 15 mm BMS of OM (04/2012), Two LAD BMS (07/2012)  Elevated liver enzymes Likely from statin  HLD (hyperlipidemia)  Hyperkalemia 06/2012 Likely from lisinopril  Ischemic cardiomyopathy LVEF  45% (11/2012) & 30% echo (04/2012)  NSTEMI (non-ST elevated myocardial infarction) ([facility] 04/2012  Vertigo Labs:   
Lab Results Component Value Date/Time  Sodium 132 (L) 10/29/2018 05:40 AM  
 Potassium 4.5 10/29/2018 05:40 AM  
 Chloride 96 (L) 10/29/2018 05:40 AM  
 CO2 29 10/29/2018 05:40 AM  
 Anion gap 7 10/29/2018 05:40 AM  
 Glucose 69 (L) 10/29/2018 05:40 AM  
 BUN 17 10/29/2018 05:40 AM  
 Creatinine 0.72 10/29/2018 05:40 AM  
 Calcium 7.9 (L) 10/29/2018 05:40 AM  
 Magnesium 1.7 (L) 04/24/2012 05:00 AM  
 Phosphorus 2.2 (L) 04/24/2012 05:00 AM  
 Albumin 2.5 (L) 10/20/2018 05:50 PM  
 
Anthropometrics: BMI (calculated): 19.3 Last 3 Recorded Weights in this Encounter 10/23/18 1903 10/30/18 1554 Weight: 52 kg (114 lb 9.6 oz) 47.9 kg (105 lb 9.6 oz) Ht Readings from Last 1 Encounters:  
10/23/18 5' 2\" (1.575 m) Weight Metrics 10/30/2018 10/21/2018 10/20/2018 10/9/2018 9/24/2018 4/30/2018 10/31/2017 Weight 105 lb 9.6 oz 110 lb - 103 lb 6.4 oz 104 lb 6.4 oz 100 lb 103 lb BMI 19.31 kg/m2 - 20.12 kg/m2 18.91 kg/m2 19.1 kg/m2 18.29 kg/m2 18.54 kg/m2 Patient Vitals for the past 100 hrs: 
 % Diet Eaten 10/26/18 2032 100 % UBW: 104-110 lb [x] Weight Loss (8.4 lb or 7.4% from admission weight)? ? 
[] Weight Gain 
[x] Weight Stable per weight records PTA Estimated Nutrition Needs: [x] MSJ  [] Other: 
Calories: 4033-1921 kcal Based on:   [x] Actual BW   
Protein:   60-65g Based on:   [x] Actual BW Fluid:       9812-2706 ml Based on:   [x] Actual BW  
 
Nutrition Problems Identified:  
[x] Suboptimal PO intake (improving) [x] Food Allergies (Eggs) [] Difficulty chewing/swallowing/poor dentition 
[] Constipation/Diarrhea  
[] Nausea/Vomiting  
[] None 
[] Other:  
 
Plan:  
[x] Therapeutic Diet [x]  Obtained/adjusted food preferences/tolerances and/or snacks options [x]  Continue supplements added  
[] Occupational therapy following for feeding techniques []  HS snack added  
[]  Modify diet texture  
[x]  Modify diet for food allergies []  Educate patient  
[]  Assist with menu selection  
[x]  Monitor PO intake on meal rounds  
[x]  Continue inpatient monitoring and intervention  
[x]  Participated in discharge planning/Interdisciplinary rounds/Team meetings  
[]  Other:  
 
Education Needs: 
 [] Not appropriate for teaching at this time due to: [x] Identified and addressed Nutrition Monitoring and Evaluation: 
[x] Continue ongoing monitoring and intervention 
[] Georgette Ron

## 2018-10-30 NOTE — PROGRESS NOTES
Problem: Self Care Deficits Care Plan (Adult) Goal: *Acute Goals and Plan of Care (Insert Text) OCCUPATIONAL THERAPY SHORT TERM GOALS Initiated 10/30/2018 and to be accomplished within 2 [facility]) 1. Patient will perform Upper body ADL's without adaptive equipment with MOD I. 
2.  Patient will perform Lower body ADL's without adaptive equipment with standby assist. 
3.  Patient will perform toileting task with standby assist with Good safety to reduce falls risk. 4.  Patient will perform toilet transfers with Rolling Walker and contact guard assist. 
5.  Patient will perform standing static/dynamic balance activities for improved ADL/IADL function with SBA and Good balance and safety awareness. 6.  Patient will improve Barthel index scores to atleast 55/100 to improve functional mobility. 7.  Patient will utilize energy conservation techniques during functional activities with minimal verbal cues. OCCUPATIONAL THERAPY SHORT TERM GOALS  UPDATED 10/30/18 Initiated 10/24/2018 and to be accomplished within 2 [facility]) 1. Patient will perform Upper body ADL's without adaptive equipment with Supv-set up. SUPERVISION GM UPG MOD I 
2. Patient will perform Lower body ADL's without adaptive equipment with standby assist. SBA,  UPG SUPERVISION 3. Patient will perform toileting task with standby assist with Good safety to reduce falls risk. SBA,  UPG SUPERVISION 4. Patient will perform toilet transfers with Rolling Walker and contact guard assist. SBA,  UPG SUPERVISION 5. Patient will perform standing static/dynamic balance activities for improved ADL/IADL function with contact guard assist and Good balance and safety awareness. CGA WITH FAIR BALANCE, UPG TO SBA WITH G BALANCE 6. Patient will improve Barthel index scores to atleast 55/100 to improve functional mobility. 7.  Patient will utilize energy conservation techniques during functional activities with minimal verbal cues.  PROGRESSING 
 
 OCCUPATIONAL THERAPY LONG TERM GOALS Initiated 10/24/2018 and to be accomplished within 4 Week(s) 1. Patient will perform ADL's & IADLs with adaptive equipment as needed with modified independence. 2.  Patient will perform toileting task with modified independence with Good safety to reduce falls risk. 3.  Patient will perform all functional transfers utilizing least restrictive device and durable medical equipment as needed with modified independence and Good balance and safety awareness. 4.  Patient will perform standing static/dynamic activity for improved ADL/IADL function with modified independence and Good balance and safety awareness. 5.  Patient will improve Barthel index score to 95/100 to improve independence with mobility. 6. Patient will perform home making tasks and simple meal prep Mod I utilizing energy conservation techniques and good safety awareness. Therapist: Kassie Newman    10/24/2018 [facility] ([facility]3] 
OCCUPATIONAL THERAPY WEEKLY SUMMARY Reporting period:  from 10/24/18 through 10/30/18 Patient: Jose Angel Huerta (64 y.o. female)   Date: 10/30/2018 Primary Diagnosis: hypokalemina Attending Physician: Audelia Chappell MD Treatment Diagnosis Treatment Diagnosis: difficulty in walking Treatment Diagnosis 2: muscle weakness Precautions: Fall Rehab Potential : Good Skill interventions and education provided with clinical rationale (include individualized treatment techniques and standardized tests): 
Skilled Occupational services were provided utilizing ADL retraining, instruction on adaptive equipment training, safety awareness and energy conservation techniques incorporating balance retraining & UE ROM techniques, therapeutic exercise and activities incorporating strengthening in order to improve ADL performance skills and functional mobility.  
 
Using a comparative statement, summarize significant progress toward goals as a result of skilled intervention provided: 
Patient has made Good progress towards their Occupational Therapy goals in the following areas: grooming, bathing, upper body dressing, lower body dressing, toileting and toilet transfer. Continue OT skilled services in order for patient to reach maximal functional potential towards goals for safe d/c home. Identify remaining functional areas, impairments limiting progress and/or barriers to improvement: 
Patient would benefit from continued skilled Occupational Therapy Services to address the following functional deficits in balance, coordination, safety awareness, strength and functional activity tolerance, which is inhibiting independence with ADL performance skills and functional mobility. OBJECTIVE DATA SUMMARY:  
 
 
INITIAL ASSESSMENT WEEKLY PROGRESS  
COGNITIVE STATUS: COGNITIVE STATUS:  
Neurologic State: Alert Orientation Level: Oriented to situation, Oriented to place, Oriented to person Cognition: Follows commands Perception: Appears intact Perseveration: No perseveration noted Safety/Judgement: Fall prevention Neurologic State: Alert, Confused Orientation Level: Oriented to person Cognition: Decreased attention/concentration, Impaired decision making, Impulsive, Poor safety awareness, Memory loss Perception: Appears intact Perseveration: No perseveration noted Safety/Judgement: Fall prevention PAIN: PAIN:  
Pain Scale 1: Numeric (0 - 10) Pain Intensity 1: 0 Pain Onset 1: sudden 
Pain Location 1: Back Pain Orientation 1: Posterior Pain Description 1: Aching Pain Intervention(s) 1: Back rub Patient Stated Pain Goal: 0 Pain Reassessment 1: Yes 
 
 Pain Scale 1: Visual 
Pain Intensity 1: 0 Pain Onset 1: chronic Pain Location 1: Back Pain Orientation 1: Posterior Pain Description 1: Aching Pain Intervention(s) 1: Repositioned, Therapeutic touch Patient Stated Pain Goal: 0 Pain Reassessment 1: Patient sleeping BED MOBILITY BED MOBILITY Supine to Sit: Supervision Sit to Supine: Supervision Scooting: Contact guard assistance Supine to Sit: Supervision Sit to Supine: Supervision Scooting: Stand-by assistance ADL SELF CARE ADL SELF CARE Type of Bath: Basin/Soap/Water Upper Body Dressing: Supervision Lower Body Dressing: Stand-by assistance Toileting: Stand by assistance Bathing Assistance: Stand-by assistance Dressing Assistance: Stand-by assistance Bathing Assistance: Contact guard assistance Toileting Assistance: Contact guard assistance Bladder Hygiene: Contact guard assistance Bowel Hygiene: Contact guard assistance Clothing Management: Contact guard assistance Adaptive Equipment: Philippe Mcardle Grooming Assistance: Supervision/set up Dressing Assistance: Contact guard assistance Feeding Assistance: Supervision/set-up TRANSFERS TRANSFERS Sit to Stand: Contact guard assistance Stand to Sit: Contact guard assistance Toilet Transfer : Minimum assistance(BSC transfer and toilet transfer) Sit to Stand: Stand-by assistance Stand to Sit: Stand-by assistance Toilet Transfer : Stand-by assistance Toilet Transfer : Minimum assistance(BSC transfer and toilet transfer) Adaptive Equipment: Philippe Mcardle (comment) Toilet Transfer : Stand-by assistance Adaptive Equipment: Philippe Mcardle (comment) BALANCE BALANCE Sitting: Intact Sitting - Static: Good (unsupported) Sitting - Dynamic: Fair (occasional) Standing: With support Standing - Static: Good Standing - Dynamic : Fair Sitting: With support Sitting - Static: Good (unsupported) Sitting - Dynamic: Fair (occasional) Standing: With support Standing - Static: Good Standing - Dynamic : Fair GROSS ASSESSMENT GROSS ASSESSMENT 
AROM: Within functional limits(BUEs) Strength: (BUEs 3+/5) Coordination: Within functional limits(BUEs) Tone: Normal(BUEs) Sensation: Intact(BUEs) AROM: Generally decreased, functional 
 Strength: Generally decreased, functional(BLEs grossly 4-/5) Coordination: Generally decreased, functional 
Tone: Normal 
Sensation: Intact(BUEs) COORDINATION COORDINATION Fine Motor Skills-Upper: Left Intact, Right Intact Gross Motor Skills-Upper: Left Intact, Right Intact Fine Motor Skills-Upper: Left Intact, Right Intact Gross Motor Skills-Upper: Left Intact, Right Intact VISUAL/PERCEPTUALVISUAL/PERCEPTUAL Corrective Lenses: Glasses   Corrective Lenses: Glasses AUDITORY: AUDITORY:  
Auditory Impairment: Hard of hearing, bilateral Auditory Impairment: Hard of hearing, bilateral  
   
INSTRUMENTAL  ADL'S: 
  INSTRUMENTAL ADL'S: 
 NT  
 
THE BARTHEL INDEXACTIVITY 
 SCORE FEEDING 
0=unable 5=needs help cutting,spreading butter,etc., or modified diet 10= independent 10 BATHING 
0=dependent 5=independent (or in shower  
0  
GROOMING 
0=needs help 5=independent face/hair/teeth/shaving (implements provided) 5 DRESSING 
0=dependent 5=needs help but can do about half unaided 10=independent(including buttons, zips,laces etc.) 10 BOWELS 
0=incontinent 5=occasional accident 10=continent 10 BLADDER 
0=incontinent, or catheterized and unable to manage alone 5=occasional accident 10=continent 10 TOILET USE 
0=dependent 5=needs some help, but can do something alone 10=independent (on and off, dressing, wiping) 10 TRANSFER (BED TO CHAIR AND BACK) 0=unable, no sitting balance 5=major help(one or two people,physical), can sit 10=minor help(verbal or physical) 15=independent 10 MOBILITY (ON LEVEL SURFACES) 0=immobile or <50 yards 5=wheelchair independent,including corners,>50 yards 10=walkes with help of one person (verbal or physical) >50 yards 15=independent(but may use any aid; for example, stick) >50 yards 10 STAIRS 
0=unable 5=needs help (verbal, physical, carrying aid) 10=independent  
5  
   
     TOTAL:                  80/100 Treatment:  SBA fnxl room and bathroom mobility with RW requiring min cueing for proper safety. BUE ex with arm cycle x 7 mins with 3 rest breaks and 2# dowel bar 3 x 10 chest press, rows, and curls to increase strength and endurance. Pt performed UB dressing Supervision sitting in chair, LB dressing SBA with leg cross tech, and SBA with toileting routine utilizing grab bar. Pt with slight confusion this date requiring vc's to increased safety. Patient's response to Treatment rendered:  Good Patient expected Discharge Location:  [x]Private Residence  [] halfway/ILF  []LTC  []Other: 
 
Plan: Continue OT services as established on the Plan of Care for 3-6 days Treatment Minutes:  47 
OT and Assistant have had a weekly case conference regarding the above treatment:  [x] Yes     [] No   
Therapist:   EMANUEL Santana,         Date:10/30/2018 Forward to OT for co-signature when completed

## 2018-10-30 NOTE — ROUTINE PROCESS
Bedside and Verbal shift change report given to Jessica Matthew RN (oncoming nurse) by Timothy Self LPN  (offgoing nurse). Report included the following information SBAR, Kardex, MAR and Recent Results.

## 2018-10-31 PROCEDURE — 74011250637 HC RX REV CODE- 250/637: Performed by: INTERNAL MEDICINE

## 2018-10-31 PROCEDURE — 74011250636 HC RX REV CODE- 250/636: Performed by: NURSE PRACTITIONER

## 2018-10-31 PROCEDURE — 74011000250 HC RX REV CODE- 250: Performed by: INTERNAL MEDICINE

## 2018-10-31 PROCEDURE — 74011250637 HC RX REV CODE- 250/637: Performed by: NURSE PRACTITIONER

## 2018-10-31 RX ORDER — FUROSEMIDE 20 MG/1
20 TABLET ORAL
Status: DISCONTINUED | OUTPATIENT
Start: 2018-10-31 | End: 2018-11-07

## 2018-10-31 RX ADMIN — METOPROLOL TARTRATE 12.5 MG: 25 TABLET, FILM COATED ORAL at 10:02

## 2018-10-31 RX ADMIN — METOPROLOL TARTRATE 12.5 MG: 25 TABLET, FILM COATED ORAL at 22:06

## 2018-10-31 RX ADMIN — CLOTRIMAZOLE: 1 CREAM TOPICAL at 18:00

## 2018-10-31 RX ADMIN — DIGOXIN 0.12 MG: 125 TABLET ORAL at 10:02

## 2018-10-31 RX ADMIN — FUROSEMIDE 20 MG: 20 TABLET ORAL at 10:02

## 2018-10-31 RX ADMIN — CALCIUM CARBONATE-VITAMIN D TAB 500 MG-200 UNIT 1 TABLET: 500-200 TAB at 10:02

## 2018-10-31 RX ADMIN — MECLIZINE 12.5 MG: 12.5 TABLET ORAL at 10:02

## 2018-10-31 RX ADMIN — FLUTICASONE PROPIONATE 1 SPRAY: 50 SPRAY, METERED NASAL at 19:35

## 2018-10-31 RX ADMIN — CLOPIDOGREL BISULFATE 75 MG: 75 TABLET ORAL at 10:02

## 2018-10-31 RX ADMIN — MIRTAZAPINE 15 MG: 15 TABLET, FILM COATED ORAL at 22:07

## 2018-10-31 RX ADMIN — VITAM B12 100 MCG: 100 TAB at 10:02

## 2018-10-31 RX ADMIN — ASPIRIN 81 MG CHEWABLE TABLET 81 MG: 81 TABLET CHEWABLE at 10:02

## 2018-10-31 NOTE — PROGRESS NOTES
Problem: Self Care Deficits Care Plan (Adult) Goal: *Acute Goals and Plan of Care (Insert Text) OCCUPATIONAL THERAPY SHORT TERM GOALS Initiated 10/30/2018 and to be accomplished within 2 [facility]) 1. Patient will perform Upper body ADL's without adaptive equipment with MOD I. 
2.  Patient will perform Lower body ADL's without adaptive equipment with standby assist. 
3.  Patient will perform toileting task with standby assist with Good safety to reduce falls risk. 4.  Patient will perform toilet transfers with Rolling Walker and contact guard assist. 
5.  Patient will perform standing static/dynamic balance activities for improved ADL/IADL function with SBA and Good balance and safety awareness. 6.  Patient will improve Barthel index scores to atleast 90/100 to improve functional mobility. 7.  Patient will utilize energy conservation techniques during functional activities with minimal verbal cues. OCCUPATIONAL THERAPY SHORT TERM GOALS  UPDATED 10/30/18 Initiated 10/24/2018 and to be accomplished within 2 [facility]) 1. Patient will perform Upper body ADL's without adaptive equipment with Supv-set up. SUPERVISION GM UPG MOD I 
2. Patient will perform Lower body ADL's without adaptive equipment with standby assist. SBA,  UPG SUPERVISION 3. Patient will perform toileting task with standby assist with Good safety to reduce falls risk. SBA,  UPG SUPERVISION 4. Patient will perform toilet transfers with Rolling Walker and contact guard assist. SBA, GM UPG SUPERVISION 5. Patient will perform standing static/dynamic balance activities for improved ADL/IADL function with contact guard assist and Good balance and safety awareness. CGA WITH FAIR BALANCE, UPG TO SBA WITH G BALANCE 6. Patient will improve Barthel index scores to atleast 55/100 to improve functional mobility. 80/100 GM UPG 90/100 
7.   Patient will utilize energy conservation techniques during functional activities with minimal verbal cues. PROGRESSING 
 
OCCUPATIONAL THERAPY LONG TERM GOALS Initiated 10/24/2018 and to be accomplished within 4 Week(s) 1. Patient will perform ADL's & IADLs with adaptive equipment as needed with modified independence. 2.  Patient will perform toileting task with modified independence with Good safety to reduce falls risk. 3.  Patient will perform all functional transfers utilizing least restrictive device and durable medical equipment as needed with modified independence and Good balance and safety awareness. 4.  Patient will perform standing static/dynamic activity for improved ADL/IADL function with modified independence and Good balance and safety awareness. 5.  Patient will improve Barthel index score to 95/100 to improve independence with mobility. 6. Patient will perform home making tasks and simple meal prep Mod I utilizing energy conservation techniques and good safety awareness. Therapist: Burgess Torres    10/24/2018 [address] [place] [address] Occupational Therapy Daily Treatment note Patient: Carlyn Shaw (83 y.o. female)       Date: 10/31/2018 Attending Physician: Jensen Willis MD 
Primary Diagnosis: hypokalemina Treatment Diagnosis Treatment Diagnosis: difficulty in walking Treatment Diagnosis 2: muscle weakness Precautions : Precautions at Admission: Fall Vital Signs: 
Vital Signs Temp: 97.3 °F (36.3 °C) Pulse (Heart Rate): 83 Resp Rate: 18 
O2 Sat (%): 93 % BP: 158/78 MAP (Calculated): 105 Cognitive Status: 
Mental Status Neurologic State: Alert;Confused Orientation Level: Oriented to person Cognition: Decreased attention/concentration Pain:Pain Screen Pain Scale 1: Numeric (0 - 10) Pain Intensity 1: 0 Patient Stated Pain Goal: 0 Pain Reassessment 1: Patient sleeping Pain Scale 1: Numeric (0 - 10) Transfers:Functional Transfers Sit to Stand: Stand-by assistance Stand to Sit: Stand-by assistance Toilet Transfer : Stand-by assistance Shower Transfer: Contact guard assistance Functional Transfers Toilet Transfer : Stand-by assistance Shower Transfer: Contact guard assistance Balance:Balance Sitting: Without support Sitting - Static: Good (unsupported) Sitting - Dynamic: Fair (occasional) Standing: With support Standing - Static: Good Standing - Dynamic : Fair ADL Self Care: 
Basic ADL Type of Bath: Shower Toileting: Stand by assistance(with grab and elevated seat) ADL Intervention:Upper Body Bathing Bathing Assistance: Modified independent Position Performed: Seated in chair Adaptive Equipment: Shower chair Upper Body Dressing Assistance Dressing Assistance: Modified independent Pullover Shirt: Modified independent Lower Body Bathing Bathing Assistance: Stand-by assistance Perineal  : Stand-by assistance Position Performed: Standing Cues: Verbal cues provided Adaptive Equipment: Grab bar Lower Body : Stand-by assistance Position Performed: Bending forward method Cues: Verbal cues provided Adaptive Equipment: Shower chair Lower Body Dressing Assistance Dressing Assistance: Minimum assistance Protective [place]: Stand-by assistance Socks: Minimum assistance(2/2 b/l feet edema) Leg Crossed Method Used: No 
Position Performed: Seated in chair;Standing Cues: Verbal cues provided Adaptive Equipment Used: Grab bar Therapeutic Activities: 
Pt seen for early morning ADL's @ shower room, see above for level of A. CASTELLANOS recommends shower chair with support when d/c to home for increased (I) and safety. SBA fnxl room and bathroom mobility with RW to increase activity tolerance and endurance. Pt performed toileting routine SBA with grab bar, pt reports there are times she does not know when she has to urinate. Pt requires increased time with all ADL tasks due to fatigue. Patient/Caregiver Education:   
Pt educated on EC/WS tech with LB ADL's to achieve optimal gains. ASSESSMENT: 
Patient continues to demonstrate the need for skilled Occupational Therapy services to improve strength, balance, and endurance. Progression toward goals: 
[x]      Improving appropriately and progressing toward goals 
[]      Improving slowly and progressing toward goals 
[]      Not making progress toward goals and plan of care will be adjusted Treatment session:   61 minutes Therapist:    EMANUEL Jung,  10/31/2018

## 2018-10-31 NOTE — ROUTINE PROCESS
Bedside and Verbal shift change report given to Dariel Connors RN (oncoming nurse) by Everett Pereira LPN  (offgoing nurse). Report included the following information SBAR, Kardex, MAR and Recent Results.

## 2018-10-31 NOTE — PROGRESS NOTES
Problem: Mobility Impaired (Adult and Pediatric) Goal: *Acute Goals and Plan of Care (Insert Text) PHYSICAL THERAPY STG GOALS : 
Initiated 10/24/2018 and to be accomplished within 1-2 Weeks (Updated 10/30/18) 1. Patient will move from supine to sit and sit to supine  and roll side to side in bed with modified independence. (Progressing; (S)) 2.  Patient will transfer from bed to chair and chair to bed with supervision/set-up using LRAD. (Progressing; SBA) 3. Patient will perform sit to stand with supervision/set-up with Good balance and safety awareness. (Progressing; close (S)) 4. Patient will ambulate with supervision/set-up for 125 feet with LRAD on level surfaces with 2 turns. (Progressing; 228ft with close (S) with RW) 5.  Patient will ascend/descend 5 stairs with left handrail(s) with stand by assistance to allow for safe home access/exit. (Progressing; 5 steps with B HR and close (S)) 6. Patient will improve standardized test score for [place] Standing Balance Scale score of 4 to reduce fall risk. (Progressing; 3) PHYSICAL THERAPY LTG GOALS : 
Initiated 10/24/2018 and to be accomplished within 3-4 Weeks 1. Patient will transfer from bed to chair and chair to bed with modified independence using LRAD. 2.  Patient will perform sit to stand with modified independence with Good balance and safety awareness. 3.  Patient will ambulate with modified independence for 250 feet with LRAD on level surfaces and be able to maneuver through narrow spaces and obstacles without loss of balance. 4.  Patient will ascend/descend 5 stairs with left handrail(s) with supervision/set-up to allow for safe home access/exit. 5.  Patient will improve standardized test score for [place] Standing Balance Scale score of 5 to reduce fall risk. Physical Therapist:   Deland Duane, PT  on 10/24/2018 [facility]  
physical Therapy Daily Treatment note Patient: Kailyn Babcock (89 y.o. female)               Date: 10/31/2018 Physician: King Rosalinda MD 
Primary Diagnosis: hypokalemina          Treatment Diagnosis Treatment Diagnosis: difficulty in walking Treatment Diagnosis 2: muscle weakness Precautions: Fall Vital SignsVital Signs Temp: 97.3 °F (36.3 °C) Pulse (Heart Rate): 83 Resp Rate: 18 
O2 Sat (%): 93 % BP: 158/78 MAP (Calculated): 105 Cognitive Status:Mental Status Neurologic State: Alert;Confused Orientation Level: Oriented to person Cognition: Decreased attention/concentration PainPain Screen Pain Scale 1: Numeric (0 - 10) Pain Intensity 1: 0 Patient Stated Pain Goal: 0 Bed Mobility TrainingBalanceSitting: Without support Sitting - Static: Good (unsupported) Sitting - Dynamic: Fair (occasional)(((+))) Standing: With support Standing - Static: Good Standing - Dynamic : Fair Transfer Training Transfer Training Sit to Stand: Stand-by assistance Stand to Sit: Stand-by assistance Sit to Stand: Stand-by assistance Gait TrainingGait Base of Support: Center of gravity altered Speed/Sarah: Slow Step Length: Right shortened;Left shortened Gait Abnormalities: Decreased step clearance Ambulation - Level of Assistance: Stand-by assistance Distance (ft): 128 Feet (ft) Assistive Device: Gait belt;Walker, rolling Gait Abnormalities: Decreased step clearance With 3 turns. Therapeutic Exercise: Pt presented sitting at EOB with [place] present. Pt demonstrated good unsupported sitting balance. Pt sat EOB for ~5-6' with intermittent use of 1 UE, but no noted LOB. Sit<>stand throughout session with SBA. GAit training with trails of 125ft, 20ft and 22ft with w/c follow for safety and abovementioned details. Pt confused throughout session and required some redirection to stay on tasks. Pt continually asking if various people/pets had passed away.  Static standing balance with 1 UE support and SBA. Pt stood for 1x5', x13'20\" with no noted LOB or sway. Pt required assistance with toileting. Pt ambulated to bathroom ~20ft with RW and SBA. Pt able to complete all self care including doff/donning clothing with SBA. Seated B LE TE's rendered with 1# AW to promote strength and endurance for improved functional mobility: LAQ x20, hip flexion, resisted hip abd (0range band), ball squeezes, HR/TR x15. Pt requested to go back to bed. SPT with SBA. Sit>supine with SBA. Pt supine in bed with call bell at side. Patient/Caregiver Education:  
Pt /Caregiver Education on safety and fall prevention, and calling for assistance before attempting to get up. Pt verbalized understanding, though pt confused throughout session, was provided to reduce risk of falls. ASSESSMENT: 
Patient continues to benefit from Skilled PT services to improve sit<>stand, transfers, strength, balance, gait and stair negotiation. Progression toward goals: 
[]      Improving appropriately and progressing toward goals [x]      Improving slowly and progressing toward goals 
[]      Not making progress toward goals and plan of care will be adjusted Treatment session: 60 minutes. Therapist:   Patricia Calderon PTA,          10/31/2018

## 2018-10-31 NOTE — PROGRESS NOTES
conducted a Follow up consultation and Spiritual Assessment for [address], who is a 80 y.o.,female. The  provided the following Interventions: 
Continued the relationship of care and support during visit in room 3102. Listened empathically as patient shared about history of family and friends. Offered prayer and assurance of continued prayer on patients behalf. The following outcomes were achieved: 
Patient expressed gratitude for pastoral care visit. Assessment: 
There are no further spiritual or Scientologist issues which require Spiritual Care Services interventions at this time. Plan: 
Chaplains will continue to follow and will provide pastoral care on an as needed/requested basis.  recommends bedside caregivers page  on duty if patient shows signs of acute spiritual or emotional distress. Cece Poseyin Extern [facility] Spiritual Care 
[phone]

## 2018-10-31 NOTE — PROGRESS NOTES
[facility] Daily progress Note Patient: Carlyn Shaw MRN: [id number]  CSN: [id number] YOB: 1927  Age: 80 y.o. Sex: female DOA: 10/23/2018 LOS:  LOS: 8 days Subjective:  
 
CC: Nurse ask to see regarding period of confusion Patient is been seen for eval, nursing and family report patient is having episode of confusion. I examined patient today, she states that she is doing ok. No report of fever or chills. She had UA and C&S. Her UA lks negative and C&S is pending. Her NA level was 132, family report that patient takes lasix as needed at home it does seem like she needs it daily. Patient report that her diarrhea has improved. No report of fever or chills. PAST MEDICAL Hx: Afib, CAD, NSTEMI, HLD, Combined HF, IBS, UTI, hyponatremia, Ischemic Cardiomyopathy, Vertigo PAST SURGICAL hx : Hx of Coronary stent placement, hx of hr cath SOCIAL hx : , lives with family, never smoked, never drink FAMILY hx: DM - mother ALLERGIES. Codeine, eggs 
  
Review of Systems Constitutional:  No fever or weight loss HEENT:  No headache or visual changes Cardiovascular:  No chest pain or diap+ shortness of breath. GI:  + N no vomiting, no diarrhea no constipation. Last bm 10/24/2018 :  No hematuria or dysuria Skin:  No rashes or moles Neuro:  No seizures or syncope Hematological:  No bruising or bleeding Endocrine:  No diabetes or thyroid disease 
  
 
 
 
 
Objective:  
  
Visit Vitals /78 Pulse 83 Temp 97.3 °F (36.3 °C) Resp 18 Ht 5' 2\" (1.575 m) Wt 47.9 kg (105 lb 9.6 oz) SpO2 93% Breastfeeding? No  
BMI 19.31 kg/m² Physical Exam: 
General appearance: alert, cooperative, no distress, appears stated age HEENT: negative Neck: supple, symmetrical, trachea midline, no adenopathy, thyroid: not enlarged, symmetric, no tenderness/mass/nodules, no carotid bruit and no JVD Lungs: clear to auscultation bilaterally Heart: regular rate and rhythm, S1, S2 normal, no murmur, click, rub or gallop Abdomen: soft, non-tender. Bowel sounds normal. No masses,  no organomegaly Pulses: 2+ and symmetric Skin: Skin color, texture, turgor normal. No rashes or lesions Extremities: edema to BL ankles Intake and Output: 
Current Shift:  No intake/output data recorded. Last three shifts:  No intake/output data recorded. Recent Results (from the past 24 hour(s)) URINALYSIS W/MICROSCOPIC Collection Time: 10/30/18  7:45 PM  
Result Value Ref Range Color YELLOW Appearance CLEAR Specific gravity 1.013 1.005 - 1.030    
 pH (UA) 5.0 5.0 - 8.0 Protein NEGATIVE  NEG mg/dL Glucose NEGATIVE  NEG mg/dL Ketone NEGATIVE  NEG mg/dL Bilirubin NEGATIVE  NEG Blood NEGATIVE  NEG Urobilinogen 0.2 0.2 - 1.0 EU/dL Nitrites NEGATIVE  NEG Leukocyte Esterase MODERATE (A) NEG    
 WBC 8 to 12 0 - 4 /hpf  
 RBC 0 0 - 5 /hpf Epithelial cells 1+ 0 - 5 /lpf Bacteria FEW (A) NEG /hpf  
 
 
 
Current Facility-Administered Medications:  
  furosemide (LASIX) tablet 20 mg, 20 mg, Oral, DAILY PRN, Dereckon Handing, NP 
  [facility] ANESTHESIA, , Other, PRN, Holly Fung MD 
  [facility] EMERGENCY/STAT BOX, , Other, PRN, Holly Fung MD 
  meclizine (ANTIVERT) tablet 12.5 mg, 12.5 mg, Oral, DAILY, Padmaja Lynn NP, 12.5 mg at 10/31/18 1002   psyllium husk-aspartame (METAMUCIL FIBER) packet 1 Packet, 1 Packet, Oral, DAILY, Dereckon Handing, NP, 1 Packet at 10/30/18 0453   ondansetron (ZOFRAN ODT) tablet 4 mg, 4 mg, Oral, Q8H PRN, Holly Fung MD, 4 mg at 10/24/18 1806   clopidogrel (PLAVIX) tablet 75 mg, 75 mg, Oral, DAILY, Dalia Helms MD, 75 mg at 10/31/18 1002   metoprolol tartrate (LOPRESSOR) tablet 12.5 mg, 12.5 mg, Oral, Q12H, Dalia Helms MD, 12.5 mg at 10/31/18 1002   digoxin (LANOXIN) tablet 0.125 mg, 0.125 mg, Oral, DAILY, Analia, Merrill Kilpatrick MD, 0.125 mg at 10/31/18 1002   aspirin chewable tablet 81 mg, 81 mg, Oral, DAILY, Dalia Helms MD, 81 mg at 10/31/18 1002   fluticasone (FLONASE) 50 mcg/actuation nasal spray 1 Spray, 1 Spray, Both Nostrils, DAILY PRN, Imelda Nguyen MD, 1 [place] at 10/25/18 1932   polyethylene glycol (MIRALAX) packet 17 g, 17 g, Oral, DAILY, Dalia Helms MD, 17 g at 10/30/18 3619   clotrimazole (LOTRIMIN) 1 % cream, , Topical, BID, Dalia Helms MD 
  nitroglycerin (NITROSTAT) tablet 0.4 mg, 0.4 mg, SubLINGual, PRN, Imelda Nguyen MD 
  cyanocobalamin (VITAMIN B12) tablet 100 mcg, 100 mcg, Oral, DAILY, Dalia Helms MD, 100 mcg at 10/31/18 1002   mirtazapine (REMERON) tablet 15 mg, 15 mg, Oral, QHS, Dalia Helms MD, 15 mg at 10/30/18 2126   calcium-vitamin D (OS-JOVAN) 500 mg-200 unit tablet, 1 Tab, Oral, DAILY, Imelda Nguyen MD, 1 Tab at 10/31/18 1002 Lab Results Component Value Date/Time Glucose 69 (L) 10/29/2018 05:40 AM  
 Glucose 79 10/25/2018 02:30 AM  
 Glucose 66 (L) 10/23/2018 05:50 AM  
 Glucose 76 10/22/2018 09:55 AM  
 Glucose 58 (L) 10/22/2018 06:19 AM  
  
 
Assessment/Plan 1. AMS: stable, she doesn't have any fever or chills. Her UA lks negative so far, pending C&S 2. Dizziness: stable, improved her patient 2. CHF :BB, no signs of fluid overload, will change her lasix to as needed and monitor. Will order christine hose to apply on q AM and off q pm. 
4. IBS w/d: stable 5. CAD: continue ASA 6. Hyponatremia: improving, will continue encourage increase po fluids. [place]o: continue PT/OT Patti Gerber, NP10/31/2018, 10:58 AM

## 2018-11-01 ENCOUNTER — PATIENT OUTREACH (OUTPATIENT)
Dept: INTERNAL MEDICINE CLINIC | Age: 83
End: 2018-11-01

## 2018-11-01 LAB
BACTERIA SPEC CULT: NORMAL
SERVICE CMNT-IMP: NORMAL

## 2018-11-01 PROCEDURE — 74011250637 HC RX REV CODE- 250/637: Performed by: INTERNAL MEDICINE

## 2018-11-01 PROCEDURE — 74011000250 HC RX REV CODE- 250: Performed by: INTERNAL MEDICINE

## 2018-11-01 PROCEDURE — 74011250636 HC RX REV CODE- 250/636: Performed by: NURSE PRACTITIONER

## 2018-11-01 PROCEDURE — 74011250637 HC RX REV CODE- 250/637: Performed by: NURSE PRACTITIONER

## 2018-11-01 RX ADMIN — METOPROLOL TARTRATE 12.5 MG: 25 TABLET, FILM COATED ORAL at 21:14

## 2018-11-01 RX ADMIN — METOPROLOL TARTRATE 12.5 MG: 25 TABLET, FILM COATED ORAL at 09:49

## 2018-11-01 RX ADMIN — MECLIZINE 12.5 MG: 12.5 TABLET ORAL at 09:49

## 2018-11-01 RX ADMIN — PSYLLIUM HUSK 1 PACKET: 3.4 POWDER ORAL at 09:50

## 2018-11-01 RX ADMIN — CLOPIDOGREL BISULFATE 75 MG: 75 TABLET ORAL at 09:49

## 2018-11-01 RX ADMIN — CALCIUM CARBONATE-VITAMIN D TAB 500 MG-200 UNIT 1 TABLET: 500-200 TAB at 09:50

## 2018-11-01 RX ADMIN — MIRTAZAPINE 15 MG: 15 TABLET, FILM COATED ORAL at 21:14

## 2018-11-01 RX ADMIN — CLOTRIMAZOLE: 1 CREAM TOPICAL at 21:18

## 2018-11-01 RX ADMIN — DIGOXIN 0.12 MG: 125 TABLET ORAL at 09:50

## 2018-11-01 RX ADMIN — ASPIRIN 81 MG CHEWABLE TABLET 81 MG: 81 TABLET CHEWABLE at 09:50

## 2018-11-01 RX ADMIN — VITAM B12 100 MCG: 100 TAB at 09:49

## 2018-11-01 RX ADMIN — POLYETHYLENE GLYCOL 3350 17 G: 17 POWDER, FOR SOLUTION ORAL at 09:49

## 2018-11-01 RX ADMIN — CLOTRIMAZOLE: 1 CREAM TOPICAL at 09:51

## 2018-11-01 NOTE — ROUTINE PROCESS
Bedside and Verbal shift change report given to Garett Kauffman (oncoming nurse) by Yesica Yoo LPN (offgoing nurse). Report included the following information SBAR, Kardex, Intake/Output and Recent Results.

## 2018-11-01 NOTE — PROGRESS NOTES
The patient did not participate in group activities due to having a pet visit with her dog. The family brought her dog in and the patient expressed that it made her very happy to see her pet.

## 2018-11-01 NOTE — PROGRESS NOTES
Problem: Mobility Impaired (Adult and Pediatric) Goal: *Acute Goals and Plan of Care (Insert Text) PHYSICAL THERAPY STG GOALS : 
Initiated 10/24/2018 and to be accomplished within 1-2 Weeks (Updated 10/30/18) 1. Patient will move from supine to sit and sit to supine  and roll side to side in bed with modified independence. (Progressing; (S)) 2.  Patient will transfer from bed to chair and chair to bed with supervision/set-up using LRAD. (Progressing; SBA) 3. Patient will perform sit to stand with supervision/set-up with Good balance and safety awareness. (Progressing; close (S)) 4. Patient will ambulate with supervision/set-up for 125 feet with LRAD on level surfaces with 2 turns. (Progressing; 228ft with close (S) with RW) 5.  Patient will ascend/descend 5 stairs with left handrail(s) with stand by assistance to allow for safe home access/exit. (Progressing; 5 steps with B HR and close (S)) 6. Patient will improve standardized test score for [place] Standing Balance Scale score of 4 to reduce fall risk. (Progressing; 3) PHYSICAL THERAPY LTG GOALS : 
Initiated 10/24/2018 and to be accomplished within 3-4 Weeks 1. Patient will transfer from bed to chair and chair to bed with modified independence using LRAD. 2.  Patient will perform sit to stand with modified independence with Good balance and safety awareness. 3.  Patient will ambulate with modified independence for 250 feet with LRAD on level surfaces and be able to maneuver through narrow spaces and obstacles without loss of balance. 4.  Patient will ascend/descend 5 stairs with left handrail(s) with supervision/set-up to allow for safe home access/exit. 5.  Patient will improve standardized test score for [place] Standing Balance Scale score of 5 to reduce fall risk. Physical Therapist:   Pillo Hansen PT  [facility]  
physical Therapy Daily Treatment note Patient: Juliana Alonzo (48 y.o. female)               Date: 11/1/2018 Physician: Darby Ruff MD 
Primary Diagnosis: hypokalemina          Treatment Diagnosis Treatment Diagnosis: difficulty in walking Treatment Diagnosis 2: muscle weakness Precautions: Fall Vital SignsVital Signs Temp: 97.4 °F (36.3 °C) Pulse (Heart Rate): 94 
Resp Rate: 16 
O2 Sat (%): 93 % BP: 141/68 MAP (Calculated): 92 
Cognitive Status:Mental Status Neurologic State: Alert Orientation Level: Oriented to person Cognition: Decreased command following PainPain Screen Pain Scale 1: Numeric (0 - 10) Pain Intensity 1: 0 Patient Stated Pain Goal: 0 Bed Mobility TrainingBalanceSitting: With support Sitting - Static: Good (unsupported) Sitting - Dynamic: Fair (occasional)(((+))) Standing: With support Standing - Static: Good Standing - Dynamic : Fair Transfer Training Transfer Training Sit to Stand: Stand-by assistance Stand to Sit: Stand-by assistance Sit to Stand: Stand-by assistance Gait TrainingGait Base of Support: Center of gravity altered Speed/Sarah: Slow Step Length: Right shortened;Left shortened Gait Abnormalities: Decreased step clearance Ambulation - Level of Assistance: Stand-by assistance Distance (ft): 128 Feet (ft)(+100ft) Assistive Device: Gait belt;Walker, rolling Gait Abnormalities: Decreased step clearance With 2 turns. Therapeutic Exercise: Pt presented sitting in w/c at bedside, pt's daughter and son-in-law present. Pt ambulated ~100ft to conference room for family/pt care meeting. Pt care meeting with pt, pt's daughter (Chayo Smith), son-in-law, SW, DN and OT. Therapist reported on pt's progress with PT and continued limitations. Due to pt's level of confusion and intermittent need for cues, therapist recommended 24 hour (S) if pt to return home upon DC. Pt's family considering assisted living or respite care at [facility].  Additional gait training rendered with abovementioned details and w/c follow for safety. Seated B LE TE's rendered with 2.5# AW to promote strength and endurance for improved functional mobility: HR/TR, LAQ, hip flexion x15. Pt positioned in w/c at bedside with B LE's elevated on foot stool due to B LE edema. Patient/Caregiver Education:  
Pt /Caregiver Education on safety and fall prevention, progress with therapy and level of assistance needed (see note above) was provided in preparation for DC.  
 
ASSESSMENT: 
Patient continues to benefit from Skilled PT services to improve balance, strength, gait and stair negotiation. Progression toward goals: 
[]      Improving appropriately and progressing toward goals [x]      Improving slowly and progressing toward goals 
[]      Not making progress toward goals and plan of care will be adjusted Treatment session: 61 minutes. Therapist:   Chalino Marlow PTA,          11/1/2018

## 2018-11-01 NOTE — ROUTINE PROCESS
Bedside and Verbal shift change report given to Anitha Barbosa LPN (oncoming nurse) by Kendrick Dexter LPN  (offgoing nurse). Report included the following information SBAR, Kardex, MAR and Recent Results.

## 2018-11-01 NOTE — PROGRESS NOTES
Follow up 
  
  
  
Noted patient currently still admitted [facility] rehab. Reviewed notes. Will continue to follow

## 2018-11-01 NOTE — ROUTINE PROCESS
Bedside and Verbal shift change report given to Garett Kauffman (oncoming nurse) by Nayely Floyd LPN (offgoing nurse). Report included the following information SBAR, Kardex, MAR and Recent Results.

## 2018-11-01 NOTE — PROGRESS NOTES
Late entry: family conference was held today with pt, daughter and son-in-law with SW ,PTA, OT and DON to discuss pt's progress and d/c needs. Pt has progressed with therapy but will need 24 hour supervision at home. Pt's son-in-law informed MACK that he spoke with [place] at [address] [place] [address] re: respite since pt doesn't have anyone to stay with her 24/7 and hiring assistance will be too costly. Pt's family was given a d/c date of 11/9/18. Pt's son-in-law informed MACK that they have an appt with [facility] will meet them here before going to the facility to tour. MACK requested that pt's family get back with SW on Monday re: pt's d/c disposition to allow time for history and physical to be completed if pt will go to [address] [place] [address].

## 2018-11-01 NOTE — ROUTINE PROCESS
Bedside and Verbal shift change report given to Petra Ordonez (oncoming nurse) by William Mireles LPN (offgoing nurse). Report included the following information SBAR, Kardex, MAR and Recent Results.

## 2018-11-01 NOTE — PROGRESS NOTES
Problem: Self Care Deficits Care Plan (Adult) Goal: *Acute Goals and Plan of Care (Insert Text) OCCUPATIONAL THERAPY SHORT TERM GOALS Initiated 10/30/2018 and to be accomplished within 2 [facility]) 1. Patient will perform Upper body ADL's without adaptive equipment with MOD I. 
2.  Patient will perform Lower body ADL's without adaptive equipment with standby assist. 
3.  Patient will perform toileting task with standby assist with Good safety to reduce falls risk. 4.  Patient will perform toilet transfers with Rolling Walker and contact guard assist. 
5.  Patient will perform standing static/dynamic balance activities for improved ADL/IADL function with SBA and Good balance and safety awareness. 6.  Patient will improve Barthel index scores to atleast 90/100 to improve functional mobility. 7.  Patient will utilize energy conservation techniques during functional activities with minimal verbal cues. OCCUPATIONAL THERAPY SHORT TERM GOALS  UPDATED 10/30/18 Initiated 10/24/2018 and to be accomplished within 2 [facility]) 1. Patient will perform Upper body ADL's without adaptive equipment with Supv-set up. SUPERVISION GM UPG MOD I 
2. Patient will perform Lower body ADL's without adaptive equipment with standby assist. SBA,  UPG SUPERVISION 3. Patient will perform toileting task with standby assist with Good safety to reduce falls risk. SBA,  UPG SUPERVISION 4. Patient will perform toilet transfers with Rolling Walker and contact guard assist. SBA, GM UPG SUPERVISION 5. Patient will perform standing static/dynamic balance activities for improved ADL/IADL function with contact guard assist and Good balance and safety awareness. CGA WITH FAIR BALANCE, UPG TO SBA WITH G BALANCE 6. Patient will improve Barthel index scores to atleast 55/100 to improve functional mobility. 80/100 GM UPG 90/100 
7.   Patient will utilize energy conservation techniques during functional activities with minimal verbal cues. PROGRESSING 
 
OCCUPATIONAL THERAPY LONG TERM GOALS Initiated 10/24/2018 and to be accomplished within 4 Week(s) 1. Patient will perform ADL's & IADLs with adaptive equipment as needed with modified independence. 2.  Patient will perform toileting task with modified independence with Good safety to reduce falls risk. 3.  Patient will perform all functional transfers utilizing least restrictive device and durable medical equipment as needed with modified independence and Good balance and safety awareness. 4.  Patient will perform standing static/dynamic activity for improved ADL/IADL function with modified independence and Good balance and safety awareness. 5.  Patient will improve Barthel index score to 95/100 to improve independence with mobility. 6. Patient will perform home making tasks and simple meal prep Mod I utilizing energy conservation techniques and good safety awareness. Therapist: Rosalinda Bueno    10/24/2018 [address] [place] [address] Occupational Therapy Daily Treatment note Patient: Janice Dominguez (51 y.o. female)       Date: 11/2/2018 Attending Physician: Mily Tamayo MD 
Primary Diagnosis: hypokalemina Treatment Diagnosis Treatment Diagnosis: difficulty in walking Treatment Diagnosis 2: muscle weakness Precautions : Precautions at Admission: Fall Vital Signs: 
Vital Signs Temp: 97.5 °F (36.4 °C) Pulse (Heart Rate): (!) 50 Resp Rate: 18 
O2 Sat (%): 95 % BP: 124/75 MAP (Calculated): 91  
Cognitive Status: 
  
Pain:  
  
Gross Assessment:  
Coordination:  
Bed Mobility:Bed Mobility Supine to Sit: Supervision Scooting: Supervision Transfers:Functional Transfers Sit to Stand: (close (S) ) Stand to Sit: (close (S) ) Balance:Balance Sitting: Without support Sitting - Static: Good (unsupported) Sitting - Dynamic: Fair (occasional)(((+))) Standing: With support Standing - Static: Good Standing - Dynamic : Fair(((+))) ADL Self Care: ADL Intervention: Second tx session: Patient noted with improvement with toilet transfer & bathroom mobility w/ RW requiring 1 verbal cue to keep RW in front of self while in stance at sink to wash hands for balance and fall prevention, daughter/Eli present and reports that patient visiting [facility] and liked it with potential plan to d/c to [facility]. Therapeutic Activities: First tx session: attended Care plan meeting with patient and her daughter/Eli and son-in-law Chuy Pimentel, physical therapy assistant, MACK & DON. Therapist provided current level of assist w/ ADLs & functional transfers. Recommendation made for 24 hour supervision and assist e.g. ADLs, IADLs, cooking, cleaning & functional transfers for safe discharge. Family reports DME available: [facility], [facility], [employer], walk in shower with grab bars. Recommendation of DME for safe d/c home: shower chair w/ back. Patient agreeable to participate in simulated medicine mgmt using weekly pill organizer box and simple microwave meal prep in upcoming OT tx sessions to assess for patient's independence or make recommendation for assist w/ IADLs for safe d/c home. Patient/Caregiver Education:   
Richard Chou Education on recommendation for shower chair with back for safe d/c home with 24 hour supervision with assist e.g. ADLs, IADLs, meal prep and functional transfers. ASSESSMENT: 
Patient continues to demonstrate the need for skilled Occupational Therapy services to improve grooming, bathing, upper body dressing, lower body dressing, toileting, toilet transfer, shower transfer and simple home management Progression toward goals: 
[x]      Improving appropriately and progressing toward goals 
[]      Improving slowly and progressing toward goals 
[]      Not making progress toward goals and plan of care will be adjusted Treatment session:  62 minutes Therapist:    Ignacio Fuller,  11/2/2018

## 2018-11-02 PROCEDURE — 74011250637 HC RX REV CODE- 250/637: Performed by: INTERNAL MEDICINE

## 2018-11-02 PROCEDURE — 74011250637 HC RX REV CODE- 250/637: Performed by: NURSE PRACTITIONER

## 2018-11-02 PROCEDURE — 74011000250 HC RX REV CODE- 250: Performed by: INTERNAL MEDICINE

## 2018-11-02 PROCEDURE — 74011250636 HC RX REV CODE- 250/636: Performed by: NURSE PRACTITIONER

## 2018-11-02 RX ADMIN — MECLIZINE 12.5 MG: 12.5 TABLET ORAL at 08:54

## 2018-11-02 RX ADMIN — CALCIUM CARBONATE-VITAMIN D TAB 500 MG-200 UNIT 1 TABLET: 500-200 TAB at 08:51

## 2018-11-02 RX ADMIN — PSYLLIUM HUSK 1 PACKET: 3.4 POWDER ORAL at 08:59

## 2018-11-02 RX ADMIN — CLOPIDOGREL BISULFATE 75 MG: 75 TABLET ORAL at 08:54

## 2018-11-02 RX ADMIN — ASPIRIN 81 MG CHEWABLE TABLET 81 MG: 81 TABLET CHEWABLE at 08:51

## 2018-11-02 RX ADMIN — MIRTAZAPINE 15 MG: 15 TABLET, FILM COATED ORAL at 22:03

## 2018-11-02 RX ADMIN — VITAM B12 100 MCG: 100 TAB at 08:55

## 2018-11-02 RX ADMIN — CLOTRIMAZOLE: 1 CREAM TOPICAL at 19:07

## 2018-11-02 RX ADMIN — METOPROLOL TARTRATE 12.5 MG: 25 TABLET, FILM COATED ORAL at 22:03

## 2018-11-02 RX ADMIN — DIGOXIN 0.12 MG: 125 TABLET ORAL at 08:55

## 2018-11-02 RX ADMIN — CLOTRIMAZOLE: 1 CREAM TOPICAL at 09:00

## 2018-11-02 RX ADMIN — POLYETHYLENE GLYCOL 3350 17 G: 17 POWDER, FOR SOLUTION ORAL at 08:59

## 2018-11-02 NOTE — ROUTINE PROCESS
Bedside and Verbal shift change report given to CINTHYA Devi (oncoming nurse) by Jesus Patel LPN (offgoing nurse). Report included the following information SBAR, Kardex and MAR.
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
## 2018-11-02 NOTE — ROUTINE PROCESS
Bedside and verbal shift report given to Mary Jane Veras RN (oncoming nurse) by YUMIKO Kendall LPN (off going nurse) Report included SBAR, MAR and Kardex.

## 2018-11-02 NOTE — PROGRESS NOTES
Problem: Mobility Impaired (Adult and Pediatric) Goal: *Acute Goals and Plan of Care (Insert Text) PHYSICAL THERAPY STG GOALS : 
Initiated 10/24/2018 and to be accomplished within 1-2 Weeks (Updated 10/30/18) 1. Patient will move from supine to sit and sit to supine  and roll side to side in bed with modified independence. (Progressing; (S)) 2.  Patient will transfer from bed to chair and chair to bed with supervision/set-up using LRAD. (Progressing; SBA) 3. Patient will perform sit to stand with supervision/set-up with Good balance and safety awareness. (Progressing; close (S)) 4. Patient will ambulate with supervision/set-up for 125 feet with LRAD on level surfaces with 2 turns. (Progressing; 228ft with close (S) with RW) 5.  Patient will ascend/descend 5 stairs with left handrail(s) with stand by assistance to allow for safe home access/exit. (Progressing; 5 steps with B HR and close (S)) 6. Patient will improve standardized test score for [place] Standing Balance Scale score of 4 to reduce fall risk. (Progressing; 3) PHYSICAL THERAPY LTG GOALS : 
Initiated 10/24/2018 and to be accomplished within 3-4 Weeks 1. Patient will transfer from bed to chair and chair to bed with modified independence using LRAD. 2.  Patient will perform sit to stand with modified independence with Good balance and safety awareness. 3.  Patient will ambulate with modified independence for 250 feet with LRAD on level surfaces and be able to maneuver through narrow spaces and obstacles without loss of balance. 4.  Patient will ascend/descend 5 stairs with left handrail(s) with supervision/set-up to allow for safe home access/exit. 5.  Patient will improve standardized test score for [place] Standing Balance Scale score of 5 to reduce fall risk. Physical Therapist:   Giovanna Iniguez PT  on 10/24/[facility]  
physical Therapy Daily Treatment note Patient: Yonny Mendez (89 y.o. female)               Date: 11/2/2018 Physician: Candi Sosa MD 
Primary Diagnosis: hypokalemina          Treatment Diagnosis Treatment Diagnosis: difficulty in walking Treatment Diagnosis 2: muscle weakness Precautions: Fall Vital SignsVital Signs Temp: 97.5 °F (36.4 °C) Pulse (Heart Rate): (!) 50 Resp Rate: 18 
O2 Sat (%): 95 % BP: 124/75 MAP (Calculated): 91 
Cognitive Status:PainBed Mobility TrainingBed Mobility Training Supine to Sit: Supervision Scooting: Supervision BalanceSitting: Without support Sitting - Static: Good (unsupported) Sitting - Dynamic: Fair (occasional)(((+))) Standing: With support Standing - Static: Good Standing - Dynamic : Fair(((+))) Transfer Training Transfer Training Sit to Stand: (close (S) ) Stand to Sit: (close (S) ) Sit to Stand: (close (S) ) Gait TrainingGait Base of Support: Center of gravity altered Speed/Sarah: Slow Gait Abnormalities: Decreased step clearance Ambulation - Level of Assistance: (close (S) ) Distance (ft): 130 Feet (ft)(+125ft) Assistive Device: Gait belt;Walker, rolling Rail Use: Both Stairs - Level of Assistance: Stand-by assistance Number of Stairs Trained: 5 Interventions: Safety awareness training Gait Abnormalities: Decreased step clearance With 3 turns. Therapeutic Exercise: Pt presented supine in bed. Pt reported needing assistance with toileting and donning pants. Supine>sit with (S). Pt completed sit<>stand throughout session with close (S) and very minimal cues for hand placement and safety. Pt ambulated to toilet with RW and close (S) to include doorway negotiation and narrow spaces. Pt demonstrated (S) with all self care task with no noted LOB. Additional gait training rendered with two trails and abovementioned details with w/c follow for safety. Stair training rendered with close (S) and no cues required for sequencing, prompting to stay on task.  Standing static balance with 1 to no UE support and SBA, no noted LOB or sway, good safety awareness. Seated B LE TE's rendered with 1.5# AW to promote strength and endurance for improved functional mobility: LAQ x15. Pt's granddaughter arrived and pt returned to her room to visit at this time. Pt remained sitting in w/c at bedside, call bell at side. Patient/Caregiver Education:  
Pt /Caregiver Education on safety and fall prevention, and use of call bell, not attempting to get up unassisted. Pt verbalized understanding, was provided to reduce risk of falls. ASSESSMENT: 
Patient continues to benefit from Skilled PT services to improve sit<>stand, balance, strength, gait and stair negotiation. Progression toward goals: 
[]      Improving appropriately and progressing toward goals [x]      Improving slowly and progressing toward goals [x]      Not making progress toward goals and plan of care will be adjusted Treatment session: 60 minutes. Therapist:   Gladis Lindsey PTA,          11/2/2018

## 2018-11-02 NOTE — PROGRESS NOTES
The patient was offered a group activity of playing the card game SASHA the patient fully participated in the group. The patient was able to successfully play the game with moderate assistance and engaged in conversation with other patients in attendance.

## 2018-11-02 NOTE — PROGRESS NOTES
Problem: Self Care Deficits Care Plan (Adult) Goal: *Acute Goals and Plan of Care (Insert Text) OCCUPATIONAL THERAPY SHORT TERM GOALS Initiated 10/30/2018 and to be accomplished within 2 [facility]) 1. Patient will perform Upper body ADL's without adaptive equipment with MOD I. 
2.  Patient will perform Lower body ADL's without adaptive equipment with standby assist. 
3.  Patient will perform toileting task with standby assist with Good safety to reduce falls risk. 4.  Patient will perform toilet transfers with Rolling Walker and contact guard assist. 
5.  Patient will perform standing static/dynamic balance activities for improved ADL/IADL function with SBA and Good balance and safety awareness. 6.  Patient will improve Barthel index scores to atleast 90/100 to improve functional mobility. 7.  Patient will utilize energy conservation techniques during functional activities with minimal verbal cues. OCCUPATIONAL THERAPY SHORT TERM GOALS  UPDATED 10/30/18 Initiated 10/24/2018 and to be accomplished within 2 [facility]) 1. Patient will perform Upper body ADL's without adaptive equipment with Supv-set up. SUPERVISION GM UPG MOD I 
2. Patient will perform Lower body ADL's without adaptive equipment with standby assist. SBA,  UPG SUPERVISION 3. Patient will perform toileting task with standby assist with Good safety to reduce falls risk. SBA,  UPG SUPERVISION 4. Patient will perform toilet transfers with Rolling Walker and contact guard assist. SBA, GM UPG SUPERVISION 5. Patient will perform standing static/dynamic balance activities for improved ADL/IADL function with contact guard assist and Good balance and safety awareness. CGA WITH FAIR BALANCE, UPG TO SBA WITH G BALANCE 6. Patient will improve Barthel index scores to atleast 55/100 to improve functional mobility. 80/100 GM UPG 90/100 
7.   Patient will utilize energy conservation techniques during functional activities with minimal verbal cues. PROGRESSING 
 
OCCUPATIONAL THERAPY LONG TERM GOALS Initiated 10/24/2018 and to be accomplished within 4 Week(s) 1. Patient will perform ADL's & IADLs with adaptive equipment as needed with modified independence. 2.  Patient will perform toileting task with modified independence with Good safety to reduce falls risk. 3.  Patient will perform all functional transfers utilizing least restrictive device and durable medical equipment as needed with modified independence and Good balance and safety awareness. 4.  Patient will perform standing static/dynamic activity for improved ADL/IADL function with modified independence and Good balance and safety awareness. 5.  Patient will improve Barthel index score to 95/100 to improve independence with mobility. 6. Patient will perform home making tasks and simple meal prep Mod I utilizing energy conservation techniques and good safety awareness. Therapist: Leela Gillis    10/24/2018 [address] [place] [address] Occupational Therapy Daily Treatment note Patient: Gal Gates (84 y.o. female)       Date: 11/2/2018 Attending Physician: Holly Fung MD 
Primary Diagnosis: hypokalemina Treatment Diagnosis Treatment Diagnosis: difficulty in walking Treatment Diagnosis 2: muscle weakness Precautions : Precautions at Admission: Fall Vital Signs: 
Vital Signs Temp: 97.5 °F (36.4 °C) Pulse (Heart Rate): (!) 50 Resp Rate: 18 
O2 Sat (%): 95 % BP: 124/75 MAP (Calculated): 91  
Cognitive Status: 
  
Pain:  
  
Gross Assessment:  
Coordination:  
Bed Mobility:Bed Mobility Supine to Sit: Supervision Scooting: Supervision Transfers:Functional Transfers Sit to Stand: (close (S) ) Stand to Sit: (close (S) ) Balance:Balance Sitting: Without support Sitting - Static: Good (unsupported) Sitting - Dynamic: Fair (occasional)(((+))) Standing: With support Standing - Static: Good Standing - Dynamic : Fair(((+))) ADL Self Care: ADL Intervention:  
  
  
  
  
  
  
Instrumental ADL Medication Management: Minimum assistance(simulated med mgmt w/ weekly pill organizer box) Medicine management: Simulated medicine management utilizing weekly pill organizer x 5 meds w/ Min A and 80% accuracy, patient provided assist to problem solve errors for 2 medications for correction. Patient reports at [facility], she utilized weekly pill organizer box. Instrumental ADL's:Instrumental ADL Medication Management: Minimum assistance(simulated med mgmt w/ weekly pill organizer box) Patient/Caregiver Education:   
Pt. Education on safety awareness during medicine mgmt task was provided with therapist recommending patient to receive assist with medicine mgmt for safe discharge, patient verbalized understanding . ASSESSMENT: 
Patient continues to demonstrate the need for skilled Occupational Therapy services to improve grooming, bathing, upper body dressing, lower body dressing, toileting, toilet transfer, shower transfer and simple home management Progression toward goals: 
[x]      Improving appropriately and progressing toward goals 
[]      Improving slowly and progressing toward goals 
[]      Not making progress toward goals and plan of care will be adjusted Treatment session:   58 minutes Therapist:    Iva Simon,  11/2/2018

## 2018-11-03 PROCEDURE — 74011250637 HC RX REV CODE- 250/637: Performed by: NURSE PRACTITIONER

## 2018-11-03 PROCEDURE — 74011000250 HC RX REV CODE- 250: Performed by: INTERNAL MEDICINE

## 2018-11-03 PROCEDURE — 74011250637 HC RX REV CODE- 250/637: Performed by: INTERNAL MEDICINE

## 2018-11-03 PROCEDURE — 74011250636 HC RX REV CODE- 250/636: Performed by: NURSE PRACTITIONER

## 2018-11-03 RX ADMIN — PSYLLIUM HUSK 1 PACKET: 3.4 POWDER ORAL at 10:23

## 2018-11-03 RX ADMIN — MIRTAZAPINE 15 MG: 15 TABLET, FILM COATED ORAL at 22:15

## 2018-11-03 RX ADMIN — METOPROLOL TARTRATE 12.5 MG: 25 TABLET, FILM COATED ORAL at 10:23

## 2018-11-03 RX ADMIN — MECLIZINE 12.5 MG: 12.5 TABLET ORAL at 10:23

## 2018-11-03 RX ADMIN — POLYETHYLENE GLYCOL 3350 17 G: 17 POWDER, FOR SOLUTION ORAL at 10:23

## 2018-11-03 RX ADMIN — CALCIUM CARBONATE-VITAMIN D TAB 500 MG-200 UNIT 1 TABLET: 500-200 TAB at 10:23

## 2018-11-03 RX ADMIN — CLOPIDOGREL BISULFATE 75 MG: 75 TABLET ORAL at 10:23

## 2018-11-03 RX ADMIN — METOPROLOL TARTRATE 12.5 MG: 25 TABLET, FILM COATED ORAL at 20:39

## 2018-11-03 RX ADMIN — DIGOXIN 0.12 MG: 125 TABLET ORAL at 10:23

## 2018-11-03 RX ADMIN — ASPIRIN 81 MG CHEWABLE TABLET 81 MG: 81 TABLET CHEWABLE at 10:23

## 2018-11-03 RX ADMIN — VITAM B12 100 MCG: 100 TAB at 10:23

## 2018-11-03 RX ADMIN — CLOTRIMAZOLE: 1 CREAM TOPICAL at 20:42

## 2018-11-03 NOTE — ROUTINE PROCESS
Patient and family member tearful stating discussing things of her past that are painful and wished to speak with a .  paged and made aware of need for visit.

## 2018-11-03 NOTE — ROUTINE PROCESS
Bedside and Verbal shift change report given to Stephanie Kline RN (oncoming nurse) by SERJIO Grove RN (offgoing nurse). Report included the following information SBAR, Kardex and MAR.

## 2018-11-04 PROCEDURE — 74011000250 HC RX REV CODE- 250: Performed by: INTERNAL MEDICINE

## 2018-11-04 PROCEDURE — 74011250636 HC RX REV CODE- 250/636: Performed by: NURSE PRACTITIONER

## 2018-11-04 PROCEDURE — 74011250637 HC RX REV CODE- 250/637: Performed by: NURSE PRACTITIONER

## 2018-11-04 PROCEDURE — 74011250637 HC RX REV CODE- 250/637: Performed by: INTERNAL MEDICINE

## 2018-11-04 RX ADMIN — CLOTRIMAZOLE: 1 CREAM TOPICAL at 09:00

## 2018-11-04 RX ADMIN — CALCIUM CARBONATE-VITAMIN D TAB 500 MG-200 UNIT 1 TABLET: 500-200 TAB at 10:08

## 2018-11-04 RX ADMIN — POLYETHYLENE GLYCOL 3350 17 G: 17 POWDER, FOR SOLUTION ORAL at 09:00

## 2018-11-04 RX ADMIN — CLOPIDOGREL BISULFATE 75 MG: 75 TABLET ORAL at 10:08

## 2018-11-04 RX ADMIN — VITAM B12 100 MCG: 100 TAB at 10:08

## 2018-11-04 RX ADMIN — MIRTAZAPINE 15 MG: 15 TABLET, FILM COATED ORAL at 21:30

## 2018-11-04 RX ADMIN — METOPROLOL TARTRATE 12.5 MG: 25 TABLET, FILM COATED ORAL at 10:08

## 2018-11-04 RX ADMIN — PSYLLIUM HUSK 1 PACKET: 3.4 POWDER ORAL at 10:10

## 2018-11-04 RX ADMIN — ASPIRIN 81 MG CHEWABLE TABLET 81 MG: 81 TABLET CHEWABLE at 09:00

## 2018-11-04 RX ADMIN — METOPROLOL TARTRATE 12.5 MG: 25 TABLET, FILM COATED ORAL at 21:30

## 2018-11-04 RX ADMIN — MECLIZINE 12.5 MG: 12.5 TABLET ORAL at 10:08

## 2018-11-04 RX ADMIN — FUROSEMIDE 20 MG: 20 TABLET ORAL at 13:16

## 2018-11-04 RX ADMIN — CLOTRIMAZOLE: 1 CREAM TOPICAL at 18:00

## 2018-11-04 RX ADMIN — DIGOXIN 0.12 MG: 125 TABLET ORAL at 09:00

## 2018-11-04 NOTE — PROGRESS NOTES
Problem: Mobility Impaired (Adult and Pediatric) Goal: *Acute Goals and Plan of Care (Insert Text) PHYSICAL THERAPY STG GOALS : 
Initiated 10/24/2018 and to be accomplished within 1-2 Weeks (Updated 10/30/18) 1. Patient will move from supine to sit and sit to supine  and roll side to side in bed with modified independence. (Progressing; (S)) 2.  Patient will transfer from bed to chair and chair to bed with supervision/set-up using LRAD. (Progressing; SBA) 3. Patient will perform sit to stand with supervision/set-up with Good balance and safety awareness. (Progressing; close (S)) 4. Patient will ambulate with supervision/set-up for 125 feet with LRAD on level surfaces with 2 turns. (Progressing; 228ft with close (S) with RW) 5.  Patient will ascend/descend 5 stairs with left handrail(s) with stand by assistance to allow for safe home access/exit. (Progressing; 5 steps with B HR and close (S)) 6. Patient will improve standardized test score for [place] Standing Balance Scale score of 4 to reduce fall risk. (Progressing; 3) PHYSICAL THERAPY LTG GOALS : 
Initiated 10/24/2018 and to be accomplished within 3-4 Weeks 1. Patient will transfer from bed to chair and chair to bed with modified independence using LRAD. 2.  Patient will perform sit to stand with modified independence with Good balance and safety awareness. 3.  Patient will ambulate with modified independence for 250 feet with LRAD on level surfaces and be able to maneuver through narrow spaces and obstacles without loss of balance. 4.  Patient will ascend/descend 5 stairs with left handrail(s) with supervision/set-up to allow for safe home access/exit. 5.  Patient will improve standardized test score for [place] Standing Balance Scale score of 5 to reduce fall risk. Physical Therapist:   Judi Vasquez PT  on 10/24/2018 Outcome: Not [address] physical Therapy Daily Treatment note Patient: Addie Tavarez (8 y.o. female)               Date: 11/4/2018 Physician: Demond Delacruz MD 
Primary Diagnosis: hypokalemina          Treatment Diagnosis Treatment Diagnosis: difficulty in walking Treatment Diagnosis 2: muscle weakness Precautions: Fall Vital SignsVital Signs Temp: 97.4 °F (36.3 °C) Temp Source: Oral 
Pulse (Heart Rate): 90 Heart Rate Source: Monitor Resp Rate: 16 
BP: 138/81 MAP (Calculated): 100 Cognitive Status:Mental Status Neurologic State: Alert;Confused Orientation Level: Oriented to person;Oriented to place;Oriented to situation Cognition: Follows commands PainPain Screen Pain Scale 1: Numeric (0 - 10) Pain Intensity 1: 7 Pain Location 1: Back;Flank Pain Orientation 1: Right Bed Mobility TrainingBed Mobility Training Supine to Sit: Supervision Scooting: Supervision BalanceSitting: Intact Sitting - Static: Good (unsupported) Sitting - Dynamic: Fair (occasional) Standing: With support Standing - Static: Fair Standing - Dynamic : Fair Transfer Training Transfer Training Sit to Stand: Stand-by assistance Stand to Sit: Stand-by assistance Sit to Stand: Stand-by assistance Gait TrainingGait Ambulation - Level of Assistance: Stand-by assistance;Contact guard assistance Distance (ft): 210 Feet (ft) Assistive Device: Walker, rolling With 4 turns. Therapeutic Exercise:  
 Pt tolerated treatment showing fair LE strength being able to perform exercises in sitting and standing x 15 reps to to further increase LE strength, activity endurance and mobility, able to complete all exercises but did not tolerate any resistance. Exhibits good technique during sit to stand transition and has fair balance (staitc) during standing. Remains bothered by fatigued requiring rest breaks to recover.  Good pace and technique using walker during gait training showing no signs of instability or decreased dynamic balance. Patient/Caregiver Education:  
Pt education on safety and fall prevention was provided to ensure safety during tranfers. ASSESSMENT: 
Patient continues to benefit from Skilled PT services to improve LE strength Progression toward goals: 
[x]      Improving appropriately and progressing toward goals 
[]      Improving slowly and progressing toward goals 
[]      Not making progress toward goals and plan of care will be adjusted Treatment session: 57 minutes. Therapist:   Gabriel Burris PTA,          11/4/2018

## 2018-11-04 NOTE — PROGRESS NOTES
Problem: Self Care Deficits Care Plan (Adult) Goal: *Acute Goals and Plan of Care (Insert Text) OCCUPATIONAL THERAPY SHORT TERM GOALS Initiated 10/30/2018 and to be accomplished within 2 [facility]) 1. Patient will perform Upper body ADL's without adaptive equipment with MOD I. 
2.  Patient will perform Lower body ADL's without adaptive equipment with standby assist. 
3.  Patient will perform toileting task with standby assist with Good safety to reduce falls risk. 4.  Patient will perform toilet transfers with Rolling Walker and contact guard assist. 
5.  Patient will perform standing static/dynamic balance activities for improved ADL/IADL function with SBA and Good balance and safety awareness. 6.  Patient will improve Barthel index scores to atleast 90/100 to improve functional mobility. 7.  Patient will utilize energy conservation techniques during functional activities with minimal verbal cues. OCCUPATIONAL THERAPY SHORT TERM GOALS  UPDATED 10/30/18 Initiated 10/24/2018 and to be accomplished within 2 [facility]) 1. Patient will perform Upper body ADL's without adaptive equipment with Supv-set up. SUPERVISION GM UPG MOD I 
2. Patient will perform Lower body ADL's without adaptive equipment with standby assist. SBA,  UPG SUPERVISION 3. Patient will perform toileting task with standby assist with Good safety to reduce falls risk. SBA,  UPG SUPERVISION 4. Patient will perform toilet transfers with Rolling Walker and contact guard assist. SBA, GM UPG SUPERVISION 5. Patient will perform standing static/dynamic balance activities for improved ADL/IADL function with contact guard assist and Good balance and safety awareness. CGA WITH FAIR BALANCE, UPG TO SBA WITH G BALANCE 6. Patient will improve Barthel index scores to atleast 55/100 to improve functional mobility. 80/100 GM UPG 90/100 
7.   Patient will utilize energy conservation techniques during functional activities with minimal verbal cues. PROGRESSING 
 
OCCUPATIONAL THERAPY LONG TERM GOALS Initiated 10/24/2018 and to be accomplished within 4 Week(s) 1. Patient will perform ADL's & IADLs with adaptive equipment as needed with modified independence. 2.  Patient will perform toileting task with modified independence with Good safety to reduce falls risk. 3.  Patient will perform all functional transfers utilizing least restrictive device and durable medical equipment as needed with modified independence and Good balance and safety awareness. 4.  Patient will perform standing static/dynamic activity for improved ADL/IADL function with modified independence and Good balance and safety awareness. 5.  Patient will improve Barthel index score to 95/100 to improve independence with mobility. 6. Patient will perform home making tasks and simple meal prep Mod I utilizing energy conservation techniques and good safety awareness. Therapist: Bernabe Garcia    10/24/2018 [address] [place] [address] Occupational Therapy Daily Treatment note Patient: Su Parker (23 y.o. female)       Date: 11/4/2018 Attending Physician: Imelda Nguyen MD 
Primary Diagnosis: hypokalemina Treatment Diagnosis Treatment Diagnosis: difficulty in walking Treatment Diagnosis 2: muscle weakness Precautions : Precautions at Admission: Fall Vital Signs: 
Vital Signs Temp: 97.4 °F (36.3 °C) Temp Source: Oral 
Pulse (Heart Rate): 84 
Heart Rate Source: Monitor Resp Rate: 16 
BP: 138/81 MAP (Calculated): 100 Cognitive Status: 
  
Pain:Pain Screen Pain Scale 1: Visual 
Pain Intensity 1: 0 Patient Stated Pain Goal: 0 Pain Reassessment 1: Patient sleeping Pain Scale 1: Visual 
Bed Mobility:Bed Mobility Supine to Sit: Supervision Scooting: Supervision Transfers:Functional Transfers Sit to Stand: Supervision Stand to Sit: Supervision Bathroom Mobility: Stand-by assistance Toilet Transfer : Stand-by assistance Functional Transfers Bathroom Mobility: Stand-by assistance Toilet Transfer : Stand-by assistance Balance:Balance Sitting: Intact Sitting - Static: Good (unsupported) Sitting - Dynamic: Fair (occasional) Standing: With support Standing - Static: Good Standing - Dynamic : Fair ADL Self Care: 
Basic ADL Oral Facial Hygiene/Grooming: Modified Independent Bathing: Stand-by assistance;Contact guard assistance Type of Bath: Basin/Soap/Water Upper Body Dressing: Modified independent Lower Body Dressing: Contact guard assistance Toileting: Contact guard assistance Therapeutic Activities: 
Pt seen for early morning ADLs @ sinkside, see above for level of A. CGA fnxl room and bathroom mobility with RW requiring min cueing for proper safety. Patient/Caregiver Education:   
Pt educated on fall prevention and safety to reduce fall risk. ASSESSMENT: 
Patient continues to demonstrate the need for skilled Occupational Therapy services to improve strength, balance, and endurance. Progression toward goals: 
[x]      Improving appropriately and progressing toward goals 
[]      Improving slowly and progressing toward goals 
[]      Not making progress toward goals and plan of care will be adjusted Treatment session:   60 minutes Therapist:    EMANUEL Gavin,  11/4/2018

## 2018-11-04 NOTE — ROUTINE PROCESS
Bedside shift change report given to Petra Ordonez (oncoming nurse) by Russ Charles LPN (offgoing nurse). Report included the following information SBAR, Kardex, MAR and Recent Results.

## 2018-11-04 NOTE — ROUTINE PROCESS
Bedside and Verbal shift change report given to GUANAKO Choi LPN (oncoming nurse) by Jessica Pacheco RN (offgoing nurse). Report included the following information SBAR, Kardex and MAR. QH VISUAL CHECKS AND 24H CHART CHECKS DONE.

## 2018-11-05 LAB
ANION GAP SERPL CALC-SCNC: 5 MMOL/L (ref 3–18)
BASOPHILS # BLD: 0.1 K/UL (ref 0–0.1)
BASOPHILS NFR BLD: 1 % (ref 0–2)
BUN SERPL-MCNC: 13 MG/DL (ref 7–18)
BUN/CREAT SERPL: 14 (ref 12–20)
CALCIUM SERPL-MCNC: 8.2 MG/DL (ref 8.5–10.1)
CHLORIDE SERPL-SCNC: 100 MMOL/L (ref 100–108)
CO2 SERPL-SCNC: 33 MMOL/L (ref 21–32)
CREAT SERPL-MCNC: 0.9 MG/DL (ref 0.6–1.3)
DIFFERENTIAL METHOD BLD: ABNORMAL
EOSINOPHIL # BLD: 0.5 K/UL (ref 0–0.4)
EOSINOPHIL NFR BLD: 5 % (ref 0–5)
ERYTHROCYTE [DISTWIDTH] IN BLOOD BY AUTOMATED COUNT: 16.5 % (ref 11.6–14.5)
GLUCOSE SERPL-MCNC: 57 MG/DL (ref 74–99)
HCT VFR BLD AUTO: 38.2 % (ref 35–45)
HGB BLD-MCNC: 12.1 G/DL (ref 12–16)
LYMPHOCYTES # BLD: 1.7 K/UL (ref 0.9–3.6)
LYMPHOCYTES NFR BLD: 20 % (ref 21–52)
MCH RBC QN AUTO: 27.1 PG (ref 24–34)
MCHC RBC AUTO-ENTMCNC: 31.7 G/DL (ref 31–37)
MCV RBC AUTO: 85.5 FL (ref 74–97)
MONOCYTES # BLD: 1.1 K/UL (ref 0.05–1.2)
MONOCYTES NFR BLD: 13 % (ref 3–10)
NEUTS SEG # BLD: 5.1 K/UL (ref 1.8–8)
NEUTS SEG NFR BLD: 61 % (ref 40–73)
PLATELET # BLD AUTO: 307 K/UL (ref 135–420)
PMV BLD AUTO: 9.1 FL (ref 9.2–11.8)
POTASSIUM SERPL-SCNC: 4.3 MMOL/L (ref 3.5–5.5)
RBC # BLD AUTO: 4.47 M/UL (ref 4.2–5.3)
SODIUM SERPL-SCNC: 138 MMOL/L (ref 136–145)
WBC # BLD AUTO: 8.5 K/UL (ref 4.6–13.2)

## 2018-11-05 PROCEDURE — 74011250637 HC RX REV CODE- 250/637: Performed by: NURSE PRACTITIONER

## 2018-11-05 PROCEDURE — 74011000250 HC RX REV CODE- 250: Performed by: INTERNAL MEDICINE

## 2018-11-05 PROCEDURE — 85025 COMPLETE CBC W/AUTO DIFF WBC: CPT | Performed by: INTERNAL MEDICINE

## 2018-11-05 PROCEDURE — 77030012890

## 2018-11-05 PROCEDURE — 36415 COLL VENOUS BLD VENIPUNCTURE: CPT | Performed by: INTERNAL MEDICINE

## 2018-11-05 PROCEDURE — 74011250637 HC RX REV CODE- 250/637: Performed by: INTERNAL MEDICINE

## 2018-11-05 PROCEDURE — 74011250636 HC RX REV CODE- 250/636: Performed by: NURSE PRACTITIONER

## 2018-11-05 PROCEDURE — 80048 BASIC METABOLIC PNL TOTAL CA: CPT | Performed by: INTERNAL MEDICINE

## 2018-11-05 RX ADMIN — MIRTAZAPINE 15 MG: 15 TABLET, FILM COATED ORAL at 21:31

## 2018-11-05 RX ADMIN — CLOTRIMAZOLE: 1 CREAM TOPICAL at 21:35

## 2018-11-05 RX ADMIN — POLYETHYLENE GLYCOL 3350 17 G: 17 POWDER, FOR SOLUTION ORAL at 09:24

## 2018-11-05 RX ADMIN — METOPROLOL TARTRATE 12.5 MG: 25 TABLET, FILM COATED ORAL at 21:31

## 2018-11-05 RX ADMIN — METOPROLOL TARTRATE 12.5 MG: 25 TABLET, FILM COATED ORAL at 09:00

## 2018-11-05 RX ADMIN — MECLIZINE 12.5 MG: 12.5 TABLET ORAL at 09:28

## 2018-11-05 RX ADMIN — CLOTRIMAZOLE: 1 CREAM TOPICAL at 09:00

## 2018-11-05 RX ADMIN — ASPIRIN 81 MG CHEWABLE TABLET 81 MG: 81 TABLET CHEWABLE at 09:27

## 2018-11-05 RX ADMIN — ONDANSETRON 4 MG: 4 TABLET, ORALLY DISINTEGRATING ORAL at 09:27

## 2018-11-05 RX ADMIN — DIGOXIN 0.12 MG: 125 TABLET ORAL at 09:27

## 2018-11-05 RX ADMIN — CLOPIDOGREL BISULFATE 75 MG: 75 TABLET ORAL at 09:28

## 2018-11-05 RX ADMIN — FUROSEMIDE 20 MG: 20 TABLET ORAL at 09:28

## 2018-11-05 RX ADMIN — PSYLLIUM HUSK 1 PACKET: 3.4 POWDER ORAL at 09:26

## 2018-11-05 RX ADMIN — CALCIUM CARBONATE-VITAMIN D TAB 500 MG-200 UNIT 1 TABLET: 500-200 TAB at 09:27

## 2018-11-05 RX ADMIN — VITAM B12 100 MCG: 100 TAB at 09:28

## 2018-11-05 NOTE — PROGRESS NOTES
MACK faxed order for rolling walker to [facility]. Darryl requested that dme provider contact pt's family re: pickup and and any copayments.

## 2018-11-05 NOTE — PROGRESS NOTES
Problem: Mobility Impaired (Adult and Pediatric) Goal: *Acute Goals and Plan of Care (Insert Text) PHYSICAL THERAPY STG GOALS : 
Initiated 10/24/2018 and to be accomplished within 1-2 Weeks (Updated 10/30/18) 1. Patient will move from supine to sit and sit to supine  and roll side to side in bed with modified independence. (Progressing; (S)) 2.  Patient will transfer from bed to chair and chair to bed with supervision/set-up using LRAD. (Progressing; SBA) 3. Patient will perform sit to stand with supervision/set-up with Good balance and safety awareness. (Progressing; close (S)) 4. Patient will ambulate with supervision/set-up for 125 feet with LRAD on level surfaces with 2 turns. (Progressing; 228ft with close (S) with RW) 5.  Patient will ascend/descend 5 stairs with left handrail(s) with stand by assistance to allow for safe home access/exit. (Progressing; 5 steps with B HR and close (S)) 6. Patient will improve standardized test score for [place] Standing Balance Scale score of 4 to reduce fall risk. (Progressing; 3) PHYSICAL THERAPY LTG GOALS : 
Initiated 10/24/2018 and to be accomplished within 3-4 Weeks 1. Patient will transfer from bed to chair and chair to bed with modified independence using LRAD. 2.  Patient will perform sit to stand with modified independence with Good balance and safety awareness. 3.  Patient will ambulate with modified independence for 250 feet with LRAD on level surfaces and be able to maneuver through narrow spaces and obstacles without loss of balance. 4.  Patient will ascend/descend 5 stairs with left handrail(s) with supervision/set-up to allow for safe home access/exit. 5.  Patient will improve standardized test score for [place] Standing Balance Scale score of 5 to reduce fall risk. Physical Therapist:   Sonya Spivey PT  on 10/24/2018 Outcome: [address] physical Therapy Daily Treatment note Patient: Yonny Mendez (56 y.o. female)               Date: 11/5/2018 Physician: Candi Sosa MD 
Primary Diagnosis: hypokalemina          Treatment Diagnosis Treatment Diagnosis: difficulty in walking Treatment Diagnosis 2: muscle weakness Precautions: Fall Vital SignsVital Signs Temp: 98 °F (36.7 °C) Pulse (Heart Rate): 90 
Cardiac Rhythm: Atrial fibrillation Resp Rate: 18 
O2 Sat (%): 95 % BP: 123/76 MAP (Calculated): 92 
Cognitive Status:Mental Status Neurologic State: Alert;Confused Orientation Level: Oriented to person Cognition: Follows commands PainPain Screen Pain Scale 1: Visual 
Pain Intensity 1: 0 Patient Stated Pain Goal: 0 Pain Reassessment 1: Patient sleeping Bed Mobility TrainingBalanceSitting: Intact Sitting - Static: Good (unsupported) Sitting - Dynamic: Fair (occasional) Standing: With support Standing - Static: Good Standing - Dynamic : Fair Transfer Training Transfer Training Sit to Stand: Stand-by assistance Stand to Sit: Stand-by assistance Sit to Stand: Stand-by assistance Gait TrainingGait Speed/Sarah: Slow Step Length: Left shortened;Right shortened Ambulation - Level of Assistance: Supervision Distance (ft): 216 Feet (ft) Assistive Device: Gait belt;Walker, rolling. Therapeutic Exercise:  
Pt is progressing well towards reaching goals, requiring supervision level with functional transfers and gait activities using FWW. Pt ascend/descended 5 steps CGA using L HR only, requiring VC's for safety and proper LE stepping sequencing technique to prep for entry of home. Pt completed standing TE AROM BLE's 1#'s: heel toe/raises, hip flexion marches, and mini-squats 2x15 with prologed rest breaks between each exercises due to fatigue.    
Patient/Caregiver Education:  
Pt /Caregiver Education on safety sequencing techniques during tranfers, gait, and stair training to improve overall functional mobility in prep for d/c to home. Pt needs minimal reinforcement secondary to confusion and cognitive deficits. ASSESSMENT: 
Patient continues to benefit from Skilled PT services to improve strength, endurance, balance, and functional (I) with transfers/gait upon return to home. Progression toward goals: 
[x]      Improving appropriately and progressing toward goals 
[]      Improving slowly and progressing toward goals 
[]      Not making progress toward goals and plan of care will be adjusted Treatment session: 58 minutes. Therapist:   Eve Tay PTA,          11/5/2018

## 2018-11-05 NOTE — ROUTINE PROCESS
Bedside and Verbal shift change report given to Bogdan Haile (oncoming nurse) by Tulio Jimenes RN (offgoing nurse). Report included the following information SBAR, Kardex and MAR. QH visual checks and 24h chart checks done.

## 2018-11-05 NOTE — PROGRESS NOTES
[facility] Daily progress Note Patient: Brayden Cat MRN: [id number]  CSN: [id number] YOB: 1927  Age: 80 y.o. Sex: female DOA: 10/23/2018 LOS:  LOS: 13 days Subjective:  
 
CC: follow up hyponatremia Patient is being seen for follow NA level. I examined patient today and she states that she was going ok, report of being in a depressed mood, over the weekend. I spoke with her family for approx 10 mins, report that patient was on Prozac for approx 1 yr, but is currently off med. Patient states that she is reminiscing on the past which is upsetting to her. Patient states that she would like to speak with psych. No report of fever or chills. She does of christine hose stocking on. I reviewed her labs with her family. Her VSS remains stable, no report of ER visit or hospitalization PAST MEDICAL Hx: Afib, CAD, NSTEMI, HLD, Combined HF, IBS, UTI, hyponatremia, Ischemic Cardiomyopathy, Vertigo PAST SURGICAL hx : Hx of Coronary stent placement, hx of hr cath SOCIAL hx : , lives with family, never smoked, never drink FAMILY hx: DM - mother ALLERGIES. Codeine, eggs 
  
Review of Systems Constitutional:  No fever or weight loss- she is alert, verbal responding HEENT:  No headache or visual changes Cardiovascular:  No chest pain or diap+ shortness of breath. GI:  + N no vomiting, no diarrhea no constipation. Last bm 10/24/2018 :  No hematuria or dysuria Skin:  No rashes or moles Neuro:  No seizures or syncope Hematological:  No bruising or bleeding Endocrine:  No diabetes or thyroid disease Objective:  
  
Visit Vitals /76 Pulse 90 Temp 98 °F (36.7 °C) Resp 18 Ht 5' 2\" (1.575 m) Wt 47.9 kg (105 lb 9.6 oz) SpO2 95% Breastfeeding? No  
BMI 19.31 kg/m² Physical Exam: 
General appearance: alert, cooperative, no distress, appears stated age HEENT: negative Neck: supple, symmetrical, trachea midline, no adenopathy, thyroid: not enlarged, symmetric, no tenderness/mass/nodules, no carotid bruit and no JVD Lungs: clear to auscultation bilaterally Heart: regular rate and rhythm, S1, S2 normal, no murmur, click, rub or gallop Abdomen: soft, non-tender. Bowel sounds normal. No masses,  no organomegaly Pulses: 2+ and symmetric Skin: Skin color, texture, turgor normal. No rashes or lesions Muscl: edema to ankles stable - christine hose in place Intake and Output: 
Current Shift:  No intake/output data recorded. Last three shifts:  No intake/output data recorded. Recent Results (from the past 24 hour(s)) CBC WITH AUTOMATED DIFF Collection Time: 11/05/18  4:46 AM  
Result Value Ref Range WBC 8.5 4.6 - 13.2 K/uL  
 RBC 4.47 4.20 - 5.30 M/uL  
 HGB 12.1 12.0 - 16.0 g/dL HCT 38.2 35.0 - 45.0 % MCV 85.5 74.0 - 97.0 FL  
 MCH 27.1 24.0 - 34.0 PG  
 MCHC 31.7 31.0 - 37.0 g/dL  
 RDW 16.5 (H) 11.6 - 14.5 % PLATELET 625 479 - 631 K/uL MPV 9.1 (L) 9.2 - 11.8 FL  
 NEUTROPHILS 61 40 - 73 % LYMPHOCYTES 20 (L) 21 - 52 % MONOCYTES 13 (H) 3 - 10 % EOSINOPHILS 5 0 - 5 % BASOPHILS 1 0 - 2 %  
 ABS. NEUTROPHILS 5.1 1.8 - 8.0 K/UL  
 ABS. LYMPHOCYTES 1.7 0.9 - 3.6 K/UL  
 ABS. MONOCYTES 1.1 0.05 - 1.2 K/UL  
 ABS. EOSINOPHILS 0.5 (H) 0.0 - 0.4 K/UL  
 ABS. BASOPHILS 0.1 0.0 - 0.1 K/UL  
 DF AUTOMATED METABOLIC PANEL, BASIC Collection Time: 11/05/18  4:46 AM  
Result Value Ref Range Sodium 138 136 - 145 mmol/L Potassium 4.3 3.5 - 5.5 mmol/L Chloride 100 100 - 108 mmol/L  
 CO2 33 (H) 21 - 32 mmol/L Anion gap 5 3.0 - 18 mmol/L Glucose 57 (L) 74 - 99 mg/dL BUN 13 7.0 - 18 MG/DL Creatinine 0.90 0.6 - 1.3 MG/DL  
 BUN/Creatinine ratio 14 12 - 20 GFR est AA >60 >60 ml/min/1.73m2 GFR est non-AA 59 (L) >60 ml/min/1.73m2 Calcium 8.2 (L) 8.5 - 10.1 MG/DL Current Facility-Administered Medications:   furosemide (LASIX) tablet 20 mg, 20 mg, Oral, DAILY PRN, Renetta ORTIZ NP, 20 mg at 11/05/18 3377   [facility] ANESTHESIA, , Other, PRN, Richar French MD 
  [facility] EMERGENCY/STAT BOX, , Other, PRN, Richar French MD 
  meclizine (ANTIVERT) tablet 12.5 mg, 12.5 mg, Oral, DAILY, Padmaja Lynn NP, 12.5 mg at 11/05/18 6005   psyllium husk-aspartame (METAMUCIL FIBER) packet 1 Packet, 1 Packet, Oral, DAILY, Renetta ORTIZ NP, 1 Packet at 11/05/18 0926 
  ondansetron (ZOFRAN ODT) tablet 4 mg, 4 mg, Oral, Q8H PRN, Richar French MD, 4 mg at 11/05/18 1904   clopidogrel (PLAVIX) tablet 75 mg, 75 mg, Oral, DAILY, Dalia Helms MD, 75 mg at 11/05/18 9980   metoprolol tartrate (LOPRESSOR) tablet 12.5 mg, 12.5 mg, Oral, Q12H, Dalia Helms MD, 12.5 mg at 11/05/18 0900   digoxin (LANOXIN) tablet 0.125 mg, 0.125 mg, Oral, DAILY, Dalia Helms MD, 0.125 mg at 11/05/18 3693   aspirin chewable tablet 81 mg, 81 mg, Oral, DAILY, Dalia Helms MD, 81 mg at 11/05/18 0927 
  fluticasone (FLONASE) 50 mcg/actuation nasal spray 1 Spray, 1 Spray, Both Nostrils, DAILY PRN, Richar French MD, 1 Spray at 10/31/18 1935   polyethylene glycol (MIRALAX) packet 17 g, 17 g, Oral, DAILY, Dalia Helms MD, 17 g at 11/05/18 9151   clotrimazole (LOTRIMIN) 1 % cream, , Topical, BID, Dalia Helms MD 
  nitroglycerin (NITROSTAT) tablet 0.4 mg, 0.4 mg, SubLINGual, PRN, Richar French MD 
  cyanocobalamin (VITAMIN B12) tablet 100 mcg, 100 mcg, Oral, DAILY, Dalia Helms MD, 100 mcg at 11/05/18 1181   mirtazapine (REMERON) tablet 15 mg, 15 mg, Oral, QHS, Dalia Helms MD, 15 mg at 11/04/18 2130   calcium-vitamin D (OS-JOVAN) 500 mg-200 unit tablet, 1 Tab, Oral, DAILY, Richar French MD, 1 Tab at 11/05/18 9689 Lab Results Component Value Date/Time  Glucose 57 (L) 11/05/2018 04:46 AM  
 Glucose 69 (L) 10/29/2018 05:40 AM  
 Glucose 79 10/25/2018 02:30 AM  
 Glucose 66 (L) 10/23/2018 05:50 AM  
 Glucose 76 10/22/2018 09:55 AM  
  
 
Assessment/Plan 1. Hyponatremia: resolved 2. AMS : resolved. UA and C&S 3. Personal hx of Depression: report she was on Prozac last yr for 1 yr. She wants to speak with psych. Tele psych consult placed. Will monitor 4. IBS w/ d: stable. 5. Weight loss: she is on Remeron, dietitian is following, family report of hx of poor appetite. Will closely monitor and encourage compliance with meals. Dispo: plans for [facility]ist[facility] living facility on Friday if she remains stable. Labs reviewed noted, low FBS in Am, will order bedtime snack.   
Nelson Hernandez NP 
11/5/2018, 12:35 PM

## 2018-11-05 NOTE — PROGRESS NOTES
Problem: Self Care Deficits Care Plan (Adult) Goal: *Acute Goals and Plan of Care (Insert Text) OCCUPATIONAL THERAPY SHORT TERM GOALS Initiated 10/30/2018 and to be accomplished within 2 [facility]) 1. Patient will perform Upper body ADL's without adaptive equipment with MOD I. 
2.  Patient will perform Lower body ADL's without adaptive equipment with standby assist. 
3.  Patient will perform toileting task with standby assist with Good safety to reduce falls risk. 4.  Patient will perform toilet transfers with Rolling Walker and contact guard assist. 
5.  Patient will perform standing static/dynamic balance activities for improved ADL/IADL function with SBA and Good balance and safety awareness. 6.  Patient will improve Barthel index scores to atleast 90/100 to improve functional mobility. 7.  Patient will utilize energy conservation techniques during functional activities with minimal verbal cues. OCCUPATIONAL THERAPY SHORT TERM GOALS  UPDATED 10/30/18 Initiated 10/24/2018 and to be accomplished within 2 [facility]) 1. Patient will perform Upper body ADL's without adaptive equipment with Supv-set up. SUPERVISION GM UPG MOD I 
2. Patient will perform Lower body ADL's without adaptive equipment with standby assist. SBA,  UPG SUPERVISION 3. Patient will perform toileting task with standby assist with Good safety to reduce falls risk. SBA,  UPG SUPERVISION 4. Patient will perform toilet transfers with Rolling Walker and contact guard assist. SBA, GM UPG SUPERVISION 5. Patient will perform standing static/dynamic balance activities for improved ADL/IADL function with contact guard assist and Good balance and safety awareness. CGA WITH FAIR BALANCE, UPG TO SBA WITH G BALANCE 6. Patient will improve Barthel index scores to atleast 55/100 to improve functional mobility. 80/100 GM UPG 90/100 
7.   Patient will utilize energy conservation techniques during functional activities with minimal verbal cues. PROGRESSING 
 
OCCUPATIONAL THERAPY LONG TERM GOALS Initiated 10/24/2018 and to be accomplished within 4 Week(s) 1. Patient will perform ADL's & IADLs with adaptive equipment as needed with modified independence. 2.  Patient will perform toileting task with modified independence with Good safety to reduce falls risk. 3.  Patient will perform all functional transfers utilizing least restrictive device and durable medical equipment as needed with modified independence and Good balance and safety awareness. 4.  Patient will perform standing static/dynamic activity for improved ADL/IADL function with modified independence and Good balance and safety awareness. 5.  Patient will improve Barthel index score to 95/100 to improve independence with mobility. 6. Patient will perform home making tasks and simple meal prep Mod I utilizing energy conservation techniques and good safety awareness. Therapist: Iva Simon    10/24/2018 [address] [place] [address] Occupational Therapy Daily Treatment note Patient: Holden Abdalla (4 y.o. female)       Date: 11/5/2018 Attending Physician: Apple Kiser MD 
Primary Diagnosis: hypokalemina Treatment Diagnosis Treatment Diagnosis: difficulty in walking Treatment Diagnosis 2: muscle weakness Precautions : Precautions at Admission: Fall Vital Signs: 
Vital Signs Temp: 97.3 °F (36.3 °C) Temp Source: Oral 
Pulse (Heart Rate): 87 Heart Rate Source: Monitor Cardiac Rhythm: Atrial fibrillation Resp Rate: 18 
O2 Sat (%): 95 % Level of Consciousness: Alert BP: 104/64 MAP (Calculated): 77 
MEWS Score: 1 Cognitive Status: 
Mental Status Neurologic State: Alert Orientation Level: Oriented to person;Oriented to place;Oriented to situation Cognition: Follows commands Pain:  
  
Gross Assessment:  
Coordination:  
Bed Mobility:  
Transfers:Functional Transfers Sit to Stand: Supervision(close) Stand to Sit: Stand-by assistance Bathroom Mobility: Supervision/set up Toilet Transfer : Supervision Functional Transfers Bathroom Mobility: Supervision/set up Toilet Transfer : Supervision Balance:Balance Sitting: Intact Sitting - Static: Good (unsupported) Sitting - Dynamic: Fair (occasional) Standing: With support Standing - Static: Good Standing - Dynamic : Fair ADL Self Care: ADL Intervention: Toileting Toileting Assistance: Supervision/set up(Distant) Bladder Hygiene: Supervision/set-up Clothing Management: Supervision/set-up Grooming Grooming Assistance: Supervision/set up Washing Face: Supervision/set-up Brushing Teeth: Supervision/set-up Therapeutic Activities: 
Shadowed patient with bathroom mobility utilizing RW, toileting routine, and handwashing at standing in order to assess safety, independence and performance with task. See above for levels of A needed. Patient/Caregiver Education:   
PtMadeline Espino Education on to avoid walking backwards during transfers was provided for optimal safety. ASSESSMENT: 
Patient continues to demonstrate the need for skilled Occupational Therapy services to improve dynamic standing balance needed for toileting Progression toward goals: 
[x]      Improving appropriately and progressing toward goals 
[]      Improving slowly and progressing toward goals 
[]      Not making progress toward goals and plan of care will be adjusted Treatment session:   44 minutes Therapist:    Meg Knapp, Tarri Eisenmenger,  11/5/2018

## 2018-11-05 NOTE — ROUTINE PROCESS
Bedside and Verbal shift change report given to Tish Vital RN (oncoming nurse) by Morenita Clements LPN 
 (offgoing nurse). Report included the following information SBAR, Kardex, MAR and Recent Results.

## 2018-11-05 NOTE — PROGRESS NOTES
SW called pt's daughter to inquire about pt's d/c disposition. MACK agreed to meet with pt's family once they arrive in town today.

## 2018-11-05 NOTE — PROGRESS NOTES
The patient was offered a group activity of working on puzzles and playing games at 11:00am. The patient declined the group activity and remained in her room. The patient was offered 1:1 activities with the Recreation Therapist. The patient used watercolors to paint a picture that she will work on in the future to complete.
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
## 2018-11-05 NOTE — PROGRESS NOTES
I have reviewed this patient's current medication list and recent laboratory results. At this time, I do not suggest any drug therapy adjustments or additional laboratory monitoring. Thank you, 
Cecil BASSETT Ph. M. S. 
11/5/2018

## 2018-11-05 NOTE — PROGRESS NOTES
conducted a Follow up consultation and Spiritual Assessment for Vincenzo Quach, who is a 80 y.o.,female. The  provided the following Interventions: 
Continued the relationship of care and support. Listened empathically. Offered prayer and assurance of continued prayer on patients behalf. Chart reviewed. The following outcomes were achieved: 
Patient expressed gratitude for pastoral care visit. Assessment: 
There are no further spiritual or Confucianism issues which require Spiritual Care Services interventions at this time. Plan: 
Chaplains will continue to follow and will provide pastoral care on an as needed/requested basis.  recommends bedside caregivers page  on duty if patient shows signs of acute spiritual or emotional distress. [facility] Staff  Spiritual Care  
[phone]

## 2018-11-05 NOTE — PROGRESS NOTES
BC reviewed the Medicare notice and the appeal process with pt's daughter and son-in-law. Pt's daughter signed the notice and was given a copy. Pt's family informed BC that pt will be going to [address] [place] [address] for respite care. Discharge planned for 11/9/18. Bc will order pt a rolling walker for home use.

## 2018-11-05 NOTE — PROGRESS NOTES
Nutrition follow-up/Plan of care tcc RECOMMENDATIONS:  
1. Regular Diet (liberalized to increase PO intake) 2. SF CIB TID and HS Snack. 3. Monitor weight, labs and PO intake 4. RD to follow GOALS:  
1. PO intake meets >75% of protein/calorie needs by 11/12 2. Weight Maintenance (+/- 1-2 lb) by 11/12 ASSESSMENT: 
Wt status is classified as normal per BMI of 19.3. However Pt at nutrition risk d/t BMI <23 and age >65 years. Poor PO intake, but slowly improving. SF CIB TID and an HS snack for additional calories/protein. Labs noted. Pt w/ hypocalcemia. Nutrition recommendations listed. RD to follow. Nutrition Risk:  [] High  [x] Moderate []  Low SUBJECTIVE/OBJECTIVE: 
(10/21):  Pt admitted for hyponatremia. PMHx including CHF, Afib, CAD and IBS-d. Noted Pt w/ N/V/D and poor PO intake x 3 days. Pt seen in room with family at bedside. Denies having any problems chewing/swallowing and unsure of weight loss. Per documented weight records weight is stable. Stated she has an allergy to eggs. Reports normally having a good appetite but family stated poor x 3-4 days now. Stated she consumes strawberry CIB at home. Consumed ~25% of lunch per family but didn't like the soup at dinner. Provided a menu to assist with food choice selection and obtained some preferences as well. Liberalized diet to increase intake and ordered SF CIB TID for additional calories/protein. Encouraged intake and will monitor.   
 
(10/24): Pt transferred from  to [address] [place] [address] on 10/23/2018. Pt seen in dining room this morning with dietary assisting with menu selection choices. PO intake improving over past couple of days per vitals (%). Last BM (10/23). Pt seen in room after lunch; observed only 40% of meal consumed. Reports she just doesn't have much of an appetite. Stated she is consuming her supplements. Encouraged intake and involvement with menu selections. Will continue to monitor. (10/30): Pt seen in room OOB in chair after lunch; seems more confused today. Pt stated: \"Well Yumi Patricia said I have so much money I can have the TV on all of the time. \" Appetite appears to be slowly improving, but still variable; observed 35-40% of meal consumed. Noted 8.4 lb or 7.4% loss from admission weight recorded, but stable per documented weight record PTA. Encouraged intake and will monitor. (11/5): Noted BG levels in the morning have been low so will add in an HS snack (pudding, ice cream, sherbet or fruit cup). Had d/w RN, [facility], and CNA, [employer]. Pt was in the middle of an assessment. Appetite has been variable still and stable weight. Will monitor. Information Obtained from:  
 [x] Chart Review [x] Patient [x] Family/Caregiver 
 [] Nurse/Physician 
 [] Interdisciplinary Meeting/Rounds Diet: Regular Diet Medications: [x] Reviewed Plavix, Lopressor, Remeron Allergies: [x] Reviewed (Eggs) Patient Active Problem List  
Diagnosis Code  SOB (shortness of breath) R06.02  
 NSTEMI (non-ST elevated myocardial infarction) ([facility]) I21.4  CAD (coronary artery disease) I25.10  
 Ischemic cardiomyopathy I25.5  Arthritis, degenerative M19.90  Vertigo R42  
 HLD (hyperlipidemia) E78.5  Chronic a-fib ([facility]) I48.2  Irritable bowel syndrome with diarrhea K58.0  Chronic fatigue R53.82  
 Hyponatremia E87.1  Acute metabolic encephalopathy E26.57  
 Hyperkalemia E87.5 Past Medical History:  
Diagnosis Date  A-fib ([address] 01/2017  Arthritis, degenerative  CAD (coronary artery disease) 04/2012 S/P 2.75 X 15 mm BMS of OM (04/2012), Two LAD BMS (07/2012)  Elevated liver enzymes Likely from statin  HLD (hyperlipidemia)  Hyperkalemia 06/2012 Likely from lisinopril  Ischemic cardiomyopathy LVEF  45% (11/2012) & 30% echo (04/2012)  NSTEMI (non-ST elevated myocardial infarction) ([facility] 04/2012  Vertigo Labs:   
Lab Results Component Value Date/Time Sodium 138 11/05/2018 04:46 AM  
 Potassium 4.3 11/05/2018 04:46 AM  
 Chloride 100 11/05/2018 04:46 AM  
 CO2 33 (H) 11/05/2018 04:46 AM  
 Anion gap 5 11/05/2018 04:46 AM  
 Glucose 57 (L) 11/05/2018 04:46 AM  
 BUN 13 11/05/2018 04:46 AM  
 Creatinine 0.90 11/05/2018 04:46 AM  
 Calcium 8.2 (L) 11/05/2018 04:46 AM  
 Magnesium 1.7 (L) 04/24/2012 05:00 AM  
 Phosphorus 2.2 (L) 04/24/2012 05:00 AM  
 Albumin 2.5 (L) 10/20/2018 05:50 PM  
 
Anthropometrics: BMI (calculated): 19.3 Last 3 Recorded Weights in this Encounter 10/23/18 1903 10/30/18 1554 Weight: 52 kg (114 lb 9.6 oz) 47.9 kg (105 lb 9.6 oz) Ht Readings from Last 1 Encounters:  
10/23/18 5' 2\" (1.575 m) Weight Metrics 10/30/2018 10/21/2018 10/20/2018 10/9/2018 9/24/2018 4/30/2018 10/31/2017 Weight 105 lb 9.6 oz 110 lb - 103 lb 6.4 oz 104 lb 6.4 oz 100 lb 103 lb BMI 19.31 kg/m2 - 20.12 kg/m2 18.91 kg/m2 19.1 kg/m2 18.29 kg/m2 18.54 kg/m2 No data found. UBW: 104-110 lb [x] Weight Loss (8.4 lb or 7.4% from admission weight)? ? 
[] Weight Gain 
[x] Weight Stable per weight records PTA Estimated Nutrition Needs: [x] MSJ  [] Other: 
Calories: 1557-4969 kcal Based on:   [x] Actual BW   
Protein:   60-65g Based on:   [x] Actual BW Fluid:       6358-7729 ml Based on:   [x] Actual BW  
 
Nutrition Problems Identified:  
[x] Suboptimal PO intake (improving) [x] Food Allergies (Eggs) [] Difficulty chewing/swallowing/poor dentition 
[] Constipation/Diarrhea  
[] Nausea/Vomiting  
[] None 
[] Other:  
 
Plan:  
[x] Therapeutic Diet [x]  Obtained/adjusted food preferences/tolerances and/or snacks options [x]  Continue supplements added  
[] Occupational therapy following for feeding techniques [x]  HS snack added  
[]  Modify diet texture  
[x]  Modify diet for food allergies []  Educate patient  
[]  Assist with menu selection  
[x]  Monitor PO intake on meal rounds [x]  Continue inpatient monitoring and intervention  
[x]  Participated in discharge planning/Interdisciplinary rounds/Team meetings  
[]  Other:  
 
Education Needs: 
 [] Not appropriate for teaching at this time due to: 
 [x] Identified and addressed Nutrition Monitoring and Evaluation: 
[x] Continue ongoing monitoring and intervention 
[] Other Kevon Bryan

## 2018-11-06 PROCEDURE — 74011250637 HC RX REV CODE- 250/637: Performed by: NURSE PRACTITIONER

## 2018-11-06 PROCEDURE — 74011250636 HC RX REV CODE- 250/636: Performed by: NURSE PRACTITIONER

## 2018-11-06 PROCEDURE — 74011250637 HC RX REV CODE- 250/637: Performed by: INTERNAL MEDICINE

## 2018-11-06 PROCEDURE — 74011000250 HC RX REV CODE- 250: Performed by: INTERNAL MEDICINE

## 2018-11-06 RX ADMIN — MIRTAZAPINE 15 MG: 15 TABLET, FILM COATED ORAL at 21:18

## 2018-11-06 RX ADMIN — VITAM B12 100 MCG: 100 TAB at 10:16

## 2018-11-06 RX ADMIN — CLOPIDOGREL BISULFATE 75 MG: 75 TABLET ORAL at 10:16

## 2018-11-06 RX ADMIN — PSYLLIUM HUSK 1 PACKET: 3.4 POWDER ORAL at 10:18

## 2018-11-06 RX ADMIN — METOPROLOL TARTRATE 12.5 MG: 25 TABLET, FILM COATED ORAL at 21:19

## 2018-11-06 RX ADMIN — CALCIUM CARBONATE-VITAMIN D TAB 500 MG-200 UNIT 1 TABLET: 500-200 TAB at 10:16

## 2018-11-06 RX ADMIN — CLOTRIMAZOLE: 1 CREAM TOPICAL at 10:20

## 2018-11-06 RX ADMIN — POLYETHYLENE GLYCOL 3350 17 G: 17 POWDER, FOR SOLUTION ORAL at 10:18

## 2018-11-06 RX ADMIN — CLOTRIMAZOLE: 1 CREAM TOPICAL at 18:00

## 2018-11-06 RX ADMIN — METOPROLOL TARTRATE 12.5 MG: 25 TABLET, FILM COATED ORAL at 10:17

## 2018-11-06 RX ADMIN — ASPIRIN 81 MG CHEWABLE TABLET 81 MG: 81 TABLET CHEWABLE at 10:17

## 2018-11-06 RX ADMIN — DIGOXIN 0.12 MG: 125 TABLET ORAL at 10:16

## 2018-11-06 RX ADMIN — MECLIZINE 12.5 MG: 12.5 TABLET ORAL at 10:17

## 2018-11-06 NOTE — PROGRESS NOTES
Problem: Mobility Impaired (Adult and Pediatric) Goal: *Acute Goals and Plan of Care (Insert Text) PHYSICAL THERAPY STG GOALS : 
Initiated 10/24/2018 and to be accomplished within 1-2 Weeks (Updated 11/6/18) 1. Patient will move from supine to sit and sit to supine  and roll side to side in bed with modified independence. (Maintaining at (S)) 2.  Patient will transfer from bed to chair and chair to bed with supervision/set-up using LRAD. (achieved with RW) 3.  Patient will perform sit to stand with supervision/set-up with Good balance and safety awareness. (Achieved) 4. Patient will ambulate with supervision/set-up for 125 feet with LRAD on level surfaces with 2 turns. (Maintaing at; 228ft with close (S) with RW) 5.  Patient will ascend/descend 5 stairs with left handrail(s) with stand by assistance to allow for safe home access/exit. (Maintaing at; 5 steps with B HR and close (S)) 6. Patient will improve [facility] test [facility] score [facility] 4 to reduce fall risk. (Maintaining at 3) PHYSICAL THERAPY STG GOALS : 
Initiated 10/24/2018 and to be accomplished within 1-2 Weeks (Updated 10/30/18) 1. Patient will move from supine to sit and sit to supine  and roll side to side in bed with modified independence. (Progressing; (S)) 2.  Patient will transfer from bed to chair and chair to bed with supervision/set-up using LRAD. (Progressing; SBA) 3. Patient will perform sit to stand with supervision/set-up with Good balance and safety awareness. (Progressing; close (S)) 4. Patient will ambulate with supervision/set-up for 125 feet with LRAD on level surfaces with 2 turns. (Progressing; 228ft with close (S) with RW) 5.  Patient will ascend/descend 5 stairs with left handrail(s) with stand by assistance to allow for safe home access/exit. (Progressing; 5 steps with B HR and close (S)) 6.   Patient will improve standardized test score for [facility] Balance Scale score of 4 to reduce fall risk. (Progressing; 3) PHYSICAL THERAPY LTG GOALS : 
Initiated 10/24/2018 and to be accomplished within 3-4 Weeks 1. Patient will transfer from bed to chair and chair to bed with modified independence using LRAD. 2.  Patient will perform sit to stand with modified independence with Good balance and safety awareness. 3.  Patient will ambulate with modified independence for 250 feet with LRAD on level surfaces and be able to maneuver through narrow spaces and obstacles without loss of balance. 4.  Patient will ascend/descend 5 stairs with left handrail(s) with supervision/set-up to allow for safe home access/exit. 5.  Patient will improve standardized test score for [place] Standing Balance Scale score of 5 to reduce fall risk. Physical Therapist:   Emily Coreas PT  on 10/24/2018 [id number] PHYSICAL THERAPY WEEKLY PROGRESS REPORT Reporting Period:  Date: 10/30/18 to Patient: Jose Crum (84 y.o. female)                         Date: 11/6/2018 Primary Diagnosis: hypokalemina Attending Physician: Constanza Santos MD 
 Treatment Diagnosis Treatment Diagnosis: difficulty in walking Treatment Diagnosis 2: muscle weakness Precautions:  Fall Rehab Potential : Good: 
 
Skill interventions and education provided with clinical rationale (include individualized treatment techniques and standardized tests):  
Skilled Physical Therapy services were provided with TA to promote Mod (I) with bed mobility and transfers,  
TE to build strength and endurance for improved functional mobility Gait training to address deficits and allow pt to return to PLOF Stair training to allow pt to safely return home upon DC Neuromuscular reeducation of  Movement, coordination and balance for reduced risk of falls Using a comparative statement, summarize significant progress toward goals as a result of skilled intervention provided: 
Patient has made Good progress towards their Physical Therapy goals in the areas of bed mobility, sit<>stand, transfers, gait and stair negotiation. Identify remaining functional areas, impairments limiting progress and/or barriers to improvement: 
Patient would benefit from continues PT services to address the following functional deficits in dynamic balance and Mod (I) with gait and (S) with stair negotiation. Pt is limited by times of confusion. OBJECTIVE DATA SUMMARY:  
 
INITIAL ASSESSMENT WEEKLY ASSESSMENT  
COGNITIVE STATUS COGNITIVE STATUS Neurologic State: Alert Orientation Level: Oriented to situation, Oriented to place, Oriented to person Cognition: Follows commands Perception: Appears intact Perseveration: No perseveration noted Safety/Judgement: Fall preventionNeurologic State: Alert Orientation Level: Oriented X4 Cognition: Follows commands Perception: Appears intact Perseveration: No perseveration noted Safety/Judgement: Fall prevention PAIN PAIN Pain Scale 1: Numeric (0 - 10) Pain Intensity 1: 0 Pain Onset 1: sudden 
Pain Location 1: Back Pain Orientation 1: Posterior Pain Description 1: Aching Pain Intervention(s) 1: Back rub Patient Stated Pain Goal: 0 Pain Reassessment 1: YesPain Scale 1: Visual 
Pain Intensity 1: 0 Pain Onset 1: chronic Pain Location 1: Back, Flank Pain Orientation 1: Right Pain Description 1: Aching Pain Intervention(s) 1: Repositioned, Therapeutic touch Patient Stated Pain Goal: 0 Pain Reassessment 1: Patient sleeping GROSS ASSESSMENT GROSS ASSESSMENT  
AROM: Within functional limits(BUEs) Strength: (BUEs 3+/5) Coordination: Within functional limits(BUEs) Tone: Normal(BUEs) Sensation: Intact(BUEs)AROM: Generally decreased, functional 
Strength: Generally decreased, functional(BLEs grossly 4-/5) Coordination: Generally decreased, functional 
Tone: Normal 
Sensation: Intact(BUEs) BED MOBILITY BED MOBILITY Supine to Sit: Supervision Sit to Supine: Supervision Scooting: Contact guard assistance Supine to Sit: Supervision Sit to Supine: Supervision Scooting: Supervision GAIT GAIT Base of Support: Center of gravity altered Speed/Sarah: Pace decreased (<100 feet/min) Step Length: Left shortened, Right shortened Gait Abnormalities: Decreased step clearance Ambulation - Level of Assistance: Contact guard assistance Distance (ft): 58 Feet (ft) Assistive Device: Gait belt(no AD) Rail Use: Both Stairs - Level of Assistance: Stand-by assistance Number of Stairs Trained: 5 Interventions: Safety awareness training, Verbal cues Base of Support: Center of gravity altered Speed/Sarah: Slow Step Length: Right shortened, Left shortened Gait Abnormalities: Decreased step clearance Ambulation - Level of Assistance: Supervision Distance (ft): 121 Feet (ft) Assistive Device: Gait belt, Walker, rolling Rail Use: Both Stairs - Level of Assistance: Stand-by assistance Number of Stairs Trained: 10 Interventions: Safety awareness training Exceptions to WDLExceptions to [facility] TRANSFERS TRANSFERS Sit to Stand: Contact guard assistance Stand to Sit: Contact guard assistance Bed to Chair: Contact guard assistance(w/RW) Sit to Stand: Supervision Stand to Sit: Supervision Bed to Chair: Contact guard assistance(w/RW) BALANCE BALANCE Sitting: Intact Sitting - Static: Good (unsupported) Sitting - Dynamic: Fair (occasional) Standing: With support Standing - Static: Good Standing - Dynamic : Fair Sitting: Intact Sitting - Static: Good (unsupported) Sitting - Dynamic: Fair (occasional) Standing: With support Standing - Static: Good Standing - Dynamic : Fair WHEELCHAIR MOBILITY/MGMT WHEELCHAIR MOBILITY/MGMT Activity Tolerance:  Fair Activity Tolerance: Fair Visual/Perceptual  
Corrective Lenses: Glasses Visual/Perceptual  
Vision Corrective Lenses: Glasses Auditory: Auditory Impairment: Hard of hearing, bilateral  
 Auditory: Auditory Auditory Impairment: Hard of hearing, bilateral  
  
 Steps: both Clinical Decision making:  [place] standing balance score: 3Clinical Decision making:  [place] standing balance score:3 Treatment:   Partial co-treat with OT to maximize functional gain with standing balance and ambulatory activities. Pt demonstrated good static standing balance with 1 UE support and (S), no noted LOB or sway. Gait training with trails of 120ft and 135ft with RW and (S). Pt continues to present with slightly kyphotic posture and slowed pace. Pt required one standing rest break and one seated rest break during gait training due to fatigue. Pt negotiated 10 steps with B HR and step-to pattern, minimal to no cues required for sequencing/safety. Seated B LE TE's rendered with 2# AW to promote strength and endurance for improved functional mobility; LAQ, hip flexion, ball squeezes, resisted hip abd (orange band), ball squeezes 2x15. Patient's response to treatment rendered:  Javier Paulson Patient expected Discharge Location:  []Private Residence  [x] residential/ILF  []LTC  []Other:  
 
Plan: Continue Skilled PT services as established by the Plan of Care for 3-6 times a week. PT and Assistant have had a weekly case conference regarding the above treatment:  [x] Yes     [] No    
 
Treatment session:  59 minutes. Therapist: Jabier Vidales PTA       Date:11/6/2018 Forward to PT for co-signature when completed.

## 2018-11-06 NOTE — PROGRESS NOTES
The patient attended a group activity to listen to a guitarist. While in attendance of the group, the patient engaged in conversation and colored pictures. The patient also participated in 1:1 activities with the Recreation Therapist about how to make an apple pie.

## 2018-11-06 NOTE — PROGRESS NOTES
Spoke briefly with family in [facility] who reported per patient she hides food in a napkin or places it out of sight of staff and acts as if she has eaten foods on tray. Family doesn't want us to approach patient and let her know he told staff. Education was provided on patient ability to receive alternate menu items upon request such as soup or sandwiches and additionally  informed family can bring food from home that is mechanical soft (explained acceptable items that could be provided) and informed family member to show to nursing first to make sure it is acceptable before giving to patient. Family member voiced understanding and was given my name, should food be provided by family during my working hours, so I can look at it and say it is okay for her diet consistency.

## 2018-11-06 NOTE — PROGRESS NOTES
Problem: Self Care Deficits Care Plan (Adult) Goal: *Acute Goals and Plan of Care (Insert Text) OCCUPATIONAL THERAPY SHORT TERM GOALS Updated 11/06/18 1. Patient will perform Lower body ADL's without adaptive equipment with Supervision. 2.  Patient will perform toileting task with Mod I with Good safety to reduce falls risk. 3.  Patient will perform toilet transfers with Kylah Wang and Supervision 4. Patient will perform standing static/dynamic balance activities for improved ADL/IADL function with Supervision and Good balance and safety awareness. 5.  Patient will improve Barthel index scores to atleast 90/100 to improve functional mobility. 6.  Patient will utilize energy conservation techniques during functional activities with minimal verbal cues. Initiated 10/30/2018 and to be accomplished within 2 [facility]) 1. Patient will perform Upper body ADL's without adaptive equipment with MOD I. (goal met-D/C goal) 2. Patient will perform Lower body ADL's without adaptive equipment with standby assist.(goal met-[facility] Supervision) 3. Patient will perform toileting task with standby assist with Good safety to reduce falls risk. (goal met-[facility] Mod I) 4. Patient will perform toilet transfers with Kylah Wang and contact guard assist. (goal met-[facility] Supervision) 5. Patient will perform standing static/dynamic balance activities for improved ADL/IADL function with SBA and Good balance and safety awareness. (goal met-[facility] Supervision) 6. Patient will improve Barthel index scores to atleast 90/100 to improve functional mobility. (progressing) 7. Patient will utilize energy conservation techniques during functional activities with minimal verbal cues. (progresing) OCCUPATIONAL THERAPY SHORT TERM GOALS  UPDATED 10/30/18 Initiated 10/24/2018 and to be accomplished within 2 [facility]) 1.   Patient will perform Upper body ADL's without adaptive equipment with Supv-set up. SUPERVISION  [facility] MOD I 
2. Patient will perform Lower body ADL's without adaptive equipment with standby assist. SBA,  [facility] SUPERVISION 3. Patient will perform toileting task with standby assist with Good safety to reduce falls risk. SBA,  [facility] SUPERVISION 4. Patient will perform toilet transfers with Rolling Walker and contact guard assist. SBA,  [facility] SUPERVISION 5. Patient will perform standing static/dynamic balance activities for improved ADL/IADL function with contact guard assist and Good balance and safety awareness. CGA WITH FAIR BALANCE, [facility] TO SBA WITH G BALANCE 6. Patient will improve Barthel index scores to atleast 55/100 to improve functional mobility. 80/100 GM [facility] 90/100 
7. Patient will utilize energy conservation techniques during functional activities with minimal verbal cues. PROGRESSING 
 
OCCUPATIONAL THERAPY LONG TERM GOALS Initiated 10/24/2018 and to be accomplished within 4 Week(s) 1. Patient will perform ADL's & IADLs with adaptive equipment as needed with modified independence. 2.  Patient will perform toileting task with modified independence with Good safety to reduce falls risk. 3.  Patient will perform all functional transfers utilizing least restrictive device and durable medical equipment as needed with modified independence and Good balance and safety awareness. 4.  Patient will perform standing static/dynamic activity for improved ADL/IADL function with modified independence and Good balance and safety awareness. 5.  Patient will improve Barthel index score to 95/100 to improve independence with mobility. 6. Patient will perform home making tasks and simple meal prep Mod I utilizing energy conservation techniques and good safety awareness. Therapist: Javier Deleon    10/24/2018 [facility] [place] 
OCCUPATIONAL THERAPY WEEKLY SUMMARY Reporting period:  from 10/30/18 through 11/06/18 Patient: Krishan Abraham (16 y.o. female)   Date: 2018 Primary Diagnosis: hypokalemina Attending Physician: Liliana Sanchez MD Treatment Diagnosis Treatment Diagnosis: difficulty in walking Treatment Diagnosis 2: muscle weakness Precautions: Fall Rehab Potential : Good Skill interventions and education provided with clinical rationale (include individualized treatment techniques and standardized tests): 
Skilled Occupational services were provided utilizing ADL retraining, instruction on adaptive equipment training, safety awareness and energy conservation techniques incorporating balance retraining & UE ROM techniques, therapeutic exercise and activities incorporating strengthening in order to improve ADL performance skills and functional mobility Using a comparative statement, summarize significant progress toward goals as a result of skilled intervention provided: 
Patient has made Good progress towards their Occupational Therapy goals in the following areas: increased independence, performance, and safety with grooming, bathing, upper body dressing, lower body dressing, toileting and toilet transfer. Patient has met 5/7 STGS and making good progression towards  LTG. Identify remaining functional areas, impairments limiting progress and/or barriers to improvement: 
Patient would benefit from continued skilled Occupational Therapy Services to address the following functional deficits in  dynamic standing balance and tolerance needed for ADL/IADLS. OBJECTIVE DATA SUMMARY:  
 
 
INITIAL ASSESSMENT WEEKLY PROGRESS  
COGNITIVE STATUS: COGNITIVE STATUS:  
Neurologic State: Alert Orientation Level: Oriented to situation, Oriented to place, Oriented to person Cognition: Follows commands Perception: Appears intact Perseveration: No perseveration noted Safety/Judgement: Fall prevention Neurologic State: Alert Orientation Level: Oriented X4 Cognition: Follows commands Perception: Appears intact Perseveration: No perseveration noted Safety/Judgement: Fall prevention PAIN: PAIN:  
Pain Scale 1: Numeric (0 - 10) Pain Intensity 1: 0 Pain Onset 1: sudden 
Pain Location 1: Back Pain Orientation 1: Posterior Pain Description 1: Aching Pain Intervention(s) 1: Back rub Patient Stated Pain Goal: 0 Pain Reassessment 1: Yes 
 
 Pain Scale 1: Visual 
Pain Intensity 1: 0 Pain Onset 1: chronic Pain Location 1: Back, Flank Pain Orientation 1: Right Pain Description 1: Aching Pain Intervention(s) 1: Repositioned, Therapeutic touch Patient Stated Pain Goal: 0 Pain Reassessment 1: Patient sleeping BED MOBILITY BED MOBILITY Supine to Sit: Supervision Sit to Supine: Supervision Scooting: Contact guard assistance Supine to Sit: Supervision Sit to Supine: Supervision Scooting: Supervision ADL SELF CARE ADL SELF CARE Oral Facial Hygiene/Grooming: Modified Independent Bathing: Stand-by assistance, Contact guard assistance Type of Bath: Basin/Soap/Water Upper Body Dressing: Supervision Lower Body Dressing: Stand-by assistance Toileting: Stand by assistance Bathing Assistance: Modified independent Position Performed: Seated in chair Adaptive Equipment: Shower chair Dressing Assistance: Modified independent Pullover Shirt: Modified independent Bathing Assistance: Stand-by assistance Perineal  : Stand-by assistance Position Performed: Standing Cues: Verbal cues provided Adaptive Equipment: Grab bar Lower Body : Stand-by assistance Position Performed: Bending forward method Cues: Verbal cues provided Adaptive Equipment: Shower chair Toileting Assistance: Supervision/set up(Distant) Bladder Hygiene: Supervision/set-up Bowel Hygiene: Supervision/set-up Clothing Management: Supervision/set-up Adaptive Equipment: Elevated seat, Grab bars, Josu[place] Grooming Assistance: Supervision/set up Washing Face: Supervision/set-up Washing Hands: Supervision/set-up Brushing Teeth: Supervision/set-up Dressing Assistance: Minimum assistance Protective Undergarmet: Supervision/set-up Pants With Elastic Waist: Supervision/set-up Socks: Supervision/set-up Leg Crossed Method Used: No 
Position Performed: Seated in chair, Standing Cues: Verbal cues provided Adaptive Equipment Used: Grab bar Feeding Assistance: Supervision/set-up TRANSFERS TRANSFERS Sit to Stand: Contact guard assistance Stand to Sit: Contact guard assistance Bed to Chair: Contact guard assistance(w/RW) Bathroom Mobility: (close Supv w/ RW) Toilet Transfer : Minimum assistance(BSC transfer and toilet transfer) Shower Transfer: Contact guard assistance Sit to Stand: Supervision(close) Stand to Sit: Stand-by assistance Bed to Chair: Contact guard assistance(w/RW) Bathroom Mobility: Supervision/set up Toilet Transfer : Supervision Shower Transfer: Contact guard assistance Bathroom Mobility: (close Supv w/ RW) Toilet Transfer : Minimum assistance(BSC transfer and toilet transfer) Shower Transfer: Contact guard assistance Adaptive Equipment: Walker (comment) Bathroom Mobility: Supervision/set up Toilet Transfer : Supervision Shower Transfer: Contact guard assistance Adaptive Equipment: [employer] (comment) BALANCE BALANCE Sitting: Intact Sitting - Static: Good (unsupported) Sitting - Dynamic: Fair (occasional) Standing: With support Standing - Static: Good Standing - Dynamic : Fair Sitting: Intact Sitting - Static: Good (unsupported) Sitting - Dynamic: Fair (occasional) Standing: With support Standing - Static: Good Standing - Dynamic : Fair GROSS ASSESSMENT GROSS ASSESSMENT 
AROM: Within functional limits(BUEs) Strength: (BUEs 3+/5) Coordination: Within functional limits(BUEs) Tone: Normal(BUEs) Sensation: Intact(BUEs) AROM: Generally decreased, functional 
Strength: Generally decreased, functional(BLEs grossly 4-/5) Coordination: Generally decreased, functional 
 Tone: Normal 
Sensation: Intact(BUEs) COORDINATION COORDINATION Fine Motor Skills-Upper: Left Intact, Right Intact Gross Motor Skills-Upper: Left Intact, Right Intact Fine Motor Skills-Upper: Left Intact, Right Intact Gross Motor Skills-Upper: Left Intact, Right Intact VISUAL/PERCEPTUALVISUAL/PERCEPTUAL Corrective Lenses: Glasses   Corrective Lenses: Glasses AUDITORY: AUDITORY:  
Auditory Impairment: Hard of hearing, bilateral Auditory Impairment: Hard of hearing, bilateral  
   
INSTRUMENTAL  ADL'S: 
  INSTRUMENTAL ADL'S: 
   
 
THE BARTHEL INDEXACTIVITY 
 SCORE FEEDING 
0=unable 5=needs help cutting,spreading butter,etc., or modified diet 10= independent 10 BATHING 
0=dependent 5=independent (or in shower  
0  
GROOMING 
0=needs help 5=independent face/hair/teeth/shaving (implements provided) 5 DRESSING 
0=dependent 5=needs help but can do about half unaided 10=independent(including buttons, zips,laces etc.) 5 BOWELS 
0=incontinent 5=occasional accident 10=continent 10 BLADDER 
0=incontinent, or catheterized and unable to manage alone 5=occasional accident 10=continent 10 TOILET USE 
0=dependent 5=needs some help, but can do something alone 10=independent (on and off, dressing, wiping) 5 TRANSFER (BED TO CHAIR AND BACK) 0=unable, no sitting balance 5=major help(one or two people,physical), can sit 10=minor help(verbal or physical) 15=independent 10 MOBILITY (ON LEVEL SURFACES) 0=immobile or <50 yards 5=wheelchair independent,including corners,>50 yards 10=walkes with help of one person (verbal or physical) >50 yards 15=independent(but may use any aid; for example, stick) >50 yards 10 STAIRS 
0=unable 5=needs help (verbal, physical, carrying aid) 10=independent  
5  
   
     TOTAL:                 70/100 Treatment:  UB strengthening with 2#, 2 sets x 15 reps in order to increase functional activity tolerance needed for ADLS.  Static standing balance with one hand release in order to increase standing balance and tolerance needed for ADLS. Patient was able to tolerate standing with Supervision for 4:30 prior to requesting to sit. Cueing needed for stepping back prior to stand to sit for optimal safety. Functional mobility utilizing RW in hallway in order to increase functional activity tolerance, safety and independence during routine. Patient's response to Treatment rendered:  Patient is pleasant and receptive to therapist cueing and recommendations. Patient expected Discharge Location:  []Private Residence  [x] INGRID/ILF  []LTC  []Other: 
 
Plan: Continue OT services as established on the Plan of Care for 3-6 times a week. Treatment Minutes:  62 OT and Assistant have had a weekly case conference regarding the above treatment:  [x] Yes     [] No   
Therapist:   Antonette Ribeiro,         Date:11/6/2018 Forward to OT for co-signature when completed

## 2018-11-06 NOTE — ROUTINE PROCESS
Verbal shift change report given to Joseph Beal (oncoming nurse) by Raymond Velazquez lpn (offgoing nurse). Report included the following information SBAR, Kardex and MAR.

## 2018-11-06 NOTE — ROUTINE PROCESS
Bedside and verbal shift report given to Tali Urban LPN (oncoming nurse) by YUMIKO Cobb LPN (off going nurse). Report included SBAR, MAR and Kardex.

## 2018-11-07 PROCEDURE — 74011250637 HC RX REV CODE- 250/637: Performed by: INTERNAL MEDICINE

## 2018-11-07 PROCEDURE — 74011250637 HC RX REV CODE- 250/637: Performed by: NURSE PRACTITIONER

## 2018-11-07 PROCEDURE — 74011250636 HC RX REV CODE- 250/636: Performed by: NURSE PRACTITIONER

## 2018-11-07 PROCEDURE — 74011000250 HC RX REV CODE- 250: Performed by: INTERNAL MEDICINE

## 2018-11-07 RX ORDER — FUROSEMIDE 20 MG/1
20 TABLET ORAL EVERY OTHER DAY
Status: DISCONTINUED | OUTPATIENT
Start: 2018-11-07 | End: 2018-11-09 | Stop reason: HOSPADM

## 2018-11-07 RX ADMIN — MECLIZINE 12.5 MG: 12.5 TABLET ORAL at 10:22

## 2018-11-07 RX ADMIN — CALCIUM CARBONATE-VITAMIN D TAB 500 MG-200 UNIT 1 TABLET: 500-200 TAB at 10:21

## 2018-11-07 RX ADMIN — METOPROLOL TARTRATE 12.5 MG: 25 TABLET, FILM COATED ORAL at 21:56

## 2018-11-07 RX ADMIN — CLOTRIMAZOLE: 1 CREAM TOPICAL at 21:58

## 2018-11-07 RX ADMIN — CLOTRIMAZOLE: 1 CREAM TOPICAL at 10:25

## 2018-11-07 RX ADMIN — VITAM B12 100 MCG: 100 TAB at 10:21

## 2018-11-07 RX ADMIN — DIGOXIN 0.12 MG: 125 TABLET ORAL at 10:22

## 2018-11-07 RX ADMIN — METOPROLOL TARTRATE 12.5 MG: 25 TABLET, FILM COATED ORAL at 10:24

## 2018-11-07 RX ADMIN — MIRTAZAPINE 15 MG: 15 TABLET, FILM COATED ORAL at 21:56

## 2018-11-07 RX ADMIN — FUROSEMIDE 20 MG: 20 TABLET ORAL at 12:32

## 2018-11-07 RX ADMIN — POLYETHYLENE GLYCOL 3350 17 G: 17 POWDER, FOR SOLUTION ORAL at 10:22

## 2018-11-07 RX ADMIN — ASPIRIN 81 MG CHEWABLE TABLET 81 MG: 81 TABLET CHEWABLE at 10:21

## 2018-11-07 RX ADMIN — CLOPIDOGREL BISULFATE 75 MG: 75 TABLET ORAL at 10:23

## 2018-11-07 RX ADMIN — PSYLLIUM HUSK 1 PACKET: 3.4 POWDER ORAL at 10:22

## 2018-11-07 NOTE — PROGRESS NOTES
Problem: Self Care Deficits Care Plan (Adult) Goal: *Acute Goals and Plan of Care (Insert Text) OCCUPATIONAL THERAPY SHORT TERM GOALS Updated 11/06/18 1. Patient will perform Lower body ADL's without adaptive equipment with Supervision. 2.  Patient will perform toileting task with Mod I with Good safety to reduce falls risk. 3.  Patient will perform toilet transfers with Nicholos Mare and Supervision 4. Patient will perform standing static/dynamic balance activities for improved ADL/IADL function with Supervision and Good balance and safety awareness. 5.  Patient will improve Barthel index scores to atleast 90/100 to improve functional mobility. 6.  Patient will utilize energy conservation techniques during functional activities with minimal verbal cues. Initiated 10/30/2018 and to be accomplished within 2 [facility]) 1. Patient will perform Upper body ADL's without adaptive equipment with MOD I. (goal met-D/C goal) 2. Patient will perform Lower body ADL's without adaptive equipment with standby assist.(goal met-[facility] Supervision) 3. Patient will perform toileting task with standby assist with Good safety to reduce falls risk. (goal met-[facility] Mod I) 4. Patient will perform toilet transfers with Nicholos Mare and contact guard assist. (goal met-[facility] Supervision) 5. Patient will perform standing static/dynamic balance activities for improved ADL/IADL function with SBA and Good balance and safety awareness. (goal met-[facility] Supervision) 6. Patient will improve Barthel index scores to atleast 90/100 to improve functional mobility. (progressing) 7. Patient will utilize energy conservation techniques during functional activities with minimal verbal cues. (progresing) OCCUPATIONAL THERAPY SHORT TERM GOALS  UPDATED 10/30/18 Initiated 10/24/2018 and to be accomplished within 2 [facility]) 1.   Patient will perform Upper body ADL's without adaptive equipment with Supv-set up. SUPERVISION  [facility] MOD I 
2. Patient will perform Lower body ADL's without adaptive equipment with standby assist. SBA,  [facility] SUPERVISION 3. Patient will perform toileting task with standby assist with Good safety to reduce falls risk. SBA,  [facility] SUPERVISION 4. Patient will perform toilet transfers with Rolling Walker and contact guard assist. SBA,  [facility] SUPERVISION 5. Patient will perform standing static/dynamic balance activities for improved ADL/IADL function with contact guard assist and Good balance and safety awareness. CGA WITH FAIR BALANCE, [facility] TO SBA WITH G BALANCE 6. Patient will improve Barthel index scores to atleast 55/100 to improve functional mobility. 80/100 GM U[facility] 90/100 
7. Patient will utilize energy conservation techniques during functional activities with minimal verbal cues. PROGRESSING 
 
OCCUPATIONAL THERAPY LONG TERM GOALS Initiated 10/24/2018 and to be accomplished within 4 Week(s) 1. Patient will perform ADL's & IADLs with adaptive equipment as needed with modified independence. 2.  Patient will perform toileting task with modified independence with Good safety to reduce falls risk. 3.  Patient will perform all functional transfers utilizing least restrictive device and durable medical equipment as needed with modified independence and Good balance and safety awareness. 4.  Patient will perform standing static/dynamic activity for improved ADL/IADL function with modified independence and Good balance and safety awareness. 5.  Patient will improve Barthel index score to 95/100 to improve independence with mobility. 6. Patient will perform home making tasks and simple meal prep Mod I utilizing energy conservation techniques and good safety awareness. Therapist: Anita Henry    10/24/2018 [address] [place] [address] Occupational Therapy Daily Treatment note Patient: Nasir Alfred (45 y.o. female)       Date: 11/7/2018 Attending Physician: Elvia Charles MD 
Primary Diagnosis: hypokalemina Treatment Diagnosis Treatment Diagnosis: difficulty in walking Treatment Diagnosis 2: muscle weakness Precautions : Precautions at Admission: Fall Vital Signs: 
Vital Signs Temp: 97.1 °F (36.2 °C) Temp Source: Oral 
Pulse (Heart Rate): 90 Resp Rate: 20 
O2 Sat (%): 98 % BP: 137/79 MAP (Calculated): 98  
Cognitive Status: 
  
Pain:  
  
Gross Assessment:  
Coordination:  
Bed Mobility:Bed Mobility Supine to Sit: Supervision Transfers:Functional Transfers Sit to Stand: Supervision Bathroom Mobility: Supervision/set up Toilet Transfer : Supervision Functional Transfers Bathroom Mobility: Supervision/set up Toilet Transfer : Supervision Balance: ADL Self Care: ADL Intervention: Toileting Toileting Assistance: Supervision/set up Bladder Hygiene: Supervision/set-up Bowel Hygiene: Supervision/set-up Clothing Management: Supervision/set-up Lower Body Dressing Assistance Dressing Assistance: Supervision/set up Pants With Elastic Waist: Supervision/set-up Leg Crossed Method Used: No 
Position Performed: Seated edge of bed;Bending forward method Therapeutic Activities: 
Shadowed patient with bed mobility, LB dressing and  bathroom mobility utilizing RW, toileting routine in order to assess safety and independence during routine. See above for levels of A needed. Functional mobility utilizing RW from patient's room to therapy room in order to increase functional activity tolerance and independence during task. Static standing activity with two hand release in order to challenge balance and stability needed to safely complete ADLS. Patient was able to tolerate standing for 5 mins with Supervision prior to requesting to sit. Therapeutic Exercises: 
UB restorator x 5 mins in order to increase functional activity tolerance needed for ADLS. Patient/Caregiver Education: Pt. Judy Goss Education on self pacing during exericises was provided for optimal energy consveration. ASSESSMENT: 
Patient continues to demonstrate the need for skilled Occupational Therapy services to improve dynamic standing balance needed for bathing Progression toward goals: 
[x]      Improving appropriately and progressing toward goals 
[]      Improving slowly and progressing toward goals 
[]      Not making progress toward goals and plan of care will be adjusted Treatment session:   60 minutes Therapist:    Jose Arechiga, [address] ,  11/7/2018

## 2018-11-07 NOTE — ROUTINE PROCESS
Verbal shift change report given to Suha thrasher (oncoming nurse) by Hugo Friend lpn (offgoing nurse). Report included the following information SBAR, Kardex and MAR.

## 2018-11-07 NOTE — PROGRESS NOTES
The patient attended a group activity and was given the choice between bowling on Wii Sports or coloring. The patient decided to color, the patient was successfully able to participate in the group. While in attendance the patient engaged in conversation with other patients.  The

## 2018-11-07 NOTE — PROGRESS NOTES
Bedside and Verbal shift change report given to chel arteaga (oncoming nurse) by Nish Hernandez (offgoing nurse). Report included the following information SBAR, Intake/Output and Recent Results.

## 2018-11-08 PROCEDURE — 74011250637 HC RX REV CODE- 250/637: Performed by: INTERNAL MEDICINE

## 2018-11-08 PROCEDURE — 74011250637 HC RX REV CODE- 250/637: Performed by: NURSE PRACTITIONER

## 2018-11-08 PROCEDURE — 74011000250 HC RX REV CODE- 250: Performed by: INTERNAL MEDICINE

## 2018-11-08 PROCEDURE — 74011250636 HC RX REV CODE- 250/636: Performed by: NURSE PRACTITIONER

## 2018-11-08 RX ORDER — FLUTICASONE PROPIONATE 50 MCG
2 SPRAY, SUSPENSION (ML) NASAL
Qty: 1 BOTTLE | Refills: 0 | Status: SHIPPED | OUTPATIENT
Start: 2018-11-08 | End: 2019-10-09

## 2018-11-08 RX ORDER — GUAIFENESIN 100 MG/5ML
81 LIQUID (ML) ORAL DAILY
Qty: 30 TAB | Refills: 0 | Status: SHIPPED | OUTPATIENT
Start: 2018-11-09

## 2018-11-08 RX ORDER — MECLIZINE HCL 12.5 MG 12.5 MG/1
12.5 TABLET ORAL DAILY
Qty: 30 TAB | Refills: 0 | Status: SHIPPED | OUTPATIENT
Start: 2018-11-09 | End: 2018-11-19

## 2018-11-08 RX ORDER — DIGOXIN 125 MCG
0.12 TABLET ORAL DAILY
Qty: 30 TAB | Refills: 0 | Status: ON HOLD | OUTPATIENT
Start: 2018-11-09 | End: 2019-10-04 | Stop reason: SDUPTHER

## 2018-11-08 RX ORDER — FERROUS SULFATE, DRIED 160(50) MG
1 TABLET, EXTENDED RELEASE ORAL DAILY
Qty: 30 TAB | Refills: 0 | Status: SHIPPED | OUTPATIENT
Start: 2018-11-09 | End: 2019-02-19

## 2018-11-08 RX ORDER — ONDANSETRON 4 MG/1
4 TABLET, ORALLY DISINTEGRATING ORAL
Qty: 12 TAB | Refills: 0 | Status: SHIPPED | OUTPATIENT
Start: 2018-11-08 | End: 2019-01-16 | Stop reason: SDUPTHER

## 2018-11-08 RX ORDER — FUROSEMIDE 20 MG/1
20 TABLET ORAL EVERY OTHER DAY
Qty: 20 TAB | Refills: 0 | Status: SHIPPED | OUTPATIENT
Start: 2018-11-08 | End: 2018-11-15

## 2018-11-08 RX ORDER — UREA 10 %
100 LOTION (ML) TOPICAL DAILY
Qty: 30 TAB | Refills: 0 | Status: SHIPPED | OUTPATIENT
Start: 2018-11-09 | End: 2019-02-19

## 2018-11-08 RX ORDER — POLYETHYLENE GLYCOL 3350 17 G/17G
17 POWDER, FOR SOLUTION ORAL DAILY
Qty: 30 PACKET | Refills: 0 | Status: SHIPPED | OUTPATIENT
Start: 2018-11-09 | End: 2019-02-19 | Stop reason: DRUGHIGH

## 2018-11-08 RX ORDER — MIRTAZAPINE 15 MG/1
15 TABLET, FILM COATED ORAL
Qty: 30 TAB | Refills: 0 | Status: ON HOLD | OUTPATIENT
Start: 2018-11-08 | End: 2019-10-09 | Stop reason: SDUPTHER

## 2018-11-08 RX ORDER — CLOPIDOGREL BISULFATE 75 MG/1
75 TABLET ORAL DAILY
Qty: 30 TAB | Refills: 0 | Status: SHIPPED | OUTPATIENT
Start: 2018-11-09 | End: 2018-11-15 | Stop reason: SDUPTHER

## 2018-11-08 RX ORDER — METOPROLOL TARTRATE 25 MG/1
12.5 TABLET, FILM COATED ORAL EVERY 12 HOURS
Qty: 60 TAB | Refills: 0 | Status: SHIPPED | OUTPATIENT
Start: 2018-11-08 | End: 2018-11-15

## 2018-11-08 RX ADMIN — MIRTAZAPINE 15 MG: 15 TABLET, FILM COATED ORAL at 21:58

## 2018-11-08 RX ADMIN — METOPROLOL TARTRATE 12.5 MG: 25 TABLET, FILM COATED ORAL at 09:57

## 2018-11-08 RX ADMIN — ASPIRIN 81 MG CHEWABLE TABLET 81 MG: 81 TABLET CHEWABLE at 09:57

## 2018-11-08 RX ADMIN — VITAM B12 100 MCG: 100 TAB at 09:57

## 2018-11-08 RX ADMIN — CALCIUM CARBONATE-VITAMIN D TAB 500 MG-200 UNIT 1 TABLET: 500-200 TAB at 09:57

## 2018-11-08 RX ADMIN — PSYLLIUM HUSK 1 PACKET: 3.4 POWDER ORAL at 09:57

## 2018-11-08 RX ADMIN — CLOTRIMAZOLE: 1 CREAM TOPICAL at 22:03

## 2018-11-08 RX ADMIN — CLOPIDOGREL BISULFATE 75 MG: 75 TABLET ORAL at 09:57

## 2018-11-08 RX ADMIN — POLYETHYLENE GLYCOL 3350 17 G: 17 POWDER, FOR SOLUTION ORAL at 09:57

## 2018-11-08 RX ADMIN — MECLIZINE 12.5 MG: 12.5 TABLET ORAL at 09:57

## 2018-11-08 RX ADMIN — DIGOXIN 0.12 MG: 125 TABLET ORAL at 09:57

## 2018-11-08 RX ADMIN — CLOTRIMAZOLE: 1 CREAM TOPICAL at 13:43

## 2018-11-08 RX ADMIN — METOPROLOL TARTRATE 12.5 MG: 25 TABLET, FILM COATED ORAL at 21:58

## 2018-11-08 NOTE — DISCHARGE SUMMARY
[facility]     Discharge Summary    Patient: Sadaf Gamez MRN: [id number]  CSN: [id number]    YOB: 1927  Age: 80 y.o. Sex: female    DOA: 10/23/2018 LOS:  LOS: 16 days   Discharge Date:      Admission Diagnoses: hypokalemina    Discharge Diagnoses:    Problem List as of 11/8/2018 Never Reviewed          Codes Class Noted - Resolved    Hyperkalemia ICD-10-CM: E87.5  ICD-9-CM: 276.7  10/23/2018 - Present        Acute metabolic encephalopathy [id number]-[id number]: G93.41  ICD-9-CM: 348.31  10/21/2018 - Present        Hyponatremia ICD-10-CM: E87.1  ICD-9-CM: 276.1  10/20/2018 - Present        Chronic fatigue ICD-10-CM: R53.82  ICD-9-CM: 780.79  10/9/2018 - Present        Irritable bowel syndrome with diarrhea ICD-10-CM: K58.0  ICD-9-CM: 564.1  9/24/2018 - Present        Chronic a-fib ([address] ICD-10-CM: I48.2  ICD-9-CM: 427.31  5/2/2017 - Present        CAD (coronary artery disease) ICD-10-CM: I25.10  ICD-9-CM: 414.00  Unknown - Present    Overview Signed 5/3/2012  3:22 PM by Uzair Olvera MD     S/P 2.75 X 15 mm BMS of Obtuse marginal             Ischemic cardiomyopathy ICD-10-CM: I25.5  ICD-9-CM: 414.8  Unknown - Present        Arthritis, degenerative ICD-10-CM: M19.90  ICD-9-CM: 715.90  Unknown - Present        Vertigo ICD-10-CM: R42  ICD-9-CM: 780.4  Unknown - Present        HLD (hyperlipidemia) ICD-10-CM: E78.5  ICD-9-CM: 272.4  Unknown - Present        SOB (shortness of breath) ICD-10-CM: R06.02  ICD-9-CM: 786.05  4/27/2012 - Present        NSTEMI (non-ST elevated myocardial infarction) [facility]) ICD-10-CM: I21.4  ICD-9-CM: 410.70  4/1/2012 - Present              Discharge Condition: Good    Discharge To: Home with [facility] Course: Ms Jessi Chacon is  A 80 yr  Old pleasant elderly patient that was recently hospitalized between 10/20-10/23 for eval of AMS and persistent NVD and dehydration.  Noted patient was tx 1 week prior to hospitalization for UTI w/ Cipro, however wasn't able to tolerate med due to N/V. She reported that she does had IBS-d, probable intolerant to Cipro. Report poor po intake. Patient had work up, she was found to have mild hyponatremia and hyperkalemia. Patient improved and was sent to [facility] to complete her Omnicef and to have bmp check for eval of K and NA level. Orders also for patient to received a dose of Kayexalate. Since admitted to facility, dietitian is still following patient, she does have poor po intake. Today patient reported that she does have dizziness, family report that she does take Antivert at home scheduled. Patient also report that its appears that's he is having flare of IBS, no report of diarrhea or constipation, report of nausea, in which Zofran is helping. Family report that she as seen by GI in past for this c/o. No report of NVD, fever or chills.      11/31/2018 Patient is been seen for eval, nursing and family report patient is having episode of confusion. I examined patient today, she states that she is doing ok. No report of fever or chills. She had UA and C&S was begative. Her NA level was 132, family report that patient takes lasix as needed at home it does seem like she needs it daily. Patient report that her diarrhea has improved. No report of fever or chills. 11/5/2018 Patient is being seen for follow NA level. Her Na is now  138. I examined patient today and she states that she was going ok, report of being in a depressed mood, over the weekend. I spoke with her family for approx 10 mins, report that patient was on Prozac for approx 1 yr, but is currently off med. Patient states that she is reminiscing on the past which is upsetting to her. Patient states that she would like to speak with psych. She was seen by psych, no recommendations. Patient  Denies any depressed mood, report she feel much better, she recalled past memories that was very disturbing, but now she feels better talking to someone.        11/8/2018: Patient is being seen discharge eval. Patient to be discharge to [address] [place] [address] with [facility]. Patient did improved with PT/OT. Patient report loose stools are improved since placed on Metamucil. No report of fever or chill. Patient is stable for discharge to assistive facility on 11/9/2018. PAST MEDICAL Hx: Afib, CAD, NSTEMI, HLD, Combined HF, IBS, UTI, hyponatremia, Ischemic Cardiomyopathy, Vertigo  PAST SURGICAL hx : Hx of Coronary stent placement, hx of hr cath  SOCIAL hx : , lives with family, never smoked, never drink  FAMILY hx: DM - mother  ALLERGIES. Codeine, eggs     Review of Systems  Constitutional:  No fever or weight loss  HEENT:  No headache or visual changes  Cardiovascular:  No chest pain or diap+ shortness of breath. GI:  + N no vomiting, no diarrhea no constipation. Last bm 10/24/2018  :  No hematuria or dysuria  Skin:  No rashes or moles  Neuro:  No seizures or syncope  Hematological:  No bruising or bleeding  Endocrine:  No diabetes or thyroid disease      Consults: None    Significant Diagnostic Studies: labs:  See below    Discharge Medications:     Current Discharge Medication List      START taking these medications    Details   psyllium husk-aspartame (METAMUCIL FIBER) 3.4 gram pwpk packet Take 1 Packet by mouth daily. Qty: 30 Packet, Refills: 0      polyethylene glycol (MIRALAX) 17 gram packet Take 1 Packet by mouth daily. Qty: 30 Packet, Refills: 0      ondansetron (ZOFRAN ODT) 4 mg disintegrating tablet Take 1 Tab by mouth every eight (8) hours as needed. Qty: 12 Tab, Refills: 0      calcium-vitamin D (OYSTER SHELL) 500 mg(1,250mg) -200 unit per tablet Take 1 Tab by mouth daily. Qty: 30 Tab, Refills: 0      !! clopidogrel (PLAVIX) 75 mg tab Take 1 Tab by mouth daily. Qty: 30 Tab, Refills: 0      aspirin 81 mg chewable tablet Take 1 Tab by mouth daily. Qty: 30 Tab, Refills: 0       !! - Potential duplicate medications found. Please discuss with provider.       CONTINUE these medications which have CHANGED    Details   mirtazapine (REMERON) 15 mg tablet Take 1 Tab by mouth nightly. Qty: 30 Tab, Refills: 0      metoprolol tartrate (LOPRESSOR) 25 mg tablet Take 0.5 Tabs by mouth every twelve (12) hours. Qty: 60 Tab, Refills: 0      meclizine (ANTIVERT) 12.5 mg tablet Take 1 Tab by mouth daily for 10 days. Qty: 30 Tab, Refills: 0      furosemide (LASIX) 20 mg tablet Take 1 Tab by mouth every other day. Qty: 20 Tab, Refills: 0      fluticasone (FLONASE) 50 mcg/actuation nasal spray 2 Sprays by Both Nostrils route daily as needed for Rhinitis. Qty: 1 Bottle, Refills: 0      digoxin (LANOXIN) 0.125 mg tablet Take 1 Tab by mouth daily. Qty: 30 Tab, Refills: 0      cyanocobalamin (VITAMIN B12) 100 mcg tablet Take 1 Tab by mouth daily. Qty: 30 Tab, Refills: 0         CONTINUE these medications which have NOT CHANGED    Details   clotrimazole (LOTRIMIN) 1 % topical cream Apply  to affected area two (2) times a day. Apply with barrier cream to affected area. Qty: 15 g, Refills: 1      ondansetron hcl (ZOFRAN) 4 mg tablet Take 1 Tab by mouth every eight (8) hours as needed for Nausea. Qty: 30 Tab, Refills: 1      psyllium (METAMUCIL) powd Use as directed daily  Indications: Irritable Bowel Syndrome  Qty: 1 Bottle, Refills: 1      !! clopidogrel (PLAVIX) 75 mg tab Take 1 Tab by mouth daily. Qty: 90 Tab, Refills: 3      nitroglycerin (NITROSTAT) 0.4 mg SL tablet 1 Tab by SubLINGual route every five (5) minutes as needed for Chest Pain. Qty: 25 Tab, Refills: 3       !! - Potential duplicate medications found. Please discuss with provider.       STOP taking these medications       cefdinir (OMNICEF) 300 mg capsule Comments:   Reason for Stopping:         sodium polystyrene (KAYEXALATE) powder Comments:   Reason for Stopping:         aspirin delayed-release 81 mg tablet Comments:   Reason for Stopping:         Calcium-Vitamin D3-Vitamin K [id number] mg-unit-mcg chew Comments:   Reason for Stopping:               Results for Gonzalo Patricia (MRN [id number]) as of 11/8/2018 12:53   Ref. Range 11/5/2018 04:46   WBC Latest Ref Range: 4.6 - 13.2 K/uL 8.5   RBC Latest Ref Range: 4.20 - 5.30 M/uL 4.47   HGB Latest Ref Range: 12.0 - 16.0 g/dL 12.1   HCT Latest Ref Range: 35.0 - 45.0 % 38.2   MCV Latest Ref Range: 74.0 - 97.0 FL 85.5   MCH Latest Ref Range: 24.0 - 34.0 PG 27.1   MCHC Latest Ref Range: 31.0 - 37.0 g/dL 31.7   RDW Latest Ref Range: 11.6 - 14.5 % 16.5 (H)   PLATELET Latest Ref Range: 135 - 420 K/uL 307   MPV Latest Ref Range: 9.2 - 11.8 FL 9.1 (L)   NEUTROPHILS Latest Ref Range: 40 - 73 % 61   LYMPHOCYTES Latest Ref Range: 21 - 52 % 20 (L)   MONOCYTES Latest Ref Range: 3 - 10 % 13 (H)   EOSINOPHILS Latest Ref Range: 0 - 5 % 5   BASOPHILS Latest Ref Range: 0 - 2 % 1   DF Latest Units:   AUTOMATED   ABS. NEUTROPHILS Latest Ref Range: 1.8 - 8.0 K/UL 5.1   ABS. LYMPHOCYTES Latest Ref Range: 0.9 - 3.6 K/UL 1.7   ABS. MONOCYTES Latest Ref Range: 0.05 - 1.2 K/UL 1.1   ABS. EOSINOPHILS Latest Ref Range: 0.0 - 0.4 K/UL 0.5 (H)   ABS. BASOPHILS Latest Ref Range: 0.0 - 0.1 K/UL 0.1   Sodium Latest Ref Range: 136 - 145 mmol/L 138   Potassium Latest Ref Range: 3.5 - 5.5 mmol/L 4.3   Chloride Latest Ref Range: 100 - 108 mmol/L 100   CO2 Latest Ref Range: 21 - 32 mmol/L 33 (H)   Anion gap Latest Ref Range: 3.0 - 18 mmol/L 5   Glucose Latest Ref Range: 74 - 99 mg/dL 57 (L)   BUN Latest Ref Range: 7.0 - 18 MG/DL 13   Creatinine Latest Ref Range: 0.6 - 1.3 MG/DL 0.90   BUN/Creatinine ratio Latest Ref Range: 12 - 20   14   Calcium Latest Ref Range: 8.5 - 10.1 MG/DL 8.2 (L)   GFR est non-AA Latest Ref Range: >60 ml/min/1.73m2 59 (L)   GFR est AA Latest Ref Range: >60 ml/min/1.73m2 >60     Visit Vitals  /71   Pulse 70   Temp 98 °F (36.7 °C)   Resp 18   Ht 5' 2\" (1.575 m)   Wt 48.44   SpO2 95%   Breastfeeding?  No            Physical Exam:  General appearance: alert, cooperative, no distress, appears stated age  [de-identified]: negative  Neck: supple, symmetrical, trachea midline, no adenopathy, thyroid: not enlarged, symmetric, no tenderness/mass/nodules, no carotid bruit and no JVD  Lungs: clear to auscultation bilaterally  Heart: regular rate and rhythm, S1, S2 normal, no murmur, click, rub or gallop  Abdomen: soft, non-tender. Bowel sounds normal. No masses,  no organomegaly  Pulses: 2+ and symmetric  Skin: Skin color, texture, turgor normal. No rashes or lesions  Muscl: edema to ankles stable - christine hose in place        ASSESSMENT/PLAN  1. Hyponatremia: resolved  2. AMS : resolved. UA and C&S   3. Personal hx of Depression: report she was on Prozac last yr for 1 yr. She wants to speak with psych. She states that she is feeling better. 4. IBS w/ d: stable. 5. Weight loss: she is on Remeron, dietitian is following, family report of hx of poor appetite. Will closely monitor and encourage compliance with meals.  Weights stable        Home health for PT/OT  DME:   Activity: Activity as tolerated  Diet: Cardiac Diet  Wound Care: None needed  Follow-up: Patient o follow up with PCP in 7- 10 days      Awilda Mays NP  11/8/2018, 12:50 PM

## 2018-11-08 NOTE — PROGRESS NOTES
MACK called pt's son re:dme. Pt's son informed SW that pt a transport w/c and he picked up the rolling walker from [employer]. MACK discussed home health referral. Pt's son-in-law  [address] when he comes in to see pt' today.

## 2018-11-08 NOTE — PROGRESS NOTES
Late entry: MACK spoke with pt's family re: home health referral. Pt's son-in-law requested a referral to [address] [place] [address]. MACK will inform liaison of the referral. MACK faxed H&P clinical information to the facility for pt's d/c tomorrow.

## 2018-11-08 NOTE — ROUTINE PROCESS
Bedside and Verbal shift change report given to [facility] [id number] (oncoming nurse) by Neelima Campo RN (offgoing nurse). Report included the following information SBAR, Kardex and MAR.

## 2018-11-08 NOTE — PROGRESS NOTES
Problem: Mobility Impaired (Adult and Pediatric) Goal: *Acute Goals and Plan of Care (Insert Text) PHYSICAL THERAPY STG GOALS : 
Initiated 10/24/2018 and to be accomplished within 1-2 Weeks (Updated 11/8/18) 1. Patient will move from supine to sit and sit to supine  and roll side to side in bed with modified independence. (Maintaining at (S)) 2.  Patient will transfer from bed to chair and chair to bed with supervision/set-up using LRAD. (achieved with RW) 3.  Patient will perform sit to stand with supervision/set-up with Good balance and safety awareness. (Achieved) 4. Patient will ambulate with supervision/set-up for 125 feet with LRAD on level surfaces with 2 turns. (Maintaing at; 228ft with close (S) with RW) 5.  Patient will ascend/descend 5 stairs with left handrail(s) with stand by assistance to allow for safe home access/exit. (Maintaing at; 5 steps with B HR and close (S)) 6. Patient will improve standardized test score for [facility] Standing Balance Scale score of 4 to reduce fall risk. (Maintaining at 3) PHYSICAL THERAPY STG GOALS : 
Initiated 10/24/2018 and to be accomplished within 1-2 Weeks (Updated 11/6/18) 1. Patient will move from supine to sit and sit to supine  and roll side to side in bed with modified independence. (Maintaining at (S)) 2.  Patient will transfer from bed to chair and chair to bed with supervision/set-up using LRAD. (achieved with RW) 3.  Patient will perform sit to stand with supervision/set-up with Good balance and safety awareness. (Achieved) 4. Patient will ambulate with supervision/set-up for 125 feet with LRAD on level surfaces with 2 turns. (Maintaing at; 228ft with close (S) with RW) 5.  Patient will ascend/descend 5 stairs with left handrail(s) with stand by assistance to allow for safe home access/exit. (Maintaing at; 5 steps with B HR and close (S)) 6.   Patient will improve standardized test score for [facility] Balance Scale score of 4 to reduce fall risk. (Maintaining at 3) PHYSICAL THERAPY STG GOALS : 
Initiated 10/24/2018 and to be accomplished within 1-2 Weeks (Updated 10/30/18) 1. Patient will move from supine to sit and sit to supine  and roll side to side in bed with modified independence. (Progressing; (S)) 2.  Patient will transfer from bed to chair and chair to bed with supervision/set-up using LRAD. (Progressing; SBA) 3. Patient will perform sit to stand with supervision/set-up with Good balance and safety awareness. (Progressing; close (S)) 4. Patient will ambulate with supervision/set-up for 125 feet with LRAD on level surfaces with 2 turns. (Progressing; 228ft with close (S) with RW) 5.  Patient will ascend/descend 5 stairs with left handrail(s) with stand by assistance to allow for safe home access/exit. (Progressing; 5 steps with B HR and close (S)) 6. Patient will improve standardized test score for [place] Standing Balance Scale score of 4 to reduce fall risk. (Progressing; 3) PHYSICAL THERAPY LTG GOALS : 
Initiated 10/24/2018 and to be accomplished within 3-4 Weeks 1. Patient will transfer from bed to chair and chair to bed with modified independence using LRAD. 2.  Patient will perform sit to stand with modified independence with Good balance and safety awareness. 3.  Patient will ambulate with modified independence for 250 feet with LRAD on level surfaces and be able to maneuver through narrow spaces and obstacles without loss of balance. 4.  Patient will ascend/descend 5 stairs with left handrail(s) with supervision/set-up to allow for safe home access/exit. 5.  Patient will improve standardized test score for [place] Standing Balance Scale score of 5 to reduce fall risk. Physical Therapist:   Kiki Garza, PT  on 10/[facility] Discharge Summary Patient: Tricia Armendariz (49 y.o. female)                  Date: 2018 Attending Physician: Marianela Leal MD 
Primary Diagnosis: hypokalemina      Treatment Diagnosis Treatment Diagnosis: difficulty in walking Treatment Diagnosis 2: muscle weakness Precautions: Fall MEDICAL HISTORY:  
Past Medical History:  
Diagnosis Date  A-fib ([facility] [address] 2017  Arthritis, degenerative  CAD (coronary artery disease) 2012 S/P 2.75 X 15 mm BMS of OM (2012), Two LAD BMS (2012)  Elevated liver enzymes Likely from statin  HLD (hyperlipidemia)  Hyperkalemia 2012 Likely from lisinopril  Ischemic cardiomyopathy LVEF  45% (2012) & 30% echo (2012)  NSTEMI (non-ST elevated myocardial infarction) ([facility] [address] 2012  Vertigo Past Surgical History:  
Procedure Laterality Date  HX CORONARY STENT PLACEMENT  12  
 bare metal stent to obtuse marginal branch  HX HEART CATHETERIZATION Reason for Discharge: [] Goals Met   [x]Met highest potential  []  
 [] Progress ceased  []Hospitalized  []Other: Pt/family request for early D/C from facility. Discharge Location:  [] Private Residence  [x] residential  [] LTC  []  [address] [x]Other: 24 hr.supervision for safety. Summarize skilled services provided and significant progress attained since last daily/weekly note (include individualized treatment techniques and standardized tests): This Patient has received skilled PT services from 10/24/18  through 18 and has made Good progress towards their PT goals. Improvements noted in bed mobility, sit<>stand, transfers, and gait. Summary of education/recommendation provided to Patient/Caregivers:   
Pt. Education provided to use [facility] Objective Data:  
 
INITIAL  ASSESSMENT DISCHARGE ASSESSMENT  
COGNITIVE STATUS COGNITIVE STATUS Neurologic State: Alert Orientation Level: Oriented to situation, Oriented to place, Oriented to person Cognition: Follows commands Perception: Appears intact Perseveration: No perseveration noted Safety/Judgement: Fall preventionNeurologic State: Sleeping Orientation Level: Oriented X4 Cognition: Follows commands Perception: Appears intact Perseveration: No perseveration noted Safety/Judgement: Fall prevention PAIN PAIN Pain Scale 1: Numeric (0 - 10) Pain Intensity 1: 0 Pain Onset 1: sudden 
Pain Location 1: Back Pain Orientation 1: Posterior Pain Description 1: Aching Pain Intervention(s) 1: Back rub Patient Stated Pain Goal: 0 Pain Reassessment 1: YesPain Scale 1: Visual 
Pain Intensity 1: 0 Pain Onset 1: chronic Pain Location 1: Back, Flank Pain Orientation 1: Right Pain Description 1: Aching Pain Intervention(s) 1: Repositioned, Therapeutic touch Patient Stated Pain Goal: 0 Pain Reassessment 1: Patient sleeping GROSS ASSESSMENT GROSS ASSESSMENT  
AROM: Within functional limits(BUEs) Strength: (BUEs 3+/5) Coordination: Within functional limits(BUEs) Tone: Normal(BUEs) Sensation: Intact(BUEs)AROM: Generally decreased, functional 
Strength: Generally decreased, functional(BLEs grossly 4-/5) Coordination: Generally decreased, functional 
Tone: Normal 
Sensation: Intact(BUEs) BED MOBILITY BED MOBILITY Supine to Sit: Supervision Sit to Supine: Supervision Scooting: Contact guard assistance Supine to Sit: Modified independent Sit to Supine: Supervision Scooting: Supervision GAIT GAIT Base of Support: Center of gravity altered Speed/Sarah: Pace decreased (<100 feet/min) Step Length: Left shortened, Right shortened Gait Abnormalities: Decreased step clearance Ambulation - Level of Assistance: Contact guard assistance Distance (ft): 58 Feet (ft) Assistive Device: Gait belt(no AD) Rail Use: Both Stairs - Level of Assistance: Stand-by assistance Number of Stairs Trained: 5 Interventions: Safety awareness training, Verbal cues Base of Support: Center of gravity altered Speed/Sarah: Slow Step Length: Right shortened, Left shortened Gait Abnormalities: Decreased step clearance Ambulation - Level of Assistance: (close (S) ) Distance (ft): 122 Feet (ft)(+73ft) Assistive Device: Gait belt, Walker, rolling Rail Use: Both Stairs - Level of Assistance: Stand-by assistance Number of Stairs Trained: 5 Interventions: Safety awareness training Exceptions to [facility]Exceptions to [facility] With 3 turns. TRANSFERS TRANSFERS Sit to Stand: Contact guard assistance Stand to Sit: Contact guard assistance Bed to Chair: Contact guard assistance(w/RW) Sit to Stand: (close (S) ) Stand to Sit: (close (S) ) Bed to Chair: Contact guard assistance(w/RW) BALANCE BALANCE Sitting: Intact Sitting - Static: Good (unsupported) Sitting - Dynamic: Fair (occasional) Standing: With support Standing - Static: Good Standing - Dynamic : Fair Sitting: Intact Sitting - Static: Good (unsupported) Sitting - Dynamic: Good (unsupported) Standing: With support Standing - Static: Good Standing - Dynamic : Fair(((+))) WHEELCHAIR MOBILITY/MGMT WHEELCHAIR MOBILITY/MGMT With 0 turns Visual/Perceptual  
  
Corrective Lenses: Glasses Auditory: Auditory Auditory Impairment: Hard of hearing, bilateral  
 
 
 Visual/Perceptual  
  
Corrective Lenses: Glasses Auditory: Auditory Auditory Impairment: Hard of hearing, bilateral  
 
  
 Activity Tolerance:  Fair Treatment:   Gait training rendered with trails of 122ft and 73ft with RW and close (S). Pt negotiated 5 steps with B HR and SBA with intermittent cues for safety. Seated B LE TE's rendered with 1.5# AW to promote strength and endurance for improved functional mobility; HR/TR, LAQ, hip flexion x15, ball squeezes, resisted hip abd x20. Pt planning on Dc to [facility] with continued skilled therapy services.   
 
Discharge Recommendations: 
[x]  Home Exercise Program    
 [x]  Home Health PT  
[x]  Remove throw rugs/clear environmental barriers   
[x]  Assistance with: stairs and 24 hour (S) for safety [x]  Ambulation Device: Rolling Walker  
[x]  Steps with both & SBA 
[]  Wheelchair  
[]  Life Line/alert  
[]  WC  Ramp Treatment minutes:  40 minutes. Therapist :  Chente Paige PTA            Date:11/8/2018

## 2018-11-08 NOTE — PROGRESS NOTES
Problem: Self Care Deficits Care Plan (Adult) Goal: *Acute Goals and Plan of Care (Insert Text) OCCUPATIONAL THERAPY SHORT TERM GOALS Updated 11/06/18 1. Patient will perform Lower body ADL's without adaptive equipment with Supervision. (goal met) 2. Patient will perform toileting task with Mod I with Good safety to reduce falls risk. (goal met) 3. Patient will perform toilet transfers with Rolling Walker and Supervision. (goal met) 4. Patient will perform standing static/dynamic balance activities for improved ADL/IADL function with Supervision and Good balance and safety awareness. (goal met) 5. Patient will improve Barthel index scores to atleast 90/100 to improve functional mobility. (goal not met) 6. Patient will utilize energy conservation techniques during functional activities with minimal verbal cues. (goal met) Initiated 10/30/2018 and to be accomplished within 2 [facility]) 1. Patient will perform Upper body ADL's without adaptive equipment with MOD I. (goal met-D/C goal) 2. Patient will perform Lower body ADL's without adaptive equipment with standby assist.(goal met-[facility] Supervision) 3. Patient will perform toileting task with standby assist with Good safety to reduce falls risk. (goal met-[facility] Mod I) 4. Patient will perform toilet transfers with Bettye Silk and contact guard assist. (goal met-[facility] Supervision) 5. Patient will perform standing static/dynamic balance activities for improved ADL/IADL function with SBA and Good balance and safety awareness. (goal met-[facility] Supervision) 6. Patient will improve Barthel index scores to atleast 90/100 to improve functional mobility. (progressing) 7. Patient will utilize energy conservation techniques during functional activities with minimal verbal cues. (progresing) OCCUPATIONAL THERAPY SHORT TERM GOALS  UPDATED 10/30/18 Initiated 10/24/2018 and to be accomplished within 2 [facility]) 1.  Patient will perform Upper body ADL's without adaptive equipment with Supv-set up. SUPERVISION  [facility] MOD I 
2. Patient will perform Lower body ADL's without adaptive equipment with standby assist. SBA,  [facility] SUPERVISION 3. Patient will perform toileting task with standby assist with Good safety to reduce falls risk. SBA,  [facility] SUPERVISION 4. Patient will perform toilet transfers with Rolling Walker and contact guard assist. SBA,  [facility] SUPERVISION 5. Patient will perform standing static/dynamic balance activities for improved ADL/IADL function with contact guard assist and Good balance and safety awareness. CGA WITH FAIR BALANCE, [facility] TO SBA WITH G BALANCE 6. Patient will improve Barthel index scores to atleast 55/100 to improve functional mobility. 80/100 GM [facility] 90/100 
7. Patient will utilize energy conservation techniques during functional activities with minimal verbal cues. PROGRESSING 
 
OCCUPATIONAL THERAPY LONG TERM GOALS Initiated 10/24/2018 and to be accomplished within 4 Week(s) 1. Patient will perform ADL's & IADLs with adaptive equipment as needed with modified independence. (goal not met-ADLS, UB ADLS Mod I and LB ADLS Supervision) 2. Patient will perform toileting task with modified independence with Good safety to reduce falls risk.  (goal met) 3. Patient will perform all functional transfers utilizing least restrictive device and durable medical equipment as needed with modified independence and Good balance and safety awareness.(goal not met-currently Supervision) 4. Patient will perform standing static/dynamic activity for improved ADL/IADL function with modified independence and Good balance and safety awareness. (goal not met-currently Supervision) 5. Patient will improve Barthel index score to 95/100 to improve independence with mobility. (goal not met) 6.  Patient will perform home making tasks and simple meal prep Mod I utilizing energy conservation techniques and good safety awareness. (not addressed due to patient discharging to [facility]) Therapist: Jagruti Brian    [facility] 
OCCUPATIONAL THERAPY DISCHARGE SUMMARY Patient: Ingrid Parikh (89 y.o. female)               Date: 2018 Attending Physician: Justin Avila MD 
Primary Diagnosis: hypokalemina      Treatment Diagnosis Treatment Diagnosis: difficulty in walking Treatment Diagnosis 2: muscle weakness Precautions: Fall Medical History:  
Past Medical History:  
Diagnosis Date  A-fib ([facility] [address] 2017  Arthritis, degenerative  CAD (coronary artery disease) 2012 S/P 2.75 X 15 mm BMS of OM (2012), Two LAD BMS (2012)  Elevated liver enzymes Likely from statin  HLD (hyperlipidemia)  Hyperkalemia 2012 Likely from lisinopril  Ischemic cardiomyopathy LVEF  45% (2012) & 30% echo (2012)  NSTEMI (non-ST elevated myocardial infarction) ([facility] [address] 2012  Vertigo Past Surgical History:  
Procedure Laterality Date  HX CORONARY STENT PLACEMENT  12  
 bare metal stent to obtuse marginal branch  HX HEART CATHETERIZATION Reason for Discharge: []Goals Met   [x]Met highest potential  []Hospitalized  
[]  Progress ceased  []   []Other: Patient/family requesting D/C from facility. Discharge Location:  []Private Residence  [x] [facility] []LTC  [] [facility] [] 24 hr. Supervision for safety Summarize skilled services provided and significant progress attained since last daily/weekly note (include individualized treatment techniques and standardized tests):  
Patient has received skilled OT services from 10/24/18 to 18 and has made Good progress towards their OT goals.  
Improvements noted in: increased independence, performance, safety, and static standing balance needed for grooming, bathing, upper body dressing, lower body dressing, toileting and toilet transfer Summary of education/recommendation provided to Patient/Caregivers in the following areas: Remove barriers/rugs, mobility w/Rolling Walker for grooming, bathing, upper body dressing, lower body dressing, toileting and toilet transfer Objective Data:  
 
 INITIAL ASSESSMENT DISCHARGE ASSESSMENT  
COGNITIVE STATUS: COGNITIVE STATUS:  
Neurologic State: Alert Orientation Level: Oriented to situation, Oriented to place, Oriented to person Cognition: Follows commands Perception: Appears intact Perseveration: No perseveration noted Safety/Judgement: Fall prevention Mental Status Neurologic State: Sleeping Orientation Level: Oriented X4 Cognition: Follows commands Perception: Appears intact Perseveration: No perseveration noted Safety/Judgement: Fall prevention PAIN: PAIN:  
Pain Scale 1: Numeric (0 - 10) Pain Intensity 1: 0 Pain Onset 1: sudden 
Pain Location 1: Back Pain Orientation 1: Posterior Pain Description 1: Aching Pain Intervention(s) 1: Back rub Patient Stated Pain Goal: 0 Pain Reassessment 1: Yes Pain Screen Pain Scale 1: Visual 
Pain Intensity 1: 0 Pain Onset 1: chronic Pain Location 1: Back, Flank Pain Orientation 1: Right Pain Description 1: Aching Pain Intervention(s) 1: Repositioned, Therapeutic touch Patient Stated Pain Goal: 0 Pain Reassessment 1: Patient sleeping BED MOBILITY BED MOBILITY Supine to Sit: Supervision Sit to Supine: Supervision Scooting: Contact guard assistance Bed Mobility Supine to Sit: Modified independent Sit to Supine: Supervision Scooting: Supervision ADL SELF CARE ADL SELF CARE Oral Facial Hygiene/Grooming: Modified Independent Bathing: Stand-by assistance, Contact guard assistance Type of Bath: Basin/Soap/Water Upper Body Dressing: Supervision Lower Body Dressing: Stand-by assistance Toileting: Stand by assistance Basic ADL Oral Facial Hygiene/Grooming: Modified Independent Bathing: Stand-by assistance, Contact guard assistance Type of Bath: Basin/Soap/Water Upper Body Dressing: Modified independent Lower Body Dressing: Contact guard assistance Toileting: Contact guard assistance ADL INTERVENTION ADL INTERVENTION Bathing Assistance: Stand-by assistance Position Performed: Seated in chair Adaptive Equipment: Shower chair Upper Body Bathing Bathing Assistance: Supervision/set-up Position Performed: Standing Adaptive Equipment: Shower chair Upper Body Dressing Assistance Dressing Assistance: Modified independent Pullover Shirt: Modified independent Lower Body Bathing Bathing Assistance: Supervision/set-up Perineal  : Supervision/set-up Position Performed: Standing Cues: Verbal cues provided Adaptive Equipment: Grab bar Lower Body : Stand-by assistance Position Performed: Bending forward method Cues: Verbal cues provided Adaptive Equipment: Shower chair Toileting Toileting Assistance: Modified independent Bladder Hygiene: Modified independent Bowel Hygiene: Modified indpendent Clothing Management: Modified independent Adaptive Equipment: Elevated seat, Grab bars, Chichi Micky Grooming Grooming Assistance: Supervision/set up Washing Face: Supervision/set-up Washing Hands: Supervision/set-up Brushing Teeth: Supervision/set-up Feeding Feeding Assistance: Supervision/set-up TRANSFERS TRANSFERS Sit to Stand: Contact guard assistance Stand to Sit: Contact guard assistance Bed to Chair: Contact guard assistance(w/RW) Bathroom Mobility: (close Supv w/ RW) Toilet Transfer : Minimum assistance(BSC transfer and toilet transfer) Shower Transfer: Contact guard assistance Functional Transfers Sit to Stand: Supervision Stand to Sit: Supervision Bed to Chair: Contact guard assistance(w/RW) Bathroom Mobility: Supervision/set up Toilet Transfer : Supervision Shower Transfer: Contact guard assistance BALANCE BALANCE Sitting: Intact Sitting - Static: Good (unsupported) Sitting - Dynamic: Fair (occasional) Standing: With support Standing - Static: Good Standing - Dynamic : Fair Balance Sitting: Intact Sitting - Static: Good (unsupported) Sitting - Dynamic: Good (unsupported) Standing: With support Standing - Static: Good Standing - Dynamic : Fair(+) GROSS ASSESSMENT GROSS ASSESSMENT  
AROM: Within functional limits(BUEs) Strength: (BUEs 3+/5) Coordination: Within functional limits(BUEs) Tone: Normal(BUEs) Sensation: Intact(BUEs) Gross Assessment AROM: Generally decreased, functional 
Strength: Generally decreased, functional(BLEs grossly 4-/5) Coordination: Generally decreased, functional 
Tone: Normal 
Sensation: Intact(BUEs) COORDINATION COORDINATION Fine Motor Skills-Upper: Left Intact, Right Intact Gross Motor Skills-Upper: Left Intact, Right Intact Coordination Fine Motor Skills-Upper: Left Intact, Right Intact Gross Motor Skills-Upper: Left Intact, Right Intact VISUAL/PERCEPTUALVISUAL/PERCEPTUAL Corrective Lenses: Glasses   Vision Corrective Lenses: Glasses AUDITORY: AUDITORY:  
Auditory Impairment: Hard of hearing, bilateral Auditory Auditory Impairment: Hard of hearing, bilateral  
I ADL'S:  I ADL'S:  
     
THE BARTHEL INDEXACTIVITY 
 SCORE FEEDING 
0=unable 5=needs help cutting,spreading butter,etc., or modified diet 10= independent 10 BATHING 
0=dependent 5=independent (or in shower  
0  
GROOMING 
0=needs help 5=independent face/hair/teeth/shaving (implements provided) 5 DRESSING 
0=dependent 5=needs help but can do about half unaided 10=independent(including buttons, zips,laces etc.) 5 BOWELS 
0=incontinent 5=occasional accident 10=continent 10 BLADDER 
0=incontinent, or catheterized and unable to manage alone 5=occasional accident 10=continent  
5  
TOILET USE 
0=dependent 5=needs some help, but can do something alone 10=independent (on and off, dressing, wiping) 10 TRANSFER (BED TO CHAIR AND BACK) 0=unable, no sitting balance 5=major help(one or two people,physical), can sit 10=minor help(verbal or physical) 15=independent 10 MOBILITY (ON LEVEL SURFACES) 0=immobile or <50 yards 5=wheelchair independent,including corners,>50 yards 10=walkes with help of one person (verbal or physical) >50 yards 15=independent(but may use any aid; for example, stick) >50 yards 10 STAIRS 
0=unable 5=needs help (verbal, physical, carrying aid) 10=independent 5  
   
       TOTAL:             70 /100 Treatment :  Shadowed patient with bed mobility, bathroom mobility utilizing RW and morning ADLS, standing, sinkside, in order to assess safety and independence during routine. See above for levels of A needed. Cueing needed for patient to sit during threading feet vs standing for optimal safety and reduce risk for falls. Discharge Recommendations:[] Home Exercise Program   
[x] Elevated toilet seat/3 in 1 commode  
[x] Shower Chair -recommend OT [facility] services to assess     []Shower Bench   
[] Life Line/alert  
[] Splint/orthotic application/schedule [x] Grab Bars: Location -recommend OT  services to assess [x] Home Health OT [x] Jessica Angel Treatment session:  53  minutes. Therapist: Neel Ellsworth, [address]      Date:11/8/2018

## 2018-11-08 NOTE — ROUTINE PROCESS
Bedside and verbal shift report given to LETTY Barboza (oncoming nurse) by YUMIKO Rios LPN (off going nurse). Report included SBAR, MAR and Kardex.

## 2018-11-09 ENCOUNTER — HOME HEALTH ADMISSION (OUTPATIENT)
Dept: HOME HEALTH SERVICES | Facility: HOME HEALTH | Age: 83
End: 2018-11-09
Payer: MEDICARE

## 2018-11-09 VITALS
HEART RATE: 69 BPM | OXYGEN SATURATION: 100 % | BODY MASS INDEX: 19.65 KG/M2 | RESPIRATION RATE: 16 BRPM | HEIGHT: 62 IN | DIASTOLIC BLOOD PRESSURE: 74 MMHG | WEIGHT: 106.8 LBS | TEMPERATURE: 96.9 F | SYSTOLIC BLOOD PRESSURE: 139 MMHG

## 2018-11-09 PROCEDURE — 74011250636 HC RX REV CODE- 250/636: Performed by: NURSE PRACTITIONER

## 2018-11-09 PROCEDURE — 74011000250 HC RX REV CODE- 250: Performed by: INTERNAL MEDICINE

## 2018-11-09 PROCEDURE — 74011250637 HC RX REV CODE- 250/637: Performed by: NURSE PRACTITIONER

## 2018-11-09 PROCEDURE — 74011250637 HC RX REV CODE- 250/637: Performed by: INTERNAL MEDICINE

## 2018-11-09 RX ADMIN — METOPROLOL TARTRATE 12.5 MG: 25 TABLET, FILM COATED ORAL at 10:17

## 2018-11-09 RX ADMIN — MECLIZINE 12.5 MG: 12.5 TABLET ORAL at 10:16

## 2018-11-09 RX ADMIN — CALCIUM CARBONATE-VITAMIN D TAB 500 MG-200 UNIT 1 TABLET: 500-200 TAB at 10:17

## 2018-11-09 RX ADMIN — POLYETHYLENE GLYCOL 3350 17 G: 17 POWDER, FOR SOLUTION ORAL at 10:16

## 2018-11-09 RX ADMIN — CLOPIDOGREL BISULFATE 75 MG: 75 TABLET ORAL at 10:20

## 2018-11-09 RX ADMIN — DIGOXIN 0.12 MG: 125 TABLET ORAL at 10:16

## 2018-11-09 RX ADMIN — CLOTRIMAZOLE: 1 CREAM TOPICAL at 09:00

## 2018-11-09 RX ADMIN — VITAM B12 100 MCG: 100 TAB at 10:16

## 2018-11-09 RX ADMIN — ASPIRIN 81 MG CHEWABLE TABLET 81 MG: 81 TABLET CHEWABLE at 10:16

## 2018-11-09 RX ADMIN — PSYLLIUM HUSK 1 PACKET: 3.4 POWDER ORAL at 10:16

## 2018-11-09 NOTE — PROGRESS NOTES
Bc faxed copy of d/c summary and prescriptions to [address] [place] [address] at [place] [phone].

## 2018-11-09 NOTE — PROGRESS NOTES
The patient was offered a craft group to paint or create a peel and stick craft. The patient declined to participate in the activities provided. The Recreation Therapist will continue to offer the patient 1:1 and group activities. The second group at 1:30 was cancelled due to lunch arriving at 1:30. Patient was provided with a new flier containing election results, a countdown to [place] and Thanksgiving and a quote of the day.

## 2018-11-09 NOTE — PROGRESS NOTES
conducted a Follow up consultation and Spiritual Assessment for [address], who is a 80 y.o.,female. The  provided the following Interventions: 
Continued the relationship of care and support. Listened empathically. Offered prayer and assurance of continued prayer on patients behalf. Chart reviewed. The following outcomes were achieved: 
Patient expressed gratitude for pastoral care visit. Assessment: 
There are no further spiritual or Confucianist issues which require Spiritual Care Services interventions at this time. Plan: 
Chaplains will continue to follow and will provide pastoral care on an as needed/requested basis.  recommends bedside caregivers page  on duty if patient shows signs of acute spiritual or emotional distress. [facility] Staff  Spiritual Care  
[phone]

## 2018-11-09 NOTE — ROUTINE PROCESS
Bedside and verbal shift report given to EMILY Morales (oncoming nurse) by YUMIKO Sainz LPN (off going nurse) Report included SBAR, MAR and Kardex.

## 2018-11-09 NOTE — PROGRESS NOTES
Home care referral sent to [facility] via [address] and called to [facility]. Care Management Interventions Transition of Care Consult (CM Consult): Home Health [facility] - INPATIENT: Yes 
Plan discussed with Pt/Family/Caregiver: Yes Freedom of Choice Offered: Yes Discharge Location Discharge Placement: Home with home health

## 2018-11-09 NOTE — ROUTINE PROCESS
Resident discharged in care of her daughter and son in law. All personal belongings taken with patient along with discharge paperwork.

## 2018-11-09 NOTE — PROGRESS NOTES
The patient was attended a morning news group at 11:00. During this group, the patient discussed current news and reminisced about when times were different. The patient engaged in conversation throughout the group. The patient attended another group at 1:30 and participated in coloring. While coloring, the patient engaged in conversation.

## 2018-11-09 NOTE — ROUTINE PROCESS
Discharge Report given to Irina Patricio at [facility] . All questions answered .

## 2018-11-12 ENCOUNTER — HOME CARE VISIT (OUTPATIENT)
Dept: SCHEDULING | Facility: HOME HEALTH | Age: 83
End: 2018-11-12
Payer: MEDICARE

## 2018-11-12 ENCOUNTER — HOME CARE VISIT (OUTPATIENT)
Dept: HOME HEALTH SERVICES | Facility: HOME HEALTH | Age: 83
End: 2018-11-12

## 2018-11-12 VITALS
TEMPERATURE: 97.9 F | HEART RATE: 100 BPM | SYSTOLIC BLOOD PRESSURE: 120 MMHG | DIASTOLIC BLOOD PRESSURE: 74 MMHG | OXYGEN SATURATION: 93 %

## 2018-11-12 PROCEDURE — G0151 HHCP-SERV OF PT,EA 15 MIN: HCPCS

## 2018-11-12 PROCEDURE — 3331090002 HH PPS REVENUE DEBIT

## 2018-11-12 PROCEDURE — 400013 HH SOC

## 2018-11-12 PROCEDURE — 3331090001 HH PPS REVENUE CREDIT

## 2018-11-13 PROCEDURE — 3331090001 HH PPS REVENUE CREDIT

## 2018-11-13 PROCEDURE — 3331090002 HH PPS REVENUE DEBIT

## 2018-11-14 ENCOUNTER — HOME CARE VISIT (OUTPATIENT)
Dept: HOME HEALTH SERVICES | Facility: HOME HEALTH | Age: 83
End: 2018-11-14
Payer: MEDICARE

## 2018-11-14 PROCEDURE — 3331090002 HH PPS REVENUE DEBIT

## 2018-11-14 PROCEDURE — 3331090001 HH PPS REVENUE CREDIT

## 2018-11-15 ENCOUNTER — DOCUMENTATION ONLY (OUTPATIENT)
Dept: INTERNAL MEDICINE CLINIC | Age: 83
End: 2018-11-15

## 2018-11-15 ENCOUNTER — HOSPITAL ENCOUNTER (OUTPATIENT)
Dept: LAB | Age: 83
Discharge: HOME | End: 2018-11-15
Payer: MEDICARE

## 2018-11-15 ENCOUNTER — HOME CARE VISIT (OUTPATIENT)
Dept: SCHEDULING | Facility: HOME HEALTH | Age: 83
End: 2018-11-15
Payer: MEDICARE

## 2018-11-15 ENCOUNTER — OFFICE VISIT (OUTPATIENT)
Dept: INTERNAL MEDICINE CLINIC | Age: 83
End: 2018-11-15

## 2018-11-15 VITALS
WEIGHT: 92 LBS | SYSTOLIC BLOOD PRESSURE: 100 MMHG | BODY MASS INDEX: 16.93 KG/M2 | HEIGHT: 62 IN | RESPIRATION RATE: 18 BRPM | DIASTOLIC BLOOD PRESSURE: 41 MMHG | TEMPERATURE: 95.9 F | OXYGEN SATURATION: 98 % | HEART RATE: 73 BPM

## 2018-11-15 VITALS
TEMPERATURE: 98.9 F | OXYGEN SATURATION: 97 % | SYSTOLIC BLOOD PRESSURE: 91 MMHG | DIASTOLIC BLOOD PRESSURE: 56 MMHG | HEART RATE: 90 BPM

## 2018-11-15 DIAGNOSIS — H61.22 CERUMEN DEBRIS ON TYMPANIC MEMBRANE OF LEFT EAR: ICD-10-CM

## 2018-11-15 DIAGNOSIS — E87.1 HYPONATREMIA: ICD-10-CM

## 2018-11-15 DIAGNOSIS — E87.5 HYPERKALEMIA: ICD-10-CM

## 2018-11-15 DIAGNOSIS — R03.1 LOW BLOOD PRESSURE READING: ICD-10-CM

## 2018-11-15 DIAGNOSIS — N39.0 RECURRENT UTI: ICD-10-CM

## 2018-11-15 DIAGNOSIS — K58.0 IRRITABLE BOWEL SYNDROME WITH DIARRHEA: ICD-10-CM

## 2018-11-15 DIAGNOSIS — N39.0 RECURRENT UTI: Primary | ICD-10-CM

## 2018-11-15 DIAGNOSIS — F32.89 OTHER DEPRESSION: ICD-10-CM

## 2018-11-15 LAB
ANION GAP SERPL CALC-SCNC: 8 MMOL/L (ref 3–18)
APPEARANCE UR: ABNORMAL
BACTERIA URNS QL MICRO: ABNORMAL /HPF
BILIRUB UR QL: ABNORMAL
BUN SERPL-MCNC: 18 MG/DL (ref 7–18)
BUN/CREAT SERPL: 16 (ref 12–20)
CALCIUM SERPL-MCNC: 9.2 MG/DL (ref 8.5–10.1)
CAOX CRY URNS QL MICRO: ABNORMAL
CHLORIDE SERPL-SCNC: 101 MMOL/L (ref 100–108)
CO2 SERPL-SCNC: 26 MMOL/L (ref 21–32)
COLOR UR: ABNORMAL
CREAT SERPL-MCNC: 1.1 MG/DL (ref 0.6–1.3)
EPITH CASTS URNS QL MICRO: ABNORMAL /LPF (ref 0–5)
ERYTHROCYTE [DISTWIDTH] IN BLOOD BY AUTOMATED COUNT: 17 % (ref 11.6–14.5)
GLUCOSE SERPL-MCNC: 114 MG/DL (ref 74–99)
GLUCOSE UR STRIP.AUTO-MCNC: NEGATIVE MG/DL
HCT VFR BLD AUTO: 40.7 % (ref 35–45)
HGB BLD-MCNC: 12.5 G/DL (ref 12–16)
HGB UR QL STRIP: NEGATIVE
KETONES UR QL STRIP.AUTO: ABNORMAL MG/DL
LEUKOCYTE ESTERASE UR QL STRIP.AUTO: ABNORMAL
MCH RBC QN AUTO: 26.6 PG (ref 24–34)
MCHC RBC AUTO-ENTMCNC: 30.7 G/DL (ref 31–37)
MCV RBC AUTO: 86.6 FL (ref 74–97)
NITRITE UR QL STRIP.AUTO: NEGATIVE
PH UR STRIP: 6.5 [PH] (ref 5–8)
PLATELET # BLD AUTO: 289 K/UL (ref 135–420)
PMV BLD AUTO: 10 FL (ref 9.2–11.8)
POTASSIUM SERPL-SCNC: 5 MMOL/L (ref 3.5–5.5)
PROT UR STRIP-MCNC: 30 MG/DL
RBC # BLD AUTO: 4.7 M/UL (ref 4.2–5.3)
RBC #/AREA URNS HPF: 0 /HPF (ref 0–5)
SODIUM SERPL-SCNC: 135 MMOL/L (ref 136–145)
SP GR UR REFRACTOMETRY: 1.02 (ref 1–1.03)
UROBILINOGEN UR QL STRIP.AUTO: 1 EU/DL (ref 0.2–1)
WBC # BLD AUTO: 6.8 K/UL (ref 4.6–13.2)
WBC URNS QL MICRO: ABNORMAL /HPF (ref 0–4)

## 2018-11-15 PROCEDURE — 3331090002 HH PPS REVENUE DEBIT

## 2018-11-15 PROCEDURE — 80048 BASIC METABOLIC PNL TOTAL CA: CPT

## 2018-11-15 PROCEDURE — 85027 COMPLETE CBC AUTOMATED: CPT

## 2018-11-15 PROCEDURE — 81001 URINALYSIS AUTO W/SCOPE: CPT

## 2018-11-15 PROCEDURE — 3331090001 HH PPS REVENUE CREDIT

## 2018-11-15 PROCEDURE — 36415 COLL VENOUS BLD VENIPUNCTURE: CPT

## 2018-11-15 PROCEDURE — 87086 URINE CULTURE/COLONY COUNT: CPT

## 2018-11-15 PROCEDURE — G0157 HHC PT ASSISTANT EA 15: HCPCS

## 2018-11-15 RX ORDER — FLUOXETINE 10 MG/1
10 TABLET ORAL DAILY
Qty: 90 TAB | Refills: 3 | Status: ON HOLD | OUTPATIENT
Start: 2018-11-15 | End: 2019-10-09 | Stop reason: SDUPTHER

## 2018-11-15 RX ORDER — METOPROLOL TARTRATE 25 MG/1
12.5 TABLET, FILM COATED ORAL DAILY
Qty: 60 TAB | Refills: 0 | Status: SHIPPED | OUTPATIENT
Start: 2018-11-15 | End: 2019-02-19

## 2018-11-15 NOTE — PATIENT INSTRUCTIONS

## 2018-11-15 NOTE — PROGRESS NOTES
HISTORY OF PRESENT ILLNESS Tea Albarran is a 80 y.o. female. 81 yo female here for f/u from recent hospital/rehab stays. She was admitted 10/23 after presenting with UTI, n/v/d. Had hyponatremia noted during her stay. Was treated with short course IV abx. Discharged to inpatient rehab. Some issues with increased depressive sx. Telemedicine psych consult was helpful. Discharged from there to [address] [place] [address] after 16 days. Has been slowly feeling better, regaining strength BP low lately. She feels 'wonky' but overall better. Has lost ~14 lbs Bladder Infection Pertinent negatives include no chills, no nausea, no vomiting, no frequency and no urgency. Review of Systems Constitutional: Positive for weight loss. Negative for chills and fever. HENT: Negative for congestion and ear pain. Eyes: Negative for blurred vision and pain. Respiratory: Negative for cough and shortness of breath. Cardiovascular: Negative for chest pain, palpitations and leg swelling. Gastrointestinal: Negative for nausea and vomiting. Genitourinary: Negative for frequency and urgency. Musculoskeletal: Negative for joint pain and myalgias. Skin: Negative for itching and rash. Neurological: Negative for dizziness (but feels 'wonky' particularly when first getting up), tingling and headaches. Psychiatric/Behavioral: Positive for depression. The patient is not nervous/anxious. Past Medical History:  
Diagnosis Date  A-fib ([facility] [address] 01/2017  Arthritis, degenerative  CAD (coronary artery disease) 04/2012 S/P 2.75 X 15 mm BMS of OM (04/2012), Two LAD BMS (07/2012)  Elevated liver enzymes Likely from statin  HLD (hyperlipidemia)  Hyperkalemia 06/2012 Likely from lisinopril  Ischemic cardiomyopathy LVEF  45% (11/2012) & 30% echo (04/2012)  NSTEMI (non-ST elevated myocardial infarction) ([facility] [address] 04/2012  UTI (urinary tract infection)  Vertigo Current Outpatient Medications on File Prior to Visit Medication Sig Dispense Refill  psyllium husk-aspartame (METAMUCIL FIBER) 3.4 gram pwpk packet Take 1 Packet by mouth daily. 30 Packet 0  
 ondansetron (ZOFRAN ODT) 4 mg disintegrating tablet Take 1 Tab by mouth every eight (8) hours as needed. 12 Tab 0  
 calcium-vitamin D (OYSTER SHELL) 500 mg(1,250mg) -200 unit per tablet Take 1 Tab by mouth daily. 30 Tab 0  
 mirtazapine (REMERON) 15 mg tablet Take 1 Tab by mouth nightly. 30 Tab 0  
 meclizine (ANTIVERT) 12.5 mg tablet Take 1 Tab by mouth daily for 10 days. 30 Tab 0  
 fluticasone (FLONASE) 50 mcg/actuation nasal spray 2 Sprays by Both Nostrils route daily as needed for Rhinitis. 1 Bottle 0  
 digoxin (LANOXIN) 0.125 mg tablet Take 1 Tab by mouth daily. 30 Tab 0  
 cyanocobalamin (VITAMIN B12) 100 mcg tablet Take 1 Tab by mouth daily. 30 Tab 0  
 aspirin 81 mg chewable tablet Take 1 Tab by mouth daily. 30 Tab 0  
 ondansetron hcl (ZOFRAN) 4 mg tablet Take 1 Tab by mouth every eight (8) hours as needed for Nausea. 30 Tab 1  psyllium (METAMUCIL) powd Use as directed daily  Indications: Irritable Bowel Syndrome 1 Bottle 1  
 clotrimazole (LOTRIMIN) 1 % topical cream Apply  to affected area two (2) times a day. Apply with barrier cream to affected area. 15 g 1  
 clopidogrel (PLAVIX) 75 mg tab Take 1 Tab by mouth daily. 90 Tab 3  
 nitroglycerin (NITROSTAT) 0.4 mg SL tablet 1 Tab by SubLINGual route every five (5) minutes as needed for Chest Pain. 25 Tab 3  polyethylene glycol (MIRALAX) 17 gram packet Take 1 Packet by mouth daily. 30 Packet 0 No current facility-administered medications on file prior to visit. Physical Exam  
Constitutional: She appears well-developed and well-nourished. No distress.   
/41 (BP 1 Location: Right arm, BP Patient Position: Sitting)   Pulse 73   Temp 95.9 °F (35.5 °C) (Oral)   Resp 18   Ht 5' 2\" (1.575 m) Wt 92 lb (41.7 kg)   SpO2 98%   BMI 16.83 kg/m² Eyes: EOM are normal. Right eye exhibits no discharge. Left eye exhibits no discharge. No scleral icterus. Neck: Neck supple. Cardiovascular: Normal rate, regular rhythm and normal heart sounds. Exam reveals no gallop and no friction rub. No murmur heard. Pulmonary/Chest: Effort normal and breath sounds normal. No respiratory distress. She has no wheezes. She has no rales. Musculoskeletal: She exhibits no edema or tenderness. Lymphadenopathy:  
  She has no cervical adenopathy. Neurological: She is alert. She exhibits normal muscle tone. Skin: Skin is warm and dry. Psychiatric: She has a normal mood and affect. Lab Results Component Value Date/Time Sodium 138 11/05/2018 04:46 AM  
 Potassium 4.3 11/05/2018 04:46 AM  
 Chloride 100 11/05/2018 04:46 AM  
 CO2 33 (H) 11/05/2018 04:46 AM  
 Anion gap 5 11/05/2018 04:46 AM  
 Glucose 57 (L) 11/05/2018 04:46 AM  
 BUN 13 11/05/2018 04:46 AM  
 Creatinine 0.90 11/05/2018 04:46 AM  
 BUN/Creatinine ratio 14 11/05/2018 04:46 AM  
 GFR est AA >60 11/05/2018 04:46 AM  
 GFR est non-AA 59 (L) 11/05/2018 04:46 AM  
 Calcium 8.2 (L) 11/05/2018 04:46 AM  
 Bilirubin, total 0.3 10/20/2018 05:50 PM  
 AST (SGOT) 15 10/20/2018 05:50 PM  
 Alk. phosphatase 139 (H) 10/20/2018 05:50 PM  
 Protein, total 6.1 (L) 10/20/2018 05:50 PM  
 Albumin 2.5 (L) 10/20/2018 05:50 PM  
 Globulin 3.6 10/20/2018 05:50 PM  
 A-G Ratio 0.7 (L) 10/20/2018 05:50 PM  
 ALT (SGPT) 18 10/20/2018 05:50 PM  
 
Lab Results Component Value Date/Time WBC 8.5 11/05/2018 04:46 AM  
 WBC 9.4 06/28/2012 12:00 AM  
 HGB 12.1 11/05/2018 04:46 AM  
 HCT 38.2 11/05/2018 04:46 AM  
 PLATELET 798 33/61/4957 04:46 AM  
 MCV 85.5 11/05/2018 04:46 AM  
 
ASSESSMENT and PLAN 
  ICD-10-CM ICD-9-CM 1.  Recurrent UTI N39.0 599.0 CBC W/O DIFF  
   URINALYSIS W/ RFLX MICROSCOPIC  
   CULTURE, URINE  
 2. Irritable bowel syndrome with diarrhea K58.0 564.1 3. Hyponatremia P08.7 546.8 METABOLIC PANEL, BASIC 4. Hyperkalemia H49.8 439.2 METABOLIC PANEL, BASIC 5. Cerumen debris on tympanic membrane of left ear H61.22 380.4 REMOVAL IMPACTED CERUMEN IRRIGATION/LVG UNILAT 6. Other depression F32.89 311   
7. Low blood pressure reading R03.1 796.3 Repeat labs today. Will hold lasix for now and monitor. Can decrease metoprolol to q day Will resume Prozac which she and family feel was helpful in past.  
Cerumen irrigated

## 2018-11-15 NOTE — PROGRESS NOTES
ROOM # 17 Vincenzo Quach presents today for Chief Complaint Patient presents with  Bladder Infection  
  follow up. Deepti Quiles preferred language for health care discussion is english/other. Is someone accompanying this pt? no 
 
Is the patient using any DME equipment during OV? no 
 
Depression Screening: PHQ over the last two weeks 10/9/2018 9/24/2018 3/27/2014 Little interest or pleasure in doing things Not at all Not at all Not at all Feeling down, depressed, irritable, or hopeless Not at all Not at all Not at all Total Score PHQ 2 0 0 0 Learning Assessment: 
Learning Assessment 7/5/2016 3/27/2014 PRIMARY LEARNER Patient Patient PRIMARY LANGUAGE ENGLISH ENGLISH  
LEARNER PREFERENCE PRIMARY READING READING  
ANSWERED BY Patient -  
RELATIONSHIP SELF -  
 
 
Abuse Screening: No flowsheet data found. Fall Risk Fall Risk Assessment, last 12 mths 11/15/2018 10/9/2018 9/24/2018 3/27/2014 Able to walk? No Yes Yes Yes Fall in past 12 months? - No No No  
 
 
Health Maintenance reviewed and discussed per provider. Yes Vincenzo Quach is due for Health Maintenance Due Topic Date Due  
 DTaP/Tdap/Td series (1 - Tdap) 08/10/1948  Pneumococcal 65+ Low/Medium Risk (1 of 2 - PCV13) 08/10/1992  Shingrix Vaccine Age 50> (2 of 2) 06/26/2017  MEDICARE YEARLY EXAM  03/14/2018  Influenza Age 5 to Adult  08/01/2018 Please order/place referral if appropriate. Advance Directive: 1. Do you have an advance directive in place? Patient Reply: no 
 
2. If not, would you like material regarding how to put one in place? Patient Reply: no 
 
Coordination of Care: 1. Have you been to the ER, urgent care clinic since your last visit? [facility] Hospitalized since your last visit? yes 2. Have you seen or consulted any other health care providers outside of the [address] [place] [address] since your last visit? Include any pap smears or colon screening. yes

## 2018-11-16 ENCOUNTER — HOME CARE VISIT (OUTPATIENT)
Dept: SCHEDULING | Facility: HOME HEALTH | Age: 83
End: 2018-11-16
Payer: MEDICARE

## 2018-11-16 VITALS
DIASTOLIC BLOOD PRESSURE: 80 MMHG | SYSTOLIC BLOOD PRESSURE: 122 MMHG | HEART RATE: 68 BPM | TEMPERATURE: 97.9 F | OXYGEN SATURATION: 98 %

## 2018-11-16 LAB
BACTERIA SPEC CULT: NORMAL
SERVICE CMNT-IMP: NORMAL

## 2018-11-16 PROCEDURE — G0152 HHCP-SERV OF OT,EA 15 MIN: HCPCS

## 2018-11-16 PROCEDURE — 3331090002 HH PPS REVENUE DEBIT

## 2018-11-16 PROCEDURE — 3331090001 HH PPS REVENUE CREDIT

## 2018-11-16 NOTE — PROGRESS NOTES
Please let pt and her family know that her urine culture appears to be due to skin contamination which is not uncommon. I do not think we need to restart antibiotics if she is not having symptoms.

## 2018-11-17 ENCOUNTER — TELEPHONE (OUTPATIENT)
Dept: INTERNAL MEDICINE CLINIC | Age: 83
End: 2018-11-17

## 2018-11-17 PROCEDURE — 3331090001 HH PPS REVENUE CREDIT

## 2018-11-17 PROCEDURE — 3331090002 HH PPS REVENUE DEBIT

## 2018-11-17 NOTE — TELEPHONE ENCOUNTER
----- Message from Denis Cutler MD sent at 11/16/2018  3:11 PM EST -----  Please let pt and her family know that her urine culture appears to be due to skin contamination which is not uncommon. I do not think we need to restart antibiotics if she is not having symptoms.

## 2018-11-17 NOTE — TELEPHONE ENCOUNTER
2 Pt Identifiers verified. Notified family of result note. Verbalized understanding. Notified to call office if having any symptoms. Verbalized understanding.

## 2018-11-18 PROCEDURE — 3331090002 HH PPS REVENUE DEBIT

## 2018-11-18 PROCEDURE — 3331090001 HH PPS REVENUE CREDIT

## 2018-11-19 PROCEDURE — 3331090001 HH PPS REVENUE CREDIT

## 2018-11-19 PROCEDURE — 3331090002 HH PPS REVENUE DEBIT

## 2018-11-20 ENCOUNTER — HOME CARE VISIT (OUTPATIENT)
Dept: SCHEDULING | Facility: HOME HEALTH | Age: 83
End: 2018-11-20
Payer: MEDICARE

## 2018-11-20 VITALS
TEMPERATURE: 98.4 F | HEART RATE: 97 BPM | OXYGEN SATURATION: 93 % | SYSTOLIC BLOOD PRESSURE: 97 MMHG | DIASTOLIC BLOOD PRESSURE: 70 MMHG

## 2018-11-20 PROCEDURE — G0157 HHC PT ASSISTANT EA 15: HCPCS

## 2018-11-20 PROCEDURE — 3331090002 HH PPS REVENUE DEBIT

## 2018-11-20 PROCEDURE — 3331090001 HH PPS REVENUE CREDIT

## 2018-11-21 PROCEDURE — 3331090001 HH PPS REVENUE CREDIT

## 2018-11-21 PROCEDURE — 3331090002 HH PPS REVENUE DEBIT

## 2018-11-22 PROCEDURE — 3331090001 HH PPS REVENUE CREDIT

## 2018-11-22 PROCEDURE — 3331090002 HH PPS REVENUE DEBIT

## 2018-11-23 ENCOUNTER — HOME CARE VISIT (OUTPATIENT)
Dept: SCHEDULING | Facility: HOME HEALTH | Age: 83
End: 2018-11-23
Payer: MEDICARE

## 2018-11-23 VITALS
HEART RATE: 64 BPM | SYSTOLIC BLOOD PRESSURE: 109 MMHG | TEMPERATURE: 97.9 F | OXYGEN SATURATION: 92 % | DIASTOLIC BLOOD PRESSURE: 61 MMHG

## 2018-11-23 PROCEDURE — G0157 HHC PT ASSISTANT EA 15: HCPCS

## 2018-11-23 PROCEDURE — 3331090002 HH PPS REVENUE DEBIT

## 2018-11-23 PROCEDURE — 3331090001 HH PPS REVENUE CREDIT

## 2018-11-24 PROCEDURE — 3331090001 HH PPS REVENUE CREDIT

## 2018-11-24 PROCEDURE — 3331090002 HH PPS REVENUE DEBIT

## 2018-11-25 PROCEDURE — 3331090002 HH PPS REVENUE DEBIT

## 2018-11-25 PROCEDURE — 3331090001 HH PPS REVENUE CREDIT

## 2018-11-26 ENCOUNTER — HOME CARE VISIT (OUTPATIENT)
Dept: SCHEDULING | Facility: HOME HEALTH | Age: 83
End: 2018-11-26
Payer: MEDICARE

## 2018-11-26 VITALS
HEART RATE: 96 BPM | TEMPERATURE: 98.3 F | OXYGEN SATURATION: 94 % | SYSTOLIC BLOOD PRESSURE: 96 MMHG | DIASTOLIC BLOOD PRESSURE: 55 MMHG

## 2018-11-26 PROCEDURE — 3331090002 HH PPS REVENUE DEBIT

## 2018-11-26 PROCEDURE — G0157 HHC PT ASSISTANT EA 15: HCPCS

## 2018-11-26 PROCEDURE — 3331090001 HH PPS REVENUE CREDIT

## 2018-11-27 PROCEDURE — 3331090002 HH PPS REVENUE DEBIT

## 2018-11-27 PROCEDURE — 3331090001 HH PPS REVENUE CREDIT

## 2018-11-28 ENCOUNTER — HOME CARE VISIT (OUTPATIENT)
Dept: SCHEDULING | Facility: HOME HEALTH | Age: 83
End: 2018-11-28
Payer: MEDICARE

## 2018-11-28 VITALS
TEMPERATURE: 97.7 F | OXYGEN SATURATION: 95 % | SYSTOLIC BLOOD PRESSURE: 124 MMHG | DIASTOLIC BLOOD PRESSURE: 64 MMHG | HEART RATE: 100 BPM

## 2018-11-28 PROCEDURE — 3331090001 HH PPS REVENUE CREDIT

## 2018-11-28 PROCEDURE — G0151 HHCP-SERV OF PT,EA 15 MIN: HCPCS

## 2018-11-28 PROCEDURE — 3331090002 HH PPS REVENUE DEBIT

## 2018-12-04 ENCOUNTER — TELEPHONE (OUTPATIENT)
Dept: INTERNAL MEDICINE CLINIC | Age: 83
End: 2018-12-04

## 2018-12-04 NOTE — TELEPHONE ENCOUNTER
Patient's son called to ask clinical staff if patient can be tested for egg allergy. Son reports unable to find FLUMIST for flu vaccine. Patient has reported egg allergy from earlier in life.

## 2018-12-04 NOTE — TELEPHONE ENCOUNTER
According to the pharmacist, unless anaphylaxis is the reaction, she can get the flu shot despite allergy according to CDC. Some pharmacies can order egg free vaccine.

## 2018-12-06 ENCOUNTER — TELEPHONE (OUTPATIENT)
Dept: INTERNAL MEDICINE CLINIC | Age: 83
End: 2018-12-06

## 2018-12-06 NOTE — TELEPHONE ENCOUNTER
Ron Lake from [address] [place] [address] called to notify that Ms. Quiles accidentally put ear wax removal in her eye and it was irritated. I consulted with Dr. Apolonia Thompson and notified Ron Lake to instruct patient to flush eye with saline for approximately 15min. If condition worsens she can be seen in the walk-in clinic or if problem becomes severe, she should go to the ER.  Ron Lake understood instructions and will consult with patient

## 2018-12-06 NOTE — TELEPHONE ENCOUNTER
Returned son's call in re: pt receiving flu shot. Relayed below info from  to son; verbalized understanding.

## 2018-12-20 ENCOUNTER — OFFICE VISIT (OUTPATIENT)
Dept: CARDIOLOGY CLINIC | Age: 83
End: 2018-12-20

## 2018-12-20 VITALS
HEART RATE: 80 BPM | WEIGHT: 100 LBS | BODY MASS INDEX: 18.4 KG/M2 | SYSTOLIC BLOOD PRESSURE: 114 MMHG | HEIGHT: 62 IN | DIASTOLIC BLOOD PRESSURE: 71 MMHG | OXYGEN SATURATION: 98 %

## 2018-12-20 DIAGNOSIS — I25.10 CORONARY ARTERY DISEASE DUE TO LIPID RICH PLAQUE: Primary | ICD-10-CM

## 2018-12-20 DIAGNOSIS — E78.00 PURE HYPERCHOLESTEROLEMIA: ICD-10-CM

## 2018-12-20 DIAGNOSIS — I42.9 CARDIOMYOPATHY, UNSPECIFIED TYPE (HCC): ICD-10-CM

## 2018-12-20 DIAGNOSIS — I10 ESSENTIAL HYPERTENSION WITH GOAL BLOOD PRESSURE LESS THAN 140/90: ICD-10-CM

## 2018-12-20 DIAGNOSIS — I25.83 CORONARY ARTERY DISEASE DUE TO LIPID RICH PLAQUE: Primary | ICD-10-CM

## 2018-12-20 NOTE — PROGRESS NOTES
As you know, Agnieszka Sharma is a pleasant, 80 y.o. female with known history of coronary artery disease, non-ST-elevation myocardial infarction, status post left anterior descending and left circumflex bare-metal stents. She also has hypertension, hyperlipidemia, and ischemic cardiomyopathy, a.fib    Ms. Emmanuel Up is here today for follow up appointment. Recently had UTI and hospitalization which was prolonged followed by rehab. Feeling much better now  Better mental status  No cardiac complaints  Occasional dizziness. BB dose decreased few weeks ago by PCP  No angina or heart failure. Past Medical History:   Diagnosis Date    A-fib [facility]) 01/2017    Arthritis, degenerative     CAD (coronary artery disease) 04/2012    S/P 2.75 X 15 mm BMS of OM (04/2012), Two LAD BMS (07/2012)    Elevated liver enzymes     Likely from statin    HLD (hyperlipidemia)     Hyperkalemia 06/2012    Likely from lisinopril    Ischemic cardiomyopathy     LVEF  45% (11/2012) & 30% echo (04/2012)    NSTEMI (non-ST elevated myocardial infarction) ([facility] [address] 04/2012    UTI (urinary tract infection)     Vertigo        Past Surgical History:   Procedure Laterality Date    HX CORONARY STENT PLACEMENT  4/23/12    bare metal stent to obtuse marginal branch    HX HEART CATHETERIZATION         Current Outpatient Medications   Medication Sig    vitamin a-vitamin c-vit e-min (OCUVITE) tablet Take 1 Tab by mouth daily.  FLUoxetine (PROZAC) 10 mg tablet Take 1 Tab by mouth daily.  metoprolol tartrate (LOPRESSOR) 25 mg tablet Take 0.5 Tabs by mouth daily.  psyllium husk-aspartame (METAMUCIL FIBER) 3.4 gram pwpk packet Take 1 Packet by mouth daily.  polyethylene glycol (MIRALAX) 17 gram packet Take 1 Packet by mouth daily.  ondansetron (ZOFRAN ODT) 4 mg disintegrating tablet Take 1 Tab by mouth every eight (8) hours as needed.  calcium-vitamin D (OYSTER SHELL) 500 mg(1,250mg) -200 unit per tablet Take 1 Tab by mouth daily.     mirtazapine (REMERON) 15 mg tablet Take 1 Tab by mouth nightly.  fluticasone (FLONASE) 50 mcg/actuation nasal spray 2 Sprays by Both Nostrils route daily as needed for Rhinitis.  digoxin (LANOXIN) 0.125 mg tablet Take 1 Tab by mouth daily.  cyanocobalamin (VITAMIN B12) 100 mcg tablet Take 1 Tab by mouth daily.  aspirin 81 mg chewable tablet Take 1 Tab by mouth daily.  ondansetron hcl (ZOFRAN) 4 mg tablet Take 1 Tab by mouth every eight (8) hours as needed for Nausea.  psyllium (METAMUCIL) powd Use as directed daily  Indications: Irritable Bowel Syndrome    clotrimazole (LOTRIMIN) 1 % topical cream Apply  to affected area two (2) times a day. Apply with barrier cream to affected area.  clopidogrel (PLAVIX) 75 mg tab Take 1 Tab by mouth daily.  nitroglycerin (NITROSTAT) 0.4 mg SL tablet 1 Tab by SubLINGual route every five (5) minutes as needed for Chest Pain. No current facility-administered medications for this visit. Allergies and Sensitivities:  Allergies   Allergen Reactions    Flu Vaccine 2011 (36 Mos+)(Pf) Anaphylaxis    Codeine Anaphylaxis    Egg Not Reported This Time    Pcn [Penicillins] Hives     Family History:  Family History   Problem Relation Age of Onset    Diabetes Mother      Social History:  Social History     Tobacco Use    Smoking status: Never Smoker    Smokeless tobacco: Never Used   Substance Use Topics    Alcohol use: No    Drug use: No     She  reports that  has never smoked. she has never used smokeless tobacco.  She  reports that she does not drink alcohol.     Review of Systems:    Physical Exam:  BP Readings from Last 3 Encounters:   12/20/18 114/71   11/28/18 124/64   11/26/18 96/55         Pulse Readings from Last 3 Encounters:   12/20/18 80   11/28/18 100   11/26/18 96          Wt Readings from Last 3 Encounters:   12/20/18 100 lb (45.4 kg)   11/15/18 92 lb (41.7 kg)   11/06/18 106 lb 12.8 oz (48.4 kg)     Constitutional: Oriented to person, place, and time. HENT: Head: Normocephalic and atraumatic. Neck: No JVD present. Cardiovascular: Irregular rhythm. No murmur, gallop or rubs appriciated  Lung[de-identified] Breath sounds normal. No respiratory distress. No Rales appriciated  Abdominal: No tenderness. No rebound and no guarding. Musculoskeletal: There is no ankle edema. No cynosis    Review of Data:  LABS:   Lab Results   Component Value Date/Time    Sodium 135 (L) 11/15/2018 02:12 PM    Potassium 5.0 11/15/2018 02:12 PM    Chloride 101 11/15/2018 02:12 PM    CO2 26 11/15/2018 02:12 PM    Glucose 114 (H) 11/15/2018 02:12 PM    BUN 18 11/15/2018 02:12 PM    Creatinine 1.10 11/15/2018 02:12 PM     Lab Results   Component Value Date/Time    Cholesterol, total 165 07/07/2012 03:21 AM    HDL Cholesterol 42 07/07/2012 03:21 AM    LDL, calculated 114.2 (H) 07/07/2012 03:21 AM    Triglyceride 44 07/07/2012 03:21 AM    CHOL/HDL Ratio 3.9 07/07/2012 03:21 AM     No results found for: GPT  No results found for: HBA1C, [id number]    EKG (07/2012) Sinus rhythm at 74 beats per minute. No dynamic ST-T changes of ischemia. No Q waves noted. (01/17) A.fib at      CATHETERIZATION:(04/2012)  1. LM: Short, but angiographically normal.   2. LAD: Proximal at D1 level, 40% to 50% moderate disease, mid left anterior descending at D2 level 80% tubular stenosis. 3. LCx/OM: Proximal diffuse 40%, Mid OM 95-99% subtotal occlusion with ruptured plaque and with NISH 2 flow. 4. RCA: Anatomically dominant; 30- 40%  proximal to mid, otherwise normal.   5. Markedly reduced LVEF with estimated ejection fraction of 25% with evidence of severe hypokinesis of entire anterior apical and inferoapical wall. Diaphragmatic and inferobasilar wall is the most mary segment. Successful PCI and stenting of the OM, 95-99% subtotal occlusion with 2.75 x 15 mm bare metal Vision stent.     CATH (07/2012)   LM: Normal  LAD: 70% calcified prox-mid, Mid LAD 90%  LCx/OM1: proximal 50% moderate disease, Patent mid stent  Successful PCI and stenting of LAD using 2.5 X 18 and 2.5X15 mm BMS    ECHO (11/2012)   1. Left ventricular systolic function was mildly reduced with ejection fraction of 45 to 50%. There was mild diffuse hypokinesis with more pronounced hypokinesis of inferolateral wall. Diastolic dysfunction present. 2.  Right ventricular function is normal.  It was mildly dilated. Estimated peak pressure of 40 mmHg. 3.  Mild mitral regurgitation and aortic regurgitation. No significant valvular pathology noted. 4.  Comparison was made with previous study in April 2012. Overall, left ventricular function has increased from 30 to 45%. Impression / Plan:    Coronary artery disease:    She had an LAD and circumflex stent in 2012. Currently she is on asa, BB and Plavix. No angina. Stable. Ischemic cardiomyopathy:    Last ejection fraction was noted to be 45-50% in November, 2012. Lasix only as needed. Already on digoxin, lopressor. No fluid overload on exam    Hypertension:   Stable. BP today 114/72 mm Hg. Now on lower dose of BB once daily. Continue digoxin and BB    A.fib:   Appears to be in A.fib on exam  HR 60 bpm today. Continue BB and digoxin  Continue antiplatelet. Hyperlipidemia:  She has a history of statin induced elevation of LFTs every time she has tried different kinds of statins. She is currently not on any lipid lowering agent. This plan was discussed with Ms. Quiles in detail who is in agreement. Thank you for allowing me to participate in the patient's care. Feel free to call me with any questions or concerns.

## 2019-01-16 RX ORDER — ONDANSETRON 4 MG/1
4 TABLET, ORALLY DISINTEGRATING ORAL
Qty: 12 TAB | Refills: 0 | Status: SHIPPED | OUTPATIENT
Start: 2019-01-16 | End: 2019-02-19

## 2019-01-28 ENCOUNTER — OFFICE VISIT (OUTPATIENT)
Dept: INTERNAL MEDICINE CLINIC | Age: 84
End: 2019-01-28

## 2019-01-28 ENCOUNTER — HOSPITAL ENCOUNTER (OUTPATIENT)
Dept: LAB | Age: 84
Discharge: HOME | End: 2019-01-28
Payer: MEDICARE

## 2019-01-28 VITALS
HEIGHT: 62 IN | SYSTOLIC BLOOD PRESSURE: 150 MMHG | HEART RATE: 83 BPM | TEMPERATURE: 97.9 F | WEIGHT: 109 LBS | BODY MASS INDEX: 20.06 KG/M2 | DIASTOLIC BLOOD PRESSURE: 92 MMHG | RESPIRATION RATE: 18 BRPM | OXYGEN SATURATION: 93 %

## 2019-01-28 DIAGNOSIS — N30.01 ACUTE CYSTITIS WITH HEMATURIA: Primary | ICD-10-CM

## 2019-01-28 LAB
BILIRUB UR QL STRIP: NEGATIVE
GLUCOSE UR-MCNC: NEGATIVE MG/DL
KETONES P FAST UR STRIP-MCNC: NEGATIVE MG/DL
PH UR STRIP: 5.5 [PH] (ref 4.6–8)
PROT UR QL STRIP: NEGATIVE
SP GR UR STRIP: 1.01 (ref 1–1.03)
UA UROBILINOGEN AMB POC: NORMAL (ref 0.2–1)
URINALYSIS CLARITY POC: CLEAR
URINALYSIS COLOR POC: YELLOW
URINE BLOOD POC: NEGATIVE
URINE LEUKOCYTES POC: NEGATIVE
URINE NITRITES POC: NEGATIVE

## 2019-01-28 PROCEDURE — 87086 URINE CULTURE/COLONY COUNT: CPT

## 2019-01-28 RX ORDER — NITROFURANTOIN (MACROCRYSTALS) 100 MG/1
100 CAPSULE ORAL 2 TIMES DAILY
Qty: 10 CAP | Refills: 0 | Status: SHIPPED | OUTPATIENT
Start: 2019-01-28 | End: 2019-02-02

## 2019-01-28 NOTE — PROGRESS NOTES
HISTORY OF PRESENT ILLNESS  Deepti Quiles is a 80 y.o. female. Urgency   The history is provided by the patient. This is a new problem. The current episode started 2 days ago. The problem occurs constantly. The problem has not changed since onset. Pertinent negatives include no chest pain, no abdominal pain, no headaches and no shortness of breath. Nothing aggravates the symptoms. Nothing relieves the symptoms. She has tried nothing for the symptoms. The treatment provided no relief. Review of Systems   Constitutional: Negative for chills, fever and malaise/fatigue. HENT: Negative for congestion. Eyes: Negative for blurred vision. Respiratory: Negative for cough and shortness of breath. Cardiovascular: Negative for chest pain, palpitations and orthopnea. Gastrointestinal: Positive for nausea. Negative for abdominal pain, constipation, diarrhea, heartburn and vomiting. Genitourinary: Positive for urgency. Negative for dysuria, flank pain, frequency and hematuria. Musculoskeletal: Negative for myalgias. Neurological: Negative for dizziness and headaches. Psychiatric/Behavioral: The patient is not nervous/anxious. Physical Exam   Constitutional: She is oriented to person, place, and time. She appears well-developed and well-nourished. No distress. HENT:   Head: Normocephalic and atraumatic. Right Ear: External ear normal.   Left Ear: External ear normal.   Mouth/Throat: Oropharynx is clear and moist. No oropharyngeal exudate. Eyes: EOM are normal. Pupils are equal, round, and reactive to light. Neck: Neck supple. Cardiovascular: Regular rhythm. Pulmonary/Chest: Breath sounds normal. She has no wheezes. Abdominal: Soft. Bowel sounds are normal. She exhibits no distension. There is no tenderness. Lymphadenopathy:     She has no cervical adenopathy. Neurological: She is alert and oriented to person, place, and time. No cranial nerve deficit.  Coordination normal.   Skin: Skin is dry. She is not diaphoretic. Psychiatric: She has a normal mood and affect. Nursing note and vitals reviewed. ASSESSMENT and PLAN    ICD-10-CM ICD-9-CM    1. Acute cystitis with hematuria N30.01 595.0 AMB POC URINALYSIS DIP STICK AUTO W/ MICRO      CULTURE, URINE      nitrofurantoin (MACRODANTIN) 100 mg capsule   advised to check the blood pressure again and report high readings to PCP. Go to ER if condition gets worse.

## 2019-01-28 NOTE — PATIENT INSTRUCTIONS
Urinary Tract Infection in Women: Care Instructions  Your Care Instructions    A urinary tract infection, or UTI, is a general term for an infection anywhere between the kidneys and the urethra (where urine comes out). Most UTIs are bladder infections. They often cause pain or burning when you urinate. UTIs are caused by bacteria and can be cured with antibiotics. Be sure to complete your treatment so that the infection goes away. Follow-up care is a key part of your treatment and safety. Be sure to make and go to all appointments, and call your doctor if you are having problems. It's also a good idea to know your test results and keep a list of the medicines you take. How can you care for yourself at home? · Take your antibiotics as directed. Do not stop taking them just because you feel better. You need to take the full course of antibiotics. · Drink extra water and other fluids for the next day or two. This may help wash out the bacteria that are causing the infection. (If you have kidney, heart, or liver disease and have to limit fluids, talk with your doctor before you increase your fluid intake.)  · Avoid drinks that are carbonated or have caffeine. They can irritate the bladder. · Urinate often. Try to empty your bladder each time. · To relieve pain, take a hot bath or lay a heating pad set on low over your lower belly or genital area. Never go to sleep with a heating pad in place. To prevent UTIs  · Drink plenty of water each day. This helps you urinate often, which clears bacteria from your system. (If you have kidney, heart, or liver disease and have to limit fluids, talk with your doctor before you increase your fluid intake.)  · Urinate when you need to. · Urinate right after you have sex. · Change sanitary pads often. · Avoid douches, bubble baths, feminine hygiene sprays, and other feminine hygiene products that have deodorants.   · After going to the bathroom, wipe from front to back.  When should you call for help? Call your doctor now or seek immediate medical care if:    · Symptoms such as fever, chills, nausea, or vomiting get worse or appear for the first time.     · You have new pain in your back just below your rib cage. This is called flank pain.     · There is new blood or pus in your urine.     · You have any problems with your antibiotic medicine.    Watch closely for changes in your health, and be sure to contact your doctor if:    · You are not getting better after taking an antibiotic for 2 days.     · Your symptoms go away but then come back. Where can you learn more? Go to http://colin-david.info/. Enter G122 in the search box to learn more about \"Urinary Tract Infection in Women: Care Instructions. \"  Current as of: March 20, 2018  Content Version: 11.9  © 3129-0022 [employer]. Care instructions adapted under license by [employer] (which disclaims liability or warranty for this information). If you have questions about a medical condition or this instruction, always ask your healthcare professional. [address] any warranty or liability for your use of this information.

## 2019-01-28 NOTE — PROGRESS NOTES
ROOM # 3    [facility] Qu[facility] presents today for   Chief Complaint   Patient presents with    Urgency     hx of uit was hospitalized       [facility] preferred language for health care discussion is english/other. Is someone accompanying this pt? no    Is the patient using any DME equipment during OV? no    Depression Screening:  PHQ over the last two weeks 10/9/2018 9/24/2018 3/27/2014   Little interest or pleasure in doing things Not at all Not at all Not at all   Feeling down, depressed, irritable, or hopeless Not at all Not at all Not at all   Total Score PHQ 2 0 0 0       Learning Assessment:  Learning Assessment 7/5/2016 3/27/2014   PRIMARY LEARNER Patient Patient   PRIMARY LANGUAGE ENGLISH ENGLISH   LEARNER PREFERENCE PRIMARY READING READING   ANSWERED BY Patient -   RELATIONSHIP SELF -       Abuse Screening:  No flowsheet data found. Fall Risk  Fall Risk Assessment, last 12 mths 1/28/2019 11/15/2018 10/9/2018 9/24/2018 3/27/2014   Able to walk? Yes No Yes Yes Yes   Fall in past 12 months? No - No No No       Health Maintenance reviewed and discussed per provider. Yes    Tra Coleman is due for   Health Maintenance Due   Topic Date Due    DTaP/Tdap/Td series (1 - Tdap) 08/10/1948    Pneumococcal 65+ Low/Medium Risk (1 of 2 - PCV13) 08/10/1992    Shingrix Vaccine Age 50> (2 of 2) 06/26/2017    MEDICARE YEARLY EXAM  03/14/2018    Influenza Age 9 to Adult  08/01/2018         Please order/place referral if appropriate. Advance Directive:  1. Do you have an advance directive in place? Patient Reply: no    2. If not, would you like material regarding how to put one in place? Patient Reply: no    Coordination of Care:  1. Have you been to the ER, urgent care clinic since your last visit? Hospitalized since your last visit? no    2. Have you seen or consulted any other health care providers outside of the [address] [place] [address] since your last visit? Include any pap smears or colon screening. no

## 2019-01-30 LAB
BACTERIA SPEC CULT: NORMAL
SERVICE CMNT-IMP: NORMAL

## 2019-02-19 ENCOUNTER — OFFICE VISIT (OUTPATIENT)
Dept: INTERNAL MEDICINE CLINIC | Age: 84
End: 2019-02-19

## 2019-02-19 ENCOUNTER — DOCUMENTATION ONLY (OUTPATIENT)
Dept: INTERNAL MEDICINE CLINIC | Age: 84
End: 2019-02-19

## 2019-02-19 VITALS
BODY MASS INDEX: 19.69 KG/M2 | SYSTOLIC BLOOD PRESSURE: 144 MMHG | HEIGHT: 62 IN | HEART RATE: 79 BPM | OXYGEN SATURATION: 98 % | WEIGHT: 107 LBS | TEMPERATURE: 96 F | RESPIRATION RATE: 18 BRPM | DIASTOLIC BLOOD PRESSURE: 58 MMHG

## 2019-02-19 DIAGNOSIS — K58.0 IRRITABLE BOWEL SYNDROME WITH DIARRHEA: Primary | ICD-10-CM

## 2019-02-19 DIAGNOSIS — R23.8 SKIN BREAKDOWN: ICD-10-CM

## 2019-02-19 DIAGNOSIS — R60.1 GENERALIZED EDEMA: ICD-10-CM

## 2019-02-19 DIAGNOSIS — R11.0 NAUSEA: ICD-10-CM

## 2019-02-19 RX ORDER — POLYETHYLENE GLYCOL 3350 17 G/17G
17 POWDER, FOR SOLUTION ORAL
COMMUNITY

## 2019-02-19 RX ORDER — LOPERAMIDE HCL 2 MG
1 TABLET ORAL
COMMUNITY
End: 2020-04-02

## 2019-02-19 RX ORDER — ACETAMINOPHEN 325 MG/1
650 TABLET ORAL
COMMUNITY
End: 2019-06-20

## 2019-02-19 RX ORDER — ONDANSETRON 4 MG/1
4 TABLET, ORALLY DISINTEGRATING ORAL
COMMUNITY
End: 2019-03-11 | Stop reason: SDUPTHER

## 2019-02-19 RX ORDER — MECLIZINE HCL 12.5 MG 12.5 MG/1
12.5 TABLET ORAL
COMMUNITY
End: 2019-06-07

## 2019-02-19 NOTE — PROGRESS NOTES
HISTORY OF PRESENT ILLNESS Jono Moran is a 80 y.o. female. 81 yo female here for f/u of IBS, edema, weight loss, medication review. Chronic diarrhea. Exacerbated currently, but otherwise feeling better. Increased strength. Has regained weight but still feels she has to make herself eat. Has been active, walking ~ 1 mile/day. Adjusting well to [employer] Swelling of ankles. No pain. Has lasix prn. Has tired compression stockings but does not like to wear these. Ulcer on buttock. Clotrimazole helping Would like to reduce medication/pill burden where possible. Zofran with issues with insurance coverage. Abdominal Pain Pertinent negatives include no chest pain, no abdominal pain, no headaches and no shortness of breath. Medication Evaluation Pertinent negatives include no chest pain, no abdominal pain, no headaches and no shortness of breath. Review of Systems Constitutional: Negative for chills, fever and weight loss. HENT: Negative for congestion and ear pain. Eyes: Negative for blurred vision and pain. Respiratory: Negative for cough and shortness of breath. Cardiovascular: Negative for chest pain, palpitations and leg swelling. Gastrointestinal: Positive for diarrhea. Negative for abdominal pain, nausea and vomiting. Genitourinary: Negative for frequency and urgency. Musculoskeletal: Negative for joint pain and myalgias. Skin: Negative for itching and rash. Neurological: Negative for dizziness, tingling and headaches. Psychiatric/Behavioral: Negative for depression. The patient is not nervous/anxious. Past Medical History:  
Diagnosis Date  [facility]  Arthritis, degenerative  CAD (coronary artery disease) 04/2012 S/P 2.75 X 15 mm BMS of OM (04/2012), Two LAD BMS (07/2012)  Elevated liver enzymes Likely from statin  HLD (hyperlipidemia)  Hyperkalemia 06/2012 Likely from lisinopril  Ischemic cardiomyopathy LVEF  45% (11/2012) & 30% echo (04/2012)  NSTEMI (non-ST elevated myocardial infarction) ([facility] 04/2012  UTI (urinary tract infection)  Vertigo Current Outpatient Medications on File Prior to Visit Medication Sig Dispense Refill  polyethylene glycol (MIRALAX) 17 gram/dose powder Take 17 g by mouth daily as needed (constipation).  vit C-E-zinc cit-lutein-zeaxan ([address]) 50 mg-15 unit- 4.5 mg-2.5 mg chew Take 1 Tab by mouth daily. Ocuvite gummy  acetaminophen (TYLENOL) 325 mg tablet Take 650 mg by mouth every four (4) hours as needed for Pain.  carboxymethylcellulos/glycerin (CLEAR EYES FOR DRY EYES OP) Administer 1 Drop to both eyes three (3) times daily as needed (dry eyes).  carbamide peroxide (MURINE EAR) 6.5 % otic solution Instill 5 drops in each ear twice daily for 4 days each month as needed for ear wax.  ondansetron (ZOFRAN ODT) 4 mg disintegrating tablet Take 4 mg by mouth every eight (8) hours as needed for Nausea.  loperamide (IMMODIUM) 2 mg tablet Take 1 mg by mouth three (3) times daily as needed for Diarrhea.  furosemide (LASIX) 20 mg tablet Take 1 Tab by mouth daily as needed. 30 Tab 0  
 FLUoxetine (PROZAC) 10 mg tablet Take 1 Tab by mouth daily. 90 Tab 3  psyllium husk-aspartame (METAMUCIL FIBER) 3.4 gram pwpk packet Take 1 Packet by mouth daily. 30 Packet 0  
 mirtazapine (REMERON) 15 mg tablet Take 1 Tab by mouth nightly. 30 Tab 0  
 fluticasone (FLONASE) 50 mcg/actuation nasal spray 2 Sprays by Both Nostrils route daily as needed for Rhinitis. 1 Bottle 0  
 digoxin (LANOXIN) 0.125 mg tablet Take 1 Tab by mouth daily. 30 Tab 0  
 aspirin 81 mg chewable tablet Take 1 Tab by mouth daily. 30 Tab 0  clotrimazole (LOTRIMIN) 1 % topical cream Apply  to affected area two (2) times a day. Apply with barrier cream to affected area. 15 g 1  
 clopidogrel (PLAVIX) 75 mg tab Take 1 Tab by mouth daily.  90 Tab 3  
  nitroglycerin (NITROSTAT) 0.4 mg SL tablet 1 Tab by SubLINGual route every five (5) minutes as needed for Chest Pain. 25 Tab 3  
 meclizine (ANTIVERT) 12.5 mg tablet Take 12.5 mg by mouth daily as needed for Dizziness. No current facility-administered medications on file prior to visit. Social History Tobacco Use  Smoking status: Never Smoker  Smokeless tobacco: Never Used Substance Use Topics  Alcohol use: No  
 Drug use: No  
 
Physical Exam  
Constitutional: She appears well-developed and well-nourished. No distress. /58 (BP 1 Location: Left arm, BP Patient Position: Sitting)   Pulse 79   Temp 96 °F (35.6 °C) (Oral)   Resp 18   Ht 5' 2\" (1.575 m)   Wt 107 lb (48.5 kg)   SpO2 98%   BMI 19.57 kg/m² Eyes: EOM are normal. Right eye exhibits no discharge. Left eye exhibits no discharge. No scleral icterus. Neck: Neck supple. Cardiovascular: Normal rate, regular rhythm and normal heart sounds. Exam reveals no gallop and no friction rub. No murmur heard. Pulmonary/Chest: Effort normal and breath sounds normal. No respiratory distress. She has no wheezes. She has no rales. Abdominal: Soft. She exhibits no distension. There is no tenderness. There is no rebound. Musculoskeletal: She exhibits no edema or tenderness. Lymphadenopathy:  
  She has no cervical adenopathy. Neurological: She is alert. She exhibits normal muscle tone. Skin: Skin is warm and dry. Mild breakdown/excoriation R buttock Psychiatric: She has a normal mood and affect. Lab Results Component Value Date/Time  Sodium 135 (L) 11/15/2018 02:12 PM  
 Potassium 5.0 11/15/2018 02:12 PM  
 Chloride 101 11/15/2018 02:12 PM  
 CO2 26 11/15/2018 02:12 PM  
 Anion gap 8 11/15/2018 02:12 PM  
 Glucose 114 (H) 11/15/2018 02:12 PM  
 BUN 18 11/15/2018 02:12 PM  
 Creatinine 1.10 11/15/2018 02:12 PM  
 BUN/Creatinine ratio 16 11/15/2018 02:12 PM  
 GFR est AA 56 (L) 11/15/2018 02:12 PM  
 GFR est non-AA 47 (L) 11/15/2018 02:12 PM  
 Calcium 9.2 11/15/2018 02:12 PM  
 Bilirubin, total 0.3 10/20/2018 05:50 PM  
 AST (SGOT) 15 10/20/2018 05:50 PM  
 Alk. phosphatase 139 (H) 10/20/2018 05:50 PM  
 Protein, total 6.1 (L) 10/20/2018 05:50 PM  
 Albumin 2.5 (L) 10/20/2018 05:50 PM  
 Globulin 3.6 10/20/2018 05:50 PM  
 A-G Ratio 0.7 (L) 10/20/2018 05:50 PM  
 ALT (SGPT) 18 10/20/2018 05:50 PM  
 
ASSESSMENT and PLAN 
  ICD-10-CM ICD-9-CM 1. Irritable bowel syndrome with diarrhea K58.0 564.1 2. Generalized edema R60.1 782.3 3. Skin breakdown L90.9 701.9 4. Nausea R11.0 787.02 Overall improved. Can continue imodium prn for her IBS sx. Discussed trying travel socks/diabetic socks for comfort. Prn lasix if needed. Discussed barrier cream to buttock particularly when diarrhea flares. PharmD met with pt and family to assist with medication reconciliation. Can hold calcium and B12 supplements.

## 2019-02-19 NOTE — PROGRESS NOTES
ROOM # 17 Identified pt with two pt identifiers(name and ). Reviewed record in preparation for visit and have obtained necessary documentation. Chief Complaint Patient presents with  Irritable Bowel Syndrome f/u  Medication Evaluation Deepti Quiles preferred language for health care discussion is english/other. Is the patient using any DME equipment during OV? NO Demarco Jacobs is due for: 
Health Maintenance Due Topic  DTaP/Tdap/Td series (1 - Tdap)  Shingrix Vaccine Age 50> (1 of 2)  Pneumococcal 65+ Low/Medium Risk (1 of 2 - PCV13)  MEDICARE YEARLY EXAM   
 Influenza Age 5 to Adult Health Maintenance reviewed and discussed per provider Please order/place referral if appropriate. Advance Directive: 1. Do you have an advance directive in place? Patient Reply: Chelsey Wagner 
 
2. If not, would you like material regarding how to put one in place? NO Coordination of Care: 1. Have you been to the ER, urgent care clinic since your last visit? Hospitalized since your last visit? NO 
 
2. Have you seen or consulted any other health care providers outside of the [address] [place] [address] since your last visit? Include any pap smears or colon screening. NO Patient is accompanied by daughter and son in law I have received verbal consent from Demarco Jacobs to discuss any/all medical information while they are present in the room. Learning Assessment: 
Learning Assessment 2016 3/27/2014 PRIMARY LEARNER Patient Patient PRIMARY LANGUAGE ENGLISH ENGLISH  
LEARNER PREFERENCE PRIMARY READING READING  
ANSWERED BY Patient -  
RELATIONSHIP SELF -  
 
Depression Screening: 
3 most recent [facility] Screens 2019 10/9/2018 2018 3/27/2014 Little interest or pleasure in doing things Not at all Not at all Not at all Not at all Feeling down, depressed, irritable, or hopeless Not at all Not at all Not at all Not at all Total Score PHQ 2 0 0 0 0 Abuse Screening: No flowsheet data found. Fall Risk Fall Risk Assessment, last 12 mths 2/19/2019 1/28/2019 11/15/2018 10/9/2018 9/24/2018 3/27/2014 Able to walk? Yes Yes No Yes Yes Yes Fall in past 12 months?  No No - No No No

## 2019-02-19 NOTE — PATIENT INSTRUCTIONS
Learning About Preventing Pressure Injuries What are pressure injuries? A pressure injury to the skin is caused by constant pressure over a period of time. The constant pressure blocks the blood supply to the skin. This causes skin cells to die and creates a sore. Pressure injuries are also called bedsores. Pressure injuries usually occur over bony areas, such as the hips, lower back, elbows, heels, and shoulders. Pressure injuries can also occur in places where the skin folds over on itself, or where medical equipment presses on the skin, such as when oxygen tubes press on the ears or cheeks. Pressure injuries can range from red areas on the surface of the skin to severe tissue damage that goes deep into muscle and bone. Severe sores are hard to treat and slow to heal. When pressure injuries do not heal properly, problems such as bone, blood, and skin infections can develop. What causes pressure injuries? Things that cause pressure injuries include: · Pressure on the skin and tissues. For example, the sores may happen when a person lies in bed or sits in a wheelchair for a long time. This is the most common cause of pressure injuries. · Sliding down in a bed or chair, forcing the skin to fold over itself (shear force). · Being pulled across bed sheets or other surfaces (friction burns). As we get older, our skin gets more thin and dry and less elastic, so it is easier to damage. Poor nutrition and not getting enough fluids make these natural changes in the skin worse. Skin in this condition may easily develop a pressure injury. Skin can also be damaged by sweat, feces, or urine, making pressure injuries more likely and harder to heal. 
How can you help prevent pressure injuries? If you are not able to do these things yourself, ask a family member or friend for help. Change position often · In a bed, change position every 2 hours. · In a wheelchair or other type of chair, shift your weight every 15 minutes, and give yourself a full relief of weight every hour. ? For a weight shift, lean forward and to the left and right. Push up out of the chair with your arms. If you have a chair that tilts, use the tilt control to help relieve pressure. ? For a full relief of weight, stand up or move to another chair or bed if you are able to. Personal care · Check your skin every day, especially around bony areas. When a pressure injury is forming, skin temperature can be different than nearby skin. It might be warmer or cooler. The skin can feel either firmer or softer than the surrounding skin. · Keep your skin clean and free of sweat, wound drainage, urine, and feces. · Use skin lotions to keep your skin from drying out and cracking. Barrier lotions or creams have ingredients that can act as a shield to help protect the skin from moisture or irritation. · Try not to slide or slump across sheets in a chair or bed. And try not to sleep in a recliner chair. Lifestyle choices · Eat healthy foods with plenty of protein to help heal damaged skin and to help new skin grow. · Get plenty of fluids. · Stay at a healthy weight. Being either overweight or underweight can make pressure injuries more likely. · If you smoke, stop. Smoking dries the skin and reduces its blood supply. If you need help quitting, talk to your doctor about stop-smoking programs and medicines. These can increase your chances of quitting for good. Ask about using cushions or pads · Overlays are special pads you put on top of a mattress. They provide a softer surface that will fit your body's shape better than a regular mattress. · Cushions or devices can be used to reduce pressure on certain areas of the body. For example, you can use a \"medical heel pillow\" to help prevent pressure injuries on heels. You can also get cushions for seating surfaces, such as wheelchair seats. Talk with your doctor about cushions and pads. Some products, such as doughnut-type devices, may actually cause pressure injuries or make them worse. Where can you learn more? Go to http://colin-david.info/. Enter 716 1162 in the search box to learn more about \"Learning About Preventing Pressure Injuries. \" Current as of: September 26, 2018 Content Version: 11.9 © 0624-7591 [employer]. Care instructions adapted under license by [employer] (which disclaims liability or warranty for this information). If you have questions about a medical condition or this instruction, always ask your healthcare professional. [address] any warranty or liability for your use of this information.

## 2019-02-19 NOTE — PROGRESS NOTES
Pharmacist Note: Immunization Update    Patient: Jerilyn Sanders (31 y.o., 8/10/1927)     Patient's immunization history was updated to reflect information contained in the [place] and/or outside immunization/pharmacy records were reconciled within [address] [facility]. Specifically, Diya [facility] and Internal Medicine Vaccination records available in media were reviewed and shingles vaccination date clarified to be 6/1/2011 and formulation would have been Zostavax at that time as Shingrix was not available back then. Patient is still due for the 2-dose Shingrix series if desired. Health Maintenance schedule updated.     Current immunizations now reflect:       Immunization History   Administered Date(s) Administered    Zoster Vaccine, Live 06/01/2011       Niko Ponce, DainD, BCACP

## 2019-02-19 NOTE — PROGRESS NOTES
Pharmacy Note - Medication Review 02/19/19 Subjective / Objective Danica De La Cruz is a 80 y.o. female who was seen today for a medication review. She is accompanied by her daughter, Emil Damico, and son-in-law, Mary Waddell. Mary Waddell handles most of the medication issues in dealing with [facility] where the patient resides. YOB: 1927 Referred by: Dr. Alla Silveira for medication reconciliation/possible deprescribing during visit scheduled with PCP. Family concerned with possible discrepancies between our office med list and the [address] [place] [address] list and the communication difficulties in getting these orders changed Past Medical History:  
Diagnosis Date  A-fib ([facility] 01/2017  Arthritis, degenerative  CAD (coronary artery disease) 04/2012 S/P 2.75 X 15 mm BMS of OM (04/2012), Two LAD BMS (07/2012)  Elevated liver enzymes Likely from statin  HLD (hyperlipidemia)  Hyperkalemia 06/2012 Likely from lisinopril  Ischemic cardiomyopathy LVEF  45% (11/2012) & 30% echo (04/2012)  NSTEMI (non-ST elevated myocardial infarction) ([facility] 04/2012  UTI (urinary tract infection)  Vertigo Past Surgical History:  
Procedure Laterality Date  HX CORONARY STENT PLACEMENT  4/23/12  
 bare metal stent to obtuse marginal branch  HX HEART CATHETERIZATION Family History Problem Relation Age of Onset  Diabetes Mother Social History Socioeconomic History  Marital status:  Spouse name: Not on file  Number of children: Not on file  Years of education: Not on file  Highest education level: Not on file Tobacco Use  Smoking status: Never Smoker  Smokeless tobacco: Never Used Substance and Sexual Activity  Alcohol use: No  
 Drug use: No  
 Sexual activity: No  
 
 
Vitals Visit Vitals /58 (BP 1 Location: Left arm, BP Patient Position: Sitting) Pulse 79 Temp 96 °F (35.6 °C) (Oral) Resp 18 Ht 5' 2\" (1.575 m) Wt 107 lb (48.5 kg) SpO2 98% BMI 19.57 kg/m² Data reviewed: 
Lab Results Component Value Date/Time Sodium 135 (L) 11/15/2018 02:12 PM  
 Potassium 5.0 11/15/2018 02:12 PM  
 Chloride 101 11/15/2018 02:12 PM  
 CO2 26 11/15/2018 02:12 PM  
 Anion gap 8 11/15/2018 02:12 PM  
 Glucose 114 (H) 11/15/2018 02:12 PM  
 BUN 18 11/15/2018 02:12 PM  
 Creatinine 1.10 11/15/2018 02:12 PM  
 BUN/Creatinine ratio 16 11/15/2018 02:12 PM  
 GFR est AA 56 (L) 11/15/2018 02:12 PM  
 GFR est non-AA 47 (L) 11/15/2018 02:12 PM  
 Calcium 9.2 11/15/2018 02:12 PM  
 Bilirubin, total 0.3 10/20/2018 05:50 PM  
 AST (SGOT) 15 10/20/2018 05:50 PM  
 Alk. phosphatase 139 (H) 10/20/2018 05:50 PM  
 Protein, total 6.1 (L) 10/20/2018 05:50 PM  
 Albumin 2.5 (L) 10/20/2018 05:50 PM  
 Globulin 3.6 10/20/2018 05:50 PM  
 A-G Ratio 0.7 (L) 10/20/2018 05:50 PM  
 ALT (SGPT) 18 10/20/2018 05:50 PM  
 
No results found for: HBA1C, HGBE8, FMW6DUAU, GEB9LZOM Lab Results Component Value Date/Time Cholesterol, total 165 07/07/2012 03:21 AM  
 HDL Cholesterol 42 07/07/2012 03:21 AM  
 LDL, calculated 114.2 (H) 07/07/2012 03:21 AM  
 VLDL, calculated 8.8 07/07/2012 03:21 AM  
 Triglyceride 44 07/07/2012 03:21 AM  
 CHOL/HDL Ratio 3.9 07/07/2012 03:21 AM  
 
 
Allergies Allergen Reactions  Flu Vaccine 2011 (36 Mos+)(Pf) Anaphylaxis  Codeine Anaphylaxis  Egg Not Reported This Time  Pcn [Penicillins] Hives Medication List Prior to Visit:  
Outpatient Medications Prior to Visit Medication Sig Dispense Refill  furosemide (LASIX) 20 mg tablet Take 1 Tab by mouth daily as needed. 30 Tab 0  
 vitamin a-vitamin c-vit e-min (OCUVITE) tablet Take 1 Tab by mouth daily. 100 Tab 3  
 FLUoxetine (PROZAC) 10 mg tablet Take 1 Tab by mouth daily. 90 Tab 3  psyllium husk-aspartame (METAMUCIL FIBER) 3.4 gram pwpk packet Take 1 Packet by mouth daily.  30 Packet 0  
  polyethylene glycol (MIRALAX) 17 gram packet Take 1 Packet by mouth daily. 30 Packet 0  
 mirtazapine (REMERON) 15 mg tablet Take 1 Tab by mouth nightly. 30 Tab 0  
 fluticasone (FLONASE) 50 mcg/actuation nasal spray 2 Sprays by Both Nostrils route daily as needed for Rhinitis. 1 Bottle 0  
 digoxin (LANOXIN) 0.125 mg tablet Take 1 Tab by mouth daily. 30 Tab 0  
 aspirin 81 mg chewable tablet Take 1 Tab by mouth daily. 30 Tab 0  psyllium (METAMUCIL) powd Use as directed daily  Indications: Irritable Bowel Syndrome 1 Bottle 1  
 clotrimazole (LOTRIMIN) 1 % topical cream Apply  to affected area two (2) times a day. Apply with barrier cream to affected area. 15 g 1  
 clopidogrel (PLAVIX) 75 mg tab Take 1 Tab by mouth daily. 90 Tab 3  
 nitroglycerin (NITROSTAT) 0.4 mg SL tablet 1 Tab by SubLINGual route every five (5) minutes as needed for Chest Pain. 25 Tab 3  
 ondansetron (ZOFRAN ODT) 4 mg disintegrating tablet Take 1 Tab by mouth every eight (8) hours as needed. 12 Tab 0  
 metoprolol tartrate (LOPRESSOR) 25 mg tablet Take 0.5 Tabs by mouth daily. 60 Tab 0  
 calcium-vitamin D (OYSTER SHELL) 500 mg(1,250mg) -200 unit per tablet Take 1 Tab by mouth daily. 30 Tab 0  
 cyanocobalamin (VITAMIN B12) 100 mcg tablet Take 1 Tab by mouth daily. 30 Tab 0  
 ondansetron hcl (ZOFRAN) 4 mg tablet Take 1 Tab by mouth every eight (8) hours as needed for Nausea. 30 Tab 1 No facility-administered medications prior to visit. Medications Discontinued / Updated During Visit:  
Medications Discontinued During This Encounter Medication Reason  ondansetron hcl (ZOFRAN) 4 mg tablet Not A Current Medication  metoprolol tartrate (LOPRESSOR) 25 mg tablet Not A Current Medication  ondansetron (ZOFRAN ODT) 4 mg disintegrating tablet Not A Current Medication  cyanocobalamin (VITAMIN B12) 100 mcg tablet Not A Current Medication  calcium-vitamin D (OYSTER SHELL) 500 mg(1,250mg) -200 unit per tablet Not A Current Medication Medication Adherence/Cost: 
 
[address] [facility] [address] [facility] [address] 
[address] [facility] [address] Phone: [phone] Fax: [phone] 3 [facility] [address] [place] [address]. [address] [address] Phone: [phone] Fax: [phone] 
 
-Degree of difficulty affording medications: little - Medications of most concern/difficulty to patient: patient would like to stop anything that can cause stomach upset/nausea (preferably any unnecessary vitamins) -Adherence to medications: reports - Missed doses occurring never - Does patient use pillbox? Medications are prepared/administered by [facility] staff although patient does sometimes have medications that she keeps at her bedside as needed - Does patient have assistance in preparing medications? Yes 
 -Potential barriers to adherence include: none other than miscommunication with [facility]/timeliness of getting medication orders changed per patient/family  (example - patient recently had IBS flare with diarrhea and per family, it took 11 days for an order to be received for loperamide) Updated Medication List  
Current Outpatient Medications Medication Sig  polyethylene glycol (MIRALAX) 17 gram/dose powder Take 17 g by mouth daily as needed (constipation).  vit C-E-zinc cit-lutein-zeaxan ([address]) 50 mg-15 unit- 4.5 mg-2.5 mg chew Take 1 Tab by mouth daily. Ocuvite gummy  acetaminophen (TYLENOL) 325 mg tablet Take 650 mg by mouth every four (4) hours as needed for Pain.  carboxymethylcellulos/glycerin (CLEAR EYES FOR DRY EYES OP) Administer 1 Drop to both eyes three (3) times daily as needed (dry eyes).  carbamide peroxide (MURINE EAR) 6.5 % otic solution Instill 5 drops in each ear twice daily for 4 days each month as needed for ear wax.  ondansetron (ZOFRAN ODT) 4 mg disintegrating tablet Take 4 mg by mouth every eight (8) hours as needed for Nausea.  loperamide (IMMODIUM) 2 mg tablet Take 1 mg by mouth three (3) times daily as needed for Diarrhea.  furosemide (LASIX) 20 mg tablet Take 1 Tab by mouth daily as needed.  FLUoxetine (PROZAC) 10 mg tablet Take 1 Tab by mouth daily.  psyllium husk-aspartame (METAMUCIL FIBER) 3.4 gram pwpk packet Take 1 Packet by mouth daily.  mirtazapine (REMERON) 15 mg tablet Take 1 Tab by mouth nightly.  fluticasone (FLONASE) 50 mcg/actuation nasal spray 2 Sprays by Both Nostrils route daily as needed for Rhinitis.  digoxin (LANOXIN) 0.125 mg tablet Take 1 Tab by mouth daily.  aspirin 81 mg chewable tablet Take 1 Tab by mouth daily.  clotrimazole (LOTRIMIN) 1 % topical cream Apply  to affected area two (2) times a day. Apply with barrier cream to affected area.  clopidogrel (PLAVIX) 75 mg tab Take 1 Tab by mouth daily.  nitroglycerin (NITROSTAT) 0.4 mg SL tablet 1 Tab by SubLINGual route every five (5) minutes as needed for Chest Pain.  meclizine (ANTIVERT) 12.5 mg tablet Take 12.5 mg by mouth daily as needed for Dizziness. No current facility-administered medications for this visit. Assessment / Plan A/P:  
Medication reconciliation: completed during the visit. Medications were added that were on [facility]'s orders as appropriate (APAP, Clear Eyes Dry Eyes, Murine drops, loperamide - new). Okay per Dr. Zoie Chacon to also add back meclizine 12.5 once daily as needed for dizziness and Zofran 4 mg ODT every 8hrs as needed for nausea (not daily - insurance only covers PRN with a quantity limit) as also listed on [facility]'s orders. Per Dr. Zoie Chacon, will stop Viactiv calcium chews and B12 today (MD already removed from med list) to hopefully help with nausea.  Miralax should also only be daily as needed for constipation - not daily scheduled (rarely needs as diarrhea is more commonly the issue). Son-in-law reviewed current medication list and agreed that it is up to date. Advised son that [facility] needs [facility] over orders for Dr. Brett Lynn to review/sign to make the necessary changes. Once the fax is received, this office should be able to respond in 2-3 days at most.  If they do not receive a response or are unsatisfied by the time taken, [address] [place] [address] needs to call this office (not fax) to confirm that faxed orders have been received. Patient/family can also call this office if they are concerned about communication delays in getting necessary medications ordered. Patient/family verbalized understanding of the information presented and all of the patients/family's questions were answered. AVS was handed to the patient's son-in-law and reviewed in detail. Patient/family was advised to call the office with any additional questions or concerns. They were also provided with the pharmacist's cell phone number in case they have further questions. Follow-up: Patient was discussed with PCP during visit. Note will be sent to Dr. Kelly Lr MD for review.  
 
Thank you for the consult, 
Jonelle Alva, PHARMD, BCACP

## 2019-02-20 ENCOUNTER — TELEPHONE (OUTPATIENT)
Dept: INTERNAL MEDICINE CLINIC | Age: 84
End: 2019-02-20

## 2019-02-20 NOTE — TELEPHONE ENCOUNTER
Patient's son is calling to verify you received a fax from [facility] in reference to a medication change?   Can be reached at [phone]

## 2019-03-11 RX ORDER — ONDANSETRON 4 MG/1
4 TABLET, ORALLY DISINTEGRATING ORAL
Qty: 30 TAB | Refills: 11 | Status: SHIPPED | OUTPATIENT
Start: 2019-03-11 | End: 2019-10-09

## 2019-03-14 ENCOUNTER — TELEPHONE (OUTPATIENT)
Dept: INTERNAL MEDICINE CLINIC | Age: 84
End: 2019-03-14

## 2019-03-14 NOTE — LETTER
3/18/2019 1:54 PM 
 
Ms. Holm [facility] [address] [facility] [address] [id number] Dear Steve Mcdonald: 
 
I hope this letter finds you well. I am a Licensed Practical Nurse with [facility] and I have attempted to contact you by phone, but was unsuccessful. Your good health is important to us. As always, our goal is to be your partner in life-long wellness. Please contact our office at your earliest convenience. If you have any questions, please do not hesitate to give us a call at the number listed above. Sincerely, Steffen New LPN

## 2019-03-14 NOTE — TELEPHONE ENCOUNTER
Patient's caretaker is calling in stating they are having difficulty getting the Zofran approved through [facility] [employer], would like to speak with the nurse.   Robyn Cox can be reached at [phone]

## 2019-03-16 NOTE — TELEPHONE ENCOUNTER
Attempted to contact pt at  number, no answer. Not able to leave message, phone rang and then message stated phone was not in service. Will continue to try to contact pt.

## 2019-03-18 NOTE — TELEPHONE ENCOUNTER
Attempted to contact pt at  number, no answer. Recording stated number is not in service. I have been unable to reach this patient by phone. A letter is being sent to the last known home address. Encounter will be closed.

## 2019-03-21 ENCOUNTER — TELEPHONE (OUTPATIENT)
Dept: INTERNAL MEDICINE CLINIC | Age: 84
End: 2019-03-21

## 2019-03-21 RX ORDER — PROMETHAZINE HYDROCHLORIDE 12.5 MG/1
12.5 TABLET ORAL
Qty: 30 TAB | Refills: 2 | Status: SHIPPED | OUTPATIENT
Start: 2019-03-21 | End: 2019-03-27 | Stop reason: SDUPTHER

## 2019-03-22 NOTE — TELEPHONE ENCOUNTER
Brannon Díaz called back to give the fax number to [facility] [phone] for [facility] 08/10/1927

## 2019-03-27 RX ORDER — PROMETHAZINE HYDROCHLORIDE 12.5 MG/1
12.5 TABLET ORAL
Qty: 30 TAB | Refills: 2 | Status: SHIPPED | OUTPATIENT
Start: 2019-03-27 | End: 2019-06-05

## 2019-04-10 NOTE — ROUTINE PROCESS
Bedside and verbal shift report given to KEVIN Trimble LPN (oncomng nurse) by YUMIKO Cobb LPN (off going nurse). Report included SBAR, MAR and Kardex. Call interpreted by Willy Zavala ID [id number], a message was left for patient to call our office back, please give message below with return call.

## 2019-04-29 ENCOUNTER — HOSPITAL ENCOUNTER (OUTPATIENT)
Dept: LAB | Age: 84
Discharge: HOME | End: 2019-04-29
Payer: MEDICARE

## 2019-04-29 LAB
ALBUMIN SERPL-MCNC: 3.1 G/DL (ref 3.4–5)
ALBUMIN/GLOB SERPL: 0.8 {RATIO} (ref 0.8–1.7)
ALP SERPL-CCNC: 134 U/L (ref 45–117)
ALT SERPL-CCNC: 46 U/L (ref 13–56)
ANION GAP SERPL CALC-SCNC: 5 MMOL/L (ref 3–18)
AST SERPL-CCNC: 35 U/L (ref 15–37)
BILIRUB SERPL-MCNC: 0.4 MG/DL (ref 0.2–1)
BUN SERPL-MCNC: 17 MG/DL (ref 7–18)
BUN/CREAT SERPL: 15 (ref 12–20)
CALCIUM SERPL-MCNC: 8.3 MG/DL (ref 8.5–10.1)
CHLORIDE SERPL-SCNC: 104 MMOL/L (ref 100–108)
CHOLEST SERPL-MCNC: 202 MG/DL
CO2 SERPL-SCNC: 29 MMOL/L (ref 21–32)
CREAT SERPL-MCNC: 1.13 MG/DL (ref 0.6–1.3)
ERYTHROCYTE [DISTWIDTH] IN BLOOD BY AUTOMATED COUNT: 17.9 % (ref 11.6–14.5)
GLOBULIN SER CALC-MCNC: 3.8 G/DL (ref 2–4)
GLUCOSE SERPL-MCNC: 75 MG/DL (ref 74–99)
HCT VFR BLD AUTO: 35.7 % (ref 35–45)
HDLC SERPL-MCNC: 64 MG/DL (ref 40–60)
HDLC SERPL: 3.2 {RATIO} (ref 0–5)
HGB BLD-MCNC: 11.3 G/DL (ref 12–16)
LDLC SERPL CALC-MCNC: 116.4 MG/DL (ref 0–100)
LIPID PROFILE,FLP: ABNORMAL
MCH RBC QN AUTO: 27.4 PG (ref 24–34)
MCHC RBC AUTO-ENTMCNC: 31.7 G/DL (ref 31–37)
MCV RBC AUTO: 86.7 FL (ref 74–97)
PLATELET # BLD AUTO: 339 K/UL (ref 135–420)
PMV BLD AUTO: 9.4 FL (ref 9.2–11.8)
POTASSIUM SERPL-SCNC: 5 MMOL/L (ref 3.5–5.5)
PROT SERPL-MCNC: 6.9 G/DL (ref 6.4–8.2)
RBC # BLD AUTO: 4.12 M/UL (ref 4.2–5.3)
SODIUM SERPL-SCNC: 138 MMOL/L (ref 136–145)
TRIGL SERPL-MCNC: 108 MG/DL (ref ?–150)
TSH SERPL DL<=0.05 MIU/L-ACNC: 2.4 UIU/ML (ref 0.36–3.74)
VLDLC SERPL CALC-MCNC: 21.6 MG/DL
WBC # BLD AUTO: 10.9 K/UL (ref 4.6–13.2)

## 2019-04-29 PROCEDURE — 36415 COLL VENOUS BLD VENIPUNCTURE: CPT

## 2019-04-29 PROCEDURE — 80061 LIPID PANEL: CPT

## 2019-04-29 PROCEDURE — 80053 COMPREHEN METABOLIC PANEL: CPT

## 2019-04-29 PROCEDURE — 85027 COMPLETE CBC AUTOMATED: CPT

## 2019-04-29 PROCEDURE — 84443 ASSAY THYROID STIM HORMONE: CPT

## 2019-06-03 ENCOUNTER — HOSPITAL ENCOUNTER (INPATIENT)
Age: 84
LOS: 2 days | Discharge: HOME HEALTH | DRG: 293 | End: 2019-06-05
Attending: EMERGENCY MEDICINE | Admitting: HOSPITALIST
Payer: MEDICARE

## 2019-06-03 ENCOUNTER — APPOINTMENT (OUTPATIENT)
Dept: GENERAL RADIOLOGY | Age: 84
DRG: 293 | End: 2019-06-03
Attending: EMERGENCY MEDICINE
Payer: MEDICARE

## 2019-06-03 DIAGNOSIS — I50.9 ACUTE ON CHRONIC CONGESTIVE HEART FAILURE, UNSPECIFIED HEART FAILURE TYPE (HCC): Primary | ICD-10-CM

## 2019-06-03 PROBLEM — I50.23 ACUTE ON CHRONIC SYSTOLIC (CONGESTIVE) HEART FAILURE (HCC): Status: ACTIVE | Noted: 2019-06-03

## 2019-06-03 LAB
ANION GAP SERPL CALC-SCNC: 6 MMOL/L (ref 3–18)
APPEARANCE UR: CLEAR
BACTERIA URNS QL MICRO: NEGATIVE /HPF
BASOPHILS # BLD: 0 K/UL (ref 0–0.1)
BASOPHILS NFR BLD: 0 % (ref 0–2)
BILIRUB UR QL: NEGATIVE
BNP SERPL-MCNC: 8005 PG/ML (ref 0–1800)
BUN SERPL-MCNC: 20 MG/DL (ref 7–18)
BUN/CREAT SERPL: 17 (ref 12–20)
CALCIUM SERPL-MCNC: 8.4 MG/DL (ref 8.5–10.1)
CHLORIDE SERPL-SCNC: 92 MMOL/L (ref 100–108)
CO2 SERPL-SCNC: 29 MMOL/L (ref 21–32)
COLOR UR: YELLOW
CREAT SERPL-MCNC: 1.19 MG/DL (ref 0.6–1.3)
DIFFERENTIAL METHOD BLD: ABNORMAL
DIGOXIN SERPL-MCNC: 1.7 NG/ML (ref 0.9–2)
EOSINOPHIL # BLD: 0.2 K/UL (ref 0–0.4)
EOSINOPHIL NFR BLD: 2 % (ref 0–5)
EPITH CASTS URNS QL MICRO: NORMAL /LPF (ref 0–5)
ERYTHROCYTE [DISTWIDTH] IN BLOOD BY AUTOMATED COUNT: 17.1 % (ref 11.6–14.5)
GLUCOSE SERPL-MCNC: 110 MG/DL (ref 74–99)
GLUCOSE UR STRIP.AUTO-MCNC: NEGATIVE MG/DL
HCT VFR BLD AUTO: 35.3 % (ref 35–45)
HGB BLD-MCNC: 11.5 G/DL (ref 12–16)
HGB UR QL STRIP: ABNORMAL
KETONES UR QL STRIP.AUTO: NEGATIVE MG/DL
LEUKOCYTE ESTERASE UR QL STRIP.AUTO: ABNORMAL
LYMPHOCYTES # BLD: 1.4 K/UL (ref 0.9–3.6)
LYMPHOCYTES NFR BLD: 15 % (ref 21–52)
MCH RBC QN AUTO: 26.7 PG (ref 24–34)
MCHC RBC AUTO-ENTMCNC: 32.6 G/DL (ref 31–37)
MCV RBC AUTO: 81.9 FL (ref 74–97)
MONOCYTES # BLD: 1.4 K/UL (ref 0.05–1.2)
MONOCYTES NFR BLD: 15 % (ref 3–10)
NEUTS SEG # BLD: 6 K/UL (ref 1.8–8)
NEUTS SEG NFR BLD: 68 % (ref 40–73)
NITRITE UR QL STRIP.AUTO: NEGATIVE
PH UR STRIP: 6 [PH] (ref 5–8)
PLATELET # BLD AUTO: 369 K/UL (ref 135–420)
PMV BLD AUTO: 8.6 FL (ref 9.2–11.8)
POTASSIUM SERPL-SCNC: 4.9 MMOL/L (ref 3.5–5.5)
PROT UR STRIP-MCNC: NEGATIVE MG/DL
RBC # BLD AUTO: 4.31 M/UL (ref 4.2–5.3)
RBC #/AREA URNS HPF: NORMAL /HPF (ref 0–5)
SODIUM SERPL-SCNC: 127 MMOL/L (ref 136–145)
SP GR UR REFRACTOMETRY: 1.01 (ref 1–1.03)
TROPONIN I SERPL-MCNC: 0.04 NG/ML (ref 0–0.04)
UROBILINOGEN UR QL STRIP.AUTO: 0.2 EU/DL (ref 0.2–1)
WBC # BLD AUTO: 9 K/UL (ref 4.6–13.2)
WBC URNS QL MICRO: NORMAL /HPF (ref 0–4)

## 2019-06-03 PROCEDURE — 81001 URINALYSIS AUTO W/SCOPE: CPT

## 2019-06-03 PROCEDURE — 77030038269 HC DRN EXT URIN PURWCK BARD -A

## 2019-06-03 PROCEDURE — 74011000250 HC RX REV CODE- 250: Performed by: HOSPITALIST

## 2019-06-03 PROCEDURE — 74011250636 HC RX REV CODE- 250/636: Performed by: HOSPITALIST

## 2019-06-03 PROCEDURE — 74011250637 HC RX REV CODE- 250/637: Performed by: HOSPITALIST

## 2019-06-03 PROCEDURE — 65660000000 HC RM CCU STEPDOWN

## 2019-06-03 PROCEDURE — 80048 BASIC METABOLIC PNL TOTAL CA: CPT

## 2019-06-03 PROCEDURE — 84484 ASSAY OF TROPONIN QUANT: CPT

## 2019-06-03 PROCEDURE — 83880 ASSAY OF NATRIURETIC PEPTIDE: CPT

## 2019-06-03 PROCEDURE — 77030021352 HC CBL LD SYS DISP COVD -B

## 2019-06-03 PROCEDURE — 80162 ASSAY OF DIGOXIN TOTAL: CPT

## 2019-06-03 PROCEDURE — 93005 ELECTROCARDIOGRAM TRACING: CPT

## 2019-06-03 PROCEDURE — 85025 COMPLETE CBC W/AUTO DIFF WBC: CPT

## 2019-06-03 PROCEDURE — 96374 THER/PROPH/DIAG INJ IV PUSH: CPT

## 2019-06-03 PROCEDURE — 74011250636 HC RX REV CODE- 250/636: Performed by: EMERGENCY MEDICINE

## 2019-06-03 PROCEDURE — 99285 EMERGENCY DEPT VISIT HI MDM: CPT

## 2019-06-03 PROCEDURE — 94761 N-INVAS EAR/PLS OXIMETRY MLT: CPT

## 2019-06-03 PROCEDURE — 71045 X-RAY EXAM CHEST 1 VIEW: CPT

## 2019-06-03 RX ORDER — ENOXAPARIN SODIUM 100 MG/ML
30 INJECTION SUBCUTANEOUS EVERY 24 HOURS
Status: DISCONTINUED | OUTPATIENT
Start: 2019-06-03 | End: 2019-06-05 | Stop reason: HOSPADM

## 2019-06-03 RX ORDER — FLUOXETINE 10 MG/1
10 CAPSULE ORAL DAILY
Status: DISCONTINUED | OUTPATIENT
Start: 2019-06-04 | End: 2019-06-05 | Stop reason: HOSPADM

## 2019-06-03 RX ORDER — GUAIFENESIN 100 MG/5ML
81 LIQUID (ML) ORAL DAILY
Status: DISCONTINUED | OUTPATIENT
Start: 2019-06-04 | End: 2019-06-05 | Stop reason: HOSPADM

## 2019-06-03 RX ORDER — ACETAMINOPHEN 325 MG/1
650 TABLET ORAL
Status: DISCONTINUED | OUTPATIENT
Start: 2019-06-03 | End: 2019-06-05 | Stop reason: HOSPADM

## 2019-06-03 RX ORDER — ONDANSETRON 2 MG/ML
4 INJECTION INTRAMUSCULAR; INTRAVENOUS
Status: DISCONTINUED | OUTPATIENT
Start: 2019-06-03 | End: 2019-06-05 | Stop reason: HOSPADM

## 2019-06-03 RX ORDER — BUMETANIDE 0.25 MG/ML
1 INJECTION INTRAMUSCULAR; INTRAVENOUS 2 TIMES DAILY
Status: COMPLETED | OUTPATIENT
Start: 2019-06-03 | End: 2019-06-05

## 2019-06-03 RX ORDER — DIGOXIN 125 MCG
0.12 TABLET ORAL DAILY
Status: DISCONTINUED | OUTPATIENT
Start: 2019-06-04 | End: 2019-06-05 | Stop reason: HOSPADM

## 2019-06-03 RX ORDER — CLOPIDOGREL BISULFATE 75 MG/1
75 TABLET ORAL DAILY
Status: DISCONTINUED | OUTPATIENT
Start: 2019-06-04 | End: 2019-06-05 | Stop reason: HOSPADM

## 2019-06-03 RX ORDER — POLYETHYLENE GLYCOL 3350 17 G/17G
17 POWDER, FOR SOLUTION ORAL
Status: DISCONTINUED | OUTPATIENT
Start: 2019-06-03 | End: 2019-06-05 | Stop reason: HOSPADM

## 2019-06-03 RX ORDER — MIRTAZAPINE 15 MG/1
15 TABLET, FILM COATED ORAL
Status: DISCONTINUED | OUTPATIENT
Start: 2019-06-03 | End: 2019-06-05 | Stop reason: HOSPADM

## 2019-06-03 RX ORDER — FUROSEMIDE 10 MG/ML
20 INJECTION INTRAMUSCULAR; INTRAVENOUS
Status: COMPLETED | OUTPATIENT
Start: 2019-06-03 | End: 2019-06-03

## 2019-06-03 RX ADMIN — MIRTAZAPINE 15 MG: 15 TABLET, FILM COATED ORAL at 22:36

## 2019-06-03 RX ADMIN — ENOXAPARIN SODIUM 30 MG: 100 INJECTION SUBCUTANEOUS at 22:36

## 2019-06-03 RX ADMIN — FUROSEMIDE 20 MG: 10 INJECTION, SOLUTION INTRAMUSCULAR; INTRAVENOUS at 17:35

## 2019-06-03 RX ADMIN — BUMETANIDE 1 MG: 0.25 INJECTION INTRAMUSCULAR; INTRAVENOUS at 22:37

## 2019-06-03 NOTE — H&P
Internal Medicine History and Physical          Subjective     HPI: Onesimo Capellan is a 80 y.o. female who presented to the ED with SOB for the last few days that got progressively worse for the last 24 hours. Denies chest pain/HA/dizziness. Denies fever or productive cough. ED evaluation revealed CHF exacerbation on CXR and clinically. BNP elevated. Started diuretics in ED.     Will admit for further evaluation and treatment      PMHx:  Past Medical History:   Diagnosis Date    A-fib [facility]) 01/2017    Arthritis, degenerative     CAD (coronary artery disease) 04/2012    S/P 2.75 X 15 mm BMS of OM (04/2012), Two LAD BMS (07/2012)    Elevated liver enzymes     Likely from statin    HLD (hyperlipidemia)     Hyperkalemia 06/2012    Likely from lisinopril    Ischemic cardiomyopathy     LVEF  45% (11/2012) & 30% echo (04/2012)    NSTEMI (non-ST elevated myocardial infarction) ([facility] 04/2012    UTI (urinary tract infection)     Vertigo        PSurgHx:  Past Surgical History:   Procedure Laterality Date    HX CORONARY STENT PLACEMENT  4/23/12    bare metal stent to obtuse marginal branch    HX HEART CATHETERIZATION         SocialHx:  Social History     Socioeconomic History    Marital status:      Spouse name: Not on file    Number of children: Not on file    Years of education: Not on file    Highest education level: Not on file   Occupational History    Not on file   Social Needs    Financial resource strain: Not on file    Food insecurity:     Worry: Not on file     Inability: Not on file    Transportation needs:     Medical: Not on file     Non-medical: Not on file   Tobacco Use    Smoking status: Never Smoker    Smokeless tobacco: Never Used   Substance and Sexual Activity    Alcohol use: No    Drug use: No    Sexual activity: Never   Lifestyle    Physical activity:     Days per week: Not on file     Minutes per session: Not on file    Stress: Not on file   Relationships    Social connections:     Talks on phone: Not on file     Gets together: Not on file     Attends Sabianist service: Not on file     Active member of club or organization: Not on file     Attends meetings of clubs or organizations: Not on file     Relationship status: Not on file    Intimate partner violence:     Fear of current or ex partner: Not on file     Emotionally abused: Not on file     Physically abused: Not on file     Forced sexual activity: Not on file   Other Topics Concern    Not on file   Social History Narrative    Not on file       Allergies: Allergies   Allergen Reactions    Flu Vaccine  (36 Mos+)(Pf) Anaphylaxis    Codeine Anaphylaxis    Egg Not Reported This Time    Pcn [Penicillins] Hives       FamilyHx:  Family History   Problem Relation Age of Onset    Diabetes Mother        Prior to Admission Medications   Prescriptions Last Dose Informant Patient Reported? Taking? FLUoxetine (PROZAC) 10 mg tablet   No No   Sig: Take 1 Tab by mouth daily. acetaminophen (TYLENOL) 325 mg tablet   Yes No   Sig: Take 650 mg by mouth every four (4) hours as needed for Pain. aspirin 81 mg chewable tablet   No No   Sig: Take 1 Tab by mouth daily. carbamide peroxide (MURINE EAR) 6.5 % otic solution   Yes No   Sig: Instill 5 drops in each ear twice daily for 4 days each month as needed for ear wax. carboxymethylcellulos/glycerin (CLEAR EYES FOR DRY EYES OP)   Yes No   Sig: Administer 1 Drop to both eyes three (3) times daily as needed (dry eyes). clopidogrel (PLAVIX) 75 mg tab   No No   Sig: Take 1 Tab by mouth daily. clotrimazole (LOTRIMIN) 1 % topical cream   No No   Sig: Apply  to affected area two (2) times a day. Apply with barrier cream to affected area. digoxin (LANOXIN) 0.125 mg tablet   No No   Sig: Take 1 Tab by mouth daily. fluticasone (FLONASE) 50 mcg/actuation nasal spray   No No   Si Sprays by Both Nostrils route daily as needed for Rhinitis.    furosemide (LASIX) 20 mg tablet No No   Sig: Take 1 Tab by mouth daily as needed. loperamide (IMMODIUM) 2 mg tablet   Yes No   Sig: Take 1 mg by mouth three (3) times daily as needed for Diarrhea. meclizine (ANTIVERT) 12.5 mg tablet   Yes No   Sig: Take 12.5 mg by mouth daily as needed for Dizziness. mirtazapine (REMERON) 15 mg tablet   No No   Sig: Take 1 Tab by mouth nightly. nitroglycerin (NITROSTAT) 0.4 mg SL tablet   No No   Si Tab by SubLINGual route every five (5) minutes as needed for Chest Pain. ondansetron (ZOFRAN ODT) 4 mg disintegrating tablet   No No   Sig: Take 1 Tab by mouth every eight (8) hours as needed for Nausea. polyethylene glycol (MIRALAX) 17 gram/dose powder   Yes No   Sig: Take 17 g by mouth daily as needed (constipation). promethazine (PHENERGAN) 12.5 mg tablet   No No   Sig: Take 1 Tab by mouth every eight (8) hours as needed for Nausea. psyllium husk-aspartame (METAMUCIL FIBER) 3.4 gram pwpk packet   No No   Sig: Take 1 Packet by mouth daily. vit C-E-zinc cit-lutein-zeaxan ([address]) 50 mg-15 unit- 4.5 mg-2.5 mg chew   Yes No   Sig: Take 1 Tab by mouth daily.  Ocuvite gummy      Facility-Administered Medications: None       Review of Systems:  CONST: fever(-),   chills(-),   Fatigue(+),   malaise(-)  SKIN: itching(-),   rash(-)  EYES: vision - no change from baseline  ENT: ear pain(-),   nasal discharge(-),   sore throat(-),   voice change(-)  RESP: SOB(+),   cough(-),   hemoptysis(-)  CV: chest pain(-),   PND(-),   orthopnea(-),   exertional dyspnea(+),   palpitations(-), syncope(-)  GI: abd pain(-),   nausea(-),   vomiting(-),   diarrhea(-),   melena(-),   hematemesis(-), hematochesia(-)  : dysuria(-),   frequency(-),   urgency(-),   hematuria(-)  MS: arthralgias(-),   myalgias(-)  NEURO: speech w/o change,   tremors(-),   seizures(-),   numbness(-),   dizziness(-)    Objective      Visit Vitals  BP (!) 158/94   Pulse (!) 107   Temp 97.7 °F (36.5 °C)   Resp (!) 32   Ht 5' 2\" (1.575 m) Wt 49 kg (108 lb)   SpO2 93%   BMI 19.75 kg/m²       Physical Exam:  CONST: NAD,   VSS reviewed  SKIN: rashes(-),   ulcers(-)  EYES: PERRL,   EOMI,   sclerae w/o jaundice  HENT: HEENT,   normal appearing nose and ears,   normal nasal mucosa and turbinates  NECK: supple,   no LA,   trachea is midline,   thyroid w/o goiter or palpable nodules  RESP: increased respiratory effort,   wheezes(-),   Rales(+) at bases,   rhochi(-),   no consolidation on percussion  CHEST: normal axillae,   retractions(-),   use of accessory muscles(-)  CV: JVD(-),   carotid bruits(-),   Afib-chronic, rate controlled,   gallops(-),   murmurs(-),   edema LE(+),   abd bruits(-),   peripheral pulses normal  ABD: soft, tender(-),   distended(-),   HSM(-),   BS(+) in all quadrants,   peritoneal signs(-)  MS: NROM in all extremities,  clubbing(-),   peripheral cyanosis(-)  NEURO: speech normal, tremors(-),   CN II-XII(-),   lateralizing signs(-)  PSYCH: AOC x 3,   appropriate affect,   non-suicidal      Laboratory Studies:  CMP:   Lab Results   Component Value Date/Time     (L) 06/03/2019 04:45 PM    K 4.9 06/03/2019 04:45 PM    CL 92 (L) 06/03/2019 04:45 PM    CO2 29 06/03/2019 04:45 PM    AGAP 6 06/03/2019 04:45 PM     (H) 06/03/2019 04:45 PM    BUN 20 (H) 06/03/2019 04:45 PM    CREA 1.19 06/03/2019 04:45 PM    GFRAA 52 (L) 06/03/2019 04:45 PM    GFRNA 43 (L) 06/03/2019 04:45 PM    CA 8.4 (L) 06/03/2019 04:45 PM     CBC:   Lab Results   Component Value Date/Time    WBC 9.0 06/03/2019 04:45 PM    HGB 11.5 (L) 06/03/2019 04:45 PM    HCT 35.3 06/03/2019 04:45 PM     06/03/2019 04:45 PM     All Cardiac Markers in the last 24 hours:   Lab Results   Component Value Date/Time    TROIQ 0.04 06/03/2019 04:45 PM       Imaging Reviewed:  No results found.         Assessment/Plan     Active Hospital Problems    Diagnosis Date Noted    Acute on chronic systolic (congestive) heart failure (HCC) 06/03/2019     Acute on chronic systolic (congestive) heart failure ([facility]  Admit for observation  Echo in am  Diurese and monitor clinically      - Cont acceptable home medications for chronic conditions   - DVT protocol and GI prophylaxis    I have personally reviewed all pertinent labs, films and EKGs that have officially resulted. I reviewed available electronic documentation outlining the initial presentation as well as the emergency room physician's encounter. Total time spent with patient and chart review, orders and documentation - 45min out of which more than 50% spent face-to-face with patient.     Kleber Wilkinson MD  6/3/2019   7:39 PM      [facility]

## 2019-06-03 NOTE — ED PROVIDER NOTES
80-year-old female  from the nursing home with complaint of shortness of breath she takes Lasix as needed she has a history of A. fib CHF she is otherwise well-appearing           Past Medical History:   Diagnosis Date    A-fib [facility]) 01/2017    Arthritis, degenerative     CAD (coronary artery disease) 04/2012    S/P 2.75 X 15 mm BMS of OM (04/2012), Two LAD BMS (07/2012)    Elevated liver enzymes     Likely from statin    HLD (hyperlipidemia)     Hyperkalemia 06/2012    Likely from lisinopril    Ischemic cardiomyopathy     LVEF  45% (11/2012) & 30% echo (04/2012)    NSTEMI (non-ST elevated myocardial infarction) ([facility] [address] 04/2012    UTI (urinary tract infection)     Vertigo        Past Surgical History:   Procedure Laterality Date    HX CORONARY STENT PLACEMENT  4/23/12    bare metal stent to obtuse marginal branch    HX HEART CATHETERIZATION           Family History:   Problem Relation Age of Onset    Diabetes Mother        Social History     Socioeconomic History    Marital status:      Spouse name: Not on file    Number of children: Not on file    Years of education: Not on file    Highest education level: Not on file   Occupational History    Not on file   Social Needs    Financial resource strain: Not on file    Food insecurity:     Worry: Not on file     Inability: Not on file    Transportation needs:     Medical: Not on file     Non-medical: Not on file   Tobacco Use    Smoking status: Never Smoker    Smokeless tobacco: Never Used   Substance and Sexual Activity    Alcohol use: No    Drug use: No    Sexual activity: Never   Lifestyle    Physical activity:     Days per week: Not on file     Minutes per session: Not on file    Stress: Not on file   Relationships    Social connections:     Talks on phone: Not on file     Gets together: Not on file     Attends Ep[employer] service: Not on file     Active member of club or organization: Not on file     Attends meetings of clubs or organizations: Not on file     Relationship status: Not on file    Intimate partner violence:     Fear of current or ex partner: Not on file     Emotionally abused: Not on file     Physically abused: Not on file     Forced sexual activity: Not on file   Other Topics Concern    Not on file   Social History Narrative    Not on file         ALLERGIES: Flu vaccine 2011 (36 mos+)(pf); Codeine; Egg; and Pcn [penicillins]    Review of Systems   Constitutional: Negative for chills, fatigue and fever. Respiratory: Positive for shortness of breath. Cardiovascular: Positive for leg swelling. Negative for chest pain. Gastrointestinal: Negative for abdominal pain. Genitourinary: Negative for dysuria. All other systems reviewed and are negative. Vitals:    06/03/19 1648 06/03/19 1649 06/03/19 1651   BP: 172/87     Pulse: 97 100 94   Resp:  (!) 34 29   Temp: 97.7 °F (36.5 °C)     SpO2: 92% 93% 94%   Weight: 49 kg (108 lb)     Height: 5' 2\" (1.575 m)              Physical Exam   Constitutional: She appears well-developed and well-nourished. No distress. Cardiovascular: An irregularly irregular rhythm present. Pulmonary/Chest: Effort normal and breath sounds normal. No respiratory distress. She has no wheezes. She has no rales. Abdominal: Soft. Bowel sounds are normal. She exhibits no distension. There is no tenderness. Musculoskeletal: She exhibits edema. Skin: Skin is warm. She is not diaphoretic. Nursing note and vitals reviewed.        MDM  Number of Diagnoses or Management Options  Acute on chronic congestive heart failure, unspecified heart failure type [facility]):   Diagnosis management comments: 70-year-old well-appearing female with a history of CHF and A. fib presents with shortness of breath she takes Lasix as needed we will give her an IV dose here check appropriate blood work however I feel it unless there was egregious abnormalities on her blood work that admitting her to the hospital is not in her best interest have discussed this with    EKG shows atrial fibrillation at a rate of 91 with a normal axis nonspecific T wave changes no obvious ischemia    Cardiac monitor at bedside shows an irregular rhythm rate between 90 and 110. Pulse ox 93% on room air.     Discussed the case with family and hospitalist agrees to admission for CHF exacerbation and further management of her fluid overload         Procedures

## 2019-06-03 NOTE — ED NOTES
TRANSFER - ED to INPATIENT REPORT:    SBAR report made available to receiving floor on this patient being transferred to [address] [place] [address] ([facility])  for routine progression of care       Admitting diagnosis Acute on chronic systolic (congestive) heart failure (HCC) [I50.23]    Information from the following report(s) SBAR, ED Summary, MAR and Recent Results was made available to receiving floor. Lines:   Peripheral IV 06/03/19 Right Antecubital (Active)   Site Assessment Clean, dry, & intact 6/3/2019  4:54 PM   Phlebitis Assessment 0 6/3/2019  4:54 PM   Infiltration Assessment 0 6/3/2019  4:54 PM   Dressing Status Clean, dry, & intact 6/3/2019  4:54 PM   Dressing Type Transparent 6/3/2019  4:54 PM   Hub Color/Line Status Patent; Flushed 6/3/2019  4:54 PM        Medication list confirmed with patient    Opportunity for questions and clarification was provided.       Patient is oriented to time, place, person and situation Patient is using a purewick  Patient is  continent and ambulatory with assist     Valuables transported with patient     Patient transported with:   Monitor  Tech    MAP (Monitor): 105 =Monitored (most recent)  Vitals w/ MEWS Score (last day)     Date/Time MEWS Score Pulse Resp Temp BP Level of Consciousness SpO2    06/03/19 1930  2  95  28  97.9 °F (36.6 °C)  166/87  Alert  95 %    06/03/19 1926    96  24        93 %    06/03/19 1920    99  25        94 %    06/03/19 1915    103  (Abnormal)   42  (Abnormal)     149/77    94 %    06/03/19 1900    103  (Abnormal)   27    163/82    95 %    06/03/19 1830    106  (Abnormal)   30    161/74    92 %    06/03/19 1813    107  (Abnormal)   32  (Abnormal)         93 %    06/03/19 1800    109  (Abnormal)   42  (Abnormal)     158/94  (Abnormal)     92 %    06/03/19 1746    95  25        89 %  (Abnormal)     06/03/19 1745    107  (Abnormal)   37  (Abnormal)     131/115  (Abnormal)     93 %    06/03/19 1730    95  35  (Abnormal)     186/99 (Abnormal)     95 %    06/03/19 1651    94  29        94 %    06/03/19 1649    100  34  (Abnormal)         93 %    06/03/19 1648    97    97.7 °F (36.5 °C)  172/87  Alert  92 %              Septic Patients:     Lactic Acid  Lab Results   Component Value Date    [facility] 1.2 10/20/2018

## 2019-06-04 ENCOUNTER — APPOINTMENT (OUTPATIENT)
Dept: NON INVASIVE DIAGNOSTICS | Age: 84
DRG: 293 | End: 2019-06-04
Attending: HOSPITALIST
Payer: MEDICARE

## 2019-06-04 LAB
ANION GAP SERPL CALC-SCNC: 7 MMOL/L (ref 3–18)
BUN SERPL-MCNC: 17 MG/DL (ref 7–18)
BUN/CREAT SERPL: 15 (ref 12–20)
CALCIUM SERPL-MCNC: 8.4 MG/DL (ref 8.5–10.1)
CHLORIDE SERPL-SCNC: 92 MMOL/L (ref 100–108)
CO2 SERPL-SCNC: 33 MMOL/L (ref 21–32)
CREAT SERPL-MCNC: 1.1 MG/DL (ref 0.6–1.3)
ECHO PULMONARY ARTERY SYSTOLIC PRESSURE (PASP): 57 MMHG
GLUCOSE SERPL-MCNC: 74 MG/DL (ref 74–99)
POTASSIUM SERPL-SCNC: 4.4 MMOL/L (ref 3.5–5.5)
SODIUM SERPL-SCNC: 132 MMOL/L (ref 136–145)

## 2019-06-04 PROCEDURE — 80048 BASIC METABOLIC PNL TOTAL CA: CPT

## 2019-06-04 PROCEDURE — 77030038269 HC DRN EXT URIN PURWCK BARD -A

## 2019-06-04 PROCEDURE — 36415 COLL VENOUS BLD VENIPUNCTURE: CPT

## 2019-06-04 PROCEDURE — 74011250637 HC RX REV CODE- 250/637: Performed by: HOSPITALIST

## 2019-06-04 PROCEDURE — 97530 THERAPEUTIC ACTIVITIES: CPT

## 2019-06-04 PROCEDURE — 93306 TTE W/DOPPLER COMPLETE: CPT

## 2019-06-04 PROCEDURE — 97161 PT EVAL LOW COMPLEX 20 MIN: CPT

## 2019-06-04 PROCEDURE — 74011000250 HC RX REV CODE- 250: Performed by: HOSPITALIST

## 2019-06-04 PROCEDURE — 65660000000 HC RM CCU STEPDOWN

## 2019-06-04 PROCEDURE — 97166 OT EVAL MOD COMPLEX 45 MIN: CPT

## 2019-06-04 RX ORDER — METOPROLOL TARTRATE 25 MG/1
12.5 TABLET, FILM COATED ORAL 2 TIMES DAILY
Status: DISCONTINUED | OUTPATIENT
Start: 2019-06-04 | End: 2019-06-05 | Stop reason: HOSPADM

## 2019-06-04 RX ORDER — ATORVASTATIN CALCIUM 20 MG/1
20 TABLET, FILM COATED ORAL
Status: DISCONTINUED | OUTPATIENT
Start: 2019-06-04 | End: 2019-06-05 | Stop reason: HOSPADM

## 2019-06-04 RX ADMIN — PSYLLIUM HUSK 1 PACKET: 3.4 POWDER ORAL at 09:42

## 2019-06-04 RX ADMIN — CLOPIDOGREL BISULFATE 75 MG: 75 TABLET ORAL at 09:41

## 2019-06-04 RX ADMIN — BUMETANIDE 1 MG: 0.25 INJECTION INTRAMUSCULAR; INTRAVENOUS at 09:41

## 2019-06-04 RX ADMIN — DIGOXIN 0.12 MG: 125 TABLET ORAL at 09:41

## 2019-06-04 RX ADMIN — MIRTAZAPINE 15 MG: 15 TABLET, FILM COATED ORAL at 21:52

## 2019-06-04 RX ADMIN — ASPIRIN 81 MG 81 MG: 81 TABLET ORAL at 09:41

## 2019-06-04 RX ADMIN — BUMETANIDE 1 MG: 0.25 INJECTION INTRAMUSCULAR; INTRAVENOUS at 17:17

## 2019-06-04 RX ADMIN — FLUOXETINE 10 MG: 10 CAPSULE ORAL at 09:41

## 2019-06-04 RX ADMIN — ATORVASTATIN CALCIUM 20 MG: 20 TABLET, FILM COATED ORAL at 21:52

## 2019-06-04 RX ADMIN — METOPROLOL TARTRATE 12.5 MG: 25 TABLET, FILM COATED ORAL at 17:17

## 2019-06-04 NOTE — PROGRESS NOTES
Medicine Progress Note    Patient: Brionna Johnston   Age:  80 y.o.  DOA: 6/3/2019   Admit Dx / CC: Acute on chronic systolic (congestive) heart failure (HCC) [I50.23]  LOS:  LOS: 1 day     Assessment/Plan   Principal Problem:    Acute on chronic systolic (congestive) heart failure ([address] (6/3/2019)    Active Problems:    CAD (coronary artery disease) ()      Overview: S/P 2.75 X 15 mm BMS of Obtuse marginal      Vertigo ()      HLD (hyperlipidemia) ()        Additional Plan notes     1)  Acute on chronic systolic HF   - Echo pending   - start low dose bb   - asa 81 mg   - start atorvastatin   - bumex     2)  HLD/CAD   - asa, statin,    DISPO     Anticipated Date of Discharge: tomorrow  Anticipated Disposition (home, SNF) : home    Subjective:   Patient seen and examined. No complaints today, no CP or SOB    Objective:     Visit Vitals  /62   Pulse 87   Temp 98.1 °F (36.7 °C)   Resp 20   Ht 5' 2\" (1.575 m)   Wt 49 kg (108 lb)   SpO2 90%   BMI 19.75 kg/m²       Physical Exam:  General appearance: alert, cooperative, no distress, appears stated age  Head: Normocephalic, without obvious abnormality, atraumatic  Neck: supple, trachea midline  Lungs: clear to auscultation bilaterally  Heart: regular rate and rhythm, S1, S2 normal, no murmur, click, rub or gallop  Abdomen: soft, non-tender.  Bowel sounds normal. No masses,  no organomegaly  Extremities: extremities normal, atraumatic, no cyanosis or edema  Skin: Skin color, texture, turgor normal. No rashes or lesions    Intake and Output:  Current Shift:  06/04 0701 - 06/04 1900  In: 240 [P.O.:240]  Out: -   Last three shifts:  06/02 1901 - 06/04 0700  In: -   Out: 1880 [Urine:1880]    Lab/Data Reviewed:  CMP:   Lab Results   Component Value Date/Time     (L) 06/04/2019 05:30 AM    K 4.4 06/04/2019 05:30 AM    CL 92 (L) 06/04/2019 05:30 AM    CO2 33 (H) 06/04/2019 05:30 AM    AGAP 7 06/04/2019 05:30 AM    GLU 74 06/04/2019 05:30 AM    BUN 17 06/04/2019 05:30 AM CREA 1.10 06/04/2019 05:30 AM    GFRAA 56 (L) 06/04/2019 05:30 AM    GFRNA 47 (L) 06/04/2019 05:30 AM    CA 8.4 (L) 06/04/2019 05:30 AM     CBC:   Lab Results   Component Value Date/Time    WBC 9.0 06/03/2019 04:45 PM    HGB 11.5 (L) 06/03/2019 04:45 PM    HCT 35.3 06/03/2019 04:45 PM     06/03/2019 04:45 PM     All Cardiac Markers in the last 24 hours:   Lab Results   Component Value Date/Time    TROIQ 0.04 06/03/2019 04:45 PM       Medications Reviewed:  Current Facility-Administered Medications   Medication Dose Route Frequency    acetaminophen (TYLENOL) tablet 650 mg  650 mg Oral Q4H PRN    aspirin chewable tablet 81 mg  81 mg Oral DAILY    clopidogrel (PLAVIX) tablet 75 mg  75 mg Oral DAILY    digoxin (LANOXIN) tablet 0.125 mg  0.125 mg Oral DAILY    FLUoxetine (PROzac) capsule 10 mg  10 mg Oral DAILY    mirtazapine (REMERON) tablet 15 mg  15 mg Oral QHS    polyethylene glycol (MIRALAX) packet 17 g  17 g Oral DAILY PRN    psyllium husk-aspartame (METAMUCIL FIBER) packet 1 Packet  1 Packet Oral DAILY    bumetanide (BUMEX) injection 1 mg  1 mg IntraVENous BID    ondansetron (ZOFRAN) injection 4 mg  4 mg IntraVENous Q4H PRN    enoxaparin (LOVENOX) injection 30 mg  30 mg SubCUTAneous Q24H       Kylee Streeter MD    June 4, 2019  \

## 2019-06-04 NOTE — PROGRESS NOTES
Bedside shift change report given to Ranjana RN (oncoming nurse) by Tonio Alvares RN (offgoing nurse). Report included the following information SBAR, Kardex, Intake/Output, MAR and Recent Results. A/O x4, no pain noted, A-fib 110 BBB per tele-monitor, bed low position and alarm on, IV flushed and capped, call bell and personal belongings in reach.      0942 -- AM medications given, well tolerated, will continue to monitor.      1106 -- Reassessment completed, no change in patient condition, will continue to monitor.      1509 -- Reassessment completed, no change in patient condition, will continue to monitor. 200 -- Patient's family at the bedside feeding the patient, meds given, well tolerated.      Bedside shift change report given to [facility], RN (oncoming nurse) by Corona Pierre RN (offgoing nurse). Report included the following information SBAR, Kardex, Intake/Output, MAR and Recent Results.

## 2019-06-04 NOTE — PROGRESS NOTES
Problem: Discharge Planning  Goal: *Discharge to safe environment  Outcome: Progressing Towards Goal  Plan is home with home Health

## 2019-06-04 NOTE — PROGRESS NOTES
Reason for Renal Dosing:  Per Renal Dosing Policy    Patient clinical status and labs ordered/reviewed. Pt Weight Weight: 49 kg (108 lb)   Serum Creatinine Lab Results   Component Value Date/Time    Creatinine 1.19 06/03/2019 04:45 PM         Creatinine Clearance Estimated Creatinine Clearance: 23.8 mL/min (based on SCr of 1.19 mg/dL). BUN Lab Results   Component Value Date/Time    BUN 20 (H) 06/03/2019 04:45 PM         WBC Lab Results   Component Value Date/Time    WBC 9.0 06/03/2019 04:45 PM        Temperature Temp: 97.8 °F (36.6 °C)   HR Pulse (Heart Rate): 89     BP BP: 158/80           Drug type: Non-Antibiotic    Drug/dose: enoxaparin 40 mg every 24 hours was adjusted to enoxaparin 30 mg every 24 hours     Continue to monitor.     Signed Josemanuel Cooper PHARMD  Date 6/3/2019  Time 10:18 PM

## 2019-06-04 NOTE — PROGRESS NOTES
Problem: Discharge Planning  Goal: *Discharge to safe environment  Outcome: Progressing Towards Goal  Plan is home with home Health    Reason for Admission:   Acure on chronic systolic Heart failure. RRAT Score:     26             Resources/supports as identified by patient/family:   [place], [facility] facing patient (as identified by patient/family and CM): Finances/Medication cost?  Did not identify                 Transportation? Son in law [address] system or lack thereof? Family                     Living arrangements? Lives at [address]            Self-care/ADLs/Cognition? Patient is awake and alert. She lives at [place] [address] and is independent with ADL. She has the option of getting help with bathing there, but has not used that yet. She has a cane she rarely uses. OT recommend Home Health and PT initially recommends no treatment. Patient and family agreeable with Home Health PT/OT at [facility]. Current Advanced Directive/Advance Care Plan: On file. It lists Burgess Hinds as MPOA, I do not see a secondary, although family thinks RALPH is secondary and granddaughter is third. Plan for utilizing home health:    Patient agreeable to PT/ [place] placed on chart for MD for MD to order. Likelihood of readmission: high/red                 Transition of Care Plan:       Home with Home Health           Chart reviewed and pt verified demographics. She gave permission for daughter and son in law to be there for interview. Ata. She has [employer] Medicare part a and b and [employer]. She has a new PCP, unsure of name. She lives at [facility] and is independent with ADL. Therapy has recommended home health. Plan is home with [facility] PT/OT.   [facility] ([facility]) Provider list has been given to the patient and/or patient representative. Patient and/or patient representative has signed the [place] of Choice selecting ____[facility]_____ as their preference facility and a copy given. Both SNF Provider list and Freedom of Choice have been placed on the chart. FYI place for MD to order this Home Health. Trini Nayak.  Beto Mcnulty, BSN  [facility]  [phone], Pager [phone]

## 2019-06-04 NOTE — PROGRESS NOTES
conducted an initial consultation and Spiritual Assessment for [address] [facility] [address], who is a 80 y.o.,female. Patients Primary Language is: [place]. According to the patients EMR Evangelical Affiliation is: [place].     The reason the Patient came to the hospital is:   Patient Active Problem List    Diagnosis Date Noted    Acute on chronic systolic (congestive) heart failure ([facility] 06/03/2019    Hyperkalemia 10/23/2018    Acute metabolic encephalopathy 68/86/5974    Hyponatremia 10/20/2018    Chronic fatigue 10/09/2018    Irritable bowel syndrome with diarrhea 09/24/2018    [facility] 05/02/2017    CAD (coronary artery disease)     Ischemic cardiomyopathy     Arthritis, degenerative     Vertigo     HLD (hyperlipidemia)     SOB (shortness of breath) 04/27/2012    NSTEMI (non-ST elevated myocardial infarction) ([facility] 04/01/2012        The  provided the following Interventions:  Initiated a relationship of care and support. Explored issues of sandra, spirituality and/or Yarsanism needs while hospitalized. Listened empathically. Provided chaplaincy education. Provided information about Spiritual Care Services. Offered prayer and assurance of continued prayers on patient's behalf. Chart reviewed. The following outcomes were achieved:  Patient shared some information about their medical narrative and spiritual journey/beliefs. Patient processed feeling about current hospitalization. Patient expressed gratitude for the 's visit. Assessment:  Patient did not indicate any spiritual or Yarsanism issues which require Spiritual Care Services interventions at this time. Patient does not have any Yarsanism/cultural needs that will affect patients preferences in health care. Plan:  Chaplains will continue to follow and will provide pastoral care on an as needed or requested basis.    recommends bedside caregivers page  on duty if patient shows signs of acute spiritual or emotional distress.     [address] [facility]   Staff [address] [facility]   [phone]

## 2019-06-04 NOTE — PROGRESS NOTES
Problem: Patient Education: Go to Patient Education Activity  Goal: Patient/Family Education  Outcome: Progressing Towards Goal     Problem: Heart Failure: Day 1  Goal: Off Pathway (Use only if patient is Off Pathway)  Outcome: Progressing Towards Goal  Goal: Activity/Safety  Outcome: Progressing Towards Goal  Goal: Consults, if ordered  Outcome: Progressing Towards Goal  Goal: Diagnostic Test/Procedures  Outcome: Progressing Towards Goal  Goal: Nutrition/Diet  Outcome: Progressing Towards Goal  Goal: Discharge Planning  Outcome: Progressing Towards Goal  Goal: Medications  Outcome: Progressing Towards Goal  Goal: Respiratory  Outcome: Progressing Towards Goal  Goal: Treatments/Interventions/Procedures  Outcome: Progressing Towards Goal  Goal: Psychosocial  Outcome: Progressing Towards Goal  Goal: *Oxygen saturation within defined limits  Outcome: Progressing Towards Goal  Goal: *Hemodynamically stable  Outcome: Progressing Towards Goal  Goal: *Optimal pain control at patient's stated goal  Outcome: Progressing Towards Goal  Goal: *Anxiety reduced or absent  Outcome: Progressing Towards Goal     Problem: Heart Failure: Discharge Outcomes  Goal: *Demonstrates ability to perform prescribed activity without shortness of breath or discomfort  Outcome: Progressing Towards Goal  Goal: *Left ventricular function assessment completed prior to or during stay, or planned for post-discharge  Outcome: Progressing Towards Goal  Goal: *ACEI prescribed if LVEF less than 40% and no contraindications or ARB prescribed  Outcome: Progressing Towards Goal  Goal: *Verbalizes understanding and describes prescribed diet  Outcome: Progressing Towards Goal  Goal: *Verbalizes understanding/describes prescribed medications  Outcome: Progressing Towards Goal  Goal: *Describes available resources and support systems  Description  (eg: Home Health, Palliative Care, Advanced Medical Directive)  Outcome: Progressing Towards Goal  Goal: *Describes smoking cessation resources  Outcome: Progressing Towards Goal  Goal: *Understands and describes signs and symptoms to report to providers(Stroke Metric)  Outcome: Progressing Towards Goal  Goal: *Describes/verbalizes understanding of follow-up/return appt  Description  (eg: to physicians, diabetes treatment coordinator, and other resources  Outcome: Progressing Towards Goal  Goal: *Describes importance of continuing daily weights and changes to report to physician  Outcome: Progressing Towards Goal     Problem: Pressure Injury - Risk of  Goal: *Prevention of pressure injury  Description  Document Allen Scale and appropriate interventions in the flowsheet. Outcome: Progressing Towards Goal     Problem: Patient Education: Go to Patient Education Activity  Goal: Patient/Family Education  Outcome: Progressing Towards Goal     Problem: Falls - Risk of  Goal: *Absence of Falls  Description  Document Naun Weber Fall Risk and appropriate interventions in the flowsheet.   Outcome: Progressing Towards Goal     Problem: Patient Education: Go to Patient Education Activity  Goal: Patient/Family Education  Outcome: Progressing Towards Goal     Problem: Patient Education: Go to Patient Education Activity  Goal: Patient/Family Education  Outcome: Progressing Towards Goal     Problem: Patient Education: Go to Patient Education Activity  Goal: Patient/Family Education  Outcome: Progressing Towards Goal     Problem: Pain  Goal: *Control of Pain  Outcome: Progressing Towards Goal  Goal: *PALLIATIVE CARE:  Alleviation of Pain  Outcome: Progressing Towards Goal     Problem: Patient Education: Go to Patient Education Activity  Goal: Patient/Family Education  Outcome: Progressing Towards Goal

## 2019-06-04 NOTE — PROGRESS NOTES
Problem: Mobility Impaired (Adult and Pediatric)  Goal: *Acute Goals and Plan of Care (Insert Text)  Description  Physical Therapy Goals   Initiated 6/4/2019 and to be accomplished within 7 days. 1.  Patient will complete all bed mobility with modified independence in order to prepare for EOB/OOB activity. 2.  Patient will perform sit <> stand with modified independence in order to prepare for OOB/gait activity. 3.  Patient will perform bed to chair transfers with modified independence in order to promote mobility and encourage seated activity to progress towards their prior level of function. 4.  Patient will ambulate 100 feet with modified independence using LRAD in order to prepare for safe negotiation of their environment. Outcome: Progressing Towards Goal   PHYSICAL THERAPY EVALUATION    Patient: Maynor Fernandez [de-identified]80 y.o. female)  Date: 6/4/2019  Primary Diagnosis: Acute on chronic systolic (congestive) heart failure (HCC) [I50.23]        Precautions:        PLOF: Independent     ASSESSMENT :  Patient supine in bed, agreeable to participation with PT. Family present. Patient is a resident of [facility]. Per family she is able to mobilize without DME. Patient denies use of O2 at baseline. Supervision for supine > sit. Good seated balance. Patient requesting to use restroom. SBA for supine > sit. Good standing balance. SBA for ambulation 5 ft x 2 into restroom and back. Patient ambulating without difficulty on RA; SPO2 92%. Patient left on RA; educated to return O2 as needed. Patient independent with toileting. Patient returned to chair with breakfast tray. Patient left sitting in chair with all needs within reach. RN Ranjana notified. Patient educated on role of PT, PT POC, bed mobility, transfers, gait, and safety with mobility as indicated. Recommend d/c back to retirement likely without need for skilled PT. Plan to see for 1 - 2 more visits.     Patient will benefit from skilled intervention to address the above impairments. Patient's rehabilitation potential is considered to be Good  Factors which may influence rehabilitation potential include:   ? None noted  ? Mental ability/status  ? Medical condition  ? Home/family situation and support systems  ? Safety awareness  ? Pain tolerance/management  ? Other:      PLAN :  Recommendations and Planned Interventions:   ?           Bed Mobility Training             ? Neuromuscular Re-Education  ? Transfer Training                   ? Orthotic/Prosthetic Training  ? Gait Training                          ? Modalities  ? Therapeutic Exercises           ? Edema Management/Control  ? Therapeutic Activities            ? Family Training/Education  ? Patient Education  ? Other (comment):    Frequency/Duration: Patient will be followed by physical therapy 1 - 2 time a week to address goals. Discharge Recommendations: None  Further Equipment Recommendations for Discharge: N/A     SUBJECTIVE:   Patient stated ? I'm cold. ?    OBJECTIVE DATA SUMMARY:     Past Medical History:   Diagnosis Date    [facility] ([facility] 01/2017    Arthritis, degenerative     CAD (coronary artery disease) 04/2012    S/P 2.75 X 15 mm BMS of OM (04/2012), Two LAD BMS (07/2012)    Elevated liver enzymes     Likely from statin    HLD (hyperlipidemia)     Hyperkalemia 06/2012    Likely from lisinopril    Ischemic cardiomyopathy     LVEF  45% (11/2012) & 30% echo (04/2012)    NSTEMI (non-ST elevated myocardial infarction) ([facility] 04/2012    UTI (urinary tract infection)     Vertigo      Past Surgical History:   Procedure Laterality Date    HX CORONARY STENT PLACEMENT  4/23/12    bare metal stent to obtuse marginal branch    HX HEART CATHETERIZATION       Barriers to Learning/Limitations: None  Compensate with: N/A  Home Situation:  Home Situation  Home Environment: Assisted living  Care Facility Name: [address] [place] [address]  # Steps to Enter: 0  One/Two Story Residence: One story  Living Alone: No  Support Systems: Assisted living  Patient Expects to be Discharged to[de-identified] Assisted living  Current DME Used/Available at Home: Grab bars, Shower chair  Tub or Shower Type: Shower  Critical Behavior:  Neurologic State: Alert  Orientation Level: Oriented X4  Cognition: Follows commands     Psychosocial  Patient Behaviors: Calm; Cooperative  Purposeful Interaction: Yes    Strength:    Strength: Within functional limits    Tone & Sensation:   Tone: Normal     Range Of Motion:  AROM: Within functional limits    Functional Mobility:  Bed Mobility:     Supine to Sit: Modified independent  Sit to Supine: Modified independent     Transfers:  Sit to Stand: Stand-by assistance  Stand to Sit: Stand-by assistance    Balance:   Sitting: Impaired  Sitting - Static: Good (unsupported)  Sitting - Dynamic: Good (unsupported)  Standing: Impaired  Standing - Static: Good  Standing - Dynamic : Fair  Ambulation/Gait Training:  SBA for ambulation 5 ft x 2 without DME. Pain: None  Pain level pre-treatment: 0/10   Pain level post-treatment: 0/10   Pain Intervention(s) : N/A    Activity Tolerance:   Fair    Please refer to the flowsheet for vital signs taken during this treatment. After treatment:   ?         Patient left in no apparent distress sitting up in chair  ? Patient left in no apparent distress in bed  ? Call bell left within reach  ? Nursing notified  ? Caregiver present  ? Bed alarm activated  ? SCDs applied    COMMUNICATION/EDUCATION:   ?         Role of Physical Therapy in the acute care setting. ?         Fall prevention education was provided and the patient/caregiver indicated understanding. ? Patient/family have participated as able in goal setting and plan of care.   ?         Patient/family agree to work toward stated goals and plan of care.  ?         Patient understands intent and goals of therapy, but is neutral about his/her participation. ? Patient is unable to participate in goal setting/plan of care: ongoing with therapy staff. ?         Other:     Thank you for this referral.  Deidra Remy   Time Calculation: 18 mins      Eval Complexity: History: MEDIUM  Complexity : 1-2 comorbidities / personal factors will impact the outcome/ POC Exam:MEDIUM Complexity : 3 Standardized tests and measures addressing body structure, function, activity limitation and / or participation in recreation  Presentation: LOW Complexity : Stable, uncomplicated  Clinical Decision Making:Low Complexity Supervision - CGA for mobility  Overall Complexity:LOW

## 2019-06-04 NOTE — PROGRESS NOTES
Nutrition initial assessment/  Plan of care      RECOMMENDATIONS:   1.   2. Monitor weight and PO intake  3. RD to follow     GOALS:   1. PO intake meets >75% of protein/calorie needs by  2. Weight Maintenance by       ASSESSMENT:   Nutrition recommendations listed. RD to follow. Nutrition Diagnoses:   *** related to *** as evidenced by ***    Nutrition Risk:  [] High  [] Moderate []  Low    SUBJECTIVE/OBJECTIVE:        Information Obtained from:    [x] Chart Review   [] Patient   [] Family/Caregiver   [] Nurse/Physician   [] Interdisciplinary Meeting/Rounds    Dx: ***  Diet: ***  Medications: [] Reviewed    Allergies: [] Reviewed   Encounter Diagnoses     ICD-10-CM ICD-9-CM   1.  Acute on chronic congestive heart failure, unspecified heart failure type ([facility] I50.9 428.0     Past Medical History:   Diagnosis Date    A-fib ([facility] 01/2017    Arthritis, degenerative     CAD (coronary artery disease) 04/2012    S/P 2.75 X 15 mm BMS of OM (04/2012), Two LAD BMS (07/2012)    Elevated liver enzymes     Likely from statin    HLD (hyperlipidemia)     Hyperkalemia 06/2012    Likely from lisinopril    Ischemic cardiomyopathy     LVEF  45% (11/2012) & 30% echo (04/2012)    NSTEMI (non-ST elevated myocardial infarction) ([facility] 04/2012    UTI (urinary tract infection)     Vertigo       Labs:    Lab Results   Component Value Date/Time    Sodium 132 (L) 06/04/2019 05:30 AM    Potassium 4.4 06/04/2019 05:30 AM    Chloride 92 (L) 06/04/2019 05:30 AM    CO2 33 (H) 06/04/2019 05:30 AM    Anion gap 7 06/04/2019 05:30 AM    Glucose 74 06/04/2019 05:30 AM    BUN 17 06/04/2019 05:30 AM    Creatinine 1.10 06/04/2019 05:30 AM    Calcium 8.4 (L) 06/04/2019 05:30 AM    Magnesium 1.7 (L) 04/24/2012 05:00 AM    Phosphorus 2.2 (L) 04/24/2012 05:00 AM    Albumin 3.1 (L) 04/29/2019 07:45 AM     Anthropometrics: BMI (calculated): 19.7  Last 3 Recorded Weights in this Encounter    06/03/19 1648 06/04/19 1545   Weight: 49 kg (108 lb) 49 kg (108 lb)      Ht Readings from Last 1 Encounters:   06/04/19 5' 2\" (1.575 m)     Patient Vitals for the past 100 hrs:   % Diet Eaten   06/04/19 1253 25 %   06/04/19 0945 25 %       IBW: *** lb %IBW: ***% UBW: *** lb %UBW: ***%   [] Weight Loss [] Weight Gain [] Weight Stable    Estimated Nutrition Needs: [] MSJ  [] Other:  Calories: *** kcal Based on:   [] Actual BW    Protein:   *** g Based on:   [] Actual BW    Fluid:       *** ml Based on:   [] Actual BW      [] No Cultural, Bahai or ethnic dietary need identified.     [] Cultural, Bahai and ethnic food preferences identified and addressed     Wt Status:  [] Normal (18.6 - 24.9) [] Underweight (<18.5) [] Overweight (25 - 29.9) [] Mild Obesity (30 - 34.9)  [] Moderate Obesity (35 - 39.9) [] Morbid Obesity (40+)   [] Moderate Malnutrition [] Severe Malnutrition in the context of :     Nutrition Problems Identified:   [] Suboptimal PO intake   [] Food Allergies  [] Difficulty chewing/swallowing/poor dentition  [] Constipation/Diarrhea   [] Nausea/Vomiting   [] None  [] Other:     Plan:   [] Therapeutic Diet  []  Obtained/adjusted food preferences/tolerances and/or snacks options   []  Supplements added   [] Occupational therapy following for feeding techniques  []  HS snack added   []  Modify diet texture   []  Modify diet for food allergies   []  Educate patient   []  Assist with menu selection   []  Monitor PO intake on meal rounds   []  Continue inpatient monitoring and intervention   []  Participated in discharge planning/Interdisciplinary rounds/Team meetings   []  Other:     Education Needs:   [] Not appropriate for teaching at this time due to:   [] Identified and addressed    Nutrition Monitoring and Evaluation:  [] Continue ongoing monitoring and intervention  [] Other    [address]

## 2019-06-04 NOTE — PROGRESS NOTES
Problem: Self Care Deficits Care Plan (Adult)  Goal: *Acute Goals and Plan of Care (Insert Text)  Description  Occupational Therapy Goals  Initiated 6/4/2019 within 7 day(s). 1.  Patient will perform LB bathing/dressing w/ Mod I to promote independence w/ self-care tasks. 2.  Patient will perform functional task in standing for 8 minutes w/ 2 rest breaks & Mod I to promote dynamic standing tolerance. 3.  Patient will perform toilet transfers with Mod I using LRAD to promote functional independence. 4.  Patient will perform all aspects of toileting with Mod I to promote functional independence. 5.  Patient will participate in upper extremity therapeutic exercise/activities with minimal cues for 8 minutes. 6.  Patient will utilize energy conservation techniques during functional activities with minimal cues. Outcome: Progressing Towards Goal   OCCUPATIONAL THERAPY EVALUATION    Patient: Jonathon Age (80 y.o. female)  Date: 6/4/2019  Primary Diagnosis: Acute on chronic systolic (congestive) heart failure (HCC) [I50.23]        Precautions: Fall    PLOF: Pt's family reports pt is independent w/ ADLs. ASSESSMENT :  Based on the objective data described below, the patient presents with decreased ADL & mobility participation secondary to exacerbation of CHF. Upon entering room pt seated in chair; caregiver present; agreeable to OT eval. Pt performed functional mobility from chair to bathroom w/ CGA & use of Rw; performed toileting w/ SBA & returned to chair w/ CGA. Pt  performed stand pivot transfer from chair to EOB w/ NO device. Pt performed sit to supine w/ SBA. Pt left in bed, needs within reach, family present & Ranjana notified of pt's status. Pt displayed episodes of SOB during session, O2 88-91% on room air. Pt reported dizziness after mobility, vitals: BP 85/50 seated in chair, 90/55 supine in bed. Patient will benefit from skilled intervention to address the above impairments.   Patient's rehabilitation potential is considered to be Fair  Factors which may influence rehabilitation potential include:   ? None noted  ? Mental ability/status  ? Medical condition  ? Home/family situation and support systems  ? Safety awareness  ? Pain tolerance/management  ? Other:      PLAN :  Recommendations and Planned Interventions:   ?               Self Care Training                  ? Therapeutic Activities  ? Functional Mobility Training   ? Cognitive Retraining  ? Therapeutic Exercises           ? Endurance Activities  ? Balance Training                    ? Neuromuscular Re-Education  ? Visual/Perceptual Training     ? Home Safety Training  ? Patient Education                   ? Family Training/Education  ? Other (comment):    Frequency/Duration: Patient will be followed by occupational therapy 1-2 more visits to address goals. Discharge Recommendations: Home Health  Further Equipment Recommendations for Discharge: anticipate none. SUBJECTIVE:   Patient stated ? I don't need one of those things, they are for old people. ? (related to rolling walker)    OBJECTIVE DATA SUMMARY:     Past Medical History:   Diagnosis Date    A-fib ([facility] 01/2017    Arthritis, degenerative     CAD (coronary artery disease) 04/2012    S/P 2.75 X 15 mm BMS of OM (04/2012), Two LAD BMS (07/2012)    Elevated liver enzymes     Likely from statin    HLD (hyperlipidemia)     Hyperkalemia 06/2012    Likely from lisinopril    Ischemic cardiomyopathy     LVEF  45% (11/2012) & 30% echo (04/2012)    NSTEMI (non-ST elevated myocardial infarction) ([facility] 04/2012    UTI (urinary tract infection)     Vertigo      Past Surgical History:   Procedure Laterality Date    HX CORONARY STENT PLACEMENT  4/23/12    bare metal stent to obtuse marginal branch HX HEART CATHETERIZATION       Barriers to Learning/Limitations: None  Compensate with: visual, verbal, tactile, kinesthetic cues/model    Home Situation:   Home Situation  Home Environment: Assisted living  [facility] Name: [address] [place] [address]  # Steps to Enter: 0  One/Two Story Residence: One story  Living Alone: No  Support Systems: Assisted living  Patient Expects to be Discharged to[de-identified] Assisted living  Current DME Used/Available at Home: Grab bars, Shower chair  Tub or Shower Type: Shower  ? Right hand dominant   ? Left hand dominant    Cognitive/Behavioral Status:  Neurologic State: Alert  Orientation Level: Oriented X4  Cognition: Follows commands    Skin: Intact. Edema: None noted. Coordination: BUE  Coordination: Within functional limits    Balance:  Sitting: Impaired  Sitting - Static: Good (unsupported)  Sitting - Dynamic: Good (unsupported)  Standing: Impaired  Standing - Static: Good  Standing - Dynamic : Fair    Strength: BUE  Strength: Within functional limits(3+/5)     Range of Motion: BUE  AROM: Within functional limits     Functional Mobility and Transfers for ADLs:  Bed Mobility:  Sit to Supine: Stand-by assistance     Transfers:  Sit to Stand: Stand-by assistance  Stand to Sit: Stand-by assistance   Toilet Transfer : Stand-by assistance   Bathroom Mobility: Contact guard assistance    ADL Assessment:   Feeding: Supervision  Oral Facial Hygiene/Grooming: Setup  Bathing: Minimum assistance  Upper Body Dressing: Setup  Lower Body Dressing: Setup  Toileting: Stand by assistance    ADL Intervention:   Pt performed functional mobility from chair to bathroom w/ CGA & use of Rw; performed toileting w/ SBA & returned to chair w/ CGA.      Lower Body Dressing Assistance  Socks: Supervision    Toileting  Toileting Assistance: Stand-by assistance  Bladder Hygiene: Stand-by assistance  Clothing Management: Stand-by assistance    Therapeutic Activity:  Pt performed functional mobility from chair to bathroom w/ CGA & use of Rw. Pt returned to chair w/ CGA & performed stand pivot transfer from chair to EOB w/ NO device. Pt performed sit to supine w/ SBA. Pain:  Pain level pre-treatment: 0/10   Pain level post-treatment: 0/10     Activity Tolerance:   Fair; some episodes of SOB during mobility; O2 88-91% on room air. Please refer to the flowsheet for vital signs taken during this treatment. After treatment:   ? Patient left in no apparent distress sitting up in chair  ? Patient left in no apparent distress in bed  ? Call bell left within reach  ? Nursing Ranjana notified  ? Caregiver present  ? Bed alarm activated    COMMUNICATION/EDUCATION:   ? Role of Occupational Therapy in the acute care setting  ? Home safety education was provided and the patient/caregiver indicated understanding. ? Patient/family have participated as able in goal setting and plan of care. ? Patient/family agree to work toward stated goals and plan of care. ? Patient understands intent and goals of therapy, but is neutral about his/her participation. ? Patient is unable to participate in goal setting and plan of care. Thank you for this referral.  Delayne [address]s  Time Calculation: 34 mins    Eval Complexity: History: MEDIUM Complexity : Expanded review of history including physical, cognitive and psychosocial  history ; Examination: MEDIUM Complexity : 3-5 performance deficits relating to physical, cognitive , or psychosocial skils that result in activity limitations and / or participation restrictions; Decision Making:MEDIUM Complexity : Patient may present with comorbidities that affect occupational performnce.  Miniml to moderate modification of tasks or assistance (eg, physical or verbal ) with assesment(s) is necessary to enable patient to complete evaluation

## 2019-06-05 ENCOUNTER — HOME HEALTH ADMISSION (OUTPATIENT)
Dept: HOME HEALTH SERVICES | Facility: HOME HEALTH | Age: 84
End: 2019-06-05
Payer: MEDICARE

## 2019-06-05 ENCOUNTER — HOSPITAL ENCOUNTER (OUTPATIENT)
Age: 84
Setting detail: OBSERVATION
Discharge: NURSING, REHAB OR LONG-TERM CARE | End: 2019-06-07
Attending: EMERGENCY MEDICINE | Admitting: HOSPITALIST
Payer: MEDICARE

## 2019-06-05 ENCOUNTER — APPOINTMENT (OUTPATIENT)
Dept: CT IMAGING | Age: 84
End: 2019-06-05
Attending: EMERGENCY MEDICINE
Payer: MEDICARE

## 2019-06-05 ENCOUNTER — APPOINTMENT (OUTPATIENT)
Dept: GENERAL RADIOLOGY | Age: 84
End: 2019-06-05
Attending: EMERGENCY MEDICINE
Payer: MEDICARE

## 2019-06-05 VITALS
HEART RATE: 78 BPM | BODY MASS INDEX: 19.88 KG/M2 | DIASTOLIC BLOOD PRESSURE: 60 MMHG | OXYGEN SATURATION: 96 % | RESPIRATION RATE: 16 BRPM | SYSTOLIC BLOOD PRESSURE: 102 MMHG | TEMPERATURE: 98 F | WEIGHT: 108 LBS | HEIGHT: 62 IN

## 2019-06-05 DIAGNOSIS — R55 SYNCOPE AND COLLAPSE: ICD-10-CM

## 2019-06-05 DIAGNOSIS — R09.02 HYPOXIA: Primary | ICD-10-CM

## 2019-06-05 PROBLEM — D72.829 LEUKOCYTOSIS: Status: ACTIVE | Noted: 2019-06-05

## 2019-06-05 LAB
ANION GAP SERPL CALC-SCNC: 7 MMOL/L (ref 3–18)
ANION GAP SERPL CALC-SCNC: 8 MMOL/L (ref 3–18)
APPEARANCE UR: CLEAR
ATRIAL RATE: 86 BPM
ATRIAL RATE: 89 BPM
BASOPHILS # BLD: 0 K/UL (ref 0–0.1)
BASOPHILS NFR BLD: 0 % (ref 0–2)
BILIRUB UR QL: NEGATIVE
BNP SERPL-MCNC: 5284 PG/ML (ref 0–1800)
BUN SERPL-MCNC: 21 MG/DL (ref 7–18)
BUN SERPL-MCNC: 24 MG/DL (ref 7–18)
BUN/CREAT SERPL: 16 (ref 12–20)
BUN/CREAT SERPL: 18 (ref 12–20)
CALCIUM SERPL-MCNC: 8 MG/DL (ref 8.5–10.1)
CALCIUM SERPL-MCNC: 8.2 MG/DL (ref 8.5–10.1)
CALCULATED R AXIS, ECG10: 111 DEGREES
CALCULATED R AXIS, ECG10: 87 DEGREES
CALCULATED T AXIS, ECG11: 104 DEGREES
CALCULATED T AXIS, ECG11: 63 DEGREES
CHLORIDE SERPL-SCNC: 90 MMOL/L (ref 100–108)
CHLORIDE SERPL-SCNC: 90 MMOL/L (ref 100–108)
CK MB CFR SERPL CALC: 5.4 % (ref 0–4)
CK MB SERPL-MCNC: 3 NG/ML (ref 5–25)
CK SERPL-CCNC: 56 U/L (ref 26–192)
CO2 SERPL-SCNC: 32 MMOL/L (ref 21–32)
CO2 SERPL-SCNC: 34 MMOL/L (ref 21–32)
COLOR UR: YELLOW
CREAT SERPL-MCNC: 1.16 MG/DL (ref 0.6–1.3)
CREAT SERPL-MCNC: 1.53 MG/DL (ref 0.6–1.3)
DIAGNOSIS, 93000: NORMAL
DIAGNOSIS, 93000: NORMAL
DIFFERENTIAL METHOD BLD: ABNORMAL
DIGOXIN SERPL-MCNC: 2.1 NG/ML (ref 0.9–2)
EOSINOPHIL # BLD: 0.1 K/UL (ref 0–0.4)
EOSINOPHIL NFR BLD: 1 % (ref 0–5)
ERYTHROCYTE [DISTWIDTH] IN BLOOD BY AUTOMATED COUNT: 17.2 % (ref 11.6–14.5)
GLUCOSE SERPL-MCNC: 166 MG/DL (ref 74–99)
GLUCOSE SERPL-MCNC: 66 MG/DL (ref 74–99)
GLUCOSE UR STRIP.AUTO-MCNC: NEGATIVE MG/DL
HCT VFR BLD AUTO: 35.8 % (ref 35–45)
HGB BLD-MCNC: 11 G/DL (ref 12–16)
HGB UR QL STRIP: NEGATIVE
KETONES UR QL STRIP.AUTO: NEGATIVE MG/DL
LEUKOCYTE ESTERASE UR QL STRIP.AUTO: NEGATIVE
LYMPHOCYTES # BLD: 1.1 K/UL (ref 0.9–3.6)
LYMPHOCYTES NFR BLD: 7 % (ref 21–52)
MCH RBC QN AUTO: 26.1 PG (ref 24–34)
MCHC RBC AUTO-ENTMCNC: 30.7 G/DL (ref 31–37)
MCV RBC AUTO: 85 FL (ref 74–97)
MONOCYTES # BLD: 1.8 K/UL (ref 0.05–1.2)
MONOCYTES NFR BLD: 11 % (ref 3–10)
NEUTS SEG # BLD: 13.6 K/UL (ref 1.8–8)
NEUTS SEG NFR BLD: 81 % (ref 40–73)
NITRITE UR QL STRIP.AUTO: NEGATIVE
PH UR STRIP: 5.5 [PH] (ref 5–8)
PLATELET # BLD AUTO: 342 K/UL (ref 135–420)
PMV BLD AUTO: 8.4 FL (ref 9.2–11.8)
POTASSIUM SERPL-SCNC: 4 MMOL/L (ref 3.5–5.5)
POTASSIUM SERPL-SCNC: 4.2 MMOL/L (ref 3.5–5.5)
PROT UR STRIP-MCNC: NEGATIVE MG/DL
Q-T INTERVAL, ECG07: 326 MS
Q-T INTERVAL, ECG07: 358 MS
QRS DURATION, ECG06: 80 MS
QRS DURATION, ECG06: 86 MS
QTC CALCULATION (BEZET), ECG08: 400 MS
QTC CALCULATION (BEZET), ECG08: 408 MS
RBC # BLD AUTO: 4.21 M/UL (ref 4.2–5.3)
SODIUM SERPL-SCNC: 130 MMOL/L (ref 136–145)
SODIUM SERPL-SCNC: 131 MMOL/L (ref 136–145)
SP GR UR REFRACTOMETRY: 1.01 (ref 1–1.03)
TROPONIN I SERPL-MCNC: 0.07 NG/ML (ref 0–0.04)
UROBILINOGEN UR QL STRIP.AUTO: 0.2 EU/DL (ref 0.2–1)
VENTRICULAR RATE, ECG03: 78 BPM
VENTRICULAR RATE, ECG03: 91 BPM
WBC # BLD AUTO: 16.6 K/UL (ref 4.6–13.2)

## 2019-06-05 PROCEDURE — 81003 URINALYSIS AUTO W/O SCOPE: CPT

## 2019-06-05 PROCEDURE — 99285 EMERGENCY DEPT VISIT HI MDM: CPT

## 2019-06-05 PROCEDURE — 83880 ASSAY OF NATRIURETIC PEPTIDE: CPT

## 2019-06-05 PROCEDURE — 82550 ASSAY OF CK (CPK): CPT

## 2019-06-05 PROCEDURE — 77030011943

## 2019-06-05 PROCEDURE — 36415 COLL VENOUS BLD VENIPUNCTURE: CPT

## 2019-06-05 PROCEDURE — 77030021352 HC CBL LD SYS DISP COVD -B

## 2019-06-05 PROCEDURE — 80162 ASSAY OF DIGOXIN TOTAL: CPT

## 2019-06-05 PROCEDURE — 70450 CT HEAD/BRAIN W/O DYE: CPT

## 2019-06-05 PROCEDURE — 74011250637 HC RX REV CODE- 250/637: Performed by: HOSPITALIST

## 2019-06-05 PROCEDURE — 71250 CT THORAX DX C-: CPT

## 2019-06-05 PROCEDURE — 74011250636 HC RX REV CODE- 250/636: Performed by: HOSPITALIST

## 2019-06-05 PROCEDURE — 85025 COMPLETE CBC W/AUTO DIFF WBC: CPT

## 2019-06-05 PROCEDURE — 99218 HC RM OBSERVATION: CPT

## 2019-06-05 PROCEDURE — 96361 HYDRATE IV INFUSION ADD-ON: CPT

## 2019-06-05 PROCEDURE — 80048 BASIC METABOLIC PNL TOTAL CA: CPT

## 2019-06-05 PROCEDURE — 74011000250 HC RX REV CODE- 250: Performed by: HOSPITALIST

## 2019-06-05 PROCEDURE — 71045 X-RAY EXAM CHEST 1 VIEW: CPT

## 2019-06-05 PROCEDURE — 74011250636 HC RX REV CODE- 250/636: Performed by: EMERGENCY MEDICINE

## 2019-06-05 PROCEDURE — 96360 HYDRATION IV INFUSION INIT: CPT

## 2019-06-05 PROCEDURE — 93005 ELECTROCARDIOGRAM TRACING: CPT

## 2019-06-05 RX ORDER — METOPROLOL TARTRATE 25 MG/1
12.5 TABLET, FILM COATED ORAL 2 TIMES DAILY
Status: DISCONTINUED | OUTPATIENT
Start: 2019-06-06 | End: 2019-06-06

## 2019-06-05 RX ORDER — ATORVASTATIN CALCIUM 20 MG/1
20 TABLET, FILM COATED ORAL
Qty: 30 TAB | Refills: 0 | Status: SHIPPED | OUTPATIENT
Start: 2019-06-05 | End: 2019-06-20

## 2019-06-05 RX ORDER — GUAIFENESIN 100 MG/5ML
81 LIQUID (ML) ORAL DAILY
Status: DISCONTINUED | OUTPATIENT
Start: 2019-06-06 | End: 2019-06-07 | Stop reason: HOSPADM

## 2019-06-05 RX ORDER — ENOXAPARIN SODIUM 100 MG/ML
30 INJECTION SUBCUTANEOUS EVERY 24 HOURS
Status: DISCONTINUED | OUTPATIENT
Start: 2019-06-06 | End: 2019-06-07

## 2019-06-05 RX ORDER — ACETAMINOPHEN 325 MG/1
650 TABLET ORAL
Status: DISCONTINUED | OUTPATIENT
Start: 2019-06-05 | End: 2019-06-07 | Stop reason: HOSPADM

## 2019-06-05 RX ORDER — MIRTAZAPINE 15 MG/1
15 TABLET, FILM COATED ORAL
Status: DISCONTINUED | OUTPATIENT
Start: 2019-06-05 | End: 2019-06-07 | Stop reason: HOSPADM

## 2019-06-05 RX ORDER — FLUOXETINE 10 MG/1
10 CAPSULE ORAL DAILY
Status: DISCONTINUED | OUTPATIENT
Start: 2019-06-06 | End: 2019-06-06

## 2019-06-05 RX ORDER — ATORVASTATIN CALCIUM 20 MG/1
20 TABLET, FILM COATED ORAL
Status: DISCONTINUED | OUTPATIENT
Start: 2019-06-05 | End: 2019-06-07 | Stop reason: HOSPADM

## 2019-06-05 RX ORDER — DIGOXIN 125 MCG
0.12 TABLET ORAL DAILY
Status: DISCONTINUED | OUTPATIENT
Start: 2019-06-06 | End: 2019-06-07

## 2019-06-05 RX ORDER — METOPROLOL TARTRATE 25 MG/1
12.5 TABLET, FILM COATED ORAL 2 TIMES DAILY
Qty: 30 TAB | Refills: 0 | Status: SHIPPED | OUTPATIENT
Start: 2019-06-05 | End: 2019-06-07

## 2019-06-05 RX ORDER — CLOPIDOGREL BISULFATE 75 MG/1
75 TABLET ORAL DAILY
Status: DISCONTINUED | OUTPATIENT
Start: 2019-06-06 | End: 2019-06-07 | Stop reason: HOSPADM

## 2019-06-05 RX ORDER — ENOXAPARIN SODIUM 100 MG/ML
30 INJECTION SUBCUTANEOUS EVERY 24 HOURS
Status: DISCONTINUED | OUTPATIENT
Start: 2019-06-06 | End: 2019-06-05

## 2019-06-05 RX ORDER — ONDANSETRON 2 MG/ML
4 INJECTION INTRAMUSCULAR; INTRAVENOUS
Status: DISCONTINUED | OUTPATIENT
Start: 2019-06-05 | End: 2019-06-07 | Stop reason: HOSPADM

## 2019-06-05 RX ORDER — POLYETHYLENE GLYCOL 3350 17 G/17G
17 POWDER, FOR SOLUTION ORAL
Status: DISCONTINUED | OUTPATIENT
Start: 2019-06-05 | End: 2019-06-07 | Stop reason: HOSPADM

## 2019-06-05 RX ORDER — BUMETANIDE 1 MG/1
1 TABLET ORAL DAILY
Qty: 30 TAB | Refills: 0 | Status: SHIPPED | OUTPATIENT
Start: 2019-06-05 | End: 2019-06-07

## 2019-06-05 RX ORDER — BUMETANIDE 1 MG/1
1 TABLET ORAL DAILY
Status: DISCONTINUED | OUTPATIENT
Start: 2019-06-06 | End: 2019-06-07

## 2019-06-05 RX ADMIN — MIRTAZAPINE 15 MG: 15 TABLET, FILM COATED ORAL at 22:18

## 2019-06-05 RX ADMIN — ONDANSETRON 4 MG: 2 INJECTION INTRAMUSCULAR; INTRAVENOUS at 10:05

## 2019-06-05 RX ADMIN — CLOPIDOGREL BISULFATE 75 MG: 75 TABLET ORAL at 09:43

## 2019-06-05 RX ADMIN — METOPROLOL TARTRATE 12.5 MG: 25 TABLET, FILM COATED ORAL at 09:43

## 2019-06-05 RX ADMIN — ASPIRIN 81 MG 81 MG: 81 TABLET ORAL at 09:43

## 2019-06-05 RX ADMIN — SODIUM CHLORIDE 500 ML: 900 INJECTION, SOLUTION INTRAVENOUS at 13:20

## 2019-06-05 RX ADMIN — ENOXAPARIN SODIUM 30 MG: 100 INJECTION SUBCUTANEOUS at 00:00

## 2019-06-05 RX ADMIN — FLUOXETINE 10 MG: 10 CAPSULE ORAL at 09:44

## 2019-06-05 RX ADMIN — BUMETANIDE 1 MG: 0.25 INJECTION INTRAMUSCULAR; INTRAVENOUS at 09:44

## 2019-06-05 RX ADMIN — DIGOXIN 0.12 MG: 125 TABLET ORAL at 09:43

## 2019-06-05 NOTE — PROGRESS NOTES
Pt arrived to the floor from ED via stretcher to room accompanied by jacob. Pt is A&Ox4, denies pain. No distress noted. Pt oriented to room. Call bell and frequently used items in reach with bed locked in lowest position. Telemetry box connected. Bedside and Verbal shift change report given to Karen Daly RN  (oncoming nurse) by Avinash Longoria RN (offgoing nurse). Report included the following information SBAR, Kardex, Intake/Output, MAR and Recent Results.

## 2019-06-05 NOTE — ED PROVIDER NOTES
28-year-old female presents after being discharged in the hospital proxy 1 hour ago with syncopal episode while she was on her toilet patient lives in a nursing home with family was there assisted patient to the ground on arrival patient was hypoxic per EMS and placed on oxygen she denies any chest pain shortness of breath fevers chills nausea or vomiting    Records reviewed patient was admitted for CHF exacerbation.   She had 2 L of fluid removed with IV Bumex           Past Medical History:   Diagnosis Date    A-fib ([facility] 01/2017    Arthritis, degenerative     CAD (coronary artery disease) 04/2012    S/P 2.75 X 15 mm BMS of OM (04/2012), Two LAD BMS (07/2012)    Elevated liver enzymes     Likely from statin    HLD (hyperlipidemia)     Hyperkalemia 06/2012    Likely from lisinopril    Ischemic cardiomyopathy     LVEF  45% (11/2012) & 30% echo (04/2012)    NSTEMI (non-ST elevated myocardial infarction) ([facility] 04/2012    UTI (urinary tract infection)     Vertigo        Past Surgical History:   Procedure Laterality Date    HX CORONARY STENT PLACEMENT  4/23/12    bare metal stent to obtuse marginal branch    HX HEART CATHETERIZATION           Family History:   Problem Relation Age of Onset    Diabetes Mother        Social History     Socioeconomic History    Marital status:      Spouse name: Not on file    Number of children: Not on file    Years of education: Not on file    Highest education level: Not on file   Occupational History    Not on file   Social Needs    Financial resource strain: Not on file    Food insecurity:     Worry: Not on file     Inability: Not on file    Transportation needs:     Medical: Not on file     Non-medical: Not on file   Tobacco Use    Smoking status: Never Smoker    Smokeless tobacco: Never Used   Substance and Sexual Activity    Alcohol use: No    Drug use: No    Sexual activity: Never   Lifestyle    Physical activity:     Days per week: Not on file Minutes per session: Not on file    Stress: Not on file   Relationships    Social connections:     Talks on phone: Not on file     Gets together: Not on file     Attends Yazidism service: Not on file     Active member of club or organization: Not on file     Attends meetings of clubs or organizations: Not on file     Relationship status: Not on file    Intimate partner violence:     Fear of current or ex partner: Not on file     Emotionally abused: Not on file     Physically abused: Not on file     Forced sexual activity: Not on file   Other Topics Concern    Not on file   Social History Narrative    Not on file         ALLERGIES: Flu vaccine 2011 (36 mos+)(pf); Codeine; Egg; Lipitor [atorvastatin]; and Pcn [penicillins]    Review of Systems   Constitutional: Negative for chills and fever. HENT: Negative for sore throat. Respiratory: Positive for shortness of breath. Cardiovascular: Negative for chest pain. Gastrointestinal: Negative for abdominal pain. Genitourinary: Negative for difficulty urinating and dysuria. Skin: Negative for rash. Neurological: Positive for syncope. There were no vitals filed for this visit. Physical Exam   Constitutional: She appears well-developed and well-nourished. No distress. HENT:   Head: Normocephalic and atraumatic. Eyes: EOM are normal.   Neck: Normal range of motion. Cardiovascular: An irregularly irregular rhythm present. Pulmonary/Chest: Effort normal and breath sounds normal.   Abdominal: Soft. Bowel sounds are normal. She exhibits no distension. There is no tenderness. Musculoskeletal: She exhibits edema. Skin: No rash noted. She is not diaphoretic. Psychiatric: She has a normal mood and affect. Nursing note and vitals reviewed.        MDM  Number of Diagnoses or Management Options  Altered mental status, unspecified altered mental status type:   Weakness:   Diagnosis management comments: 43-year-old female seen by me yesterday for CHF exacerbation admitted to the hospital.    Old records reviewed patient with IV Bumex administered in decreased 2 L of fluid discharge today approximate 1 hour ago family at nursing home with her went to the bathroom where she syncopized laid on the floor EMS call patient arrived in no distress.     I discussed this case with the hospitalist  as she was recently discharged and possibly fluid status needs to be adjusted    Discussed with family patient blood pressure normally hovers in the systolic of 37Q I believe her symptoms may have also occurred after she received Phenergan at home she was also started on her Lopressor however her heart rate is 77 she does appear to be baseline per family we will give a small fluid bolus check CAT scan and further blood work discussed again with the hospitalist however she may be okay for discharge back to the nursing home I discussed with family that this is a ongoing balancing act between the 2 much fluid which caused her admission in the first place for CHF exacerbation and possibly to little fluid which can make her hypotensive and lightheaded    EKG shows atrial fibrillation and a rate of 78 with a normal axis unchanged from previous EKG 2 days ago    Patient awake and alert systolic blood pressure 90 declined urinalysis catheter will use a spine she has an elevated white count of undetermined significance I discussed with radiology her chest x-ray is unchanged from previous do not believe pneumonia if clinical picture warrants may need CAT scan she is afebrile    Patient road tested by me off oxygen sats 85% sitting in the bed while walking around the hovered anywhere between 77% to 90 %, would say majority of the time they are in the 85 to 87% range she is not short of breath however is somewhat unsteady on her feet will discuss with hospitalist    Critical Care Time:  The services I provided to this patient were to treat and/or prevent clinically significant deterioration that could result in the failure of one or more body systems and/or organ systems due to hypoxia. Services included the following:  -reviewing nursing notes and old charts  -vital sign assessments  -direct patient care  -medication orders and management  -interpreting and reviewing diagnostic studies/labs  -re-evaluations  -documentation time    Aggregate critical care time was 78 minutes, which includes only time during which I was engaged in work directly related to the patient's care as described above, whether I was at bedside or elsewhere in the Emergency Department. It did not include time spent performing other reported procedures or the services of residents, students, nurses, or advance practice providers.        Sara Goodman MD    5:45 PM      5:46 PM - d/w hospitalist for admission           Procedures

## 2019-06-05 NOTE — PROGRESS NOTES
Discharge instructions reviewed with pt and pt daughter on behalf of primary nurse Ranjana Hector RN. Pt received writtten prescriptions for lipitor, bumex, and metoprolol. Pt daughter reports pt was taken off lipitor by Dr. Vikas Mae in the past because if caused nausea. Informed them that pt did receive lipitor last night. Pt thinks this maybe a reason for her nausea this morning. Updated pt allergy list to include lipitor as an intolerant medication. Pt to follow up with Dr. Vikas Mae, PCP, and [facility] staff. Pt getting dressed and will be transported to [facility] by family.

## 2019-06-05 NOTE — PROGRESS NOTES
Bedside shift change report given to CINTHYA Chilel (oncoming nurse) by David Ortiz RN (offgoing nurse). Report included the following information SBAR, Kardex, Intake/Output, MAR and Recent Results. A/O x4, no pain noted, A-fib per tele-monitor, Iv flushed and capped, call bell and personal belongings in reach.      0944 -- AM medications given, well tolerated, will continue to monitor. 1005 -- Zofran given, patient having nausea, vomited up meds and breakfast.    1014 -- [facility] FOR CHANGE to give report, spoke with [facility], nurse.      3863 -- Patient discharge.

## 2019-06-05 NOTE — ED TRIAGE NOTES
Pt arrived via EMS after syncopal episode at the [facility]. Pt given phenergan earlier today with subsequent drowsiness and syncope when got up to toilet. No fall, patient also given Metroprolal this am which is a new medication for this patient.

## 2019-06-05 NOTE — PROGRESS NOTES
1925: Bedside report received from [address] [place] [address]    2005: pt resting in bed,watching tv, alert and oriented X4, denies pain or discomforts, on room air, non labored breathing, shift assessment completed, bed locked and low position, call bell within reach    2152: due meds given tolerated well, no c/o presented, call bell within reach    2210: assist pt to bathroom and back to bed, resting in bed comfortably,call bell within reach    0000: resting in bed, no distress noted, no changed from previous assessment, continue to monitor    0114: ambulated pt to bathroom and back to bed comfortably    0400: pt sleeping, no visual sign of distress, no changed from previous assessment, continue to monitor    0627: sleeping, no c/o throughout the night, call bell within reach    0710: Bedside shift change report given to [address] [place] [address] (oncoming nurse) by Jason Licona RN (offgoing nurse). Report included the following information SBAR, Kardex, Intake/Output, MAR, Recent Results and Cardiac Rhythm Afib , BBB on tele # 27.

## 2019-06-05 NOTE — ED NOTES
Oxygen removed at 1600, patient does not use O2 at home. SAO2 dropped, Nasal cannula replaced at 2l.

## 2019-06-05 NOTE — ED NOTES
TRANSFER - ED to INPATIENT REPORT:    SBAR report made available to receiving floor on this patient being transferred to [facility] medical bed, report to [facility] for routine progression of care       Admitting diagnosis Hypoxia [R09.02]  Syncope [R55]    Information from the following report(s) SBAR, Kardex, ED Summary, MAR and Recent Results was made available to receiving floor. Lines:   Peripheral IV 06/05/19 Left Antecubital (Active)   Site Assessment Clean, dry, & intact 6/5/2019  1:12 PM   Phlebitis Assessment 0 6/5/2019  1:12 PM   Infiltration Assessment 0 6/5/2019  1:12 PM   Dressing Status Clean, dry, & intact 6/5/2019  1:12 PM   Dressing Type Transparent 6/5/2019  1:12 PM   Hub Color/Line Status Pink;Flushed;Patent 6/5/2019  1:12 PM        Medication list updated today    Opportunity for questions and clarification was provided. Patient is oriented to time, place, person and situation Oxygen Saturation and drops.   Patient is  continent and ambulatory with assist     Valuables given to family at patients request     Patient transported with:   Tech    MAP (Monitor): 94 =Monitored (most recent)  Vitals w/ MEWS Score (last day)     Date/Time MEWS Score Pulse Resp Temp BP Level of Consciousness SpO2    06/05/19 1648            Alert  100 %    06/05/19 1630          105/91  (Abnormal)     100 %    06/05/19 1615          97/63    100 %    06/05/19 1611              95 %    06/05/19 1610              85 %  (Abnormal)     06/05/19 1609              77 %  (Abnormal)     06/05/19 1608              76 %  (Abnormal)     06/05/19 1607              80 %  (Abnormal)     06/05/19 1606              85 %  (Abnormal)     06/05/19 1605              88 %  (Abnormal)     06/05/19 1604    [facility]        91 %    06/05/19 1603    [facility]        96 %    06/05/19 1602              99 %    06/05/19 1601              100 %    06/05/19 1600         108/47    99 %    06/05/19 1559              99 %    06/05/19 1530    85  18    107/59    100 %    06/05/19 1529    81  16        100 %    06/05/19 1515    79  18    110/68    100 %    06/05/19 1500    81  19    113/61    97 %    06/05/19 1445    83  21    106/68    100 %    06/05/19 1430    80  20    106/54    100 %    06/05/19 1415    82  18    120/70    100 %    06/05/19 1400    80  18    112/63    100 %    06/05/19 1351    82  12        97 %    06/05/19 1350          94/62        06/05/19 1318    84      86/63  (Abnormal)     97 %    06/05/19 1304    87      88/61  (Abnormal)     96 %    06/05/19 1258  1  74  16  98.5 °F (36.9 °C)  105/64  Alert  88 %  (Abnormal)               Septic Patients:     Lactic Acid  Lab Results   Component Value Date    [facility] 1.2 10/20/2018

## 2019-06-05 NOTE — PROGRESS NOTES
Problem: Pressure Injury - Risk of  Goal: *Prevention of pressure injury  Description  Document Allen Scale and appropriate interventions in the flowsheet. Outcome: Progressing Towards Goal     Problem: Patient Education: Go to Patient Education Activity  Goal: Patient/Family Education  Outcome: Progressing Towards Goal     Problem: Falls - Risk of  Goal: *Absence of Falls  Description  Document Leonides Mohr Fall Risk and appropriate interventions in the flowsheet.   Outcome: Progressing Towards Goal     Problem: Patient Education: Go to Patient Education Activity  Goal: Patient/Family Education  Outcome: Progressing Towards Goal

## 2019-06-05 NOTE — DISCHARGE SUMMARY
Discharge Summary    Patient: Rusty Smyth               Sex: female          DOA: 6/3/2019         YOB: 1927      Age:  80 y.o.        LOS:  LOS: 2 days                Admit Date: 6/3/2019    Discharge Date: 6/5/2019    Discharge Medications:     Current Discharge Medication List      START taking these medications    Details   atorvastatin (LIPITOR) 20 mg tablet Take 1 Tab by mouth nightly. Qty: 30 Tab, Refills: 0      metoprolol tartrate (LOPRESSOR) 25 mg tablet Take 0.5 Tabs by mouth two (2) times a day. Qty: 30 Tab, Refills: 0      bumetanide (BUMEX) 1 mg tablet Take 1 Tab by mouth daily. Qty: 30 Tab, Refills: 0         CONTINUE these medications which have NOT CHANGED    Details   ondansetron (ZOFRAN ODT) 4 mg disintegrating tablet Take 1 Tab by mouth every eight (8) hours as needed for Nausea. Qty: 30 Tab, Refills: 11      polyethylene glycol (MIRALAX) 17 gram/dose powder Take 17 g by mouth daily as needed (constipation). vit C-E-zinc cit-lutein-zeaxan ([address]) 50 mg-15 unit- 4.5 mg-2.5 mg chew Take 1 Tab by mouth daily. Ocuvite gummy      acetaminophen (TYLENOL) 325 mg tablet Take 650 mg by mouth every four (4) hours as needed for Pain. carboxymethylcellulos/glycerin (CLEAR EYES FOR DRY EYES OP) Administer 1 Drop to both eyes three (3) times daily as needed (dry eyes). carbamide peroxide (MURINE EAR) 6.5 % otic solution Instill 5 drops in each ear twice daily for 4 days each month as needed for ear wax. meclizine (ANTIVERT) 12.5 mg tablet Take 12.5 mg by mouth daily as needed for Dizziness. loperamide (IMMODIUM) 2 mg tablet Take 1 mg by mouth three (3) times daily as needed for Diarrhea. FLUoxetine (PROZAC) 10 mg tablet Take 1 Tab by mouth daily. Qty: 90 Tab, Refills: 3      psyllium husk-aspartame (METAMUCIL FIBER) 3.4 gram pwpk packet Take 1 Packet by mouth daily.   Qty: 30 Packet, Refills: 0      mirtazapine (REMERON) 15 mg tablet Take 1 Tab by mouth nightly. Qty: 30 Tab, Refills: 0      fluticasone (FLONASE) 50 mcg/actuation nasal spray 2 Sprays by Both Nostrils route daily as needed for Rhinitis. Qty: 1 Bottle, Refills: 0      digoxin (LANOXIN) 0.125 mg tablet Take 1 Tab by mouth daily. Qty: 30 Tab, Refills: 0      aspirin 81 mg chewable tablet Take 1 Tab by mouth daily. Qty: 30 Tab, Refills: 0      clotrimazole (LOTRIMIN) 1 % topical cream Apply  to affected area two (2) times a day. Apply with barrier cream to affected area. Qty: 15 g, Refills: 1      clopidogrel (PLAVIX) 75 mg tab Take 1 Tab by mouth daily. Qty: 90 Tab, Refills: 3         STOP taking these medications       promethazine (PHENERGAN) 12.5 mg tablet Comments:   Reason for Stopping:         furosemide (LASIX) 20 mg tablet Comments:   Reason for Stopping: Follow-up: PCP, cardiology     Discharge Condition: Stable    Activity: Activity as tolerated    Diet: Cardiac Diet (1500ml FR)    Labs:  Labs: Results:       Chemistry Recent Labs     06/05/19  0440 06/04/19  0530 06/03/19  1645   GLU 66* 74 110*   * 132* 127*   K 4.2 4.4 4.9   CL 90* 92* 92*   CO2 34* 33* 29   BUN 21* 17 20*   CREA 1.16 1.10 1.19   CA 8.0* 8.4* 8.4*   AGAP 7 7 6   BUCR 18 15 17      CBC w/Diff Recent Labs     06/03/19  1645   WBC 9.0   RBC 4.31   HGB 11.5*   HCT 35.3      GRANS 68   LYMPH 15*   EOS 2      Cardiac Enzymes No results for input(s): CPK, CKND1, KELSIE in the last 72 hours. No lab exists for component: CKRMB, TROIP   Coagulation No results for input(s): PTP, INR, APTT in the last 72 hours.     No lab exists for component: INREXT    Lipid Panel Lab Results   Component Value Date/Time    Cholesterol, total 202 (H) 04/29/2019 07:45 AM    HDL Cholesterol 64 (H) 04/29/2019 07:45 AM    LDL, calculated 116.4 (H) 04/29/2019 07:45 AM    VLDL, calculated 21.6 04/29/2019 07:45 AM    Triglyceride 108 04/29/2019 07:45 AM    CHOL/HDL Ratio 3.2 04/29/2019 07:45 AM      BNP No results for input(s): BNPP in the last 72 hours. Liver Enzymes No results for input(s): TP, ALB, TBIL, AP, SGOT, GPT in the last 72 hours. No lab exists for component: DBIL   Thyroid Studies Lab Results   Component Value Date/Time    TSH 2.40 04/29/2019 07:45 AM          Imaging:    Xr Chest Port    Result Date: 6/3/2019  EXAM: CHEST RADIOGRAPH, SINGLE VIEW CLINICAL INDICATION/HISTORY: dyspnea, shortness of breath COMPARISON: 10/21/2018 TECHNIQUE: Portable frontal view of the chest was obtained. _______________ FINDINGS: SUPPORT DEVICES: None. HEART AND MEDIASTINUM: Cardiomediastinal silhouette appears within normal limits. Tortuous and atherosclerotic thoracic aorta. No central vascular congestion. LUNGS AND PLEURAL SPACES: Tiny left and right pleural effusions, each with adjacent basilar patchy opacity. Mild central bronchiectasis. No pneumothorax. BONY THORAX AND SOFT TISSUES: No acute osseous abnormality. _______________     IMPRESSION: Small left and right pleural effusions, each with adjacent basilar patchy opacity, atelectasis versus infiltrate.       Consults: None    Treatment Team: Treatment Team: Attending Provider: Adolm Snellen, MD; Utilization Review: Chari Hernandez RN; Physical Therapist: Alexandra Ashby Care Manager: Junie Ramirez RN; Occupational Therapist: Dae Bauman OT    Significant Diagnostic Studies: labs:   Recent Results (from the past 24 hour(s))   ECHO ADULT COMPLETE    Collection Time: 06/04/19  3:45 PM   Result Value Ref Range    PASP 69.7 mmHg   METABOLIC PANEL, BASIC    Collection Time: 06/05/19  4:40 AM   Result Value Ref Range    Sodium 131 (L) 136 - 145 mmol/L    Potassium 4.2 3.5 - 5.5 mmol/L    Chloride 90 (L) 100 - 108 mmol/L    CO2 34 (H) 21 - 32 mmol/L    Anion gap 7 3.0 - 18 mmol/L    Glucose 66 (L) 74 - 99 mg/dL    BUN 21 (H) 7.0 - 18 MG/DL    Creatinine 1.16 0.6 - 1.3 MG/DL    BUN/Creatinine ratio 18 12 - 20      GFR est AA 53 (L) >60 ml/min/1.73m2    GFR est non-AA 44 (L) >60 ml/min/1.73m2    Calcium 8.0 (L) 8.5 - 10.1 MG/DL           Discharge diagnoses:    Problem List as of 6/5/2019 Date Reviewed: 1/28/2019          Codes Class Noted - Resolved    * (Principal) Acute on chronic systolic (congestive) heart failure (HCC) ICD-10-CM: I50.23  ICD-9-CM: 428.23, 428.0  6/3/2019 - Present        Hyperkalemia ICD-10-CM: E87.5  ICD-9-CM: 276.7  10/23/2018 - Present        Acute metabolic encephalopathy FXM-84-PATRICIA: G93.41  ICD-9-CM: 348.31  10/21/2018 - Present        Hyponatremia ICD-10-CM: E87.1  ICD-9-CM: 276.1  10/20/2018 - Present        Chronic fatigue ICD-10-CM: R53.82  ICD-9-CM: 780.79  10/9/2018 - Present        Irritable bowel syndrome with diarrhea ICD-10-CM: K58.0  ICD-9-CM: 564.1  9/24/2018 - Present        Chronic a-fib ([address] ICD-10-CM: I48.2  ICD-9-CM: 427.31  5/2/2017 - Present        CAD (coronary artery disease) ICD-10-CM: I25.10  ICD-9-CM: 414.00  Unknown - Present    Overview Signed 5/3/2012  3:22 PM by Humberto Reveles MD     S/P 2.75 X 15 mm BMS of Obtuse marginal             Ischemic cardiomyopathy ICD-10-CM: I25.5  ICD-9-CM: 414.8  Unknown - Present        Arthritis, degenerative ICD-10-CM: M19.90  ICD-9-CM: 715.90  Unknown - Present        Vertigo ICD-10-CM: R42  ICD-9-CM: 780.4  Unknown - Present        HLD (hyperlipidemia) ICD-10-CM: E78.5  ICD-9-CM: 272.4  Unknown - Present        SOB (shortness of breath) ICD-10-CM: R06.02  ICD-9-CM: 786.05  4/27/2012 - Present        NSTEMI (non-ST elevated myocardial infarction) [facility]) ICD-10-CM: I21.4  ICD-9-CM: 410.70  4/1/2012 - Present            Hospital Course:  Major issues addressed during hospitalization outlined  below. Martinez Pedro is a 80 y.o. female who presented to the ED with SOB for the last few days that got progressively worse for the last 24 hours. Denies chest pain/HA/dizziness.  Denies fever or productive cough    Patient continued on BID IV bumex with at least 2 L of net output during hospitalization. Patient did not require 02. Echo completed demonstrating 30-35% EF and grade II diastolic. Patient will f/u with PCP in 1 week and Cardiology in 2-4 weeks. She will dc with daily PO bumex.       Ortencia Hammans, MD  June 5, 2019        Total time spent 40 minutes

## 2019-06-05 NOTE — PROGRESS NOTES
Problem: Discharge Planning  Goal: *Discharge to safe environment  Outcome: Resolved/Met    Home with home health. Chart reviewed. Noted orders for discharge. Called [facility], spoke with BODØ, states pt ok to return. Made her aware that therapy rec home health & pt has selected [facility], states that's fine. Spoke with pt & family & made them aware of discharge, but will have to wait for discharge summary, as will need it @ [facility], verbalized understanding. [facility] & [address] Provider list has been given to the patient and/or patient representative. Patient and/or patient representative has signed the [place] of Choice selecting [facility] as their preference agency and a copy given. Both Home Health Provider list and Freedom of Choice have been placed on the chart. HH orders received & entered. Referral entered in epic & called to [facility], spoke with Jorgito Pike.  Available as needed. Mariah Carrera,RN,ext [phone]. Care Management Interventions  PCP Verified by CM: No(Has a new MD at the facility [facility])  Palliative Care Criteria Met (RRAT>21 & CHF Dx)?: No  Mode of Transport at Discharge:  Other (see comment)(Son in law Nely Mccall)  Transition of Care Consult (CM Consult): Discharge Planning, [address]: Yes  MyChart Signup: No  Discharge Durable Medical Equipment: No  Physical Therapy Consult: Yes  Occupational Therapy Consult: Yes  Speech Therapy Consult: No  Current Support Network: Assisted Living([facility])  Confirm Follow Up Transport: Family  Plan discussed with Pt/Family/Caregiver: Yes  Freedom of Choice Offered: Yes  Discharge Location  Discharge Placement: Home with home health

## 2019-06-05 NOTE — DISCHARGE INSTRUCTIONS
DISCHARGE SUMMARY from Nurse    PATIENT INSTRUCTIONS:    After general anesthesia or intravenous sedation, for 24 hours or while taking prescription Narcotics:  · Limit your activities  · Do not drive and operate hazardous machinery  · Do not make important personal or business decisions  · Do  not drink alcoholic beverages  · If you have not urinated within 8 hours after discharge, please contact your surgeon on call. Report the following to your surgeon:  · Excessive pain, swelling, redness or odor of or around the surgical area  · Temperature over 100.5  · Nausea and vomiting lasting longer than 4 hours or if unable to take medications  · Any signs of decreased circulation or nerve impairment to extremity: change in color, persistent  numbness, tingling, coldness or increase pain  · Any questions    What to do at Home:  Recommended activity: Activity as tolerated and PT/OT per Home Health,     If you experience any of the following symptoms worsening shortness of breath, palpitations, or chest pain, please follow up with 911/emergency room. *  Please give a list of your current medications to your Primary Care Provider. *  Please update this list whenever your medications are discontinued, doses are      changed, or new medications (including over-the-counter products) are added. *  Please carry medication information at all times in case of emergency situations. These are general instructions for a healthy lifestyle:    No smoking/ No tobacco products/ Avoid exposure to second hand smoke  Surgeon General's Warning:  Quitting smoking now greatly reduces serious risk to your health.     Obesity, smoking, and sedentary lifestyle greatly increases your risk for illness    A healthy diet, regular physical exercise & weight monitoring are important for maintaining a healthy lifestyle    You may be retaining fluid if you have a history of heart failure or if you experience any of the following symptoms: Weight gain of 3 pounds or more overnight or 5 pounds in a week, increased swelling in our hands or feet or shortness of breath while lying flat in bed. Please call your doctor as soon as you notice any of these symptoms; do not wait until your next office visit. Recognize signs and symptoms of STROKE:    F-face looks uneven    A-arms unable to move or move unevenly    S-speech slurred or non-existent    T-time-call 911 as soon as signs and symptoms begin-DO NOT go       Back to bed or wait to see if you get better-TIME IS BRAIN. Warning Signs of HEART ATTACK     Call 911 if you have these symptoms:   Chest discomfort. Most heart attacks involve discomfort in the center of the chest that lasts more than a few minutes, or that goes away and comes back. It can feel like uncomfortable pressure, squeezing, fullness, or pain.  Discomfort in other areas of the upper body. Symptoms can include pain or discomfort in one or both arms, the back, neck, jaw, or stomach.  Shortness of breath with or without chest discomfort.  Other signs may include breaking out in a cold sweat, nausea, or lightheadedness. Don't wait more than five minutes to call 911 - MINUTES MATTER! Fast action can save your life. Calling 911 is almost always the fastest way to get lifesaving treatment. Emergency Medical Services staff can begin treatment when they arrive -- up to an hour sooner than if someone gets to the hospital by car. The discharge information has been reviewed with the patient and caregiver. The patient and caregiver verbalized understanding. Discharge medications reviewed with the patient and caregiver and appropriate educational materials and side effects teaching were provided. Patient armband removed and shredded.

## 2019-06-05 NOTE — ED NOTES
Pure wick placed to try to obtain urine sample, pt attempted bedpan and unable to void, pt refusing to have straight cath place at this time.

## 2019-06-05 NOTE — H&P
History and Physical    Patient: Sayra Araya               Sex: female          DOA: 6/5/2019       YOB: 1927      Age:  80 y.o.        LOS:  LOS: 0 days        CHIEF COMPLAINT: Dizziness      HPI:     Sayra Araya is a 80 y.o. female as below who presents to ER from assisted living due to syncopal episode which occurred at home after discharging today from the hospital following admission for CHF exacerbation 6/3-6/5 treated with IV bumex. Apparently pt was given phenergan earlier while at assisted living and subsequently pt became drowsy and had syncopal episode when getting up to the toilet. She was also given metoprolol which is a new medication for this patient. She was evaluated in the ED. She had a head CT which is negative for acute intracranial process. Labs notable for leukocytosis. NT-proBNP elevated but lower than previous. No source of infection (UA unremarkable, CT negative for pneumonia). It was thought her syncopal episode could be 2/2 medications, or over-diuresis, she was given small fluid bolus and she was going to be discharged back to assisted living, however, she was noted to have hypoxia. At rest she would have pulse ox in the 84-87%, which improved to 95-99% with 2L. She did not c/o SOB. Per ED physician, with ambulation while in ED, she had SpO2 77-90% on average 85-87%, she did not have SOB but was unsteady on her feet. CT chest did show findings of interstitial edema.      She was placed under observation for hypoxia      Past Medical History:   Diagnosis Date    A-fib [facility]) 01/2017    Arthritis, degenerative     CAD (coronary artery disease) 04/2012    S/P 2.75 X 15 mm BMS of OM (04/2012), Two LAD BMS (07/2012)    Elevated liver enzymes     Likely from statin    HLD (hyperlipidemia)     Hyperkalemia 06/2012    Likely from lisinopril    Ischemic cardiomyopathy     LVEF  45% (11/2012) & 30% echo (04/2012)    NSTEMI (non-ST elevated myocardial infarction) ([address] 04/2012    UTI (urinary tract infection)     Vertigo        Social History:  Social History     Socioeconomic History    Marital status:      Spouse name: Not on file    Number of children: Not on file    Years of education: Not on file    Highest education level: Not on file   Tobacco Use    Smoking status: Never Smoker    Smokeless tobacco: Never Used   Substance and Sexual Activity    Alcohol use: No    Drug use: No    Sexual activity: Never       Family History:  Family History   Problem Relation Age of Onset    Diabetes Mother        Review of Systems  Review of Systems   Constitutional: Negative for chills and fever. HENT: Negative for congestion and hearing loss. Eyes: Negative for blurred vision and double vision. Respiratory: Negative for cough and shortness of breath. Cardiovascular: Negative for chest pain and palpitations. Gastrointestinal: Negative for abdominal pain, nausea and vomiting. Genitourinary: Negative for dysuria and hematuria. Musculoskeletal: Negative for falls and myalgias. Skin: Negative for rash. Neurological: Negative for dizziness and focal weakness. Psychiatric/Behavioral: Negative. Objective:      Visit Vitals  BP (!) 105/91   Pulse 85   Temp 98.5 °F (36.9 °C)   Resp 18   Ht 5' 2\" (1.575 m)   Wt 49.9 kg (110 lb)   SpO2 100%   BMI 20.12 kg/m²       Physical Exam:  Ears: hearing is intact  Eyes: Icterus is not present  Lungs: clear to auscultation bilaterally  Heart: irregularly irregular rhythm  Gastrointestinal: soft, non-tender.  Bowel sounds normal. No masses,  no organomegaly  Neurological:  New Focal Deficits are not present  Psychiatric:  Mood is stable    Laboratory Studies:  CMP:   Lab Results   Component Value Date/Time     (L) 06/05/2019 01:10 PM    K 4.0 06/05/2019 01:10 PM    CL 90 (L) 06/05/2019 01:10 PM    CO2 32 06/05/2019 01:10 PM    AGAP 8 06/05/2019 01:10 PM     (H) 06/05/2019 01:10 PM    BUN 24 (H) 06/05/2019 01:10 PM    CREA 1.53 (H) 06/05/2019 01:10 PM    GFRAA 39 (L) 06/05/2019 01:10 PM    GFRNA 32 (L) 06/05/2019 01:10 PM    CA 8.2 (L) 06/05/2019 01:10 PM     CBC:   Lab Results   Component Value Date/Time    WBC 16.6 (H) 06/05/2019 01:10 PM    HGB 11.0 (L) 06/05/2019 01:10 PM    HCT 35.8 06/05/2019 01:10 PM     06/05/2019 01:10 PM       Imaging:  CT CHEST WO CONT   Final Result   IMPRESSION:      1. Cardiomegaly with findings of interstitial edema and small effusions likely   sequela of CHF/fluid overload. 2. Pulmonary nodules measuring up to 4-5 mm with irregular opacities right   greater than left lung. Diagnostic considerations may include unresolved   edema/nonspecific infectious or inflammatory alveolitis. No pulmonary   consolidation. XR CHEST PORT   Final Result   IMPRESSION:      1. Stable exam. Small left greater than right pleural effusions and adjacent   basilar patchy reticular opacities, persistent atelectasis and/or pneumonia. CT HEAD WO CONT   Final Result   IMPRESSION:      1. No CT findings of an acute intracranial abnormality. Please note that   noncontrast head CT may be normal in early acute infarct. 2. Mild amount of chronic white matter changes. Assessment:     Active Problems:    Ischemic cardiomyopathy ()      Chronic a-fib ([facility] (5/2/2017)      Acute on chronic systolic (congestive) heart failure (HCC) (6/3/2019)      Syncope (6/5/2019)      Hypoxia (6/5/2019)        PLAN:  · Cont 2L supplemental O2 and continuous pulse oximetry  · Walk test to qualify for home O2, CM aware   · Syncope - monitor on telemetry, already obtained echo which shows LVEF 31-35%. CT Head unremarkable. Suspect orthostatic as occurred with standing, could be 2/2 meds (phenergan, metoprolol, over diuresis).    · Check orthostatic vital signs  · ADHF - CT chest shows interstitial edema, cont PO bumex, resume metoprolol at lower dose   · Monitor WBC count - currently no sign of infection  · DNR code status per ADV  · Cont acceptable home medications for chronic conditions  · DVT protocol    I have personally reviewed all pertinent labs and films that have officially resulted over the last 24 hours. I have personally checked for all pending labs that are awaiting final results.       Signed By: Mackenzie Santos MD     June 5, 2019

## 2019-06-06 LAB
ANION GAP SERPL CALC-SCNC: 7 MMOL/L (ref 3–18)
BASOPHILS # BLD: 0 K/UL (ref 0–0.1)
BASOPHILS NFR BLD: 0 % (ref 0–3)
BUN SERPL-MCNC: 26 MG/DL (ref 7–18)
BUN/CREAT SERPL: 17 (ref 12–20)
CALCIUM SERPL-MCNC: 7.5 MG/DL (ref 8.5–10.1)
CHLORIDE SERPL-SCNC: 89 MMOL/L (ref 100–108)
CO2 SERPL-SCNC: 33 MMOL/L (ref 21–32)
CREAT SERPL-MCNC: 1.51 MG/DL (ref 0.6–1.3)
DIFFERENTIAL METHOD BLD: ABNORMAL
EOSINOPHIL # BLD: 0 K/UL (ref 0–0.4)
EOSINOPHIL NFR BLD: 0 % (ref 0–5)
ERYTHROCYTE [DISTWIDTH] IN BLOOD BY AUTOMATED COUNT: 17.2 % (ref 11.6–14.5)
GLUCOSE SERPL-MCNC: 81 MG/DL (ref 74–99)
HCT VFR BLD AUTO: 32.5 % (ref 35–45)
HGB BLD-MCNC: 10.3 G/DL (ref 12–16)
LYMPHOCYTES # BLD: 1.7 K/UL (ref 0.8–3.5)
LYMPHOCYTES NFR BLD: 16 % (ref 20–51)
MAGNESIUM SERPL-MCNC: 2 MG/DL (ref 1.6–2.6)
MCH RBC QN AUTO: 26.4 PG (ref 24–34)
MCHC RBC AUTO-ENTMCNC: 31.7 G/DL (ref 31–37)
MCV RBC AUTO: 83.3 FL (ref 74–97)
MONOCYTES # BLD: 0.6 K/UL (ref 0–1)
MONOCYTES NFR BLD: 6 % (ref 2–9)
NEUTS SEG # BLD: 8.3 K/UL (ref 1.8–8)
NEUTS SEG NFR BLD: 78 % (ref 42–75)
PHOSPHATE SERPL-MCNC: 5.8 MG/DL (ref 2.5–4.9)
PLATELET # BLD AUTO: 329 K/UL (ref 135–420)
PLATELET COMMENTS,PCOM: ABNORMAL
PMV BLD AUTO: 8.4 FL (ref 9.2–11.8)
POTASSIUM SERPL-SCNC: 4.8 MMOL/L (ref 3.5–5.5)
RBC # BLD AUTO: 3.9 M/UL (ref 4.2–5.3)
RBC MORPH BLD: ABNORMAL
RBC MORPH BLD: ABNORMAL
SODIUM SERPL-SCNC: 129 MMOL/L (ref 136–145)
WBC # BLD AUTO: 10.6 K/UL (ref 4.6–13.2)

## 2019-06-06 PROCEDURE — 36415 COLL VENOUS BLD VENIPUNCTURE: CPT

## 2019-06-06 PROCEDURE — 74011250637 HC RX REV CODE- 250/637: Performed by: HOSPITALIST

## 2019-06-06 PROCEDURE — 83735 ASSAY OF MAGNESIUM: CPT

## 2019-06-06 PROCEDURE — 77030037877 HC DRSG MEPILEX >48IN BORD MOLN -A

## 2019-06-06 PROCEDURE — 80048 BASIC METABOLIC PNL TOTAL CA: CPT

## 2019-06-06 PROCEDURE — 74011250636 HC RX REV CODE- 250/636: Performed by: HOSPITALIST

## 2019-06-06 PROCEDURE — 96372 THER/PROPH/DIAG INJ SC/IM: CPT

## 2019-06-06 PROCEDURE — 77010033678 HC OXYGEN DAILY

## 2019-06-06 PROCEDURE — 99218 HC RM OBSERVATION: CPT

## 2019-06-06 PROCEDURE — 85025 COMPLETE CBC W/AUTO DIFF WBC: CPT

## 2019-06-06 PROCEDURE — 84100 ASSAY OF PHOSPHORUS: CPT

## 2019-06-06 RX ORDER — FLUOXETINE HYDROCHLORIDE 20 MG/1
20 CAPSULE ORAL DAILY
Status: DISCONTINUED | OUTPATIENT
Start: 2019-06-07 | End: 2019-06-07 | Stop reason: HOSPADM

## 2019-06-06 RX ORDER — FLUOXETINE 10 MG/1
10 CAPSULE ORAL DAILY
Status: DISCONTINUED | OUTPATIENT
Start: 2019-06-06 | End: 2019-06-06

## 2019-06-06 RX ADMIN — ATORVASTATIN CALCIUM 20 MG: 20 TABLET, FILM COATED ORAL at 21:38

## 2019-06-06 RX ADMIN — ENOXAPARIN SODIUM 30 MG: 100 INJECTION SUBCUTANEOUS at 09:17

## 2019-06-06 RX ADMIN — DIGOXIN 0.12 MG: 125 TABLET ORAL at 09:19

## 2019-06-06 RX ADMIN — MIRTAZAPINE 15 MG: 15 TABLET, FILM COATED ORAL at 21:38

## 2019-06-06 RX ADMIN — ACETAMINOPHEN 650 MG: 325 TABLET, FILM COATED ORAL at 09:20

## 2019-06-06 RX ADMIN — ACETAMINOPHEN 650 MG: 325 TABLET, FILM COATED ORAL at 21:38

## 2019-06-06 RX ADMIN — ASPIRIN 81 MG 81 MG: 81 TABLET ORAL at 09:19

## 2019-06-06 RX ADMIN — CLOPIDOGREL BISULFATE 75 MG: 75 TABLET ORAL at 09:19

## 2019-06-06 RX ADMIN — BUMETANIDE 1 MG: 1 TABLET ORAL at 09:23

## 2019-06-06 RX ADMIN — FLUOXETINE 10 MG: 10 CAPSULE ORAL at 10:36

## 2019-06-06 NOTE — PROGRESS NOTES
Home oxygen order and clinicals faxed to [address] Per Rodney Ross, pt is approved to have tank delivered. Spoke with Sonya at [facility] to inform her that pt has discharge orders for today, however, we are waiting on PT/OT eval and home oxygen to be delivered. She stated that pt son called her to say that pt is weak and unable to walk without assistance, unable to get to bathroom by herself. She stated that pt will not be allowed to return to facility today, and that she will need to visit pt tomorrow for assessment. Stated that pt lives on 3rd floor and they need her to be able to be independent. ENRICO Mckinney notified by Aneta Salmon that pt is unable to be discharged today.

## 2019-06-06 NOTE — PROGRESS NOTES
Problem: Discharge Planning  Goal: *Discharge to safe environment  Outcome: Resolved/Met   Plan: [address]

## 2019-06-06 NOTE — ROUTINE PROCESS
Pulse oximetry assessment 90% at rest on room air (if 88% or less, skip next steps) 84% while ambulating on room air 95% at rest on 2LPM 
 99% while ambulation on 2 LPM

## 2019-06-06 NOTE — PROGRESS NOTES
Problem: Discharge Planning  Goal: *Discharge to safe environment  Outcome: Resolved/Met   Plan: [address]      Reason for Admission:   Hypoxia               RRAT Score:     21             Resources/supports as identified by patient/family:   Lives at assisted living facility, son Laisha Brewer and daughter in law Erin Benton are local and involved. Top Challenges facing patient (as identified by patient/family and CM): Finances/Medication cost?    No concerns voiced. Has Medicare parts a & b and [employer]? correction or family provide              Support system or lack thereof? As above                     Living arrangements? correction, single room               Self-care/ADLs/Cognition? Current Advanced Directive/Advance Care Plan: On file  Plan for utilizing home health:                        Home oxygen ordered for pt  Likelihood of readmission: high/red                 Transition of Care Plan:                Interviewed pt with son Laisha Brewer and daughter in law Erin Benton at bedside with pt permission. Pt states that she lives at [address] [place] [address] assisted living in a single room. Reports that she is independent with ADL's at the facility. Son reports that at baseline she is very mobile and uses no DME. Ambulates to dining room, etc without assistance. She is able to obtain needed medications from the pharmacy without issue. She currently sees LEN Madrid at the facility for her health care needs. Her son will provide transportation at time of discharge. Pt does not wish to have anyone notified of admission      Patient has designated __son______________________ to participate in his/her discharge plan and to receive any needed information.      Name: Carole Torres  Address:  Phone [phone]    Care Management Interventions  PCP Verified by CM: Yes(Sees LEN Madrid at [facility])  Last Visit to PCP: 06/05/19  Mode of Transport at Discharge: Self  Transition of Care Consult (CM Consult): Discharge Planning  Physical Therapy Consult: No  Occupational Therapy Consult: No  Speech Therapy Consult: No  Current Support Network: Assisted Living  Confirm Follow Up Transport: Family  Plan discussed with Pt/Family/Caregiver: Yes  Discharge Location  Discharge Placement: Assisted Living    Patient and/or next of kin has been given and has signed the [facility] Outpatient Observation  Notification letter and all questions answered. Copy of this notice given to patient and copy placed on chart. Patient and/or next of kin has been given the Outpatient Observation Information and Notification letter and all questions answered.

## 2019-06-06 NOTE — PROGRESS NOTES
Late entry 06/04/19 [facility] verbally reviewed and signed for [facility]. Home Health List  was given to patient and copy to chart. It was erroneously documented as snf list.     Ashish Reed.  SABINA Logan  [facility]  [phone], Pager [phone]

## 2019-06-06 NOTE — ROUTINE PROCESS
Bedside and Verbal shift change report given to Troy (oncoming nurse) by Carmita Hooks RN 
 (offgoing nurse). Report included the following information SBAR, Kardex, MAR and Recent Results.

## 2019-06-06 NOTE — PROGRESS NOTES
Pt advocate returned call and this CM informed her of family's complaint. She will be coming to see family now.

## 2019-06-06 NOTE — PROGRESS NOTES
conducted an initial consultation and Spiritual Assessment for [address] [facility] [address], who is a 80 y.o.,female. Patients Primary Language is: [place]. According to the patients EMR Spiritism Affiliation is: Tai Mead.     The reason the Patient came to the hospital is:   Patient Active Problem List    Diagnosis Date Noted    Syncope 06/05/2019    Hypoxia 06/05/2019    Leukocytosis 06/05/2019    Acute on chronic systolic (congestive) heart failure ([facility] [address] 06/03/2019    Hyperkalemia 10/23/2018    Acute metabolic encephalopathy 71/31/6221    Hyponatremia 10/20/2018    Chronic fatigue 10/09/2018    Irritable bowel syndrome with diarrhea 09/24/2018    Chronic a-fib ([facility] 05/02/2017    CAD (coronary artery disease)     Ischemic cardiomyopathy     Arthritis, degenerative     Vertigo     HLD (hyperlipidemia)     SOB (shortness of breath) 04/27/2012    NSTEMI (non-ST elevated myocardial infarction) ([facility] 04/01/2012        The  provided the following Interventions:  Initiated a relationship of care and support. Explored issues of sandra, spirituality and/or Latter day needs while hospitalized. Listened empathically. Provided chaplaincy education. Provided information about Spiritual Care Services. Offered prayer and assurance of continued prayers on patient's behalf. Chart reviewed. The following outcomes were achieved:  Patient shared some information about their medical narrative and spiritual journey/beliefs. Patient processed feeling about current hospitalization. Patient expressed gratitude for the 's visit. Assessment:  Patient did not indicate any spiritual or Latter day issues which require Spiritual Care Services interventions at this time. Patient does not have any Latter day/cultural needs that will affect patients preferences in health care. Plan:  Chaplains will continue to follow and will provide pastoral care on an as needed or requested basis.    recommends bedside caregivers page  on duty if patient shows signs of acute spiritual or emotional distress.     [address] [facility]   Staff [address] [facility]   [phone]

## 2019-06-06 NOTE — PROGRESS NOTES
Family (son Maxwell Quinonez, daughter in law Mu Gomez) mentioned to this CM that they would like pt to be able to go to rehab at discharge rather than back to [facility] due to her weakened state. Explained to them that pt is in observation status and as such, would not be covered by insurance to go to rehab as 3 overnight stay required. They state that they are upset with this because \"if it werent for the mistakes made here, she wouldn't be in this condition\". Mu Gomez went on to explain that pt was given Lipitor, \"even though her PCP took her off that 8 years ago and said she shouldn't be on it\". They also stated that she was given Lopressor and \"it dropped her blood pressure too low\". They report that the Lipitor had made pt nauseated, and that just prior to discharge pt was also given Phenergan and when she returned to [facility] she \"fell out on the floor\". Daughter in law stated \"we were just mary we were there to catch her, she could have \". This CM asked if family would like to speak with patient advocate, and they said they would. Placed call to advocate at [phone] and left message. Informed family that I have placed call and I would follow up with them.

## 2019-06-06 NOTE — PROGRESS NOTES
Problem: Falls - Risk of  Goal: *Absence of Falls  Description  Document Bishop King Fall Risk and appropriate interventions in the flowsheet.   Outcome: Progressing Towards Goal     Problem: Pain  Goal: *Control of Pain  Outcome: Progressing Towards Goal

## 2019-06-06 NOTE — PROGRESS NOTES
06/05/19 2200 06/05/19 2205 06/05/19 2210   Vital Signs   Temp 98.3 °F (36.8 °C)  --   --    Temp Source Oral  --   --    Pulse (Heart Rate) 89  --   --    Heart Rate Source Monitor  --   --    Resp Rate 16  --   --    O2 Sat (%) 92 %  --   --    Level of Consciousness Alert  --   --    BP 97/62  --   --    MAP (Calculated) 74  --   --    BP 1 Method Automatic  --   --    BP 1 Location Left arm  --   --    BP Patient Position Supine  --   --    MEWS Score 2  --   --    Additional Blood Pressure/Pulse Data   Pulse 2  --  83  --    BP 2  --  109/68  --    MAP 2 (Calculated)  --  82  --    BP 2 Location  --  Left arm  --    BP Method 2  --  Automatic  --    Patient Position 2  --  Sitting  --    Pulse 3  --   --  94   BP 3  --   --  101/64   MAP 3 (Calculated)  --   --  76   BP 3 Location  --   --  Left arm   BP Method 3  --   --  Automatic   Patient Position 3  --   --  Standing

## 2019-06-06 NOTE — DISCHARGE SUMMARY
2 [facility]  Hospitalist Division    Discharge Summary    Patient: Lety Hill MRN: [id number]  CSN: [id number]    YOB: 1927  Age: 80 y.o.   Sex: female    DOA: 6/5/2019 LOS:  LOS: 0 days   Discharge Date:      Admission Diagnoses: Hypoxia [R09.02]  Syncope [R55]    Discharge Diagnoses:    Problem List as of 6/6/2019 Date Reviewed: 1/28/2019          Codes Class Noted - Resolved    Syncope ICD-10-CM: R55  ICD-9-CM: 780.2  6/5/2019 - Present        Hypoxia ICD-10-CM: R09.02  ICD-9-CM: 799.02  6/5/2019 - Present        Leukocytosis ICD-10-CM: B75.406  ICD-9-CM: 288.60  6/5/2019 - Present        Acute on chronic systolic (congestive) heart failure ([facility] [address] ICD-10-CM: I50.23  ICD-9-CM: 428.23, 428.0  6/3/2019 - Present        Hyperkalemia ICD-10-CM: E87.5  ICD-9-CM: 276.7  10/23/2018 - Present        Acute metabolic encephalopathy RQV-23-BV: G93.41  ICD-9-CM: 348.31  10/21/2018 - Present        Hyponatremia ICD-10-CM: E87.1  ICD-9-CM: 276.1  10/20/2018 - Present        Chronic fatigue ICD-10-CM: R53.82  ICD-9-CM: 780.79  10/9/2018 - Present        Irritable bowel syndrome with diarrhea ICD-10-CM: K58.0  ICD-9-CM: 564.1  9/24/2018 - Present        Chronic a-fib ([facility] [address] ICD-10-CM: I48.2  ICD-9-CM: 427.31  5/2/2017 - Present        CAD (coronary artery disease) ICD-10-CM: I25.10  ICD-9-CM: 414.00  Unknown - Present    Overview Signed 5/3/2012  3:22 PM by Daryle Cole, MD     S/P 2.75 X 15 mm BMS of Obtuse marginal             Ischemic cardiomyopathy ICD-10-CM: I25.5  ICD-9-CM: 414.8  Unknown - Present        Arthritis, degenerative ICD-10-CM: M19.90  ICD-9-CM: 715.90  Unknown - Present        Vertigo ICD-10-CM: R42  ICD-9-CM: 780.4  Unknown - Present        HLD (hyperlipidemia) ICD-10-CM: E78.5  ICD-9-CM: 272.4  Unknown - Present        SOB (shortness of breath) ICD-10-CM: R06.02  ICD-9-CM: 786.05  4/27/2012 - Present        NSTEMI (non-ST elevated myocardial infarction) [facility]) ICD-10-CM: I21.4  ICD-9-CM: 410.70  2012 - Present              Discharge Condition: Stable    Discharge To: Home    Consults: None    Hospital Course: Brianna Hilliard is a 80 y.o. female as below who presents to ER from assisted living due to syncopal episode which occurred at home after discharging today from the hospital following admission for CHF exacerbation 6/3- treated with IV bumex. Apparently pt was given phenergan earlier while at assisted living and subsequently pt became drowsy and had syncopal episode when getting up to the toilet. She was also given metoprolol which is a new medication for this patient. She was evaluated in the ED. She had a head CT which is negative for acute intracranial process. Labs notable for leukocytosis. NT-proBNP elevated but lower than previous. No source of infection (UA unremarkable, CT negative for pneumonia). It was thought her syncopal episode could be 2/2 medications, or over-diuresis, she was given small fluid bolus and she was going to be discharged back to assisted living, however, she was noted to have hypoxia. At rest she would have pulse ox in the 84-87%, which improved to 95-99% with 2L. She did not c/o SOB. Per ED physician, with ambulation while in ED, she had SpO2 77-90% on average 85-87%, she did not have SOB but was unsteady on her feet. CT chest did show findings of interstitial edema.      She was placed under observation for hypoxia    Walk test done following mornin% at rest on RA, 84% while ambulating on RA, recovers to 95% on 2lpm. Home O2 ordered. Echo done on previous admission - EF 31-35%, grade 2 DD, diffuse hypokinesis. Orthostatic BP done: supine 97/62, sitting 109/68, standing 101/64. Occupational therapy recommended home health on 19 and physical therapy had NO discharge recommendations on 19. VSS for discharge.       Physical Exam:  General appearance: alert, cooperative, no distress, appears stated age  Head: Normocephalic, without obvious abnormality, atraumatic  Lungs: clear to auscultation bilaterally  Heart: regular rate and rhythm, S1, S2 normal, no murmur, click, rub or gallop  Extremities: no cyanosis or edema  Skin: Skin color, texture, turgor normal. No rashes or lesions  Neurologic: Grossly normal  PSY: Mood and affect normal, appropriately behaved    Significant Diagnostic Studies:     BMP:   Lab Results   Component Value Date/Time     (L) 06/06/2019 05:15 AM    K 4.8 06/06/2019 05:15 AM    CL 89 (L) 06/06/2019 05:15 AM    CO2 33 (H) 06/06/2019 05:15 AM    AGAP 7 06/06/2019 05:15 AM    GLU 81 06/06/2019 05:15 AM    BUN 26 (H) 06/06/2019 05:15 AM    CREA 1.51 (H) 06/06/2019 05:15 AM    GFRAA 39 (L) 06/06/2019 05:15 AM    GFRNA 32 (L) 06/06/2019 05:15 AM     CBC:   Lab Results   Component Value Date/Time    WBC 10.6 06/06/2019 05:15 AM    HGB 10.3 (L) 06/06/2019 05:15 AM    HCT 32.5 (L) 06/06/2019 05:15 AM     06/06/2019 05:15 AM       Ct Head Wo Cont    Result Date: 6/5/2019  EXAM: CT Head INDICATION: Syncope/fainting. COMPARISON: 7/17/2007. TECHNIQUE: Axial CT imaging of the head was performed without intravenous contrast. One or more dose reduction techniques were used on this CT: automated exposure control, adjustment of the mAs and/or kVp according to patient size, and iterative reconstruction techniques. The specific techniques used on this CT exam have been documented in the patient's electronic medical record. Digital Imaging and Communications in Medicine (DICOM) format image data are available to nonaffiliated external healthcare facilities or entities on a secure, media free, reciprocally searchable basis with patient authorization for at least a 12-month period after this study.  _______________ FINDINGS: BRAIN:   > Brain volume: Age appropriate.   > White matter: A mild amount of hypodense white matter lesions are seen within the periventricular and subcortical white matter which are nonspecific, but likely represent chronic small vessel changes. > Infarcts, encephalomalacia: None.   > Parenchymal mass: None.   > Parenchymal hemorrhage: None.   > Midline shift: None.   > Miscellaneous: None. EXTRA-AXIAL SPACES: Unremarkable. No fluid collections. CALVARIUM: Intact. SINUSES, MASTOIDS: Clear. OTHER EXTRACRANIAL: Unremarkable. _______________     IMPRESSION: 1. No CT findings of an acute intracranial abnormality. Please note that noncontrast head CT may be normal in early acute infarct. 2. Mild amount of chronic white matter changes. Ct Chest Wo Cont    Result Date: 6/5/2019  EXAM: CT Chest INDICATION: Syncopal episode, chest x-ray with persistent lower lung opacity, clinical concern for pneumonia. COMPARISON: No prior CT chest, chest x-ray from earlier same day and 6/3/2019. TECHNIQUE: Axial CT imaging from the thoracic inlet through the diaphragm without intravenous contrast. Multiplanar reformats were generated. One or more dose reduction techniques were used on this CT: automated exposure control, adjustment of the mAs and/or kVp according to patient size, and iterative reconstruction techniques. The specific techniques used on this CT exam have been documented in the patient's electronic medical record. Digital Imaging and Communications in Medicine (DICOM) format image data are available to nonaffiliated external healthcare facilities or entities on a secure, media free, reciprocally searchable basis with patient authorization for at least a 12-month period after this study. _______________ FINDINGS: LUNGS, PLEURA: Small left greater the right-sided pleural effusions with bibasilar dependent atelectasis, linear atelectatic changes linear segment left upper lobe. > Multiple right upper lobe and right lower lobe alveolar nodules and irregular opacities measuring 4-5 mm. There are a few peripheral left upper lobe irregular alveolar opacities.    > Diffuse bilateral upper and lower thoracic mosaic attenuation pattern. Right lower lobe thin-walled small pneumatocele is present. Thickened septal lines are present. AIRWAY: No filling defect. MEDIASTINUM: Cardiomegaly without pericardial effusion. Moderate coronary artery atherosclerotic calcifications. Enlarged main pulmonary artery measuring 3.5 cm, keeping with pulmonary arterial hypertension. Borderline prominent ascending thoracic aorta with mild hepatic changes transverse arch and descending segment. Air-fluid level in the upper thoracic esophagus suggestive of reflux. Oris Patterson LYMPH NODES: No enlarged lymph nodes. UPPER ABDOMEN: Unremarkable. OTHER: No acute or aggressive osseous abnormalities identified. _______________     IMPRESSION: 1. Cardiomegaly with findings of interstitial edema and small effusions likely sequela of CHF/fluid overload. 2. Pulmonary nodules measuring up to 4-5 mm with irregular opacities right greater than left lung. Diagnostic considerations may include unresolved edema/nonspecific infectious or inflammatory alveolitis. No pulmonary consolidation. Xr Chest Port    Result Date: 6/5/2019  EXAM: XR CHEST PORT CLINICAL INDICATION/HISTORY: syncope -Additional: None COMPARISON: 6/3/2019, 10/21/2018 TECHNIQUE: Frontal view of the chest _______________ FINDINGS: HEART AND MEDIASTINUM: Stable midline cardiac silhouette, within normal limits. Stable mediastinal contours. LUNGS AND PLEURAL SPACES: Small/tiny bilateral effusions, with adjacent basilar opacities /atelectasis similar prior exam. No developing consolidation or confluence. No pneumothorax. BONY THORAX AND SOFT TISSUES: No acute or destructive osseous abnormality. _______________     IMPRESSION: 1. Stable exam. Small left greater than right pleural effusions and adjacent basilar patchy reticular opacities, persistent atelectasis and/or pneumonia.     Xr Chest Port    Result Date: 6/3/2019  EXAM: CHEST RADIOGRAPH, SINGLE VIEW CLINICAL INDICATION/HISTORY: dyspnea, shortness of breath COMPARISON: 10/21/2018 TECHNIQUE: Portable frontal view of the chest was obtained. _______________ FINDINGS: SUPPORT DEVICES: None. HEART AND MEDIASTINUM: Cardiomediastinal silhouette appears within normal limits. Tortuous and atherosclerotic thoracic aorta. No central vascular congestion. LUNGS AND PLEURAL SPACES: Tiny left and right pleural effusions, each with adjacent basilar patchy opacity. Mild central bronchiectasis. No pneumothorax. BONY THORAX AND SOFT TISSUES: No acute osseous abnormality. _______________     IMPRESSION: Small left and right pleural effusions, each with adjacent basilar patchy opacity, atelectasis versus infiltrate. Discharge Medications:     Current Discharge Medication List      CONTINUE these medications which have NOT CHANGED    Details   atorvastatin (LIPITOR) 20 mg tablet Take 1 Tab by mouth nightly. Qty: 30 Tab, Refills: 0      polyethylene glycol (MIRALAX) 17 gram/dose powder Take 17 g by mouth daily as needed (constipation). vit C-E-zinc cit-lutein-zeaxan ([address]) 50 mg-15 unit- 4.5 mg-2.5 mg chew Take 1 Tab by mouth daily. Ocuvite gummy      acetaminophen (TYLENOL) 325 mg tablet Take 650 mg by mouth every four (4) hours as needed for Pain. carboxymethylcellulos/glycerin (CLEAR EYES FOR DRY EYES OP) Administer 1 Drop to both eyes three (3) times daily as needed (dry eyes). carbamide peroxide (MURINE EAR) 6.5 % otic solution Instill 5 drops in each ear twice daily for 4 days each month as needed for ear wax. loperamide (IMMODIUM) 2 mg tablet Take 1 mg by mouth three (3) times daily as needed for Diarrhea. FLUoxetine (PROZAC) 10 mg tablet Take 1 Tab by mouth daily. Qty: 90 Tab, Refills: 3      psyllium husk-aspartame (METAMUCIL FIBER) 3.4 gram pwpk packet Take 1 Packet by mouth daily. Qty: 30 Packet, Refills: 0      mirtazapine (REMERON) 15 mg tablet Take 1 Tab by mouth nightly.   Qty: 30 Tab, Refills: 0 fluticasone (FLONASE) 50 mcg/actuation nasal spray 2 Sprays by Both Nostrils route daily as needed for Rhinitis. Qty: 1 Bottle, Refills: 0      digoxin (LANOXIN) 0.125 mg tablet Take 1 Tab by mouth daily. Qty: 30 Tab, Refills: 0      aspirin 81 mg chewable tablet Take 1 Tab by mouth daily. Qty: 30 Tab, Refills: 0      clotrimazole (LOTRIMIN) 1 % topical cream Apply  to affected area two (2) times a day. Apply with barrier cream to affected area. Qty: 15 g, Refills: 1      clopidogrel (PLAVIX) 75 mg tab Take 1 Tab by mouth daily. Qty: 90 Tab, Refills: 3      nitroglycerin (NITROSTAT) 0.4 mg SL tablet 1 Tab by SubLINGual route every five (5) minutes as needed for Chest Pain. Qty: 25 Tab, Refills: 3      bumetanide (BUMEX) 1 mg tablet Take 1 Tab by mouth daily. Qty: 30 Tab, Refills: 0      ondansetron (ZOFRAN ODT) 4 mg disintegrating tablet Take 1 Tab by mouth every eight (8) hours as needed for Nausea.   Qty: 30 Tab, Refills: 11         STOP taking these medications       metoprolol tartrate (LOPRESSOR) 25 mg tablet Comments:   Reason for Stopping:         meclizine (ANTIVERT) 12.5 mg tablet Comments:   Reason for Stopping:               Activity: PT/OT per Home Health    Diet: Cardiac Diet    Wound Care: None needed    Follow-up: 1 week with PCP    Discharge time: >35 minutes    Helga Cisneros PA-C  [facility]  Office:  [phone]  Pager: [phone]      6/6/2019, 11:22 AM

## 2019-06-06 NOTE — PROGRESS NOTES
1924: Assumed pt care. Received pt resting in bed, pt is alert and oriented x 4. Denies any pain at this time, no signs of distress. Family at bedside. Bed on lowest position, wheels locked, call bell within reach. Pt's son-in-law Lon Cohn stated not to give the patient phenergan, lipitor nor lopressor as they believed that these medications caused her syncopal episode earlier, patient agreed to son-in-law. 6-6-19    5730: Bedside and Verbal shift change report given to Denis Waterman (oncoming nurse) by Meaghan Crum   (offgoing nurse). Report included the following information SBAR, Intake/Output, MAR and Recent Results.

## 2019-06-06 NOTE — PROGRESS NOTES
Nutrition initial assessment/  Plan of care      RECOMMENDATIONS:     1. Cardiac; 1.5 gm NA; 1200 ml FR  2. Ensure daily  3. Monitor weight, labs and PO intake  4. RD to follow     GOALS:     1. PO intake meets >75% of protein/calorie needs by 6/12  2. Weight Maintenance (+/- 1-2 lb by 6/12)    ASSESSMENT:     Weight status is classified as normal per BMI of 20.1. However, patient is at nutrition risk due to BMI below 23 with patient above 72years of age. PO intake is poor. Added Ensure daily for additional calories/protein. Labs noted. Patient with hyponatremia and hypocalcemia. Elevated BUN/Creatinine levels; GFR: 32. Elevated BNP (5284). Nutrition recommendations listed. RD to follow. Nutrition Diagnoses:   Inadequate oral intake related to poor appetite as evidenced by less than 25% intake of lunch meal.    Nutrition Risk:  [] High  [x] Moderate []  Low    SUBJECTIVE/OBJECTIVE:      Admitted with hypoxia and syncope and collapse. Patient reports having a fair appetite. Observed less than 25% intake of lunch meal during visit. Family reports patient has lost weight over the years but recently has been around 110 lb. Has food allergy to eggs. No problems with chewing/swallowing. Agreeable to trying Ensure with meals. Encouraged adequate intake. Will monitor. Information Obtained from:    [x] Chart Review   [x] Patient   [x] Family/Caregiver   [] Nurse/Physician   [] Interdisciplinary Meeting/Rounds    Diet: Cardiac; 1.5 gm NA; 1200 ml FR  Medications: [x] Reviewed    Allergies: [x] Reviewed (Eggs)  Encounter Diagnoses     ICD-10-CM ICD-9-CM   1. Hypoxia R09.02 799.02   2.  Syncope and collapse R55 780.2     Past Medical History:   Diagnosis Date    A-fib [facility]) 01/2017    Arthritis, degenerative     CAD (coronary artery disease) 04/2012    S/P 2.75 X 15 mm BMS of OM (04/2012), Two LAD BMS (07/2012)    Elevated liver enzymes     Likely from statin    HLD (hyperlipidemia)     Hyperkalemia 06/2012 Likely from lisinopril    Ischemic cardiomyopathy     LVEF  45% (11/2012) & 30% echo (04/2012)    NSTEMI (non-ST elevated myocardial infarction) ([facility] [address] 04/2012    UTI (urinary tract infection)     Vertigo       Labs:    Lab Results   Component Value Date/Time    Sodium 128 (L) 06/07/2019 05:15 AM    Potassium 4.5 06/07/2019 05:15 AM    Chloride 88 (L) 06/07/2019 05:15 AM    CO2 33 (H) 06/07/2019 05:15 AM    Anion gap 7 06/07/2019 05:15 AM    Glucose 70 (L) 06/07/2019 05:15 AM    BUN 29 (H) 06/07/2019 05:15 AM    Creatinine 1.52 (H) 06/07/2019 05:15 AM    Calcium 7.4 (L) 06/07/2019 05:15 AM    Magnesium 2.5 06/07/2019 05:15 AM    Phosphorus 4.6 06/07/2019 05:15 AM    Albumin 3.1 (L) 04/29/2019 07:45 AM     Anthropometrics: BMI (calculated): 20.1  Last 3 Recorded Weights in this Encounter    06/05/19 1258   Weight: 49.9 kg (110 lb)      Ht Readings from Last 1 Encounters:   06/05/19 5' 2\" (1.575 m)     Patient Vitals for the past 100 hrs:   % Diet Eaten   06/06/19 1830 30 %   06/06/19 1340 100 %   06/06/19 0917 0 %     IBW: 110 lb %IBW: 100%    Estimated Nutrition Needs: [x] MSJ   Calories: 1250 Kcal Based on:   [x] Actual BW    Protein:   60 g Based on:   [x] Actual BW    Fluid:       1500 ml Based on:   [x] Actual BW      [x] No Cultural, Orthodoxy or ethnic dietary need identified.     [] Cultural, Orthodoxy and ethnic food preferences identified and addressed     Wt Status:  [x] Normal (18.6 - 24.9) [] Underweight (<18.5) [] Overweight (25 - 29.9) [] Mild Obesity (30 - 34.9)  [] Moderate Obesity (35 - 39.9) [] Morbid Obesity (40+)     Nutrition Problems Identified:   [x] Suboptimal PO intake   [x] Food Allergies  [] Difficulty chewing/swallowing/poor dentition  [] Constipation/Diarrhea   [] Nausea/Vomiting   [] None  [] Other:     Plan:   [x] Therapeutic Diet  [x]  Obtained/adjusted food preferences/tolerances and/or snacks options   [x]  Supplements added   [] Occupational therapy following for feeding techniques  []  HS snack added   []  Modify diet texture   [x]  Modify diet for food allergies   []  Assist with menu selection   [x]  Monitor PO intake on meal rounds   [x]  Continue inpatient monitoring and intervention   [x]  Participated in discharge planning/Interdisciplinary rounds/Team meetings   []  Other:     Education Needs:   [] Not appropriate for teaching at this time due to:   [x] Identified and addressed    Nutrition Monitoring and Evaluation:  [x] Continue ongoing monitoring and intervention  [] Other    Ofilia Honor

## 2019-06-06 NOTE — DISCHARGE INSTRUCTIONS
DISCHARGE SUMMARY from Nurse    PATIENT INSTRUCTIONS:    After general anesthesia or intravenous sedation, for 24 hours or while taking prescription Narcotics:  · Limit your activities  · Do not drive and operate hazardous machinery  · Do not make important personal or business decisions  · Do  not drink alcoholic beverages  · If you have not urinated within 8 hours after discharge, please contact your surgeon on call. Report the following to your surgeon:  · Excessive pain, swelling, redness or odor of or around the surgical area  · Temperature over 100.5  · Nausea and vomiting lasting longer than 4 hours or if unable to take medications  · Any signs of decreased circulation or nerve impairment to extremity: change in color, persistent  numbness, tingling, coldness or increase pain  · Any questions    What to do at Home:  Recommended activity: Activity as tolerated and PT/OT per Home Health    If you experience any of the following symptoms increasing episodes of dizziness/lightheadedness/fainting, please follow up with primary care physician/emergency room. *  Please give a list of your current medications to your Primary Care Provider. *  Please update this list whenever your medications are discontinued, doses are      changed, or new medications (including over-the-counter products) are added. *  Please carry medication information at all times in case of emergency situations. These are general instructions for a healthy lifestyle:    No smoking/ No tobacco products/ Avoid exposure to second hand smoke  Surgeon General's Warning:  Quitting smoking now greatly reduces serious risk to your health.     Obesity, smoking, and sedentary lifestyle greatly increases your risk for illness    A healthy diet, regular physical exercise & weight monitoring are important for maintaining a healthy lifestyle    You may be retaining fluid if you have a history of heart failure or if you experience any of the following symptoms:  Weight gain of 3 pounds or more overnight or 5 pounds in a week, increased swelling in our hands or feet or shortness of breath while lying flat in bed. Please call your doctor as soon as you notice any of these symptoms; do not wait until your next office visit. Recognize signs and symptoms of STROKE:    F-face looks uneven    A-arms unable to move or move unevenly    S-speech slurred or non-existent    T-time-call 911 as soon as signs and symptoms begin-DO NOT go       Back to bed or wait to see if you get better-TIME IS BRAIN. Warning Signs of HEART ATTACK     Call 911 if you have these symptoms:   Chest discomfort. Most heart attacks involve discomfort in the center of the chest that lasts more than a few minutes, or that goes away and comes back. It can feel like uncomfortable pressure, squeezing, fullness, or pain.  Discomfort in other areas of the upper body. Symptoms can include pain or discomfort in one or both arms, the back, neck, jaw, or stomach.  Shortness of breath with or without chest discomfort.  Other signs may include breaking out in a cold sweat, nausea, or lightheadedness. Don't wait more than five minutes to call 911 - MINUTES MATTER! Fast action can save your life. Calling 911 is almost always the fastest way to get lifesaving treatment. Emergency Medical Services staff can begin treatment when they arrive -- up to an hour sooner than if someone gets to the hospital by car. The discharge information has been reviewed with the patient and caregiver. The patient and caregiver verbalized understanding. Discharge medications reviewed with the patient and caregiver and appropriate educational materials and side effects teaching were provided.     Patient armband removed and shredded

## 2019-06-06 NOTE — PROGRESS NOTES
Patient received in bed awake. Patient alert and oriented X4, denies pain and discomfort. Patient resting quietly. Frequent use items within reach. Bed locked in low position. Call bell within reach and patient verbalized understanding of use for assistance and needs. Dual skin assessment conducted with Margarita Wilcox RN. Patient 2 stage 2 pressure ulcers on the right buttocks. Mepilex was applied. 1416: Reassessed patient. Patient is weak and dizzy. Earlier when doing the Oxygen Challenge patient was visibly weak, and I had to assist patient while she walked. 1527:  Spoke with son about Prozac dose and verified the dose is 20 mg not 10 mg per day. Son also said that the 20 mg can start tomorrow. Informed Paul in the pharmacy of the same. 1820:  Place wedge under right buttocks to alleviate pressure on her right buttocks.

## 2019-06-07 ENCOUNTER — HOME CARE VISIT (OUTPATIENT)
Dept: HOME HEALTH SERVICES | Facility: HOME HEALTH | Age: 84
End: 2019-06-07

## 2019-06-07 VITALS
HEIGHT: 62 IN | HEART RATE: 81 BPM | OXYGEN SATURATION: 95 % | TEMPERATURE: 98.1 F | BODY MASS INDEX: 20.24 KG/M2 | DIASTOLIC BLOOD PRESSURE: 65 MMHG | SYSTOLIC BLOOD PRESSURE: 127 MMHG | RESPIRATION RATE: 20 BRPM | WEIGHT: 110 LBS

## 2019-06-07 LAB
ANION GAP SERPL CALC-SCNC: 7 MMOL/L (ref 3–18)
BASOPHILS # BLD: 0 K/UL (ref 0–0.1)
BASOPHILS NFR BLD: 0 % (ref 0–2)
BUN SERPL-MCNC: 29 MG/DL (ref 7–18)
BUN/CREAT SERPL: 19 (ref 12–20)
CALCIUM SERPL-MCNC: 7.4 MG/DL (ref 8.5–10.1)
CHLORIDE SERPL-SCNC: 88 MMOL/L (ref 100–108)
CO2 SERPL-SCNC: 33 MMOL/L (ref 21–32)
CREAT SERPL-MCNC: 1.52 MG/DL (ref 0.6–1.3)
DIFFERENTIAL METHOD BLD: ABNORMAL
EOSINOPHIL # BLD: 0.8 K/UL (ref 0–0.4)
EOSINOPHIL NFR BLD: 9 % (ref 0–5)
ERYTHROCYTE [DISTWIDTH] IN BLOOD BY AUTOMATED COUNT: 17.1 % (ref 11.6–14.5)
GLUCOSE SERPL-MCNC: 70 MG/DL (ref 74–99)
HCT VFR BLD AUTO: 31.8 % (ref 35–45)
HGB BLD-MCNC: 9.7 G/DL (ref 12–16)
LYMPHOCYTES # BLD: 0.9 K/UL (ref 0.9–3.6)
LYMPHOCYTES NFR BLD: 11 % (ref 21–52)
MAGNESIUM SERPL-MCNC: 2.5 MG/DL (ref 1.6–2.6)
MCH RBC QN AUTO: 26.1 PG (ref 24–34)
MCHC RBC AUTO-ENTMCNC: 30.5 G/DL (ref 31–37)
MCV RBC AUTO: 85.7 FL (ref 74–97)
MONOCYTES # BLD: 1.5 K/UL (ref 0.05–1.2)
MONOCYTES NFR BLD: 19 % (ref 3–10)
NEUTS SEG # BLD: 5 K/UL (ref 1.8–8)
NEUTS SEG NFR BLD: 61 % (ref 40–73)
PHOSPHATE SERPL-MCNC: 4.6 MG/DL (ref 2.5–4.9)
PLATELET # BLD AUTO: 275 K/UL (ref 135–420)
PMV BLD AUTO: 8.2 FL (ref 9.2–11.8)
POTASSIUM SERPL-SCNC: 4.5 MMOL/L (ref 3.5–5.5)
RBC # BLD AUTO: 3.71 M/UL (ref 4.2–5.3)
SODIUM SERPL-SCNC: 128 MMOL/L (ref 136–145)
WBC # BLD AUTO: 8.2 K/UL (ref 4.6–13.2)

## 2019-06-07 PROCEDURE — 97112 NEUROMUSCULAR REEDUCATION: CPT

## 2019-06-07 PROCEDURE — 84100 ASSAY OF PHOSPHORUS: CPT

## 2019-06-07 PROCEDURE — 74011250637 HC RX REV CODE- 250/637: Performed by: HOSPITALIST

## 2019-06-07 PROCEDURE — 97162 PT EVAL MOD COMPLEX 30 MIN: CPT

## 2019-06-07 PROCEDURE — 36415 COLL VENOUS BLD VENIPUNCTURE: CPT

## 2019-06-07 PROCEDURE — 99218 HC RM OBSERVATION: CPT

## 2019-06-07 PROCEDURE — 83735 ASSAY OF MAGNESIUM: CPT

## 2019-06-07 PROCEDURE — 80048 BASIC METABOLIC PNL TOTAL CA: CPT

## 2019-06-07 PROCEDURE — 97116 GAIT TRAINING THERAPY: CPT

## 2019-06-07 PROCEDURE — 85025 COMPLETE CBC W/AUTO DIFF WBC: CPT

## 2019-06-07 PROCEDURE — 74011250636 HC RX REV CODE- 250/636: Performed by: HOSPITALIST

## 2019-06-07 PROCEDURE — 96372 THER/PROPH/DIAG INJ SC/IM: CPT

## 2019-06-07 RX ORDER — BUMETANIDE 0.5 MG/1
0.5 TABLET ORAL DAILY
Qty: 15 TAB | Refills: 0 | Status: ON HOLD | OUTPATIENT
Start: 2019-06-07 | End: 2019-10-09 | Stop reason: SDUPTHER

## 2019-06-07 RX ORDER — BUMETANIDE 1 MG/1
0.5 TABLET ORAL DAILY
Status: DISCONTINUED | OUTPATIENT
Start: 2019-06-07 | End: 2019-06-07 | Stop reason: HOSPADM

## 2019-06-07 RX ORDER — DIGOXIN 125 MCG
0.12 TABLET ORAL
Status: DISCONTINUED | OUTPATIENT
Start: 2019-06-09 | End: 2019-06-07 | Stop reason: HOSPADM

## 2019-06-07 RX ORDER — HEPARIN SODIUM 5000 [USP'U]/ML
5000 INJECTION, SOLUTION INTRAVENOUS; SUBCUTANEOUS EVERY 12 HOURS
Status: DISCONTINUED | OUTPATIENT
Start: 2019-06-08 | End: 2019-06-07 | Stop reason: HOSPADM

## 2019-06-07 RX ADMIN — CLOPIDOGREL BISULFATE 75 MG: 75 TABLET ORAL at 08:31

## 2019-06-07 RX ADMIN — ENOXAPARIN SODIUM 30 MG: 100 INJECTION SUBCUTANEOUS at 08:32

## 2019-06-07 RX ADMIN — ASPIRIN 81 MG 81 MG: 81 TABLET ORAL at 08:31

## 2019-06-07 RX ADMIN — FLUOXETINE 20 MG: 20 CAPSULE ORAL at 08:32

## 2019-06-07 RX ADMIN — DIGOXIN 0.12 MG: 125 TABLET ORAL at 08:32

## 2019-06-07 RX ADMIN — BUMETANIDE 0.5 MG: 1 TABLET ORAL at 08:32

## 2019-06-07 RX ADMIN — PSYLLIUM HUSK 1 PACKET: 3.4 POWDER ORAL at 08:31

## 2019-06-07 NOTE — PROGRESS NOTES
Bedside shift change report given to this RN (oncoming nurse) by Cinthia RN (offgoing nurse). Report included the following information SBAR, Kardex and Cardiac Rhythm A-fib.

## 2019-06-07 NOTE — DISCHARGE SUMMARY
2 [facility]  Hospitalist Division    Discharge Summary    Patient: Essie Theodore MRN: [id number]  CSN: [id number]    YOB: 1927  Age: 80 y.o.   Sex: female    DOA: 6/5/2019 LOS:  LOS: 0 days   Discharge Date:      Admission Diagnoses: Hypoxia [R09.02]  Syncope [R55]    Discharge Diagnoses:    Problem List as of 6/7/2019 Date Reviewed: 1/28/2019          Codes Class Noted - Resolved    Syncope ICD-10-CM: R55  ICD-9-CM: 780.2  6/5/2019 - Present        Hypoxia ICD-10-CM: R09.02  ICD-9-CM: 799.02  6/5/2019 - Present        Leukocytosis ICD-10-CM: X99.718  ICD-9-CM: 288.60  6/5/2019 - Present        Acute on chronic systolic (congestive) heart failure ([facility] [address] ICD-10-CM: I50.23  ICD-9-CM: 428.23, 428.0  6/3/2019 - Present        Hyperkalemia ICD-10-CM: E87.5  ICD-9-CM: 276.7  10/23/2018 - Present        Acute metabolic encephalopathy H-44-GB: G93.41  ICD-9-CM: 348.31  10/21/2018 - Present        Hyponatremia ICD-10-CM: E87.1  ICD-9-CM: 276.1  10/20/2018 - Present        Chronic fatigue ICD-10-CM: R53.82  ICD-9-CM: 780.79  10/9/2018 - Present        Irritable bowel syndrome with diarrhea ICD-10-CM: K58.0  ICD-9-CM: 564.1  9/24/2018 - Present        Chronic a-fib ([facility] [address] ICD-10-CM: I48.2  ICD-9-CM: 427.31  5/2/2017 - Present        CAD (coronary artery disease) ICD-10-CM: I25.10  ICD-9-CM: 414.00  Unknown - Present    Overview Signed 5/3/2012  3:22 PM by Marilin yVas MD     S/P 2.75 X 15 mm BMS of Obtuse marginal             Ischemic cardiomyopathy ICD-10-CM: I25.5  ICD-9-CM: 414.8  Unknown - Present        Arthritis, degenerative ICD-10-CM: M19.90  ICD-9-CM: 715.90  Unknown - Present        Vertigo ICD-10-CM: R42  ICD-9-CM: 780.4  Unknown - Present        HLD (hyperlipidemia) ICD-10-CM: E78.5  ICD-9-CM: 272.4  Unknown - Present        SOB (shortness of breath) ICD-10-CM: R06.02  ICD-9-CM: 786.05  4/27/2012 - Present        NSTEMI (non-ST elevated myocardial infarction) [facility]) ICD-10-CM: I21.4  ICD-9-CM: 410.70  2012 - Present              Discharge Condition: Stable    Discharge To: Home    Consults: None    Hospital Course:     Deepti Quiles is a 80 y. o. female as below who presents to ER from assisted living due to syncopal episode which occurred at home after discharging today from the hospital following admission for CHF exacerbation 6/3- treated with IV bumex.  Apparently pt was given phenergan earlier while at assisted living and subsequently pt became drowsy and had syncopal episode when getting up to the toilet. She was also given metoprolol which is a new medication for this patient. She was evaluated in the ED. She had a head CT which is negative for acute intracranial process.  Labs notable for leukocytosis. NT-proBNP elevated but lower than previous. No source of infection (UA unremarkable, CT negative for pneumonia). It was thought her syncopal episode could be 2/2 medications, or over-diuresis, she was given small fluid bolus and she was going to be discharged back to assisted living, however, she was noted to have hypoxia. At rest she would have pulse ox in the 84-87%, which improved to 95-99% with 2L. She did not c/o SOB. Per ED physician, with ambulation while in ED, she had SpO2 77-90% on average 85-87%, she did not have SOB but was unsteady on her feet.  CT chest did show findings of interstitial edema.      She was placed under observation for hypoxia     Walk test done following mornin% at rest on RA, 84% while ambulating on RA, recovers to 95% on 2lpm. Home O2 ordered. Echo done on previous admission - EF 31-35%, grade 2 DD, diffuse hypokinesis. Orthostatic BP done: supine 97/62, sitting 109/68, standing 101/64. Occupational therapy recommended home health on 19 and physical therapy recommends HH with a rolling walker. VSS. Ready for discharge.     Physical Exam:  General appearance: alert, cooperative, no distress, appears stated age  Head: Normocephalic, without obvious abnormality, atraumatic  Lungs: clear to auscultation bilaterally  Heart: regular rate and rhythm, S1, S2 normal, no murmur, click, rub or gallop  Abdomen: soft, non-tender. Bowel sounds normal. No masses,  no organomegaly  Extremities: extremities normal, atraumatic, no cyanosis or edema  Skin: Skin color, texture, turgor normal. No rashes or lesions  Neurologic: Grossly normal  PSY: Mood and affect normal, appropriately behaved    Significant Diagnostic Studies: All lab results for the last 24 hours reviewed. No results found. Discharge Medications:     Current Discharge Medication List      CONTINUE these medications which have CHANGED    Details   bumetanide (BUMEX) 0.5 mg tablet Take 1 Tab by mouth daily. Qty: 15 Tab, Refills: 0         CONTINUE these medications which have NOT CHANGED    Details   atorvastatin (LIPITOR) 20 mg tablet Take 1 Tab by mouth nightly. Qty: 30 Tab, Refills: 0      polyethylene glycol (MIRALAX) 17 gram/dose powder Take 17 g by mouth daily as needed (constipation). vit C-E-zinc cit-lutein-zeaxan ([address]) 50 mg-15 unit- 4.5 mg-2.5 mg chew Take 1 Tab by mouth daily. Ocuvite gummy      acetaminophen (TYLENOL) 325 mg tablet Take 650 mg by mouth every four (4) hours as needed for Pain. carboxymethylcellulos/glycerin (CLEAR EYES FOR DRY EYES OP) Administer 1 Drop to both eyes three (3) times daily as needed (dry eyes). carbamide peroxide (MURINE EAR) 6.5 % otic solution Instill 5 drops in each ear twice daily for 4 days each month as needed for ear wax. loperamide (IMMODIUM) 2 mg tablet Take 1 mg by mouth three (3) times daily as needed for Diarrhea. FLUoxetine (PROZAC) 10 mg tablet Take 1 Tab by mouth daily. Qty: 90 Tab, Refills: 3      psyllium husk-aspartame (METAMUCIL FIBER) 3.4 gram pwpk packet Take 1 Packet by mouth daily.   Qty: 30 Packet, Refills: 0      mirtazapine (REMERON) 15 mg tablet Take 1 Tab by mouth nightly. Qty: 30 Tab, Refills: 0      fluticasone (FLONASE) 50 mcg/actuation nasal spray 2 Sprays by Both Nostrils route daily as needed for Rhinitis. Qty: 1 Bottle, Refills: 0      digoxin (LANOXIN) 0.125 mg tablet Take 1 Tab by mouth daily. Qty: 30 Tab, Refills: 0      aspirin 81 mg chewable tablet Take 1 Tab by mouth daily. Qty: 30 Tab, Refills: 0      clotrimazole (LOTRIMIN) 1 % topical cream Apply  to affected area two (2) times a day. Apply with barrier cream to affected area. Qty: 15 g, Refills: 1      clopidogrel (PLAVIX) 75 mg tab Take 1 Tab by mouth daily. Qty: 90 Tab, Refills: 3      nitroglycerin (NITROSTAT) 0.4 mg SL tablet 1 Tab by SubLINGual route every five (5) minutes as needed for Chest Pain. Qty: 25 Tab, Refills: 3      ondansetron (ZOFRAN ODT) 4 mg disintegrating tablet Take 1 Tab by mouth every eight (8) hours as needed for Nausea.   Qty: 30 Tab, Refills: 11         STOP taking these medications       metoprolol tartrate (LOPRESSOR) 25 mg tablet Comments:   Reason for Stopping:         meclizine (ANTIVERT) 12.5 mg tablet Comments:   Reason for Stopping:               Activity: Activity as tolerated    Diet: Cardiac Diet    Wound Care: None needed    Follow-up: PCP    Discharge time: >35 Minutes     Dimmit Anguiano, [employer]  [facility]  Pager: [phone]  Office: [phone]    6/7/2019, 12:25 PM

## 2019-06-07 NOTE — PROGRESS NOTES
Patient is discharge to [address] [place] [address]. Report Call IDA Hernandez of [facility]. David stated that the concentrator was deliver to the facility. Discharge instruction giving to the patient and son-in-law. Patient and Son-in-law stated understanding of discharge instruction, and medication administration. Son-in-law stated that the will bring the patient's walker over to [facility]. Patient transported to the exit via wheelchair. No acute distress noted at the time.

## 2019-06-07 NOTE — PROGRESS NOTES
Nursing assessment completed. Alert and oriented. Follows commands. PIV left AC> Afib on monitor. Stage 2 x2 to right buttock and gluteal folds. Wedge under right hip to relieve discomfort to buttock. Patient readjusted.

## 2019-06-07 NOTE — PROGRESS NOTES
Discharge instructions provided to pt and pt family members on behalf of primary nurse Wanda Cabot. Written prescription for change of bumex was given. Pt discharged with home oxygen at 2 L/min via nasal cannula. Pt left in stable condition with family driving her to [facility].

## 2019-06-07 NOTE — PROGRESS NOTES
Problem: Pressure Injury - Risk of  Goal: *Prevention of pressure injury  Description  Document Allen Scale and appropriate interventions in the flowsheet. Outcome: Progressing Towards Goal     Problem: Patient Education: Go to Patient Education Activity  Goal: Patient/Family Education  Outcome: Progressing Towards Goal     Problem: Falls - Risk of  Goal: *Absence of Falls  Description  Document Chele Mckee Fall Risk and appropriate interventions in the flowsheet.   Outcome: Progressing Towards Goal     Problem: Patient Education: Go to Patient Education Activity  Goal: Patient/Family Education  Outcome: Progressing Towards Goal     Problem: Pain  Goal: *Control of Pain  Outcome: Progressing Towards Goal     Problem: Syncope  Goal: *Absence of injury  Outcome: Progressing Towards Goal  Goal: Decrease or eliminate episodes of syncope  Outcome: Progressing Towards Goal

## 2019-06-07 NOTE — PROGRESS NOTES
Problem: Mobility Impaired (Adult and Pediatric)  Goal: *Acute Goals and Plan of Care (Insert Text)  Description  Physical Therapy Goals  Initiated 6/7/2019 and to be accomplished within 7 day(s)  1. Patient will move from supine to sit and sit to supine  in bed with modified independence. 2.  Patient will transfer from bed to chair and chair to bed with modified independence using the least restrictive device. 3.  Patient will perform sit to stand with modified independence. 4.  Patient will ambulate with modified independence for 150 feet with the least restrictive device. Outcome: Progressing Towards Goal    PHYSICAL THERAPY EVALUATION    Patient: Moris Bartholomew (80 y.o. female)  Date: 6/7/2019  Primary Diagnosis: Hypoxia [R09.02]  Syncope [R55]  Precautions: Fall  PLOF: Independent    ASSESSMENT :  Oriented to self, month, and place. Supervision for supine to sit. Seated EOB with good sitting balance; 10 minutes. Min A for stand to sit from low bed. Amb 12ft with supervision and no AD. Demonstrates reaching pattern for doorways and walls to steady self during amb; educated on safety concerns with this amb pattern. Supervision for sit to stand to low commode. Unsupported sitting balance; maintained 10 minutes with supervision. Supervision for dynamic sitting balance. Supervision for sit to stand from low commode. Dynamic standing at sink 5 minutes with supervision. Amb 10ft with supervision to chair. Seated in chair with call bell and breakfast tray. Son-in-law present during session; hypervigilant. Education provided on bed mobility, transfers, ADLs, balance, amb, safety, exercise, role of PT, plan of care, cognition, skin integrity, vitals as indicated. Educated on need for RN assistance with mobility; verbalized understanding. Call momin in reach. RN Panfilo Varghese aware. Patient will benefit from skilled intervention to address the above impairments.   Patient's rehabilitation potential is considered to be Good  Factors which may influence rehabilitation potential include:   ? None noted  ? Mental ability/status  ? Medical condition  ? Home/family situation and support systems  ? Safety awareness  ? Pain tolerance/management  ? Other:      PLAN :  Recommendations and Planned Interventions:   ?           Bed Mobility Training             ? Neuromuscular Re-Education  ? Transfer Training                   ? Orthotic/Prosthetic Training  ? Gait Training                          ? Modalities  ? Therapeutic Exercises           ? Edema Management/Control  ? Therapeutic Activities            ? Family Training/Education  ? Patient Education  ? Other (comment):    Frequency/Duration: Patient will be followed by physical therapy 1-2 time a week to address goals. Discharge Recommendations: Home Health  Further Equipment Recommendations for Discharge: rolling walker     SUBJECTIVE:   Patient stated ? Yesterday was D Day. ?    OBJECTIVE DATA SUMMARY:     Past Medical History:   Diagnosis Date    A-fib ([facility] [address] 01/2017    Arthritis, degenerative     CAD (coronary artery disease) 04/2012    S/P 2.75 X 15 mm BMS of OM (04/2012), Two LAD BMS (07/2012)    Elevated liver enzymes     Likely from statin    HLD (hyperlipidemia)     Hyperkalemia 06/2012    Likely from lisinopril    Ischemic cardiomyopathy     LVEF  45% (11/2012) & 30% echo (04/2012)    NSTEMI (non-ST elevated myocardial infarction) ([facility] [address] 04/2012    UTI (urinary tract infection)     Vertigo      Past Surgical History:   Procedure Laterality Date    HX CORONARY STENT PLACEMENT  4/23/12    bare metal stent to obtuse marginal branch    HX HEART CATHETERIZATION       Barriers to Learning/Limitations: yes;  cognitive  Compensate with: Visual Cues, Verbal Cues, Tactile Cues and Kinesthetic Cues    Home Situation:  Home Situation  Home Environment: Assisted living  Care Facility [facility]: [facility]  # Steps to Enter: 4  One/Two Story Residence: One story  Living Alone: No  Support Systems: Assisted living  Patient Expects to be Discharged to[de-identified] Private residence  Current DME Used/Available at Home: None    Critical Behavior:  Neurologic State: Alert  Orientation Level: Oriented to person;Oriented to place  Cognition: Follows commands  Safety/Judgement: Fall prevention  Psychosocial  Patient Behaviors: Cooperative    Strength:    Manual Muscle Testing (LE)         R     L    Hip Flexion:   4+/5  4+/5  Knee EXT:   4+/5  4+/5  Knee FLEX:   4+/5  4+/5  Ankle DF:   4+/5  4+/5  _________________________________________________   Tone & Sensation:   Tone:BLE normal  Sensation: Not tested  Range Of Motion:  BLE AROM WFL  Functional Mobility:  Bed Mobility:  Supine to Sit: Supervision  Transfers:  Sit to Stand: Minimum assistance;Supervision  Stand to Sit: Supervision  Balance:   Sitting: Intact  Standing: Impaired  Standing - Static: Good  Standing - Dynamic : Good  Ambulation/Gait Training:  Distance (ft): (12, 10)  Ambulation - Level of Assistance: Supervision  Neuro Re-education:  Seated EOB 10 minutes  Unsupported sitting 10 minutes  Dynamic sitting 3 minutes  Dynamic standing 5 minutes  Therapeutic Exercises:   Sit to stand x2  Pain:  Pain level pre-treatment: 0/10   Pain level post-treatment: 0/10   Pain Scale 1: Numeric (0 - 10)    Activity Tolerance:   Good    After treatment:   ?         Patient left in no apparent distress sitting up in chair  ? Patient left in no apparent distress in bed  ? Call bell left within reach  ? Nursing notified  ? Caregiver present  ? Bed alarm activated  ? SCDs applied    COMMUNICATION/EDUCATION:   ?         Role of physical therapy and plan of care in the acute care setting. ?         Fall prevention education was provided and the patient/caregiver indicated understanding.   ? Patient/family have participated as able in goal setting and plan of care. ?         Patient/family agree to work toward stated goals and plan of care. ?         Patient understands intent and goals of therapy, but is neutral about his/her participation. ? Patient is unable to participate in goal setting/plan of care: ongoing with therapy staff.     Thank you for this referral.  Naila Caldwell, PT   Time Calculation: 38 mins    Eval Complexity: History: MEDIUM  Complexity : 1-2 comorbidities / personal factors will impact the outcome/ POC Exam:MEDIUM Complexity : 3 Standardized tests and measures addressing body structure, function, activity limitation and / or participation in recreation  Presentation: MEDIUM Complexity : Evolving with changing characteristics  Clinical Decision Making:Medium Complexity clinical judgement; ROM, MMT, functional mobility  Overall Complexity:MEDIUM

## 2019-06-07 NOTE — PROGRESS NOTES
Problem: Discharge Planning  Goal: *Discharge to safe environment  Outcome: Resolved/Met   I spoke to the family regarding the patients discharge order. The family is upset that the patient does not meet for inpatient per criteria. I explained based on the clinical assessment she does not meet for inpatient. I explained  PT evaluation recommendation is for home with home care. I have offered [address] for home care and a personal care agency list to the family. I have delivered the patient portable tank and have called [employer] and requested delivery for her concentrator before 300 pm as requested by [facility]. Sonya from [facility] in to review patient, she stated she felt they patient needed to be in a skilled facility. I explained the patient is under observation status and does not meet for inpatient. I went on to explained to [facility] and the patients family that if the patient does not have a skilled need a facility will not accept her for rehab. [facility] then stated she would be calling her . I have escalated to Director for case management. The family has agreed to transport the patient back to [facility]. Awaiting a selection for Home care and signing of [address]. Ja Smith  RN    141 pm- Spoke to [facility] and have been told the patients Oxygen concentrator has been delivered. [facility] & [address] Provider list has been given to the patient and/or patient representative. Patient and/or patient representative has signed the [place] of Choice selecting [address] [place] [address] Road as their preference agency and a copy given. Both Home Health Provider list and Freedom of Choice have been placed on the chart. Family to transport home. Patients POA says he has a walker and will bring it to her from his home. Care Management Interventions  PCP Verified by CM: Yes  Last Visit to PCP: 06/05/19  Palliative Care Criteria Met (RRAT>21 & CHF Dx)?: No  Mode of Transport at Discharge:  Other (see comment)(family)  Transition of Care Consult (CM Consult): Home Health([facility])  [address]: Yes  MyChart Signup: No  Discharge Durable Medical Equipment: Yes(oxygen)  Health Maintenance Reviewed: Yes  Physical Therapy Consult: Yes  Occupational Therapy Consult: No  Speech Therapy Consult: No  Current Support Network: Assisted Living([facility])  Confirm Follow Up Transport: Family  Plan discussed with Pt/Family/Caregiver: Yes  Freedom of Choice Offered: Yes  [place] Resource Information Provided?: No  Discharge Location  Discharge Placement: Home with home health

## 2019-06-07 NOTE — ROUTINE PROCESS
5126 Bedside, Verbal and Written shift change report given to [address] [place] [address],[address] (oncoming nurse) by Humza Abernathy RN (offgoing nurse). Report included the following information SBAR and Kardex. 0745 Bedside, Verbal and Written shift change report given to Nir Janett (oncoming nurse) by Cinthia RN (offgoing nurse). Report included the following information SBAR and Kardex.

## 2019-06-09 ENCOUNTER — HOME CARE VISIT (OUTPATIENT)
Dept: SCHEDULING | Facility: HOME HEALTH | Age: 84
End: 2019-06-09
Payer: MEDICARE

## 2019-06-09 PROCEDURE — G0299 HHS/HOSPICE OF RN EA 15 MIN: HCPCS

## 2019-06-09 PROCEDURE — 3331090001 HH PPS REVENUE CREDIT

## 2019-06-09 PROCEDURE — 400013 HH SOC

## 2019-06-09 PROCEDURE — 3331090002 HH PPS REVENUE DEBIT

## 2019-06-10 ENCOUNTER — HOME CARE VISIT (OUTPATIENT)
Dept: SCHEDULING | Facility: HOME HEALTH | Age: 84
End: 2019-06-10
Payer: MEDICARE

## 2019-06-10 VITALS
DIASTOLIC BLOOD PRESSURE: 60 MMHG | RESPIRATION RATE: 18 BRPM | OXYGEN SATURATION: 93 % | HEART RATE: 84 BPM | TEMPERATURE: 97.6 F | SYSTOLIC BLOOD PRESSURE: 100 MMHG

## 2019-06-10 VITALS
SYSTOLIC BLOOD PRESSURE: 122 MMHG | TEMPERATURE: 97.2 F | DIASTOLIC BLOOD PRESSURE: 68 MMHG | HEART RATE: 95 BPM | OXYGEN SATURATION: 98 %

## 2019-06-10 PROCEDURE — 3331090002 HH PPS REVENUE DEBIT

## 2019-06-10 PROCEDURE — 3331090001 HH PPS REVENUE CREDIT

## 2019-06-10 PROCEDURE — G0151 HHCP-SERV OF PT,EA 15 MIN: HCPCS

## 2019-06-11 ENCOUNTER — HOME CARE VISIT (OUTPATIENT)
Dept: SCHEDULING | Facility: HOME HEALTH | Age: 84
End: 2019-06-11
Payer: MEDICARE

## 2019-06-11 VITALS
HEART RATE: 69 BPM | TEMPERATURE: 96.9 F | DIASTOLIC BLOOD PRESSURE: 69 MMHG | SYSTOLIC BLOOD PRESSURE: 116 MMHG | OXYGEN SATURATION: 98 %

## 2019-06-11 PROCEDURE — G0157 HHC PT ASSISTANT EA 15: HCPCS

## 2019-06-11 PROCEDURE — 3331090002 HH PPS REVENUE DEBIT

## 2019-06-11 PROCEDURE — 3331090001 HH PPS REVENUE CREDIT

## 2019-06-12 ENCOUNTER — HOME CARE VISIT (OUTPATIENT)
Dept: HOME HEALTH SERVICES | Facility: HOME HEALTH | Age: 84
End: 2019-06-12
Payer: MEDICARE

## 2019-06-12 ENCOUNTER — HOME CARE VISIT (OUTPATIENT)
Dept: SCHEDULING | Facility: HOME HEALTH | Age: 84
End: 2019-06-12
Payer: MEDICARE

## 2019-06-12 PROCEDURE — G0152 HHCP-SERV OF OT,EA 15 MIN: HCPCS

## 2019-06-12 PROCEDURE — 3331090001 HH PPS REVENUE CREDIT

## 2019-06-12 PROCEDURE — 3331090002 HH PPS REVENUE DEBIT

## 2019-06-13 ENCOUNTER — HOME CARE VISIT (OUTPATIENT)
Dept: SCHEDULING | Facility: HOME HEALTH | Age: 84
End: 2019-06-13
Payer: MEDICARE

## 2019-06-13 VITALS
DIASTOLIC BLOOD PRESSURE: 68 MMHG | SYSTOLIC BLOOD PRESSURE: 112 MMHG | HEART RATE: 81 BPM | OXYGEN SATURATION: 97 % | TEMPERATURE: 97.9 F

## 2019-06-13 PROCEDURE — G0299 HHS/HOSPICE OF RN EA 15 MIN: HCPCS

## 2019-06-13 PROCEDURE — 3331090002 HH PPS REVENUE DEBIT

## 2019-06-13 PROCEDURE — 3331090001 HH PPS REVENUE CREDIT

## 2019-06-14 ENCOUNTER — HOME CARE VISIT (OUTPATIENT)
Dept: SCHEDULING | Facility: HOME HEALTH | Age: 84
End: 2019-06-14
Payer: MEDICARE

## 2019-06-14 VITALS
OXYGEN SATURATION: 97 % | SYSTOLIC BLOOD PRESSURE: 118 MMHG | DIASTOLIC BLOOD PRESSURE: 62 MMHG | TEMPERATURE: 97.7 F | HEART RATE: 98 BPM

## 2019-06-14 VITALS
SYSTOLIC BLOOD PRESSURE: 90 MMHG | RESPIRATION RATE: 20 BRPM | HEART RATE: 82 BPM | DIASTOLIC BLOOD PRESSURE: 62 MMHG | OXYGEN SATURATION: 98 % | TEMPERATURE: 98 F

## 2019-06-14 PROCEDURE — 3331090002 HH PPS REVENUE DEBIT

## 2019-06-14 PROCEDURE — G0158 HHC OT ASSISTANT EA 15: HCPCS

## 2019-06-14 PROCEDURE — 3331090001 HH PPS REVENUE CREDIT

## 2019-06-14 PROCEDURE — G0157 HHC PT ASSISTANT EA 15: HCPCS

## 2019-06-15 VITALS
HEART RATE: 88 BPM | TEMPERATURE: 97.9 F | OXYGEN SATURATION: 96 % | SYSTOLIC BLOOD PRESSURE: 97 MMHG | DIASTOLIC BLOOD PRESSURE: 63 MMHG

## 2019-06-15 PROCEDURE — 3331090001 HH PPS REVENUE CREDIT

## 2019-06-15 PROCEDURE — 3331090002 HH PPS REVENUE DEBIT

## 2019-06-16 PROCEDURE — 3331090001 HH PPS REVENUE CREDIT

## 2019-06-16 PROCEDURE — 3331090002 HH PPS REVENUE DEBIT

## 2019-06-17 ENCOUNTER — HOME CARE VISIT (OUTPATIENT)
Dept: SCHEDULING | Facility: HOME HEALTH | Age: 84
End: 2019-06-17
Payer: MEDICARE

## 2019-06-17 VITALS
HEART RATE: 50 BPM | OXYGEN SATURATION: 92 % | TEMPERATURE: 98.2 F | SYSTOLIC BLOOD PRESSURE: 118 MMHG | DIASTOLIC BLOOD PRESSURE: 62 MMHG

## 2019-06-17 VITALS
SYSTOLIC BLOOD PRESSURE: 90 MMHG | RESPIRATION RATE: 16 BRPM | TEMPERATURE: 98.5 F | WEIGHT: 109 LBS | HEART RATE: 90 BPM | OXYGEN SATURATION: 97 % | BODY MASS INDEX: 19.94 KG/M2 | DIASTOLIC BLOOD PRESSURE: 60 MMHG

## 2019-06-17 PROCEDURE — 3331090001 HH PPS REVENUE CREDIT

## 2019-06-17 PROCEDURE — G0299 HHS/HOSPICE OF RN EA 15 MIN: HCPCS

## 2019-06-17 PROCEDURE — 3331090002 HH PPS REVENUE DEBIT

## 2019-06-17 PROCEDURE — G0158 HHC OT ASSISTANT EA 15: HCPCS

## 2019-06-18 ENCOUNTER — HOME CARE VISIT (OUTPATIENT)
Dept: SCHEDULING | Facility: HOME HEALTH | Age: 84
End: 2019-06-18
Payer: MEDICARE

## 2019-06-18 VITALS
HEART RATE: 86 BPM | SYSTOLIC BLOOD PRESSURE: 103 MMHG | DIASTOLIC BLOOD PRESSURE: 61 MMHG | TEMPERATURE: 97.3 F | OXYGEN SATURATION: 96 %

## 2019-06-18 PROCEDURE — G0157 HHC PT ASSISTANT EA 15: HCPCS

## 2019-06-18 PROCEDURE — 3331090002 HH PPS REVENUE DEBIT

## 2019-06-18 PROCEDURE — 3331090001 HH PPS REVENUE CREDIT

## 2019-06-19 ENCOUNTER — HOSPITAL ENCOUNTER (OUTPATIENT)
Dept: LAB | Age: 84
Discharge: HOME | End: 2019-06-19
Payer: MEDICARE

## 2019-06-19 ENCOUNTER — HOME CARE VISIT (OUTPATIENT)
Dept: SCHEDULING | Facility: HOME HEALTH | Age: 84
End: 2019-06-19
Payer: MEDICARE

## 2019-06-19 VITALS
SYSTOLIC BLOOD PRESSURE: 126 MMHG | HEART RATE: 93 BPM | OXYGEN SATURATION: 94 % | TEMPERATURE: 97.6 F | DIASTOLIC BLOOD PRESSURE: 68 MMHG

## 2019-06-19 LAB
ANION GAP SERPL CALC-SCNC: 5 MMOL/L (ref 3–18)
BUN SERPL-MCNC: 17 MG/DL (ref 7–18)
BUN/CREAT SERPL: 19 (ref 12–20)
CALCIUM SERPL-MCNC: 7.8 MG/DL (ref 8.5–10.1)
CHLORIDE SERPL-SCNC: 99 MMOL/L (ref 100–108)
CO2 SERPL-SCNC: 30 MMOL/L (ref 21–32)
CREAT SERPL-MCNC: 0.9 MG/DL (ref 0.6–1.3)
GLUCOSE SERPL-MCNC: 67 MG/DL (ref 74–99)
POTASSIUM SERPL-SCNC: 4.9 MMOL/L (ref 3.5–5.5)
SODIUM SERPL-SCNC: 134 MMOL/L (ref 136–145)

## 2019-06-19 PROCEDURE — G0158 HHC OT ASSISTANT EA 15: HCPCS

## 2019-06-19 PROCEDURE — 3331090002 HH PPS REVENUE DEBIT

## 2019-06-19 PROCEDURE — 3331090001 HH PPS REVENUE CREDIT

## 2019-06-19 PROCEDURE — 36415 COLL VENOUS BLD VENIPUNCTURE: CPT

## 2019-06-19 PROCEDURE — 80048 BASIC METABOLIC PNL TOTAL CA: CPT

## 2019-06-20 ENCOUNTER — OFFICE VISIT (OUTPATIENT)
Dept: CARDIOLOGY CLINIC | Age: 84
End: 2019-06-20

## 2019-06-20 VITALS
DIASTOLIC BLOOD PRESSURE: 61 MMHG | BODY MASS INDEX: 19.94 KG/M2 | OXYGEN SATURATION: 96 % | SYSTOLIC BLOOD PRESSURE: 121 MMHG | HEART RATE: 90 BPM | HEIGHT: 62 IN

## 2019-06-20 DIAGNOSIS — I42.9 CARDIOMYOPATHY, UNSPECIFIED TYPE (HCC): ICD-10-CM

## 2019-06-20 DIAGNOSIS — I10 ESSENTIAL HYPERTENSION WITH GOAL BLOOD PRESSURE LESS THAN 140/90: ICD-10-CM

## 2019-06-20 DIAGNOSIS — I25.10 CORONARY ARTERY DISEASE DUE TO LIPID RICH PLAQUE: Primary | ICD-10-CM

## 2019-06-20 DIAGNOSIS — I25.83 CORONARY ARTERY DISEASE DUE TO LIPID RICH PLAQUE: Primary | ICD-10-CM

## 2019-06-20 DIAGNOSIS — I48.0 PAF (PAROXYSMAL ATRIAL FIBRILLATION) (HCC): ICD-10-CM

## 2019-06-20 DIAGNOSIS — E78.00 PURE HYPERCHOLESTEROLEMIA: ICD-10-CM

## 2019-06-20 PROCEDURE — 3331090002 HH PPS REVENUE DEBIT

## 2019-06-20 PROCEDURE — 3331090001 HH PPS REVENUE CREDIT

## 2019-06-20 NOTE — PROGRESS NOTES
1. Have you been to the ER, urgent care clinic since your last visit? Hospitalized since your last visit? No      2. Have you seen or consulted any other health care providers outside of the [address] [place] [address] since your last visit? Include any pap smears or colon screening.  No

## 2019-06-20 NOTE — PROGRESS NOTES
As you know, Juan Quezada is a pleasant, 80 y.o. female with known history of coronary artery disease, non-ST-elevation myocardial infarction, status post left anterior descending and left circumflex bare-metal stents. She also has hypertension, hyperlipidemia, and ischemic cardiomyopathy, a.fib    Ms. Hamilton Alvarez is here today for follow up appointment. She recently was admitted to the hospital with CHF exacerbation. She was diuresed and eventually was sent back to assisted living facility. She is here today and she is accompanied with the family member. Since hospital discharge, overall she is doing much better. She is taking Bumex half tablet once a day. She is denying any chest pain or chest tightness. She is slowly getting her strength back. She denies PND or any significant lower extremity swelling    Past Medical History:   Diagnosis Date    A-fib [facility]) 01/2017    Arthritis, degenerative     CAD (coronary artery disease) 04/2012    S/P 2.75 X 15 mm BMS of OM (04/2012), Two LAD BMS (07/2012)    Elevated liver enzymes     Likely from statin    HLD (hyperlipidemia)     Hyperkalemia 06/2012    Likely from lisinopril    Ischemic cardiomyopathy     LVEF  30-35%(05/19) 45% (11/2012) & 30% echo (04/2012)    NSTEMI (non-ST elevated myocardial infarction) ([facility] [address] 04/2012    UTI (urinary tract infection)     Vertigo        Past Surgical History:   Procedure Laterality Date    HX CORONARY STENT PLACEMENT  4/23/12    bare metal stent to obtuse marginal branch    HX HEART CATHETERIZATION         Current Outpatient Medications   Medication Sig    OXYGEN-AIR DELIVERY SYSTEMS 2 L by Nasal route continuous.  acetaminophen (TYLENOL) 325 mg tablet Take 650 mg by mouth every six (6) hours as needed for Pain.  bumetanide (BUMEX) 0.5 mg tablet Take 1 Tab by mouth daily.  ondansetron (ZOFRAN ODT) 4 mg disintegrating tablet Take 1 Tab by mouth every eight (8) hours as needed for Nausea.     polyethylene glycol (MIRALAX) 17 gram/dose powder Take 17 g by mouth daily as needed (constipation).  vit C-E-zinc cit-lutein-zeaxan ([address]) 50 mg-15 unit- 4.5 mg-2.5 mg chew Take 1 Tab by mouth daily. Ocuvite gummy    carboxymethylcellulos/glycerin (CLEAR EYES FOR DRY EYES OP) Administer 1 Drop to both eyes three (3) times daily as needed (dry eyes).  carbamide peroxide (MURINE EAR) 6.5 % otic solution Instill 5 drops in each ear twice daily for 4 days each month as needed for ear wax.  loperamide (IMMODIUM) 2 mg tablet Take 1 mg by mouth three (3) times daily as needed for Diarrhea.  FLUoxetine (PROZAC) 10 mg tablet Take 1 Tab by mouth daily.  psyllium husk-aspartame (METAMUCIL FIBER) 3.4 gram pwpk packet Take 1 Packet by mouth daily.  mirtazapine (REMERON) 15 mg tablet Take 1 Tab by mouth nightly.  fluticasone (FLONASE) 50 mcg/actuation nasal spray 2 Sprays by Both Nostrils route daily as needed for Rhinitis.  digoxin (LANOXIN) 0.125 mg tablet Take 1 Tab by mouth daily.  aspirin 81 mg chewable tablet Take 1 Tab by mouth daily.  clotrimazole (LOTRIMIN) 1 % topical cream Apply  to affected area two (2) times a day. Apply with barrier cream to affected area.  clopidogrel (PLAVIX) 75 mg tab Take 1 Tab by mouth daily.  nitroglycerin (NITROSTAT) 0.4 mg SL tablet 1 Tab by SubLINGual route every five (5) minutes as needed for Chest Pain. (Patient taking differently: 1 Tab by SubLINGual route every five (5) minutes as needed for Chest Pain. take 1tab sl  5min apart for a total of 3tabs then call 911)     No current facility-administered medications for this visit.       Allergies and Sensitivities:  Allergies   Allergen Reactions    Flu Vaccine 2011 (36 Mos+)(Pf) Anaphylaxis    Codeine Anaphylaxis    Egg Not Reported This Time    Lipitor [Atorvastatin] Nausea Only    Pcn [Penicillins] Hives     Family History:  Family History   Problem Relation Age of Onset    Diabetes Mother      Social History:  Social History     Tobacco Use    Smoking status: Never Smoker    Smokeless tobacco: Never Used   Substance Use Topics    Alcohol use: No    Drug use: No     She  reports that she has never smoked. She has never used smokeless tobacco.  She  reports that she does not drink alcohol. Review of Systems:    Physical Exam:  BP Readings from Last 3 Encounters:   06/20/19 121/61   06/19/19 126/68   06/18/19 103/61         Pulse Readings from Last 3 Encounters:   06/20/19 90   06/19/19 93   06/18/19 86          Wt Readings from Last 3 Encounters:   06/17/19 109 lb (49.4 kg)   06/05/19 110 lb (49.9 kg)   06/04/19 108 lb (49 kg)     Constitutional: Oriented to person, place, and time. HENT: Head: Normocephalic and atraumatic. Neck: No JVD present. Cardiovascular: Irregular rhythm. No murmur, gallop or rubs appriciated  Lung[de-identified] Breath sounds normal. No respiratory distress. No Rales appriciated  Abdominal: No tenderness. No rebound and no guarding. Musculoskeletal: There is no ankle edema. No cynosis    Review of Data:  LABS:   Lab Results   Component Value Date/Time    Sodium 134 (L) 06/19/2019 06:40 AM    Potassium 4.9 06/19/2019 06:40 AM    Chloride 99 (L) 06/19/2019 06:40 AM    CO2 30 06/19/2019 06:40 AM    Glucose 67 (L) 06/19/2019 06:40 AM    BUN 17 06/19/2019 06:40 AM    Creatinine 0.90 06/19/2019 06:40 AM     Lab Results   Component Value Date/Time    Cholesterol, total 202 (H) 04/29/2019 07:45 AM    HDL Cholesterol 64 (H) 04/29/2019 07:45 AM    LDL, calculated 116.4 (H) 04/29/2019 07:45 AM    Triglyceride 108 04/29/2019 07:45 AM    CHOL/HDL Ratio 3.2 04/29/2019 07:45 AM     No results found for: GPT  No results found for: HBA1C, [id number]    EKG (07/2012) Sinus rhythm at 74 beats per minute. No dynamic ST-T changes of ischemia. No Q waves noted. (01/17) A.fib at      CATHETERIZATION:(04/2012)  1.  LM: Short, but angiographically normal.   2. LAD: Proximal at D1 level, 40% to 50% moderate disease, mid left anterior descending at D2 level 80% tubular stenosis. 3. LCx/OM: Proximal diffuse 40%, Mid OM 95-99% subtotal occlusion with ruptured plaque and with NISH 2 flow. 4. RCA: Anatomically dominant; 30- 40%  proximal to mid, otherwise normal.   5. Markedly reduced LVEF with estimated ejection fraction of 25% with evidence of severe hypokinesis of entire anterior apical and inferoapical wall. Diaphragmatic and inferobasilar wall is the most mary segment. Successful PCI and stenting of the OM, 95-99% subtotal occlusion with 2.75 x 15 mm bare metal Vision stent. CATH (07/2012)   LM: Normal  LAD: 70% calcified prox-mid, Mid LAD 90%  LCx/OM1: proximal 50% moderate disease, Patent mid stent  Successful PCI and stenting of LAD using 2.5 X 18 and 2.5X15 mm BMS    ECHO (11/2012)   1. Left ventricular systolic function was mildly reduced with ejection fraction of 45 to 50%. There was mild diffuse hypokinesis with more pronounced hypokinesis of inferolateral wall. Diastolic dysfunction present. 2.  Right ventricular function is normal.  It was mildly dilated. Estimated peak pressure of 40 mmHg. 3.  Mild mitral regurgitation and aortic regurgitation. No significant valvular pathology noted. 4.  Comparison was made with previous study in April 2012. Overall, left ventricular function has increased from 30 to 45%. ECHO (05/19)  Left Ventricle Normal cavity size and wall thickness. Moderate-to-severe systolic dysfunction. The estimated ejection fraction is 31 - 35%. Visually measured ejection fraction. Moderate-severe diffuse hypokinesis with more pronounced inferolateral hypokinesis. There is moderate (grade 2) left ventricular diastolic dysfunction. Left Atrium Mildly dilated left atrium. Right Ventricle Dilated right ventricle. Reduced systolic function. Right Atrium Moderately dilated right atrium. Interatrial Septum Interatrial septum not assessed   Aortic Valve No stenosis. There is mild leaflet calcification without reduced excursion. Trace aortic valve regurgitation. Mitral Valve No stenosis. Mitral valve non-specific thickening. w/o reduced excursion. Moderate mitral annular calcification. Mild to moderate regurgitation. Tricuspid Valve No stenosis. Dilated annulus. Mild to moderate tricuspid valve regurgitation. Pulmonary arterial systolic pressure is 42.5 mmHg. Moderate pulmonary hypertension. Impression / Plan:    Coronary artery disease:    She had an LAD and circumflex stent in 2012. Currently she is on asa, BB and Plavix. No angina. Stable. Ischemic cardiomyopathy:    Last ejection fraction was noted to be 45-50% in November, 2012. Last ECHO 05/19, with EF 30-35%  Currently on Bumex 0.5 mg daily without any obvious significant fluid overload on exam  Would like to use beta-blocker or Entresto however blood pressure runs on the lower side in assisted living facility according to family member. Blood pressure today's 120/60 however at facility blood pressure usually runs less than 100 between  mmHg. I have asked family member to provide me blood pressure reading from the facility and would consider adding those medication in future  Continue digoxin  No significant fluid overload on exam    Hypertension:   Stable. BP today 121/60 mm Hg. Was on lower dose of BB once daily however currently not on any medication because of borderline blood pressure at the assisted living facility    A.fib:   Appears to be in A.fib on exam  HR 90 bpm today. Continue digoxin  If needed and if blood pressure allows would use beta-blocker in future  Continue antiplatelet. Hyperlipidemia:  She has a history of statin induced elevation of LFTs every time she has tried different kinds of statins. She is currently not on any lipid lowering agent. This plan was discussed with Ms. Quiles in detail who is in agreement.      Thank you for allowing me to participate in the patient's care. Feel free to call me with any questions or concerns.

## 2019-06-21 ENCOUNTER — HOME CARE VISIT (OUTPATIENT)
Dept: SCHEDULING | Facility: HOME HEALTH | Age: 84
End: 2019-06-21
Payer: MEDICARE

## 2019-06-21 VITALS
SYSTOLIC BLOOD PRESSURE: 103 MMHG | DIASTOLIC BLOOD PRESSURE: 61 MMHG | OXYGEN SATURATION: 90 % | HEART RATE: 87 BPM | TEMPERATURE: 97.9 F

## 2019-06-21 VITALS
TEMPERATURE: 97.5 F | RESPIRATION RATE: 14 BRPM | DIASTOLIC BLOOD PRESSURE: 68 MMHG | HEART RATE: 82 BPM | SYSTOLIC BLOOD PRESSURE: 95 MMHG | OXYGEN SATURATION: 94 %

## 2019-06-21 PROCEDURE — 3331090002 HH PPS REVENUE DEBIT

## 2019-06-21 PROCEDURE — G0299 HHS/HOSPICE OF RN EA 15 MIN: HCPCS

## 2019-06-21 PROCEDURE — 3331090001 HH PPS REVENUE CREDIT

## 2019-06-21 PROCEDURE — G0157 HHC PT ASSISTANT EA 15: HCPCS

## 2019-06-22 PROCEDURE — 3331090001 HH PPS REVENUE CREDIT

## 2019-06-22 PROCEDURE — 3331090002 HH PPS REVENUE DEBIT

## 2019-06-23 PROCEDURE — 3331090001 HH PPS REVENUE CREDIT

## 2019-06-23 PROCEDURE — 3331090002 HH PPS REVENUE DEBIT

## 2019-06-24 ENCOUNTER — HOME CARE VISIT (OUTPATIENT)
Dept: SCHEDULING | Facility: HOME HEALTH | Age: 84
End: 2019-06-24
Payer: MEDICARE

## 2019-06-24 VITALS
BODY MASS INDEX: 18.66 KG/M2 | DIASTOLIC BLOOD PRESSURE: 70 MMHG | SYSTOLIC BLOOD PRESSURE: 105 MMHG | RESPIRATION RATE: 14 BRPM | HEART RATE: 92 BPM | WEIGHT: 102 LBS | TEMPERATURE: 97.9 F | OXYGEN SATURATION: 92 %

## 2019-06-24 VITALS
OXYGEN SATURATION: 93 % | SYSTOLIC BLOOD PRESSURE: 101 MMHG | DIASTOLIC BLOOD PRESSURE: 63 MMHG | TEMPERATURE: 98.3 F | HEART RATE: 87 BPM

## 2019-06-24 PROCEDURE — 3331090001 HH PPS REVENUE CREDIT

## 2019-06-24 PROCEDURE — G0299 HHS/HOSPICE OF RN EA 15 MIN: HCPCS

## 2019-06-24 PROCEDURE — G0152 HHCP-SERV OF OT,EA 15 MIN: HCPCS

## 2019-06-24 PROCEDURE — 3331090002 HH PPS REVENUE DEBIT

## 2019-06-25 ENCOUNTER — HOME CARE VISIT (OUTPATIENT)
Dept: SCHEDULING | Facility: HOME HEALTH | Age: 84
End: 2019-06-25
Payer: MEDICARE

## 2019-06-25 VITALS
SYSTOLIC BLOOD PRESSURE: 97 MMHG | DIASTOLIC BLOOD PRESSURE: 58 MMHG | OXYGEN SATURATION: 92 % | HEART RATE: 88 BPM | TEMPERATURE: 98.7 F

## 2019-06-25 PROCEDURE — G0157 HHC PT ASSISTANT EA 15: HCPCS

## 2019-06-25 PROCEDURE — 3331090001 HH PPS REVENUE CREDIT

## 2019-06-25 PROCEDURE — 3331090002 HH PPS REVENUE DEBIT

## 2019-06-26 ENCOUNTER — HOME CARE VISIT (OUTPATIENT)
Dept: SCHEDULING | Facility: HOME HEALTH | Age: 84
End: 2019-06-26
Payer: MEDICARE

## 2019-06-26 VITALS
HEART RATE: 94 BPM | SYSTOLIC BLOOD PRESSURE: 118 MMHG | OXYGEN SATURATION: 90 % | TEMPERATURE: 98 F | DIASTOLIC BLOOD PRESSURE: 64 MMHG

## 2019-06-26 PROCEDURE — G0151 HHCP-SERV OF PT,EA 15 MIN: HCPCS

## 2019-06-26 PROCEDURE — 3331090002 HH PPS REVENUE DEBIT

## 2019-06-26 PROCEDURE — 3331090001 HH PPS REVENUE CREDIT

## 2019-06-27 ENCOUNTER — HOSPITAL ENCOUNTER (OUTPATIENT)
Dept: LAB | Age: 84
Discharge: HOME | End: 2019-06-27
Payer: MEDICARE

## 2019-06-27 LAB
ANION GAP SERPL CALC-SCNC: 9 MMOL/L (ref 3–18)
BUN SERPL-MCNC: 21 MG/DL (ref 7–18)
BUN/CREAT SERPL: 17 (ref 12–20)
CALCIUM SERPL-MCNC: 8.3 MG/DL (ref 8.5–10.1)
CHLORIDE SERPL-SCNC: 97 MMOL/L (ref 100–108)
CO2 SERPL-SCNC: 26 MMOL/L (ref 21–32)
CREAT SERPL-MCNC: 1.25 MG/DL (ref 0.6–1.3)
GLUCOSE SERPL-MCNC: 78 MG/DL (ref 74–99)
POTASSIUM SERPL-SCNC: 5 MMOL/L (ref 3.5–5.5)
SODIUM SERPL-SCNC: 132 MMOL/L (ref 136–145)

## 2019-06-27 PROCEDURE — 80048 BASIC METABOLIC PNL TOTAL CA: CPT

## 2019-06-27 PROCEDURE — 36415 COLL VENOUS BLD VENIPUNCTURE: CPT

## 2019-07-09 ENCOUNTER — HOSPITAL ENCOUNTER (OUTPATIENT)
Dept: LAB | Age: 84
Discharge: HOME | End: 2019-07-09
Payer: COMMERCIAL

## 2019-07-09 LAB
C DIFF GDH STL QL: NEGATIVE
C DIFF TOX A+B STL QL IA: NEGATIVE
INTERPRETATION: NORMAL

## 2019-07-09 PROCEDURE — 87449 NOS EACH ORGANISM AG IA: CPT

## 2019-07-10 ENCOUNTER — HOME HEALTH ADMISSION (OUTPATIENT)
Dept: HOME HEALTH SERVICES | Facility: HOME HEALTH | Age: 84
End: 2019-07-10
Payer: MEDICARE

## 2019-07-11 ENCOUNTER — HOME CARE VISIT (OUTPATIENT)
Dept: HOME HEALTH SERVICES | Facility: HOME HEALTH | Age: 84
End: 2019-07-11

## 2019-07-11 ENCOUNTER — HOME CARE VISIT (OUTPATIENT)
Dept: SCHEDULING | Facility: HOME HEALTH | Age: 84
End: 2019-07-11
Payer: MEDICARE

## 2019-07-11 VITALS
OXYGEN SATURATION: 92 % | TEMPERATURE: 97.2 F | SYSTOLIC BLOOD PRESSURE: 100 MMHG | DIASTOLIC BLOOD PRESSURE: 50 MMHG | HEART RATE: 96 BPM

## 2019-07-11 PROCEDURE — 3331090001 HH PPS REVENUE CREDIT

## 2019-07-11 PROCEDURE — G0299 HHS/HOSPICE OF RN EA 15 MIN: HCPCS

## 2019-07-11 PROCEDURE — A4649 SURGICAL SUPPLIES: HCPCS

## 2019-07-11 PROCEDURE — 400013 HH SOC

## 2019-07-11 PROCEDURE — A6197 ALGINATE DRSG >16 <=48 SQ IN: HCPCS

## 2019-07-11 PROCEDURE — A6212 FOAM DRG <=16 SQ IN W/BORDER: HCPCS

## 2019-07-11 PROCEDURE — A6216 NON-STERILE GAUZE<=16 SQ IN: HCPCS

## 2019-07-11 PROCEDURE — 3331090002 HH PPS REVENUE DEBIT

## 2019-07-11 PROCEDURE — A6260 WOUND CLEANSER ANY TYPE/SIZE: HCPCS

## 2019-07-11 PROCEDURE — A4927 NON-STERILE GLOVES: HCPCS

## 2019-07-12 ENCOUNTER — HOME CARE VISIT (OUTPATIENT)
Dept: HOME HEALTH SERVICES | Facility: HOME HEALTH | Age: 84
End: 2019-07-12
Payer: MEDICARE

## 2019-07-12 ENCOUNTER — HOME CARE VISIT (OUTPATIENT)
Dept: SCHEDULING | Facility: HOME HEALTH | Age: 84
End: 2019-07-12
Payer: MEDICARE

## 2019-07-12 VITALS
TEMPERATURE: 98.5 F | HEART RATE: 89 BPM | DIASTOLIC BLOOD PRESSURE: 60 MMHG | SYSTOLIC BLOOD PRESSURE: 99 MMHG | OXYGEN SATURATION: 96 %

## 2019-07-12 PROCEDURE — G0299 HHS/HOSPICE OF RN EA 15 MIN: HCPCS

## 2019-07-12 PROCEDURE — 3331090002 HH PPS REVENUE DEBIT

## 2019-07-12 PROCEDURE — 3331090001 HH PPS REVENUE CREDIT

## 2019-07-13 PROCEDURE — A6197 ALGINATE DRSG >16 <=48 SQ IN: HCPCS

## 2019-07-13 PROCEDURE — 3331090001 HH PPS REVENUE CREDIT

## 2019-07-13 PROCEDURE — 3331090002 HH PPS REVENUE DEBIT

## 2019-07-14 PROCEDURE — 3331090002 HH PPS REVENUE DEBIT

## 2019-07-14 PROCEDURE — 3331090001 HH PPS REVENUE CREDIT

## 2019-07-15 ENCOUNTER — HOME CARE VISIT (OUTPATIENT)
Dept: SCHEDULING | Facility: HOME HEALTH | Age: 84
End: 2019-07-15
Payer: MEDICARE

## 2019-07-15 VITALS
HEART RATE: 87 BPM | TEMPERATURE: 98 F | SYSTOLIC BLOOD PRESSURE: 90 MMHG | DIASTOLIC BLOOD PRESSURE: 60 MMHG | OXYGEN SATURATION: 95 %

## 2019-07-15 PROCEDURE — G0299 HHS/HOSPICE OF RN EA 15 MIN: HCPCS

## 2019-07-15 PROCEDURE — 3331090001 HH PPS REVENUE CREDIT

## 2019-07-15 PROCEDURE — 3331090002 HH PPS REVENUE DEBIT

## 2019-07-16 PROCEDURE — 3331090001 HH PPS REVENUE CREDIT

## 2019-07-16 PROCEDURE — 3331090002 HH PPS REVENUE DEBIT

## 2019-07-17 ENCOUNTER — HOME CARE VISIT (OUTPATIENT)
Dept: SCHEDULING | Facility: HOME HEALTH | Age: 84
End: 2019-07-17
Payer: MEDICARE

## 2019-07-17 VITALS
SYSTOLIC BLOOD PRESSURE: 132 MMHG | TEMPERATURE: 97 F | RESPIRATION RATE: 18 BRPM | DIASTOLIC BLOOD PRESSURE: 82 MMHG | HEART RATE: 83 BPM | OXYGEN SATURATION: 94 %

## 2019-07-17 PROCEDURE — G0299 HHS/HOSPICE OF RN EA 15 MIN: HCPCS

## 2019-07-17 PROCEDURE — 3331090002 HH PPS REVENUE DEBIT

## 2019-07-17 PROCEDURE — 3331090001 HH PPS REVENUE CREDIT

## 2019-07-18 PROCEDURE — 3331090001 HH PPS REVENUE CREDIT

## 2019-07-18 PROCEDURE — 3331090002 HH PPS REVENUE DEBIT

## 2019-07-19 ENCOUNTER — HOME CARE VISIT (OUTPATIENT)
Dept: SCHEDULING | Facility: HOME HEALTH | Age: 84
End: 2019-07-19
Payer: MEDICARE

## 2019-07-19 VITALS
TEMPERATURE: 97.6 F | HEART RATE: 93 BPM | RESPIRATION RATE: 18 BRPM | DIASTOLIC BLOOD PRESSURE: 78 MMHG | SYSTOLIC BLOOD PRESSURE: 128 MMHG | OXYGEN SATURATION: 94 %

## 2019-07-19 PROCEDURE — G0299 HHS/HOSPICE OF RN EA 15 MIN: HCPCS

## 2019-07-19 PROCEDURE — 3331090002 HH PPS REVENUE DEBIT

## 2019-07-19 PROCEDURE — 3331090001 HH PPS REVENUE CREDIT

## 2019-07-20 PROCEDURE — 3331090002 HH PPS REVENUE DEBIT

## 2019-07-20 PROCEDURE — 3331090001 HH PPS REVENUE CREDIT

## 2019-07-21 PROCEDURE — 3331090001 HH PPS REVENUE CREDIT

## 2019-07-21 PROCEDURE — 3331090002 HH PPS REVENUE DEBIT

## 2019-07-22 ENCOUNTER — HOME CARE VISIT (OUTPATIENT)
Dept: SCHEDULING | Facility: HOME HEALTH | Age: 84
End: 2019-07-22
Payer: MEDICARE

## 2019-07-22 VITALS
BODY MASS INDEX: 18.11 KG/M2 | TEMPERATURE: 97.6 F | HEART RATE: 74 BPM | SYSTOLIC BLOOD PRESSURE: 108 MMHG | OXYGEN SATURATION: 93 % | RESPIRATION RATE: 16 BRPM | WEIGHT: 99 LBS | DIASTOLIC BLOOD PRESSURE: 62 MMHG

## 2019-07-22 PROCEDURE — 3331090002 HH PPS REVENUE DEBIT

## 2019-07-22 PROCEDURE — 3331090001 HH PPS REVENUE CREDIT

## 2019-07-22 PROCEDURE — G0299 HHS/HOSPICE OF RN EA 15 MIN: HCPCS

## 2019-07-23 ENCOUNTER — OFFICE VISIT (OUTPATIENT)
Dept: CARDIOLOGY CLINIC | Age: 84
End: 2019-07-23

## 2019-07-23 VITALS
DIASTOLIC BLOOD PRESSURE: 62 MMHG | SYSTOLIC BLOOD PRESSURE: 120 MMHG | WEIGHT: 99 LBS | OXYGEN SATURATION: 92 % | BODY MASS INDEX: 18.11 KG/M2 | HEART RATE: 93 BPM

## 2019-07-23 DIAGNOSIS — I48.0 PAF (PAROXYSMAL ATRIAL FIBRILLATION) (HCC): ICD-10-CM

## 2019-07-23 DIAGNOSIS — I42.9 CARDIOMYOPATHY, UNSPECIFIED TYPE (HCC): Primary | ICD-10-CM

## 2019-07-23 DIAGNOSIS — E78.00 PURE HYPERCHOLESTEROLEMIA: ICD-10-CM

## 2019-07-23 PROCEDURE — 3331090002 HH PPS REVENUE DEBIT

## 2019-07-23 PROCEDURE — 3331090001 HH PPS REVENUE CREDIT

## 2019-07-23 NOTE — PROGRESS NOTES
As you know, Kalyani Johnson is a pleasant, 80 y.o. female with known history of coronary artery disease, non-ST-elevation myocardial infarction, status post left anterior descending and left circumflex bare-metal stents. She also has hypertension, hyperlipidemia, and ischemic cardiomyopathy, a.fib    Ms. Audie Michel is here today for follow up appointment. She is feeling much better since last time. Her swelling is getting better with using Bumex 0.5 mg tablet every day. She denies any chest pain or chest tightness. She is using 2 pillows at night. She has long-standing history of irritable bowel syndrome which is affecting her bowel function which is chronic for her. Past Medical History:   Diagnosis Date    A-fib [facility]) 01/2017    Arthritis, degenerative     CAD (coronary artery disease) 04/2012    S/P 2.75 X 15 mm BMS of OM (04/2012), Two LAD BMS (07/2012)    Elevated liver enzymes     Likely from statin    HLD (hyperlipidemia)     Hyperkalemia 06/2012    Likely from lisinopril    Ischemic cardiomyopathy     LVEF  30-35%(05/19) 45% (11/2012) & 30% echo (04/2012)    NSTEMI (non-ST elevated myocardial infarction) ([facility] [address] 04/2012    UTI (urinary tract infection)     Vertigo        Past Surgical History:   Procedure Laterality Date    HX CORONARY STENT PLACEMENT  4/23/12    bare metal stent to obtuse marginal branch    HX HEART CATHETERIZATION         Current Outpatient Medications   Medication Sig    OXYGEN-AIR DELIVERY SYSTEMS 2 L by Nasal route continuous.  acetaminophen (TYLENOL) 325 mg tablet Take 650 mg by mouth every six (6) hours as needed for Pain.  bumetanide (BUMEX) 0.5 mg tablet Take 1 Tab by mouth daily.  ondansetron (ZOFRAN ODT) 4 mg disintegrating tablet Take 1 Tab by mouth every eight (8) hours as needed for Nausea.  polyethylene glycol (MIRALAX) 17 gram/dose powder Take 17 g by mouth daily as needed (constipation).     vit C-E-zinc cit-lutein-zeaxan ([address]) 50 mg-15 unit- 4.5 mg-2.5 mg chew Take 1 Tab by mouth daily. Ocuvite gummy    carboxymethylcellulos/glycerin (CLEAR EYES FOR DRY EYES OP) Administer 1 Drop to both eyes three (3) times daily as needed (dry eyes).  carbamide peroxide (MURINE EAR) 6.5 % otic solution Instill 5 drops in each ear twice daily for 4 days each month as needed for ear wax.  loperamide (IMMODIUM) 2 mg tablet Take 1 mg by mouth three (3) times daily as needed for Diarrhea.  FLUoxetine (PROZAC) 10 mg tablet Take 1 Tab by mouth daily.  psyllium husk-aspartame (METAMUCIL FIBER) 3.4 gram pwpk packet Take 1 Packet by mouth daily.  mirtazapine (REMERON) 15 mg tablet Take 1 Tab by mouth nightly.  fluticasone (FLONASE) 50 mcg/actuation nasal spray 2 Sprays by Both Nostrils route daily as needed for Rhinitis.  digoxin (LANOXIN) 0.125 mg tablet Take 1 Tab by mouth daily.  aspirin 81 mg chewable tablet Take 1 Tab by mouth daily.  clotrimazole (LOTRIMIN) 1 % topical cream Apply  to affected area two (2) times a day. Apply with barrier cream to affected area.  clopidogrel (PLAVIX) 75 mg tab Take 1 Tab by mouth daily.  nitroglycerin (NITROSTAT) 0.4 mg SL tablet 1 Tab by SubLINGual route every five (5) minutes as needed for Chest Pain. (Patient taking differently: 1 Tab by SubLINGual route every five (5) minutes as needed for Chest Pain. take 1tab sl  5min apart for a total of 3tabs then call 911)     No current facility-administered medications for this visit.       Allergies and Sensitivities:  Allergies   Allergen Reactions    Flu Vaccine 2011 (36 Mos+)(Pf) Anaphylaxis    Codeine Anaphylaxis    Egg Not Reported This Time    Lipitor [Atorvastatin] Nausea Only    Pcn [Penicillins] Hives     Family History:  Family History   Problem Relation Age of Onset    Diabetes Mother      Social History:  Social History     Tobacco Use    Smoking status: Never Smoker    Smokeless tobacco: Never Used   Substance Use Topics    Alcohol use: No    Drug use: No     She  reports that she has never smoked. She has never used smokeless tobacco.  She  reports that she does not drink alcohol. Review of Systems:    Physical Exam:  BP Readings from Last 3 Encounters:   07/22/19 108/62   07/19/19 128/78   07/17/19 132/82         Pulse Readings from Last 3 Encounters:   07/22/19 74   07/19/19 93   07/17/19 83          Wt Readings from Last 3 Encounters:   07/22/19 99 lb (44.9 kg)   06/24/19 102 lb (46.3 kg)   06/17/19 109 lb (49.4 kg)     Constitutional: Oriented to person, place, and time. HENT: Head: Normocephalic and atraumatic. Neck: No JVD present. Cardiovascular: Irregular rhythm. No murmur, gallop or rubs appriciated  Lung[de-identified] Breath sounds normal. No respiratory distress. No Rales appriciated  Abdominal: No tenderness. No rebound and no guarding. Musculoskeletal: There is no ankle edema. No cynosis    Review of Data:  LABS:   Lab Results   Component Value Date/Time    Sodium 132 (L) 06/27/2019 11:35 AM    Potassium 5.0 06/27/2019 11:35 AM    Chloride 97 (L) 06/27/2019 11:35 AM    CO2 26 06/27/2019 11:35 AM    Glucose 78 06/27/2019 11:35 AM    BUN 21 (H) 06/27/2019 11:35 AM    Creatinine 1.25 06/27/2019 11:35 AM     Lab Results   Component Value Date/Time    Cholesterol, total 202 (H) 04/29/2019 07:45 AM    HDL Cholesterol 64 (H) 04/29/2019 07:45 AM    LDL, calculated 116.4 (H) 04/29/2019 07:45 AM    Triglyceride 108 04/29/2019 07:45 AM    CHOL/HDL Ratio 3.2 04/29/2019 07:45 AM     No results found for: GPT  No results found for: HBA1C, [id number]    EKG (07/2012) Sinus rhythm at 74 beats per minute. No dynamic ST-T changes of ischemia. No Q waves noted. (01/17) A.fib at      CATHETERIZATION:(04/2012)  1. LM: Short, but angiographically normal.   2. LAD: Proximal at D1 level, 40% to 50% moderate disease, mid left anterior descending at D2 level 80% tubular stenosis.    3. LCx/OM: Proximal diffuse 40%, Mid OM 95-99% subtotal occlusion with ruptured plaque and with NISH 2 flow. 4. RCA: Anatomically dominant; 30- 40%  proximal to mid, otherwise normal.   5. Markedly reduced LVEF with estimated ejection fraction of 25% with evidence of severe hypokinesis of entire anterior apical and inferoapical wall. Diaphragmatic and inferobasilar wall is the most mary segment. Successful PCI and stenting of the OM, 95-99% subtotal occlusion with 2.75 x 15 mm bare metal Vision stent. CATH (07/2012)   LM: Normal  LAD: 70% calcified prox-mid, Mid LAD 90%  LCx/OM1: proximal 50% moderate disease, Patent mid stent  Successful PCI and stenting of LAD using 2.5 X 18 and 2.5X15 mm BMS    ECHO (11/2012)   1. Left ventricular systolic function was mildly reduced with ejection fraction of 45 to 50%. There was mild diffuse hypokinesis with more pronounced hypokinesis of inferolateral wall. Diastolic dysfunction present. 2.  Right ventricular function is normal.  It was mildly dilated. Estimated peak pressure of 40 mmHg. 3.  Mild mitral regurgitation and aortic regurgitation. No significant valvular pathology noted. 4.  Comparison was made with previous study in April 2012. Overall, left ventricular function has increased from 30 to 45%. ECHO (05/19)  Left Ventricle Normal cavity size and wall thickness. Moderate-to-severe systolic dysfunction. The estimated ejection fraction is 31 - 35%. Visually measured ejection fraction. Moderate-severe diffuse hypokinesis with more pronounced inferolateral hypokinesis. There is moderate (grade 2) left ventricular diastolic dysfunction. Left Atrium Mildly dilated left atrium. Right Ventricle Dilated right ventricle. Reduced systolic function. Right Atrium Moderately dilated right atrium. Interatrial Septum Interatrial septum not assessed   Aortic Valve No stenosis. There is mild leaflet calcification without reduced excursion. Trace aortic valve regurgitation. Mitral Valve No stenosis.  Mitral valve non-specific thickening. w/o reduced excursion. Moderate mitral annular calcification. Mild to moderate regurgitation. Tricuspid Valve No stenosis. Dilated annulus. Mild to moderate tricuspid valve regurgitation. Pulmonary arterial systolic pressure is 71.1 mmHg. Moderate pulmonary hypertension. Impression / Plan:    Coronary artery disease:    She had an LAD and circumflex stent in 2012. Currently she is on asa, BB and Plavix. No angina. Stable. Ischemic cardiomyopathy:    Last ejection fraction was noted to be 45-50% in November, 2012. Last ECHO 05/19, with EF 30-35%  Currently on Bumex 0.5 mg daily without any obvious significant fluid overload on exam  Would like to use beta-blocker or Entresto however blood pressure runs on the lower side in assisted living facility according to family member. Discussed about adding further medication however because of her advanced age and low blood pressure at facility, decision was to continue the same medication. Continue digoxin  No significant fluid overload on exam.    Hypertension:   Stable. BP today 120/62 mm Hg. Was on lower dose of BB once daily however currently not on any medication because of borderline blood pressure at the assisted living facility. Blood pressure usually between 90120 at assisted living facility    A.fib:   Appears to be in A.fib on exam  HR 92 Bpm today. Continue digoxin  If needed and if blood pressure allows would use beta-blocker in future  Continue antiplatelet. Hyperlipidemia:  She has a history of statin induced elevation of LFTs every time she has tried different kinds of statins. She is currently not on any lipid lowering agent. This plan was discussed with Ms. Quiles in detail who is in agreement. Thank you for allowing me to participate in the patient's care. Feel free to call me with any questions or concerns.

## 2019-07-24 ENCOUNTER — HOME CARE VISIT (OUTPATIENT)
Dept: SCHEDULING | Facility: HOME HEALTH | Age: 84
End: 2019-07-24
Payer: MEDICARE

## 2019-07-24 PROCEDURE — 3331090001 HH PPS REVENUE CREDIT

## 2019-07-24 PROCEDURE — G0299 HHS/HOSPICE OF RN EA 15 MIN: HCPCS

## 2019-07-24 PROCEDURE — 3331090002 HH PPS REVENUE DEBIT

## 2019-07-25 VITALS
SYSTOLIC BLOOD PRESSURE: 110 MMHG | HEART RATE: 72 BPM | DIASTOLIC BLOOD PRESSURE: 80 MMHG | TEMPERATURE: 97.4 F | WEIGHT: 98 LBS | BODY MASS INDEX: 17.92 KG/M2 | OXYGEN SATURATION: 94 % | RESPIRATION RATE: 16 BRPM

## 2019-07-25 PROCEDURE — 3331090001 HH PPS REVENUE CREDIT

## 2019-07-25 PROCEDURE — 3331090002 HH PPS REVENUE DEBIT

## 2019-07-26 ENCOUNTER — HOSPITAL ENCOUNTER (OUTPATIENT)
Dept: LAB | Age: 84
Discharge: HOME | End: 2019-07-26
Payer: MEDICARE

## 2019-07-26 LAB
ALBUMIN SERPL-MCNC: 3.1 G/DL (ref 3.4–5)
ALBUMIN/GLOB SERPL: 0.7 {RATIO} (ref 0.8–1.7)
ALP SERPL-CCNC: 213 U/L (ref 45–117)
ALT SERPL-CCNC: 17 U/L (ref 13–56)
ANION GAP SERPL CALC-SCNC: 6 MMOL/L (ref 3–18)
AST SERPL-CCNC: 22 U/L (ref 10–38)
BILIRUB SERPL-MCNC: 0.5 MG/DL (ref 0.2–1)
BUN SERPL-MCNC: 20 MG/DL (ref 7–18)
BUN/CREAT SERPL: 19 (ref 12–20)
CALCIUM SERPL-MCNC: 8.5 MG/DL (ref 8.5–10.1)
CHLORIDE SERPL-SCNC: 102 MMOL/L (ref 100–111)
CO2 SERPL-SCNC: 30 MMOL/L (ref 21–32)
CREAT SERPL-MCNC: 1.08 MG/DL (ref 0.6–1.3)
ERYTHROCYTE [DISTWIDTH] IN BLOOD BY AUTOMATED COUNT: 19 % (ref 11.6–14.5)
GLOBULIN SER CALC-MCNC: 4.6 G/DL (ref 2–4)
GLUCOSE SERPL-MCNC: 64 MG/DL (ref 74–99)
HCT VFR BLD AUTO: 38.2 % (ref 35–45)
HGB BLD-MCNC: 11.9 G/DL (ref 12–16)
MCH RBC QN AUTO: 25.9 PG (ref 24–34)
MCHC RBC AUTO-ENTMCNC: 31.2 G/DL (ref 31–37)
MCV RBC AUTO: 83.2 FL (ref 74–97)
PLATELET # BLD AUTO: 371 K/UL (ref 135–420)
PMV BLD AUTO: 8.5 FL (ref 9.2–11.8)
POTASSIUM SERPL-SCNC: 3.7 MMOL/L (ref 3.5–5.5)
PROT SERPL-MCNC: 7.7 G/DL (ref 6.4–8.2)
RBC # BLD AUTO: 4.59 M/UL (ref 4.2–5.3)
SODIUM SERPL-SCNC: 138 MMOL/L (ref 136–145)
TSH SERPL DL<=0.05 MIU/L-ACNC: 1.57 UIU/ML (ref 0.36–3.74)
WBC # BLD AUTO: 8.9 K/UL (ref 4.6–13.2)

## 2019-07-26 PROCEDURE — 85027 COMPLETE CBC AUTOMATED: CPT

## 2019-07-26 PROCEDURE — 80053 COMPREHEN METABOLIC PANEL: CPT

## 2019-07-26 PROCEDURE — 36415 COLL VENOUS BLD VENIPUNCTURE: CPT

## 2019-07-26 PROCEDURE — 84443 ASSAY THYROID STIM HORMONE: CPT

## 2019-07-28 ENCOUNTER — HOSPITAL ENCOUNTER (OUTPATIENT)
Dept: LAB | Age: 84
Discharge: HOME | End: 2019-07-28
Payer: COMMERCIAL

## 2019-07-28 PROCEDURE — 87177 OVA AND PARASITES SMEARS: CPT

## 2019-07-28 PROCEDURE — 87449 NOS EACH ORGANISM AG IA: CPT

## 2019-07-30 LAB
C DIFF GDH STL QL: NEGATIVE
C DIFF TOX A+B STL QL IA: NEGATIVE
INTERPRETATION: NORMAL

## 2019-08-05 LAB
O+P SPEC MICRO: NORMAL
O+P STL CONC: NORMAL
SPECIMEN SOURCE: NORMAL

## 2019-08-15 ENCOUNTER — HOSPITAL ENCOUNTER (OUTPATIENT)
Dept: LAB | Age: 84
Discharge: HOME | End: 2019-08-15
Payer: MEDICARE

## 2019-08-15 LAB
ANION GAP SERPL CALC-SCNC: 7 MMOL/L (ref 3–18)
BUN SERPL-MCNC: 19 MG/DL (ref 7–18)
BUN/CREAT SERPL: 21 (ref 12–20)
CALCIUM SERPL-MCNC: 7.9 MG/DL (ref 8.5–10.1)
CHLORIDE SERPL-SCNC: 99 MMOL/L (ref 100–111)
CO2 SERPL-SCNC: 32 MMOL/L (ref 21–32)
CREAT SERPL-MCNC: 0.92 MG/DL (ref 0.6–1.3)
GLUCOSE SERPL-MCNC: 65 MG/DL (ref 74–99)
POTASSIUM SERPL-SCNC: 3.7 MMOL/L (ref 3.5–5.5)
SODIUM SERPL-SCNC: 138 MMOL/L (ref 136–145)

## 2019-08-15 PROCEDURE — 80048 BASIC METABOLIC PNL TOTAL CA: CPT

## 2019-08-15 PROCEDURE — 36415 COLL VENOUS BLD VENIPUNCTURE: CPT

## 2019-08-22 ENCOUNTER — HOSPITAL ENCOUNTER (OUTPATIENT)
Dept: LAB | Age: 84
Discharge: HOME | End: 2019-08-22
Payer: MEDICARE

## 2019-08-22 LAB
ANION GAP SERPL CALC-SCNC: 3 MMOL/L (ref 3–18)
BUN SERPL-MCNC: 14 MG/DL (ref 7–18)
BUN/CREAT SERPL: 15 (ref 12–20)
CALCIUM SERPL-MCNC: 7.8 MG/DL (ref 8.5–10.1)
CHLORIDE SERPL-SCNC: 104 MMOL/L (ref 100–111)
CO2 SERPL-SCNC: 34 MMOL/L (ref 21–32)
CREAT SERPL-MCNC: 0.91 MG/DL (ref 0.6–1.3)
GLUCOSE SERPL-MCNC: 65 MG/DL (ref 74–99)
POTASSIUM SERPL-SCNC: 3.8 MMOL/L (ref 3.5–5.5)
SODIUM SERPL-SCNC: 141 MMOL/L (ref 136–145)

## 2019-08-22 PROCEDURE — 36415 COLL VENOUS BLD VENIPUNCTURE: CPT

## 2019-08-22 PROCEDURE — 80048 BASIC METABOLIC PNL TOTAL CA: CPT

## 2019-09-30 ENCOUNTER — HOSPITAL ENCOUNTER (EMERGENCY)
Age: 84
Discharge: HOME | DRG: 314 | End: 2019-09-30
Attending: EMERGENCY MEDICINE | Admitting: HOSPITALIST
Payer: MEDICARE

## 2019-09-30 ENCOUNTER — APPOINTMENT (OUTPATIENT)
Dept: GENERAL RADIOLOGY | Age: 84
DRG: 314 | End: 2019-09-30
Attending: EMERGENCY MEDICINE
Payer: MEDICARE

## 2019-09-30 VITALS
HEIGHT: 60 IN | OXYGEN SATURATION: 92 % | SYSTOLIC BLOOD PRESSURE: 109 MMHG | DIASTOLIC BLOOD PRESSURE: 66 MMHG | HEART RATE: 71 BPM | BODY MASS INDEX: 19.63 KG/M2 | TEMPERATURE: 98.1 F | WEIGHT: 100 LBS | RESPIRATION RATE: 28 BRPM

## 2019-09-30 DIAGNOSIS — I50.9 ACUTE CONGESTIVE HEART FAILURE, UNSPECIFIED HEART FAILURE TYPE (HCC): ICD-10-CM

## 2019-09-30 DIAGNOSIS — I48.91 ATRIAL FIBRILLATION WITH RAPID VENTRICULAR RESPONSE (HCC): Primary | ICD-10-CM

## 2019-09-30 LAB
ALBUMIN SERPL-MCNC: 2.8 G/DL (ref 3.4–5)
ALBUMIN/GLOB SERPL: 0.7 {RATIO} (ref 0.8–1.7)
ALP SERPL-CCNC: 172 U/L (ref 45–117)
ALT SERPL-CCNC: 41 U/L (ref 13–56)
ANION GAP SERPL CALC-SCNC: 1 MMOL/L (ref 3–18)
AST SERPL-CCNC: 47 U/L (ref 10–38)
BASOPHILS # BLD: 0.1 K/UL (ref 0–0.1)
BASOPHILS NFR BLD: 1 % (ref 0–2)
BILIRUB SERPL-MCNC: 0.5 MG/DL (ref 0.2–1)
BNP SERPL-MCNC: ABNORMAL PG/ML (ref 0–1800)
BUN SERPL-MCNC: 24 MG/DL (ref 7–18)
BUN/CREAT SERPL: 20 (ref 12–20)
CALCIUM SERPL-MCNC: 8.4 MG/DL (ref 8.5–10.1)
CHLORIDE SERPL-SCNC: 109 MMOL/L (ref 100–111)
CK MB CFR SERPL CALC: 6.6 % (ref 0–4)
CK MB SERPL-MCNC: 5.7 NG/ML (ref 5–25)
CK SERPL-CCNC: 86 U/L (ref 26–192)
CO2 SERPL-SCNC: 30 MMOL/L (ref 21–32)
CREAT SERPL-MCNC: 1.23 MG/DL (ref 0.6–1.3)
DIFFERENTIAL METHOD BLD: ABNORMAL
DIGOXIN SERPL-MCNC: 1.6 NG/ML (ref 0.9–2)
EOSINOPHIL # BLD: 0.2 K/UL (ref 0–0.4)
EOSINOPHIL NFR BLD: 2 % (ref 0–5)
ERYTHROCYTE [DISTWIDTH] IN BLOOD BY AUTOMATED COUNT: 20.3 % (ref 11.6–14.5)
GLOBULIN SER CALC-MCNC: 3.9 G/DL (ref 2–4)
GLUCOSE SERPL-MCNC: 99 MG/DL (ref 74–99)
HCT VFR BLD AUTO: 35.1 % (ref 35–45)
HGB BLD-MCNC: 10.7 G/DL (ref 12–16)
LYMPHOCYTES # BLD: 1.5 K/UL (ref 0.9–3.6)
LYMPHOCYTES NFR BLD: 14 % (ref 21–52)
MCH RBC QN AUTO: 26.8 PG (ref 24–34)
MCHC RBC AUTO-ENTMCNC: 30.5 G/DL (ref 31–37)
MCV RBC AUTO: 87.8 FL (ref 74–97)
MONOCYTES # BLD: 1.4 K/UL (ref 0.05–1.2)
MONOCYTES NFR BLD: 13 % (ref 3–10)
NEUTS SEG # BLD: 7.7 K/UL (ref 1.8–8)
NEUTS SEG NFR BLD: 70 % (ref 40–73)
PLATELET # BLD AUTO: 349 K/UL (ref 135–420)
PMV BLD AUTO: 8.6 FL (ref 9.2–11.8)
POTASSIUM SERPL-SCNC: 5.2 MMOL/L (ref 3.5–5.5)
PROT SERPL-MCNC: 6.7 G/DL (ref 6.4–8.2)
RBC # BLD AUTO: 4 M/UL (ref 4.2–5.3)
SODIUM SERPL-SCNC: 140 MMOL/L (ref 136–145)
TROPONIN I SERPL-MCNC: 0.23 NG/ML (ref 0–0.04)
WBC # BLD AUTO: 10.8 K/UL (ref 4.6–13.2)

## 2019-09-30 PROCEDURE — 74011250637 HC RX REV CODE- 250/637: Performed by: EMERGENCY MEDICINE

## 2019-09-30 PROCEDURE — 93005 ELECTROCARDIOGRAM TRACING: CPT

## 2019-09-30 PROCEDURE — 74011250636 HC RX REV CODE- 250/636: Performed by: EMERGENCY MEDICINE

## 2019-09-30 PROCEDURE — 74011000258 HC RX REV CODE- 258: Performed by: EMERGENCY MEDICINE

## 2019-09-30 PROCEDURE — 80053 COMPREHEN METABOLIC PANEL: CPT

## 2019-09-30 PROCEDURE — 65660000000 HC RM CCU STEPDOWN

## 2019-09-30 PROCEDURE — 85025 COMPLETE CBC W/AUTO DIFF WBC: CPT

## 2019-09-30 PROCEDURE — 96376 TX/PRO/DX INJ SAME DRUG ADON: CPT

## 2019-09-30 PROCEDURE — 99285 EMERGENCY DEPT VISIT HI MDM: CPT

## 2019-09-30 PROCEDURE — 96366 THER/PROPH/DIAG IV INF ADDON: CPT

## 2019-09-30 PROCEDURE — 83880 ASSAY OF NATRIURETIC PEPTIDE: CPT

## 2019-09-30 PROCEDURE — 80162 ASSAY OF DIGOXIN TOTAL: CPT

## 2019-09-30 PROCEDURE — 71045 X-RAY EXAM CHEST 1 VIEW: CPT

## 2019-09-30 PROCEDURE — 82550 ASSAY OF CK (CPK): CPT

## 2019-09-30 PROCEDURE — 74011000250 HC RX REV CODE- 250: Performed by: EMERGENCY MEDICINE

## 2019-09-30 PROCEDURE — 96365 THER/PROPH/DIAG IV INF INIT: CPT

## 2019-09-30 RX ORDER — METOPROLOL SUCCINATE 50 MG/1
25 TABLET, EXTENDED RELEASE ORAL
Status: COMPLETED | OUTPATIENT
Start: 2019-09-30 | End: 2019-09-30

## 2019-09-30 RX ORDER — DILTIAZEM HYDROCHLORIDE 5 MG/ML
10 INJECTION INTRAVENOUS
Status: COMPLETED | OUTPATIENT
Start: 2019-09-30 | End: 2019-09-30

## 2019-09-30 RX ORDER — FUROSEMIDE 40 MG/1
20 TABLET ORAL
Status: COMPLETED | OUTPATIENT
Start: 2019-09-30 | End: 2019-09-30

## 2019-09-30 RX ADMIN — FUROSEMIDE 20 MG: 40 TABLET ORAL at 10:08

## 2019-09-30 RX ADMIN — SODIUM CHLORIDE 10 MG/HR: 900 INJECTION, SOLUTION INTRAVENOUS at 08:02

## 2019-09-30 RX ADMIN — DILTIAZEM HYDROCHLORIDE 10 MG: 5 INJECTION INTRAVENOUS at 07:58

## 2019-09-30 RX ADMIN — METOPROLOL SUCCINATE 25 MG: 50 TABLET, EXTENDED RELEASE ORAL at 10:04

## 2019-09-30 NOTE — CONSULTS
[facility] - Consult Note    Consultation request by Javier Friday, DO for advice/opinion related to evaluating Atrial fibrillation with rapid ventricular response ([facility] [address] [I48.91]    Date of  Admission: 9/30/2019  7:02 AM   Primary Care Physician:  Jesus Tate MD     Assessment:     - Atrial fibrillation with rapid ventricular response. Heart rate reportedly 150 bpm per family member at assisted living facility  - Mild fluid overload and chest pressure with minimal troponin elevation is likely because of the atrial fibrillation with rapid ventricle response, likely type II   A-fib ([address] 01/2017   CAD (coronary artery disease) 04/2012   S/P 2.75 X 15 mm BMS of OM (04/2012), Two LAD BMS (07/2012)   H/O Elevated liver enzymes    Likely from statin   HLD (hyperlipidemia)    Hyperkalemia 06/2012   Likely from lisinopril   Ischemic cardiomyopathy    LVEF  30-35%(05/19) 45% (11/2012) & 30% echo (04/2012)   NSTEMI (non-ST elevated myocardial infarction) ([address] 04/2012   UTI (urinary tract infection)    Vertigo        Plan:     Agree with IV Cardizem drip and try to wean off as heart rate allow  Would recommend to give Toprol-XL 25 mg now and try to wean Cardizem drip after that  Would recommend to give IV Lasix bolus 20/40 mg and check response  Continue with digoxin  Continue aspirin and Plavix. Suboptimal candidate for aggressive anti-Coblation  Further plan depending on hospital course after above intervention     History of Present Illness: This is a 80 y.o. female admitted for Atrial fibrillation with rapid ventricular response ([facility] [address] [I48.91]. Patient complains of:      Ms. Apolinar Orozco is 80-year-old female with a known history of atrial fibrillation, CAD, ischemic cardia myopathy. She was brought to the emergency department with feeling of chest pressure and palpitation and mild dizziness.   Patient lives in assisted living facility where for about symptoms heart rate and blood pressure was checked. Heart rate was reported to be 150 bpm  She had some mild dyspnea along with this symptoms. She denies any presyncope or syncope  She takes digoxin at home on a regular basis. In the emergency department patient was found to have atrial fibrillation with rapid medical response and IV Cardizem drip was started. Once the heart rate improved, her symptoms have essentially resolved. She denies any chest pressure at this time. Dyspnea is better. Review of Symptoms:  Except as stated above include:  Constitutional:  negative  Respiratory:  negative  Cardiovascular:  negative  Gastrointestinal: negative  Genitourinary:  negative  Musculoskeletal:  Negative  Neurological:  Negative  Dermatological:  Negative  Endocrinological: Negative  Psychological:  Negative    A comprehensive review of systems was negative except for that written in the HPI.      Past Medical History:     Past Medical History:   Diagnosis Date    A-fib [facility]) 01/2017    Arthritis, degenerative     CAD (coronary artery disease) 04/2012    S/P 2.75 X 15 mm BMS of OM (04/2012), Two LAD BMS (07/2012)    Elevated liver enzymes     Likely from statin    HLD (hyperlipidemia)     Hyperkalemia 06/2012    Likely from lisinopril    Ischemic cardiomyopathy     LVEF  30-35%(05/19) 45% (11/2012) & 30% echo (04/2012)    NSTEMI (non-ST elevated myocardial infarction) ([facility] [address] 04/2012    UTI (urinary tract infection)     Vertigo          Social History:     Social History     Socioeconomic History    Marital status:      Spouse name: Not on file    Number of children: Not on file    Years of education: Not on file    Highest education level: Not on file   Tobacco Use    Smoking status: Never Smoker    Smokeless tobacco: Never Used   Substance and Sexual Activity    Alcohol use: No    Drug use: No    Sexual activity: Never        Family History:     Family History   Problem Relation Age of Onset    Diabetes Mother Medications: Allergies   Allergen Reactions    Flu Vaccine 2011 (36 Mos+)(Pf) Anaphylaxis    Codeine Anaphylaxis    Egg Not Reported This Time    Lipitor [Atorvastatin] Nausea Only    Pcn [Penicillins] Hives        Current Facility-Administered Medications   Medication Dose Route Frequency    metoprolol succinate (TOPROL-XL) XL tablet 25 mg  25 mg Oral NOW     Current Outpatient Medications   Medication Sig    bumetanide (BUMEX) 0.5 mg tablet Take 1 Tab by mouth daily.  carbamide peroxide (MURINE EAR) 6.5 % otic solution Instill 5 drops in each ear twice daily for 4 days each month as needed for ear wax.  loperamide (IMMODIUM) 2 mg tablet Take 1 mg by mouth three (3) times daily as needed for Diarrhea.  FLUoxetine (PROZAC) 10 mg tablet Take 1 Tab by mouth daily.  mirtazapine (REMERON) 15 mg tablet Take 1 Tab by mouth nightly.  fluticasone (FLONASE) 50 mcg/actuation nasal spray 2 Sprays by Both Nostrils route daily as needed for Rhinitis.  digoxin (LANOXIN) 0.125 mg tablet Take 1 Tab by mouth daily.  aspirin 81 mg chewable tablet Take 1 Tab by mouth daily.  clopidogrel (PLAVIX) 75 mg tab Take 1 Tab by mouth daily.  nitroglycerin (NITROSTAT) 0.4 mg SL tablet 1 Tab by SubLINGual route every five (5) minutes as needed for Chest Pain. (Patient taking differently: 1 Tab by SubLINGual route every five (5) minutes as needed for Chest Pain. take 1tab sl  5min apart for a total of 3tabs then call 911)    acetaminophen (TYLENOL) 325 mg tablet Take 650 mg by mouth every six (6) hours as needed for Pain.  ondansetron (ZOFRAN ODT) 4 mg disintegrating tablet Take 1 Tab by mouth every eight (8) hours as needed for Nausea.  polyethylene glycol (MIRALAX) 17 gram/dose powder Take 17 g by mouth daily as needed (constipation).  carboxymethylcellulos/glycerin (CLEAR EYES FOR DRY EYES OP) Administer 1 Drop to both eyes three (3) times daily as needed (dry eyes).     psyllium husk-aspartame (METAMUCIL FIBER) 3.4 gram pwpk packet Take 1 Packet by mouth daily.  clotrimazole (LOTRIMIN) 1 % topical cream Apply  to affected area two (2) times a day. Apply with barrier cream to affected area. Physical Exam:     Visit Vitals  /76   Pulse 71   Temp 98.1 °F (36.7 °C)   Resp 28   Ht 5' (1.524 m)   Wt 100 lb (45.4 kg)   SpO2 97%   BMI 19.53 kg/m²     BP Readings from Last 3 Encounters:   09/30/19 115/76   07/24/19 110/80   07/23/19 120/62     Pulse Readings from Last 3 Encounters:   09/30/19 71   07/24/19 72   07/23/19 93     Wt Readings from Last 3 Encounters:   09/30/19 100 lb (45.4 kg)   07/24/19 98 lb (44.5 kg)   07/23/19 99 lb (44.9 kg)       General:  alert, cooperative, no distress, appears stated age  Neck:  no stridor, no JVD  Lungs:  Few bibasilar rales  Heart:  Irregular rhythm, S1S2+.    Abdomen:  abdomen is soft without significant tenderness, masses, organomegaly or guarding  Extremities: +1 LE edema noted  Skin: No visible skin rash  Neuro: No motor deficite     Data Review:     Recent Labs     09/30/19  0741   WBC 10.8   HGB 10.7*   HCT 35.1        Recent Labs     09/30/19  0741      K 5.2      CO2 30   GLU 99   BUN 24*   CREA 1.23   CA 8.4*   ALB 2.8*   SGOT 47*   ALT 41       Results for orders placed or performed during the hospital encounter of 06/05/19   EKG, 12 LEAD, INITIAL   Result Value Ref Range    Ventricular Rate 78 BPM    Atrial Rate 86 BPM    QRS Duration 86 ms    Q-T Interval 358 ms    QTC Calculation (Bezet) 408 ms    Calculated R Axis 87 degrees    Calculated T Axis 63 degrees    Diagnosis       Atrial fibrillation  Nonspecific ST and T wave abnormality  Abnormal ECG  When compared with ECG of 03-JUN-2019 16:59,  No significant change was found  Confirmed by Ángel Calderon ([id number]) on 6/5/2019 1:46:32 PM         All Cardiac Markers in the last 24 hours:    Lab Results   Component Value Date/Time    CPK 86 09/30/2019 07:41 AM CKMB 5.7 (H) 09/30/2019 07:41 AM    CKND1 6.6 (H) 09/30/2019 07:41 AM    TROIQ 0.23 (H) 09/30/2019 07:41 AM       Last Lipid:    Lab Results   Component Value Date/Time    Cholesterol, total 202 (H) 04/29/2019 07:45 AM    HDL Cholesterol 64 (H) 04/29/2019 07:45 AM    LDL, calculated 116.4 (H) 04/29/2019 07:45 AM    Triglyceride 108 04/29/2019 07:45 AM    CHOL/HDL Ratio 3.2 04/29/2019 07:45 AM       Signed By: Isac Santos MD     September 30, 2019

## 2019-09-30 NOTE — DISCHARGE INSTRUCTIONS

## 2019-09-30 NOTE — ED PROVIDER NOTES
EMERGENCY DEPARTMENT HISTORY AND PHYSICAL EXAM    7:21 AM      Date: 9/30/2019  Patient Name: Israel Bernard    History of Presenting Illness     Chief Complaint   Patient presents with    Chest Pain    Shortness of Breath         History Provided By: patient    Additional History (Context): Israel Bernard is a 80 y.o. female presents with comes from assisted living with tachycardia shortness of breath and some chest pressure. Currently chest pressure constant 5 out of 10. No vomiting diarrhea or recent illness abdominal pain or fever. Evangelina Villagomez PCP: Benny Shankar MD    Chief Complaint:   Duration:    Timing:    Location:   Quality:   Severity:   Modifying Factors:   Associated Symptoms:       Current Outpatient Medications   Medication Sig Dispense Refill    bumetanide (BUMEX) 0.5 mg tablet Take 1 Tab by mouth daily. 15 Tab 0    carbamide peroxide (MURINE EAR) 6.5 % otic solution Instill 5 drops in each ear twice daily for 4 days each month as needed for ear wax.  loperamide (IMMODIUM) 2 mg tablet Take 1 mg by mouth three (3) times daily as needed for Diarrhea.  FLUoxetine (PROZAC) 10 mg tablet Take 1 Tab by mouth daily. 90 Tab 3    mirtazapine (REMERON) 15 mg tablet Take 1 Tab by mouth nightly. 30 Tab 0    fluticasone (FLONASE) 50 mcg/actuation nasal spray 2 Sprays by Both Nostrils route daily as needed for Rhinitis. 1 Bottle 0    digoxin (LANOXIN) 0.125 mg tablet Take 1 Tab by mouth daily. 30 Tab 0    aspirin 81 mg chewable tablet Take 1 Tab by mouth daily. 30 Tab 0    clopidogrel (PLAVIX) 75 mg tab Take 1 Tab by mouth daily. 90 Tab 3    nitroglycerin (NITROSTAT) 0.4 mg SL tablet 1 Tab by SubLINGual route every five (5) minutes as needed for Chest Pain.  (Patient taking differently: 1 Tab by SubLINGual route every five (5) minutes as needed for Chest Pain. take 1tab sl  5min apart for a total of 3tabs then call 911) 25 Tab 3    acetaminophen (TYLENOL) 325 mg tablet Take 650 mg by mouth every six (6) hours as needed for Pain.  ondansetron (ZOFRAN ODT) 4 mg disintegrating tablet Take 1 Tab by mouth every eight (8) hours as needed for Nausea. 30 Tab 11    polyethylene glycol (MIRALAX) 17 gram/dose powder Take 17 g by mouth daily as needed (constipation).  carboxymethylcellulos/glycerin (CLEAR EYES FOR DRY EYES OP) Administer 1 Drop to both eyes three (3) times daily as needed (dry eyes).  psyllium husk-aspartame (METAMUCIL FIBER) 3.4 gram pwpk packet Take 1 Packet by mouth daily. 30 Packet 0    clotrimazole (LOTRIMIN) 1 % topical cream Apply  to affected area two (2) times a day. Apply with barrier cream to affected area. 15 g 1       Past History     Past Medical History:  Past Medical History:   Diagnosis Date    A-fib ([facility] 01/2017    Arthritis, degenerative     CAD (coronary artery disease) 04/2012    S/P 2.75 X 15 mm BMS of OM (04/2012), Two LAD BMS (07/2012)    Elevated liver enzymes     Likely from statin    HLD (hyperlipidemia)     Hyperkalemia 06/2012    Likely from lisinopril    Ischemic cardiomyopathy     LVEF  30-35%(05/19) 45% (11/2012) & 30% echo (04/2012)    [facility] (non-ST elevated [facility]) [facility] 04/2012    UTI (urinary tract infection)     Vertigo        Past Surgical History:  Past Surgical History:   Procedure Laterality Date    HX CORONARY STENT PLACEMENT  4/23/12    bare metal stent to obtuse marginal branch    HX HEART CATHETERIZATION         Family History:  Family History   Problem Relation Age of Onset    Diabetes Mother        Social History:  Social History     Tobacco Use    Smoking status: Never Smoker    Smokeless tobacco: Never Used   Substance Use Topics    Alcohol use: No    Drug use: No       Allergies:   Allergies   Allergen Reactions    Flu Vaccine 2011 (36 Mos+)(Pf) Anaphylaxis    Codeine Anaphylaxis    Egg Not Reported This Time    Lipitor [Atorvastatin] Nausea Only    Pcn [Penicillins] Hives         Review of Systems Review of Systems   Constitutional: Negative for diaphoresis and fever. HENT: Negative for congestion and sore throat. Eyes: Negative for pain and itching. Respiratory: Negative for cough and shortness of breath. Cardiovascular: Positive for chest pain and palpitations. Gastrointestinal: Negative for abdominal pain and diarrhea. Endocrine: Negative for polydipsia and polyuria. Genitourinary: Negative for dysuria and hematuria. Musculoskeletal: Negative for arthralgias and myalgias. Skin: Negative for rash and wound. Neurological: Negative for seizures and syncope. Hematological: Does not bruise/bleed easily. Psychiatric/Behavioral: Negative for agitation and hallucinations. Physical Exam       Patient Vitals for the past 12 hrs:   Temp Pulse Resp BP SpO2   09/30/19 1030  61 22 109/66 97 %   09/30/19 1015  64 23 111/67 97 %   09/30/19 1004  69  116/64    09/30/19 1000  70 25 115/66 95 %   09/30/19 0945  73 23 116/64 97 %   09/30/19 0930  71 28 117/74 97 %   09/30/19 0915  73 29 115/76 96 %   09/30/19 0900  73 22 110/65 98 %   09/30/19 0845  68 21 123/79 96 %   09/30/19 0830  74 17 119/71 96 %   09/30/19 0815  74 23 129/70 (!) 89 %   09/30/19 0800  86 18 120/72 92 %   09/30/19 0758  (!) 118  (!) 134/99    09/30/19 0745  96 27 (!) 134/99 94 %   09/30/19 0730  (!) 112 (!) 43 (!) 144/96 95 %   09/30/19 0715  (!) 109 28 (!) 138/93 91 %   09/30/19 0709 98.1 °F (36.7 °C) (!) 108 16 (!) 131/102 98 %   09/30/19 0703  (!) 128 14 (!) 131/102 94 %       Physical Exam   Constitutional: She appears well-developed and well-nourished. HENT:   Head: Normocephalic and atraumatic. Eyes: Conjunctivae are normal. No scleral icterus. Neck: Normal range of motion. Neck supple. No JVD present. Cardiovascular: Normal heart sounds. 4 intact extremity pulses tachycardic, irregular irregular   Pulmonary/Chest: Effort normal and breath sounds normal.   Abdominal: Soft.  She exhibits no mass. There is no tenderness. Musculoskeletal: Normal range of motion. Lymphadenopathy:     She has no cervical adenopathy. Neurological: She is alert. Skin: Skin is warm and dry. Nursing note and vitals reviewed. Diagnostic Study Results   Labs -  Recent Results (from the past 12 hour(s))   CBC WITH AUTOMATED DIFF    Collection Time: 09/30/19  7:41 AM   Result Value Ref Range    WBC 10.8 4.6 - 13.2 K/uL    RBC 4.00 (L) 4.20 - 5.30 M/uL    HGB 10.7 (L) 12.0 - 16.0 g/dL    HCT 35.1 35.0 - 45.0 %    MCV 87.8 74.0 - 97.0 FL    MCH 26.8 24.0 - 34.0 PG    MCHC 30.5 (L) 31.0 - 37.0 g/dL    RDW 20.3 (H) 11.6 - 14.5 %    PLATELET 415 192 - 005 K/uL    MPV 8.6 (L) 9.2 - 11.8 FL    NEUTROPHILS 70 40 - 73 %    LYMPHOCYTES 14 (L) 21 - 52 %    MONOCYTES 13 (H) 3 - 10 %    EOSINOPHILS 2 0 - 5 %    BASOPHILS 1 0 - 2 %    ABS. NEUTROPHILS 7.7 1.8 - 8.0 K/UL    ABS. LYMPHOCYTES 1.5 0.9 - 3.6 K/UL    ABS. MONOCYTES 1.4 (H) 0.05 - 1.2 K/UL    ABS. EOSINOPHILS 0.2 0.0 - 0.4 K/UL    ABS. BASOPHILS 0.1 0.0 - 0.1 K/UL    DF AUTOMATED     METABOLIC PANEL, COMPREHENSIVE    Collection Time: 09/30/19  7:41 AM   Result Value Ref Range    Sodium 140 136 - 145 mmol/L    Potassium 5.2 3.5 - 5.5 mmol/L    Chloride 109 100 - 111 mmol/L    CO2 30 21 - 32 mmol/L    Anion gap 1 (L) 3.0 - 18 mmol/L    Glucose 99 74 - 99 mg/dL    BUN 24 (H) 7.0 - 18 MG/DL    Creatinine 1.23 0.6 - 1.3 MG/DL    BUN/Creatinine ratio 20 12 - 20      GFR est AA 49 (L) >60 ml/min/1.73m2    GFR est non-AA 41 (L) >60 ml/min/1.73m2    Calcium 8.4 (L) 8.5 - 10.1 MG/DL    Bilirubin, total 0.5 0.2 - 1.0 MG/DL    ALT (SGPT) 41 13 - 56 U/L    AST (SGOT) 47 (H) 10 - 38 U/L    Alk.  phosphatase 172 (H) 45 - 117 U/L    Protein, total 6.7 6.4 - 8.2 g/dL    Albumin 2.8 (L) 3.4 - 5.0 g/dL    Globulin 3.9 2.0 - 4.0 g/dL    A-G Ratio 0.7 (L) 0.8 - 1.7     CARDIAC PANEL,(CK, CKMB & TROPONIN)    Collection Time: 09/30/19  7:41 AM   Result Value Ref Range    CK 86 26 - 192 U/L    CK - MB 5.7 (H) <3.6 ng/ml    CK-MB Index 6.6 (H) 0.0 - 4.0 %    Troponin-I, QT 0.23 (H) 0.0 - 0.045 NG/ML   NT-PRO BNP    Collection Time: 09/30/19  7:41 AM   Result Value Ref Range    NT pro-BNP 12,369 (H) 0 - 1,800 PG/ML   DIGOXIN    Collection Time: 09/30/19  7:41 AM   Result Value Ref Range    Digoxin level 1.6 0.9 - 2.0 NG/ML       Radiologic Studies -   XR CHEST PORT   Final Result   Impression:      Mild peribronchial thickening and streaky opacities in the lower lung zone which   could represent chronic changes or improving upper respiratory infection. No new   infiltrates. Xr Chest Port    Result Date: 9/30/2019  CHEST AP PORTABLE Indication: Chest pain with shortness of breath. Comparison: X-ray 06/05/2019. Findings: There is mild peribronchial thickening in the lower lung zone, improved from prior examination. Minimal streaky opacities in the lower lung zones bilaterally also appears improved from prior study. Left costophrenic angle sharp. Stable slight blunting of the right costophrenic angle, suspect pleural-parenchymal scarring. No new airspace disease. The cardiac silhouette and pulmonary vascularity appear within normal limits. No evidence for pneumothorax or pleural effusion. Impression: Mild peribronchial thickening and streaky opacities in the lower lung zone which could represent chronic changes or improving upper respiratory infection. No new infiltrates. Medications ordered:   Medications   dilTIAZem (CARDIZEM) injection 10 mg (10 mg IntraVENous Given 9/30/19 0508)   dilTIAZem (CARDIZEM) 100 mg in 0.9% sodium chloride (MBP/ADV) 100 mL infusion (0 mg/hr IntraVENous IV Completed 9/30/19 1035)   metoprolol succinate (TOPROL-XL) XL tablet 25 mg (25 mg Oral Given 9/30/19 1004)   furosemide (LASIX) tablet 20 mg (20 mg Oral Given 9/30/19 1008)         Medical Decision Making   Initial Medical Decision Making and DDx:  Appears calm comfortable and imminently stable.   A. fib on the twelve-lead. Will start Cardizem. Less likely ACS CHF PE pneumonia pneumothorax. Vital sign abnormalities are due to atrial fibrillation, not sepsis    ED Course: Progress Notes, Reevaluation, and Consults:  ED Course as of Sep 30 1053   Mon Sep 30, 2019   4760 Critical care time 35 minutes for management of atrial fibrillation with rapid ventricular response. System at risk cardiovascular.    [CB]   0964 Reassessed the patient stable vitals pulse is now 70 on 10 of Cardizem drip. Not requiring oxygen. Blood pressure is fine. I notified Dr. Misty Vargas hospitalist via [facility] text for admission.    [CB]   1000 Dr. Yolanda Grubbs saw and evaluated the patient, spoke to me, we will give her Toprol and try to decrease the drip, a little bit of Lasix, she may be suitable for discharge and he can see her in the office in the next day or 2. Previously when she was admitted she had severe deconditioning.    [CB]      ED Course User Index  [CB] Carisa Brink MD     Noted elevated BNP and some fluid on her chest x-ray. We will see how she does with correction of her heart rate, this may fix the problem. Would hold diuretics and nitro for the moment. 10:53 AM Pt reevaluated at this time. Discussed results and findings, as well as, diagnosis and plan for discharge. Follow up with doctors/services listed. Return to the emergency department for alarming symptoms. Pt verbalizes understanding and agreement with plan. All questions addressed. Patient is received oral doses of Lasix and metoprolol she is off the Cardizem drip she ambulates around the department very quickly with stable vital signs. I reviewed Dr. Lupe Barber note, no change to her ongoing medications. They will call to make an appointment in the next day or 2 with him in the office return to the emergency department for any alarming problems. They are happy with the plan to go home    I am the first provider for this patient.     I reviewed the vital signs, available nursing notes, past medical history, past surgical history, family history and social history. Patient Vitals for the past 12 hrs:   Temp Pulse Resp BP SpO2   09/30/19 1030  61 22 109/66 97 %   09/30/19 1015  64 23 111/67 97 %   09/30/19 1004  69  116/64    09/30/19 1000  70 25 115/66 95 %   09/30/19 0945  73 23 116/64 97 %   09/30/19 0930  71 28 117/74 97 %   09/30/19 0915  73 29 115/76 96 %   09/30/19 0900  73 22 110/65 98 %   09/30/19 0845  68 21 123/79 96 %   09/30/19 0830  74 17 119/71 96 %   09/30/19 0815  74 23 129/70 (!) 89 %   09/30/19 0800  86 18 120/72 92 %   09/30/19 0758  (!) 118  (!) 134/99    09/30/19 0745  96 27 (!) 134/99 94 %   09/30/19 0730  (!) 112 (!) 43 (!) 144/96 95 %   09/30/19 0715  (!) 109 28 (!) 138/93 91 %   09/30/19 0709 98.1 °F (36.7 °C) (!) 108 16 (!) 131/102 98 %   09/30/19 0703  (!) 128 14 (!) 131/102 94 %       Vital Signs-Reviewed the patient's vital signs. Pulse Oximetry Analysis, Cardiac Monitor, 12 lead ekg:     Telemetry atrial fibrillation with a ventricular rate of 108. Twelve-lead EKG at 7:16 AM, A. fib with rapid ventricular response with ventricular rate of 134, increased rate otherwise no change compared to prior. Oximetry 98% on room air. Interpreted by the EP. Records Reviewed: Nursing notes reviewed (Time of Review: 7:21 AM)    Procedures:   Critical Care Time:   Aspirin: (was aspirin given for stroke?)    Diagnosis     Clinical Impression:   1. Atrial fibrillation with rapid ventricular response ([address]    2.  Acute congestive heart failure, unspecified heart failure type [facility])        Disposition: Discharged      Follow-up Information     Follow up With Specialties Details Why Contact Info    Aliyah Pedro MD Cardiology, Internal Medicine In 1 day  [facility]  [address] [phone]             Patient's Medications   Start Taking    No medications on file   Continue Taking ACETAMINOPHEN (TYLENOL) 325 MG TABLET    Take 650 mg by mouth every six (6) hours as needed for Pain. ASPIRIN 81 MG CHEWABLE TABLET    Take 1 Tab by mouth daily. BUMETANIDE (BUMEX) 0.5 MG TABLET    Take 1 Tab by mouth daily. CARBAMIDE PEROXIDE (MURINE EAR) 6.5 % OTIC SOLUTION    Instill 5 drops in each ear twice daily for 4 days each month as needed for ear wax. CARBOXYMETHYLCELLULOS/GLYCERIN (CLEAR EYES FOR DRY EYES OP)    Administer 1 Drop to both eyes three (3) times daily as needed (dry eyes). CLOPIDOGREL (PLAVIX) 75 MG TAB    Take 1 Tab by mouth daily. CLOTRIMAZOLE (LOTRIMIN) 1 % TOPICAL CREAM    Apply  to affected area two (2) times a day. Apply with barrier cream to affected area. DIGOXIN (LANOXIN) 0.125 MG TABLET    Take 1 Tab by mouth daily. FLUOXETINE (PROZAC) 10 MG TABLET    Take 1 Tab by mouth daily. FLUTICASONE (FLONASE) 50 MCG/ACTUATION NASAL SPRAY    2 Sprays by Both Nostrils route daily as needed for Rhinitis. LOPERAMIDE (IMMODIUM) 2 MG TABLET    Take 1 mg by mouth three (3) times daily as needed for Diarrhea. MIRTAZAPINE (REMERON) 15 MG TABLET    Take 1 Tab by mouth nightly. NITROGLYCERIN (NITROSTAT) 0.4 MG SL TABLET    1 Tab by SubLINGual route every five (5) minutes as needed for Chest Pain. ONDANSETRON (ZOFRAN ODT) 4 MG DISINTEGRATING TABLET    Take 1 Tab by mouth every eight (8) hours as needed for Nausea. POLYETHYLENE GLYCOL (MIRALAX) 17 GRAM/DOSE POWDER    Take 17 g by mouth daily as needed (constipation). PSYLLIUM HUSK-ASPARTAME (METAMUCIL FIBER) 3.4 GRAM PWPK PACKET    Take 1 Packet by mouth daily.    These Medications have changed    No medications on file   Stop Taking    No medications on file     _______________________________    Notes:    Ade Sandoval MD using Dragon dictation      _______________________________

## 2019-09-30 NOTE — ED TRIAGE NOTES
PT A&OX3, patent airway, non-labored breathing, PERRLA, skin warm/dry, arrive via EMS from [address] [place] [address] of [place] [address] with C/O \"chest discomfort with SOB\". Staff gave 1 sublingual nitro, EMS gave 324 ASA. PT denies complaints at this time. PT AFIB on monitor.

## 2019-09-30 NOTE — ED NOTES
Patient was able to ambulate around the ED with little assistance. Patient did say she has some SOB after the walk.  Patient to be discharged

## 2019-10-01 ENCOUNTER — APPOINTMENT (OUTPATIENT)
Dept: GENERAL RADIOLOGY | Age: 84
DRG: 314 | End: 2019-10-01
Attending: EMERGENCY MEDICINE
Payer: MEDICARE

## 2019-10-01 ENCOUNTER — HOSPITAL ENCOUNTER (INPATIENT)
Age: 84
LOS: 3 days | Discharge: NURSING, REHAB OR LONG-TERM CARE | DRG: 314 | End: 2019-10-04
Attending: EMERGENCY MEDICINE | Admitting: INTERNAL MEDICINE
Payer: MEDICARE

## 2019-10-01 DIAGNOSIS — I95.9 HYPOTENSION, UNSPECIFIED HYPOTENSION TYPE: ICD-10-CM

## 2019-10-01 DIAGNOSIS — R00.1 BRADYCARDIA: Primary | ICD-10-CM

## 2019-10-01 PROBLEM — E87.20 METABOLIC ACIDOSIS: Status: ACTIVE | Noted: 2019-10-01

## 2019-10-01 PROBLEM — R21 SKIN RASH: Status: ACTIVE | Noted: 2019-10-01

## 2019-10-01 PROBLEM — R63.6 UNDERWEIGHT: Status: ACTIVE | Noted: 2019-10-01

## 2019-10-01 PROBLEM — I48.91 ATRIAL FIBRILLATION (HCC): Status: ACTIVE | Noted: 2019-10-01

## 2019-10-01 LAB
ALBUMIN SERPL-MCNC: 2.6 G/DL (ref 3.4–5)
ALBUMIN/GLOB SERPL: 0.7 {RATIO} (ref 0.8–1.7)
ALP SERPL-CCNC: 189 U/L (ref 45–117)
ALT SERPL-CCNC: 45 U/L (ref 13–56)
ANION GAP SERPL CALC-SCNC: 16 MMOL/L (ref 3–18)
APTT PPP: 42.8 SEC (ref 23–36.4)
APTT PPP: 58.6 SEC (ref 23–36.4)
ARTERIAL PATENCY WRIST A: YES
AST SERPL-CCNC: 74 U/L (ref 10–38)
BASE DEFICIT BLD-SCNC: 12 MMOL/L
BASOPHILS # BLD: 0.1 K/UL (ref 0–0.1)
BASOPHILS NFR BLD: 1 % (ref 0–2)
BDY SITE: ABNORMAL
BILIRUB SERPL-MCNC: 0.6 MG/DL (ref 0.2–1)
BNP SERPL-MCNC: ABNORMAL PG/ML (ref 0–1800)
BODY TEMPERATURE: 97.6
BUN SERPL-MCNC: 28 MG/DL (ref 7–18)
BUN/CREAT SERPL: 18 (ref 12–20)
CALCIUM SERPL-MCNC: 7.7 MG/DL (ref 8.5–10.1)
CALCULATED R AXIS, ECG10: 105 DEGREES
CALCULATED T AXIS, ECG11: -152 DEGREES
CHLORIDE SERPL-SCNC: 109 MMOL/L (ref 100–111)
CK MB CFR SERPL CALC: 4.2 % (ref 0–4)
CK MB SERPL-MCNC: 4.5 NG/ML (ref 5–25)
CK SERPL-CCNC: 107 U/L (ref 26–192)
CO2 SERPL-SCNC: 13 MMOL/L (ref 21–32)
CREAT SERPL-MCNC: 1.6 MG/DL (ref 0.6–1.3)
DIAGNOSIS, 93000: NORMAL
DIFFERENTIAL METHOD BLD: ABNORMAL
DIGOXIN SERPL-MCNC: 1.2 NG/ML (ref 0.9–2)
EOSINOPHIL # BLD: 0 K/UL (ref 0–0.4)
EOSINOPHIL NFR BLD: 0 % (ref 0–5)
ERYTHROCYTE [DISTWIDTH] IN BLOOD BY AUTOMATED COUNT: 20.7 % (ref 11.6–14.5)
GAS FLOW.O2 O2 DELIVERY SYS: ABNORMAL L/MIN
GAS FLOW.O2 SETTING OXYMISER: 4 L/M
GLOBULIN SER CALC-MCNC: 3.6 G/DL (ref 2–4)
GLUCOSE SERPL-MCNC: 201 MG/DL (ref 74–99)
HCO3 BLD-SCNC: 15.8 MMOL/L (ref 22–26)
HCT VFR BLD AUTO: 36 % (ref 35–45)
HGB BLD-MCNC: 10.8 G/DL (ref 12–16)
LYMPHOCYTES # BLD: 1.6 K/UL (ref 0.9–3.6)
LYMPHOCYTES NFR BLD: 13 % (ref 21–52)
MCH RBC QN AUTO: 27.2 PG (ref 24–34)
MCHC RBC AUTO-ENTMCNC: 30 G/DL (ref 31–37)
MCV RBC AUTO: 90.7 FL (ref 74–97)
MONOCYTES # BLD: 1.1 K/UL (ref 0.05–1.2)
MONOCYTES NFR BLD: 9 % (ref 3–10)
NEUTS SEG # BLD: 9.3 K/UL (ref 1.8–8)
NEUTS SEG NFR BLD: 77 % (ref 40–73)
O2/TOTAL GAS SETTING VFR VENT: 36 %
PCO2 BLD: 36.8 MMHG (ref 35–45)
PH BLD: 7.24 [PH] (ref 7.35–7.45)
PLATELET # BLD AUTO: 331 K/UL (ref 135–420)
PMV BLD AUTO: 9 FL (ref 9.2–11.8)
PO2 BLD: 125 MMHG (ref 80–100)
POTASSIUM SERPL-SCNC: 5 MMOL/L (ref 3.5–5.5)
PROT SERPL-MCNC: 6.2 G/DL (ref 6.4–8.2)
Q-T INTERVAL, ECG07: 262 MS
QRS DURATION, ECG06: 86 MS
QTC CALCULATION (BEZET), ECG08: 391 MS
RBC # BLD AUTO: 3.97 M/UL (ref 4.2–5.3)
SAO2 % BLD: 98 % (ref 92–97)
SERVICE CMNT-IMP: ABNORMAL
SODIUM SERPL-SCNC: 138 MMOL/L (ref 136–145)
SPECIMEN TYPE: ABNORMAL
TOTAL RESP. RATE, ITRR: 24
TROPONIN I SERPL-MCNC: 0.23 NG/ML (ref 0–0.04)
VENTRICULAR RATE, ECG03: 134 BPM
WBC # BLD AUTO: 12.1 K/UL (ref 4.6–13.2)

## 2019-10-01 PROCEDURE — 99285 EMERGENCY DEPT VISIT HI MDM: CPT

## 2019-10-01 PROCEDURE — 96375 TX/PRO/DX INJ NEW DRUG ADDON: CPT

## 2019-10-01 PROCEDURE — 77010033678 HC OXYGEN DAILY

## 2019-10-01 PROCEDURE — 36600 WITHDRAWAL OF ARTERIAL BLOOD: CPT

## 2019-10-01 PROCEDURE — 71045 X-RAY EXAM CHEST 1 VIEW: CPT

## 2019-10-01 PROCEDURE — 96374 THER/PROPH/DIAG INJ IV PUSH: CPT

## 2019-10-01 PROCEDURE — 85025 COMPLETE CBC W/AUTO DIFF WBC: CPT

## 2019-10-01 PROCEDURE — 83880 ASSAY OF NATRIURETIC PEPTIDE: CPT

## 2019-10-01 PROCEDURE — 82803 BLOOD GASES ANY COMBINATION: CPT

## 2019-10-01 PROCEDURE — 93005 ELECTROCARDIOGRAM TRACING: CPT

## 2019-10-01 PROCEDURE — 74011250636 HC RX REV CODE- 250/636: Performed by: PHYSICIAN ASSISTANT

## 2019-10-01 PROCEDURE — 80053 COMPREHEN METABOLIC PANEL: CPT

## 2019-10-01 PROCEDURE — 85730 THROMBOPLASTIN TIME PARTIAL: CPT

## 2019-10-01 PROCEDURE — 74011250636 HC RX REV CODE- 250/636: Performed by: EMERGENCY MEDICINE

## 2019-10-01 PROCEDURE — 77030038269 HC DRN EXT URIN PURWCK BARD -A

## 2019-10-01 PROCEDURE — 74011250637 HC RX REV CODE- 250/637: Performed by: EMERGENCY MEDICINE

## 2019-10-01 PROCEDURE — 65660000000 HC RM CCU STEPDOWN

## 2019-10-01 PROCEDURE — 80162 ASSAY OF DIGOXIN TOTAL: CPT

## 2019-10-01 PROCEDURE — 82550 ASSAY OF CK (CPK): CPT

## 2019-10-01 PROCEDURE — 77030021352 HC CBL LD SYS DISP COVD -B

## 2019-10-01 RX ORDER — HEPARIN SODIUM 1000 [USP'U]/ML
INJECTION, SOLUTION INTRAVENOUS; SUBCUTANEOUS
Status: DISCONTINUED
Start: 2019-10-01 | End: 2019-10-01 | Stop reason: WASHOUT

## 2019-10-01 RX ORDER — HEPARIN SODIUM 5000 [USP'U]/ML
3000 INJECTION, SOLUTION INTRAVENOUS; SUBCUTANEOUS ONCE
Status: COMPLETED | OUTPATIENT
Start: 2019-10-01 | End: 2019-10-01

## 2019-10-01 RX ORDER — ASPIRIN 325 MG
325 TABLET ORAL
Status: COMPLETED | OUTPATIENT
Start: 2019-10-01 | End: 2019-10-01

## 2019-10-01 RX ORDER — ATROPINE SULFATE 0.1 MG/ML
0.5 INJECTION INTRAVENOUS
Status: COMPLETED | OUTPATIENT
Start: 2019-10-01 | End: 2019-10-01

## 2019-10-01 RX ORDER — HEPARIN SODIUM 1000 [USP'U]/ML
60 INJECTION, SOLUTION INTRAVENOUS; SUBCUTANEOUS ONCE
Status: COMPLETED | OUTPATIENT
Start: 2019-10-01 | End: 2019-10-01

## 2019-10-01 RX ORDER — HEPARIN SODIUM 10000 [USP'U]/100ML
12-25 INJECTION, SOLUTION INTRAVENOUS
Status: DISCONTINUED | OUTPATIENT
Start: 2019-10-01 | End: 2019-10-02

## 2019-10-01 RX ADMIN — ATROPINE SULFATE 0.5 MG: 0.1 INJECTION, SOLUTION ENDOTRACHEAL; INTRAMUSCULAR; INTRAVENOUS; SUBCUTANEOUS at 11:45

## 2019-10-01 RX ADMIN — HEPARIN SODIUM 3000 UNITS: 5000 INJECTION INTRAVENOUS; SUBCUTANEOUS at 20:30

## 2019-10-01 RX ADMIN — SODIUM CHLORIDE 1000 ML: 900 INJECTION, SOLUTION INTRAVENOUS at 11:50

## 2019-10-01 RX ADMIN — ASPIRIN 325 MG ORAL TABLET 325 MG: 325 PILL ORAL at 13:31

## 2019-10-01 RX ADMIN — HEPARIN SODIUM 14 UNITS/KG/HR: 10000 INJECTION, SOLUTION INTRAVENOUS at 21:40

## 2019-10-01 RX ADMIN — HEPARIN SODIUM 12 UNITS/KG/HR: 10000 INJECTION, SOLUTION INTRAVENOUS at 13:32

## 2019-10-01 RX ADMIN — HEPARIN SODIUM 2720 UNITS: 1000 INJECTION, SOLUTION INTRAVENOUS; SUBCUTANEOUS at 13:31

## 2019-10-01 NOTE — CONSULTS
[facility] - Consult Note    Date of  Admission: 10/1/2019 11:31 AM   Primary Care Physician:  Nunu Portillo MD  Patient seen and examined independently. Patient nauseated earlier today. No complaints of chest pain at present. Patient in atrial fibrillation on presentation. Electrocardiogram mildly suspicious for ST elevation inferior leads. Will await second set serum troponin. We will plan to anticoagulate in the interim. Agree with assessment and plan as noted below. Mirian Awad MD   Assessment:     - Presented with bradycardia, hypotension. EKG with ST elevation in lead III on admission, ST depression in I and aVL. No CP   - Syncopal episode  - ER visit 09/30/19 with atrial fibrillation with rapid ventricular response. Was given oral Lasix and Metoprolol before discharged from the ED   - Mild fluid overload and chest pressure ER visit 09/30  - CAD:  S/P 2.75 X 15 mm BMS of OM (in the setting of NSTEMI) 04/2012, Two LAD BMS 07/2012  - HLD   - History of Ischemic cardiomyopathy: LVEF  30-35%(06/19) 45% (11/2012) & 30% echo (04/2012)  - Arthritis  - DNR status      Plan:     - Patient was seen urgently at bedside upon consult request. She presented with afib RVR yesterday, 09/30, and some mild volume overload, also Troponin . 23. She was given oral Metoprolol for afib and one dose of oral Lasix, and was discharged from ED  - She is returning today with bradycardia, hypotension, reports of possible syncopal spell. Received . 5 mg Atropine with improvement in HR and BP  - EKG did show some ST elevation in lead III and ST depression in 1 and aVL, subsequent EKG's were performed 15 minutes apart without dynamic or evolving changes. Attending cardiologist reviewed and final decision of further workup per MD. Patient has remained pain free throughout this time.  Troponin value is exactly the same at .23.   - ASA given, starting Heparin with bolus and drip  - Trend enzymes     History of Present Illness: This is a 80 y.o. female admitted for No admission diagnoses are documented for this encounter. .    Patient complains of:  Seen in consult for bradycardia, hypotension, abnormal EKG    This is a 80year old female with a history of afib, CAD and hypertension that cardiology is seeing in consult due to reasons listed above. She presented yesterday with afib RVR. She was on Cardizem drip, received dose of Metoprolol with improvement in rates, also received Lasix for volume overload. She had complete resolution of symptoms and was discharged from the ED. This morning she returned to the ED with complaints of severe weakness, also a report of falling off the toilet and thinking she may have \"blacked out. \" No head trauma. No CP. She did have nausea and vomiting, and she reports that she has had this issue since her teenage years. Currently she denies any pain and just states that she overall feels very weak and tired. Cardiac risk factors: dyslipidemia, hypertension, post-menopausal      Review of Symptoms:  Except as stated above include:  Constitutional:  negative  Respiratory:  negative  Cardiovascular: per HPI  Gastrointestinal: +N/V  Genitourinary:  negative  Musculoskeletal:  Negative  Neurological:  Negative  Dermatological:  Negative  Endocrinological: Negative  Psychological:  Negative    A comprehensive review of systems was negative except for that written in the HPI.      Past Medical History:     Past Medical History:   Diagnosis Date    A-fib [facility]) 01/2017    Arthritis, degenerative     CAD (coronary artery disease) 04/2012    S/P 2.75 X 15 mm BMS of OM (04/2012), Two LAD BMS (07/2012)    Elevated liver enzymes     Likely from statin    HLD (hyperlipidemia)     Hyperkalemia 06/2012    Likely from lisinopril    Ischemic cardiomyopathy     LVEF  30-35%(05/19) 45% (11/2012) & 30% echo (04/2012)    NSTEMI (non-ST elevated myocardial infarction) ([facility] [address] 04/2012    UTI (urinary tract infection)     Vertigo          Social History:     Social History     Socioeconomic History    Marital status:      Spouse name: Not on file    Number of children: Not on file    Years of education: Not on file    Highest education level: Not on file   Tobacco Use    Smoking status: Never Smoker    Smokeless tobacco: Never Used   Substance and Sexual Activity    Alcohol use: No    Drug use: No    Sexual activity: Never        Family History:     Family History   Problem Relation Age of Onset    Diabetes Mother         Medications: Allergies   Allergen Reactions    Flu Vaccine 2011 (36 Mos+)(Pf) Anaphylaxis    Codeine Anaphylaxis    Egg Not Reported This Time    Lipitor [Atorvastatin] Nausea Only    Pcn [Penicillins] Hives        Current Facility-Administered Medications   Medication Dose Route Frequency    aspirin tablet 325 mg  325 mg Oral NOW    heparin (porcine) 1,000 unit/mL injection 2,720 Units  60 Units/kg IntraVENous ONCE    heparin 25,000 units in D5W 250 ml infusion  12-25 Units/kg/hr IntraVENous TITRATE     Current Outpatient Medications   Medication Sig    acetaminophen (TYLENOL) 325 mg tablet Take 650 mg by mouth every six (6) hours as needed for Pain.  bumetanide (BUMEX) 0.5 mg tablet Take 1 Tab by mouth daily.  ondansetron (ZOFRAN ODT) 4 mg disintegrating tablet Take 1 Tab by mouth every eight (8) hours as needed for Nausea.  polyethylene glycol (MIRALAX) 17 gram/dose powder Take 17 g by mouth daily as needed (constipation).  carboxymethylcellulos/glycerin (CLEAR EYES FOR DRY EYES OP) Administer 1 Drop to both eyes three (3) times daily as needed (dry eyes).  carbamide peroxide (MURINE EAR) 6.5 % otic solution Instill 5 drops in each ear twice daily for 4 days each month as needed for ear wax.  loperamide (IMMODIUM) 2 mg tablet Take 1 mg by mouth three (3) times daily as needed for Diarrhea.     FLUoxetine (PROZAC) 10 mg tablet Take 1 Tab by mouth daily.    psyllium husk-aspartame (METAMUCIL FIBER) 3.4 gram pwpk packet Take 1 Packet by mouth daily.  mirtazapine (REMERON) 15 mg tablet Take 1 Tab by mouth nightly.  fluticasone (FLONASE) 50 mcg/actuation nasal spray 2 Sprays by Both Nostrils route daily as needed for Rhinitis.  digoxin (LANOXIN) 0.125 mg tablet Take 1 Tab by mouth daily.  aspirin 81 mg chewable tablet Take 1 Tab by mouth daily.  clotrimazole (LOTRIMIN) 1 % topical cream Apply  to affected area two (2) times a day. Apply with barrier cream to affected area.  clopidogrel (PLAVIX) 75 mg tab Take 1 Tab by mouth daily.  nitroglycerin (NITROSTAT) 0.4 mg SL tablet 1 Tab by SubLINGual route every five (5) minutes as needed for Chest Pain. (Patient taking differently: 1 Tab by SubLINGual route every five (5) minutes as needed for Chest Pain. take 1tab sl  5min apart for a total of 3tabs then call 911)         Physical Exam:     Visit Vitals  /68   Pulse (!) 120   Temp 97.6 °F (36.4 °C)   Resp 29   Ht 5' 1\" (1.549 m)   Wt 100 lb (45.4 kg)   SpO2 97%   BMI 18.89 kg/m²     BP Readings from Last 3 Encounters:   10/01/19 108/68   09/30/19 109/66   07/24/19 110/80     Pulse Readings from Last 3 Encounters:   10/01/19 (!) 120   09/30/19 71   07/24/19 72     Wt Readings from Last 3 Encounters:   10/01/19 100 lb (45.4 kg)   09/30/19 100 lb (45.4 kg)   07/24/19 98 lb (44.5 kg)       General:  fatigued, cooperative, no distress  Neck:  nontender, no JVD  Lungs:  clear to auscultation bilaterally  Heart:  regular rate and rhythm, S1, S2 normal, no murmur, click, rub or gallop  Abdomen:  abdomen is soft without significant tenderness, masses, organomegaly or guarding  Extremities:  extremities normal, atraumatic, no cyanosis or edema  Skin: Warm and dry.  no hyperpigmentation, vitiligo, or suspicious lesions  Neuro: alert, oriented x3, affect appropriate, no focal neurological deficits  Psych: non focal     Data Review:     Recent Labs 10/01/19  1148 09/30/19  0741   WBC 12.1 10.8   HGB 10.8* 10.7*   HCT 36.0 35.1    349     Recent Labs     10/01/19  1148 09/30/19  0741    140   K 5.0 5.2    109   CO2 13* 30   * 99   BUN 28* 24*   CREA 1.60* 1.23   CA 7.7* 8.4*   ALB 2.6* 2.8*   SGOT 74* 47*   ALT 45 41       Results for orders placed or performed during the hospital encounter of 10/01/19   EKG, 12 LEAD, INITIAL   Result Value Ref Range    Ventricular Rate 44 BPM    QRS Duration 90 ms    Q-T Interval 436 ms    QTC Calculation (Bezet) 372 ms    Calculated R Axis 104 degrees    Calculated T Axis 133 degrees    Diagnosis       Junctional bradycardia with occasional premature ventricular complexes  Possible Right ventricular hypertrophy  Marked ST abnormality, possible lateral subendocardial injury  Abnormal ECG  When compared with ECG of 30-SEP-2019 07:16,  Junctional rhythm has replaced Atrial fibrillation  Vent. rate has decreased BY  90 BPM  ST elevation now present in Inferior leads  Nonspecific T wave abnormality no longer evident in Inferior leads  Inverted T waves have replaced nonspecific T wave abnormality in Lateral   leads         All Cardiac Markers in the last 24 hours:    Lab Results   Component Value Date/Time     10/01/2019 11:48 AM    CKMB 4.5 (H) 10/01/2019 11:48 AM    CKND1 4.2 (H) 10/01/2019 11:48 AM    TROIQ 0.23 (H) 10/01/2019 11:48 AM       Last Lipid:    Lab Results   Component Value Date/Time    Cholesterol, total 202 (H) 04/29/2019 07:45 AM    HDL Cholesterol 64 (H) 04/29/2019 07:45 AM    LDL, calculated 116.4 (H) 04/29/2019 07:45 AM    Triglyceride 108 04/29/2019 07:45 AM    CHOL/HDL Ratio 3.2 04/29/2019 07:45 AM       Signed By: Rachel Harmon.  Dallie Mortimer     October 1, 2019

## 2019-10-01 NOTE — H&P
Internal Medicine History and Physical  Note           NAME:  Flavia Chatman   :   8/10/1927   MRN:  [id number]     PCP:  Kelly Benton MD     Date/Time:  10/1/2019 1:09 PM      I hereby certify this patient for admission based upon medical necessity as noted below:        Assessment / plan :        #   Hypotension (10/1/2019). Factors: poor po intake, hypovolemia from GI problems, A fib/cardiac medications. - gentle iv hydration and follow. Check trop level. careful about fluids as CMP. Defer repeat TTE to cards, may had recent one in the office    # Husam Rendon cardia . Improved with trop. Iv.     #   CAD (coronary artery disease) , ICMP , S/P 2.75 X 15 mm BMS of Obtuse marginal. Home regimen. Cardiology on boards    #   HLD (hyperlipidemia) ()     #  Acute metabolic encephalopathy . Due to hypotension. Improved in ER and back to base line. # Atrial fibrillation with rapid ventricular response . Started on heparin ggt in ER. ASA. - she is on Digoxin. Check level     # Abnormal EKG. No CP. Tele bed. Serial EKG as needed. Trop level. Cards on board     # Nausea , retching and diarrhea. Ho chronic IBS. Also Digoxin might contribute if high level        # Skin rash (10/1/2019) unclear etiology. Topical regimen. Verify source per course     # Acute   Metabolic acidosis , non anion gap. GI problems. Symptomatic support and maintain hydration. Follow labs    #  Underweight . Not eating well for 8 years. On remeron. very poor eater for years and family tried many thing with her. Nutritionist consulted   - Body mass index is 18.89 kg/m². -DVT prophylaxis :  Heparin.   - Code Status : DNR as dw family/pt    -Other chronic medical problems as per past history. Further management depend on the course of the case and expanded data base.   DISPO - pt to be admitted at this time for reasons addressed above, continued hospitalization for ongoing assessment and treatment indicated        Subjective:     CHIEF COMPLAINT: feeling sick     HISTORY OF PRESENT ILLNESS:     Ms. Vivian Wesley is a 80 y.o.  female who is admitted for Hypotension , bradycardia and AMS, a fib and other cardiac history as noted per the chart. Ms. Vivian Wesley presented to the Emergency Department today  complaining of above. Patient referred me to her family for history as poor hearing and tired though she report she is much better in ER. This am family was at her place in the assisted living across the road. Pt was nauseous , given zofran, started retching and dry heaves, had little diarrhea, went to toilet, family followed her, she was slumped forward and groggy, nurse followed her and checked BP, was in 76s and brought to ER. She is a patient of Dr Bong Bella from cardiology for years, seen yesterday in ER for a fib RVR and treated with cardizem infusion , seen by cardio and sent home to be followed today in office. Today in ER her HR nad BP was significantly low , given atropine and her VS improved. Some EKG changes also noted in ER. Trop checked with no changes in ER from yesterday. Has chronic diarrhea from IBS. Also very poor eater for years and family tried many thing with her.          Past Medical History:   Diagnosis Date    [facility]fib [facility]) 01/2017    Arthritis, degenerative     CAD (coronary artery disease) 04/2012    S/P 2.75 X 15 mm BMS of OM (04/2012), Two LAD BMS (07/2012)    Elevated liver enzymes     Likely from statin    HLD (hyperlipidemia)     Hyperkalemia 06/2012    Likely from lisinopril    Ischemic cardiomyopathy     LVEF  30-35%(05/19) 45% (11/2012) & 30% echo (04/2012)    NSTEMI (non-ST elevated myocardial infarction) ([facility] [address] 04/2012    UTI (urinary tract infection)     Vertigo         Past Surgical History:   Procedure Laterality Date    HX CORONARY STENT PLACEMENT  4/23/12    bare metal stent to obtuse marginal branch    HX HEART CATHETERIZATION         Social History     Tobacco Use    Smoking status: Never Smoker    Smokeless tobacco: Never Used   Substance Use Topics    Alcohol use: No        Family History   Problem Relation Age of Onset    Diabetes Mother         Allergies   Allergen Reactions    Flu Vaccine 2011 (36 Mos+)(Pf) Anaphylaxis    Codeine Anaphylaxis    Egg Not Reported This Time    Lipitor [Atorvastatin] Nausea Only    Pcn [Penicillins] Hives        Prior to Admission medications    Medication Sig Start Date End Date Taking? Authorizing Provider   acetaminophen (TYLENOL) 325 mg tablet Take 650 mg by mouth every six (6) hours as needed for Pain. Provider, Historical   bumetanide (BUMEX) 0.5 mg tablet Take 1 Tab by mouth daily. 6/7/19   La Curtis MD   ondansetron (ZOFRAN ODT) 4 mg disintegrating tablet Take 1 Tab by mouth every eight (8) hours as needed for Nausea. 3/11/19   Alexa Telles MD   polyethylene glycol (MIRALAX) 17 gram/dose powder Take 17 g by mouth daily as needed (constipation). Provider, Historical   carboxymethylcellulos/glycerin (CLEAR EYES FOR DRY EYES OP) Administer 1 Drop to both eyes three (3) times daily as needed (dry eyes). Provider, Historical   carbamide peroxide (MURINE EAR) 6.5 % otic solution Instill 5 drops in each ear twice daily for 4 days each month as needed for ear wax. Provider, Historical   loperamide (IMMODIUM) 2 mg tablet Take 1 mg by mouth three (3) times daily as needed for Diarrhea. Provider, Historical   FLUoxetine (PROZAC) 10 mg tablet Take 1 Tab by mouth daily. 11/15/18   Alexa Telles MD   psyllium husk-aspartame (METAMUCIL FIBER) 3.4 gram pwpk packet Take 1 Packet by mouth daily. 11/9/18   Kailash ORTIZ NP   mirtazapine (REMERON) 15 mg tablet Take 1 Tab by mouth nightly. 11/8/18   Ti Saldaña NP   fluticasone (FLONASE) 50 mcg/actuation nasal spray 2 Sprays by Both Nostrils route daily as needed for Rhinitis. 11/8/18   Kailash ORTIZ NP   digoxin (LANOXIN) 0.125 mg tablet Take 1 Tab by mouth daily. 11/9/18   Ross ORTIZ NP   aspirin 81 mg chewable tablet Take 1 Tab by mouth daily. 11/9/18   Ross ORTIZ NP   clotrimazole (LOTRIMIN) 1 % topical cream Apply  to affected area two (2) times a day. Apply with barrier cream to affected area. 9/24/18   Cassie Moya MD   clopidogrel (PLAVIX) 75 mg tab Take 1 Tab by mouth daily. 7/23/18   Jasen Negron MD   nitroglycerin (NITROSTAT) 0.4 mg SL tablet 1 Tab by SubLINGual route every five (5) minutes as needed for Chest Pain.   Patient taking differently: 1 Tab by SubLINGual route every five (5) minutes as needed for Chest Pain. take 1tab sl  5min apart for a total of 3tabs then call 911 10/31/17   Jasen Negron MD       Review of Systems:  (bold if positive, otherwise negative), apart from what mentioned in HPI  Apart from above patient deny any other significant complain  Gen:  Weight gain, weight loss, fever, chills, fatigue  Eyes:  Visual changes, pain, conjunctivitis  ENT:  Sore throat, rhinorrhea, decreased hearing  CVS:  Palpitations, chest pain, dizziness, syncope, edema, PND  Pulm:  Cough, dyspnea, sputum, hemoptysis, wheezing  GI: retching , nausea, chronic diarrhea,   Abdominal pain,    :  Hematuria, incontinence, nocturia, dysuria, discharge  MS:  Pain, weakness, swelling, arthritis  Skin:  Rash started yesterday, erythema, abscess, wound, moles  Endo:  Heat intolerance, cold intolerance, weight gain, polyuria  Hem:  Enlarged nodes, bruising, bleeding, night sweats  Renal:  Edema, change in urine, flank pain  Neuro:  Numbness, tingling, weakness, seizure, headache, tremors       VITALS:    Vital signs reviewed; most recent are:    Visit Vitals  /60   Pulse 85   Temp 97.6 °F (36.4 °C)   Resp 26   Ht 5' 1\" (1.549 m)   Wt 45.4 kg (100 lb)   SpO2 100%   BMI 18.89 kg/m²     SpO2 Readings from Last 6 Encounters:   10/01/19 100%   09/30/19 92%   07/24/19 94%   07/23/19 92%   07/22/19 93%   07/19/19 94%    O2 Flow Rate (L/min): 4 l/min   No intake or output data in the 24 hours ending 10/01/19 1406     Physical Exam:     Gen:  Appear stated age, Well-developed,   in no acute distress  HEENT:  Head atraumatic, normocephalic , hearing intact to voice, dry mucous membranes. Neck:  Supple, no masses I appreciate, thyroid non-tender. Resp:  No accessory muscle use,Bilateral BS present, clear breath sounds without wheezes rales or rhonchi  Card:  , normal S1, S2 without thrills, bruits . +3 lower leg peripheral edema. Abd:  Soft, non-tender, non-distended, normoactive bowel sounds are present, no palpable organomegaly   Musc:  No cyanosis or clubbing. Skin: itching marks and rash on the chest   No  ulcers, skin turgor is good. Neuro:  Cranial nerves are grossly intact, no clear area of focal motor weakness, follows commands appropriately. Psych:  Good insight, oriented to person, place and time, alert. Labs: All Cardiac Markers in the last 24 hours:   Lab Results   Component Value Date/Time     10/01/2019 11:48 AM    CKMB 4.5 (H) 10/01/2019 11:48 AM    CKND1 4.2 (H) 10/01/2019 11:48 AM    TROIQ 0.23 (H) 10/01/2019 11:48 AM       Recent Labs     10/01/19  1148   WBC 12.1   HGB 10.8*   HCT 36.0        Recent Labs     10/01/19  1148      K 5.0      CO2 13*   *   BUN 28*   CREA 1.60*   CA 7.7*   ALB 2.6*   TBILI 0.6   SGOT 74*   ALT 45     No results found for: GLUCPOC  No results for input(s): PH, PCO2, PO2, HCO3, FIO2 in the last 72 hours. No results for input(s): INR, INREXT, INREXT in the last 72 hours. Xr Chest Port    Result Date: 10/1/2019  IMPRESSION:  No acute cardiopulmonary disease. No significant change in this one day interval.       Please refer to radiology reports.       Telemetry reviewed:   AFIB    Risk of deterioration: high      Total time spent in the care of this patient: [address] discussed with: Patient, Family, Nursing Staff, >50% of time spent in counseling and coordination of care and ER MD/Staff      Discussed:  Care Plan       I have personally reviewed all pertinent labs, films and EKGs that have officially resulted. I reviewed available pertaining electronic documentation outlining the initial presentation as well as the emergency room physician's encounter.     Attending Physician: Ventura Fabian MD

## 2019-10-01 NOTE — ED PROVIDER NOTES
EMERGENCY DEPARTMENT HISTORY AND PHYSICAL EXAM    11:38 AM      Date: 10/1/2019  Patient Name: Rick Brown    History of Presenting Illness     Chief Complaint   Patient presents with    Fatigue    Fall         History Provided By: patient    Additional History (Context): Rick Brown is a 80 y.o. female presents with return to the emergency department with vomiting and diarrhea. She was seen yesterday for A. fib with RVR. She has not taken any of her medications this morning. She is a bit confused as well. History and review of systems limited by patient's confusion. Nayeli Serna PCP: Salomón Garrison MD    Chief Complaint:   Duration:    Timing:    Location:   Quality:   Severity:   Modifying Factors:   Associated Symptoms:       Current Facility-Administered Medications   Medication Dose Route Frequency Provider Last Rate Last Dose    aspirin tablet 325 mg  325 mg Oral NOW Nuha Harvey MD        heparin (porcine) 1,000 unit/mL injection 2,720 Units  60 Units/kg IntraVENous ONCE [place] Patient MSACHA Correa        heparin 25,000 units in D5W 250 ml infusion  12-25 Units/kg/hr IntraVENous TITRATE Madhavi Manley PA-C         Current Outpatient Medications   Medication Sig Dispense Refill    acetaminophen (TYLENOL) 325 mg tablet Take 650 mg by mouth every six (6) hours as needed for Pain.  bumetanide (BUMEX) 0.5 mg tablet Take 1 Tab by mouth daily. 15 Tab 0    ondansetron (ZOFRAN ODT) 4 mg disintegrating tablet Take 1 Tab by mouth every eight (8) hours as needed for Nausea. 30 Tab 11    polyethylene glycol (MIRALAX) 17 gram/dose powder Take 17 g by mouth daily as needed (constipation).  carboxymethylcellulos/glycerin (CLEAR EYES FOR DRY EYES OP) Administer 1 Drop to both eyes three (3) times daily as needed (dry eyes).  carbamide peroxide (MURINE EAR) 6.5 % otic solution Instill 5 drops in each ear twice daily for 4 days each month as needed for ear wax.       loperamide (IMMODIUM) 2 mg tablet Take 1 mg by mouth three (3) times daily as needed for Diarrhea.  FLUoxetine (PROZAC) 10 mg tablet Take 1 Tab by mouth daily. 90 Tab 3    psyllium husk-aspartame (METAMUCIL FIBER) 3.4 gram pwpk packet Take 1 Packet by mouth daily. 30 Packet 0    mirtazapine (REMERON) 15 mg tablet Take 1 Tab by mouth nightly. 30 Tab 0    fluticasone (FLONASE) 50 mcg/actuation nasal spray 2 Sprays by Both Nostrils route daily as needed for Rhinitis. 1 Bottle 0    digoxin (LANOXIN) 0.125 mg tablet Take 1 Tab by mouth daily. 30 Tab 0    aspirin 81 mg chewable tablet Take 1 Tab by mouth daily. 30 Tab 0    clotrimazole (LOTRIMIN) 1 % topical cream Apply  to affected area two (2) times a day. Apply with barrier cream to affected area. 15 g 1    clopidogrel (PLAVIX) 75 mg tab Take 1 Tab by mouth daily. 90 Tab 3    nitroglycerin (NITROSTAT) 0.4 mg SL tablet 1 Tab by SubLINGual route every five (5) minutes as needed for Chest Pain.  (Patient taking differently: 1 Tab by SubLINGual route every five (5) minutes as needed for Chest Pain. take 1tab sl  5min apart for a total of 3tabs then call 911) 25 Tab 3       Past History     Past Medical History:  Past Medical History:   Diagnosis Date    A-fib ([facility] 01/2017    Arthritis, degenerative     CAD (coronary artery disease) 04/2012    S/P 2.75 X 15 mm BMS of OM (04/2012), Two LAD BMS (07/2012)    Elevated liver enzymes     Likely from statin    HLD (hyperlipidemia)     Hyperkalemia 06/2012    Likely from lisinopril    Ischemic cardiomyopathy     LVEF  30-35%(05/19) 45% (11/2012) & 30% echo (04/2012)    NSTEMI (non-ST elevated myocardial infarction) ([facility] 04/2012    UTI (urinary tract infection)     Vertigo        Past Surgical History:  Past Surgical History:   Procedure Laterality Date    HX CORONARY STENT PLACEMENT  4/23/12    bare metal stent to obtuse marginal branch    HX HEART CATHETERIZATION         Family History:  Family History   Problem Relation Age of Onset    Diabetes Mother        Social History:  Social History     Tobacco Use    Smoking status: Never Smoker    Smokeless tobacco: Never Used   Substance Use Topics    Alcohol use: No    Drug use: No       Allergies: Allergies   Allergen Reactions    Flu Vaccine 2011 (36 Mos+)(Pf) Anaphylaxis    Codeine Anaphylaxis    Egg Not Reported This Time    Lipitor [Atorvastatin] Nausea Only    Pcn [Penicillins] Hives         Review of Systems     Review of Systems   Gastrointestinal: Positive for diarrhea and vomiting. Physical Exam       Patient Vitals for the past 12 hrs:   Temp Pulse Resp BP SpO2   10/01/19 1204     97 %   10/01/19 1200    108/68 100 %   10/01/19 1145  (!) 120 29 108/86 97 %   10/01/19 1142  (!) 110 (!) 36 128/71 93 %   10/01/19 1137     94 %   10/01/19 1135 97.6 °F (36.4 °C) (!) 42 16 (!) 70/44 94 %       Physical Exam   Constitutional: She appears well-developed and well-nourished. She appears distressed (Appears uncomfortable). HENT:   Head: Normocephalic and atraumatic. Eyes: Conjunctivae are normal. No scleral icterus. Neck: Normal range of motion. Neck supple. No JVD present. Cardiovascular: Regular rhythm and normal heart sounds. 4 intact extremity pulses. Bradycardic   Pulmonary/Chest: Effort normal and breath sounds normal.   Slightly hyperventilating   Abdominal: Soft. She exhibits no mass. There is no tenderness. Musculoskeletal: Normal range of motion. Lymphadenopathy:     She has no cervical adenopathy. Neurological: She is alert. Skin: Skin is warm. Slightly clammy   Nursing note and vitals reviewed.         Diagnostic Study Results   Labs -  Recent Results (from the past 12 hour(s))   EKG, 12 LEAD, INITIAL    Collection Time: 10/01/19 11:41 AM   Result Value Ref Range    Ventricular Rate 44 BPM    QRS Duration 90 ms    Q-T Interval 436 ms    QTC Calculation (Bezet) 372 ms    Calculated R Axis 104 degrees    Calculated T Axis 133 degrees    Diagnosis       Junctional bradycardia with occasional premature ventricular complexes  Possible Right ventricular hypertrophy  Marked ST abnormality, possible lateral subendocardial injury  Abnormal ECG  When compared with ECG of 30-SEP-2019 07:16,  Junctional rhythm has replaced Atrial fibrillation  Vent. rate has decreased BY  90 BPM  ST elevation now present in Inferior leads  Nonspecific T wave abnormality no longer evident in Inferior leads  Inverted T waves have replaced nonspecific T wave abnormality in Lateral   leads     CBC WITH AUTOMATED DIFF    Collection Time: 10/01/19 11:48 AM   Result Value Ref Range    WBC 12.1 4.6 - 13.2 K/uL    RBC 3.97 (L) 4.20 - 5.30 M/uL    HGB 10.8 (L) 12.0 - 16.0 g/dL    HCT 36.0 35.0 - 45.0 %    MCV 90.7 74.0 - 97.0 FL    MCH 27.2 24.0 - 34.0 PG    MCHC 30.0 (L) 31.0 - 37.0 g/dL    RDW 20.7 (H) 11.6 - 14.5 %    PLATELET 754 106 - 855 K/uL    MPV 9.0 (L) 9.2 - 11.8 FL    NEUTROPHILS 77 (H) 40 - 73 %    LYMPHOCYTES 13 (L) 21 - 52 %    MONOCYTES 9 3 - 10 %    EOSINOPHILS 0 0 - 5 %    BASOPHILS 1 0 - 2 %    ABS. NEUTROPHILS 9.3 (H) 1.8 - 8.0 K/UL    ABS. LYMPHOCYTES 1.6 0.9 - 3.6 K/UL    ABS. MONOCYTES 1.1 0.05 - 1.2 K/UL    ABS. EOSINOPHILS 0.0 0.0 - 0.4 K/UL    ABS. BASOPHILS 0.1 0.0 - 0.1 K/UL    DF AUTOMATED     METABOLIC PANEL, COMPREHENSIVE    Collection Time: 10/01/19 11:48 AM   Result Value Ref Range    Sodium 138 136 - 145 mmol/L    Potassium 5.0 3.5 - 5.5 mmol/L    Chloride 109 100 - 111 mmol/L    CO2 13 (L) 21 - 32 mmol/L    Anion gap 16 3.0 - 18 mmol/L    Glucose 201 (H) 74 - 99 mg/dL    BUN 28 (H) 7.0 - 18 MG/DL    Creatinine 1.60 (H) 0.6 - 1.3 MG/DL    BUN/Creatinine ratio 18 12 - 20      GFR est AA 37 (L) >60 ml/min/1.73m2    GFR est non-AA 30 (L) >60 ml/min/1.73m2    Calcium 7.7 (L) 8.5 - 10.1 MG/DL    Bilirubin, total 0.6 0.2 - 1.0 MG/DL    ALT (SGPT) 45 13 - 56 U/L    AST (SGOT) 74 (H) 10 - 38 U/L    Alk.  phosphatase 189 (H) 45 - 117 U/L Protein, total 6.2 (L) 6.4 - 8.2 g/dL    Albumin 2.6 (L) 3.4 - 5.0 g/dL    Globulin 3.6 2.0 - 4.0 g/dL    A-G Ratio 0.7 (L) 0.8 - 1.7     CARDIAC PANEL,(CK, CKMB & TROPONIN)    Collection Time: 10/01/19 11:48 AM   Result Value Ref Range     26 - 192 U/L    CK - MB 4.5 (H) <3.6 ng/ml    CK-MB Index 4.2 (H) 0.0 - 4.0 %    Troponin-I, QT 0.23 (H) 0.0 - 0.045 NG/ML   NT-PRO BNP    Collection Time: 10/01/19 12:15 PM   Result Value Ref Range    NT pro-BNP 22,608 (H) 0 - 1,800 PG/ML   POC G3    Collection Time: 10/01/19 12:29 PM   Result Value Ref Range    Device: NASAL CANNULA      Flow rate (POC) 4 L/M    FIO2 (POC) 36 %    pH (POC) 7.239 (LL) 7.35 - 7.45      pCO2 (POC) 36.8 35.0 - 45.0 MMHG    pO2 (POC) 125 (H) 80 - 100 MMHG    HCO3 (POC) 15.8 (L) 22 - 26 MMOL/L    sO2 (POC) 98 (H) 92 - 97 %    Base deficit (POC) 12 mmol/L    Allens test (POC) YES      Total resp. rate 24      Site LEFT RADIAL      Patient temp. 97.6      Specimen type (POC) ARTERIAL      Performed by [facility]        Radiologic Studies -   XR CHEST PORT   Final Result   IMPRESSION:  No acute cardiopulmonary disease. No significant change in this one   day interval.              Xr Chest Port    Result Date: 10/1/2019  EXAM:  PORTABLE CHEST INDICATION:  Generalized weakness. TECHNIQUE:  Portable, supine AP view. COMPARISON:  09/30/2019 ____________________ FINDINGS:  SUPPORT DEVICES: None. HEART AND MEDIASTINUM: Stable. Cardiac silhouette is within normal limits for technique. Tortuous thoracic aorta. LUNGS AND PLEURAL SPACES: Lungs are adequately expanded without focal consolidation, pulmonary edema or pneumothorax. Stable minimal blunting of bilateral costophrenic angles. No pneumothorax. BONY THORAX AND SOFT TISSUES: No acute osseous abnormality. ____________________     IMPRESSION:  No acute cardiopulmonary disease.  No significant change in this one day interval.       Medications ordered:   Medications   aspirin tablet 325 mg (has no administration in time range)   heparin (porcine) 1,000 unit/mL injection 2,720 Units (has no administration in time range)   heparin 25,000 units in D5W 250 ml infusion (has no administration in time range)   atropine injection 0.5 mg (0.5 mg IntraVENous Given 10/1/19 1145)   sodium chloride 0.9 % bolus infusion 1,000 mL (1,000 mL IntraVENous New Bag 10/1/19 1150)         Medical Decision Making   Initial Medical Decision Making and DDx:     Yesterday she had A. fib with RVR Cardizem for short period of time, cardiology saw her and on the recommendation we gave her a oral dose of Lasix and oral dose of Toprol. And discharged with plan to follow-up with cardiology. Today she is bradycardic, hypotensive with this we will try some atropine. ED Course: Progress Notes, Reevaluation, and Consults:  ED Course as of Oct 01 1315   Tue Oct 01, 2019   1151 We will not be starting pressors. Atropine is likely to make her feel better since she is profoundly bradycardic so I think that is indicated. Increased oxygen by nasal cannula. Her blood pressure and pulse rate is improved with the atropine. [CB]   2061 I have had the  page cardiology. [CB]   3430 Critical care time 35 minutes for management of symptomatic bradycardia system at risk cardiovascular.    [CB]   1156 Discussed with Junie WESTON for cardiology asked him to come down and get involved. Patient was symptomatic bradycardia better after atropine. Profoundly hypotensive upon her arrival.  Recognize that he she is a DNR however we may be able to intervene and get her feeling better.    [CB]   1156 I reassessed the patient her bradycardia and blood pressure are better. She still feels very short of breath.   She has some JVD so I think she is developed some failure.    [CB]   1229 Third twelve-lead EKG at 1226, atrial fibrillation at a ventricular rate of 117, ST-T abnormalities persists no clear change.    [CB]   1314 D/w Dr Kamala Ruelas list he will see and admit. [CB]      ED Course User Index  [CB] Beth Covington MD     11:44 AM twelve-lead EKG shows junctional bradycardia with some ST elevation only in lead III. Inverted T waves in 1 and aVL. The time of the EKG is 1141. She has a valid DNR on file so she is a DNR. 12:02 PM and twelve-lead EKG at 1159, atrial fibrillation at a rate of 123, still some elevation in lead III only, appears to have depressions in 1 and aVL. 1:15 PM blood pressure impulsive stabilized nicely. Patient is feeling a bit better. I am the first provider for this patient. I reviewed the vital signs, available nursing notes, past medical history, past surgical history, family history and social history. Patient Vitals for the past 12 hrs:   Temp Pulse Resp BP SpO2   10/01/19 1204     97 %   10/01/19 1200    108/68 100 %   10/01/19 1145  (!) 120 29 108/86 97 %   10/01/19 1142  (!) 110 (!) 36 128/71 93 %   10/01/19 1137     94 %   10/01/19 1135 97.6 °F (36.4 °C) (!) 42 16 (!) 70/44 94 %       Vital Signs-Reviewed the patient's vital signs. Pulse Oximetry Analysis, Cardiac Monitor, 12 lead ekg:       Interpreted by the EP. Records Reviewed: Nursing notes reviewed (Time of Review: 11:38 AM)    Procedures:   Critical Care Time:   Aspirin: (was aspirin given for stroke?)    Diagnosis     Clinical Impression:   1. Bradycardia    2. Hypotension, unspecified hypotension type        Disposition:       Follow-up Information    None          Patient's Medications   Start Taking    No medications on file   Continue Taking    ACETAMINOPHEN (TYLENOL) 325 MG TABLET    Take 650 mg by mouth every six (6) hours as needed for Pain. ASPIRIN 81 MG CHEWABLE TABLET    Take 1 Tab by mouth daily. BUMETANIDE (BUMEX) 0.5 MG TABLET    Take 1 Tab by mouth daily. CARBAMIDE PEROXIDE (MURINE EAR) 6.5 % OTIC SOLUTION    Instill 5 drops in each ear twice daily for 4 days each month as needed for ear wax. CARBOXYMETHYLCELLULOS/GLYCERIN (CLEAR EYES FOR DRY EYES OP)    Administer 1 Drop to both eyes three (3) times daily as needed (dry eyes). CLOPIDOGREL (PLAVIX) 75 MG TAB    Take 1 Tab by mouth daily. CLOTRIMAZOLE (LOTRIMIN) 1 % TOPICAL CREAM    Apply  to affected area two (2) times a day. Apply with barrier cream to affected area. DIGOXIN (LANOXIN) 0.125 MG TABLET    Take 1 Tab by mouth daily. FLUOXETINE (PROZAC) 10 MG TABLET    Take 1 Tab by mouth daily. FLUTICASONE (FLONASE) 50 MCG/ACTUATION NASAL SPRAY    2 Sprays by Both Nostrils route daily as needed for Rhinitis. LOPERAMIDE (IMMODIUM) 2 MG TABLET    Take 1 mg by mouth three (3) times daily as needed for Diarrhea. MIRTAZAPINE (REMERON) 15 MG TABLET    Take 1 Tab by mouth nightly. NITROGLYCERIN (NITROSTAT) 0.4 MG SL TABLET    1 Tab by SubLINGual route every five (5) minutes as needed for Chest Pain. ONDANSETRON (ZOFRAN ODT) 4 MG DISINTEGRATING TABLET    Take 1 Tab by mouth every eight (8) hours as needed for Nausea. POLYETHYLENE GLYCOL (MIRALAX) 17 GRAM/DOSE POWDER    Take 17 g by mouth daily as needed (constipation). PSYLLIUM HUSK-ASPARTAME (METAMUCIL FIBER) 3.4 GRAM PWPK PACKET    Take 1 Packet by mouth daily.    These Medications have changed    No medications on file   Stop Taking    No medications on file     _______________________________    Notes:    Iveth Cowart MD using Dragon dictation      _______________________________

## 2019-10-01 NOTE — ED NOTES
Patient sleeping quietly at this time with no signs of distress noted. Cardiac monitor, BP monitor and pulse ox remain intact. IV insertion with no redness or edema noted. HOB elevated and call bell within reach. Will continue to monitor for any change in condition.

## 2019-10-01 NOTE — ED TRIAGE NOTES
Patient arrives via EMS with a c/c of generalized weakness x one day. Patient's family states she fell off of the toilet yesterday and hit her elbow because she was feeling so weak. Patient hypotensive 70/44 and sinus janee 42 on monitor here in ED.

## 2019-10-01 NOTE — ED NOTES
Patient sleeping soundly with family at bedside.  Patient given warm blankets, pillow, and lights dimmed to facilitate rest.

## 2019-10-02 LAB
ANION GAP SERPL CALC-SCNC: 7 MMOL/L (ref 3–18)
APTT PPP: 97.6 SEC (ref 23–36.4)
BASOPHILS # BLD: 0 K/UL (ref 0–0.1)
BASOPHILS # BLD: 0.1 K/UL (ref 0–0.1)
BASOPHILS NFR BLD: 0 % (ref 0–2)
BASOPHILS NFR BLD: 1 % (ref 0–2)
BUN SERPL-MCNC: 32 MG/DL (ref 7–18)
BUN/CREAT SERPL: 25 (ref 12–20)
CALCIUM SERPL-MCNC: 7.9 MG/DL (ref 8.5–10.1)
CALCULATED R AXIS, ECG10: 104 DEGREES
CALCULATED R AXIS, ECG10: 105 DEGREES
CALCULATED R AXIS, ECG10: 95 DEGREES
CALCULATED T AXIS, ECG11: -141 DEGREES
CALCULATED T AXIS, ECG11: -174 DEGREES
CALCULATED T AXIS, ECG11: 133 DEGREES
CHLORIDE SERPL-SCNC: 107 MMOL/L (ref 100–111)
CK MB CFR SERPL CALC: 6.2 % (ref 0–4)
CK MB SERPL-MCNC: 3.9 NG/ML (ref 5–25)
CK SERPL-CCNC: 63 U/L (ref 26–192)
CO2 SERPL-SCNC: 24 MMOL/L (ref 21–32)
CREAT SERPL-MCNC: 1.3 MG/DL (ref 0.6–1.3)
DIAGNOSIS, 93000: NORMAL
DIFFERENTIAL METHOD BLD: ABNORMAL
DIFFERENTIAL METHOD BLD: ABNORMAL
EOSINOPHIL # BLD: 0.1 K/UL (ref 0–0.4)
EOSINOPHIL # BLD: 0.5 K/UL (ref 0–0.4)
EOSINOPHIL NFR BLD: 1 % (ref 0–5)
EOSINOPHIL NFR BLD: 5 % (ref 0–5)
ERYTHROCYTE [DISTWIDTH] IN BLOOD BY AUTOMATED COUNT: 19.8 % (ref 11.6–14.5)
ERYTHROCYTE [DISTWIDTH] IN BLOOD BY AUTOMATED COUNT: 20 % (ref 11.6–14.5)
GLUCOSE SERPL-MCNC: 75 MG/DL (ref 74–99)
HCT VFR BLD AUTO: 31.1 % (ref 35–45)
HCT VFR BLD AUTO: 32.5 % (ref 35–45)
HGB BLD-MCNC: 9.5 G/DL (ref 12–16)
HGB BLD-MCNC: 9.8 G/DL (ref 12–16)
LYMPHOCYTES # BLD: 1.8 K/UL (ref 0.9–3.6)
LYMPHOCYTES # BLD: 2 K/UL (ref 0.9–3.6)
LYMPHOCYTES NFR BLD: 16 % (ref 21–52)
LYMPHOCYTES NFR BLD: 18 % (ref 21–52)
MAGNESIUM SERPL-MCNC: 2.3 MG/DL (ref 1.6–2.6)
MCH RBC QN AUTO: 26.5 PG (ref 24–34)
MCH RBC QN AUTO: 26.6 PG (ref 24–34)
MCHC RBC AUTO-ENTMCNC: 30.2 G/DL (ref 31–37)
MCHC RBC AUTO-ENTMCNC: 30.5 G/DL (ref 31–37)
MCV RBC AUTO: 86.9 FL (ref 74–97)
MCV RBC AUTO: 88.3 FL (ref 74–97)
MONOCYTES # BLD: 1.6 K/UL (ref 0.05–1.2)
MONOCYTES # BLD: 1.7 K/UL (ref 0.05–1.2)
MONOCYTES NFR BLD: 15 % (ref 3–10)
MONOCYTES NFR BLD: 16 % (ref 3–10)
NEUTS SEG # BLD: 7.1 K/UL (ref 1.8–8)
NEUTS SEG # BLD: 7.1 K/UL (ref 1.8–8)
NEUTS SEG NFR BLD: 63 % (ref 40–73)
NEUTS SEG NFR BLD: 65 % (ref 40–73)
PHOSPHATE SERPL-MCNC: 3.7 MG/DL (ref 2.5–4.9)
PLATELET # BLD AUTO: 267 K/UL (ref 135–420)
PLATELET # BLD AUTO: 309 K/UL (ref 135–420)
PMV BLD AUTO: 8.6 FL (ref 9.2–11.8)
PMV BLD AUTO: 8.9 FL (ref 9.2–11.8)
POTASSIUM SERPL-SCNC: 4.7 MMOL/L (ref 3.5–5.5)
Q-T INTERVAL, ECG07: 302 MS
Q-T INTERVAL, ECG07: 310 MS
Q-T INTERVAL, ECG07: 436 MS
QRS DURATION, ECG06: 100 MS
QRS DURATION, ECG06: 104 MS
QRS DURATION, ECG06: 90 MS
QTC CALCULATION (BEZET), ECG08: 372 MS
QTC CALCULATION (BEZET), ECG08: 421 MS
QTC CALCULATION (BEZET), ECG08: 443 MS
RBC # BLD AUTO: 3.58 M/UL (ref 4.2–5.3)
RBC # BLD AUTO: 3.68 M/UL (ref 4.2–5.3)
SODIUM SERPL-SCNC: 138 MMOL/L (ref 136–145)
TROPONIN I SERPL-MCNC: 0.19 NG/ML (ref 0–0.04)
TROPONIN I SERPL-MCNC: 0.21 NG/ML (ref 0–0.04)
TROPONIN I SERPL-MCNC: 0.34 NG/ML (ref 0–0.04)
VENTRICULAR RATE, ECG03: 117 BPM
VENTRICULAR RATE, ECG03: 123 BPM
VENTRICULAR RATE, ECG03: 44 BPM
WBC # BLD AUTO: 11 K/UL (ref 4.6–13.2)
WBC # BLD AUTO: 11.1 K/UL (ref 4.6–13.2)

## 2019-10-02 PROCEDURE — 36415 COLL VENOUS BLD VENIPUNCTURE: CPT

## 2019-10-02 PROCEDURE — 97535 SELF CARE MNGMENT TRAINING: CPT

## 2019-10-02 PROCEDURE — 74011250636 HC RX REV CODE- 250/636: Performed by: PHYSICIAN ASSISTANT

## 2019-10-02 PROCEDURE — 83735 ASSAY OF MAGNESIUM: CPT

## 2019-10-02 PROCEDURE — 84100 ASSAY OF PHOSPHORUS: CPT

## 2019-10-02 PROCEDURE — 80048 BASIC METABOLIC PNL TOTAL CA: CPT

## 2019-10-02 PROCEDURE — 85730 THROMBOPLASTIN TIME PARTIAL: CPT

## 2019-10-02 PROCEDURE — 82550 ASSAY OF CK (CPK): CPT

## 2019-10-02 PROCEDURE — 77010033678 HC OXYGEN DAILY

## 2019-10-02 PROCEDURE — 65660000000 HC RM CCU STEPDOWN

## 2019-10-02 PROCEDURE — 74011250637 HC RX REV CODE- 250/637: Performed by: INTERNAL MEDICINE

## 2019-10-02 PROCEDURE — 85025 COMPLETE CBC W/AUTO DIFF WBC: CPT

## 2019-10-02 PROCEDURE — 97166 OT EVAL MOD COMPLEX 45 MIN: CPT

## 2019-10-02 PROCEDURE — 74011250636 HC RX REV CODE- 250/636: Performed by: INTERNAL MEDICINE

## 2019-10-02 RX ORDER — BUMETANIDE 1 MG/1
0.5 TABLET ORAL DAILY
Status: DISCONTINUED | OUTPATIENT
Start: 2019-10-02 | End: 2019-10-04 | Stop reason: HOSPADM

## 2019-10-02 RX ORDER — GUAIFENESIN 100 MG/5ML
81 LIQUID (ML) ORAL DAILY
Status: DISCONTINUED | OUTPATIENT
Start: 2019-10-02 | End: 2019-10-04 | Stop reason: HOSPADM

## 2019-10-02 RX ORDER — NITROGLYCERIN 0.4 MG/1
0.4 TABLET SUBLINGUAL
Status: DISCONTINUED | OUTPATIENT
Start: 2019-10-02 | End: 2019-10-04 | Stop reason: HOSPADM

## 2019-10-02 RX ORDER — DEXTROSE, SODIUM CHLORIDE, AND POTASSIUM CHLORIDE 5; .45; .15 G/100ML; G/100ML; G/100ML
60 INJECTION INTRAVENOUS CONTINUOUS
Status: DISCONTINUED | OUTPATIENT
Start: 2019-10-02 | End: 2019-10-02

## 2019-10-02 RX ORDER — MIRTAZAPINE 15 MG/1
15 TABLET, FILM COATED ORAL
Status: DISCONTINUED | OUTPATIENT
Start: 2019-10-02 | End: 2019-10-04 | Stop reason: HOSPADM

## 2019-10-02 RX ORDER — DIGOXIN 125 MCG
0.12 TABLET ORAL DAILY
Status: DISCONTINUED | OUTPATIENT
Start: 2019-10-02 | End: 2019-10-04 | Stop reason: HOSPADM

## 2019-10-02 RX ORDER — ONDANSETRON 4 MG/1
4 TABLET, ORALLY DISINTEGRATING ORAL
Status: DISCONTINUED | OUTPATIENT
Start: 2019-10-02 | End: 2019-10-02 | Stop reason: SDUPTHER

## 2019-10-02 RX ORDER — LOPERAMIDE HYDROCHLORIDE 2 MG/1
2 CAPSULE ORAL
Status: DISCONTINUED | OUTPATIENT
Start: 2019-10-02 | End: 2019-10-02

## 2019-10-02 RX ORDER — POLYETHYLENE GLYCOL 3350 17 G/17G
17 POWDER, FOR SOLUTION ORAL DAILY PRN
Status: DISCONTINUED | OUTPATIENT
Start: 2019-10-02 | End: 2019-10-04 | Stop reason: HOSPADM

## 2019-10-02 RX ORDER — HYDROCORTISONE 1 %
CREAM (GRAM) TOPICAL 2 TIMES DAILY
Status: DISCONTINUED | OUTPATIENT
Start: 2019-10-02 | End: 2019-10-04 | Stop reason: HOSPADM

## 2019-10-02 RX ORDER — ACETAMINOPHEN 325 MG/1
650 TABLET ORAL
Status: DISCONTINUED | OUTPATIENT
Start: 2019-10-02 | End: 2019-10-04 | Stop reason: HOSPADM

## 2019-10-02 RX ORDER — FLUOXETINE 10 MG/1
10 CAPSULE ORAL DAILY
Status: DISCONTINUED | OUTPATIENT
Start: 2019-10-02 | End: 2019-10-04 | Stop reason: HOSPADM

## 2019-10-02 RX ORDER — ONDANSETRON 2 MG/ML
4 INJECTION INTRAMUSCULAR; INTRAVENOUS
Status: DISCONTINUED | OUTPATIENT
Start: 2019-10-02 | End: 2019-10-04 | Stop reason: HOSPADM

## 2019-10-02 RX ORDER — FLUTICASONE PROPIONATE 50 MCG
2 SPRAY, SUSPENSION (ML) NASAL
Status: DISCONTINUED | OUTPATIENT
Start: 2019-10-02 | End: 2019-10-04 | Stop reason: HOSPADM

## 2019-10-02 RX ADMIN — HYDROCORTISONE: 1 CREAM TOPICAL at 17:56

## 2019-10-02 RX ADMIN — LOPERAMIDE HYDROCHLORIDE 1 MG: 1 SOLUTION ORAL at 14:39

## 2019-10-02 RX ADMIN — HYDROCORTISONE: 1 CREAM TOPICAL at 10:58

## 2019-10-02 RX ADMIN — MIRTAZAPINE 15 MG: 15 TABLET, FILM COATED ORAL at 21:22

## 2019-10-02 RX ADMIN — POTASSIUM CHLORIDE, DEXTROSE MONOHYDRATE AND SODIUM CHLORIDE 60 ML/HR: 150; 5; 450 INJECTION, SOLUTION INTRAVENOUS at 09:04

## 2019-10-02 RX ADMIN — DIGOXIN 0.12 MG: 125 TABLET ORAL at 08:56

## 2019-10-02 RX ADMIN — BUMETANIDE 0.5 MG: 1 TABLET ORAL at 08:55

## 2019-10-02 RX ADMIN — HEPARIN SODIUM 14 UNITS/KG/HR: 10000 INJECTION, SOLUTION INTRAVENOUS at 07:59

## 2019-10-02 RX ADMIN — ASPIRIN 81 MG 81 MG: 81 TABLET ORAL at 08:56

## 2019-10-02 NOTE — PROGRESS NOTES
TRANSFER - IN REPORT:    Verbal report received from [address], RN(name) on [address]  being received from ED  (unit) for routine progression of care      Report consisted of patients Situation, Background, Assessment and   Recommendations(SBAR). Information from the following report(s) SBAR, Kardex, ED Summary, Intake/Output, MAR and Recent Results was reviewed with the receiving nurse. Opportunity for questions and clarification was provided. Assessment completed upon patients arrival to unit and care assumed. Patient arrived on floor with Heparin drip infusing for A- Fib. Patient accompanied by son in law. Instructed on call light within reach and understand how to use for assistance and needs. 2200 Dual skin assessment with patient noted to have abrasion on chest, back and abdominal area. Patient stated areas were itchy and trying to keep from scratching. 10/02/2019     0000 Patient resting quietly at present without complaint voiced. 0730 Bedside and Verbal shift change report given to CINTHYA Chilel (oncoming nurse) by Garima Whatley RN (offgoing nurse). Report given with SBAR, Kardex, Intake/Output, MAR and Recent Results.

## 2019-10-02 NOTE — PROGRESS NOTES
[facility]  -  Progress Note      Patient: Sharri Gosselin MRN: [id number]  SSN: [id number]    YOB: 1927  Age: 80 y.o. Sex: female      Admit Date: 10/1/2019    I saw, evaluated, interviewed and examined the patient personally. I agree with the findings and plan of care as documented below with PA-C note  Patient feels much better today. She is in good spirit. She denies any chest pain or shortness of breath. She would like to go home tomorrow after physical therapy  Repeat creatinine better after IV fluid administration  We will stop IV fluids today  Her presentation was most likely related to nausea and vomiting which may have caused vagal response and bradycardia and hypotension  Would avoid any beta-blocker at this time  Continue aspirin, maintenance Bumex, digoxin  PT OT  Discussed with patient and family member at bedside in detail  Stop IV heparin      Prabhjot Hernandez MD        Assessment:     - Presented with bradycardia, hypotension. EKG with ST elevation in lead III on admission, ST depression in I and aVL. No CP   - Syncopal episode  - ER visit 09/30/19 with atrial fibrillation with rapid ventricular response. Was given oral Lasix and Metoprolol before discharged from the ED   - Mild fluid overload and chest pressure ER visit 09/30  - CAD:  S/P 2.75 X 15 mm BMS of OM (in the setting of NSTEMI) 04/2012, Two LAD BMS 07/2012  - HLD   - History of Ischemic cardiomyopathy: LVEF  30-35%(06/19) 45% (11/2012) & 30% echo (04/2012)  - Arthritis  - DNR status    Plan:     -Agree with d/c IVF. -Will d/c Heparin infusion. -PT/OT consults ordered. -BP stable overnight, 90's-110's.  -Continue ASA, Bumex, Digoxin. She has known cardiomyopathy but addition of further cardiac medications (BB/ACEi/ARB/ARNI) is limited by soft BP (90's-110's at baseline). -Do not plan any further cardiac workup, pt and son-in-law at bedside are in agreement. Subjective:     No new complaints. Reports had milk, yogurt and small amount of cereal this morning. Hoping to be able to go back to her apartment soon.     Objective:      Patient Vitals for the past 8 hrs:   Temp Pulse Resp BP SpO2   10/02/19 1104 97.5 °F (36.4 °C) 74 22 110/60 98 %   10/02/19 0718 97.7 °F (36.5 °C) 88 20 99/52 100 %   10/02/19 0506 97.3 °F (36.3 °C) 79 20 93/58 98 %         Patient Vitals for the past 96 hrs:   Weight   10/01/19 1135 100 lb (45.4 kg)         Intake/Output Summary (Last 24 hours) at 10/2/2019 1217  Last data filed at 10/2/2019 9926  Gross per 24 hour   Intake    Output 200 ml   Net -200 ml       Physical Exam:  General:  alert, cooperative, no distress, appears stated age  Neck:  No JVD  Lungs:  clear to auscultation bilaterally  Heart:  Irregularly irregular rhythm  Abdomen:  abdomen is soft without significant tenderness, masses, organomegaly or guarding  Extremities:  Atraumatic, no edema     Data Review:     Labs: Results:       Chemistry Recent Labs     10/02/19  0845 10/01/19  1148 09/30/19  0741   GLU 75 201* 99    138 140   K 4.7 5.0 5.2    109 109   CO2 24 13* 30   BUN 32* 28* 24*   CREA 1.30 1.60* 1.23   CA 7.9* 7.7* 8.4*   MG 2.3  --   --    PHOS 3.7  --   --    AGAP 7 16 1*   BUCR 25* 18 20   AP  --  189* 172*   TP  --  6.2* 6.7   ALB  --  2.6* 2.8*   GLOB  --  3.6 3.9   AGRAT  --  0.7* 0.7*      CBC w/Diff Recent Labs     10/02/19  0845 10/02/19  0110 10/01/19  1148   WBC 11.1 11.0 12.1   RBC 3.68* 3.58* 3.97*   HGB 9.8* 9.5* 10.8*   HCT 32.5* 31.1* 36.0    267 331   GRANS 63 65 77*   LYMPH 16* 18* 13*   EOS 5 1 0      Cardiac Enzymes Lab Results   Component Value Date/Time    CPK 63 10/02/2019 08:45 AM    CKMB 3.9 (H) 10/02/2019 08:45 AM    CKND1 6.2 (H) 10/02/2019 08:45 AM    TROIQ 0.34 (H) 10/02/2019 08:45 AM      Coagulation Recent Labs     10/02/19  0110 10/01/19  1952   APTT 97.6* 58.6*       Lipid Panel Lab Results   Component Value Date/Time    Cholesterol, total 202 (H) 04/29/2019 07:45 AM    HDL Cholesterol 64 (H) 04/29/2019 07:45 AM    LDL, calculated 116.4 (H) 04/29/2019 07:45 AM    VLDL, calculated 21.6 04/29/2019 07:45 AM    Triglyceride 108 04/29/2019 07:45 AM    CHOL/HDL Ratio 3.2 04/29/2019 07:45 AM      Liver Enzymes Recent Labs     10/01/19  1148   TP 6.2*   ALB 2.6*   *   SGOT 74*      Thyroid Studies Lab Results   Component Value Date/Time    TSH 1.57 07/26/2019 08:09 AM

## 2019-10-02 NOTE — PROGRESS NOTES
Internal Medicine Progress Note        NAME: Juaquin Moses Quiles   :  8/10/1927  MRM:  [id number]    Date/Time: 10/2/2019        ASSESSMENT/PLAN: PT when off heparin ggt and ok with cards      #   Hypotension (10/1/2019). Improved. DC IVF. Factors: poor po intake, hypovolemia from GI problems, A fib/cardiac medications. - had gentle iv hydration and followed. Serial trop level. careful about fluids as CMP. Defer repeat TTE to cards      # Ysabel Tanisha cardia . Improved with Atrop. Iv.  Resolved.      #   CAD (coronary artery disease) , ICMP , S/P 2.75 X 15 mm BMS of Obtuse marginal. Home regimen. Cardiology on boards     #   HLD (hyperlipidemia)       #  Acute metabolic encephalopathy . Due to hypotension. Improved in ER and back to base line.       # Atrial fibrillation with rapid ventricular response . Currently CVR. Started on heparin ggt in ER. ASA. she is on Digoxin. Normal level . paitent declined any inteventin and her goal be able to go back to her room and see or play with her dog.       # Abnormal EKG. No CP. Monitored at [facility] bed. Serial EKG as needed. Trop level. Cards on board      # Nausea , retching and diarrhea. Improved. Ho chronic IBS. Also Digoxin might contribute if high level       # Skin rash (10/1/2019) unclear etiology. Topical regimen. Verify source per course      # Acute   Metabolic acidosis , non anion gap. GI problems. Symptomatic support and maintain hydration. Follow labs. co2 normalized     #  Underweight . Not eating well for 8 years. On remeron. very poor eater for years and family tried many thing with her.  Nutritionist consulted   - Body mass index is 18.89 kg/m².     -DVT prophylaxis :  Heparin.   - Code Status : DNR as dw family/pt    Lab Review:     Recent Labs     10/02/19  0845 10/02/19  0110 10/01/19  1148   WBC 11.1 11.0 12.1   HGB 9.8* 9.5* 10.8*   HCT 32.5* 31.1* 36.0    267 331     Recent Labs     10/02/19  0845 10/01/19  1148 19  0741    138 140   K 4.7 5.0 5.2    109 109   CO2 24 13* 30   GLU 75 201* 99   BUN 32* 28* 24*   CREA 1.30 1.60* 1.23   CA 7.9* 7.7* 8.4*   MG 2.3  --   --    PHOS 3.7  --   --    ALB  --  2.6* 2.8*   TBILI  --  0.6 0.5   SGOT  --  74* 47*   ALT  --  45 41     No results found for: GLUCPOC  No results for input(s): PH, PCO2, PO2, HCO3, FIO2 in the last 72 hours. No results for input(s): INR, INREXT in the last 72 hours. No results found for: SDES  Lab Results   Component Value Date/Time    Culture result: NO GROWTH 1 DAY 01/28/2019 02:12 PM    Culture result:  11/15/2018 02:12 PM     >100,000  COLONIES/mL  MIXED GRAM POSITIVE DAVID, PROBABLE SKIN/GENITAL CONTAMINATION. Culture result: NO GROWTH 2 DAYS 10/30/2018 07:45 PM       All Cardiac Markers in the last 24 hours:   Lab Results   Component Value Date/Time    CPK 63 10/02/2019 08:45 AM    CKMB 3.9 (H) 10/02/2019 08:45 AM    CKND1 6.2 (H) 10/02/2019 08:45 AM    TROIQ 0.34 (H) 10/02/2019 08:45 AM             Subjective:     Chief Complaint:      Generalized weakness, no focal pains, no SOB. stable    ROS:  (bold if positive,otherwise negative)    Fever/chills ,  Dysuria   Cough , Sputum , SOB/JARRELL , Chest Pain     Chronic Diarrhea ,Nausea/Vomit , Abd Pain , Constipation     Tolerating Diet, eat little                 Objective:     Vitals:  Last 24hrs VS reviewed since prior progress note. Most recent are:    Visit Vitals  /60   Pulse 74   Temp 97.5 °F (36.4 °C)   Resp 22   Ht 5' 1\" (1.549 m)   Wt 45.4 kg (100 lb)   SpO2 98%   Breastfeeding?  No   BMI 18.89 kg/m²     SpO2 Readings from Last 6 Encounters:   10/02/19 98%   09/30/19 92%   07/24/19 94%   07/23/19 92%   07/22/19 93%   07/19/19 94%    O2 Flow Rate (L/min): 2 l/min       Intake/Output Summary (Last 24 hours) at 10/2/2019 1203  Last data filed at 10/2/2019 0718  Gross per 24 hour   Intake    Output 200 ml   Net -200 ml          Physical Exam:   Gen:  Appear stated age, Well-developed,   in no acute distress  HEENT:  Head atraumatic, normocephalic , hearing intact to voice, moist mucous membranes. Neck:  Supple, no masses I appreciate, thyroid non-tender. Resp:  No accessory muscle use,Bilateral BS present, clear breath sounds without wheezes rales or rhonchi  Card:  , normal S1, S2 without thrills, bruits . +3 lower leg peripheral edema. Abd:  Soft, non-tender, non-distended, normoactive bowel sounds are present, no palpable organomegaly   Musc:  No cyanosis or clubbing. Skin: itching marks and rash on the chest   No  ulcers, skin turgor is good. Neuro:  Cranial nerves are grossly intact, no clear area of focal motor weakness, follows commands appropriately. Psych:  Good insight, oriented to person, place and time, alert.        Telemetry reviewed:   AFIB    Medications Reviewed: (see below)    Lab Data Reviewed: (see below)    ______________________________________________________________________    Medications:     Current Facility-Administered Medications   Medication Dose Route Frequency    acetaminophen (TYLENOL) tablet 650 mg  650 mg Oral Q6H PRN    aspirin chewable tablet 81 mg  81 mg Oral DAILY    bumetanide (BUMEX) tablet 0.5 mg  0.5 mg Oral DAILY    digoxin (LANOXIN) tablet 0.125 mg  0.125 mg Oral DAILY    fluticasone propionate (FLONASE) 50 mcg/actuation nasal spray 2 Spray  2 Spray Both Nostrils DAILY PRN    mirtazapine (REMERON) tablet 15 mg  15 mg Oral QHS    nitroglycerin (NITROSTAT) tablet 0.4 mg  0.4 mg SubLINGual Q5MIN PRN    psyllium husk-aspartame (METAMUCIL FIBER) packet 1 Packet  1 Packet Oral DAILY    hydrocortisone (CORTAID) 1 % cream   Topical BID    ondansetron (ZOFRAN) injection 4 mg  4 mg IntraVENous Q4H PRN    peg 400-hypromellose-glycerin 1-0.2-0.2 % opthalmic solution 1 Drop  1 Drop Both Eyes TID PRN    FLUoxetine (PROzac) capsule 10 mg  10 mg Oral DAILY    polyethylene glycol (MIRALAX) packet 17 g  17 g Oral DAILY PRN    loperamide (IMODIUM) 1 mg/5 mL oral solution 1 mg  1 mg Oral TID PRN    heparin 25,000 units in D5W 250 ml infusion  12-25 Units/kg/hr IntraVENous TITRATE          Total time spent with patient: 35 minutes                  Care Plan discussed with: Patient, Family, Nursing Staff, Consultant/Specialist and >50% of time spent in counseling and coordination of care    Discussed:  Care Plan    Prophylaxis:  Heparin    Disposition:  Home w/Family             Attending Physician: Rossana Bruno MD

## 2019-10-02 NOTE — PROGRESS NOTES
NUTRITION  Patient/Family Education Record    FACTORS THAT MAY INFLUENCE PATIENTS ABILITY TO LEARN:   []   Language barrier    []   Cultural needs   []   Motivation    []   Cognitive limitation    []   Physical   []   Education   []   Physiological factors   []   Hearing/vision/speaking impairment   []   Caodaism beliefs    []   Financial limitations    []  Other:   [x]   No barriers limiting ability to learn     Person Instructed:   [x]   Patient   []   Family   []  Other     Preference for Learning:   [x]   Verbal   [x]   Written   []  Demonstration     Patient educated on:   [] Cardiac/heart healthy diet  [] 2gm Sodium diet  [] Vitamin K regulated diet (coumadin)  [] Weight loss/portion control  [x] High protein/high kcal tips  [x] Other: MNT CHF    Goal:  Patient will demonstrate understanding of modified diet by implementing suggestions to maintain weight      Outcome:   [x]  Patient verbalized understanding of education and willing to comply with recommendations.   []  Patient declined education  []  Patient needs follow up education; scheduled date for follow up:  [x]  Written information provided  [x]  RD contact information provided    Trish Man

## 2019-10-02 NOTE — PROGRESS NOTES
conducted an initial consultation and Spiritual Assessment for [address] [facility] [address] 64, who is a 80 y.o.,female. Patients Primary Language is: [place]. According to the patients EMR [employer] Affiliation is: [place].     The reason the Patient came to the hospital is:   Patient Active Problem List    Diagnosis Date Noted    [facility]01/2019    Hypotension 10/01/2019    Skin rash 70/40/0947    Metabolic acidosis 90/63/0782    Underweight 10/01/2019    Atrial fibrillation with rapid ventricular response ([facility] 09/30/2019    Syncope 06/05/2019    Hypoxia 06/05/2019    Leukocytosis 06/05/2019    Acute on chronic systolic (congestive) heart failure (HCC) 06/03/2019    Hyperkalemia 10/23/2018    Acute metabolic encephalopathy 67/71/1570    Hyponatremia 10/20/2018    Chronic fatigue 10/09/2018    Irritable bowel syndrome with diarrhea 09/24/2018    Chronic a-fib 05/02/2017    CAD (coronary artery disease)     Ischemic cardiomyopathy     Arthritis, degenerative     Vertigo     HLD (hyperlipidemia)     SOB (shortness of breath) 04/27/2012    NSTEMI (non-ST elevated myocardial infarction) ([facility] 04/01/2012        The  provided the following Interventions:  Initiated a relationship of care and support. Explored issues of sandra, spirituality and/or Jainism needs while hospitalized. Listened empathically. Provided chaplaincy education. Provided information about Spiritual Care Services. Offered prayer and assurance of continued prayers on patient's behalf. Chart reviewed. The following outcomes were achieved:  Patient shared some information about their medical narrative and spiritual journey/beliefs. Patient processed feeling about current hospitalization. Patient expressed gratitude for the 's visit. Assessment:  Patient did not indicate any spiritual or Jainism issues which require Spiritual Care Services interventions at this time.    Patient does not have any Yazidism/cultural needs that will affect patients preferences in health care. Plan:  Chaplains will continue to follow and will provide pastoral care on an as needed or requested basis.  recommends bedside caregivers page  on duty if patient shows signs of acute spiritual or emotional distress.     [facility]   Staff [address] [place] [address]   [phone]

## 2019-10-02 NOTE — MANAGEMENT PLAN
Discharge/Transition Planning    Problem: Discharge Planning  Goal: *Discharge to safe environment  Outcome: Progressing Towards Goal   HH vs SNF    Reason for Admission:   Hypotension               RRAT Score:     21             Resources/supports as identified by patient/family:   Assisted Living and family                Top Challenges facing patient (as identified by patient/family and CM):  Did not voice concern except low BP                     Finances/Medication cost?                    Transportation? Support system or lack thereof? Living arrangements? Self-care/ADLs/Cognition? Current Advanced Directive/Advance Care Plan: On file                          Plan for utilizing home health: Active with [facility]                 Transition of Care Plan:                Interviewed patient and son in law. Verified demographics listed on face sheet with patient; all information correct. Address listed is home, but now lives at [facility]. Patient stated their PCP is Dr Yazmin White . Patient's NOK is son in law , daughter, grandchild Patient independent with ADLs prior to admission. No use of DME prior to admission. Has transport wheelchair, walker, rollator but doesn't need currently. Discharge plan : pt wants SNF at [facility]. Will get therapy ordered      Patient has designated __family_____________________ to participate in his/her discharge plan and to receive any needed information. Anahy Halifax Other Relative   [phone]   Zita Gitelman Child [phone]     Kevin Matamoros Daughter   [phone]       Care Management Interventions  PCP Verified by CM: Yes(Dr Yazmin White)  Mode of Transport at Discharge:  Other (see comment)(family)  Transition of Care Consult (CM Consult): Assisted Living  Current Support Network: Assisted Living  Confirm Follow Up Transport: Family  Plan discussed with Pt/Family/Caregiver: Yes  Discharge Location  Discharge Placement: Skilled nursing facility(vs [facility])

## 2019-10-02 NOTE — PROGRESS NOTES
Problem: Discharge Planning  Goal: *Discharge to safe environment  Outcome: Progressing Towards Goal   HH vs SNF

## 2019-10-02 NOTE — CDMP QUERY
Pt admitted with Bradycardia/ HR 42,   Hypotension with bp 70-44,  Creat noted to 1.60,  If possible, please document in the progress notes and d/c summary if you are evaluating and / or treating any of the following: ? Acute kidney failure ? Acute kidney failure with tubular necrosis ? Acute kidney injury ? Other type of acute kidney failure  
? Other cause (please specify) ? Unable to determine ? Unknown The medical record reflects the following: 
  Risk Factors:  Pt with  Hypotension, junc bradycardia,  
  Clinical Indicators:  Creat  1.60 on admission labs  Up from 0.91 on 8/22/19 , gfr 30/37 and down from 58/60 on 8/22/19 Treatment: ivf, Thank you, 
Vikas Tan RN/CDI 
[phone]

## 2019-10-02 NOTE — ED NOTES
TRANSFER - ED to INPATIENT REPORT:    SBAR report made available to receiving floor on this patient being transferred to [address] [place] [address] ([facility])  for routine progression of care       Admitting diagnosis Hypotension [I95.9]  Atrial fibrillation ([facility] [I48.91]    Information from the following report(s) SBAR, Kardex, ED Summary and MAR was made available to receiving floor. Lines:       Medication list confirmed with patient    Opportunity for questions and clarification was provided.       Patient is oriented to time, place, person and situation High alert med  Patient is  incontinent and ambulatory with assist     Valuables transported with patient     Patient transported with:   Monitor  O2 @ 4.5 liters  Registered Nurse  Tech    MAP (Monitor): 79 =Monitored (most recent)  Vitals w/ MEWS Score (last day)     Date/Time MEWS Score Pulse Resp Temp BP Level of Consciousness SpO2    10/01/19 20:50:28  2  86  23  98.5 °F (36.9 °C)  102/61  Alert  100 %    10/01/19 2000    82  23    110/70    100 %    10/01/19 1945    88  24    116/78    100 %    10/01/19 1930    96  25    100/70    100 %    10/01/19 1915    94  25    109/66    100 %    10/01/19 1900    96  22    94/63    100 %    10/01/19 1845    87  20    111/72    100 %    10/01/19 1830    87  19    116/74    100 %    10/01/19 1815    88  19    115/75    100 %    10/01/19 1800    83  18    94/79    100 %    10/01/19 1745    87  16    119/69    100 %    10/01/19 1730    76  17    111/53    100 %    10/01/19 1715    74  17    115/60    100 %    10/01/19 1700    79  16    121/75    100 %    10/01/19 1645    76  19    112/73    100 %    10/01/19 1630    75  17    113/71    100 %    10/01/19 1615    84  19    121/60    100 %    10/01/19 1600    79  18    119/63    100 %    10/01/19 1545    83  19    113/72    100 %    10/01/19 1530    84  17    110/68    100 %    10/01/19 1515    82  18    113/74    100 %    10/01/19 1500    85  20    114/68    100 %    10/01/19 1445    88  20    109/70    100 %    10/01/19 1430    86  20    114/81    100 %    10/01/19 1400    88  20    118/73    100 %    10/01/19 1345    81  24    119/70    100 %    10/01/19 1330    85  26    112/60    100 %    10/01/19 1315    91  21    100/67    100 %    10/01/19 1230    (!) 106  23    119/82    100 %    10/01/19 12:04:24              97 %    10/01/19 1200          108/68    100 %    10/01/19 1145    (!) 120  29    108/86    97 %    10/01/19 1142    (!) 110  (!) 36    128/71    93 %    10/01/19 11:37:09              94 %    10/01/19 1135  5  (!) 42  16  97.6 °F (36.4 °C)  (!) 70/44  Alert  94 %              Septic Patients:     Lactic Acid  Lab Results   Component Value Date    [facility] 1.2 10/20/2018    (Most recent on top)  Repeat drawn: NO Time: na     ALL LACTIC ACIDS GREATER THAN 2 MUST BE REPEATED POC WITHIN 4 HOURS OR PRIOR TO ADMISSION               Total Fluid Bolus initiated and documented on MAR: YES    All ordered antibiotics initiated within first 3 hours of TIME ZERO?   YES

## 2019-10-02 NOTE — PROGRESS NOTES
Bedside shift change report given to CINTHYA Chilel (oncoming nurse) by Rafiq Baker RN (offgoing nurse). Report included the following information SBAR, Kardex, Intake/Output, MAR and Recent Results.      0855 -- AM medications given, well tolerated, will continue to monitor.      1104 -- Reassessment completed, no change in patient condition, will continue to monitor. 1215 -- IV fluids stopped per order. 1331 -- Heparin drip stopped per order.      1520 -- Reassessment completed, no change in patient condition, will continue to monitor.      Bedside shift change report given to CINTHYA Eldridge (oncoming nurse) by Avril Mazariegos RN (offgoing nurse). Report included the following information SBAR, Kardex, Intake/Output, MAR and Recent Results.

## 2019-10-02 NOTE — PROGRESS NOTES
Nutrition initial assessment/Plan of care      RECOMMENDATIONS:   1. Regular Diet (Liberalized to promote PO intake)  2. Monitor labs, weight and PO intake  3. RD to follow     GOALS:   1. PO intake meets >75% of protein/calorie needs by 10/7  2. Weight Maintenance/gradual gain (1-2 lb/week) by 10/9       ASSESSMENT:   Wt status is classified as normal per before Body mass index is 18.89 kg/m². However, Pt at nutrition risk d/t BMI <23 and age >65 years. Fair PO intake at best. (baseline) Labs noted. Pt w/ elevated troponin (0.34), elevated BNP (22, 608) and elevated BUN; GFR (38). Nutrition recommendations listed. RD to follow. Nutrition Diagnoses: Altered nutrition related lab lorrie related to CHF as evidenced by an elevated BNP (22,608) upon admission. Underweight related to energy intake as evidenced by a BMI <23 and age >65 years. Nutrition Risk:  [] High  [x] Moderate []  Low    SUBJECTIVE/OBJECTIVE:    Pt admitted for hypotension and AFib. Consult received for general nutrition management and supplements. PMHx including CHF, Afib, CAD and IBS-d. Familiar with Pt from previous admission. Pt seen in room this afternoon; appears thin. Per documented weight records and Pt weight has been fairly stable ( lb) with fluid fluctuations and Pt is 105% IBW. Appetite is fair at best and has been on Remeron since (11/2018). Pt has an allergy to eggs, no problems chewing/swallowing and was previously consuming nutritional supplements at home (CIB) but now does not like them. Stated she just got turned off by them after awhile and declined anything on [facility] formulary. Stated she has never been a big eater but has started trying to snack more and noted some snacks in room from family. Menu provided to implement preferences with dietary.  Discussed importance of adequate nutrition and implementing suggestions made to increase kcal/protein  to maintain weight as well as watching salt intake; handouts provided. Stated \"Well I won't eat bland food. \" Will continue to monitor. Information Obtained from:    [x] Chart Review   [x] Patient   [] Family/Caregiver   [] Nurse/Physician   [] Interdisciplinary Meeting/Rounds      Diet: Regular Diet  Medications: [x] Reviewed  Bumex, Remeron,     Allergies: [x] Reviewed   Encounter Diagnoses     ICD-10-CM ICD-9-CM   1. Bradycardia R00.1 427.89   2. Hypotension, unspecified hypotension type I95.9 458.9     Past Medical History:   Diagnosis Date    [facility]2017    Arthritis, degenerative     CAD (coronary artery disease) 04/2012    S/P 2.75 X 15 mm BMS of OM (04/2012), Two LAD BMS (07/2012)    Elevated liver enzymes     Likely from statin    HLD (hyperlipidemia)     Hyperkalemia 06/2012    Likely from lisinopril    Ischemic cardiomyopathy     LVEF  30-35%(05/19) 45% (11/2012) & 30% echo (04/2012)    NSTEMI (non-ST elevated myocardial infarction) ([facility] 04/2012    UTI (urinary tract infection)     Vertigo       Labs:    Lab Results   Component Value Date/Time    Sodium 138 10/02/2019 08:45 AM    Potassium 4.7 10/02/2019 08:45 AM    Chloride 107 10/02/2019 08:45 AM    CO2 24 10/02/2019 08:45 AM    Anion gap 7 10/02/2019 08:45 AM    Glucose 75 10/02/2019 08:45 AM    BUN 32 (H) 10/02/2019 08:45 AM    Creatinine 1.30 10/02/2019 08:45 AM    Calcium 7.9 (L) 10/02/2019 08:45 AM    Magnesium 2.3 10/02/2019 08:45 AM    Phosphorus 3.7 10/02/2019 08:45 AM    Albumin 2.6 (L) 10/01/2019 11:48 AM     Anthropometrics: BMI (calculated): 18.9  Last 3 Recorded Weights in this Encounter    10/01/19 1135   Weight: 45.4 kg (100 lb)      Ht Readings from Last 1 Encounters:   10/01/19 5' 1\" (1.549 m)     Weight Metrics 10/1/2019 9/30/2019 7/24/2019 7/23/2019 7/22/2019 6/24/2019 6/20/2019   Weight 100 lb 100 lb 98 lb 99 lb 99 lb 102 lb -   BMI 18.89 kg/m2 19.53 kg/m2 17.92 kg/m2 18.11 kg/m2 18.11 kg/m2 18.66 kg/m2 19.94 kg/m2       No data found.     IBW: 105 lb %IBW: 105% UBW:  lb over past couple of year  [] Weight Loss [] Weight Gain [x] Weight Stable per documented records    Estimated Nutrition Needs: [x] MSJ x 1.3  [x] Other: 30 kcal/kg  Calories: 9692-7393 kcal Based on:   [x] Actual BW    Protein:   60 g Based on:   [x] Actual BW    Fluid:       1500 ml Based on:   [x] Actual BW      [x] No Cultural, Scientology or ethnic dietary need identified.     [] Cultural, Scientology and ethnic food preferences identified and addressed     Wt Status:  [x] Normal (18.6 - 24.9) [] Underweight (<18.5) [] Overweight (25 - 29.9) [] Mild Obesity (30 - 34.9)  [] Moderate Obesity (35 - 39.9) [] Morbid Obesity (40+)     Nutrition Problems Identified:   [x] Suboptimal PO intake (variable to fair)  [x] Food Allergies (Eggs)  [] Difficulty chewing/swallowing/poor dentition  [] Constipation/Diarrhea   [] Nausea/Vomiting   [] None  [] Other:     Plan:   [] Therapeutic Diet  [x]  Obtained/adjusted food preferences/tolerances and/or snacks options   []  Supplements added   [] Occupational therapy following for feeding techniques  []  HS snack added   []  Modify diet texture   [x]  Modify diet for food allergies   [x]  Educate patient   []  Assist with menu selection   [x]  Monitor PO intake on meal rounds   [x]  Continue inpatient monitoring and intervention   [x]  Participated in discharge planning/Interdisciplinary rounds/Team meetings   []  Other:     Education Needs:   [] Not appropriate for teaching at this time due to:   [x] Identified and addressed    Nutrition Monitoring and Evaluation:  [x] Continue ongoing monitoring and intervention  [] Other    Farzad Greer

## 2019-10-02 NOTE — PROGRESS NOTES
Problem: Self Care Deficits Care Plan (Adult)  Goal: *Acute Goals and Plan of Care (Insert Text)  Description  Occupational Therapy Goals  Initiated 10/2/2019 within 7 day(s). 1.  Patient will perform grooming tasks while standing with modified independence. 2.  Patient will perform lower body dressing with modified independence and minimal verbal cues for pacing. 3.  Patient will perform functional task in standing for 8 minutes with modified independence  And good safety awareness. 4.  Patient will perform toilet transfers with modified independence. 5.  Patient will perform all aspects of toileting with modified independence. 6.  Patient will participate in upper extremity therapeutic exercise/activities with supervision for 8 minutes to increase/maintain BUE strength for functional transfers and ADLs. Outcome: Progressing Towards Goal     OCCUPATIONAL THERAPY EVALUATION    Patient: Travis Rosenthal (80 y.o. female)  Date: 10/2/2019  Primary Diagnosis: Hypotension [I95.9]  Atrial fibrillation ([facility] [address] [I48.91]        Precautions:  Fall  PLOF: Pt was independent with ADLs & functional mobility. ASSESSMENT :  Based on the objective data described below, the patient presents with impairments with regard to bed mobility, functional transfers and ADLs. Supervision for bed mobility, intact sitting balance. CGA to stand from EOB; maneuvered to bathroom with supervision. Supervision for toilet transfer, brief management & pericare. 1 seated rest break at EOB. /69. Maneuvered  back to bathroom; supervision for oral care & hand hygiene for ~5 mins. /64 at EOB. Supervision to maneuver to chair, needs within reach, family at bedside. N/o dizziness. Recommend HH upon d/c. Patient will benefit from skilled intervention to address the above impairments. Patient's rehabilitation potential is considered to be Good  Factors which may influence rehabilitation potential include:   ? None noted  ? Mental ability/status  ? Medical condition  ? Home/family situation and support systems  ? Safety awareness  ? Pain tolerance/management  ? Other:      PLAN :  Recommendations and Planned Interventions:   ?               Self Care Training                  ? Therapeutic Activities  ? Functional Mobility Training   ? Cognitive Retraining  ? Therapeutic Exercises           ? Endurance Activities  ? Balance Training                    ? Neuromuscular Re-Education  ? Visual/Perceptual Training     ? Home Safety Training  ? Patient Education                   ? Family Training/Education  ? Other (comment):    Frequency/Duration: Patient will be followed by occupational therapy 3-5 times a week to address goals. Discharge Recommendations: Home Health  Further Equipment Recommendations for Discharge: anticipate none     SUBJECTIVE:   Patient stated This bed is uncomfortable.     OBJECTIVE DATA SUMMARY:     Past Medical History:   Diagnosis Date    [facility] 01/2017    Arthritis, degenerative     CAD (coronary artery disease) 04/2012    S/P 2.75 X 15 mm BMS of OM (04/2012), Two LAD BMS (07/2012)    Elevated liver enzymes     Likely from statin    HLD (hyperlipidemia)     Hyperkalemia 06/2012    Likely from lisinopril    Ischemic cardiomyopathy     LVEF  30-35%(05/19) 45% (11/2012) & 30% echo (04/2012)    NSTEMI (non-ST elevated myocardial infarction) ([facility] [address] 04/2012    UTI (urinary tract infection)     Vertigo      Past Surgical History:   Procedure Laterality Date    HX CORONARY STENT PLACEMENT  4/23/12    bare metal stent to obtuse marginal branch    HX HEART CATHETERIZATION       Barriers to Learning/Limitations: None  Compensate with: visual, verbal, tactile, kinesthetic cues/model    Home Situation:   Home Situation  Home Environment: Independent living  # Steps to Enter: 0  One/Two Story Residence: One story  Living Alone: No  Support Systems: Child(lola)  Patient Expects to be Discharged to[de-identified] Independent living facility  Current DME Used/Available at Home: Wheelchair  Tub or Shower Type: Shower  ? Right hand dominant   ? Left hand dominant    Cognitive/Behavioral Status:  Neurologic State: Alert  Orientation Level: Oriented X4  Cognition: Follows commands  Safety/Judgement: Awareness of environment; Fall prevention    Skin: Intact (BUEs)  Edema: None noted (BUEs)    Vision/Perceptual:    Acuity: Within Defined Limits      Coordination: BUE  Coordination: Within functional limits  Fine Motor Skills-Upper: Right Intact; Left Intact    Gross Motor Skills-Upper: Right Intact; Left Intact    Balance:  Sitting: Intact  Standing: Intact    Strength: BUE  Strength: Generally decreased, functional(4/5)    Range of Motion: BUE  AROM: Within functional limits    Functional Mobility and Transfers for ADLs:  Bed Mobility:  Supine to Sit: Supervision  Sit to Supine: Supervision    Transfers:  Sit to Stand: Contact guard assistance  Stand to Sit: Contact guard assistance   Toilet Transfer : Contact guard assistance   Bathroom Mobility: Stand-by assistance;Contact guard assistance    ADL Assessment:   Feeding: Setup;Supervision  Oral Facial Hygiene/Grooming: Setup;Supervision  Bathing: Minimum assistance  Upper Body Dressing: Setup;Supervision  Lower Body Dressing: Setup;Supervision  Toileting: Setup;Supervision    ADL Intervention:  Grooming  Grooming Assistance: Set-up; Supervision  Washing Hands: Supervision  Brushing Teeth: Supervision  Cues: Verbal cues provided    Toileting  Toileting Assistance: Contact guard assistance;Supervision  Bladder Hygiene: Supervision  Clothing Management: Supervision  Cues: Verbal cues provided    CGA for toilet transfer; supervision for brief management, gown management and pericare. 1 seated rest break at EOB.  Pt tolerated ~5 mins of standing at sink with supervision; intact dynamic standing balance during forward flexion & oral care. Cognitive Retraining  Safety/Judgement: Awareness of environment; Fall prevention    Pain:  Pain level pre-treatment: 0/10   Pain level post-treatment: 0/10     Activity Tolerance:  Fair  Please refer to the flowsheet for vital signs taken during this treatment. After treatment:   ? Patient left in no apparent distress sitting up in chair  ? Patient left in no apparent distress in bed  ? Call bell left within reach  ? Nursing notified  ? Caregiver present  ? Bed alarm activated    COMMUNICATION/EDUCATION:   ? Role of Occupational Therapy in the acute care setting  ? Home safety education was provided and the patient/caregiver indicated understanding. ? Patient/family have participated as able in goal setting and plan of care. ? Patient/family agree to work toward stated goals and plan of care. ? Patient understands intent and goals of therapy, but is neutral about his/her participation. ? Patient is unable to participate in goal setting and plan of care. Thank you for this referral.  Maggy Bell MS OTR/L  Time Calculation: 33 mins    Eval Complexity: History: MEDIUM Complexity : Expanded review of history including physical, cognitive and psychosocial  history ; Examination: MEDIUM Complexity : 3-5 performance deficits relating to physical, cognitive , or psychosocial skils that result in activity limitations and / or participation restrictions; Decision Making:HIGH Complexity : Patient presents with comorbidities that affect occupational performance.  Signifigant modification of tasks or assistance (eg, physical or verbal) with assessment (s) is necessary to enable patient to complete evaluation

## 2019-10-02 NOTE — PROGRESS NOTES
Problem: Falls - Risk of  Goal: *Absence of Falls  Description  Document Hiram Malik Fall Risk and appropriate interventions in the flowsheet. Outcome: Progressing Towards Goal  Note:   Fall Risk Interventions:                 Elimination Interventions: Call light in reach              Problem: Patient Education: Go to Patient Education Activity  Goal: Patient/Family Education  Outcome: Progressing Towards Goal     Problem: Pressure Injury - Risk of  Goal: *Prevention of pressure injury  Description  Document Allen Scale and appropriate interventions in the flowsheet.   Outcome: Progressing Towards Goal  Note:   Pressure Injury Interventions:       Moisture Interventions: Absorbent underpads, Minimize layers, Offer toileting Q_hr    Activity Interventions: Pressure redistribution bed/mattress(bed type)    Mobility Interventions: Pressure redistribution bed/mattress (bed type)    Nutrition Interventions: Offer support with meals,snacks and hydration    Friction and Shear Interventions: Foam dressings/transparent film/skin sealants                Problem: Patient Education: Go to Patient Education Activity  Goal: Patient/Family Education  Outcome: Progressing Towards Goal     Problem: Pain  Goal: *Control of Pain  Outcome: Progressing Towards Goal     Problem: Patient Education: Go to Patient Education Activity  Goal: Patient/Family Education  Outcome: Progressing Towards Goal     Problem: Hypotension  Goal: *Blood pressure within specified parameters  Outcome: Progressing Towards Goal  Goal: *Fluid volume balance  Outcome: Progressing Towards Goal  Goal: *Labs within defined limits  Outcome: Progressing Towards Goal     Problem: Patient Education: Go to Patient Education Activity  Goal: Patient/Family Education  Outcome: Progressing Towards Goal     Problem: Discharge Planning  Goal: *Discharge to safe environment  Outcome: Progressing Towards Goal     Problem: Patient Education: Go to Patient Education Activity  Goal: Patient/Family Education  Outcome: Progressing Towards Goal

## 2019-10-03 LAB
APTT PPP: 40.8 SEC (ref 23–36.4)
BASOPHILS # BLD: 0 K/UL (ref 0–0.1)
BASOPHILS NFR BLD: 0 % (ref 0–2)
CK MB CFR SERPL CALC: 8.5 % (ref 0–4)
CK MB SERPL-MCNC: 3.5 NG/ML (ref 5–25)
CK SERPL-CCNC: 41 U/L (ref 26–192)
DIFFERENTIAL METHOD BLD: ABNORMAL
EOSINOPHIL # BLD: 0.5 K/UL (ref 0–0.4)
EOSINOPHIL NFR BLD: 5 % (ref 0–5)
ERYTHROCYTE [DISTWIDTH] IN BLOOD BY AUTOMATED COUNT: 19.8 % (ref 11.6–14.5)
HCT VFR BLD AUTO: 30.7 % (ref 35–45)
HGB BLD-MCNC: 9.4 G/DL (ref 12–16)
LYMPHOCYTES # BLD: 1.2 K/UL (ref 0.9–3.6)
LYMPHOCYTES NFR BLD: 12 % (ref 21–52)
MAGNESIUM SERPL-MCNC: 2.1 MG/DL (ref 1.6–2.6)
MCH RBC QN AUTO: 26.9 PG (ref 24–34)
MCHC RBC AUTO-ENTMCNC: 30.6 G/DL (ref 31–37)
MCV RBC AUTO: 88 FL (ref 74–97)
MONOCYTES # BLD: 1.5 K/UL (ref 0.05–1.2)
MONOCYTES NFR BLD: 15 % (ref 3–10)
NEUTS SEG # BLD: 6.7 K/UL (ref 1.8–8)
NEUTS SEG NFR BLD: 68 % (ref 40–73)
PHOSPHATE SERPL-MCNC: 3.3 MG/DL (ref 2.5–4.9)
PLATELET # BLD AUTO: 312 K/UL (ref 135–420)
PMV BLD AUTO: 9.1 FL (ref 9.2–11.8)
RBC # BLD AUTO: 3.49 M/UL (ref 4.2–5.3)
TROPONIN I SERPL-MCNC: 0.13 NG/ML (ref 0–0.04)
WBC # BLD AUTO: 9.9 K/UL (ref 4.6–13.2)

## 2019-10-03 PROCEDURE — 83735 ASSAY OF MAGNESIUM: CPT

## 2019-10-03 PROCEDURE — 65660000000 HC RM CCU STEPDOWN

## 2019-10-03 PROCEDURE — 84100 ASSAY OF PHOSPHORUS: CPT

## 2019-10-03 PROCEDURE — 85730 THROMBOPLASTIN TIME PARTIAL: CPT

## 2019-10-03 PROCEDURE — 85025 COMPLETE CBC W/AUTO DIFF WBC: CPT

## 2019-10-03 PROCEDURE — 77010033678 HC OXYGEN DAILY

## 2019-10-03 PROCEDURE — 97116 GAIT TRAINING THERAPY: CPT

## 2019-10-03 PROCEDURE — 97162 PT EVAL MOD COMPLEX 30 MIN: CPT

## 2019-10-03 PROCEDURE — 97535 SELF CARE MNGMENT TRAINING: CPT

## 2019-10-03 PROCEDURE — 74011250637 HC RX REV CODE- 250/637: Performed by: INTERNAL MEDICINE

## 2019-10-03 PROCEDURE — 36415 COLL VENOUS BLD VENIPUNCTURE: CPT

## 2019-10-03 PROCEDURE — 82550 ASSAY OF CK (CPK): CPT

## 2019-10-03 RX ADMIN — DIGOXIN 0.12 MG: 125 TABLET ORAL at 09:08

## 2019-10-03 RX ADMIN — BUMETANIDE 0.5 MG: 1 TABLET ORAL at 09:08

## 2019-10-03 RX ADMIN — HYDROCORTISONE: 1 CREAM TOPICAL at 17:19

## 2019-10-03 RX ADMIN — FLUOXETINE 10 MG: 10 CAPSULE ORAL at 09:00

## 2019-10-03 RX ADMIN — ASPIRIN 81 MG 81 MG: 81 TABLET ORAL at 09:08

## 2019-10-03 RX ADMIN — MIRTAZAPINE 15 MG: 15 TABLET, FILM COATED ORAL at 23:35

## 2019-10-03 RX ADMIN — HYDROCORTISONE: 1 CREAM TOPICAL at 09:08

## 2019-10-03 NOTE — PROGRESS NOTES
Internal Medicine Progress Note        NAME: Rina Knapp Quiles   :  8/10/1927  MRM:  [id number]    Date/Time: 10/3/2019        ASSESSMENT/PLAN:  PT/ot eval.       #   Hypotension (10/1/2019). Improved. DC IVF. Factors: poor po intake, hypovolemia from GI problems, A fib/cardiac medications. - had gentle iv hydration and followed. Serial trop level. careful about fluids as CMP. Defer repeat TTE to cards    - cardiology notes noted, episode possibly triggered by vasovagal effect from N/V.  avoid BB for now. Digoxin can cause Nausea for her, follow     # Kath Curling cardia . Improved with Atrop. Iv.  Resolved.      #   CAD (coronary artery disease) , ICMP , S/P 2.75 X 15 mm BMS of Obtuse marginal. Home regimen. Cardiology on boards     #   HLD (hyperlipidemia)       #  Acute metabolic encephalopathy . Due to hypotension. Improved in ER and back to base line. # LIZZ. Slight increase in S.cr improved with hydration.       # Atrial fibrillation with rapid ventricular response . Currently CVR. Started on heparin ggt in ER. ASA. she is on Digoxin. Normal level . john declined any inteventin and her goal be able to go back to her room and see or play with her dog.       # Abnormal EKG. No CP. Monitored at [facility] bed. Serial EKG as needed. Trop level. Cards on board      # Nausea , retching and diarrhea. Improved. Ho chronic IBS. Also Digoxin might contribute if high level       # Skin rash (10/1/2019) unclear etiology. Topical regimen. Verify source per course      # Acute   Metabolic acidosis , non anion gap. GI problems. Symptomatic support and maintain hydration. Follow labs. co2 normalized     #  Underweight . Not eating well for 8 years. On remeron. very poor eater for years and family tried many thing with her.  Nutritionist consulted   - Body mass index is 18.89 kg/m².     -DVT prophylaxis :  Heparin.   - Code Status : DNR as dw family/pt    Lab Review:     Recent Labs     10/03/19  6210 10/02/19  6162 10/02/19  0110   WBC 9.9 11.1 11.0   HGB 9.4* 9.8* 9.5*   HCT 30.7* 32.5* 31.1*    309 267     Recent Labs     10/03/19  0450 10/02/19  0845 10/01/19  1148   NA  --  138 138   K  --  4.7 5.0   CL  --  107 109   CO2  --  24 13*   GLU  --  75 201*   BUN  --  32* 28*   CREA  --  1.30 1.60*   CA  --  7.9* 7.7*   MG 2.1 2.3  --    PHOS 3.3 3.7  --    ALB  --   --  2.6*   TBILI  --   --  0.6   SGOT  --   --  74*   ALT  --   --  45     No results found for: GLUCPOC  No results for input(s): PH, PCO2, PO2, HCO3, FIO2 in the last 72 hours. No results for input(s): INR, INREXT, INREXT in the last 72 hours. No results found for: SDES  Lab Results   Component Value Date/Time    Culture result: NO GROWTH 1 DAY 01/28/2019 02:12 PM    Culture result:  11/15/2018 02:12 PM     >100,000  COLONIES/mL  MIXED GRAM POSITIVE DAVID, PROBABLE SKIN/GENITAL CONTAMINATION. Culture result: NO GROWTH 2 DAYS 10/30/2018 07:45 PM       All Cardiac Markers in the last 24 hours:   Lab Results   Component Value Date/Time    CPK 41 10/03/2019 04:50 AM    CPK 63 10/02/2019 08:45 AM    CKMB 3.5 10/03/2019 04:50 AM    CKMB 3.9 (H) 10/02/2019 08:45 AM    CKND1 8.5 (H) 10/03/2019 04:50 AM    CKND1 6.2 (H) 10/02/2019 08:45 AM    TROIQ 0.13 (H) 10/03/2019 04:50 AM    TROIQ 0.21 (H) 10/02/2019 09:00 PM    TROIQ 0.19 (H) 10/02/2019 02:22 PM    TROIQ 0.34 (H) 10/02/2019 08:45 AM             Subjective:     Chief Complaint:      Had good sleep , no NV. No PT seen yet. Feel week     ROS:  (bold if positive,otherwise negative)  Fever/chills ,  Dysuria   Cough , Sputum , SOB/JARRELL , Chest Pain     Chronic Diarrhea ,Nausea/Vomit , Abd Pain , Constipation     Tolerating Diet,  eat little                 Objective:     Vitals:  Last 24hrs VS reviewed since prior progress note. Most recent are:    Visit Vitals  /60   Pulse 89   Temp 98.1 °F (36.7 °C)   Resp 18   Ht 5' 1\" (1.549 m)   Wt 45.4 kg (100 lb)   SpO2 98%   Breastfeeding?  No   BMI 18.89 kg/m² SpO2 Readings from Last 6 Encounters:   10/03/19 98%   09/30/19 92%   07/24/19 94%   07/23/19 92%   07/22/19 93%   07/19/19 94%    O2 Flow Rate (L/min): 4 l/min       Intake/Output Summary (Last 24 hours) at 10/3/2019 0820  Last data filed at 10/2/2019 1342  Gross per 24 hour   Intake    Output 200 ml   Net -200 ml          Physical Exam:   Gen:  Appear stated age, Well-developed,   in no acute distress  HEENT:  Head atraumatic, normocephalic , hearing intact to voice, moist mucous membranes. Neck:  Supple, no masses I appreciate, thyroid non-tender. Resp:  No accessory muscle use,Bilateral BS present, clear breath sounds without wheezes rales or rhonchi  Card:  , normal S1, S2 without thrills, bruits . +3 lower leg peripheral edema. Abd:  Soft, non-tender, non-distended, normoactive bowel sounds are present, no palpable organomegaly   Musc:  No cyanosis or clubbing. Skin: itching marks and rash on the chest   No  ulcers, skin turgor is good. Neuro:  Cranial nerves are grossly intact, no clear area of focal motor weakness, follows commands appropriately. Psych:  Good insight, oriented to person, place and time, alert.        Telemetry reviewed:   AFIB    Medications Reviewed: (see below)    Lab Data Reviewed: (see below)    ______________________________________________________________________    Medications:     Current Facility-Administered Medications   Medication Dose Route Frequency    acetaminophen (TYLENOL) tablet 650 mg  650 mg Oral Q6H PRN    aspirin chewable tablet 81 mg  81 mg Oral DAILY    bumetanide (BUMEX) tablet 0.5 mg  0.5 mg Oral DAILY    digoxin (LANOXIN) tablet 0.125 mg  0.125 mg Oral DAILY    fluticasone propionate (FLONASE) 50 mcg/actuation nasal spray 2 Spray  2 Spray Both Nostrils DAILY PRN    mirtazapine (REMERON) tablet 15 mg  15 mg Oral QHS    nitroglycerin (NITROSTAT) tablet 0.4 mg  0.4 mg SubLINGual Q5MIN PRN    psyllium husk-aspartame (METAMUCIL FIBER) packet 1 Packet 1 Packet Oral DAILY    hydrocortisone (CORTAID) 1 % cream   Topical BID    ondansetron (ZOFRAN) injection 4 mg  4 mg IntraVENous Q4H PRN    peg 400-hypromellose-glycerin 1-0.2-0.2 % opthalmic solution 1 Drop  1 Drop Both Eyes TID PRN    FLUoxetine (PROzac) capsule 10 mg  10 mg Oral DAILY    polyethylene glycol (MIRALAX) packet 17 g  17 g Oral DAILY PRN    loperamide (IMODIUM) 1 mg/5 mL oral solution 1 mg  1 mg Oral TID PRN          Total time spent with patient: 25 minutes                  Care Plan discussed with: Patient, Family, Nursing Staff, Consultant/Specialist and >50% of time spent in counseling and coordination of care    Discussed:  Care Plan    Prophylaxis:  Heparin    Disposition:  Home w/Family             Attending Physician: Hair An MD

## 2019-10-03 NOTE — PROGRESS NOTES
[facility] - Progress Note  Admit Date: 10/1/2019  Patient seen and examined independently. Much more alert. No specific complaints. Discussed patient's condition with her son at length. He is concerned about her ability to  get to the bathroom where she resides in the [place] [facility]. We will continue present cardiac Rx. Agree with assessment and plan as noted below. Shane Batres  Assessment:     Hospital Problems  Date Reviewed: 1/28/2019          Codes Class Noted POA    * (Principal) Hypotension ICD-10-CM: I95.9  ICD-9-CM: 458.9  10/1/2019 Unknown        Skin rash ICD-10-CM: R21  ICD-9-CM: 782.1  10/1/2019 Unknown        Metabolic acidosis NRU-24-QH: E87.2  ICD-9-CM: 276.2  10/1/2019 Unknown        Underweight ICD-10-CM: R63.6  ICD-9-CM: 783.22  10/1/2019 Unknown        Atrial fibrillation with rapid ventricular response ([facility] [address] ICD-10-CM: I48.91  ICD-9-CM: 427.31  9/30/2019 Yes        Acute metabolic encephalopathy QLU-02-PI: G93.41  ICD-9-CM: 348.31  10/21/2018 Yes        CAD (coronary artery disease) ICD-10-CM: I25.10  ICD-9-CM: 414.00  Unknown Yes    Overview Signed 5/3/2012  3:22 PM by Rui Chan MD     S/P 2.75 X 15 mm BMS of Obtuse marginal             Ischemic cardiomyopathy ICD-10-CM: I25.5  ICD-9-CM: 414.8  Unknown Yes        HLD (hyperlipidemia) ICD-10-CM: E78.5  ICD-9-CM: 272.4  Unknown Yes                 - Presented with bradycardia, hypotension. EKG with ST elevation in lead III on admission, ST depression in I and aVL.  No CP   - Syncopal episode  - ER visit 09/30/19 with atrial fibrillation with rapid ventricular response. Was given oral Lasix and Metoprolol before discharged from the ED   - Mild fluid overload and chest pressure ER visit 09/30  - CAD:  S/P 2.75 X 15 mm BMS of OM (in the setting of NSTEMI) 04/2012, Two LAD BMS 07/2012  - HLD   - History of Ischemic cardiomyopathy: LVEF  30-35%(06/19) 45% (11/2012) & 30% echo (04/2012)  - Arthritis  - DNR status     Plan:      - PT/OT on board, patient's family is very concerned about her ability to be able to complete ADL's as she would return back to assisted living facility  -Continue ASA, Bumex, Digoxin. She has known cardiomyopathy but addition of further cardiac medications (BB/ACEi/ARB/ARNI) is limited by soft BP (90's-110's at baseline). - Occasional increased afib rates with activity, but again BP is limiting beta blocker to slow afib, and frailty/chronic nausea and GI issues would limit increasing Digoxin     Subjective:     No new complaints.  No palpitations, chest pain or shortness of breath    Objective:      Patient Vitals for the past 8 hrs:   Temp Pulse Resp BP SpO2   10/03/19 1120 97.8 °F (36.6 °C) 91 20 94/57 99 %   10/03/19 0701 98.1 °F (36.7 °C) 89 18 107/60 98 %         Patient Vitals for the past 96 hrs:   Weight   10/01/19 1135 100 lb (45.4 kg)                    Intake/Output Summary (Last 24 hours) at 10/3/2019 1322  Last data filed at 10/3/2019 1218  Gross per 24 hour   Intake 240 ml   Output 400 ml   Net -160 ml       Physical Exam:  General:  alert, cooperative, no distress  Neck:  nontender, no JVD  Lungs:  clear to auscultation bilaterally  Heart:  irregularly irregular rhythm  Abdomen:  abdomen is soft without significant tenderness, masses, organomegaly or guarding  Extremities:  extremities normal, atraumatic, no cyanosis or edema    Data Review:     Labs: Results:       Chemistry Recent Labs     10/03/19  0450 10/02/19  0845 10/01/19  1148   GLU  --  75 201*   NA  --  138 138   K  --  4.7 5.0   CL  --  107 109   CO2  --  24 13*   BUN  --  32* 28*   CREA  --  1.30 1.60*   CA  --  7.9* 7.7*   MG 2.1 2.3  --    PHOS 3.3 3.7  --    AGAP  --  7 16   BUCR  --  25* 18   AP  --   --  189*   TP  --   --  6.2*   ALB  --   --  2.6*   GLOB  --   --  3.6   AGRAT  --   --  0.7*      CBC w/Diff Recent Labs     10/03/19  0450 10/02/19  0845 10/02/19  0110   WBC 9.9 11.1 11.0   RBC 3.49* 3.68* 3.58*   HGB 9.4* 9.8* 9.5*   HCT 30.7* 32.5* 31.1*    309 267   GRANS 68 63 65   LYMPH 12* 16* 18*   EOS 5 5 1      Cardiac Enzymes Lab Results   Component Value Date/Time    CPK 41 10/03/2019 04:50 AM    CKMB 3.5 10/03/2019 04:50 AM    CKND1 8.5 (H) 10/03/2019 04:50 AM    TROIQ 0.13 (H) 10/03/2019 04:50 AM    TROIQ 0.21 (H) 10/02/2019 09:00 PM    TROIQ 0.19 (H) 10/02/2019 02:22 PM      Coagulation Recent Labs     10/03/19  0450 10/02/19  0110   APTT 40.8* 97.6*       Lipid Panel Lab Results   Component Value Date/Time    Cholesterol, total 202 (H) 04/29/2019 07:45 AM    HDL Cholesterol 64 (H) 04/29/2019 07:45 AM    LDL, calculated 116.4 (H) 04/29/2019 07:45 AM    VLDL, calculated 21.6 04/29/2019 07:45 AM    Triglyceride 108 04/29/2019 07:45 AM    CHOL/HDL Ratio 3.2 04/29/2019 07:45 AM      BNP No results found for: BNP, BNPP, XBNPT   Liver Enzymes Recent Labs     10/01/19  1148   TP 6.2*   ALB 2.6*   *   SGOT 74*      Digoxin    Thyroid Studies Lab Results   Component Value Date/Time    TSH 1.57 07/26/2019 08:09 AM          Signed By: Holden Pitts     October 3, 2019

## 2019-10-03 NOTE — PROGRESS NOTES
Problem: Falls - Risk of  Goal: *Absence of Falls  Description  Document [facility] Fall Risk and appropriate interventions in the flowsheet. Outcome: Progressing Towards Goal  Note:   Fall Risk Interventions:  Mobility Interventions: Patient to call before getting OOB              Elimination Interventions: Call light in reach              Problem: Patient Education: Go to Patient Education Activity  Goal: Patient/Family Education  Outcome: Progressing Towards Goal     Problem: Pressure Injury - Risk of  Goal: *Prevention of pressure injury  Description  Document Allen Scale and appropriate interventions in the flowsheet.   Outcome: Progressing Towards Goal  Note:   Pressure Injury Interventions:       Moisture Interventions: Apply protective barrier, creams and emollients    Activity Interventions: Increase time out of bed, Pressure redistribution bed/mattress(bed type)    Mobility Interventions: Pressure redistribution bed/mattress (bed type)    Nutrition Interventions: Document food/fluid/supplement intake    Friction and Shear Interventions: Apply protective barrier, creams and emollients                Problem: Patient Education: Go to Patient Education Activity  Goal: Patient/Family Education  Outcome: Progressing Towards Goal     Problem: Pain  Goal: *Control of Pain  Outcome: Progressing Towards Goal     Problem: Patient Education: Go to Patient Education Activity  Goal: Patient/Family Education  Outcome: Progressing Towards Goal     Problem: Hypotension  Goal: *Blood pressure within specified parameters  Outcome: Progressing Towards Goal  Goal: *Fluid volume balance  Outcome: Progressing Towards Goal  Goal: *Labs within defined limits  Outcome: Progressing Towards Goal     Problem: Patient Education: Go to Patient Education Activity  Goal: Patient/Family Education  Outcome: Progressing Towards Goal     Problem: Discharge Planning  Goal: *Discharge to safe environment  Outcome: Progressing Towards Goal Problem: Patient Education: Go to Patient Education Activity  Goal: Patient/Family Education  Outcome: Progressing Towards Goal     Problem: Altered nutrition  Goal: *Optimize nutritional status  Outcome: Progressing Towards Goal  Goal: Patient/Family Education  Outcome: Progressing Towards Goal     Problem: Patient Education: Go to Patient Education Activity  Goal: Patient/Family Education  Outcome: Progressing Towards Goal

## 2019-10-03 NOTE — PROGRESS NOTES
Bedside shift change report given to CINTHYA Chilel (oncoming nurse) by Benigno Lomax RN (offgoing nurse). Report included the following information SBAR, Kardex, Intake/Output, MAR and Recent Results.      0908 -- AM medications given, well tolerated, will continue to monitor.      1120 -- Reassessment completed, no change in patient condition, will continue to monitor.      1506 -- Reassessment completed, no change in patient condition, will continue to monitor.      Bedside shift change report given to CINTHYA Eldridge (oncoming nurse) by Misty Quezada RN (offgoing nurse). Report included the following information SBAR, Kardex, Intake/Output, MAR and Recent Results.

## 2019-10-03 NOTE — PROGRESS NOTES
Problem: Falls - Risk of  Goal: *Absence of Falls  Description  Document Hiram Malik Fall Risk and appropriate interventions in the flowsheet. Outcome: Progressing Towards Goal  Note:   Fall Risk Interventions:  Mobility Interventions: Patient to call before getting OOB              Elimination Interventions: Call light in reach, Patient to call for help with toileting needs              Problem: Patient Education: Go to Patient Education Activity  Goal: Patient/Family Education  Outcome: Progressing Towards Goal     Problem: Pressure Injury - Risk of  Goal: *Prevention of pressure injury  Description  Document Allen Scale and appropriate interventions in the flowsheet.   Outcome: Progressing Towards Goal  Note:   Pressure Injury Interventions:       Moisture Interventions: Apply protective barrier, creams and emollients, Absorbent underpads    Activity Interventions: Increase time out of bed, Pressure redistribution bed/mattress(bed type)    Mobility Interventions: Pressure redistribution bed/mattress (bed type)    Nutrition Interventions: Document food/fluid/supplement intake, Offer support with meals,snacks and hydration    Friction and Shear Interventions: Apply protective barrier, creams and emollients, Foam dressings/transparent film/skin sealants                Problem: Patient Education: Go to Patient Education Activity  Goal: Patient/Family Education  Outcome: Progressing Towards Goal     Problem: Pain  Goal: *Control of Pain  Outcome: Progressing Towards Goal     Problem: Patient Education: Go to Patient Education Activity  Goal: Patient/Family Education  Outcome: Progressing Towards Goal

## 2019-10-03 NOTE — PROGRESS NOTES
Problem: Self Care Deficits Care Plan (Adult)  Goal: *Acute Goals and Plan of Care (Insert Text)  Description  Occupational Therapy Goals  Initiated 10/2/2019 within 7 day(s). 1.  Patient will perform grooming tasks while standing with modified independence. 2.  Patient will perform lower body dressing with modified independence and minimal verbal cues for pacing. 3.  Patient will perform functional task in standing for 8 minutes with modified independence  And good safety awareness. 4.  Patient will perform toilet transfers with modified independence. 5.  Patient will perform all aspects of toileting with modified independence. 6.  Patient will participate in upper extremity therapeutic exercise/activities with supervision for 8 minutes to increase/maintain BUE strength for functional transfers and ADLs. Outcome: Progressing Towards Goal   OCCUPATIONAL THERAPY TREATMENT    Patient: Jeff Doe (80 y.o. female)  Date: 10/3/2019  Diagnosis: Hypotension [I95.9]  Atrial fibrillation ([facility] [address] [I48.91] Hypotension       Precautions: Fall  PLOF: Assist w/bathing ADLs    Chart, occupational therapy assessment, plan of care, and goals were reviewed. ASSESSMENT:  Pt OOB seated in chair upon entry. Pt appears SOB at rest. SpO2% 97 on 4L O2 nc. Pt requires min vc's for safety w/RW and O2 tubing mgt w/functional mobility to bathroom. Verbal cues for use of grab bar w/functional transfer to toilet. Pt tolerates standing performing hand hygiene. Pt c/o dizziness s/p standing activity requiring seated rest break. Generalized weakness requires assist w/BLE returning to supine. Pt will benefit from short SNF stay to increase overall strength and activity tolerance for return to PLOF. Progression toward goals:  ?          Improving appropriately and progressing toward goals  ? Improving slowly and progressing toward goals  ?           Not making progress toward goals and plan of care will be adjusted PLAN:  Patient continues to benefit from skilled intervention to address the above impairments. Continue treatment per established plan of care. Discharge Recommendations:  [place]  Further Equipment Recommendations for Discharge:  N/A, pt has DME     SUBJECTIVE:   Patient stated I've always been a mouth breather.     OBJECTIVE DATA SUMMARY:   Cognitive/Behavioral Status:  Neurologic State: Alert  Orientation Level: Oriented X4  Cognition: Follows commands  Safety/Judgement: Awareness of environment, Fall prevention    Functional Mobility and Transfers for ADLs:   Bed Mobility:  Supine to Sit: Supervision  Sit to Supine: Moderate assistance(assist w/BLE)   Transfers:  Sit to Stand: Contact guard assistance(w/RW)  Chair to bed: Contact guard assistance(w/RW)   Toilet Transfer : Stand-by assistance(w/grab bar)   Bathroom Mobility: Stand-by assistance;Contact guard assistance(w/RW)  Balance:  Sitting: Intact  Standing: Intact; With support  Standing - Static: Good  Standing - Dynamic : Fair(fair plus)    ADL Intervention:  Grooming  Washing Hands: Stand-by assistance(standing sinkside)    Toileting  Toileting Assistance: Stand-by assistance  Bladder Hygiene: Supervision(seated)  Clothing Management: Stand-by assistance    Pain:  Pain level pre-treatment: 0/10   Pain level post-treatment: 0/10    Activity Tolerance:    Fair, pt fatigues easily    Please refer to the flowsheet for vital signs taken during this treatment. After treatment:   ?  Patient left in no apparent distress sitting up in chair  ? Patient left in no apparent distress in bed  ? Call bell left within reach  ? Nursing notified  ? son-in-law present  ? Bed alarm activated    COMMUNICATION/EDUCATION:   ? Role of Occupational Therapy in the acute care setting  ? Home safety education was provided and the patient/caregiver indicated understanding. ?  Patient/family have participated as able in working towards goals and plan of care.  ? Patient/family agree to work toward stated goals and plan of care. ? Patient understands intent and goals of therapy, but is neutral about his/her participation. ? Patient is unable to participate in goal setting and plan of care.       Thank you for this referral.  EMANEUL Felder  Time Calculation: 30 mins

## 2019-10-03 NOTE — PROGRESS NOTES
Problem: Discharge Planning  Goal: *Discharge to safe environment  Outcome: Progressing Towards Goal   HH vs SNF    Has [facility] to [facility]. Lives at [facility]    Awaiting PT.

## 2019-10-04 ENCOUNTER — HOSPITAL ENCOUNTER (INPATIENT)
Age: 84
LOS: 5 days | Discharge: HOME | End: 2019-10-09
Attending: INTERNAL MEDICINE | Admitting: INTERNAL MEDICINE

## 2019-10-04 VITALS
OXYGEN SATURATION: 99 % | SYSTOLIC BLOOD PRESSURE: 114 MMHG | BODY MASS INDEX: 18.88 KG/M2 | DIASTOLIC BLOOD PRESSURE: 82 MMHG | HEIGHT: 61 IN | WEIGHT: 100 LBS | HEART RATE: 96 BPM | TEMPERATURE: 97.4 F | RESPIRATION RATE: 20 BRPM

## 2019-10-04 PROCEDURE — 97116 GAIT TRAINING THERAPY: CPT

## 2019-10-04 PROCEDURE — 74011250637 HC RX REV CODE- 250/637: Performed by: INTERNAL MEDICINE

## 2019-10-04 RX ORDER — FLUOXETINE 10 MG/1
10 CAPSULE ORAL DAILY
Status: DISCONTINUED | OUTPATIENT
Start: 2019-10-05 | End: 2019-10-09 | Stop reason: HOSPADM

## 2019-10-04 RX ORDER — DIGOXIN 125 MCG
TABLET ORAL
Qty: 45 TAB | Refills: 0 | Status: ON HOLD | OUTPATIENT
Start: 2019-10-04 | End: 2019-10-09 | Stop reason: SDUPTHER

## 2019-10-04 RX ORDER — POLYETHYLENE GLYCOL 3350 17 G/17G
17 POWDER, FOR SOLUTION ORAL DAILY
Status: DISCONTINUED | OUTPATIENT
Start: 2019-10-05 | End: 2019-10-04

## 2019-10-04 RX ORDER — DIGOXIN 125 MCG
0.12 TABLET ORAL DAILY
Status: DISCONTINUED | OUTPATIENT
Start: 2019-10-05 | End: 2019-10-04

## 2019-10-04 RX ORDER — POLYETHYLENE GLYCOL 3350 17 G/17G
17 POWDER, FOR SOLUTION ORAL
Status: DISCONTINUED | OUTPATIENT
Start: 2019-10-04 | End: 2019-10-09 | Stop reason: HOSPADM

## 2019-10-04 RX ORDER — DIGOXIN 125 MCG
0.12 TABLET ORAL
Status: DISCONTINUED | OUTPATIENT
Start: 2019-10-05 | End: 2019-10-09 | Stop reason: HOSPADM

## 2019-10-04 RX ORDER — ONDANSETRON 4 MG/1
4 TABLET, ORALLY DISINTEGRATING ORAL
Status: DISCONTINUED | OUTPATIENT
Start: 2019-10-04 | End: 2019-10-09 | Stop reason: HOSPADM

## 2019-10-04 RX ORDER — MIRTAZAPINE 15 MG/1
15 TABLET, FILM COATED ORAL
Status: DISCONTINUED | OUTPATIENT
Start: 2019-10-04 | End: 2019-10-09 | Stop reason: HOSPADM

## 2019-10-04 RX ORDER — HYDROCORTISONE 1 %
CREAM (GRAM) TOPICAL 2 TIMES DAILY
Status: DISCONTINUED | OUTPATIENT
Start: 2019-10-04 | End: 2019-10-09 | Stop reason: HOSPADM

## 2019-10-04 RX ORDER — DIGOXIN 125 MCG
0.25 TABLET ORAL
Status: DISCONTINUED | OUTPATIENT
Start: 2019-10-06 | End: 2019-10-09 | Stop reason: HOSPADM

## 2019-10-04 RX ORDER — DIGOXIN 125 MCG
TABLET ORAL
Qty: 45 TAB | Refills: 0 | Status: SHIPPED | OUTPATIENT
Start: 2019-10-04 | End: 2019-10-04 | Stop reason: SDUPTHER

## 2019-10-04 RX ORDER — ACETAMINOPHEN 325 MG/1
650 TABLET ORAL
Status: DISCONTINUED | OUTPATIENT
Start: 2019-10-04 | End: 2019-10-09 | Stop reason: HOSPADM

## 2019-10-04 RX ORDER — FLUTICASONE PROPIONATE 50 MCG
2 SPRAY, SUSPENSION (ML) NASAL DAILY
Status: DISCONTINUED | OUTPATIENT
Start: 2019-10-05 | End: 2019-10-09 | Stop reason: HOSPADM

## 2019-10-04 RX ORDER — HYDROCORTISONE 1 %
CREAM (GRAM) TOPICAL 2 TIMES DAILY
Qty: 30 G | Refills: 0 | Status: SHIPPED | OUTPATIENT
Start: 2019-10-04 | End: 2019-10-09

## 2019-10-04 RX ORDER — ASPIRIN 325 MG
325 TABLET ORAL DAILY
Status: DISCONTINUED | OUTPATIENT
Start: 2019-10-05 | End: 2019-10-07

## 2019-10-04 RX ORDER — LOPERAMIDE HYDROCHLORIDE 2 MG/1
2 CAPSULE ORAL
Status: DISCONTINUED | OUTPATIENT
Start: 2019-10-04 | End: 2019-10-09 | Stop reason: HOSPADM

## 2019-10-04 RX ORDER — CARBOXYMETHYLCELLULOSE SODIUM 5 MG/ML
1 SOLUTION/ DROPS OPHTHALMIC
Status: DISCONTINUED | OUTPATIENT
Start: 2019-10-04 | End: 2019-10-09 | Stop reason: HOSPADM

## 2019-10-04 RX ORDER — BUMETANIDE 1 MG/1
0.5 TABLET ORAL DAILY
Status: DISCONTINUED | OUTPATIENT
Start: 2019-10-05 | End: 2019-10-09 | Stop reason: HOSPADM

## 2019-10-04 RX ORDER — NITROGLYCERIN 0.4 MG/1
0.4 TABLET SUBLINGUAL AS NEEDED
Status: DISCONTINUED | OUTPATIENT
Start: 2019-10-04 | End: 2019-10-09 | Stop reason: HOSPADM

## 2019-10-04 RX ADMIN — BUMETANIDE 0.5 MG: 1 TABLET ORAL at 09:31

## 2019-10-04 RX ADMIN — FLUOXETINE 10 MG: 10 CAPSULE ORAL at 09:31

## 2019-10-04 RX ADMIN — ASPIRIN 81 MG 81 MG: 81 TABLET ORAL at 09:31

## 2019-10-04 RX ADMIN — MIRTAZAPINE 15 MG: 15 TABLET, FILM COATED ORAL at 22:10

## 2019-10-04 RX ADMIN — DIGOXIN 0.12 MG: 125 TABLET ORAL at 09:31

## 2019-10-04 RX ADMIN — ACETAMINOPHEN 650 MG: 325 TABLET, FILM COATED ORAL at 05:47

## 2019-10-04 RX ADMIN — HYDROCORTISONE: 1 CREAM TOPICAL at 09:34

## 2019-10-04 NOTE — PROGRESS NOTES
[facility] - Progress Note  Admit Date: 10/1/2019      Assessment:       - Presented with bradycardia, hypotension. EKG with ST elevation in lead III on admission, ST depression in I and aVL. No CP   - Syncopal episode  - ER visit 09/30/19 with atrial fibrillation with rapid ventricular response. Was given oral Lasix and Metoprolol before discharged from the ED   - Mild fluid overload and chest pressure ER visit 09/30  - CAD:  S/P 2.75 X 15 mm BMS of OM (in the setting of NSTEMI) 04/2012, Two LAD BMS 07/2012  - HLD   - History of Ischemic cardiomyopathy: LVEF  30-35%(06/19) 45% (11/2012) & 30% echo (04/2012)  - Arthritis  - DNR status     Plan:      Plan for SNF today  May consider increase digoxin to 0.125 mg alternating with 0.25 mg every other day but currently HR stable. continue maintenance bumex   Unable to add other heart failure medication like ACE inhibitor or beta-blocker at this time because of marginal blood pressure  No further cardiac work-up is planned at this time    Nabila Nichols MD    Subjective:     No new complaints. No palpitations, chest pain or shortness of breath  Walked this morning in hallway without problem.      Objective:      Patient Vitals for the past 8 hrs:   Temp Pulse Resp BP SpO2   10/04/19 0800 97.6 °F (36.4 °C) 92 20 124/86 100 %   10/04/19 0417 97.3 °F (36.3 °C) 90 20 125/85 98 %         Patient Vitals for the past 96 hrs:   Weight   10/01/19 1135 100 lb (45.4 kg)                    Intake/Output Summary (Last 24 hours) at 10/4/2019 0944  Last data filed at 10/3/2019 1218  Gross per 24 hour   Intake 240 ml   Output 200 ml   Net 40 ml       Physical Exam:  General:  alert, cooperative, no distress  Neck:  nontender, no JVD  Lungs:  clear to auscultation bilaterally  Heart:  irregularly irregular rhythm  Abdomen:  abdomen is soft without significant tenderness, masses, organomegaly or guarding  Extremities:  No edema    Data Review:     Labs: Results: Chemistry Recent Labs     10/03/19  0450 10/02/19  0845 10/01/19  1148   GLU  --  75 201*   NA  --  138 138   K  --  4.7 5.0   CL  --  107 109   CO2  --  24 13*   BUN  --  32* 28*   CREA  --  1.30 1.60*   CA  --  7.9* 7.7*   MG 2.1 2.3  --    PHOS 3.3 3.7  --    AGAP  --  7 16   BUCR  --  25* 18   AP  --   --  189*   TP  --   --  6.2*   ALB  --   --  2.6*   GLOB  --   --  3.6   AGRAT  --   --  0.7*      CBC w/Diff Recent Labs     10/03/19  0450 10/02/19  0845 10/02/19  0110   WBC 9.9 11.1 11.0   RBC 3.49* 3.68* 3.58*   HGB 9.4* 9.8* 9.5*   HCT 30.7* 32.5* 31.1*    309 267   GRANS 68 63 65   LYMPH 12* 16* 18*   EOS 5 5 1      Cardiac Enzymes No results found for: CPK, CK, CKMMB, CKMB, RCK3, CKMBT, CKNDX, CKND1, KELSIE, TROPT, TROIQ, HERNÁN, TROPT, TNIPOC, BNP, BNPP   Coagulation Recent Labs     10/03/19  0450 10/02/19  0110   APTT 40.8* 97.6*       Lipid Panel Lab Results   Component Value Date/Time    Cholesterol, total 202 (H) 04/29/2019 07:45 AM    HDL Cholesterol 64 (H) 04/29/2019 07:45 AM    LDL, calculated 116.4 (H) 04/29/2019 07:45 AM    VLDL, calculated 21.6 04/29/2019 07:45 AM    Triglyceride 108 04/29/2019 07:45 AM    CHOL/HDL Ratio 3.2 04/29/2019 07:45 AM      BNP No results found for: BNP, BNPP, XBNPT   Liver Enzymes Recent Labs     10/01/19  1148   TP 6.2*   ALB 2.6*   *   SGOT 74*      Digoxin    Thyroid Studies Lab Results   Component Value Date/Time    TSH 1.57 07/26/2019 08:09 AM          Signed By: Kalia Tyler MD     October 4, 2019

## 2019-10-04 NOTE — PROGRESS NOTES
Problem: Discharge Planning  Goal: *Discharge to safe environment  Outcome: Progressing Towards Goal SNF      [facility] can accept if medically cleared today. Son stated that they would be able pay for LTC if needed greater than 6 month at $ 8000.00 a month at this time. Care Management Interventions  PCP Verified by CM: Yes(Dr White)  Mode of Transport at Discharge:  Other (see comment)(family)  Transition of Care Consult (CM Consult): SNF([facility])  Partner SNF: Yes  Current Support Network: Assisted Living(family)  Confirm Follow Up Transport: Family  Plan discussed with Pt/Family/Caregiver: Yes  Discharge Location  Discharge Placement: Skilled nursing facility([facility])

## 2019-10-04 NOTE — ANCILLARY DISCHARGE INSTRUCTIONS
Core Measures Patient, Good Help ACO Patient, RRAT Score is 21, Patient is discharged to [facility].

## 2019-10-04 NOTE — ANCILLARY DISCHARGE INSTRUCTIONS
Patient and/or next of kin has been given the [facility] Important Message From Medicare About Your Rights\" letter and all questions were answered.

## 2019-10-04 NOTE — PROGRESS NOTES
Problem: Falls - Risk of  Goal: *Absence of Falls  Description  Document Aparna Tee Fall Risk and appropriate interventions in the flowsheet. Outcome: Progressing Towards Goal  Note:   Fall Risk Interventions:  Mobility Interventions: Patient to call before getting OOB, Strengthening exercises (ROM-active/passive)              Elimination Interventions: Call light in reach, Bed/chair exit alarm              Problem: Patient Education: Go to Patient Education Activity  Goal: Patient/Family Education  Outcome: Progressing Towards Goal     Problem: Pressure Injury - Risk of  Goal: *Prevention of pressure injury  Description  Document Allen Scale and appropriate interventions in the flowsheet.   Outcome: Progressing Towards Goal  Note:   Pressure Injury Interventions:       Moisture Interventions: Absorbent underpads, Apply protective barrier, creams and emollients    Activity Interventions: Increase time out of bed, Pressure redistribution bed/mattress(bed type)    Mobility Interventions: Pressure redistribution bed/mattress (bed type)    Nutrition Interventions: Document food/fluid/supplement intake, Offer support with meals,snacks and hydration    Friction and Shear Interventions: Apply protective barrier, creams and emollients, Foam dressings/transparent film/skin sealants                Problem: Patient Education: Go to Patient Education Activity  Goal: Patient/Family Education  Outcome: Progressing Towards Goal     Problem: Pain  Goal: *Control of Pain  Outcome: Progressing Towards Goal     Problem: Patient Education: Go to Patient Education Activity  Goal: Patient/Family Education  Outcome: Progressing Towards Goal

## 2019-10-04 NOTE — DISCHARGE SUMMARY
Discharge Summary      [facility] service    Patient ID:  Maye Lehman, 80 y.o., female  : 8/10/1927    Admit Date: 10/1/2019  Discharge Date:  10/4/2019  Length of stay: 3 day(s)    PCP:  Consuello Buerger, MD    Chief Complaint   Patient presents with    Fatigue    Fall       Discharge Diagnosis:     Hospital Problems  Date Reviewed: 2019          Codes Class Noted POA    * (Principal) Hypotension ICD-10-CM: I95.9  ICD-9-CM: 458.9  10/1/2019 Yes        Skin rash ICD-10-CM: R21  ICD-9-CM: 782.1  10/1/2019 Yes        Metabolic acidosis YIV-86-JE: E87.2  ICD-9-CM: 276.2  10/1/2019 Yes        Underweight ICD-10-CM: R63.6  ICD-9-CM: 783.22  10/1/2019 Yes        Atrial fibrillation with rapid ventricular response ([address] ICD-10-CM: I48.91  ICD-9-CM: 427.31  2019 Yes        Acute metabolic encephalopathy YFD-78-IF: G93.41  ICD-9-CM: 348.31  10/21/2018 Yes        CAD (coronary artery disease) ICD-10-CM: I25.10  ICD-9-CM: 414.00  Unknown Yes    Overview Signed 5/3/2012  3:22 PM by Maria Fernanda Martínez MD     S/P 2.75 X 15 mm BMS of Obtuse marginal             Ischemic cardiomyopathy ICD-10-CM: I25.5  ICD-9-CM: 414.8  Unknown Yes        HLD (hyperlipidemia) ICD-10-CM: E78.5  ICD-9-CM: 272.4  Unknown Yes              Discharge instructions:    #  Discharge Diet:            Diet: Cardiac Diet  # Discharge activity and restrictions: Activity as tolerated and PT/OT Eval and Treat    # Follow-up appointments:      Follow-up Information     Follow up With Specialties Details Why Contact Info    Consuello Buerger, MD Geriatric Medicine In 3 days  [address] [facility] [address]      Maria Fernanda Martínez MD Cardiology, Internal Medicine Schedule an appointment as soon as possible for a visit  [facility]  [address] [facility] [address]      Consuello Buerger, MD Geriatric Medicine   [address] Internal Medicine [place] [address]  [phone]              HPI on Admission (per admitting physician):  Ms. Nunu Reed is a 80 y.o.  female who is admitted for Hypotension , bradycardia and AMS, a fib and other cardiac history as noted per the chart. Ms. Nunu Reed presented to the Emergency Department today  complaining of above. Patient referred me to her family for history as poor hearing and tired though she report she is much better in ER. This am family was at her place in the assisted living across the road. Pt was nauseous , given zofran, started retching and dry heaves, had little diarrhea, went to toilet, family followed her, she was slumped forward and groggy, nurse followed her and checked BP, was in 76s and brought to ER. She is a patient of Dr Miah Sarkar from cardiology for years, seen yesterday in ER for a fib RVR and treated with cardizem infusion , seen by cardio and sent home to be followed today in office. Today in ER her HR nad BP was significantly low , given atropine and her VS improved. Some EKG changes also noted in ER. Trop checked with no changes in ER from yesterday. Has chronic diarrhea from IBS. Also very poor eater for years and family tried many thing with her. For further details and initial management please refer to H/P. Hospital Course:      Apart from her daily Nausea She feel good to go to [facility], rehab place. Family at bed side. Report rash and itching with the cream and want to continue. [facility] cardiology re her Nausea and Digoxin, stated they will follow and adjust the dose. By Problems :     #   Hypotension . Improved. Off  IVF. Factors: poor po intake, hypovolemia from GI problems, A fib/cardiac medications.  had gentle iv hydration and followed. Serial trop level. careful about fluids as CMP. cardiology notes noted, episode possibly triggered by vasovagal effect from N/V.  avoid BB for now. Digoxin can cause Nausea for her.     # Dank cardia . Improved with dose of Atrop. Iv in ER. Resolved.    #   CAD (coronary artery disease) , ICMP , S/P 2.75 X 15 mm BMS of Obtuse marginal. Home regimen. Cardiology on boards. Continue BB, bumex and Digoxin      #   HLD (hyperlipidemia)       #  Acute metabolic encephalopathy . Due to hypotension. Improved in ER and back to base line.      # LIZZ. Slight increase in S.cr improved with hydration.       # Atrial fibrillation with rapid ventricular response . Currently CVR. Treated with heparin ggt in ER. ASA. she is on Digoxin. Normal level . john declined any inteventin and her goal be able to go back to her room and see or play with her dog.       # Abnormal EKG. No CP. Monitored at [facility] bed. Serial EKG as needed. Trop level. Cards on board      # Nausea , retching and diarrhea. Improved. Ho chronic IBS. Also Digoxin might contribute        # Skin rash (10/1/2019) unclear etiology. Topical HC cream helping. Verify source per course      # Acute   Metabolic acidosis , non anion gap. GI problems. Symptomatic support and maintain hydration. Follow labs. co2 normalized     #  Underweight . Not eating well for 8 years. On remeron.  very poor eater for years and family tried many thing with her. Nutritionist consulted   - Body mass index is 18.89 kg/m²    Discharge condition:  improved and stable    Disposition:  Rehab , TCC. Procedures:   * No surgery found *      Consultants: Cardiology    Physical Exam on Discharge:  Visit Vitals  /86   Pulse 92   Temp 97.6 °F (36.4 °C)   Resp 20   Ht 5' 1\" (1.549 m)   Wt 45.4 kg (100 lb)   SpO2 100%   Breastfeeding? No   BMI 18.89 kg/m²   Gen: Praveen Yates stated age, Well-developed,   in no acute distress  HEENT:  Head atraumatic, normocephalic , hearing intact to voice, moist mucous membranes. Neck:  Supple, no masses I appreciate, thyroid non-tender.   Resp:  No accessory muscle use,Bilateral BS present, clear breath sounds without wheezes rales or rhonchi  Card:  , normal S1, S2 without thrills, bruits . +2 lower leg peripheral edema. Abd:  Soft, non-tender, non-distended, normoactive bowel sounds are present, no palpable organomegaly   Musc:  No cyanosis or clubbing. Skin: itching marks and rash on the chest improved,  Few spot on her L fore arm,   No  ulcers, skin turgor is good. Neuro:  Cranial nerves are grossly intact, no clear area of focal motor weakness, follows commands appropriately. Psych:  Good insight, oriented to person, place and time, alert. Hospitalization and discharge instructions d/c in details with : patient , family , Nursing/CM , cardiology team    Discharge medications :  Current Discharge Medication List      START taking these medications    Details   hydrocortisone (CORTAID) 1 % topical cream Apply  to affected area two (2) times a day. use thin layer  Qty: 30 g, Refills: 0         CONTINUE these medications which have NOT CHANGED    Details   bumetanide (BUMEX) 0.5 mg tablet Take 1 Tab by mouth daily. Qty: 15 Tab, Refills: 0      polyethylene glycol (MIRALAX) 17 gram/dose powder Take 17 g by mouth daily as needed (constipation). mirtazapine (REMERON) 15 mg tablet Take 1 Tab by mouth nightly. Qty: 30 Tab, Refills: 0      fluticasone (FLONASE) 50 mcg/actuation nasal spray 2 Sprays by Both Nostrils route daily as needed for Rhinitis. Qty: 1 Bottle, Refills: 0      aspirin 81 mg chewable tablet Take 1 Tab by mouth daily. Qty: 30 Tab, Refills: 0      acetaminophen (TYLENOL) 325 mg tablet Take 650 mg by mouth every six (6) hours as needed for Pain. ondansetron (ZOFRAN ODT) 4 mg disintegrating tablet Take 1 Tab by mouth every eight (8) hours as needed for Nausea. Qty: 30 Tab, Refills: 11      carboxymethylcellulos/glycerin (CLEAR EYES FOR DRY EYES OP) Administer 1 Drop to both eyes three (3) times daily as needed (dry eyes). loperamide (IMMODIUM) 2 mg tablet Take 1 mg by mouth three (3) times daily as needed for Diarrhea.       FLUoxetine (PROZAC) 10 mg tablet Take 1 Tab by mouth daily. Qty: 90 Tab, Refills: 3      psyllium husk-aspartame (METAMUCIL FIBER) 3.4 gram pwpk packet Take 1 Packet by mouth daily. Qty: 30 Packet, Refills: 0      digoxin (LANOXIN) 0.125 mg tablet Take 1 Tab by mouth daily. Qty: 30 Tab, Refills: 0      nitroglycerin (NITROSTAT) 0.4 mg SL tablet 1 Tab by SubLINGual route every five (5) minutes as needed for Chest Pain. Qty: 25 Tab, Refills: 3         STOP taking these medications       clopidogrel (PLAVIX) 75 mg tab Comments:   Reason for Stopping:         carbamide peroxide (MURINE EAR) 6.5 % otic solution Comments:   Reason for Stopping:         clotrimazole (LOTRIMIN) 1 % topical cream Comments:   Reason for Stopping:                   Most Recent Labs:       Recent Labs     10/03/19  0450 10/02/19  0845 10/02/19  0110   WBC 9.9 11.1 11.0   HGB 9.4* 9.8* 9.5*   HCT 30.7* 32.5* 31.1*    309 267     Recent Labs     10/03/19  0450 10/02/19  0845 10/01/19  1148   NA  --  138 138   K  --  4.7 5.0   CL  --  107 109   CO2  --  24 13*   GLU  --  75 201*   BUN  --  32* 28*   CREA  --  1.30 1.60*   CA  --  7.9* 7.7*   MG 2.1 2.3  --    PHOS 3.3 3.7  --    ALB  --   --  2.6*   TBILI  --   --  0.6   SGOT  --   --  74*   ALT  --   --  45     No results found for: GLUCPOC  No results for input(s): PH, PCO2, PO2, HCO3, FIO2 in the last 72 hours. No results for input(s): INR, INREXT in the last 72 hours. No results found for: SDES  Lab Results   Component Value Date/Time    Culture result: NO GROWTH 1 DAY 01/28/2019 02:12 PM    Culture result:  11/15/2018 02:12 PM     >100,000  COLONIES/mL  MIXED GRAM POSITIVE DAVID, PROBABLE SKIN/GENITAL CONTAMINATION.       Culture result: NO GROWTH 2 DAYS 10/30/2018 07:45 PM       All Cardiac Markers in the last 24 hours: No results found for: CPK, CK, CKMMB, CKMB, RCK3, CKMBT, CKNDX, CKND1, KELSIE, TROPT, TROIQ, HERNÁN, TROPT, TNIPOC, BNP, BNPP    XR Results:  Results from Hospital Encounter encounter on 10/01/19   XR CHEST PORT Narrative EXAM:  PORTABLE CHEST    INDICATION:  Generalized weakness. TECHNIQUE:  Portable, supine AP view. COMPARISON:  09/30/2019    ____________________    FINDINGS:      SUPPORT DEVICES: None. HEART AND MEDIASTINUM: Stable. Cardiac silhouette is within normal limits for  technique. Tortuous thoracic aorta. LUNGS AND PLEURAL SPACES: Lungs are adequately expanded without focal  consolidation, pulmonary edema or pneumothorax. Stable minimal blunting of  bilateral costophrenic angles. No pneumothorax. BONY THORAX AND SOFT TISSUES: No acute osseous abnormality. ____________________      Impression IMPRESSION:  No acute cardiopulmonary disease. No significant change in this one  day interval.           CT Results:  Results from Hospital Encounter encounter on 06/05/19   CT CHEST WO CONT    Narrative EXAM: CT Chest     INDICATION: Syncopal episode, chest x-ray with persistent lower lung opacity,  clinical concern for pneumonia. COMPARISON: No prior CT chest, chest x-ray from earlier same day and 6/3/2019. TECHNIQUE: Axial CT imaging from the thoracic inlet through the diaphragm  without intravenous contrast. Multiplanar reformats were generated. One or more dose reduction techniques were used on this CT: automated exposure  control, adjustment of the mAs and/or kVp according to patient size, and  iterative reconstruction techniques. The specific techniques used on this CT  exam have been documented in the patient's electronic medical record. Digital  Imaging and Communications in Medicine (DICOM) format image data are available  to nonaffiliated external healthcare facilities or entities on a secure, media  free, reciprocally searchable basis with patient authorization for at least a  12-month period after this study.     _______________    FINDINGS:    LUNGS, PLEURA: Small left greater the right-sided pleural effusions with  bibasilar dependent atelectasis, linear atelectatic changes linear segment left  upper lobe. > Multiple right upper lobe and right lower lobe alveolar nodules and  irregular opacities measuring 4-5 mm. There are a few peripheral left upper lobe  irregular alveolar opacities. > Diffuse bilateral upper and lower thoracic mosaic attenuation pattern. Right  lower lobe thin-walled small pneumatocele is present. Thickened septal lines are present. AIRWAY: No filling defect. MEDIASTINUM: Cardiomegaly without pericardial effusion. Moderate coronary artery  atherosclerotic calcifications. Enlarged main pulmonary artery measuring 3.5 cm,  keeping with pulmonary arterial hypertension. Borderline prominent ascending  thoracic aorta with mild hepatic changes transverse arch and descending segment. Air-fluid level in the upper thoracic esophagus suggestive of reflux. Elige Nicely LYMPH NODES: No enlarged lymph nodes. UPPER ABDOMEN: Unremarkable. OTHER: No acute or aggressive osseous abnormalities identified. _______________      Impression IMPRESSION:    1. Cardiomegaly with findings of interstitial edema and small effusions likely  sequela of CHF/fluid overload. 2. Pulmonary nodules measuring up to 4-5 mm with irregular opacities right  greater than left lung. Diagnostic considerations may include unresolved  edema/nonspecific infectious or inflammatory alveolitis. No pulmonary  consolidation. MRI Results:  No results found for this or any previous visit. Nuclear Medicine Results:  No results found for this or any previous visit.     US Results:  Results from [place] encounter on 06/20/12   US ABDOMINAL WALL SOFT TISSUE    Narrative Ordering MD: Eli Hinds MD  Signed By: Annabel Monte MD  ** FINAL **  ---------------------------------------------------------------------  Procedure Date:  Jun 20 2012 12:20PM    Accession Number:  [id number]  Order No: [id number]  Procedure: ULD - US ABDOMEN  CPT Code: 14720  Reason: INCREASED LFT'S  INTERPRETATION:  Clinical history: Increased LFTs  Technique: Right upper quadrant ultrasound  Findings: The liver is mildly increased in echogenicity consistent   with fatty infiltration. No focal hepatic lesions are seen. The   gallbladder is surgically absent. The common bile duct measures 0.6   cm in diameter. The right kidney measures 9.6 cm in length. There is   cortical thinning and increased cortical echogenicity consistent   with chronic medical renal disease. No hydronephrosis is seen. The   pancreas is unremarkable. IMPRESSION:  Mild hepatic steatosis. Cholecystectomy. Findings compatible with   chronic renal disease. IR Results:  No results found for this or any previous visit. VAS/US Results:  No results found for this or any previous visit. Total discharge time 45 minutes. Cal Palma MD  Hospitalist  [facility]. medical group. Hospitalist Division.   October 4, 2019  9:49 AM

## 2019-10-04 NOTE — DISCHARGE INSTRUCTIONS
DISCHARGE SUMMARY from Nurse    PATIENT INSTRUCTIONS:    After general anesthesia or intravenous sedation, for 24 hours or while taking prescription Narcotics:  · Limit your activities  · Do not drive and operate hazardous machinery  · Do not make important personal or business decisions  · Do  not drink alcoholic beverages  · If you have not urinated within 8 hours after discharge, please contact your surgeon on call. Report the following to your surgeon:  · Excessive pain, swelling, redness or odor of or around the surgical area  · Temperature over 100.5  · Nausea and vomiting lasting longer than 4 hours or if unable to take medications  · Any signs of decreased circulation or nerve impairment to extremity: change in color, persistent  numbness, tingling, coldness or increase pain  · Any questions    What to do at Home:  Recommended activity: Activity as tolerated and PT/OT Eval and Treat,     If you experience any of the following symptoms dizziness, lightheadedness, or extreme fatique, please follow up with rehab staff/primary care physician. *  Please give a list of your current medications to your Primary Care Provider. *  Please update this list whenever your medications are discontinued, doses are      changed, or new medications (including over-the-counter products) are added. *  Please carry medication information at all times in case of emergency situations. These are general instructions for a healthy lifestyle:    No smoking/ No tobacco products/ Avoid exposure to second hand smoke  Surgeon General's Warning:  Quitting smoking now greatly reduces serious risk to your health.     Obesity, smoking, and sedentary lifestyle greatly increases your risk for illness    A healthy diet, regular physical exercise & weight monitoring are important for maintaining a healthy lifestyle    You may be retaining fluid if you have a history of heart failure or if you experience any of the following symptoms:  Weight gain of 3 pounds or more overnight or 5 pounds in a week, increased swelling in our hands or feet or shortness of breath while lying flat in bed. Please call your doctor as soon as you notice any of these symptoms; do not wait until your next office visit. The discharge information has been reviewed with the patient. The patient verbalized understanding. Discharge medications reviewed with the patient and appropriate educational materials and side effects teaching were provided. Patient armband removed and shredded.

## 2019-10-04 NOTE — PROGRESS NOTES
Problem: Mobility Impaired (Adult and Pediatric)  Goal: *Acute Goals and Plan of Care (Insert Text)  Description  Physical Therapy Goals  Initiated 10/3/2019 and to be accomplished within 7 day(s)  1. Patient will move from supine to sit and sit to supine  in bed with modified independence. 2.  Patient will transfer from bed to chair and chair to bed with modified independence using the least restrictive device. 3.  Patient will perform sit to stand with modified independence. 4.  Patient will ambulate with modified independence for 250 feet with the least restrictive device. Outcome: Resolved/Met   PHYSICAL THERAPY TREATMENT AND DISCHARGE    Patient: Sharri Gosselin (80 y.o. female)  Date: 10/4/2019  Diagnosis: Hypotension [I95.9]  Atrial fibrillation (HCC) [I48.91] Hypotension  Precautions: Fall  PLOF: Independent    ASSESSMENT:  Based on the objective data described below, the patient presents with baseline mobility level and is appropriate for discharge to assisted living facility with home health. Remains on supplemental O2 at 4L; on room air at baseline. Encountered patient in restroom. Mod I for sit to stand from low commode. Completed seated and standing balance activities in restroom with mod I. Mod I for amb in rosa 150ft and no AD. Self guides supplemental O2 with good safety awareness. Standing rest 1 minute to assess O2 saturation on room air. Poor, inconsistent pulse oximeter reading in upper 80s (88-90%), then recovering to 94%. Unclear from this assessment if patient will benefit from supplemental O2. May benefit from formal walk test order. Returned to seated in chair. Appropriate safety awareness with all mobility. Call bell in reach. PLAN:  Maximum therapeutic gains met at current level of care and patient will be discharged from physical therapy at this time. Further skilled physical therapy is not indicated at this time. Rationale for discharge:  ?     Goals Achieved  ? Plateau Reached; at baseline mobility level in regard to physical assistance  ? Patient not participating in therapy  ? Other:  Discharge Recommendations:  Home Health  Further Equipment Recommendations for Discharge:  N/A     SUBJECTIVE:   Patient stated I would like some hot tea.     OBJECTIVE DATA SUMMARY:   Critical Behavior:  Neurologic State: Alert  Orientation Level: Oriented to person, Oriented to place  Cognition: Follows commands  Safety/Judgement: Awareness of environment, Fall prevention  Functional Mobility Training:  Transfers:  Sit to Stand: Modified independent  Stand to Sit: Modified independent  Balance:  Sitting: Impaired  Standing: Intact  Standing - Static: Good  Standing - Dynamic : Good   Ambulation/Gait Training:  Distance (ft): 150 Feet (ft)  Ambulation - Level of Assistance: Modified independent    Neuro Re-Education:  Standing balance; 4 minutes  Seated balance 2 minutes    Pain:  Pain level pre-treatment: 0/10   Pain level post-treatment: 0/10     Activity Tolerance:   Good    After treatment:   ? Patient left in no apparent distress sitting up in chair  ? Patient left in no apparent distress in bed  ? Call bell left within reach  ? Nursing notified  ? Caregiver present  ? Bed alarm activated  ? SCDs applied      COMMUNICATION/EDUCATION:   ?         Role of physical therapy in the acute care setting. ?         Fall prevention education was provided and the patient/caregiver indicated understanding. ? Patient/family have participated as able and agree with findings and recommendations. ?         Patient is unable to participate in plan of care at this time.           Lincoln Agee, PT   Time Calculation: 12 mins

## 2019-10-04 NOTE — PROGRESS NOTES
8003 Verbal bedside report received from off going RN Jazmyn. Pt sleeping at this time. O2 via NC in place; cardiac monitor leads without complication. Pt arouses to voice, states no needs at this time. Call light within reach. 1200 Pt family at bedside. Pt comfortable up in recliner. Continued to sat well on RA. Call light within reach    1500 Pt ambulated on RA with O2 sat remaining above 90%. Pt reports no concerns at this time. Requests to wait to transfer to TCC until family here    1800 Report given to Jh Strong.  Pt to transfer to [phone]

## 2019-10-05 PROCEDURE — 74011250637 HC RX REV CODE- 250/637: Performed by: INTERNAL MEDICINE

## 2019-10-05 RX ADMIN — FLUTICASONE PROPIONATE 2 SPRAY: 50 SPRAY, METERED NASAL at 09:24

## 2019-10-05 RX ADMIN — ONDANSETRON 4 MG: 4 TABLET, ORALLY DISINTEGRATING ORAL at 06:00

## 2019-10-05 RX ADMIN — ACETAMINOPHEN 650 MG: 325 TABLET ORAL at 11:45

## 2019-10-05 RX ADMIN — ACETAMINOPHEN 650 MG: 325 TABLET ORAL at 06:00

## 2019-10-05 RX ADMIN — HYDROCORTISONE: 1 CREAM TOPICAL at 09:24

## 2019-10-05 RX ADMIN — ONDANSETRON 4 MG: 4 TABLET, ORALLY DISINTEGRATING ORAL at 19:47

## 2019-10-05 RX ADMIN — ACETAMINOPHEN 650 MG: 325 TABLET ORAL at 19:47

## 2019-10-05 RX ADMIN — HYDROCORTISONE: 1 CREAM TOPICAL at 18:07

## 2019-10-05 RX ADMIN — MIRTAZAPINE 15 MG: 15 TABLET, FILM COATED ORAL at 21:50

## 2019-10-05 RX ADMIN — BUMETANIDE 0.5 MG: 1 TABLET ORAL at 09:14

## 2019-10-05 RX ADMIN — ASPIRIN 325 MG ORAL TABLET 325 MG: 325 PILL ORAL at 09:15

## 2019-10-05 RX ADMIN — DIGOXIN 0.12 MG: 125 TABLET ORAL at 09:15

## 2019-10-05 RX ADMIN — FLUOXETINE 10 MG: 10 CAPSULE ORAL at 09:14

## 2019-10-05 NOTE — PROGRESS NOTES
conducted an initial consultation and Spiritual Assessment for [address] [facility] [address], who is a 80 y.o.,female. According to the patients chart Taoist Affiliation is: Kwasi Callejas.     The reason the Patient came to the hospital is:   Patient Active Problem List    Diagnosis Date Noted    Atrial fibrillation ([facility] 10/01/2019    Hypotension 10/01/2019    Skin rash 66/65/5671    Metabolic acidosis 23/50/5215    Underweight 10/01/2019    Atrial fibrillation with rapid ventricular response ([facility] 09/30/2019    Syncope 06/05/2019    Hypoxia 06/05/2019    Leukocytosis 06/05/2019    Acute on chronic systolic (congestive) heart failure (HCC) 06/03/2019    Hyperkalemia 10/23/2018    Acute metabolic encephalopathy 97/41/7566    Hyponatremia 10/20/2018    Chronic fatigue 10/09/2018    Irritable bowel syndrome with diarrhea 09/24/2018    Chronic a-fib 05/02/2017    CAD (coronary artery disease)     Ischemic cardiomyopathy     Arthritis, degenerative     Vertigo     HLD (hyperlipidemia)     SOB (shortness of breath) 04/27/2012    NSTEMI (non-ST elevated myocardial infarction) ([facility] 04/01/2012        The  provided the following Interventions:  Initiated a relationship of care and support. Explored issues of sandra, belief, spirituality and Worship/ritual needs while hospitalized. Listened empathically. Provided information about Spiritual Care Services. Offered prayer and assurance of continued prayers on patients behalf. Chart reviewed. The following outcomes were achieved:  Patient shared limited information about their medical narrative, spiritual journey and beliefs. Patient processed feelings about current hospitalization. Patient expressed gratitude for Spiritual Care visit. Assessment:  There are no significant spiritual or Worship issues which require further intervention at this time.    Patient does not have any Worship or cultural needs that will affect patients preferences in health care. Plan:  Chaplains will continue to follow and will provide pastoral care as needed or requested.  recommends bedside caregivers page  on duty if patient shows signs of acute spiritual or emotional distress.     [facility]    Spiritual Care   [phone]

## 2019-10-05 NOTE — ROUTINE PROCESS
0600 patient complained of nausea and back pain 4/10, prn zofran 4mg tab and tylenol 650mg tab given.

## 2019-10-05 NOTE — ROUTINE PROCESS
Dr. Rachelle Urbina at bedside bloop pressure and pulse rechecked per his request, patients heart rate had elevated he is aware and stated the Dig will help. Patient is in wheelchair, eating breakfast, family member at bedside.

## 2019-10-05 NOTE — ROUTINE PROCESS
Tylenol 650 mg po for generalized discomfort. Currently bed, feeds self needs encouragement and help with set up.

## 2019-10-05 NOTE — PROGRESS NOTES
[facility] - Progress Note  Admit Date: 10/4/2019      Assessment:       - Presented with bradycardia, hypotension. EKG with ST elevation in lead III on admission, ST depression in I and aVL. No CP   - Syncopal episode  - ER visit 09/30/19 with atrial fibrillation with rapid ventricular response. Was given oral Lasix and Metoprolol before discharged from the ED   - Mild fluid overload and chest pressure ER visit 09/30  - CAD:  S/P 2.75 X 15 mm BMS of OM (in the setting of NSTEMI) 04/2012, Two LAD BMS 07/2012  - HLD   - History of Ischemic cardiomyopathy: LVEF  30-35%(06/19) 45% (11/2012) & 30% echo (04/2012)  - Arthritis  - DNR status     Plan:      Heart rate ranging from  beats per minute. Remain in A. Fib  Have increased digoxin dose to 0.25 mg alternating with 0.125 mg every other day  Continue maintenance diuretics and aspirin    Subjective:     No new complaints.  No palpitations, chest pain or shortness of breath  In rehab facility    Objective:      Patient Vitals for the past 8 hrs:   Temp Pulse Resp BP SpO2   10/05/19 0914  (!) 110  116/78    10/05/19 0829 97.8 °F (36.6 °C) 88 18 106/61 94 %         Patient Vitals for the past 96 hrs:   Weight   10/04/19 1919 105 lb (47.6 kg)                  No intake or output data in the 24 hours ending 10/05/19 0957    Physical Exam:  General:  alert, cooperative, no distress  Neck:  nontender, no JVD  Lungs: No rales  Heart:  irregularly irregular rhythm  Abdomen:  abdomen is soft  Extremities:  No edema    Data Review:     Labs: Results:       Chemistry Recent Labs     10/03/19  0450   MG 2.1   PHOS 3.3      CBC w/Diff Recent Labs     10/03/19  0450   WBC 9.9   RBC 3.49*   HGB 9.4*   HCT 30.7*      GRANS 68   LYMPH 12*   EOS 5      Cardiac Enzymes No results found for: CPK, CK, CKMMB, CKMB, RCK3, CKMBT, CKNDX, CKND1, KELSIE, TROPT, TROIQ, HERNÁN, TROPT, TNIPOC, BNP, BNPP   Coagulation Recent Labs     10/03/19  0450   APTT 40.8*       Lipid Panel Lab Results   Component Value Date/Time    Cholesterol, total 202 (H) 04/29/2019 07:45 AM    HDL Cholesterol 64 (H) 04/29/2019 07:45 AM    LDL, calculated 116.4 (H) 04/29/2019 07:45 AM    VLDL, calculated 21.6 04/29/2019 07:45 AM    Triglyceride 108 04/29/2019 07:45 AM    CHOL/HDL Ratio 3.2 04/29/2019 07:45 AM      BNP No results found for: BNP, BNPP, XBNPT   Liver Enzymes No results for input(s): TP, ALB, TBIL, AP, SGOT, GPT in the last 72 hours.     No lab exists for component: DBIL   Digoxin    Thyroid Studies Lab Results   Component Value Date/Time    TSH 1.57 07/26/2019 08:09 AM          Signed By: Olimpia Pina MD     October 5, 2019

## 2019-10-05 NOTE — ROUTINE PROCESS
Bedside, Verbal and Written shift change report given to gonzalo arteaga (oncoming nurse) by Karie Henry (offgoing nurse). Report included the following information SBAR, Intake/Output and MAR.

## 2019-10-05 NOTE — ROUTINE PROCESS
Received patient from 3000 unit in w/c Alert oriented x4 with son accompany patient . Assisted to room 3109 . No c/o pain or distress. Dr Femi mays

## 2019-10-06 PROCEDURE — 74011250637 HC RX REV CODE- 250/637: Performed by: INTERNAL MEDICINE

## 2019-10-06 RX ADMIN — BUMETANIDE 0.5 MG: 1 TABLET ORAL at 09:52

## 2019-10-06 RX ADMIN — DIGOXIN 0.25 MG: 125 TABLET ORAL at 09:58

## 2019-10-06 RX ADMIN — MIRTAZAPINE 15 MG: 15 TABLET, FILM COATED ORAL at 21:30

## 2019-10-06 RX ADMIN — ASPIRIN 325 MG ORAL TABLET 325 MG: 325 PILL ORAL at 09:52

## 2019-10-06 RX ADMIN — HYDROCORTISONE: 1 CREAM TOPICAL at 11:35

## 2019-10-06 RX ADMIN — HYDROCORTISONE: 1 CREAM TOPICAL at 18:08

## 2019-10-06 RX ADMIN — FLUOXETINE 10 MG: 10 CAPSULE ORAL at 09:52

## 2019-10-06 RX ADMIN — FLUTICASONE PROPIONATE 2 SPRAY: 50 SPRAY, METERED NASAL at 09:53

## 2019-10-06 NOTE — PROGRESS NOTES
Nutrition initial assessment/Plan of care [facility]     RECOMMENDATIONS:   1. Regular Diet (Liberalized to promote PO intake)  2. Monitor labs, weight and PO intake  3. RD to follow     GOALS:   1. PO intake meets >75% of protein/calorie needs by 10/13  2. Weight Maintenance/gradual gain (1-2 lb/week) by 10/13       ASSESSMENT:   Wt status is classified as normal per before Body mass index is 19.84 kg/m². However, Pt at nutrition risk d/t BMI <23 and age >65 years. Fair PO intake at best. (baseline) Labs noted. Nutrition recommendations listed. RD to follow. Nutrition Diagnoses:     Underweight related to energy intake as evidenced by a BMI <23 and age >65 years. Nutrition Risk:  [] High  [x] Moderate []  Low    SUBJECTIVE/OBJECTIVE:   (10/6): [facility] Last BM on (10/6) per I/Os. Pt seen in room OOB in chair. Reports she is doing OK and that she has been implementing her preferences but wants her regular diet back. Adjusted orders to regular diet to promote increased intake. Again declined nutritional supplements as they make her nauseous. Will monitor.     (10/2): Pt admitted for hypotension and AFib. Consult received for general nutrition management and supplements. PMHx including CHF, Afib, CAD and IBS-d. Familiar with Pt from previous admission. Pt seen in room this afternoon; appears thin. Per documented weight records and Pt weight has been fairly stable ( lb) with fluid fluctuations and Pt is 105% IBW. Appetite is fair at best and has been on Remeron since (11/2018). Pt has an allergy to eggs, no problems chewing/swallowing and was previously consuming nutritional supplements at home ([facility]) but now does not like them. Stated she just got turned off by them after awhile and declined anything on [facility] formulary. Stated she has never been a big eater but has started trying to snack more and noted some snacks in room from family.  Menu provided to implement preferences with dietary. Discussed importance of adequate nutrition and implementing suggestions made to increase kcal/protein  to maintain weight as well as watching salt intake; handouts provided. Stated \"Well I won't eat bland food. \" Will continue to monitor.      Information Obtained from:    [x] Chart Review   [x] Patient   [] Family/Caregiver   [] Nurse/Physician   [] Interdisciplinary Meeting/Rounds      Diet: Regular Diet  Medications: [x] Reviewed  Bumex, Remeron,     Allergies: [x] Reviewed   Patient Active Problem List   Diagnosis Code    SOB (shortness of breath) R06.02    NSTEMI (non-ST elevated myocardial infarction) ([facility] I21.4    CAD (coronary artery disease) I25.10    Ischemic cardiomyopathy I25.5    Arthritis, degenerative M19.90    Vertigo R42    HLD (hyperlipidemia) E78.5    Chronic a-fib I48.20    Irritable bowel syndrome with diarrhea K58.0    Chronic fatigue R53.82    Hyponatremia E87.1    Acute metabolic encephalopathy J53.42    Hyperkalemia E87.5    Acute on chronic systolic (congestive) heart failure (HCC) I50.23    Syncope R55    Hypoxia R09.02    Leukocytosis D72.829    Atrial fibrillation with rapid ventricular response (HCC) I48.91    Atrial fibrillation (HCC) I48.91    Hypotension I95.9    Skin rash Y69    Metabolic acidosis P78.4    Underweight R63.6       Past Medical History:   Diagnosis Date    A-fib ([facility] 01/2017    Arthritis, degenerative     CAD (coronary artery disease) 04/2012    S/P 2.75 X 15 mm BMS of OM (04/2012), Two LAD BMS (07/2012)    Elevated liver enzymes     Likely from statin    HLD (hyperlipidemia)     Hyperkalemia 06/2012    Likely from lisinopril    Ischemic cardiomyopathy     LVEF  30-35%(05/19) 45% (11/2012) & 30% echo (04/2012)    NSTEMI (non-ST elevated myocardial infarction) ([facility] 04/2012    UTI (urinary tract infection)     Vertigo       Labs:    Lab Results   Component Value Date/Time    Sodium 138 10/02/2019 08:45 AM Potassium 4.7 10/02/2019 08:45 AM    Chloride 107 10/02/2019 08:45 AM    CO2 24 10/02/2019 08:45 AM    Anion gap 7 10/02/2019 08:45 AM    Glucose 75 10/02/2019 08:45 AM    BUN 32 (H) 10/02/2019 08:45 AM    Creatinine 1.30 10/02/2019 08:45 AM    Calcium 7.9 (L) 10/02/2019 08:45 AM    Magnesium 2.1 10/03/2019 04:50 AM    Phosphorus 3.3 10/03/2019 04:50 AM    Albumin 2.6 (L) 10/01/2019 11:48 AM     Anthropometrics: BMI (calculated): 19.8  Last 3 Recorded Weights in this Encounter    10/04/19 1919   Weight: 47.6 kg (105 lb)      Ht Readings from Last 1 Encounters:   10/04/19 5' 1\" (1.549 m)     Weight Metrics 10/4/2019 10/1/2019 9/30/2019 7/24/2019 7/23/2019 7/22/2019 6/24/2019   Weight 105 lb 100 lb 100 lb 98 lb 99 lb 99 lb 102 lb   BMI 19.84 kg/m2 18.89 kg/m2 19.53 kg/m2 17.92 kg/m2 18.11 kg/m2 18.11 kg/m2 18.66 kg/m2       No data found. IBW: 105 lb %IBW: 100% UBW:  lb over past couple of year  [] Weight Loss [] Weight Gain [x] Weight Stable per documented records    Estimated Nutrition Needs: [x] MSJ x 1.3  [x] Other: 30 kcal/kg  Calories: 8017-5576 kcal Based on:   [x] Actual BW    Protein:   60-65 g Based on:   [x] Actual BW    Fluid:       1500 ml Based on:   [x] Actual BW      [x] No Cultural, Latter day or ethnic dietary need identified.     [] Cultural, Latter day and ethnic food preferences identified and addressed     Wt Status:  [x] Normal (18.6 - 24.9) [] Underweight (<18.5) [] Overweight (25 - 29.9) [] Mild Obesity (30 - 34.9)  [] Moderate Obesity (35 - 39.9) [] Morbid Obesity (40+)     Nutrition Problems Identified:   [x] Suboptimal PO intake (variable to fair)  [x] Food Allergies (Eggs)  [] Difficulty chewing/swallowing/poor dentition  [] Constipation/Diarrhea (Last BM on 10/6; bowel regimen in place)  [] Nausea/Vomiting   [] None  [] Other:     Plan:   [] Therapeutic Diet  [x]  Obtained/adjusted food preferences/tolerances and/or snacks options   []  Supplements added   [] Occupational therapy following for feeding techniques  []  HS snack added   []  Modify diet texture   [x]  Modify diet for food allergies   [x]  Educate patient (completed)   []  Assist with menu selection   [x]  Monitor PO intake on meal rounds   [x]  Continue inpatient monitoring and intervention   [x]  Participated in discharge planning/Interdisciplinary rounds/Team meetings   []  Other:     Education Needs:   [] Not appropriate for teaching at this time due to:   [x] Identified and addressed    Nutrition Monitoring and Evaluation:  [x] Continue ongoing monitoring and intervention  [] Other    Sukhdeep Grimes

## 2019-10-06 NOTE — PROGRESS NOTES
[facility] - Progress Note  Admit Date: 10/4/2019      Assessment:       - Presented with bradycardia, hypotension. EKG with ST elevation in lead III on admission, ST depression in I and aVL. No CP   - Syncopal episode  - ER visit 09/30/19 with atrial fibrillation with rapid ventricular response. Was given oral Lasix and Metoprolol before discharged from the ED   - Mild fluid overload and chest pressure ER visit 09/30  - CAD:  S/P 2.75 X 15 mm BMS of OM (in the setting of NSTEMI) 04/2012, Two LAD BMS 07/2012  - HLD   - History of Ischemic cardiomyopathy: LVEF  30-35%(06/19) 45% (11/2012) & 30% echo (04/2012)  - Arthritis  - DNR status     Plan:      Heart rate ranging from  beats per minute. Remain in A. Fib  Have increased digoxin dose to 0.25 mg alternating with 0.125 mg every other day  Plan for digoxin 0.25 mg today  Blood pressure usually marginal to add any beta-blocker. Will observe for another 24 hours and see if blood pressure allows to add low-dose of beta-blocker. Continue maintenance diuretics and aspirin  This with patient and family at bedside    Subjective:     No new complaints. No palpitations, chest pain or shortness of breath    Objective:      Patient Vitals for the past 8 hrs:   Temp Pulse Resp BP SpO2   10/06/19 0952  90      10/06/19 0847 97.2 °F (36.2 °C)  18 (!) 137/93 96 %         Patient Vitals for the past 96 hrs:   Weight   10/04/19 1919 105 lb (47.6 kg)                  No intake or output data in the 24 hours ending 10/06/19 0953    Physical Exam:  General:  alert, cooperative, no distress  Neck:  nontender, no JVD  Lungs: No rales  Heart:  irregularly irregular rhythm  Abdomen:  abdomen is soft  Extremities:  No edema    Data Review:     Labs: Results:       Chemistry No results for input(s): GLU, NA, K, CL, CO2, BUN, CREA, CA, MG, PHOS, AGAP, BUCR, TBIL, GPT, AP, TP, ALB, GLOB, AGRAT in the last 72 hours.    CBC w/Diff No results for input(s): WBC, RBC, HGB, HCT, PLT, GRANS, LYMPH, EOS, HGBEXT, HCTEXT, PLTEXT, HGBEXT, HCTEXT, PLTEXT in the last 72 hours. Cardiac Enzymes No results found for: CPK, CK, CKMMB, CKMB, RCK3, CKMBT, CKNDX, CKND1, KELSIE, TROPT, TROIQ, HERNÁN, TROPT, TNIPOC, BNP, BNPP   Coagulation No results for input(s): PTP, INR, APTT, INREXT, INREXT in the last 72 hours. Lipid Panel Lab Results   Component Value Date/Time    Cholesterol, total 202 (H) 04/29/2019 07:45 AM    HDL Cholesterol 64 (H) 04/29/2019 07:45 AM    LDL, calculated 116.4 (H) 04/29/2019 07:45 AM    VLDL, calculated 21.6 04/29/2019 07:45 AM    Triglyceride 108 04/29/2019 07:45 AM    CHOL/HDL Ratio 3.2 04/29/2019 07:45 AM      BNP No results found for: BNP, BNPP, XBNPT   Liver Enzymes No results for input(s): TP, ALB, TBIL, AP, SGOT, GPT in the last 72 hours.     No lab exists for component: DBIL   Digoxin    Thyroid Studies Lab Results   Component Value Date/Time    TSH 1.57 07/26/2019 08:09 AM          Signed By: Jeronimo Medrano MD     October 6, 2019

## 2019-10-07 PROCEDURE — 74011250637 HC RX REV CODE- 250/637: Performed by: INTERNAL MEDICINE

## 2019-10-07 RX ORDER — GUAIFENESIN 100 MG/5ML
162 LIQUID (ML) ORAL DAILY
Status: DISCONTINUED | OUTPATIENT
Start: 2019-10-08 | End: 2019-10-09 | Stop reason: HOSPADM

## 2019-10-07 RX ORDER — GUAIFENESIN 100 MG/5ML
162 LIQUID (ML) ORAL DAILY
Status: DISCONTINUED | OUTPATIENT
Start: 2019-10-07 | End: 2019-10-07

## 2019-10-07 RX ADMIN — HYDROCORTISONE: 1 CREAM TOPICAL at 17:27

## 2019-10-07 RX ADMIN — FLUOXETINE 10 MG: 10 CAPSULE ORAL at 09:06

## 2019-10-07 RX ADMIN — BUMETANIDE 0.5 MG: 1 TABLET ORAL at 09:05

## 2019-10-07 RX ADMIN — DIGOXIN 0.12 MG: 125 TABLET ORAL at 17:27

## 2019-10-07 RX ADMIN — MIRTAZAPINE 15 MG: 15 TABLET, FILM COATED ORAL at 21:03

## 2019-10-07 RX ADMIN — ASPIRIN 325 MG ORAL TABLET 325 MG: 325 PILL ORAL at 09:06

## 2019-10-07 RX ADMIN — HYDROCORTISONE: 1 CREAM TOPICAL at 09:10

## 2019-10-07 RX ADMIN — FLUTICASONE PROPIONATE 2 SPRAY: 50 SPRAY, METERED NASAL at 09:04

## 2019-10-07 NOTE — PROGRESS NOTES
[facility] - Progress Note  Admit Date: 10/4/2019      Assessment:     - Presented with bradycardia, hypotension. EKG with ST elevation in lead III on admission, ST depression in I and aVL. No CP   - Syncopal episode  - ER visit 09/30/19 with atrial fibrillation with rapid ventricular response. Was given oral Lasix and Metoprolol before discharged from the ED   - Mild fluid overload and chest pressure ER visit 09/30  - CAD:  S/P 2.75 X 15 mm BMS of OM (in the setting of NSTEMI) 04/2012, Two LAD BMS 07/2012  - HLD   - History of Ischemic cardiomyopathy: LVEF  30-35%(06/19) 45% (11/2012) & 30% echo (04/2012)  - Arthritis  - DNR status     Plan:      Heart rate ranging from  beats per minute. Remain in A. Fib on exam  Have increased digoxin dose to 0.25 mg alternating with 0.125 mg every other day  HR acceptable at thsi time. Blood pressure usually marginal to add any beta-blocker. Will observe for another 24-48 hours and see if blood pressure allows to add low-dose of beta-blocker. Continue maintenance diuretics and aspirin  This with patient and family at bedside    Subjective:     No new complaints. No palpitations, chest pain or shortness of breath  Walked short distance in hallway    Objective:      Patient Vitals for the past 8 hrs:   Temp Pulse Resp BP SpO2   10/07/19 0905  90      10/07/19 0816 97.7 °F (36.5 °C) (!) 102 20 121/75 96 %         Patient Vitals for the past 96 hrs:   Weight   10/04/19 1919 105 lb (47.6 kg)                  No intake or output data in the 24 hours ending 10/07/19 0958    Physical Exam:  General:  alert, cooperative, no distress  Neck:  nontender, no JVD  Lungs: No rales  Heart:  irregularly irregular rhythm  Abdomen:  abdomen is soft  Extremities:  No edema    Data Review:     Labs: Results:       Chemistry No results for input(s): GLU, NA, K, CL, CO2, BUN, CREA, CA, MG, PHOS, AGAP, BUCR, TBIL, GPT, AP, TP, ALB, GLOB, AGRAT in the last 72 hours.    CBC w/Diff No results for input(s): WBC, RBC, HGB, HCT, PLT, GRANS, LYMPH, EOS, HGBEXT, HCTEXT, PLTEXT, HGBEXT, HCTEXT, PLTEXT in the last 72 hours. Cardiac Enzymes No results found for: CPK, CK, CKMMB, CKMB, RCK3, CKMBT, CKNDX, CKND1, KELSIE, TROPT, TROIQ, HERNÁN, TROPT, TNIPOC, BNP, BNPP   Coagulation No results for input(s): PTP, INR, APTT, INREXT, INREXT in the last 72 hours. Lipid Panel Lab Results   Component Value Date/Time    Cholesterol, total 202 (H) 04/29/2019 07:45 AM    HDL Cholesterol 64 (H) 04/29/2019 07:45 AM    LDL, calculated 116.4 (H) 04/29/2019 07:45 AM    VLDL, calculated 21.6 04/29/2019 07:45 AM    Triglyceride 108 04/29/2019 07:45 AM    CHOL/HDL Ratio 3.2 04/29/2019 07:45 AM      BNP No results found for: BNP, BNPP, XBNPT   Liver Enzymes No results for input(s): TP, ALB, TBIL, AP, SGOT, GPT in the last 72 hours.     No lab exists for component: DBIL   Digoxin    Thyroid Studies Lab Results   Component Value Date/Time    TSH 1.57 07/26/2019 08:09 AM          Signed By: Margaret Wakefield MD     October 7, 2019

## 2019-10-07 NOTE — PROGRESS NOTES
Pt resting quietly in bed, HOB elevated, no c/o pain and no visual signs of pain noted, pt assisted with toileting as needed, call bell and personal items within reach

## 2019-10-07 NOTE — ROUTINE PROCESS
Therapist reviewed cardiologist's notes after visit this am. Cardiologist stated that  bpm heart rate was \"acceptable at this time\". Therapist f/u to inquire about heart rate parameters during physical therapy. Haider Sorto returned phone call with following verbal orders:  
120 bpm and under- proceed with participation >130 bpm- no participation Stop activity if pt becomes symptomatic.   
 
Matthew, CHANA 
10/07/19 10: 33 am

## 2019-10-07 NOTE — PROGRESS NOTES
I have reviewed this patient's current medication list and recent laboratory results. At this time, I do not suggest any drug therapy adjustments or additional laboratory monitoring. Thank you,  Navid BASSETT  Ph. M. S.  10/7/2019

## 2019-10-08 PROCEDURE — 74011250637 HC RX REV CODE- 250/637: Performed by: INTERNAL MEDICINE

## 2019-10-08 RX ADMIN — FLUOXETINE 10 MG: 10 CAPSULE ORAL at 11:36

## 2019-10-08 RX ADMIN — FLUTICASONE PROPIONATE 2 SPRAY: 50 SPRAY, METERED NASAL at 11:39

## 2019-10-08 RX ADMIN — DIGOXIN 0.25 MG: 125 TABLET ORAL at 17:18

## 2019-10-08 RX ADMIN — ASPIRIN 81 MG 162 MG: 81 TABLET ORAL at 11:36

## 2019-10-08 RX ADMIN — MIRTAZAPINE 15 MG: 15 TABLET, FILM COATED ORAL at 21:38

## 2019-10-08 RX ADMIN — HYDROCORTISONE: 1 CREAM TOPICAL at 17:19

## 2019-10-08 RX ADMIN — ONDANSETRON 4 MG: 4 TABLET, ORALLY DISINTEGRATING ORAL at 11:36

## 2019-10-08 RX ADMIN — BUMETANIDE 0.5 MG: 1 TABLET ORAL at 11:36

## 2019-10-08 RX ADMIN — HYDROCORTISONE: 1 CREAM TOPICAL at 09:00

## 2019-10-08 NOTE — PROGRESS NOTES
Care Plan Meeting held with the following attendees: Activities/,Rehab,DON,and Ms. Quiles and son in law Shona Martin. Initial care plan reviewed along with ,therapy goals and frequency of service delivery,pt ability as of this date,activities offered and patient participation in activities,discharge plans, and specific questions regarding medications and other areas of concern.

## 2019-10-08 NOTE — ROUTINE PROCESS
Bedside and Verbal shift change report given to John Santos LPN (oncoming nurse) by Juan Jose Pelayo LPN (offgoing nurse). Report included the following information SBAR, Kardex and MAR.

## 2019-10-08 NOTE — ROUTINE PROCESS
Patient sat in dining room until after dinner. Ate very little dinner. Says she hasn't eaten much food for a long time. Very apologetic and embarrassed for having stool incontinence. Says she has that \"bowel \"disease (IBS) Patient cleaned and changed then put to bed. Patient currently reading. Denies discomfort or pain. Call light and personal items within reach. Bed alarm set as well.

## 2019-10-09 VITALS
OXYGEN SATURATION: 95 % | DIASTOLIC BLOOD PRESSURE: 70 MMHG | WEIGHT: 104.3 LBS | SYSTOLIC BLOOD PRESSURE: 114 MMHG | RESPIRATION RATE: 18 BRPM | HEART RATE: 78 BPM | BODY MASS INDEX: 19.69 KG/M2 | TEMPERATURE: 97.7 F | HEIGHT: 61 IN

## 2019-10-09 PROCEDURE — 74011250637 HC RX REV CODE- 250/637: Performed by: INTERNAL MEDICINE

## 2019-10-09 RX ORDER — CARBOXYMETHYLCELLULOSE SODIUM 5 MG/ML
1 SOLUTION/ DROPS OPHTHALMIC
Qty: 1 BOTTLE | Refills: 0 | Status: SHIPPED | OUTPATIENT
Start: 2019-10-09 | End: 2020-04-02

## 2019-10-09 RX ORDER — MIRTAZAPINE 15 MG/1
15 TABLET, FILM COATED ORAL
Qty: 30 TAB | Refills: 0 | Status: SHIPPED | OUTPATIENT
Start: 2019-10-09

## 2019-10-09 RX ORDER — HYDROCORTISONE 1 %
CREAM (GRAM) TOPICAL 2 TIMES DAILY
Qty: 30 G | Refills: 0 | Status: SHIPPED | OUTPATIENT
Start: 2019-10-09 | End: 2019-12-10

## 2019-10-09 RX ORDER — DIGOXIN 125 MCG
TABLET ORAL
Qty: 45 TAB | Refills: 0 | Status: SHIPPED | OUTPATIENT
Start: 2019-10-09

## 2019-10-09 RX ORDER — FLUOXETINE 10 MG/1
10 TABLET ORAL DAILY
Qty: 90 TAB | Refills: 3 | Status: SHIPPED | OUTPATIENT
Start: 2019-10-09 | End: 2019-12-10

## 2019-10-09 RX ORDER — BUMETANIDE 0.5 MG/1
0.5 TABLET ORAL DAILY
Qty: 15 TAB | Refills: 0 | Status: SHIPPED | OUTPATIENT
Start: 2019-10-09 | End: 2019-11-26

## 2019-10-09 RX ADMIN — ASPIRIN 81 MG 162 MG: 81 TABLET ORAL at 09:35

## 2019-10-09 RX ADMIN — FLUOXETINE 10 MG: 10 CAPSULE ORAL at 09:35

## 2019-10-09 RX ADMIN — BUMETANIDE 0.5 MG: 1 TABLET ORAL at 09:35

## 2019-10-09 RX ADMIN — FLUTICASONE PROPIONATE 2 SPRAY: 50 SPRAY, METERED NASAL at 09:30

## 2019-10-09 RX ADMIN — HYDROCORTISONE: 1 CREAM TOPICAL at 11:06

## 2019-10-09 NOTE — H&P
[address] [place] [address]  HISTORY AND PHYSICAL    Name:  Álvaro Marinelli  MR#:   [id number]  :  08/10/1927  ACCOUNT #:  [de-identified]  ADMIT DATE:  10/04/2019    CHIEF COMPLAINT:  Weakness. HISTORY OF PRESENT ILLNESS:  This is a very pleasant 80-year-old white female with multiple medical problems who was seen originally in the emergency department on 2019, with atrial fibrillation and rapid ventricular response, she was given oral Lasix and metoprolol and discharged home. The patient returned to the emergency department with syncope, bradycardia, hypotension with ST elevation in lead III. The patient was treated medically on telemetry and remained in atrial fib, medication adjustments with the addition of digoxin for rate control. She was continued on low-dose beta blocker to avoid further bradycardia and continued on diuretic and aspirin regimen. The patient was admitted to [facility] for continued monitoring and physical rehab. She was seen up in her room getting ready for physical therapy session. She denies chest pain, palpitations, shortness of breath. She was very motivated to start her therapy. PAST MEDICAL HISTORY:  1. Coronary artery disease as above status post non-ST elevation myocardial infarction with cardiac stenting. 2.  Hypercholesterolemia. 3.  Ischemic cardiomyopathy, ejection fraction 30% to 35% in 2019.  4.  Osteoarthritis. PAST SURGICAL HISTORY:  She had coronary stenting as above. ALLERGIES:  MULTIPLE INCLUDE FLU VACCINE, CODEINE, EGGS, INFLUENZA VACCINE, LIPITOR, AND PENICILLIN. MEDICATIONS:  Reviewed as per MAR. SOCIAL HISTORY:  She is a nonsmoker and nondrinker. She is a long-term resident of the [address] [place] [address]. She is a DNR. FAMILY HISTORY:  Mother had diabetes. REVIEW OF SYSTEMS:  Again, the patient denies chest pain, palpitation, shortness of breath. No abdominal pain, nausea or vomiting. No change in bowel or bladder habit.   No focal weakness. The patient has a history of depression as well. No fever, no chills, no chest pain, no shortness of breath. No nausea, no vomiting, no abdominal pain. No change in bowel or bladder habit and no focal neurologic symptoms. PHYSICAL EXAMINATION:  GENERAL:  A very pleasant female, in no acute distress. VITAL SIGNS:  Temperature 97, pulse 84, blood pressure 111/69, respirations 18. HEENT:  Atraumatic and normocephalic. Sclerae anicteric. NECK:  No venous distention or thyromegaly. LUNGS:  Clear to auscultation. HEART:  Regular by auscultation, rate controlled. ABDOMEN:  Soft, nontender, and nondistended. EXTREMITIES:  Venous insufficiency changes, nontender. ASSESSMENT:  1. Coronary artery disease. 2.  Ischemic cardiomyopathy. 3.  Hypertension. 4.  Hypercholesterolemia. 5.  Anemia. 6.  As per past medical history. PLAN:  1. We will continue current medication. 2.  Close medical monitoring. 3.  Monitor labs. 4.  Physical therapy and rehab.  5.  Further management according to outcome.         Katya Win MD      [id number]  D:  10/08/2019 23:00  T:  10/09/2019 2:22  JOB #:  [id number]

## 2019-10-09 NOTE — INTERDISCIPLINARY ROUNDS
IDT team, , DON, MDS, Rehab Manager, Dietician, /, NP, met and reviewed patients medication, diet, therapy, nursing needs, discharge plans, and overall progress. Concerns identified and addressed to meet patients needs.

## 2019-10-09 NOTE — PROGRESS NOTES
Ms. Castañeda Imus request to return to [address] [place] [address] today. Left message with Ilia Darden at Dr. Hutchinson [facility] office. Notified [address] [place] [address] and Nicholas Lara will be over to assess patient.

## 2019-10-09 NOTE — ROUTINE PROCESS
Patient and son decided to discharge today. Patient seen by physician. Discharge  Instructions given. Patient packed all of her personal belongings and took them with her.

## 2019-10-09 NOTE — PROGRESS NOTES
[facility] - Progress Note  Admit Date: 10/4/2019    Assessment:     - Presented with bradycardia, hypotension. EKG with ST elevation in lead III on admission, ST depression in I and aVL. No CP   - Syncopal episode  - ER visit 09/30/19 with atrial fibrillation with rapid ventricular response. Was given oral Lasix and Metoprolol before discharged from the ED   - Mild fluid overload and chest pressure ER visit 09/30  - CAD:  S/P 2.75 X 15 mm BMS of OM (in the setting of NSTEMI) 04/2012, Two LAD BMS 07/2012  - HLD   - History of Ischemic cardiomyopathy: LVEF  30-35%(06/19) 45% (11/2012) & 30% echo (04/2012)  - Arthritis  - DNR status     Plan:      I saw, evaluated, interviewed and examined the patient personally. I agree with the findings and plan of care as documented below with PAAMBER note  HR stable. Continue current dose of medications  No angina or heart failure currently. DW patient and family. Mabel Prather MD    Subjective:     No new complaints. No palpitations, chest pain or shortness of breath  Getting rehab    Objective:      Patient Vitals for the past 8 hrs:   Temp Pulse Resp BP SpO2   10/09/19 0930    114/70    10/09/19 0929 97.7 °F (36.5 °C) 78 18  95 %         Patient Vitals for the past 96 hrs:   Weight   10/07/19 1033 104 lb 4.8 oz (47.3 kg)                  No intake or output data in the 24 hours ending 10/09/19 1025    Physical Exam:  General:  alert, cooperative, no distress  Neck:  nontender, no JVD  Lungs: No rales  Heart:  irregularly irregular rhythm  Abdomen:  abdomen is soft  Extremities:  No edema    Data Review:     Labs: Results:       Chemistry No results for input(s): GLU, NA, K, CL, CO2, BUN, CREA, CA, MG, PHOS, AGAP, BUCR, TBIL, GPT, AP, TP, ALB, GLOB, AGRAT in the last 72 hours. CBC w/Diff No results for input(s): WBC, RBC, HGB, HCT, PLT, GRANS, LYMPH, EOS, HGBEXT, HCTEXT, PLTEXT, HGBEXT, HCTEXT, PLTEXT in the last 72 hours.    Cardiac Enzymes No results found for: CPK, CK, CKMMB, CKMB, RCK3, CKMBT, CKNDX, CKND1, KELSIE, TROPT, TROIQ, HERNÁN, TROPT, TNIPOC, BNP, BNPP   Coagulation No results for input(s): PTP, INR, APTT, INREXT, INREXT in the last 72 hours. Lipid Panel Lab Results   Component Value Date/Time    Cholesterol, total 202 (H) 04/29/2019 07:45 AM    HDL Cholesterol 64 (H) 04/29/2019 07:45 AM    LDL, calculated 116.4 (H) 04/29/2019 07:45 AM    VLDL, calculated 21.6 04/29/2019 07:45 AM    Triglyceride 108 04/29/2019 07:45 AM    CHOL/HDL Ratio 3.2 04/29/2019 07:45 AM      BNP No results found for: BNP, BNPP, XBNPT   Liver Enzymes No results for input(s): TP, ALB, TBIL, AP, SGOT, GPT in the last 72 hours.     No lab exists for component: DBIL   Digoxin    Thyroid Studies Lab Results   Component Value Date/Time    TSH 1.57 07/26/2019 08:09 AM          Signed By: Gerardo Yoder MD     October 9, 2019

## 2019-10-10 ENCOUNTER — HOME HEALTH ADMISSION (OUTPATIENT)
Dept: HOME HEALTH SERVICES | Facility: HOME HEALTH | Age: 84
End: 2019-10-10
Payer: MEDICARE

## 2019-10-11 ENCOUNTER — HOME CARE VISIT (OUTPATIENT)
Dept: HOME HEALTH SERVICES | Facility: HOME HEALTH | Age: 84
End: 2019-10-11

## 2019-10-11 ENCOUNTER — HOME CARE VISIT (OUTPATIENT)
Dept: HOME HEALTH SERVICES | Facility: HOME HEALTH | Age: 84
End: 2019-10-11
Payer: MEDICARE

## 2019-10-11 ENCOUNTER — HOME CARE VISIT (OUTPATIENT)
Dept: SCHEDULING | Facility: HOME HEALTH | Age: 84
End: 2019-10-11
Payer: MEDICARE

## 2019-10-11 VITALS
SYSTOLIC BLOOD PRESSURE: 102 MMHG | OXYGEN SATURATION: 95 % | HEART RATE: 62 BPM | DIASTOLIC BLOOD PRESSURE: 60 MMHG | TEMPERATURE: 97.9 F

## 2019-10-11 PROCEDURE — 3331090001 HH PPS REVENUE CREDIT

## 2019-10-11 PROCEDURE — 400013 HH SOC

## 2019-10-11 PROCEDURE — G0151 HHCP-SERV OF PT,EA 15 MIN: HCPCS

## 2019-10-11 PROCEDURE — 3331090002 HH PPS REVENUE DEBIT

## 2019-10-11 NOTE — DISCHARGE SUMMARY
[address]    Name:  Nichol Browning  MR#:   [id number]  :  08/10/1927  ACCOUNT #:  [de-identified]  ADMIT DATE:  10/04/2019  DISCHARGE DATE:  10/09/2019    DISCHARGE DIAGNOSES:  1. Coronary artery disease. 2.  Ischemic cardiomyopathy. 3.  Hypertension. 4.  Hypercholesterolemia. 5.  Marked chronic anemia. 6.  Osteoarthritis. 7.  Osteoporosis. 8.  As per past medical history. 9.  Do-not-resuscitate status. 10.  Chronic atrial fibrillation. 11.  Depression. 12.  Dry eye syndrome. DISCHARGE DISPOSITION:  Back to the [facility]. HOSPITAL COURSE:  A 80-year-old female with multiple medical problems, who was initially admitted to the acute site with atrial fibrillation with rapid ventricular response. The patient had syncope, bradycardia, and hypotension. She was seen by Cardiology, admitted to telemetry, and treated medically. Medications adjusted and dose of digoxin was increased. She was on very low dose beta-blocker because of low blood pressure. She was continued on her diuretic and aspirin regimen. After stabilization, the patient was admitted to [facility] for monitoring and physical rehab. The patient received physical and occupational therapy. She progressed satisfactorily and has become independently ambulatory. Has good appetite. Remains stable from the hemodynamic and cardiopulmonary standpoint. The patient is felt to have achieved maximum benefits and ready for transfer back to the [facility]. ALLERGIES:  THE PATIENT HAS MULTIPLE ALLERGIES INCLUDING FLU VACCINE, CODEINE, EGGS, LIPITOR, AND PENICILLIN. DISCHARGE MEDICATIONS:  1.  Sublingual nitroglycerin as needed. 2.  Metamucil one packet daily. 3.  Aspirin 81 mg daily. 4.  MiraLax 17 g daily. 5.  Methylcellulose eye drops one drop to both eyes three times daily. 6.  Imodium as needed for diarrhea. 7.  Tylenol 650 mg every 6 hours as needed for pain or fever.   8.  Bumex 0.5 mg daily.  9.  Remeron 15 mg at bedtime. 10.  Digoxin 0.125 mg on Wednesday, Friday, and Saturday and 0.25 mg the rest of the week. 11.  Prozac 10 mg daily. 12.  Hydrocortisone 1% cream as needed.         Toshia Shah MD      [id number]  D:  10/11/2019 6:50  T:  10/11/2019 10:04  JOB #:  [id number]

## 2019-10-12 PROCEDURE — 3331090002 HH PPS REVENUE DEBIT

## 2019-10-12 PROCEDURE — 3331090001 HH PPS REVENUE CREDIT

## 2019-10-13 PROCEDURE — 3331090002 HH PPS REVENUE DEBIT

## 2019-10-13 PROCEDURE — 3331090001 HH PPS REVENUE CREDIT

## 2019-10-14 ENCOUNTER — HOME CARE VISIT (OUTPATIENT)
Dept: HOME HEALTH SERVICES | Facility: HOME HEALTH | Age: 84
End: 2019-10-14
Payer: MEDICARE

## 2019-10-14 PROCEDURE — 3331090002 HH PPS REVENUE DEBIT

## 2019-10-14 PROCEDURE — 3331090001 HH PPS REVENUE CREDIT

## 2019-10-15 ENCOUNTER — HOME CARE VISIT (OUTPATIENT)
Dept: SCHEDULING | Facility: HOME HEALTH | Age: 84
End: 2019-10-15
Payer: MEDICARE

## 2019-10-15 ENCOUNTER — OFFICE VISIT (OUTPATIENT)
Dept: CARDIOLOGY CLINIC | Age: 84
End: 2019-10-15

## 2019-10-15 VITALS
OXYGEN SATURATION: 91 % | DIASTOLIC BLOOD PRESSURE: 64 MMHG | SYSTOLIC BLOOD PRESSURE: 108 MMHG | WEIGHT: 108 LBS | HEART RATE: 83 BPM | BODY MASS INDEX: 20.41 KG/M2

## 2019-10-15 DIAGNOSIS — I42.9 CARDIOMYOPATHY, UNSPECIFIED TYPE (HCC): Primary | ICD-10-CM

## 2019-10-15 DIAGNOSIS — I48.0 PAF (PAROXYSMAL ATRIAL FIBRILLATION) (HCC): ICD-10-CM

## 2019-10-15 DIAGNOSIS — E78.00 PURE HYPERCHOLESTEROLEMIA: ICD-10-CM

## 2019-10-15 PROCEDURE — 3331090001 HH PPS REVENUE CREDIT

## 2019-10-15 PROCEDURE — G0157 HHC PT ASSISTANT EA 15: HCPCS

## 2019-10-15 PROCEDURE — 3331090002 HH PPS REVENUE DEBIT

## 2019-10-15 NOTE — PROGRESS NOTES
1. Have you been to the ER, urgent care clinic since your last visit? Hospitalized since your last visit? no     2. Have you seen or consulted any other health care providers outside of the [address] [place] [address] since your last visit? Include any pap smears or colon screening.  yes

## 2019-10-15 NOTE — PATIENT INSTRUCTIONS
Bumex 0.5mg alternating every other day with Bumex 1 mg for 6 days then reduce back down to Bumex 0.5mg daily

## 2019-10-15 NOTE — PROGRESS NOTES
As you know, Garrett Han is a pleasant, 80 y.o. female with known history of coronary artery disease, non-ST-elevation myocardial infarction, status post left anterior descending and left circumflex bare-metal stents. She also has hypertension, hyperlipidemia, and ischemic cardiomyopathy, a.fib    Ms. Maddy Zepeda is here today for follow up appointment. She was recently admitted to the hospital with atrial fibrillation with rapid ventricle response. She also had fluid overload. She was treated appropriately. She went to rehab for 1 week. She is here today and she is feeling much better. She denies any chest pain or chest tightness. She has mild dyspnea. She is using walker to walk around. She denies any palpitation. She has some lower extremity edema. She is accompanied with the family member    Past Medical History:   Diagnosis Date    A-fib [facility]) 01/2017    Arthritis, degenerative     CAD (coronary artery disease) 04/2012    S/P 2.75 X 15 mm BMS of OM (04/2012), Two LAD BMS (07/2012)    Elevated liver enzymes     Likely from statin    HLD (hyperlipidemia)     Hyperkalemia 06/2012    Likely from lisinopril    Ischemic cardiomyopathy     LVEF  30-35%(05/19) 45% (11/2012) & 30% echo (04/2012)    NSTEMI (non-ST elevated myocardial infarction) ([facility] [address] 04/2012    UTI (urinary tract infection)     Vertigo        Past Surgical History:   Procedure Laterality Date    HX CORONARY STENT PLACEMENT  4/23/12    bare metal stent to obtuse marginal branch    HX HEART CATHETERIZATION         Current Outpatient Medications   Medication Sig    fluticasone propionate (FLONASE) 50 mcg/actuation nasal spray 1 [place] by Both Nostrils route daily.  ondansetron (ZOFRAN ODT) 4 mg disintegrating tablet Take 4 mg by mouth every eight (8) hours as needed for Nausea.  bumetanide (BUMEX) 0.5 mg tablet Take 1 Tab by mouth daily.  mirtazapine (REMERON) 15 mg tablet Take 1 Tab by mouth nightly.  Indications: major depressive disorder    digoxin (LANOXIN) 0.125 mg tablet Take 1 Tab by mouth every Monday, Wednesday, Friday, Saturday AND 2 Tabs every Sunday, Tuesday, Thursday. Indications: Ventricular Rate Control in Atrial Fibrillation    FLUoxetine (PROZAC) 10 mg tablet Take 1 Tab by mouth daily. (Patient taking differently: Take 20 mg by mouth daily.)    hydrocortisone (CORTAID) 1 % topical cream Apply  to affected area two (2) times a day. use thin layer (Patient taking differently: Apply  to affected area two (2) times a day. use thin layer back and chest)    carboxymethylcellulose sodium (CELLUVISC) 0.5 % drop ophthalmic solution Administer 1 Drop to both eyes three (3) times daily as needed for Other (dry eyes).  acetaminophen (TYLENOL) 325 mg tablet Take 650 mg by mouth every six (6) hours as needed for Pain.  polyethylene glycol (MIRALAX) 17 gram/dose powder Take 17 g by mouth daily as needed (constipation).  carboxymethylcellulos/glycerin (CLEAR EYES FOR DRY EYES OP) Administer 1 Drop to both eyes three (3) times daily as needed (dry eyes).  loperamide (IMMODIUM) 2 mg tablet Take 1 mg by mouth three (3) times daily as needed for Diarrhea.  psyllium husk-aspartame (METAMUCIL FIBER) 3.4 gram pwpk packet Take 1 Packet by mouth daily.  aspirin 81 mg chewable tablet Take 1 Tab by mouth daily.  nitroglycerin (NITROSTAT) 0.4 mg SL tablet 1 Tab by SubLINGual route every five (5) minutes as needed for Chest Pain. (Patient taking differently: 1 Tab by SubLINGual route every five (5) minutes as needed for Chest Pain. take 1tab sl  5min apart for a total of 3tabs then call 911)     No current facility-administered medications for this visit.       Allergies and Sensitivities:  Allergies   Allergen Reactions    Flu Vaccine 2011 (36 Mos+)(Pf) Anaphylaxis    Codeine Anaphylaxis    Egg Not Reported This Time    Influenza Virus Vaccines Hives    Lipitor [Atorvastatin] Nausea Only    Pcn [Penicillins] Hives Family History:  Family History   Problem Relation Age of Onset    Diabetes Mother      Social History:  Social History     Tobacco Use    Smoking status: Never Smoker    Smokeless tobacco: Never Used   Substance Use Topics    Alcohol use: No    Drug use: No     She  reports that she has never smoked. She has never used smokeless tobacco.  She  reports that she does not drink alcohol. Review of Systems:    Physical Exam:  BP Readings from Last 3 Encounters:   10/15/19 108/64   10/11/19 102/60   10/09/19 114/70         Pulse Readings from Last 3 Encounters:   10/15/19 83   10/11/19 62   10/09/19 78          Wt Readings from Last 3 Encounters:   10/15/19 108 lb (49 kg)   10/07/19 104 lb 4.8 oz (47.3 kg)   10/01/19 100 lb (45.4 kg)     Constitutional: Oriented to person, place, and time. HENT: Head: Normocephalic and atraumatic. Neck: No JVD present. Cardiovascular: Irregular rhythm. No murmur, gallop or rubs appriciated  Lung[de-identified] Breath sounds normal. No respiratory distress. No Rales appriciated  Abdominal: No tenderness. No rebound and no guarding. Musculoskeletal: There is no ankle edema. No cynosis    Review of Data:  LABS:   Lab Results   Component Value Date/Time    Sodium 138 10/02/2019 08:45 AM    Potassium 4.7 10/02/2019 08:45 AM    Chloride 107 10/02/2019 08:45 AM    CO2 24 10/02/2019 08:45 AM    Glucose 75 10/02/2019 08:45 AM    BUN 32 (H) 10/02/2019 08:45 AM    Creatinine 1.30 10/02/2019 08:45 AM     Lab Results   Component Value Date/Time    Cholesterol, total 202 (H) 04/29/2019 07:45 AM    HDL Cholesterol 64 (H) 04/29/2019 07:45 AM    LDL, calculated 116.4 (H) 04/29/2019 07:45 AM    Triglyceride 108 04/29/2019 07:45 AM    CHOL/HDL Ratio 3.2 04/29/2019 07:45 AM     No results found for: GPT  No results found for: HBA1C, [id number]    EKG (07/2012) Sinus rhythm at 74 beats per minute. No dynamic ST-T changes of ischemia. No Q waves noted.   (01/17) A.fib at CATHETERIZATION:(04/2012)  1. LM: Short, but angiographically normal.   2. LAD: Proximal at D1 level, 40% to 50% moderate disease, mid left anterior descending at D2 level 80% tubular stenosis. 3. LCx/OM: Proximal diffuse 40%, Mid OM 95-99% subtotal occlusion with ruptured plaque and with NISH 2 flow. 4. RCA: Anatomically dominant; 30- 40%  proximal to mid, otherwise normal.   5. Markedly reduced LVEF with estimated ejection fraction of 25% with evidence of severe hypokinesis of entire anterior apical and inferoapical wall. Diaphragmatic and inferobasilar wall is the most mary segment. Successful PCI and stenting of the OM, 95-99% subtotal occlusion with 2.75 x 15 mm bare metal Vision stent. CATH (07/2012)   LM: Normal  LAD: 70% calcified prox-mid, Mid LAD 90%  LCx/OM1: proximal 50% moderate disease, Patent mid stent  Successful PCI and stenting of LAD using 2.5 X 18 and 2.5X15 mm BMS    ECHO (11/2012)   1. Left ventricular systolic function was mildly reduced with ejection fraction of 45 to 50%. There was mild diffuse hypokinesis with more pronounced hypokinesis of inferolateral wall. Diastolic dysfunction present. 2.  Right ventricular function is normal.  It was mildly dilated. Estimated peak pressure of 40 mmHg. 3.  Mild mitral regurgitation and aortic regurgitation. No significant valvular pathology noted. 4.  Comparison was made with previous study in April 2012. Overall, left ventricular function has increased from 30 to 45%. ECHO (05/19)  Left Ventricle Normal cavity size and wall thickness. Moderate-to-severe systolic dysfunction. The estimated ejection fraction is 31 - 35%. Visually measured ejection fraction. Moderate-severe diffuse hypokinesis with more pronounced inferolateral hypokinesis. There is moderate (grade 2) left ventricular diastolic dysfunction. Left Atrium Mildly dilated left atrium. Right Ventricle Dilated right ventricle. Reduced systolic function. Right Atrium Moderately dilated right atrium. Interatrial Septum Interatrial septum not assessed   Aortic Valve No stenosis. There is mild leaflet calcification without reduced excursion. Trace aortic valve regurgitation. Mitral Valve No stenosis. Mitral valve non-specific thickening. w/o reduced excursion. Moderate mitral annular calcification. Mild to moderate regurgitation. Tricuspid Valve No stenosis. Dilated annulus. Mild to moderate tricuspid valve regurgitation. Pulmonary arterial systolic pressure is 49.7 mmHg. Moderate pulmonary hypertension. Impression / Plan:    Coronary artery disease:    She had an LAD and circumflex stent in 2012. Currently she is on asa, BB and Plavix. No angina. Stable. Ischemic cardiomyopathy:    Last ejection fraction was noted to be 45-50% in November, 2012. Last ECHO 05/19, with EF 30-35%  Currently on Bumex 0.5 mg daily. Mild fluid overload on exam with minimal rales and ankle swelling. Have asked patient and the family member to take Bumex 0.5 mg alternating with 1 mg every other day for 6 days and then reduce by 2.5 mg daily  Unable to use beta-blocker and Entresto in past because of low blood pressure. Continue digoxin    Hypertension:   Stable. Unable to use beta-blocker and Entresto in past because of low blood pressure. A.fib:   Appears to be in A.fib on exam  HR 80-90 Bpm today. Continue digoxin 0.125 mg alternating 0.25 mg every other day  If needed and if blood pressure allows would use beta-blocker in future  Continue antiplatelet. Suboptimal candidate for anticoagulation    Hyperlipidemia:  She has a history of statin induced elevation of LFTs every time she has tried different kinds of statins. She is currently not on any lipid lowering agent. This plan was discussed with Ms. Quiles in detail who is in agreement. Thank you for allowing me to participate in the patient's care.   Feel free to call me with any questions or concerns.

## 2019-10-16 ENCOUNTER — HOME CARE VISIT (OUTPATIENT)
Dept: SCHEDULING | Facility: HOME HEALTH | Age: 84
End: 2019-10-16
Payer: MEDICARE

## 2019-10-16 VITALS
HEART RATE: 97 BPM | SYSTOLIC BLOOD PRESSURE: 98 MMHG | OXYGEN SATURATION: 96 % | DIASTOLIC BLOOD PRESSURE: 62 MMHG | TEMPERATURE: 98.1 F

## 2019-10-16 VITALS
HEART RATE: 65 BPM | OXYGEN SATURATION: 92 % | TEMPERATURE: 97.8 F | DIASTOLIC BLOOD PRESSURE: 68 MMHG | SYSTOLIC BLOOD PRESSURE: 98 MMHG

## 2019-10-16 PROCEDURE — 3331090002 HH PPS REVENUE DEBIT

## 2019-10-16 PROCEDURE — 3331090001 HH PPS REVENUE CREDIT

## 2019-10-16 PROCEDURE — G0152 HHCP-SERV OF OT,EA 15 MIN: HCPCS

## 2019-10-17 ENCOUNTER — HOME CARE VISIT (OUTPATIENT)
Dept: SCHEDULING | Facility: HOME HEALTH | Age: 84
End: 2019-10-17
Payer: MEDICARE

## 2019-10-17 VITALS
HEART RATE: 72 BPM | OXYGEN SATURATION: 96 % | TEMPERATURE: 97.8 F | DIASTOLIC BLOOD PRESSURE: 68 MMHG | SYSTOLIC BLOOD PRESSURE: 102 MMHG

## 2019-10-17 PROCEDURE — G0157 HHC PT ASSISTANT EA 15: HCPCS

## 2019-10-17 PROCEDURE — 3331090001 HH PPS REVENUE CREDIT

## 2019-10-17 PROCEDURE — 3331090002 HH PPS REVENUE DEBIT

## 2019-10-17 PROCEDURE — G0158 HHC OT ASSISTANT EA 15: HCPCS

## 2019-10-18 VITALS
HEART RATE: 96 BPM | TEMPERATURE: 98.2 F | SYSTOLIC BLOOD PRESSURE: 102 MMHG | OXYGEN SATURATION: 94 % | DIASTOLIC BLOOD PRESSURE: 62 MMHG

## 2019-10-18 PROCEDURE — 3331090002 HH PPS REVENUE DEBIT

## 2019-10-18 PROCEDURE — 3331090001 HH PPS REVENUE CREDIT

## 2019-10-19 PROCEDURE — 3331090001 HH PPS REVENUE CREDIT

## 2019-10-19 PROCEDURE — 3331090002 HH PPS REVENUE DEBIT

## 2019-10-20 PROCEDURE — 3331090001 HH PPS REVENUE CREDIT

## 2019-10-20 PROCEDURE — 3331090002 HH PPS REVENUE DEBIT

## 2019-10-21 PROCEDURE — 3331090002 HH PPS REVENUE DEBIT

## 2019-10-21 PROCEDURE — 3331090001 HH PPS REVENUE CREDIT

## 2019-10-22 ENCOUNTER — HOME CARE VISIT (OUTPATIENT)
Dept: SCHEDULING | Facility: HOME HEALTH | Age: 84
End: 2019-10-22
Payer: MEDICARE

## 2019-10-22 PROCEDURE — G0157 HHC PT ASSISTANT EA 15: HCPCS

## 2019-10-22 PROCEDURE — 3331090002 HH PPS REVENUE DEBIT

## 2019-10-22 PROCEDURE — 3331090001 HH PPS REVENUE CREDIT

## 2019-10-23 ENCOUNTER — HOME CARE VISIT (OUTPATIENT)
Dept: SCHEDULING | Facility: HOME HEALTH | Age: 84
End: 2019-10-23
Payer: MEDICARE

## 2019-10-23 VITALS
DIASTOLIC BLOOD PRESSURE: 58 MMHG | OXYGEN SATURATION: 95 % | TEMPERATURE: 97.9 F | SYSTOLIC BLOOD PRESSURE: 116 MMHG | HEART RATE: 74 BPM

## 2019-10-23 VITALS
HEART RATE: 123 BPM | TEMPERATURE: 97.3 F | SYSTOLIC BLOOD PRESSURE: 98 MMHG | DIASTOLIC BLOOD PRESSURE: 70 MMHG | OXYGEN SATURATION: 96 %

## 2019-10-23 PROCEDURE — G0158 HHC OT ASSISTANT EA 15: HCPCS

## 2019-10-23 PROCEDURE — 3331090002 HH PPS REVENUE DEBIT

## 2019-10-23 PROCEDURE — 3331090001 HH PPS REVENUE CREDIT

## 2019-10-24 ENCOUNTER — HOME CARE VISIT (OUTPATIENT)
Dept: SCHEDULING | Facility: HOME HEALTH | Age: 84
End: 2019-10-24
Payer: MEDICARE

## 2019-10-24 PROCEDURE — 3331090001 HH PPS REVENUE CREDIT

## 2019-10-24 PROCEDURE — 3331090002 HH PPS REVENUE DEBIT

## 2019-10-24 PROCEDURE — G0157 HHC PT ASSISTANT EA 15: HCPCS

## 2019-10-25 ENCOUNTER — HOME CARE VISIT (OUTPATIENT)
Dept: SCHEDULING | Facility: HOME HEALTH | Age: 84
End: 2019-10-25
Payer: MEDICARE

## 2019-10-25 VITALS
HEART RATE: 84 BPM | SYSTOLIC BLOOD PRESSURE: 120 MMHG | DIASTOLIC BLOOD PRESSURE: 67 MMHG | OXYGEN SATURATION: 96 % | TEMPERATURE: 98.4 F

## 2019-10-25 VITALS
TEMPERATURE: 97.9 F | DIASTOLIC BLOOD PRESSURE: 60 MMHG | SYSTOLIC BLOOD PRESSURE: 96 MMHG | HEART RATE: 98 BPM | OXYGEN SATURATION: 92 %

## 2019-10-25 PROCEDURE — G0158 HHC OT ASSISTANT EA 15: HCPCS

## 2019-10-25 PROCEDURE — 3331090002 HH PPS REVENUE DEBIT

## 2019-10-25 PROCEDURE — 3331090001 HH PPS REVENUE CREDIT

## 2019-10-26 PROCEDURE — 3331090001 HH PPS REVENUE CREDIT

## 2019-10-26 PROCEDURE — 3331090002 HH PPS REVENUE DEBIT

## 2019-10-27 ENCOUNTER — TELEPHONE (OUTPATIENT)
Dept: CARDIOLOGY CLINIC | Age: 84
End: 2019-10-27

## 2019-10-27 PROCEDURE — 3331090002 HH PPS REVENUE DEBIT

## 2019-10-27 PROCEDURE — 3331090001 HH PPS REVENUE CREDIT

## 2019-10-27 NOTE — TELEPHONE ENCOUNTER
I got a call from the patient's son saying that her weight was up 5-6 pounds and she had a lot of edema, but no shortness of breath and her lungs were clear according to the nurses at [facility]. I told him it would be OK to give her another Bumex 0.5 mg this afternoon and check the weight in the office and then let our office know.  ES

## 2019-10-28 ENCOUNTER — HOME CARE VISIT (OUTPATIENT)
Dept: SCHEDULING | Facility: HOME HEALTH | Age: 84
End: 2019-10-28
Payer: MEDICARE

## 2019-10-28 ENCOUNTER — APPOINTMENT (OUTPATIENT)
Dept: GENERAL RADIOLOGY | Age: 84
End: 2019-10-28
Attending: EMERGENCY MEDICINE
Payer: MEDICARE

## 2019-10-28 ENCOUNTER — HOSPITAL ENCOUNTER (EMERGENCY)
Age: 84
Discharge: HOME | End: 2019-10-28
Attending: EMERGENCY MEDICINE | Admitting: EMERGENCY MEDICINE
Payer: MEDICARE

## 2019-10-28 VITALS
SYSTOLIC BLOOD PRESSURE: 107 MMHG | RESPIRATION RATE: 21 BRPM | WEIGHT: 100 LBS | BODY MASS INDEX: 17.07 KG/M2 | DIASTOLIC BLOOD PRESSURE: 70 MMHG | OXYGEN SATURATION: 94 % | HEIGHT: 64 IN | HEART RATE: 101 BPM | TEMPERATURE: 98.2 F

## 2019-10-28 VITALS
SYSTOLIC BLOOD PRESSURE: 124 MMHG | OXYGEN SATURATION: 92 % | HEART RATE: 87 BPM | TEMPERATURE: 96.7 F | DIASTOLIC BLOOD PRESSURE: 82 MMHG

## 2019-10-28 DIAGNOSIS — S80.10XA HEMATOMA OF LOWER LEG: Primary | ICD-10-CM

## 2019-10-28 LAB
ALBUMIN SERPL-MCNC: 2.9 G/DL (ref 3.4–5)
ALBUMIN/GLOB SERPL: 0.7 {RATIO} (ref 0.8–1.7)
ALP SERPL-CCNC: 175 U/L (ref 45–117)
ALT SERPL-CCNC: 24 U/L (ref 13–56)
ANION GAP SERPL CALC-SCNC: 10 MMOL/L (ref 3–18)
AST SERPL-CCNC: 22 U/L (ref 10–38)
BASOPHILS # BLD: 0.1 K/UL (ref 0–0.1)
BASOPHILS NFR BLD: 1 % (ref 0–2)
BILIRUB SERPL-MCNC: 0.4 MG/DL (ref 0.2–1)
BNP SERPL-MCNC: ABNORMAL PG/ML (ref 0–1800)
BUN SERPL-MCNC: 23 MG/DL (ref 7–18)
BUN/CREAT SERPL: 16 (ref 12–20)
CALCIUM SERPL-MCNC: 8 MG/DL (ref 8.5–10.1)
CHLORIDE SERPL-SCNC: 98 MMOL/L (ref 100–111)
CO2 SERPL-SCNC: 28 MMOL/L (ref 21–32)
CREAT SERPL-MCNC: 1.47 MG/DL (ref 0.6–1.3)
DIFFERENTIAL METHOD BLD: ABNORMAL
EOSINOPHIL # BLD: 0.3 K/UL (ref 0–0.4)
EOSINOPHIL NFR BLD: 4 % (ref 0–5)
ERYTHROCYTE [DISTWIDTH] IN BLOOD BY AUTOMATED COUNT: 18 % (ref 11.6–14.5)
GLOBULIN SER CALC-MCNC: 3.9 G/DL (ref 2–4)
GLUCOSE SERPL-MCNC: 122 MG/DL (ref 74–99)
HCT VFR BLD AUTO: 33.9 % (ref 35–45)
HGB BLD-MCNC: 10.3 G/DL (ref 12–16)
INR PPP: 1.2 (ref 0.8–1.2)
LYMPHOCYTES # BLD: 1.3 K/UL (ref 0.9–3.6)
LYMPHOCYTES NFR BLD: 19 % (ref 21–52)
MCH RBC QN AUTO: 26.3 PG (ref 24–34)
MCHC RBC AUTO-ENTMCNC: 30.4 G/DL (ref 31–37)
MCV RBC AUTO: 86.5 FL (ref 74–97)
MONOCYTES # BLD: 1 K/UL (ref 0.05–1.2)
MONOCYTES NFR BLD: 15 % (ref 3–10)
NEUTS SEG # BLD: 4.4 K/UL (ref 1.8–8)
NEUTS SEG NFR BLD: 61 % (ref 40–73)
PLATELET # BLD AUTO: 317 K/UL (ref 135–420)
PMV BLD AUTO: 8.4 FL (ref 9.2–11.8)
POTASSIUM SERPL-SCNC: 4.3 MMOL/L (ref 3.5–5.5)
PROT SERPL-MCNC: 6.8 G/DL (ref 6.4–8.2)
PROTHROMBIN TIME: 14.5 SEC (ref 11.5–15.2)
RBC # BLD AUTO: 3.92 M/UL (ref 4.2–5.3)
SODIUM SERPL-SCNC: 136 MMOL/L (ref 136–145)
TROPONIN I SERPL-MCNC: 0.1 NG/ML (ref 0–0.04)
WBC # BLD AUTO: 7.1 K/UL (ref 4.6–13.2)

## 2019-10-28 PROCEDURE — 3331090001 HH PPS REVENUE CREDIT

## 2019-10-28 PROCEDURE — 71045 X-RAY EXAM CHEST 1 VIEW: CPT

## 2019-10-28 PROCEDURE — 83880 ASSAY OF NATRIURETIC PEPTIDE: CPT

## 2019-10-28 PROCEDURE — G0151 HHCP-SERV OF PT,EA 15 MIN: HCPCS

## 2019-10-28 PROCEDURE — 85025 COMPLETE CBC W/AUTO DIFF WBC: CPT

## 2019-10-28 PROCEDURE — 93005 ELECTROCARDIOGRAM TRACING: CPT

## 2019-10-28 PROCEDURE — 99284 EMERGENCY DEPT VISIT MOD MDM: CPT

## 2019-10-28 PROCEDURE — 80053 COMPREHEN METABOLIC PANEL: CPT

## 2019-10-28 PROCEDURE — 85610 PROTHROMBIN TIME: CPT

## 2019-10-28 PROCEDURE — 3331090002 HH PPS REVENUE DEBIT

## 2019-10-28 PROCEDURE — 73590 X-RAY EXAM OF LOWER LEG: CPT

## 2019-10-28 PROCEDURE — 84484 ASSAY OF TROPONIN QUANT: CPT

## 2019-10-29 ENCOUNTER — OFFICE VISIT (OUTPATIENT)
Dept: CARDIOLOGY CLINIC | Age: 84
End: 2019-10-29

## 2019-10-29 VITALS
DIASTOLIC BLOOD PRESSURE: 65 MMHG | SYSTOLIC BLOOD PRESSURE: 104 MMHG | OXYGEN SATURATION: 100 % | WEIGHT: 116 LBS | HEART RATE: 90 BPM | BODY MASS INDEX: 19.91 KG/M2

## 2019-10-29 DIAGNOSIS — I42.9 CARDIOMYOPATHY, UNSPECIFIED TYPE (HCC): Primary | ICD-10-CM

## 2019-10-29 DIAGNOSIS — E78.00 PURE HYPERCHOLESTEROLEMIA: ICD-10-CM

## 2019-10-29 DIAGNOSIS — I48.0 PAF (PAROXYSMAL ATRIAL FIBRILLATION) (HCC): ICD-10-CM

## 2019-10-29 PROCEDURE — 3331090002 HH PPS REVENUE DEBIT

## 2019-10-29 PROCEDURE — 3331090001 HH PPS REVENUE CREDIT

## 2019-10-29 NOTE — ED PROVIDER NOTES
Cole Ruff is a 80 y.o. Female with history of A. fib who was brought over from the nursing home due to bruising on her anterior bilateral legs. Nurses were concerned about the possibility of a DVT but patient has no posterior swelling or swelling above. The bruising is all anterior very soft. There is no known trauma. There is no swelling in ankles. Patient is not on anticoagulants. She is being covered with doxycycline for suspected cellulitis. She has no new chest pain or shortness of breath or fever. She does not recall hitting it on anything. The history is provided by the patient.         Past Medical History:   Diagnosis Date    A-fib [facility]) 01/2017    Arthritis, degenerative     CAD (coronary artery disease) 04/2012    S/P 2.75 X 15 mm BMS of OM (04/2012), Two LAD BMS (07/2012)    Elevated liver enzymes     Likely from statin    HLD (hyperlipidemia)     Hyperkalemia 06/2012    Likely from lisinopril    Ischemic cardiomyopathy     LVEF  30-35%(05/19) 45% (11/2012) & 30% echo (04/2012)    NSTEMI (non-ST elevated myocardial infarction) ([facility] [address] 04/2012    UTI (urinary tract infection)     Vertigo        Past Surgical History:   Procedure Laterality Date    HX CORONARY STENT PLACEMENT  4/23/12    bare metal stent to obtuse marginal branch    HX HEART CATHETERIZATION           Family History:   Problem Relation Age of Onset    Diabetes Mother        Social History     Socioeconomic History    Marital status:      Spouse name: Not on file    Number of children: Not on file    Years of education: Not on file    Highest education level: Not on file   Occupational History    Not on file   Social Needs    Financial resource strain: Not on file    Food insecurity:     Worry: Not on file     Inability: Not on file    Transportation needs:     Medical: Not on file     Non-medical: Not on file   Tobacco Use    Smoking status: Never Smoker    Smokeless tobacco: Never Used   Substance and Sexual Activity    Alcohol use: No    Drug use: No    Sexual activity: Never   Lifestyle    Physical activity:     Days per week: Not on file     Minutes per session: Not on file    Stress: Not on file   Relationships    Social connections:     Talks on phone: Not on file     Gets together: Not on file     Attends Taoism service: Not on file     Active member of club or organization: Not on file     Attends meetings of clubs or organizations: Not on file     Relationship status: Not on file    Intimate partner violence:     Fear of current or ex partner: Not on file     Emotionally abused: Not on file     Physically abused: Not on file     Forced sexual activity: Not on file   Other Topics Concern    Not on file   Social History Narrative    Not on file         ALLERGIES: Flu vaccine 2011 (36 mos+)(pf); Codeine; Egg; Influenza virus vaccines; Lipitor [atorvastatin]; and Pcn [penicillins]    Review of Systems   Constitutional: Negative for fever. HENT: Negative for sore throat. Eyes: Negative for visual disturbance. Respiratory: Negative for shortness of breath. Cardiovascular: Positive for leg swelling. Negative for chest pain. Gastrointestinal: Negative for abdominal pain. Genitourinary: Negative for difficulty urinating. Musculoskeletal: Positive for joint swelling. Skin: Positive for color change. Allergic/Immunologic: Negative for immunocompromised state. Neurological: Negative for syncope. Hematological: Bruises/bleeds easily. Psychiatric/Behavioral: Negative for sleep disturbance. Vitals:    10/28/19 1828   BP: 102/63   Pulse: 93   Resp: 14   Temp: 98.2 °F (36.8 °C)   SpO2: 96%   Weight: 45.4 kg (100 lb)   Height: 5' 4\" (1.626 m)            Physical Exam   Constitutional: She is oriented to person, place, and time. She appears well-developed and well-nourished. No distress.    Elderly appearing, not ill or sickly appearing   HENT:   Head: Normocephalic and atraumatic. Right Ear: External ear normal.   Left Ear: External ear normal.   Nose: Nose normal.   Mouth/Throat: Uvula is midline, oropharynx is clear and moist and mucous membranes are normal.   Eyes: Conjunctivae are normal. No scleral icterus. Neck: Neck supple. Cardiovascular: Normal rate, regular rhythm, normal heart sounds and intact distal pulses. Pulmonary/Chest: Effort normal and breath sounds normal.   Abdominal: Soft. There is no tenderness. Musculoskeletal: She exhibits edema. Legs:  Neurological: She is alert and oriented to person, place, and time. Gait normal.   Skin: Skin is warm and dry. Capillary refill takes less than 2 seconds. She is not diaphoretic. Psychiatric: Her behavior is normal.   Nursing note and vitals reviewed.        [facility]       Procedures  Vitals:  Patient Vitals for the past 12 hrs:   Temp Pulse Resp BP SpO2   10/28/19 1828 98.2 °F (36.8 °C) 93 14 102/63 96 %         Medications ordered:   Medications - No data to display      Lab findings:  Recent Results (from the past 12 hour(s))   EKG, 12 LEAD, INITIAL    Collection Time: 10/28/19  6:35 PM   Result Value Ref Range    Ventricular Rate 99 BPM    QRS Duration 96 ms    Q-T Interval 312 ms    QTC Calculation (Bezet) 400 ms    Calculated R Axis 110 degrees    Calculated T Axis -64 degrees    Diagnosis       Atrial fibrillation  Right ventricular hypertrophy with repolarization abnormality  Cannot rule out Inferior infarct , age undetermined  Abnormal ECG  When compared with ECG of 01-OCT-2019 12:26,  Incomplete right bundle branch block is no longer present  ST no longer depressed in Lateral leads  T wave inversion no longer evident in Lateral leads     CBC WITH AUTOMATED DIFF    Collection Time: 10/28/19  6:38 PM   Result Value Ref Range    WBC 7.1 4.6 - 13.2 K/uL    RBC 3.92 (L) 4.20 - 5.30 M/uL    HGB 10.3 (L) 12.0 - 16.0 g/dL    HCT 33.9 (L) 35.0 - 45.0 %    MCV 86.5 74.0 - 97.0 FL    MCH 26.3 24.0 - 34.0 PG MCHC 30.4 (L) 31.0 - 37.0 g/dL    RDW 18.0 (H) 11.6 - 14.5 %    PLATELET 480 670 - 388 K/uL    MPV 8.4 (L) 9.2 - 11.8 FL    NEUTROPHILS 61 40 - 73 %    LYMPHOCYTES 19 (L) 21 - 52 %    MONOCYTES 15 (H) 3 - 10 %    EOSINOPHILS 4 0 - 5 %    BASOPHILS 1 0 - 2 %    ABS. NEUTROPHILS 4.4 1.8 - 8.0 K/UL    ABS. LYMPHOCYTES 1.3 0.9 - 3.6 K/UL    ABS. MONOCYTES 1.0 0.05 - 1.2 K/UL    ABS. EOSINOPHILS 0.3 0.0 - 0.4 K/UL    ABS. BASOPHILS 0.1 0.0 - 0.1 K/UL    DF AUTOMATED     PROTHROMBIN TIME + INR    Collection Time: 10/28/19  6:38 PM   Result Value Ref Range    Prothrombin time 14.5 11.5 - 15.2 sec    INR 1.2 0.8 - 1.2     NT-PRO BNP    Collection Time: 10/28/19  6:38 PM   Result Value Ref Range    NT pro-BNP 11,270 (H) 0 - 1,800 PG/ML   TROPONIN I    Collection Time: 10/28/19  6:38 PM   Result Value Ref Range    Troponin-I, QT 0.10 (H) 0.0 - 2.395 NG/ML   METABOLIC PANEL, COMPREHENSIVE    Collection Time: 10/28/19  6:38 PM   Result Value Ref Range    Sodium 136 136 - 145 mmol/L    Potassium 4.3 3.5 - 5.5 mmol/L    Chloride 98 (L) 100 - 111 mmol/L    CO2 28 21 - 32 mmol/L    Anion gap 10 3.0 - 18 mmol/L    Glucose 122 (H) 74 - 99 mg/dL    BUN PENDING MG/DL    Creatinine 1.47 (H) 0.6 - 1.3 MG/DL    BUN/Creatinine ratio PENDING     GFR est AA 40 (L) >60 ml/min/1.73m2    GFR est non-AA 33 (L) >60 ml/min/1.73m2    Calcium 8.0 (L) 8.5 - 10.1 MG/DL    Bilirubin, total PENDING MG/DL    ALT (SGPT) 24 13 - 56 U/L    AST (SGOT) 22 10 - 38 U/L    Alk.  phosphatase PENDING U/L    Protein, total PENDING g/dL    Albumin 2.9 (L) 3.4 - 5.0 g/dL    Globulin PENDING g/dL    A-G Ratio PENDING         EKG interpretation by ED Physician:  Atrial fibrillation with acute ST or T wave changes  Normal rate  Rate 89 QRS 96   Previous ST and T wave changes no longer present    X-Ray, CT or other radiology findings or impressions:  XR CHEST PORT    (Results Pending)   XR TIB/FIB LT    (Results Pending)   XR TIB/FIB RT    (Results Pending)   Chest x-ray: No acute process    Progress notes, Consult notes or additional Procedure notes:   Not consistent with DVT. This is more consistent with bruising and slight hematoma. There is no evidence of fracture. Patient already on antibiotics for possible cellulitis but this is more likely lymphedema rather than cellulitis    I have discussed with patient and/or family/sig other the results, interpretation of any imaging if performed, suspected diagnosis and treatment plan to include instructions regarding the diagnoses listed to which understanding was expressed with all questions answered      Reevaluation of patient:   stable    Disposition:  Diagnosis:   1. Hematoma of lower leg        Disposition: home      Follow-up Information     Follow up With Specialties Details Why Contact Info    Ophelia Esteves MD Geriatric Medicine Schedule an appointment as soon as possible for a visit  [address] [facility]  [address] [facility] [address] [facility] [address]      [facility] EMERGENCY DEPT Emergency Medicine  If symptoms worsen [address] [facility] [address].  [phone]            Patient's Medications   Start Taking    No medications on file   Continue Taking    ACETAMINOPHEN (TYLENOL) 325 MG TABLET    Take 650 mg by mouth every six (6) hours as needed for Pain. ASPIRIN 81 MG CHEWABLE TABLET    Take 1 Tab by mouth daily. BUMETANIDE (BUMEX) 0.5 MG TABLET    Take 1 Tab by mouth daily. CARBOXYMETHYLCELLULOS/GLYCERIN (CLEAR EYES FOR DRY EYES OP)    Administer 1 Drop to both eyes three (3) times daily as needed (dry eyes). CARBOXYMETHYLCELLULOSE SODIUM (CELLUVISC) 0.5 % DROP OPHTHALMIC SOLUTION    Administer 1 Drop to both eyes three (3) times daily as needed for Other (dry eyes). DIGOXIN (LANOXIN) 0.125 MG TABLET    Take 1 Tab by mouth every Monday, Wednesday, Friday, Saturday AND 2 Tabs every Sunday, Tuesday, Thursday.  Indications: Ventricular Rate Control in Atrial Fibrillation    DOXYCYCLINE (ADOXA) 100 MG TABLET    Take 100 mg by mouth daily. FLUOXETINE (PROZAC) 10 MG TABLET    Take 1 Tab by mouth daily. FLUTICASONE PROPIONATE (FLONASE) 50 MCG/ACTUATION NASAL SPRAY    1 Ethel by Both Nostrils route daily. HYDROCORTISONE (CORTAID) 1 % TOPICAL CREAM    Apply  to affected area two (2) times a day. use thin layer    LOPERAMIDE (IMMODIUM) 2 MG TABLET    Take 1 mg by mouth three (3) times daily as needed for Diarrhea. MIRTAZAPINE (REMERON) 15 MG TABLET    Take 1 Tab by mouth nightly. Indications: major depressive disorder    NITROGLYCERIN (NITROSTAT) 0.4 MG SL TABLET    1 Tab by SubLINGual route every five (5) minutes as needed for Chest Pain. ONDANSETRON (ZOFRAN ODT) 4 MG DISINTEGRATING TABLET    Take 4 mg by mouth every eight (8) hours as needed for Nausea. POLYETHYLENE GLYCOL (MIRALAX) 17 GRAM/DOSE POWDER    Take 17 g by mouth daily as needed (constipation). PSYLLIUM HUSK-ASPARTAME (METAMUCIL FIBER) 3.4 GRAM PWPK PACKET    Take 1 Packet by mouth daily.    These Medications have changed    No medications on file   Stop Taking    No medications on file

## 2019-10-29 NOTE — DISCHARGE INSTRUCTIONS
Patient Education     Contusion: Care Instructions  Your Care Instructions  Contusion is the medical term for a bruise. It is the result of a direct blow or an impact, such as a fall. Contusions are common sports injuries. Most people think of a bruise as a black-and-blue spot. This happens when small blood vessels get torn and leak blood under the skin. But bones, muscles, and organs can also get bruised. This may damage deep tissues but not cause a bruise you can see. The doctor will do a physical exam to find the location of your contusion. You may also have tests to make sure you do not have a more serious injury, such as a broken bone or nerve damage. These may include X-rays or other imaging tests like a CT scan or MRI. Deep-tissue contusions may cause pain and swelling. But if there is no serious damage, they will often get better in a few weeks with home treatment. The doctor has checked you carefully, but problems can develop later. If you notice any problems or new symptoms, get medical treatment right away. Follow-up care is a key part of your treatment and safety. Be sure to make and go to all appointments, and call your doctor if you are having problems. It's also a good idea to know your test results and keep a list of the medicines you take. How can you care for yourself at home? · Put ice or a cold pack on the sore area for 10 to 20 minutes at a time to stop swelling. Put a thin cloth between the ice pack and your skin. · Be safe with medicines. Read and follow all instructions on the label. ¨ If the doctor gave you a prescription medicine for pain, take it as prescribed. ¨ If you are not taking a prescription pain medicine, ask your doctor if you can take an over-the-counter medicine. · If you can, prop up the sore area on pillows as much as possible for the next few days. Try to keep the sore area above the level of your heart. When should you call for help?   Call your doctor now or seek immediate medical care if:  · Your pain gets worse. · You have new or worse swelling. · You have tingling, weakness, or numbness in the area near the contusion. · The area near the contusion is cold or pale. Watch closely for changes in your health, and be sure to contact your doctor if:  · You do not get better as expected. Where can you learn more? Go to dooyoo.be  Enter [id number] in the search box to learn more about \"Contusion: Care Instructions. \"   © 9612-7067 [employer]. Care instructions adapted under license by [employer] (which disclaims liability or warranty for this information). This care instruction is for use with your licensed healthcare professional. If you have questions about a medical condition or this instruction, always ask your healthcare professional. [address] any warranty or liability for your use of this information. Content Version: 26.1.790418; Current as of: May 22, 2015           Patient Education        Bruises: Care Instructions  Your Care Instructions    Bruises occur when small blood vessels under the skin tear or rupture, most often from a twist, bump, or fall. Blood leaks into tissues under the skin and causes a black-and-blue spot that often turns colors, including purplish black, reddish blue, or yellowish green, as the bruise heals. Bruises hurt, but most are not serious and will go away on their own within 2 to 4 weeks. Sometimes, gravity causes them to spread down the body. A leg bruise usually will take longer to heal than a bruise on the face or arms. Follow-up care is a key part of your treatment and safety. Be sure to make and go to all appointments, and call your doctor if you are having problems. It's also a good idea to know your test results and keep a list of the medicines you take. How can you care for yourself at home? · Take pain medicines exactly as directed.   ? If the doctor gave you a prescription medicine for pain, take it as prescribed. ? If you are not taking a prescription pain medicine, ask your doctor if you can take an over-the-counter medicine. · Put ice or a cold pack on the area for 10 to 20 minutes at a time. Put a thin cloth between the ice and your skin. · If you can, prop up the bruised area on pillows as much as possible for the next few days. Try to keep the bruise above the level of your heart. When should you call for help? Call your doctor now or seek immediate medical care if:    · You have signs of infection, such as:  ? Increased pain, swelling, warmth, or redness. ? Red streaks leading from the bruise. ? Pus draining from the bruise. ? A fever.     · You have a bruise on your leg and signs of a blood clot, such as:  ? Increasing redness and swelling along with warmth, tenderness, and pain in the bruised area. ? Pain in your calf, back of the knee, thigh, or groin. ? Redness and swelling in your leg or groin.     · Your pain gets worse.    Watch closely for changes in your health, and be sure to contact your doctor if:    · You do not get better as expected. Where can you learn more? Go to http://colin-david.info/. Enter ([phone] 659-556 in the search box to learn more about \"Bruises: Care Instructions. \"  Current as of: June 26, 2019  Content Version: 12.2  © 4368-4943 [employer]. Care instructions adapted under license by [employer] (which disclaims liability or warranty for this information). If you have questions about a medical condition or this instruction, always ask your healthcare professional. [place] [address] any warranty or liability for your use of this information.

## 2019-10-29 NOTE — PROGRESS NOTES
1. Have you been to the ER, urgent care clinic since your last visit? Hospitalized since your last visit? No     2. Have you seen or consulted any other health care providers outside of the [address] [place] [address] since your last visit? Include any pap smears or colon screening.   No

## 2019-10-29 NOTE — ED NOTES
I have reviewed discharge instructions with the patient and caregiver. The patient and caregiver verbalized understanding. Patient armband given to patient to take home.   Patient was informed of the privacy risks if armband lost or stolen

## 2019-10-30 ENCOUNTER — HOME CARE VISIT (OUTPATIENT)
Dept: SCHEDULING | Facility: HOME HEALTH | Age: 84
End: 2019-10-30
Payer: MEDICARE

## 2019-10-30 VITALS
SYSTOLIC BLOOD PRESSURE: 99 MMHG | DIASTOLIC BLOOD PRESSURE: 71 MMHG | HEART RATE: 91 BPM | TEMPERATURE: 97.9 F | OXYGEN SATURATION: 95 %

## 2019-10-30 LAB
CALCULATED R AXIS, ECG10: 110 DEGREES
CALCULATED T AXIS, ECG11: -64 DEGREES
DIAGNOSIS, 93000: NORMAL
Q-T INTERVAL, ECG07: 312 MS
QRS DURATION, ECG06: 96 MS
QTC CALCULATION (BEZET), ECG08: 400 MS
VENTRICULAR RATE, ECG03: 99 BPM

## 2019-10-30 PROCEDURE — 3331090002 HH PPS REVENUE DEBIT

## 2019-10-30 PROCEDURE — G0152 HHCP-SERV OF OT,EA 15 MIN: HCPCS

## 2019-10-30 PROCEDURE — 3331090001 HH PPS REVENUE CREDIT

## 2019-10-31 ENCOUNTER — HOSPITAL ENCOUNTER (OUTPATIENT)
Dept: LAB | Age: 84
Discharge: HOME | End: 2019-10-31
Payer: MEDICARE

## 2019-10-31 LAB
ANION GAP SERPL CALC-SCNC: 6 MMOL/L (ref 3–18)
BUN SERPL-MCNC: 28 MG/DL (ref 7–18)
BUN/CREAT SERPL: 19 (ref 12–20)
CALCIUM SERPL-MCNC: 8.8 MG/DL (ref 8.5–10.1)
CHLORIDE SERPL-SCNC: 98 MMOL/L (ref 100–111)
CO2 SERPL-SCNC: 32 MMOL/L (ref 21–32)
CREAT SERPL-MCNC: 1.49 MG/DL (ref 0.6–1.3)
GLUCOSE SERPL-MCNC: 75 MG/DL (ref 74–99)
POTASSIUM SERPL-SCNC: 5.4 MMOL/L (ref 3.5–5.5)
SODIUM SERPL-SCNC: 136 MMOL/L (ref 136–145)

## 2019-10-31 PROCEDURE — 36415 COLL VENOUS BLD VENIPUNCTURE: CPT

## 2019-10-31 PROCEDURE — 3331090001 HH PPS REVENUE CREDIT

## 2019-10-31 PROCEDURE — 80048 BASIC METABOLIC PNL TOTAL CA: CPT

## 2019-10-31 PROCEDURE — 3331090002 HH PPS REVENUE DEBIT

## 2019-11-03 ENCOUNTER — APPOINTMENT (OUTPATIENT)
Dept: CT IMAGING | Age: 84
End: 2019-11-03
Attending: EMERGENCY MEDICINE
Payer: MEDICARE

## 2019-11-03 ENCOUNTER — HOSPITAL ENCOUNTER (EMERGENCY)
Age: 84
Discharge: HOME | End: 2019-11-03
Attending: EMERGENCY MEDICINE | Admitting: EMERGENCY MEDICINE
Payer: MEDICARE

## 2019-11-03 VITALS
RESPIRATION RATE: 17 BRPM | HEART RATE: 108 BPM | TEMPERATURE: 97.8 F | DIASTOLIC BLOOD PRESSURE: 60 MMHG | SYSTOLIC BLOOD PRESSURE: 120 MMHG | OXYGEN SATURATION: 100 %

## 2019-11-03 DIAGNOSIS — I48.91 ATRIAL FIBRILLATION, UNSPECIFIED TYPE (HCC): Primary | ICD-10-CM

## 2019-11-03 DIAGNOSIS — S80.11XA LEG HEMATOMA, RIGHT, INITIAL ENCOUNTER: ICD-10-CM

## 2019-11-03 DIAGNOSIS — R60.0 PEDAL EDEMA: ICD-10-CM

## 2019-11-03 DIAGNOSIS — S80.12XA LEG HEMATOMA, LEFT, INITIAL ENCOUNTER: ICD-10-CM

## 2019-11-03 LAB
ALBUMIN SERPL-MCNC: 2.8 G/DL (ref 3.4–5)
ALBUMIN/GLOB SERPL: 0.8 {RATIO} (ref 0.8–1.7)
ALP SERPL-CCNC: 157 U/L (ref 45–117)
ALT SERPL-CCNC: 22 U/L (ref 13–56)
ANION GAP SERPL CALC-SCNC: 7 MMOL/L (ref 3–18)
APTT PPP: 39.9 SEC (ref 23–36.4)
AST SERPL-CCNC: 22 U/L (ref 10–38)
BASOPHILS # BLD: 0 K/UL (ref 0–0.1)
BASOPHILS NFR BLD: 0 % (ref 0–2)
BILIRUB SERPL-MCNC: 0.5 MG/DL (ref 0.2–1)
BUN SERPL-MCNC: 27 MG/DL (ref 7–18)
BUN/CREAT SERPL: 20 (ref 12–20)
CALCIUM SERPL-MCNC: 8.2 MG/DL (ref 8.5–10.1)
CALCULATED R AXIS, ECG10: 114 DEGREES
CALCULATED T AXIS, ECG11: -27 DEGREES
CHLORIDE SERPL-SCNC: 97 MMOL/L (ref 100–111)
CK MB CFR SERPL CALC: 5.2 % (ref 0–4)
CK MB SERPL-MCNC: 3.6 NG/ML (ref 5–25)
CK SERPL-CCNC: 69 U/L (ref 26–192)
CO2 SERPL-SCNC: 31 MMOL/L (ref 21–32)
CREAT SERPL-MCNC: 1.33 MG/DL (ref 0.6–1.3)
DIAGNOSIS, 93000: NORMAL
DIFFERENTIAL METHOD BLD: ABNORMAL
EOSINOPHIL # BLD: 0.1 K/UL (ref 0–0.4)
EOSINOPHIL NFR BLD: 1 % (ref 0–5)
ERYTHROCYTE [DISTWIDTH] IN BLOOD BY AUTOMATED COUNT: 17.4 % (ref 11.6–14.5)
GLOBULIN SER CALC-MCNC: 3.6 G/DL (ref 2–4)
GLUCOSE SERPL-MCNC: 124 MG/DL (ref 74–99)
HCT VFR BLD AUTO: 32.1 % (ref 35–45)
HGB BLD-MCNC: 10 G/DL (ref 12–16)
INR PPP: 1.1 (ref 0.8–1.2)
LYMPHOCYTES # BLD: 1.2 K/UL (ref 0.9–3.6)
LYMPHOCYTES NFR BLD: 13 % (ref 21–52)
MCH RBC QN AUTO: 26.2 PG (ref 24–34)
MCHC RBC AUTO-ENTMCNC: 31.2 G/DL (ref 31–37)
MCV RBC AUTO: 84.3 FL (ref 74–97)
MONOCYTES # BLD: 1.1 K/UL (ref 0.05–1.2)
MONOCYTES NFR BLD: 11 % (ref 3–10)
NEUTS SEG # BLD: 7.2 K/UL (ref 1.8–8)
NEUTS SEG NFR BLD: 75 % (ref 40–73)
PLATELET # BLD AUTO: 303 K/UL (ref 135–420)
PMV BLD AUTO: 8.6 FL (ref 9.2–11.8)
POTASSIUM SERPL-SCNC: 4.4 MMOL/L (ref 3.5–5.5)
PROT SERPL-MCNC: 6.4 G/DL (ref 6.4–8.2)
PROTHROMBIN TIME: 14.2 SEC (ref 11.5–15.2)
Q-T INTERVAL, ECG07: 290 MS
QRS DURATION, ECG06: 104 MS
QTC CALCULATION (BEZET), ECG08: 372 MS
RBC # BLD AUTO: 3.81 M/UL (ref 4.2–5.3)
SODIUM SERPL-SCNC: 135 MMOL/L (ref 136–145)
TROPONIN I SERPL-MCNC: 0.05 NG/ML (ref 0–0.04)
VENTRICULAR RATE, ECG03: 99 BPM
WBC # BLD AUTO: 9.6 K/UL (ref 4.6–13.2)

## 2019-11-03 PROCEDURE — 74011000258 HC RX REV CODE- 258: Performed by: EMERGENCY MEDICINE

## 2019-11-03 PROCEDURE — 74011636320 HC RX REV CODE- 636/320: Performed by: EMERGENCY MEDICINE

## 2019-11-03 PROCEDURE — 85610 PROTHROMBIN TIME: CPT

## 2019-11-03 PROCEDURE — 85025 COMPLETE CBC W/AUTO DIFF WBC: CPT

## 2019-11-03 PROCEDURE — 73706 CT ANGIO LWR EXTR W/O&W/DYE: CPT

## 2019-11-03 PROCEDURE — 82550 ASSAY OF CK (CPK): CPT

## 2019-11-03 PROCEDURE — 99285 EMERGENCY DEPT VISIT HI MDM: CPT

## 2019-11-03 PROCEDURE — 74011250636 HC RX REV CODE- 250/636: Performed by: EMERGENCY MEDICINE

## 2019-11-03 PROCEDURE — 93005 ELECTROCARDIOGRAM TRACING: CPT

## 2019-11-03 PROCEDURE — 74011250637 HC RX REV CODE- 250/637: Performed by: EMERGENCY MEDICINE

## 2019-11-03 PROCEDURE — 80053 COMPREHEN METABOLIC PANEL: CPT

## 2019-11-03 PROCEDURE — 85730 THROMBOPLASTIN TIME PARTIAL: CPT

## 2019-11-03 RX ORDER — IBUPROFEN 400 MG/1
400 TABLET ORAL
Status: COMPLETED | OUTPATIENT
Start: 2019-11-03 | End: 2019-11-03

## 2019-11-03 RX ADMIN — SODIUM CHLORIDE 100 ML: 900 INJECTION, SOLUTION INTRAVENOUS at 14:53

## 2019-11-03 RX ADMIN — IBUPROFEN 400 MG: 400 TABLET, FILM COATED ORAL at 13:05

## 2019-11-03 RX ADMIN — SODIUM CHLORIDE 500 ML: 900 INJECTION, SOLUTION INTRAVENOUS at 13:06

## 2019-11-03 RX ADMIN — IOPAMIDOL 100 ML: 755 INJECTION, SOLUTION INTRAVENOUS at 14:52

## 2019-11-03 NOTE — PROGRESS NOTES
Pt seen and examined. Bilateral anterior shin hematomas present for 10+ days with skin tears on the left. No active bleeding visualized during exam.  Pt with 2+ pitting edema into the hips with BNP of 11,000 at end of October. Believe the edema plus her age made her skin very fragile and lead to subcutaneous hematoma. Placed both legs into gentle compression given degree of CHF. Will follow up CT results. If negative pt can f/u as outpt in the office Tuesday. Full consult to follow.

## 2019-11-03 NOTE — ED PROVIDER NOTES
EMERGENCY DEPARTMENT HISTORY AND PHYSICAL EXAM    11:14 AM      Date: 11/3/2019  Patient Name: Maritza Terrazas    History of Presenting Illness     Chief Complaint   Patient presents with    Leg Swelling         History Provided By: patient    Additional History (Context): Maritza Terrazas is a 80 y.o. female presents with a week or more of swelling in the legs. She continues to take her Bumex and is urinating. She had some soreness and redness is and has been put on doxycycline and been on this for several days from Dr. Adelaide hernandez. On her anterior shins she is too large 5 inch diameter ecchymotic fluid collections consistent with hematoma. She denies trauma. Pain is moderate and worse with palpation. No fever. No chest pain or shortness of breath. .    PCP: More Dhillon MD    Chief Complaint:   Duration:    Timing:    Location:   Quality:   Severity:   Modifying Factors:   Associated Symptoms:       Current Outpatient Medications   Medication Sig Dispense Refill    vitamin a-vitamin c-vit e-min (OCUVITE) tablet Take 1 Tab by mouth daily. Gummy      multivit,calc,mins/iron/folic (ONE-A-DAY WOMENS FORMULA PO) Take  by mouth. Gummy      fluticasone propionate (FLONASE) 50 mcg/actuation nasal spray 1 [place] by Both Nostrils route daily.  ondansetron (ZOFRAN ODT) 4 mg disintegrating tablet Take 4 mg by mouth every eight (8) hours as needed for Nausea.  bumetanide (BUMEX) 0.5 mg tablet Take 1 Tab by mouth daily. (Patient taking differently: Take 1 mg by mouth daily. ) 15 Tab 0    mirtazapine (REMERON) 15 mg tablet Take 1 Tab by mouth nightly. Indications: major depressive disorder 30 Tab 0    digoxin (LANOXIN) 0.125 mg tablet Take 1 Tab by mouth every Monday, Wednesday, Friday, Saturday AND 2 Tabs every Sunday, Tuesday, Thursday. Indications: Ventricular Rate Control in Atrial Fibrillation 45 Tab 0    FLUoxetine (PROZAC) 10 mg tablet Take 1 Tab by mouth daily.  (Patient taking differently: Take 20 mg by mouth daily.) 90 Tab 3    hydrocortisone (CORTAID) 1 % topical cream Apply  to affected area two (2) times a day. use thin layer (Patient taking differently: Apply  to affected area two (2) times a day. use thin layer back and chest) 30 g 0    carboxymethylcellulose sodium (CELLUVISC) 0.5 % drop ophthalmic solution Administer 1 Drop to both eyes three (3) times daily as needed for Other (dry eyes). 1 Bottle 0    acetaminophen (TYLENOL) 325 mg tablet Take 650 mg by mouth every six (6) hours as needed for Pain.  polyethylene glycol (MIRALAX) 17 gram/dose powder Take 17 g by mouth daily as needed (constipation).  carboxymethylcellulos/glycerin (CLEAR EYES FOR DRY EYES OP) Administer 1 Drop to both eyes three (3) times daily as needed (dry eyes).  loperamide (IMMODIUM) 2 mg tablet Take 1 mg by mouth three (3) times daily as needed for Diarrhea.  psyllium husk-aspartame (METAMUCIL FIBER) 3.4 gram pwpk packet Take 1 Packet by mouth daily. 30 Packet 0    aspirin 81 mg chewable tablet Take 1 Tab by mouth daily. 30 Tab 0    nitroglycerin (NITROSTAT) 0.4 mg SL tablet 1 Tab by SubLINGual route every five (5) minutes as needed for Chest Pain.  (Patient taking differently: 1 Tab by SubLINGual route every five (5) minutes as needed for Chest Pain. take 1tab sl  5min apart for a total of 3tabs then call 911) 25 Tab 3       Past History     Past Medical History:  Past Medical History:   Diagnosis Date    A-fib ([facility] 01/2017    Arthritis, degenerative     CAD (coronary artery disease) 04/2012    S/P 2.75 X 15 mm BMS of OM (04/2012), Two LAD BMS (07/2012)    Elevated liver enzymes     Likely from statin    HLD (hyperlipidemia)     Hyperkalemia 06/2012    Likely from lisinopril    Ischemic cardiomyopathy     LVEF  30-35%(05/19) 45% (11/2012) & 30% echo (04/2012)    NSTEMI (non-ST elevated myocardial infarction) ([facility] 04/2012    UTI (urinary tract infection)     Vertigo        Past Surgical History:  Past Surgical History:   Procedure Laterality Date    HX CORONARY STENT PLACEMENT  4/23/12    bare metal stent to obtuse marginal branch    HX HEART CATHETERIZATION         Family History:  Family History   Problem Relation Age of Onset    Diabetes Mother        Social History:  Social History     Tobacco Use    Smoking status: Never Smoker    Smokeless tobacco: Never Used   Substance Use Topics    Alcohol use: No    Drug use: No       Allergies: Allergies   Allergen Reactions    Flu Vaccine 2011 (36 Mos+)(Pf) Anaphylaxis    Codeine Anaphylaxis    Egg Not Reported This Time    Influenza Virus Vaccines Hives    Lipitor [Atorvastatin] Nausea Only    Pcn [Penicillins] Hives         Review of Systems     Review of Systems   Constitutional: Negative for diaphoresis and fever. HENT: Negative for congestion and sore throat. Eyes: Negative for pain and itching. Respiratory: Negative for cough and shortness of breath. Cardiovascular: Negative for chest pain and palpitations. Gastrointestinal: Negative for abdominal pain and diarrhea. Endocrine: Negative for polydipsia and polyuria. Genitourinary: Negative for dysuria and hematuria. Musculoskeletal: Positive for joint swelling. Negative for arthralgias and myalgias. Skin: Positive for rash and wound. Neurological: Negative for seizures and syncope. Hematological: Does not bruise/bleed easily. Psychiatric/Behavioral: Negative for agitation and hallucinations. Physical Exam       Patient Vitals for the past 12 hrs:   Temp Pulse Resp BP SpO2   11/03/19 1315    128/64    11/03/19 1313     100 %   11/03/19 1055 97.8 °F (36.6 °C) (!) 108 17 123/68 100 %       Physical Exam   Constitutional: She appears well-developed and well-nourished. Appears stated age   HENT:   Head: Normocephalic and atraumatic. Eyes: Conjunctivae are normal. No scleral icterus. Neck: Normal range of motion. Neck supple. No JVD present. Cardiovascular: Normal rate, regular rhythm and normal heart sounds. 4 intact extremity pulses   Pulmonary/Chest: Effort normal and breath sounds normal.   Abdominal: Soft. She exhibits no mass. There is no tenderness. Musculoskeletal: Normal range of motion. There is pitting edema on the lower extremities up to the level just above the knees bilaterally. The middle third of the bilateral pretibial areas is pink possibly some cellulitis. Each leg has an anterior area of swelling that is ecchymotic and about 4 inches in diameter rather large trace sanguinous drainage and fluctuant and tender. Lymphadenopathy:     She has no cervical adenopathy. Neurological: She is alert. Skin: Skin is warm and dry. Nursing note and vitals reviewed.         Diagnostic Study Results   Labs -  Recent Results (from the past 12 hour(s))   EKG, 12 LEAD, INITIAL    Collection Time: 11/03/19 11:30 AM   Result Value Ref Range    Ventricular Rate 99 BPM    QRS Duration 104 ms    Q-T Interval 290 ms    QTC Calculation (Bezet) 372 ms    Calculated R Axis 114 degrees    Calculated T Axis -27 degrees    Diagnosis       Atrial fibrillation with premature ventricular or aberrantly conducted   complexes  Left posterior fascicular block  T wave abnormality, consider inferior ischemia  Abnormal ECG  When compared with ECG of 28-OCT-2019 18:35,  Minimal criteria for Inferior infarct are no longer present  Confirmed by Michael Bailey MD, --- ([id number]) on 11/3/2019 12:31:02 PM     CBC WITH AUTOMATED DIFF    Collection Time: 11/03/19 11:56 AM   Result Value Ref Range    WBC 9.6 4.6 - 13.2 K/uL    RBC 3.81 (L) 4.20 - 5.30 M/uL    HGB 10.0 (L) 12.0 - 16.0 g/dL    HCT 32.1 (L) 35.0 - 45.0 %    MCV 84.3 74.0 - 97.0 FL    MCH 26.2 24.0 - 34.0 PG    MCHC 31.2 31.0 - 37.0 g/dL    RDW 17.4 (H) 11.6 - 14.5 %    PLATELET 435 384 - 418 K/uL    MPV 8.6 (L) 9.2 - 11.8 FL    NEUTROPHILS 75 (H) 40 - 73 %    LYMPHOCYTES 13 (L) 21 - 52 %    MONOCYTES 11 (H) 3 - 10 %    EOSINOPHILS 1 0 - 5 %    BASOPHILS 0 0 - 2 %    ABS. NEUTROPHILS 7.2 1.8 - 8.0 K/UL    ABS. LYMPHOCYTES 1.2 0.9 - 3.6 K/UL    ABS. MONOCYTES 1.1 0.05 - 1.2 K/UL    ABS. EOSINOPHILS 0.1 0.0 - 0.4 K/UL    ABS. BASOPHILS 0.0 0.0 - 0.1 K/UL    DF AUTOMATED     METABOLIC PANEL, COMPREHENSIVE    Collection Time: 11/03/19 11:56 AM   Result Value Ref Range    Sodium 135 (L) 136 - 145 mmol/L    Potassium 4.4 3.5 - 5.5 mmol/L    Chloride 97 (L) 100 - 111 mmol/L    CO2 31 21 - 32 mmol/L    Anion gap 7 3.0 - 18 mmol/L    Glucose 124 (H) 74 - 99 mg/dL    BUN 27 (H) 7.0 - 18 MG/DL    Creatinine 1.33 (H) 0.6 - 1.3 MG/DL    BUN/Creatinine ratio 20 12 - 20      GFR est AA 45 (L) >60 ml/min/1.73m2    GFR est non-AA 37 (L) >60 ml/min/1.73m2    Calcium 8.2 (L) 8.5 - 10.1 MG/DL    Bilirubin, total 0.5 0.2 - 1.0 MG/DL    ALT (SGPT) 22 13 - 56 U/L    AST (SGOT) 22 10 - 38 U/L    Alk. phosphatase 157 (H) 45 - 117 U/L    Protein, total 6.4 6.4 - 8.2 g/dL    Albumin 2.8 (L) 3.4 - 5.0 g/dL    Globulin 3.6 2.0 - 4.0 g/dL    A-G Ratio 0.8 0.8 - 1.7     PTT    Collection Time: 11/03/19 11:56 AM   Result Value Ref Range    aPTT 39.9 (H) 23.0 - 36.4 SEC   PROTHROMBIN TIME + INR    Collection Time: 11/03/19 11:56 AM   Result Value Ref Range    Prothrombin time 14.2 11.5 - 15.2 sec    INR 1.1 0.8 - 1.2     CARDIAC PANEL,(CK, CKMB & TROPONIN)    Collection Time: 11/03/19 11:56 AM   Result Value Ref Range    CK 69 26 - 192 U/L    CK - MB 3.6 (H) <3.6 ng/ml    CK-MB Index 5.2 (H) 0.0 - 4.0 %    Troponin-I, QT 0.05 (H) 0.0 - 0.045 NG/ML       Radiologic Studies -   CTA LOW EXT BI W CONT   Final Result   IMPRESSION:      1. Bilateral anterior lower leg subcutaneous hematomas without evidence of   active extravasation. 2.  No acute osseous abnormality. 3.  Diffuse subcutaneous edema and soft tissue swelling seen extensively   throughout the bilateral lower legs from the level of the distal thighs to the   feet.    4.  Multifocal peripheral artery disease, worse involving the bilateral   posterior tibial arteries. Initial preliminary report was provided to the ED by the on-call radiology   resident. Cta Low Ext Bi W Cont    Result Date: 11/3/2019  EXAM: CTA LOW EXT BI W CONT CLINICAL INDICATION/HISTORY: Abnormal xray, ankle; Bilateral leg hematomas. Evaluate for extravasation  >Additional: None COMPARISON: Bilateral tibia/fibula radiographs. TECHNIQUE: CT of the bilateral lower legs from the level of the distal femurs to the feet was performed with and without contrast including arterial and delayed phases. Coronal and sagittal MIP reformats were generated and reviewed. One or more dose reduction techniques were used on this CT: automated exposure control, adjustment of the mAs and/or kVp according to patient size, and iterative reconstruction techniques. The specific techniques used on this CT exam have been documented in the patient's electronic medical record. Digital Imaging and Communications in Medicine (DICOM) format image data are available to nonaffiliated external healthcare facilities or entities on a secure, media free, reciprocally searchable basis with patient authorization for at least a 12-month period after this study. _______________ FINDINGS: Osseous: alignment is anatomic. Generalized bony demineralization. No fractures. No joint effusions. Soft tissues: Diffuse soft tissue swelling about the lower extremities is noted bilaterally. In addition, there are multilobulated heterogeneous collections along the anterior aspects of both lower legs at roughly the same level, spanning the middle third of the tibia which with areas of increased and decreased attenuation on the noncontrast study, compatible with hematomas. The postcontrast imaging demonstrates no evidence of active extravasation.  Dominant portion of the hematoma on the right measures approximately 4.6 x 1.5 x 9 cm and on the left is slightly larger estimated approximately 4.2 x 1.6 x 8.7 cm. Diffuse subcutaneous edema and soft tissue swelling circumferentially about the lower legs is also noted and is slightly more pronounced at the level of the ankles extending to the dorsum of the foot, particularly on the right. The lower extremity musculature appears intact. No tendon subluxation. Fatty infiltration of the medial gastrocnemius bilaterally. There is peripheral artery disease with scattered calcifications, particularly in the bilateral posterior tibial arteries. However, there is adequate perfusion with three-vessel runoff to the level of the ankles. Other: Unremarkable. _______________     IMPRESSION: 1. Bilateral anterior lower leg subcutaneous hematomas without evidence of active extravasation. 2.  No acute osseous abnormality. 3.  Diffuse subcutaneous edema and soft tissue swelling seen extensively throughout the bilateral lower legs from the level of the distal thighs to the feet. 4.  Multifocal peripheral artery disease, worse involving the bilateral posterior tibial arteries. Initial preliminary report was provided to the ED by the on-call radiology resident. Medications ordered:   Medications   sodium chloride 0.9 % bolus infusion 500 mL (0 mL IntraVENous IV Completed 11/3/19 1401)   ibuprofen (MOTRIN) tablet 400 mg (400 mg Oral Given 11/3/19 1305)   iopamidol (ISOVUE-370) 76 % injection 100 mL (100 mL IntraVENous Given 11/3/19 1452)   sodium chloride 0.9 % bolus infusion 100 mL (100 mL IntraVENous New Bag 11/3/19 1453)         Medical Decision Making   Initial Medical Decision Making and DDx:  I think she has a combination of lower extremity edema, do not suspect DVT, she has some cellulitis which is partially treated to this point with doxycycline, and she seems to developed hematomas over both tibias. I do not think the fluid collections or abscess.     ED Course: Progress Notes, Reevaluation, and Consults:  ED Course as of Nov 03 1632   [facility] Nov 03, 2019 1129 Discussed with Dr. Bradford Holt vascular surgery, bilateral hematomas. Question if there is active extravization of them will get CTA. [CB]   4537 I sent pictures to Dr. Bradford Holt via secure [employer] text. [CB]   1208 Dr. Bradford Holt at bedside to examine    [CB]   1549 CT read by Dr. Estell Goldmann, bilateral shin hematomas no evidence of active extravasation, peripheral artery disease is present with adequate perfusion and reconstitution in the feet    [CB]      ED Course User Index  [CB] Parish Elias MD     4:33 PM  No signs of active extravasation Dr. Bradford Holt has bandaged her legs and will see her in 2 days time in the clinic, they have primary care appointment for tomorrow. They request something stronger for pain control but I think this runs risk of anaphylaxis considering her reaction to codeine as well as increasing her risk of falls or other complications. They are happy with plan for discharge. I am the first provider for this patient. I reviewed the vital signs, available nursing notes, past medical history, past surgical history, family history and social history. Patient Vitals for the past 12 hrs:   Temp Pulse Resp BP SpO2   11/03/19 1315    128/64    11/03/19 1313     100 %   11/03/19 1055 97.8 °F (36.6 °C) (!) 108 17 123/68 100 %       Vital Signs-Reviewed the patient's vital signs. Pulse Oximetry Analysis, Cardiac Monitor, 12 lead ekg:  Twelve-lead EKG at 1130, atrial fibrillation at a rate of 99 this. Telemetry atrial fibrillation rate of 108. Oximetry 100% room air  Interpreted by the EP. Records Reviewed: Nursing notes reviewed (Time of Review: 11:14 AM)    Procedures:   Critical Care Time:   Aspirin: (was aspirin given for stroke?)    Diagnosis     Clinical Impression:   1. Atrial fibrillation, unspecified type ([address]    2. Pedal edema    3. Leg hematoma, left, initial encounter    4.  Leg hematoma, right, initial encounter        Disposition: Discharged      Follow-up Information     Follow up With Specialties Details Why Contact Info    Austen Amaro MD Geriatric Medicine   [address]      Lor Mcneal MD Vascular Surgery   [place] [address]  [address]  [address] [place] [address]  [phone]             Patient's Medications   Start Taking    No medications on file   Continue Taking    ACETAMINOPHEN (TYLENOL) 325 MG TABLET    Take 650 mg by mouth every six (6) hours as needed for Pain. ASPIRIN 81 MG CHEWABLE TABLET    Take 1 Tab by mouth daily. BUMETANIDE (BUMEX) 0.5 MG TABLET    Take 1 Tab by mouth daily. CARBOXYMETHYLCELLULOS/GLYCERIN (CLEAR EYES FOR DRY EYES OP)    Administer 1 Drop to both eyes three (3) times daily as needed (dry eyes). CARBOXYMETHYLCELLULOSE SODIUM (CELLUVISC) 0.5 % DROP OPHTHALMIC SOLUTION    Administer 1 Drop to both eyes three (3) times daily as needed for Other (dry eyes). DIGOXIN (LANOXIN) 0.125 MG TABLET    Take 1 Tab by mouth every Monday, Wednesday, Friday, Saturday AND 2 Tabs every Sunday, Tuesday, Thursday. Indications: Ventricular Rate Control in Atrial Fibrillation    FLUOXETINE (PROZAC) 10 MG TABLET    Take 1 Tab by mouth daily. FLUTICASONE PROPIONATE (FLONASE) 50 MCG/ACTUATION NASAL SPRAY    1 [place] by Both Nostrils route daily. HYDROCORTISONE (CORTAID) 1 % TOPICAL CREAM    Apply  to affected area two (2) times a day. use thin layer    LOPERAMIDE (IMMODIUM) 2 MG TABLET    Take 1 mg by mouth three (3) times daily as needed for Diarrhea. MIRTAZAPINE (REMERON) 15 MG TABLET    Take 1 Tab by mouth nightly. Indications: major depressive disorder    MULTIVIT,CALC,MINS/IRON/FOLIC (ONE-A-DAY WOMENS FORMULA PO)    Take  by mouth. Gummy    NITROGLYCERIN (NITROSTAT) 0.4 MG SL TABLET    1 Tab by SubLINGual route every five (5) minutes as needed for Chest Pain.     ONDANSETRON (ZOFRAN ODT) 4 MG DISINTEGRATING TABLET    Take 4 mg by mouth every eight (8) hours as needed for Nausea. POLYETHYLENE GLYCOL (MIRALAX) 17 GRAM/DOSE POWDER    Take 17 g by mouth daily as needed (constipation). PSYLLIUM HUSK-ASPARTAME (METAMUCIL FIBER) 3.4 GRAM PWPK PACKET    Take 1 Packet by mouth daily. VITAMIN A-VITAMIN C-VIT E-MIN (OCUVITE) TABLET    Take 1 Tab by mouth daily.  Gummy   These Medications have changed    No medications on file   Stop Taking    No medications on file     _______________________________    Notes:    Griselda Agosto MD using Dragon dictation      _______________________________

## 2019-11-03 NOTE — ED TRIAGE NOTES
Pt c/o worsening bilateral leg wounds. Pt with increased swelling and blistering to lower legs. Pt was seen last week, finished a course of oral doxy, received xrays and doppler studies last week. Pt reports increased pain.

## 2019-11-05 ENCOUNTER — HOME HEALTH ADMISSION (OUTPATIENT)
Dept: HOME HEALTH SERVICES | Facility: HOME HEALTH | Age: 84
End: 2019-11-05
Payer: MEDICARE

## 2019-11-05 ENCOUNTER — TELEPHONE (OUTPATIENT)
Dept: CARDIOLOGY CLINIC | Age: 84
End: 2019-11-05

## 2019-11-05 ENCOUNTER — OFFICE VISIT (OUTPATIENT)
Dept: CARDIOLOGY CLINIC | Age: 84
End: 2019-11-05

## 2019-11-05 VITALS
SYSTOLIC BLOOD PRESSURE: 102 MMHG | WEIGHT: 116 LBS | HEIGHT: 64 IN | DIASTOLIC BLOOD PRESSURE: 76 MMHG | BODY MASS INDEX: 19.81 KG/M2 | OXYGEN SATURATION: 100 % | RESPIRATION RATE: 20 BRPM | HEART RATE: 78 BPM

## 2019-11-05 DIAGNOSIS — S80.12XD HEMATOMA OF LEFT LOWER EXTREMITY, SUBSEQUENT ENCOUNTER: ICD-10-CM

## 2019-11-05 DIAGNOSIS — S80.11XD HEMATOMA OF LEG, RIGHT, SUBSEQUENT ENCOUNTER: ICD-10-CM

## 2019-11-05 DIAGNOSIS — R60.0 BILATERAL LEG EDEMA: Primary | ICD-10-CM

## 2019-11-05 NOTE — CONSULTS
Surgery Consult      Patient: Theresa Melgar MRN: [id number]  CSN: [id number]      YOB: 1927    Age: 80 y.o. Sex: female      DOA: 11/3/2019       HPI:     Theresa Melgar is a 80 y.o. female who who presents with bilateral lower extremity swelling and large anterior shin hematomas. She denies any history of trauma is no not on any blood thinners. She began to develop these hematomas lump approximately 10 to 14 days prior pain first in her right shin and then to the left. She denies any syncopal episodes and states that the skin around the area is tender to touch. She also has been having some shortness of breath regularly since worsening of her congestive heart failure.       Past Medical History:   Diagnosis Date    A-fib [facility]) 01/2017    Arthritis, degenerative     CAD (coronary artery disease) 04/2012    S/P 2.75 X 15 mm BMS of OM (04/2012), Two LAD BMS (07/2012)    Elevated liver enzymes     Likely from statin    HLD (hyperlipidemia)     Hyperkalemia 06/2012    Likely from lisinopril    Ischemic cardiomyopathy     LVEF  30-35%(05/19) 45% (11/2012) & 30% echo (04/2012)    NSTEMI (non-ST elevated myocardial infarction) ([facility] [address] 04/2012    UTI (urinary tract infection)     Vertigo        Past Surgical History:   Procedure Laterality Date    HX CORONARY STENT PLACEMENT  4/23/12    bare metal stent to obtuse marginal branch    HX HEART CATHETERIZATION         Family History   Problem Relation Age of Onset    Diabetes Mother        Social History     Socioeconomic History    Marital status:      Spouse name: Not on file    Number of children: Not on file    Years of education: Not on file    Highest education level: Not on file   Tobacco Use    Smoking status: Never Smoker    Smokeless tobacco: Never Used   Substance and Sexual Activity    Alcohol use: No    Drug use: No    Sexual activity: Never       Prior to Admission medications    Medication Sig Start Date End Date Taking? Authorizing Provider   vitamin a-vitamin c-vit e-min (OCUVITE) tablet Take 1 Tab by mouth daily. Gummy    Provider, Historical   multivit,calc,mins/iron/folic (ONE-A-DAY WOMENS FORMULA PO) Take  by mouth. Gummy    Provider, Historical   fluticasone propionate (FLONASE) 50 mcg/actuation nasal spray 1 [place] by Both Nostrils route daily. Provider, Historical   ondansetron (ZOFRAN ODT) 4 mg disintegrating tablet Take 4 mg by mouth every eight (8) hours as needed for Nausea. Provider, Historical   bumetanide (BUMEX) 0.5 mg tablet Take 1 Tab by mouth daily. Patient taking differently: Take 1 mg by mouth daily. 10/9/19   Eliud Garzon MD   mirtazapine (REMERON) 15 mg tablet Take 1 Tab by mouth nightly. Indications: major depressive disorder 10/9/19   Eliud Garzon MD   digoxin Izaiah Ceron) 0.125 mg tablet Take 1 Tab by mouth every Monday, Wednesday, Friday, Saturday AND 2 Tabs every Sunday, Tuesday, Thursday. Indications: Ventricular Rate Control in Atrial Fibrillation 10/9/19   Eliud Garzon MD   FLUoxetine (PROZAC) 10 mg tablet Take 1 Tab by mouth daily. Patient taking differently: Take 20 mg by mouth daily. 10/9/19   Eliud Garzon MD   hydrocortisone (CORTAID) 1 % topical cream Apply  to affected area two (2) times a day. use thin layer  Patient taking differently: Apply  to affected area two (2) times a day. use thin layer back and chest 10/9/19   Aurea MUJICA MD   carboxymethylcellulose sodium (CELLUVISC) 0.5 % drop ophthalmic solution Administer 1 Drop to both eyes three (3) times daily as needed for Other (dry eyes). 10/9/19   Eliud Garzon MD   acetaminophen (TYLENOL) 325 mg tablet Take 650 mg by mouth every six (6) hours as needed for Pain. Provider, Historical   polyethylene glycol (MIRALAX) 17 gram/dose powder Take 17 g by mouth daily as needed (constipation).     Provider, Historical   carboxymethylcellulos/glycerin (CLEAR EYES FOR DRY EYES OP) Administer 1 Drop to both eyes three (3) times daily as needed (dry eyes). Provider, Historical   loperamide (IMMODIUM) 2 mg tablet Take 1 mg by mouth three (3) times daily as needed for Diarrhea. Provider, Historical   psyllium husk-aspartame (METAMUCIL FIBER) 3.4 gram pwpk packet Take 1 Packet by mouth daily. 11/9/18   Ivan ORTIZ NP   aspirin 81 mg chewable tablet Take 1 Tab by mouth daily. 11/9/18   Ivan ORTIZ NP   nitroglycerin (NITROSTAT) 0.4 mg SL tablet 1 Tab by SubLINGual route every five (5) minutes as needed for Chest Pain. Patient taking differently: 1 Tab by SubLINGual route every five (5) minutes as needed for Chest Pain. take 1tab sl  5min apart for a total of 3tabs then call 911 10/31/17   Leopoldo Whitney MD       Allergies   Allergen Reactions    Flu Vaccine 2011 (36 Mos+)(Pf) Anaphylaxis    Codeine Anaphylaxis    Egg Not Reported This Time    Influenza Virus Vaccines Hives    Lipitor [Atorvastatin] Nausea Only    Pcn [Penicillins] Hives       Physical Exam:      Visit Vitals  /60   Pulse (!) 108   Temp 97.8 °F (36.6 °C)   Resp 17   SpO2 100%       GENERAL: alert, cooperative, no distress, appears stated age, EYE: negative findings: anicteric sclera, LYMPHATIC: Cervical, supraclavicular, and axillary nodes normal. , THROAT & NECK: normal, LUNG: Coarse, HEART: regular rate and rhythm, ABDOMEN: soft, non-tender. Bowel sounds normal. No masses,  no organomegaly, EXTREMITIES:  edema 2+ extending from the toes up to the thighs bilaterally. Large hematomas anterior shin with skin disruption on the left. No sign of infection no erythema. ROS:  Constitutional: negative  Eyes: negative  Ears, nose, mouth, throat, and face: negative  Respiratory: positive for dyspnea on exertion  Cardiovascular: positive for dyspnea, orthopnea, lower extremity edema  Gastrointestinal: negative  Musculoskeletal:negative  Unless otherwise mentioned in the HPI.     Data Review:    CBC:   Lab Results   Component Value Date/Time WBC 9.6 11/03/2019 11:56 AM    RBC 3.81 (L) 11/03/2019 11:56 AM    HGB 10.0 (L) 11/03/2019 11:56 AM    HCT 32.1 (L) 11/03/2019 11:56 AM    PLATELET 680 73/80/4108 11:56 AM      BMP:   Lab Results   Component Value Date/Time    Glucose 124 (H) 11/03/2019 11:56 AM    Sodium 135 (L) 11/03/2019 11:56 AM    Potassium 4.4 11/03/2019 11:56 AM    Chloride 97 (L) 11/03/2019 11:56 AM    CO2 31 11/03/2019 11:56 AM    BUN 27 (H) 11/03/2019 11:56 AM    Creatinine 1.33 (H) 11/03/2019 11:56 AM    Calcium 8.2 (L) 11/03/2019 11:56 AM     Coagulation:   Lab Results   Component Value Date/Time    Prothrombin time 14.2 11/03/2019 11:56 AM    INR 1.1 11/03/2019 11:56 AM    aPTT 39.9 (H) 11/03/2019 11:56 AM         Assessment/Plan     Ms. Lorrie Anderson is a 57-year-old female with apparently spontaneous bilateral shin hematomas. I reviewed the CTA results of did not show any evidence of extravasation. Believe her ecchymosis is most likely due to shear stress from her significant lower extremity edema secondary to her CHF. I have placed her on gentle compression we will see in office on Tuesday to check her hematoma to see the started to drain as well as put her into some Tubigrip for compression. She states she understands this plan is as this her son and I will see her in the office. Active Problems:    * No active hospital problems.  Natalie Cano MD  November 5, 2019

## 2019-11-05 NOTE — TELEPHONE ENCOUNTER
Spoke with Adriana Carias at assisted living facility where patient resides; orders given per Dr. Jasbir Cox to change dressings to BLE daily and prn saturation.

## 2019-11-05 NOTE — PROGRESS NOTES
Patient presents in office today as hospital follow-up ; this is first office visit.       3 most recent PHQ Screens 11/5/2019   Little interest or pleasure in doing things Not at all   Feeling down, depressed, irritable, or hopeless Not at all   Total Score PHQ 2 0

## 2019-11-05 NOTE — PROGRESS NOTES
[facility]    Chief Complaint   Patient presents with   [facility] Follow Up    Leg Swelling       History and Physical    Ms. Jena Garay is a 80-year-old female who I saw in the ER this weekend for bilateral shin hematomas. Patient is not on any blood thinners and denies any history of trauma although her family believe that she may have bumped her legs. She states that she had pain in the nose shins on hematoma area however this is improved since wearing compression. She has no other complaints.     Past Medical History:   Diagnosis Date    A-fib [facility]) 01/2017    Arthritis, degenerative     CAD (coronary artery disease) 04/2012    S/P 2.75 X 15 mm BMS of OM (04/2012), Two LAD BMS (07/2012)    Elevated liver enzymes     Likely from statin    HLD (hyperlipidemia)     Hyperkalemia 06/2012    Likely from lisinopril    Ischemic cardiomyopathy     LVEF  30-35%(05/19) 45% (11/2012) & 30% echo (04/2012)    NSTEMI (non-ST elevated myocardial infarction) ([facility] [address] 04/2012    UTI (urinary tract infection)     Vertigo      Patient Active Problem List   Diagnosis Code    SOB (shortness of breath) R06.02    NSTEMI (non-ST elevated myocardial infarction) ([facility] [address] I21.4    CAD (coronary artery disease) I25.10    Ischemic cardiomyopathy I25.5    Arthritis, degenerative M19.90    Vertigo R42    HLD (hyperlipidemia) E78.5    Chronic a-fib I48.20    Irritable bowel syndrome with diarrhea K58.0    Chronic fatigue R53.82    Hyponatremia E87.1    Acute metabolic encephalopathy W56.18    Hyperkalemia E87.5    Acute on chronic systolic (congestive) heart failure (HCC) I50.23    Syncope R55    Hypoxia R09.02    Leukocytosis D72.829    Atrial fibrillation with rapid ventricular response (HCC) I48.91    Atrial fibrillation (HCC) I48.91    Hypotension I95.9    Skin rash H91    Metabolic acidosis C10.6    Underweight R63.6     Past Surgical History:   Procedure Laterality Date    HX CORONARY STENT PLACEMENT  4/23/12 bare metal stent to obtuse marginal branch    HX HEART CATHETERIZATION       Current Outpatient Medications   Medication Sig Dispense Refill    vitamin a-vitamin c-vit e-min (OCUVITE) tablet Take 1 Tab by mouth daily. Gummy      multivit,calc,mins/iron/folic (ONE-A-DAY WOMENS FORMULA PO) Take  by mouth. Gummy      fluticasone propionate (FLONASE) 50 mcg/actuation nasal spray 1 [place] by Both Nostrils route daily.  ondansetron (ZOFRAN ODT) 4 mg disintegrating tablet Take 4 mg by mouth every eight (8) hours as needed for Nausea.  bumetanide (BUMEX) 0.5 mg tablet Take 1 Tab by mouth daily. (Patient taking differently: Take 1 mg by mouth daily. ) 15 Tab 0    mirtazapine (REMERON) 15 mg tablet Take 1 Tab by mouth nightly. Indications: major depressive disorder 30 Tab 0    digoxin (LANOXIN) 0.125 mg tablet Take 1 Tab by mouth every Monday, Wednesday, Friday, Saturday AND 2 Tabs every Sunday, Tuesday, Thursday. Indications: Ventricular Rate Control in Atrial Fibrillation 45 Tab 0    FLUoxetine (PROZAC) 10 mg tablet Take 1 Tab by mouth daily. (Patient taking differently: Take 20 mg by mouth daily.) 90 Tab 3    hydrocortisone (CORTAID) 1 % topical cream Apply  to affected area two (2) times a day. use thin layer (Patient taking differently: Apply  to affected area two (2) times a day. use thin layer back and chest) 30 g 0    carboxymethylcellulose sodium (CELLUVISC) 0.5 % drop ophthalmic solution Administer 1 Drop to both eyes three (3) times daily as needed for Other (dry eyes). 1 Bottle 0    acetaminophen (TYLENOL) 325 mg tablet Take 650 mg by mouth every six (6) hours as needed for Pain.  polyethylene glycol (MIRALAX) 17 gram/dose powder Take 17 g by mouth daily as needed (constipation).  carboxymethylcellulos/glycerin (CLEAR EYES FOR DRY EYES OP) Administer 1 Drop to both eyes three (3) times daily as needed (dry eyes).       loperamide (IMMODIUM) 2 mg tablet Take 1 mg by mouth three (3) times daily as needed for Diarrhea.  psyllium husk-aspartame (METAMUCIL FIBER) 3.4 gram pwpk packet Take 1 Packet by mouth daily. 30 Packet 0    aspirin 81 mg chewable tablet Take 1 Tab by mouth daily. 30 Tab 0    nitroglycerin (NITROSTAT) 0.4 mg SL tablet 1 Tab by SubLINGual route every five (5) minutes as needed for Chest Pain.  (Patient taking differently: 1 Tab by SubLINGual route every five (5) minutes as needed for Chest Pain. take 1tab sl  5min apart for a total of 3tabs then call 911) 25 Tab 3     Allergies   Allergen Reactions    Flu Vaccine 2011 (36 Mos+)(Pf) Anaphylaxis    Codeine Anaphylaxis    Egg Not Reported This Time    Influenza Virus Vaccines Hives    Lipitor [Atorvastatin] Nausea Only    Pcn [Penicillins] Hives     Social History     Socioeconomic History    Marital status:      Spouse name: Not on file    Number of children: Not on file    Years of education: Not on file    Highest education level: Not on file   Occupational History    Not on file   Social Needs    Financial resource strain: Not on file    Food insecurity:     Worry: Not on file     Inability: Not on file    Transportation needs:     Medical: Not on file     Non-medical: Not on file   Tobacco Use    Smoking status: Never Smoker    Smokeless tobacco: Never Used   Substance and Sexual Activity    Alcohol use: No    Drug use: No    Sexual activity: Never   Lifestyle    Physical activity:     Days per week: Not on file     Minutes per session: Not on file    Stress: Not on file   Relationships    Social connections:     Talks on phone: Not on file     Gets together: Not on file     Attends [employer] service: Not on file     Active member of club or organization: Not on file     Attends meetings of clubs or organizations: Not on file     Relationship status: Not on file    Intimate partner violence:     Fear of current or ex partner: Not on file     Emotionally abused: Not on file     Physically abused: Not on file     Forced sexual activity: Not on file   Other Topics Concern    Not on file   Social History Narrative    Not on file      Family History   Problem Relation Age of Onset    Diabetes Mother        Review of Systems    Review of Systems   Constitutional: Positive for malaise/fatigue. Negative for chills, diaphoresis, fever and weight loss. HENT: Negative for hearing loss and sore throat. Eyes: Negative for blurred vision, photophobia and redness. Respiratory: Positive for shortness of breath. Negative for cough, hemoptysis and wheezing. Cardiovascular: Positive for orthopnea and leg swelling. Negative for chest pain and palpitations. Gastrointestinal: Negative for abdominal pain, blood in stool, constipation, diarrhea, heartburn, nausea and vomiting. Genitourinary: Negative for dysuria, frequency, hematuria and urgency. Musculoskeletal: Negative for back pain and myalgias. Skin: Negative for itching and rash. Neurological: Negative for dizziness, speech change, focal weakness, weakness and headaches. Endo/Heme/Allergies: Does not bruise/bleed easily. Psychiatric/Behavioral: Negative for depression and suicidal ideas. Physical Exam:    Visit Vitals  /76 (BP 1 Location: Left arm, BP Patient Position: Sitting)   Pulse 78   Resp 20   Ht 5' 4\" (1.626 m)   Wt 116 lb (52.6 kg)   SpO2 100%   BMI 19.91 kg/m²      Physical Examination: General appearance - alert, well appearing, and in no distress  Mental status - alert, oriented to person, place, and time  Eyes - sclera anicteric, left eye normal, right eye normal  Ears - right ear normal, left ear normal  Nose - normal and patent, no erythema, discharge or polyps  Mouth - mucous membranes moist, pharynx normal without lesions  Extremities -warm well perfused with 2+ pitting edema bilateral lower extremities up to the thighs. Large area of hematoma and fluid within skin left anterior shin.   Small skin defect open slightly with copious amount of serous fluid drainage no blood. No signs of infection. Impression and Plan:    ICD-10-CM ICD-9-CM    1. Bilateral leg edema R60.0 782.3    2. Hematoma of left lower extremity, subsequent encounter S80.12XD V58.89      924.5    3. Hematoma of leg, right, subsequent encounter S80.11XD V58.89      924.5      No orders of the defined types were placed in this encounter. Follow-up and Dispositions    · Return in about 1 week (around 11/12/2019) for Wound check, Symptom check. I told Ms. Quiles and her family that I believe that her hematoma was most likely secondary to trauma. We would not take much stronger given her age and the thinness of her skin with the edema a dead space between her skin second anesthesia conform with significant fluid in her tissues in the before with blood from a hematoma. The fluid to drain out of her collection on her anterior shin was mostly serous fluid with some blood-tinged although I believe there is some old clot in the area. I do believe though the majority of the blebs on her skin are actually serous fluid filled as opposed to blood. I think the biggest issue for her is going to be getting her CHF under control and improving her volume status. I placed her in some gentle Tubigrip today because I do not want to overwhelm her intravascular volume. I will check on her leg again in 1 week's time. The treatment plan was reviewed with the patient in detail. The patient voiced understanding of this plan and all questions and concerns were addressed. The patient agrees with this plan. We discussed the signs and symptoms that would require earlier attention or intervention. The patient was given educational material related to his/her visit and the patient has voiced understanding of the material.     I appreciate the opportunity to participate in the care of your patient.   I will be sure to keep you informed of any subsequent changes in the treatment plan. If you have any questions or concerns, please feel free to contact me. Pascale Smiht MD    PLEASE NOTE:  This document has been produced using voice recognition software. Unrecognized errors in transcription may be present.

## 2019-11-06 ENCOUNTER — HOSPITAL ENCOUNTER (EMERGENCY)
Age: 84
Discharge: HOME | End: 2019-11-06
Attending: EMERGENCY MEDICINE | Admitting: EMERGENCY MEDICINE
Payer: MEDICARE

## 2019-11-06 VITALS
HEIGHT: 64 IN | TEMPERATURE: 98 F | DIASTOLIC BLOOD PRESSURE: 73 MMHG | WEIGHT: 100 LBS | HEART RATE: 86 BPM | OXYGEN SATURATION: 100 % | BODY MASS INDEX: 17.07 KG/M2 | SYSTOLIC BLOOD PRESSURE: 129 MMHG | RESPIRATION RATE: 29 BRPM

## 2019-11-06 DIAGNOSIS — T79.2XXD TRAUMATIC SEROMA OF RIGHT LOWER LEG, SUBSEQUENT ENCOUNTER: Primary | ICD-10-CM

## 2019-11-06 DIAGNOSIS — M25.561 ARTHRALGIA OF BOTH LOWER LEGS: ICD-10-CM

## 2019-11-06 DIAGNOSIS — M25.562 ARTHRALGIA OF BOTH LOWER LEGS: ICD-10-CM

## 2019-11-06 LAB
ALBUMIN SERPL-MCNC: 2.9 G/DL (ref 3.4–5)
ALBUMIN/GLOB SERPL: 0.8 {RATIO} (ref 0.8–1.7)
ALP SERPL-CCNC: 155 U/L (ref 45–117)
ALT SERPL-CCNC: 29 U/L (ref 13–56)
ANION GAP SERPL CALC-SCNC: 7 MMOL/L (ref 3–18)
AST SERPL-CCNC: 26 U/L (ref 10–38)
BASOPHILS # BLD: 0 K/UL (ref 0–0.1)
BASOPHILS NFR BLD: 1 % (ref 0–2)
BILIRUB SERPL-MCNC: 0.4 MG/DL (ref 0.2–1)
BUN SERPL-MCNC: 31 MG/DL (ref 7–18)
BUN/CREAT SERPL: 21 (ref 12–20)
CALCIUM SERPL-MCNC: 8.6 MG/DL (ref 8.5–10.1)
CHLORIDE SERPL-SCNC: 98 MMOL/L (ref 100–111)
CO2 SERPL-SCNC: 28 MMOL/L (ref 21–32)
CREAT SERPL-MCNC: 1.46 MG/DL (ref 0.6–1.3)
DIFFERENTIAL METHOD BLD: ABNORMAL
EOSINOPHIL # BLD: 0.2 K/UL (ref 0–0.4)
EOSINOPHIL NFR BLD: 3 % (ref 0–5)
ERYTHROCYTE [DISTWIDTH] IN BLOOD BY AUTOMATED COUNT: 17.8 % (ref 11.6–14.5)
GLOBULIN SER CALC-MCNC: 3.8 G/DL (ref 2–4)
GLUCOSE SERPL-MCNC: 83 MG/DL (ref 74–99)
HCT VFR BLD AUTO: 32.8 % (ref 35–45)
HGB BLD-MCNC: 10.2 G/DL (ref 12–16)
INR PPP: 1.2 (ref 0.8–1.2)
LACTATE BLD-SCNC: 0.85 MMOL/L (ref 0.4–2)
LYMPHOCYTES # BLD: 1.5 K/UL (ref 0.9–3.6)
LYMPHOCYTES NFR BLD: 20 % (ref 21–52)
MCH RBC QN AUTO: 26.2 PG (ref 24–34)
MCHC RBC AUTO-ENTMCNC: 31.1 G/DL (ref 31–37)
MCV RBC AUTO: 84.1 FL (ref 74–97)
MONOCYTES # BLD: 0.9 K/UL (ref 0.05–1.2)
MONOCYTES NFR BLD: 11 % (ref 3–10)
NEUTS SEG # BLD: 5 K/UL (ref 1.8–8)
NEUTS SEG NFR BLD: 65 % (ref 40–73)
PLATELET # BLD AUTO: 307 K/UL (ref 135–420)
PMV BLD AUTO: 9.2 FL (ref 9.2–11.8)
POTASSIUM SERPL-SCNC: 4.3 MMOL/L (ref 3.5–5.5)
PROT SERPL-MCNC: 6.7 G/DL (ref 6.4–8.2)
PROTHROMBIN TIME: 14.6 SEC (ref 11.5–15.2)
RBC # BLD AUTO: 3.9 M/UL (ref 4.2–5.3)
SODIUM SERPL-SCNC: 133 MMOL/L (ref 136–145)
WBC # BLD AUTO: 7.6 K/UL (ref 4.6–13.2)

## 2019-11-06 PROCEDURE — 83605 ASSAY OF LACTIC ACID: CPT

## 2019-11-06 PROCEDURE — 85610 PROTHROMBIN TIME: CPT

## 2019-11-06 PROCEDURE — 75810000463 HC INC/DRN HEMATOMA/SEROMA LVL 2 5052

## 2019-11-06 PROCEDURE — 99284 EMERGENCY DEPT VISIT MOD MDM: CPT

## 2019-11-06 PROCEDURE — 80053 COMPREHEN METABOLIC PANEL: CPT

## 2019-11-06 PROCEDURE — 74011250637 HC RX REV CODE- 250/637: Performed by: EMERGENCY MEDICINE

## 2019-11-06 PROCEDURE — 85025 COMPLETE CBC W/AUTO DIFF WBC: CPT

## 2019-11-06 RX ORDER — TRAMADOL HYDROCHLORIDE 50 MG/1
25 TABLET ORAL
Qty: 12 TAB | Refills: 0 | Status: SHIPPED | OUTPATIENT
Start: 2019-11-06 | End: 2019-11-11

## 2019-11-06 RX ORDER — TRAMADOL HYDROCHLORIDE 50 MG/1
25 TABLET ORAL
Status: COMPLETED | OUTPATIENT
Start: 2019-11-06 | End: 2019-11-06

## 2019-11-06 RX ORDER — ACETAMINOPHEN 500 MG
1000 TABLET ORAL
Status: COMPLETED | OUTPATIENT
Start: 2019-11-06 | End: 2019-11-06

## 2019-11-06 RX ADMIN — ACETAMINOPHEN 1000 MG: 500 TABLET, FILM COATED ORAL at 02:04

## 2019-11-06 RX ADMIN — TRAMADOL HYDROCHLORIDE 25 MG: 50 TABLET, FILM COATED ORAL at 02:04

## 2019-11-06 NOTE — DISCHARGE INSTRUCTIONS
Patient Education        Musculoskeletal Pain: Care Instructions  Your Care Instructions    Different problems with the bones, muscles, nerves, ligaments, and tendons in the body can cause pain. One or more areas of your body may ache or burn. Or they may feel tired, stiff, or sore. The medical term for this type of pain is musculoskeletal pain. It can have many different causes. Sometimes the pain is caused by an injury such as a strain or sprain. Or you might have pain from using one part of your body in the same way over and over again. This is called overuse. In some cases, the cause of the pain is another health problem such as arthritis or fibromyalgia. The doctor will examine you and ask you questions about your health to help find the cause of your pain. Blood tests or imaging tests like an X-ray may also be helpful. But sometimes doctors can't find a cause of the pain. Treatment depends on your symptoms and the cause of the pain, if known. The doctor has checked you carefully, but problems can develop later. If you notice any problems or new symptoms, get medical treatment right away. Follow-up care is a key part of your treatment and safety. Be sure to make and go to all appointments, and call your doctor if you are having problems. It's also a good idea to know your test results and keep a list of the medicines you take. How can you care for yourself at home? · Rest until you feel better. · Do not do anything that makes the pain worse. Return to exercise gradually if you feel better and your doctor says it's okay. · Be safe with medicines. Read and follow all instructions on the label. ? If the doctor gave you a prescription medicine for pain, take it as prescribed. ? If you are not taking a prescription pain medicine, ask your doctor if you can take an over-the-counter medicine. · Put ice or a cold pack on the area for 10 to 20 minutes at a time to ease pain.  Put a thin cloth between the ice and your skin. When should you call for help? Call your doctor now or seek immediate medical care if:    · You have new pain, or your pain gets worse.     · You have new symptoms such as a fever, a rash, or chills.    Watch closely for changes in your health, and be sure to contact your doctor if:    · You do not get better as expected. Where can you learn more? Go to http://colin-david.info/. Enter P531 in the search box to learn more about \"Musculoskeletal Pain: Care Instructions. \"  Current as of: March 28, 2019  Content Version: 12.2  © 6840-1714 [employer]. Care instructions adapted under license by [employer] (which disclaims liability or warranty for this information). If you have questions about a medical condition or this instruction, always ask your healthcare professional. [address] any warranty or liability for your use of this information. Lower leg dressing should be changed at least twice daily when they are soaked.

## 2019-11-06 NOTE — ED PROVIDER NOTES
Janis Crooks is a 80 y.o. Female with complaints of worsening pain and weeping from her lower extremity wounds. Patient had a left lower leg fluid collection open up by vascular surgery today but did not do the other one. Patient complains of continues to weep. She has increased pain in the right lower leg area of swelling and fluid collection. She has no fever. She just finished a course of antibiotics. There is no nausea or vomiting. Patient states that Tylenol is not helping    The history is provided by the patient, a relative and medical records. Past Medical History:   Diagnosis Date    A-fib [facility]) 01/2017    Arthritis, degenerative     CAD (coronary artery disease) 04/2012    S/P 2.75 X 15 mm BMS of OM (04/2012), Two LAD BMS (07/2012)    Elevated liver enzymes     Likely from statin    HLD (hyperlipidemia)     Hyperkalemia 06/2012    Likely from lisinopril    Ischemic cardiomyopathy     LVEF  30-35%(05/19) 45% (11/2012) & 30% echo (04/2012)    NSTEMI (non-ST elevated myocardial infarction) ([facility] [address] 04/2012    UTI (urinary tract infection)     Vertigo        Past Surgical History:   Procedure Laterality Date    HX CORONARY STENT PLACEMENT  4/23/12    bare metal stent to obtuse marginal branch    HX HEART CATHETERIZATION           Family History:   Problem Relation Age of Onset    Diabetes Mother        Social History     Socioeconomic History    Marital status:      Spouse name: Not on file    Number of children: Not on file    Years of education: Not on file    Highest education level: Not on file   Occupational History    Not on file   Social Needs    Financial resource strain: Not on file    Food insecurity:     Worry: Not on file     Inability: Not on file    Transportation needs:     Medical: Not on file     Non-medical: Not on file   Tobacco Use    Smoking status: Never Smoker    Smokeless tobacco: Never Used   Substance and Sexual Activity    Alcohol use:  No  Drug use: No    Sexual activity: Never   Lifestyle    Physical activity:     Days per week: Not on file     Minutes per session: Not on file    Stress: Not on file   Relationships    Social connections:     Talks on phone: Not on file     Gets together: Not on file     Attends Congregational service: Not on file     Active member of club or organization: Not on file     Attends meetings of clubs or organizations: Not on file     Relationship status: Not on file    Intimate partner violence:     Fear of current or ex partner: Not on file     Emotionally abused: Not on file     Physically abused: Not on file     Forced sexual activity: Not on file   Other Topics Concern    Not on file   Social History Narrative    Not on file         ALLERGIES: Flu vaccine 2011 (36 mos+)(pf); Codeine; Egg; Influenza virus vaccines; Lipitor [atorvastatin]; and Pcn [penicillins]    Review of Systems   Constitutional: Negative for fever. HENT: Negative for sore throat. Eyes: Negative for visual disturbance. Respiratory: Negative for shortness of breath. Cardiovascular: Positive for leg swelling. Gastrointestinal: Negative for abdominal pain. Genitourinary: Negative for difficulty urinating. Musculoskeletal: Positive for gait problem. Skin: Positive for rash and wound. Neurological: Negative for syncope. Hematological: Bruises/bleeds easily. Psychiatric/Behavioral: Positive for sleep disturbance. Vitals:    11/06/19 0104   BP: 129/73   Pulse: 86   Resp: 29   Temp: 98 °F (36.7 °C)   SpO2: 100%   Weight: 45.4 kg (100 lb)   Height: 5' 4\" (1.626 m)            Physical Exam   Constitutional: She is oriented to person, place, and time. She appears well-developed and well-nourished. No distress. Frail appearing elderly lady no acute distress   HENT:   Head: Normocephalic and atraumatic.    Right Ear: External ear normal.   Left Ear: External ear normal.   Nose: Nose normal.   Mouth/Throat: Uvula is midline, oropharynx is clear and moist and mucous membranes are normal.   Eyes: Conjunctivae are normal. No scleral icterus. Neck: Neck supple. Cardiovascular: Normal rate, regular rhythm, normal heart sounds and intact distal pulses. Pulmonary/Chest: Effort normal and breath sounds normal.   Abdominal: Soft. There is no tenderness. Musculoskeletal: She exhibits edema. Bilateral lower extremity edema. Left anterior lower leg status post incision anteriorly with continued clear serosanguineous fluid draining. There is a fluid collection on the right leg in the same area. It is tender to palpation. Is not changed in character or size since I saw her last time   Neurological: She is alert and oriented to person, place, and time. Gait normal.   Skin: Skin is warm and dry. Capillary refill takes less than 2 seconds. She is not diaphoretic. Psychiatric: Her behavior is normal.   Nursing note and vitals reviewed. [facility]       I&D Shon Simple  Date/Time: 11/6/2019 1:45 AM  Performed by: Shamar Casillas MD  Authorized by: Shamar Casillas MD     Consent:     Consent obtained:  Verbal    Consent given by:  Patient    Risks discussed:  Bleeding, incomplete drainage, infection and damage to other organs    Alternatives discussed:  No treatment, delayed treatment and alternative treatment  Location:     Type:  Seroma    Location:  Lower extremity    Lower extremity location:  Leg    Leg location:  R lower leg  Pre-procedure details:     Skin preparation:  Chloraprep  Anesthesia (see MAR for exact dosages): Anesthesia method:  Local infiltration    Local anesthetic:  Lidocaine 1% w/o epi  Procedure type:     Complexity:  Simple  Procedure details:     Needle aspiration: no      Incision types:  Stab incision    Incision depth:  Dermal    Scalpel blade:  11    Drainage:  Serosanguinous    Drainage amount:   Moderate    Wound treatment:  Wound left open    Packing materials:  None  Post-procedure details: Patient tolerance of procedure: Tolerated well, no immediate complications      Vitals:  Patient Vitals for the past 12 hrs:   Temp Pulse Resp BP SpO2   11/06/19 0104 98 °F (36.7 °C) 86 29 129/73 100 %         Medications ordered:   Medications   traMADol (ULTRAM) tablet 25 mg (25 mg Oral Given 11/6/19 0204)   acetaminophen (TYLENOL) tablet 1,000 mg (1,000 mg Oral Given 11/6/19 0204)         Lab findings:  Recent Results (from the past 12 hour(s))   POC LACTIC ACID    Collection Time: 11/06/19  1:02 AM   Result Value Ref Range    Lactic Acid (POC) 0.85 0.40 - 2.00 mmol/L   CBC WITH AUTOMATED DIFF    Collection Time: 11/06/19  1:02 AM   Result Value Ref Range    WBC 7.6 4.6 - 13.2 K/uL    RBC 3.90 (L) 4.20 - 5.30 M/uL    HGB 10.2 (L) 12.0 - 16.0 g/dL    HCT 32.8 (L) 35.0 - 45.0 %    MCV 84.1 74.0 - 97.0 FL    MCH 26.2 24.0 - 34.0 PG    MCHC 31.1 31.0 - 37.0 g/dL    RDW 17.8 (H) 11.6 - 14.5 %    PLATELET 141 922 - 293 K/uL    MPV 9.2 9.2 - 11.8 FL    NEUTROPHILS 65 40 - 73 %    LYMPHOCYTES 20 (L) 21 - 52 %    MONOCYTES 11 (H) 3 - 10 %    EOSINOPHILS 3 0 - 5 %    BASOPHILS 1 0 - 2 %    ABS. NEUTROPHILS 5.0 1.8 - 8.0 K/UL    ABS. LYMPHOCYTES 1.5 0.9 - 3.6 K/UL    ABS. MONOCYTES 0.9 0.05 - 1.2 K/UL    ABS. EOSINOPHILS 0.2 0.0 - 0.4 K/UL    ABS.  BASOPHILS 0.0 0.0 - 0.1 K/UL    DF AUTOMATED     PROTHROMBIN TIME + INR    Collection Time: 11/06/19  1:02 AM   Result Value Ref Range    Prothrombin time 14.6 11.5 - 15.2 sec    INR 1.2 0.8 - 1.2     METABOLIC PANEL, COMPREHENSIVE    Collection Time: 11/06/19  1:02 AM   Result Value Ref Range    Sodium 133 (L) 136 - 145 mmol/L    Potassium 4.3 3.5 - 5.5 mmol/L    Chloride 98 (L) 100 - 111 mmol/L    CO2 28 21 - 32 mmol/L    Anion gap 7 3.0 - 18 mmol/L    Glucose 83 74 - 99 mg/dL    BUN 31 (H) 7.0 - 18 MG/DL    Creatinine 1.46 (H) 0.6 - 1.3 MG/DL    BUN/Creatinine ratio 21 (H) 12 - 20      GFR est AA 41 (L) >60 ml/min/1.73m2    GFR est non-AA 34 (L) >60 ml/min/1.73m2    Calcium 8.6 8.5 - 10.1 MG/DL    Bilirubin, total 0.4 0.2 - 1.0 MG/DL    ALT (SGPT) 29 13 - 56 U/L    AST (SGOT) 26 10 - 38 U/L    Alk. phosphatase 155 (H) 45 - 117 U/L    Protein, total 6.7 6.4 - 8.2 g/dL    Albumin 2.9 (L) 3.4 - 5.0 g/dL    Globulin 3.8 2.0 - 4.0 g/dL    A-G Ratio 0.8 0.8 - 1.7         EKG interpretation by ED Physician:      X-Ray, CT or other radiology findings or impressions:  No orders to display       Progress notes, Consult notes or additional Procedure notes:   Patient wanted if the fluid collection drain which I discussed with son-in-law who is in agreement    Do not feel patient requires antibiotics at this time. Do not feel she requires imaging. Patient has follow-up with her vascular surgeon next week. I have discussed with patient and/or family/sig other the results, interpretation of any imaging if performed, suspected diagnosis and treatment plan to include instructions regarding the diagnoses listed to which understanding was expressed with all questions answered    Reevaluation of patient:   stable    Disposition:  Diagnosis:   1. Traumatic seroma of right lower leg, subsequent encounter    2. Arthralgia of both lower legs        Disposition: home      Follow-up Information     Follow up With Specialties Details Why Contact Info    Maxx Chang MD Vascular Surgery  keep appointment for next [address]               Patient's Medications   Start Taking    TRAMADOL (ULTRAM) 50 MG TABLET    Take 0.5 Tabs by mouth every eight (8) hours as needed for Pain for up to 5 days. Max Daily Amount: 75 mg. Continue Taking    ACETAMINOPHEN (TYLENOL) 325 MG TABLET    Take 650 mg by mouth every six (6) hours as needed for Pain. ASPIRIN 81 MG CHEWABLE TABLET    Take 1 Tab by mouth daily. BUMETANIDE (BUMEX) 0.5 MG TABLET    Take 1 Tab by mouth daily.     CARBOXYMETHYLCELLULOS/GLYCERIN (CLEAR EYES FOR DRY EYES OP)    Administer 1 Drop to both eyes three (3) times daily as needed (dry eyes). CARBOXYMETHYLCELLULOSE SODIUM (CELLUVISC) 0.5 % DROP OPHTHALMIC SOLUTION    Administer 1 Drop to both eyes three (3) times daily as needed for Other (dry eyes). DIGOXIN (LANOXIN) 0.125 MG TABLET    Take 1 Tab by mouth every Monday, Wednesday, Friday, Saturday AND 2 Tabs every Sunday, Tuesday, Thursday. Indications: Ventricular Rate Control in Atrial Fibrillation    FLUOXETINE (PROZAC) 10 MG TABLET    Take 1 Tab by mouth daily. FLUTICASONE PROPIONATE (FLONASE) 50 MCG/ACTUATION NASAL SPRAY    1 [place] by Both Nostrils route daily. HYDROCORTISONE (CORTAID) 1 % TOPICAL CREAM    Apply  to affected area two (2) times a day. use thin layer    LOPERAMIDE (IMMODIUM) 2 MG TABLET    Take 1 mg by mouth three (3) times daily as needed for Diarrhea. MIRTAZAPINE (REMERON) 15 MG TABLET    Take 1 Tab by mouth nightly. Indications: major depressive disorder    MULTIVIT,CALC,MINS/IRON/FOLIC (ONE-A-DAY WOMENS FORMULA PO)    Take  by mouth. Gummy    NITROGLYCERIN (NITROSTAT) 0.4 MG SL TABLET    1 Tab by SubLINGual route every five (5) minutes as needed for Chest Pain. ONDANSETRON (ZOFRAN ODT) 4 MG DISINTEGRATING TABLET    Take 4 mg by mouth every eight (8) hours as needed for Nausea. POLYETHYLENE GLYCOL (MIRALAX) 17 GRAM/DOSE POWDER    Take 17 g by mouth daily as needed (constipation). PSYLLIUM HUSK-ASPARTAME (METAMUCIL FIBER) 3.4 GRAM PWPK PACKET    Take 1 Packet by mouth daily. VITAMIN A-VITAMIN C-VIT E-MIN (OCUVITE) TABLET    Take 1 Tab by mouth daily.  Gummy   These Medications have changed    No medications on file   Stop Taking    No medications on file

## 2019-11-06 NOTE — ED TRIAGE NOTES
Pt arrives via ems from [facility] a/o x 3 with a c/c of pain to her lower extremities with weeping would to left leg. It is unknown how long patient has had these symptoms per EMS via nursing staff at facility. Pt complains of chills but pt remains afebrile. Bandages noted to bilateral lower extremities weeping of left leg > than right.

## 2019-11-07 ENCOUNTER — HOSPITAL ENCOUNTER (OUTPATIENT)
Dept: LAB | Age: 84
Discharge: HOME | End: 2019-11-07
Payer: MEDICARE

## 2019-11-07 LAB
ANION GAP SERPL CALC-SCNC: 8 MMOL/L (ref 3–18)
BUN SERPL-MCNC: 32 MG/DL (ref 7–18)
BUN/CREAT SERPL: 21 (ref 12–20)
CALCIUM SERPL-MCNC: 8.5 MG/DL (ref 8.5–10.1)
CHLORIDE SERPL-SCNC: 98 MMOL/L (ref 100–111)
CO2 SERPL-SCNC: 30 MMOL/L (ref 21–32)
CREAT SERPL-MCNC: 1.54 MG/DL (ref 0.6–1.3)
GLUCOSE SERPL-MCNC: 70 MG/DL (ref 74–99)
POTASSIUM SERPL-SCNC: 4.8 MMOL/L (ref 3.5–5.5)
SODIUM SERPL-SCNC: 136 MMOL/L (ref 136–145)

## 2019-11-07 PROCEDURE — 36415 COLL VENOUS BLD VENIPUNCTURE: CPT

## 2019-11-07 PROCEDURE — 80048 BASIC METABOLIC PNL TOTAL CA: CPT

## 2019-11-08 ENCOUNTER — TELEPHONE (OUTPATIENT)
Dept: CARDIOLOGY CLINIC | Age: 84
End: 2019-11-08

## 2019-11-08 NOTE — TELEPHONE ENCOUNTER
Incoming from pts son. Two patient Identifiers confirmed. Advised pt has been having bilateral leg swelling with fluid seeping but they have been bandaged by vascular ( Dr Rebekah Wiggins)  He advised pt is not c/o sob and vitals are \"good. \"  Advised I would consult with Dr Mellisa Clarke and advise.

## 2019-11-08 NOTE — TELEPHONE ENCOUNTER
Contacted pt at [facility] number. Two patient Identifiers confirmed. Advised pt I was unable to speak with Dr Danielle Calvin before eos. Advised son to take pt to ER if swelling persists. Pts son verbalized understanding and stated he may call oncall tomorrow. No other issues noted.

## 2019-11-09 ENCOUNTER — HOME CARE VISIT (OUTPATIENT)
Dept: SCHEDULING | Facility: HOME HEALTH | Age: 84
End: 2019-11-09
Payer: MEDICARE

## 2019-11-09 PROCEDURE — 3331090001 HH PPS REVENUE CREDIT

## 2019-11-09 PROCEDURE — G0299 HHS/HOSPICE OF RN EA 15 MIN: HCPCS

## 2019-11-09 PROCEDURE — 3331090002 HH PPS REVENUE DEBIT

## 2019-11-09 PROCEDURE — 400013 HH SOC

## 2019-11-10 ENCOUNTER — HOME CARE VISIT (OUTPATIENT)
Dept: SCHEDULING | Facility: HOME HEALTH | Age: 84
End: 2019-11-10
Payer: MEDICARE

## 2019-11-10 PROCEDURE — 3331090002 HH PPS REVENUE DEBIT

## 2019-11-10 PROCEDURE — G0299 HHS/HOSPICE OF RN EA 15 MIN: HCPCS

## 2019-11-10 PROCEDURE — 3331090001 HH PPS REVENUE CREDIT

## 2019-11-11 ENCOUNTER — APPOINTMENT (OUTPATIENT)
Dept: CT IMAGING | Age: 84
End: 2019-11-11
Attending: EMERGENCY MEDICINE
Payer: MEDICARE

## 2019-11-11 ENCOUNTER — HOME CARE VISIT (OUTPATIENT)
Dept: HOME HEALTH SERVICES | Facility: HOME HEALTH | Age: 84
End: 2019-11-11
Payer: MEDICARE

## 2019-11-11 ENCOUNTER — HOME CARE VISIT (OUTPATIENT)
Dept: SCHEDULING | Facility: HOME HEALTH | Age: 84
End: 2019-11-11
Payer: MEDICARE

## 2019-11-11 ENCOUNTER — HOSPITAL ENCOUNTER (EMERGENCY)
Age: 84
Discharge: HOME | End: 2019-11-12
Attending: EMERGENCY MEDICINE | Admitting: EMERGENCY MEDICINE
Payer: MEDICARE

## 2019-11-11 VITALS
RESPIRATION RATE: 16 BRPM | TEMPERATURE: 98 F | OXYGEN SATURATION: 97 % | SYSTOLIC BLOOD PRESSURE: 112 MMHG | HEART RATE: 77 BPM | DIASTOLIC BLOOD PRESSURE: 70 MMHG

## 2019-11-11 VITALS
OXYGEN SATURATION: 98 % | DIASTOLIC BLOOD PRESSURE: 68 MMHG | RESPIRATION RATE: 18 BRPM | SYSTOLIC BLOOD PRESSURE: 110 MMHG | DIASTOLIC BLOOD PRESSURE: 64 MMHG | OXYGEN SATURATION: 98 % | HEART RATE: 84 BPM | TEMPERATURE: 97.8 F | SYSTOLIC BLOOD PRESSURE: 120 MMHG | RESPIRATION RATE: 20 BRPM | HEART RATE: 76 BPM | TEMPERATURE: 97.7 F

## 2019-11-11 VITALS
BODY MASS INDEX: 18.88 KG/M2 | SYSTOLIC BLOOD PRESSURE: 137 MMHG | DIASTOLIC BLOOD PRESSURE: 87 MMHG | HEIGHT: 61 IN | WEIGHT: 100 LBS | OXYGEN SATURATION: 98 % | TEMPERATURE: 97.7 F | RESPIRATION RATE: 18 BRPM | HEART RATE: 101 BPM

## 2019-11-11 DIAGNOSIS — I89.0 LYMPHEDEMA OF BOTH LOWER EXTREMITIES: ICD-10-CM
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
## 2019-11-11 DIAGNOSIS — S00.03XA CONTUSION OF RIGHT TEMPOROFRONTAL SCALP, INITIAL ENCOUNTER: ICD-10-CM

## 2019-11-11 LAB
ALBUMIN SERPL-MCNC: 2.9 G/DL (ref 3.4–5)
ALBUMIN/GLOB SERPL: 0.7 {RATIO} (ref 0.8–1.7)
ALP SERPL-CCNC: 157 U/L (ref 45–117)
ALT SERPL-CCNC: 26 U/L (ref 13–56)
ANION GAP SERPL CALC-SCNC: 7 MMOL/L (ref 3–18)
AST SERPL-CCNC: 25 U/L (ref 10–38)
BASOPHILS # BLD: 0 K/UL (ref 0–0.1)
BASOPHILS NFR BLD: 0 % (ref 0–2)
BILIRUB SERPL-MCNC: 0.6 MG/DL (ref 0.2–1)
BUN SERPL-MCNC: 27 MG/DL (ref 7–18)
BUN/CREAT SERPL: 20 (ref 12–20)
CALCIUM SERPL-MCNC: 8.5 MG/DL (ref 8.5–10.1)
CHLORIDE SERPL-SCNC: 102 MMOL/L (ref 100–111)
CO2 SERPL-SCNC: 25 MMOL/L (ref 21–32)
CREAT SERPL-MCNC: 1.35 MG/DL (ref 0.6–1.3)
DIFFERENTIAL METHOD BLD: ABNORMAL
EOSINOPHIL # BLD: 0.1 K/UL (ref 0–0.4)
EOSINOPHIL NFR BLD: 1 % (ref 0–5)
ERYTHROCYTE [DISTWIDTH] IN BLOOD BY AUTOMATED COUNT: 18 % (ref 11.6–14.5)
GLOBULIN SER CALC-MCNC: 3.9 G/DL (ref 2–4)
GLUCOSE SERPL-MCNC: 90 MG/DL (ref 74–99)
HCT VFR BLD AUTO: 34.6 % (ref 35–45)
HGB BLD-MCNC: 10.8 G/DL (ref 12–16)
LYMPHOCYTES # BLD: 0.8 K/UL (ref 0.9–3.6)
LYMPHOCYTES NFR BLD: 9 % (ref 21–52)
MCH RBC QN AUTO: 26.3 PG (ref 24–34)
MCHC RBC AUTO-ENTMCNC: 31.2 G/DL (ref 31–37)
MCV RBC AUTO: 84.2 FL (ref 74–97)
MONOCYTES # BLD: 1.1 K/UL (ref 0.05–1.2)
MONOCYTES NFR BLD: 11 % (ref 3–10)
NEUTS SEG # BLD: 7.6 K/UL (ref 1.8–8)
NEUTS SEG NFR BLD: 79 % (ref 40–73)
PLATELET # BLD AUTO: 305 K/UL (ref 135–420)
PMV BLD AUTO: 8.6 FL (ref 9.2–11.8)
POTASSIUM SERPL-SCNC: 5 MMOL/L (ref 3.5–5.5)
PROT SERPL-MCNC: 6.8 G/DL (ref 6.4–8.2)
RBC # BLD AUTO: 4.11 M/UL (ref 4.2–5.3)
SODIUM SERPL-SCNC: 134 MMOL/L (ref 136–145)
WBC # BLD AUTO: 9.5 K/UL (ref 4.6–13.2)

## 2019-11-11 PROCEDURE — 99283 EMERGENCY DEPT VISIT LOW MDM: CPT

## 2019-11-11 PROCEDURE — 74011250637 HC RX REV CODE- 250/637: Performed by: EMERGENCY MEDICINE

## 2019-11-11 PROCEDURE — 74011250636 HC RX REV CODE- 250/636: Performed by: EMERGENCY MEDICINE

## 2019-11-11 PROCEDURE — 96374 THER/PROPH/DIAG INJ IV PUSH: CPT

## 2019-11-11 PROCEDURE — 80053 COMPREHEN METABOLIC PANEL: CPT

## 2019-11-11 PROCEDURE — G0299 HHS/HOSPICE OF RN EA 15 MIN: HCPCS

## 2019-11-11 PROCEDURE — 85025 COMPLETE CBC W/AUTO DIFF WBC: CPT

## 2019-11-11 PROCEDURE — 70450 CT HEAD/BRAIN W/O DYE: CPT

## 2019-11-11 PROCEDURE — 3331090002 HH PPS REVENUE DEBIT

## 2019-11-11 PROCEDURE — 3331090001 HH PPS REVENUE CREDIT

## 2019-11-11 RX ORDER — ONDANSETRON 2 MG/ML
4 INJECTION INTRAMUSCULAR; INTRAVENOUS
Status: COMPLETED | OUTPATIENT
Start: 2019-11-11 | End: 2019-11-11

## 2019-11-11 RX ORDER — ACETAMINOPHEN 500 MG
1000 TABLET ORAL
Status: COMPLETED | OUTPATIENT
Start: 2019-11-11 | End: 2019-11-11

## 2019-11-11 RX ORDER — ONDANSETRON 2 MG/ML
4 INJECTION INTRAMUSCULAR; INTRAVENOUS
Status: DISCONTINUED | OUTPATIENT
Start: 2019-11-11 | End: 2019-11-12 | Stop reason: HOSPADM

## 2019-11-11 RX ADMIN — ACETAMINOPHEN 1000 MG: 500 TABLET, FILM COATED ORAL at 22:16

## 2019-11-11 RX ADMIN — ONDANSETRON 4 MG: 2 INJECTION INTRAMUSCULAR; INTRAVENOUS at 22:16

## 2019-11-11 RX ADMIN — SODIUM CHLORIDE 500 ML: 900 INJECTION, SOLUTION INTRAVENOUS at 22:17

## 2019-11-12 PROCEDURE — A4452 WATERPROOF TAPE: HCPCS

## 2019-11-12 PROCEDURE — A6252 ABSORPT DRG >16 <=48 W/O BDR: HCPCS

## 2019-11-12 PROCEDURE — A6260 WOUND CLEANSER ANY TYPE/SIZE: HCPCS

## 2019-11-12 PROCEDURE — 3331090002 HH PPS REVENUE DEBIT

## 2019-11-12 PROCEDURE — A4649 SURGICAL SUPPLIES: HCPCS

## 2019-11-12 PROCEDURE — A6212 FOAM DRG <=16 SQ IN W/BORDER: HCPCS

## 2019-11-12 PROCEDURE — A6218 NON-STERILE GAUZE > 48 SQ IN: HCPCS

## 2019-11-12 PROCEDURE — A6446 CONFORM BAND S W>=3" <5"/YD: HCPCS

## 2019-11-12 PROCEDURE — A6457 TUBULAR DRESSING: HCPCS

## 2019-11-12 PROCEDURE — 3331090001 HH PPS REVENUE CREDIT

## 2019-11-12 PROCEDURE — A4216 STERILE WATER/SALINE, 10 ML: HCPCS

## 2019-11-12 PROCEDURE — A6250 SKIN SEAL PROTECT MOISTURIZR: HCPCS

## 2019-11-12 PROCEDURE — A6253 ABSORPT DRG > 48 SQ IN W/O B: HCPCS

## 2019-11-12 NOTE — ED TRIAGE NOTES
PT aaox3 arrived via medics with c/o trip and fall, possible LOC, pt TERRY and is c/o headache. Pt denies being on blood thinners and has a contusion to the right forehead.  PERRL

## 2019-11-12 NOTE — ED PROVIDER NOTES
Danis Baumann is a 80 y.o. Female With trip and fall tonight hitting her head. There is no loss conscious. She is not on anticoagulants. Patient complains of slight headache with nausea but no vomiting. She denies other injury. Patient has been seen recently for chronic swelling of her lower extremities with lymphedema and seroma evacuation. Patient denies any other joint pains at this time. She denies any recent fever vomiting or diarrhea. She has no abdominal pain. Patient does not recall what happened. The history is provided by the patient.         Past Medical History:   Diagnosis Date    A-fib [facility]) 01/2017    Arthritis, degenerative     CAD (coronary artery disease) 04/2012    S/P 2.75 X 15 mm BMS of OM (04/2012), Two LAD BMS (07/2012)    Elevated liver enzymes     Likely from statin    HLD (hyperlipidemia)     Hyperkalemia 06/2012    Likely from lisinopril    Ischemic cardiomyopathy     LVEF  30-35%(05/19) 45% (11/2012) & 30% echo (04/2012)    NSTEMI (non-ST elevated myocardial infarction) ([facility] [address] 04/2012    UTI (urinary tract infection)     Vertigo        Past Surgical History:   Procedure Laterality Date    HX CORONARY STENT PLACEMENT  4/23/12    bare metal stent to obtuse marginal branch    HX HEART CATHETERIZATION           Family History:   Problem Relation Age of Onset    Diabetes Mother        Social History     Socioeconomic History    Marital status:      Spouse name: Not on file    Number of children: Not on file    Years of education: Not on file    Highest education level: Not on file   Occupational History    Not on file   Social Needs    Financial resource strain: Not on file    Food insecurity:     Worry: Not on file     Inability: Not on file    Transportation needs:     Medical: Not on file     Non-medical: Not on file   Tobacco Use    Smoking status: Never Smoker    Smokeless tobacco: Never Used   Substance and Sexual Activity    Alcohol use: No    Drug use: No    Sexual activity: Never   Lifestyle    Physical activity:     Days per week: Not on file     Minutes per session: Not on file    Stress: Not on file   Relationships    Social connections:     Talks on phone: Not on file     Gets together: Not on file     Attends Mandaen service: Not on file     Active member of club or organization: Not on file     Attends meetings of clubs or organizations: Not on file     Relationship status: Not on file    Intimate partner violence:     Fear of current or ex partner: Not on file     Emotionally abused: Not on file     Physically abused: Not on file     Forced sexual activity: Not on file   Other Topics Concern    Not on file   Social History Narrative    Not on file         ALLERGIES: Flu vaccine 2011 (36 mos+)(pf); Codeine; Egg; Influenza virus vaccines; Lipitor [atorvastatin]; and Pcn [penicillins]    Review of Systems   Constitutional: Negative for fever. HENT: Negative for sore throat. Eyes: Negative for visual disturbance. Respiratory: Negative for cough. Cardiovascular: Negative for chest pain. Gastrointestinal: Positive for nausea. Negative for abdominal pain and vomiting. Endocrine: Negative for polyuria. Genitourinary: Negative for difficulty urinating. Musculoskeletal: Negative for gait problem. Skin: Positive for rash and wound. Allergic/Immunologic: Negative for immunocompromised state. Neurological: Positive for headaches. Hematological: Bruises/bleeds easily. Psychiatric/Behavioral: Negative for confusion. Vitals:    11/11/19 2046   BP: 137/87   Pulse: (!) 101   Resp: 18   Temp: 97.7 °F (36.5 °C)   SpO2: 98%   Weight: 45.4 kg (100 lb)   Height: 5' 1\" (1.549 m)            Physical Exam   Constitutional: She is oriented to person, place, and time. She appears well-developed and well-nourished. She appears distressed. HENT:   Head: Head is with abrasion and with contusion.  Head is without raccoon's eyes, without Johansen's sign and without laceration. Hair is normal.       Right Ear: External ear normal.   Left Ear: External ear normal.   Nose: Nose normal.   Mouth/Throat: Uvula is midline, oropharynx is clear and moist and mucous membranes are normal. No oropharyngeal exudate. Eyes: Pupils are equal, round, and reactive to light. Conjunctivae and EOM are normal. No scleral icterus. Neck: Normal range of motion. Neck supple. Cardiovascular: Normal rate, regular rhythm, normal heart sounds and intact distal pulses. Pulmonary/Chest: Effort normal and breath sounds normal.   Abdominal: Soft. There is no tenderness. Musculoskeletal: She exhibits edema. Continued edema and decreased seroma enlargement of both lower legs. There is no new erythema. Patient has continued serosanguineous drainage. Dressings were changed. Neurological: She is alert and oriented to person, place, and time. No cranial nerve deficit or sensory deficit. She exhibits normal muscle tone. Gait normal.   Skin: Skin is warm and dry. Capillary refill takes less than 2 seconds. She is not diaphoretic. Psychiatric: Her behavior is normal.   Nursing note and vitals reviewed. MDM       Procedures  Vitals:  Patient Vitals for the past 12 hrs:   Temp Pulse Resp BP SpO2   11/11/19 2046 97.7 °F (36.5 °C) (!) 101 18 137/87 98 %         Medications ordered:   Medications   ondansetron (ZOFRAN) injection 4 mg (has no administration in time range)   sodium chloride 0.9 % bolus infusion 500 mL (has no administration in time range)   ondansetron (ZOFRAN) injection 4 mg (has no administration in time range)   acetaminophen (TYLENOL) tablet 1,000 mg (has no administration in time range)         Lab findings:  No results found for this or any previous visit (from the past 12 hour(s)).     EKG interpretation by ED Physician:      X-Ray, CT or other radiology findings or impressions:  CT HEAD WO CONT    (Results Pending)   No CT findings of any acute intracranial abnormality. Soft tissue swelling of the right frontal calvarium. Progress notes, Consult notes or additional Procedure notes:   No acute lab findings. Do not feel patient requires other work-up or imaging at this time    I have discussed with patient and/or family/sig other the results, interpretation of any imaging if performed, suspected diagnosis and treatment plan to include instructions regarding the diagnoses listed to which understanding was expressed with all questions answered      Reevaluation of patient:   stable    Disposition:  Diagnosis:   1. Fall on same level from slipping, tripping or stumbling, initial encounter    2. Contusion of right temporofrontal scalp, initial encounter    3. Lymphedema of both lower extremities        Disposition: home      Follow-up Information     Follow up With Specialties Details Why Contact Info    Ophelia Esteves MD Geriatric Medicine Schedule an appointment as soon as possible for a visit  [address] [place] [address]  [phone]      keep appointment with vascular surgeon for tomorrow        [facility] EMERGENCY DEPT Emergency Medicine  If symptoms worsen [address]  [phone]            Patient's Medications   Start Taking    No medications on file   Continue Taking    ACETAMINOPHEN (TYLENOL) 325 MG TABLET    Take 650 mg by mouth every six (6) hours as needed for Pain. 2 tabs    ASPIRIN 81 MG CHEWABLE TABLET    Take 1 Tab by mouth daily. BUMETANIDE (BUMEX) 0.5 MG TABLET    Take 1 Tab by mouth daily. CARBOXYMETHYLCELLULOS/GLYCERIN (CLEAR EYES FOR DRY EYES OP)    Administer 1 Drop to both eyes three (3) times daily as needed (dry eyes). CARBOXYMETHYLCELLULOSE SODIUM (CELLUVISC) 0.5 % DROP OPHTHALMIC SOLUTION    Administer 1 Drop to both eyes three (3) times daily as needed for Other (dry eyes).     DIGOXIN (LANOXIN) 0.125 MG TABLET    Take 1 Tab by mouth every Monday, Wednesday, Friday, Saturday AND 2 Tabs every Sunday, Tuesday, Thursday. Indications: Ventricular Rate Control in Atrial Fibrillation    FLUOXETINE (PROZAC) 10 MG TABLET    Take 1 Tab by mouth daily. FLUTICASONE PROPIONATE (FLONASE) 50 MCG/ACTUATION NASAL SPRAY    1 [place] by Both Nostrils route daily. HYDROCORTISONE (CORTAID) 1 % TOPICAL CREAM    Apply  to affected area two (2) times a day. use thin layer    LOPERAMIDE (IMMODIUM) 2 MG TABLET    Take 1 mg by mouth three (3) times daily as needed for Diarrhea. Take 1 mg tab by mouth three (3) times daily as needed for Diarrhea. MIRTAZAPINE (REMERON) 15 MG TABLET    Take 1 Tab by mouth nightly. Indications: major depressive disorder    MULTIVIT,CALC,MINS/IRON/FOLIC (ONE-A-DAY WOMENS FORMULA PO)    Take  by mouth. 1 Gummy     NITROGLYCERIN (NITROSTAT) 0.4 MG SL TABLET    1 Tab by SubLINGual route every five (5) minutes as needed for Chest Pain. ONDANSETRON (ZOFRAN ODT) 4 MG DISINTEGRATING TABLET    Take 4 mg by mouth every eight (8) hours as needed for Nausea. Take 4 mg tab by mouth every eight (8) hours as needed for Nausea    POLYETHYLENE GLYCOL (MIRALAX) 17 GRAM/DOSE POWDER    Take 17 g by mouth daily as needed (constipation). PSYLLIUM HUSK-ASPARTAME (METAMUCIL FIBER) 3.4 GRAM PWPK PACKET    Take 1 Packet by mouth daily. TRAMADOL (ULTRAM) 50 MG TABLET    Take 0.5 Tabs by mouth every eight (8) hours as needed for Pain for up to 5 days. Max Daily Amount: 75 mg. VITAMIN A-VITAMIN C-VIT E-MIN (OCUVITE) TABLET    Take 1 Tab by mouth daily.  Gummy   These Medications have changed    No medications on file   Stop Taking    No medications on file

## 2019-11-12 NOTE — DISCHARGE INSTRUCTIONS
Patient Education        Contusion: Care Instructions  Your Care Instructions    Contusion is the medical term for a bruise. It is the result of a direct blow or an impact, such as a fall. Contusions are common sports injuries. Most people think of a bruise as a black-and-blue spot. This happens when small blood vessels get torn and leak blood under the skin. But bones, muscles, and organs can also get bruised. This may damage deep tissues but not cause a bruise you can see. The doctor will do a physical exam to find the location of your contusion. You may also have tests to make sure you do not have a more serious injury, such as a broken bone or nerve damage. These may include X-rays or other imaging tests like a CT scan or MRI. Deep-tissue contusions may cause pain and swelling. But if there is no serious damage, they will often get better in a few weeks with home treatment. The doctor has checked you carefully, but problems can develop later. If you notice any problems or new symptoms, get medical treatment right away. Follow-up care is a key part of your treatment and safety. Be sure to make and go to all appointments, and call your doctor if you are having problems. It's also a good idea to know your test results and keep a list of the medicines you take. How can you care for yourself at home? · Put ice or a cold pack on the sore area for 10 to 20 minutes at a time to stop swelling. Put a thin cloth between the ice pack and your skin. · Be safe with medicines. Read and follow all instructions on the label. ? If the doctor gave you a prescription medicine for pain, take it as prescribed. ? If you are not taking a prescription pain medicine, ask your doctor if you can take an over-the-counter medicine. · If you can, prop up the sore area on pillows as much as possible for the next few days. Try to keep the sore area above the level of your heart. When should you call for help?   Call your doctor now or seek immediate medical care if:    · Your pain gets worse.     · You have new or worse swelling.     · You have tingling, weakness, or numbness in the area near the contusion.     · The area near the contusion is cold or pale.    Watch closely for changes in your health, and be sure to contact your doctor if:    · You do not get better as expected. Where can you learn more? Go to http://colin-david.info/. Enter Q114 in the search box to learn more about \"Contusion: Care Instructions. \"  Current as of: June 26, 2019  Content Version: 12.2  © 7669-7080 [employer]. Care instructions adapted under license by [employer] (which disclaims liability or warranty for this information). If you have questions about a medical condition or this instruction, always ask your healthcare professional. [address] any warranty or liability for your use of this information. Patient Education        Head Injury: Care Instructions  Your Care Instructions    Most injuries to the head are minor. Bumps, cuts, and scrapes on the head and face usually heal well and can be treated the same as injuries to other parts of the body. Although it's rare, once in a while a more serious problem shows up after you are home. So it's good to be on the lookout for symptoms for a day or two. Follow-up care is a key part of your treatment and safety. Be sure to make and go to all appointments, and call your doctor if you are having problems. It's also a good idea to know your test results and keep a list of the medicines you take. How can you care for yourself at home? · Follow your doctor's instructions. He or she will tell you if you need someone to watch you closely for the next 24 hours or longer. · Take it easy for the next few days or more if you are not feeling well.   · Ask your doctor when it's okay for you to go back to activities like driving a car, riding a bike, or operating machinery. When should you call for help? Call 911 anytime you think you may need emergency care. For example, call if:    · You have a seizure.     · You passed out (lost consciousness).     · You are confused or can't stay awake.    Call your doctor now or seek immediate medical care if:    · You have new or worse vomiting.     · You feel less alert.     · You have new weakness or numbness in any part of your body.    Watch closely for changes in your health, and be sure to contact your doctor if:    · You do not get better as expected.     · You have new symptoms, such as headaches, trouble concentrating, or changes in mood. Where can you learn more? Go to http://colin-david.info/. Enter V621 in the search box to learn more about \"Head Injury: Care Instructions. \"  Current as of: March 28, 2019  Content Version: 12.2  © 4030-1025 [employer]. Care instructions adapted under license by [employer] (which disclaims liability or warranty for this information). If you have questions about a medical condition or this instruction, always ask your healthcare professional. [address] any warranty or liability for your use of this information.

## 2019-11-13 ENCOUNTER — HOME CARE VISIT (OUTPATIENT)
Dept: SCHEDULING | Facility: HOME HEALTH | Age: 84
End: 2019-11-13
Payer: MEDICARE

## 2019-11-13 PROCEDURE — 3331090002 HH PPS REVENUE DEBIT

## 2019-11-13 PROCEDURE — G0299 HHS/HOSPICE OF RN EA 15 MIN: HCPCS

## 2019-11-13 PROCEDURE — 3331090001 HH PPS REVENUE CREDIT

## 2019-11-14 VITALS
HEART RATE: 86 BPM | DIASTOLIC BLOOD PRESSURE: 64 MMHG | TEMPERATURE: 98.9 F | SYSTOLIC BLOOD PRESSURE: 96 MMHG | OXYGEN SATURATION: 96 % | RESPIRATION RATE: 16 BRPM

## 2019-11-14 PROCEDURE — 3331090002 HH PPS REVENUE DEBIT

## 2019-11-14 PROCEDURE — 3331090001 HH PPS REVENUE CREDIT

## 2019-11-15 PROCEDURE — 3331090002 HH PPS REVENUE DEBIT

## 2019-11-15 PROCEDURE — 3331090001 HH PPS REVENUE CREDIT

## 2019-11-16 ENCOUNTER — HOME CARE VISIT (OUTPATIENT)
Dept: SCHEDULING | Facility: HOME HEALTH | Age: 84
End: 2019-11-16
Payer: MEDICARE

## 2019-11-16 PROCEDURE — 3331090001 HH PPS REVENUE CREDIT

## 2019-11-16 PROCEDURE — 3331090002 HH PPS REVENUE DEBIT

## 2019-11-16 PROCEDURE — G0299 HHS/HOSPICE OF RN EA 15 MIN: HCPCS

## 2019-11-17 VITALS
TEMPERATURE: 97.3 F | HEART RATE: 81 BPM | RESPIRATION RATE: 16 BRPM | OXYGEN SATURATION: 97 % | DIASTOLIC BLOOD PRESSURE: 58 MMHG | SYSTOLIC BLOOD PRESSURE: 102 MMHG

## 2019-11-17 PROCEDURE — 3331090001 HH PPS REVENUE CREDIT

## 2019-11-17 PROCEDURE — 3331090002 HH PPS REVENUE DEBIT

## 2019-11-18 ENCOUNTER — HOME CARE VISIT (OUTPATIENT)
Dept: SCHEDULING | Facility: HOME HEALTH | Age: 84
End: 2019-11-18
Payer: MEDICARE

## 2019-11-18 VITALS
TEMPERATURE: 96.6 F | SYSTOLIC BLOOD PRESSURE: 118 MMHG | OXYGEN SATURATION: 92 % | RESPIRATION RATE: 12 BRPM | DIASTOLIC BLOOD PRESSURE: 52 MMHG | HEART RATE: 56 BPM

## 2019-11-18 VITALS
OXYGEN SATURATION: 97 % | TEMPERATURE: 98.2 F | DIASTOLIC BLOOD PRESSURE: 62 MMHG | HEART RATE: 73 BPM | SYSTOLIC BLOOD PRESSURE: 124 MMHG

## 2019-11-18 PROCEDURE — G0152 HHCP-SERV OF OT,EA 15 MIN: HCPCS

## 2019-11-18 PROCEDURE — 3331090001 HH PPS REVENUE CREDIT

## 2019-11-18 PROCEDURE — G0299 HHS/HOSPICE OF RN EA 15 MIN: HCPCS

## 2019-11-18 PROCEDURE — G0151 HHCP-SERV OF PT,EA 15 MIN: HCPCS

## 2019-11-18 PROCEDURE — 3331090002 HH PPS REVENUE DEBIT

## 2019-11-19 VITALS
TEMPERATURE: 98 F | DIASTOLIC BLOOD PRESSURE: 69 MMHG | HEART RATE: 94 BPM | SYSTOLIC BLOOD PRESSURE: 98 MMHG | OXYGEN SATURATION: 98 %

## 2019-11-19 PROCEDURE — 3331090002 HH PPS REVENUE DEBIT

## 2019-11-19 PROCEDURE — 3331090001 HH PPS REVENUE CREDIT

## 2019-11-20 ENCOUNTER — HOME CARE VISIT (OUTPATIENT)
Dept: HOME HEALTH SERVICES | Facility: HOME HEALTH | Age: 84
End: 2019-11-20
Payer: MEDICARE

## 2019-11-20 PROCEDURE — 3331090001 HH PPS REVENUE CREDIT

## 2019-11-20 PROCEDURE — 3331090002 HH PPS REVENUE DEBIT

## 2019-11-21 ENCOUNTER — HOME CARE VISIT (OUTPATIENT)
Dept: SCHEDULING | Facility: HOME HEALTH | Age: 84
End: 2019-11-21
Payer: MEDICARE

## 2019-11-21 ENCOUNTER — APPOINTMENT (OUTPATIENT)
Dept: GENERAL RADIOLOGY | Age: 84
DRG: 312 | End: 2019-11-21
Attending: EMERGENCY MEDICINE
Payer: MEDICARE

## 2019-11-21 ENCOUNTER — HOSPITAL ENCOUNTER (INPATIENT)
Age: 84
LOS: 4 days | Discharge: NURSING, REHAB OR LONG-TERM CARE | DRG: 312 | End: 2019-11-26
Attending: EMERGENCY MEDICINE | Admitting: HOSPITALIST
Payer: MEDICARE

## 2019-11-21 ENCOUNTER — APPOINTMENT (OUTPATIENT)
Dept: CT IMAGING | Age: 84
DRG: 312 | End: 2019-11-21
Attending: EMERGENCY MEDICINE
Payer: MEDICARE

## 2019-11-21 ENCOUNTER — HOME CARE VISIT (OUTPATIENT)
Dept: HOME HEALTH SERVICES | Facility: HOME HEALTH | Age: 84
End: 2019-11-21
Payer: MEDICARE

## 2019-11-21 ENCOUNTER — APPOINTMENT (OUTPATIENT)
Dept: MRI IMAGING | Age: 84
DRG: 312 | End: 2019-11-21
Attending: HOSPITALIST
Payer: MEDICARE

## 2019-11-21 DIAGNOSIS — R42 VERTIGO: Primary | ICD-10-CM

## 2019-11-21 DIAGNOSIS — S00.83XD CONTUSION OF FACE, SUBSEQUENT ENCOUNTER: ICD-10-CM

## 2019-11-21 DIAGNOSIS — R77.8 ELEVATED TROPONIN: ICD-10-CM

## 2019-11-21 DIAGNOSIS — R11.0 NAUSEA WITHOUT VOMITING: ICD-10-CM

## 2019-11-21 LAB
ANION GAP SERPL CALC-SCNC: 3 MMOL/L (ref 3–18)
APPEARANCE UR: CLEAR
BACTERIA URNS QL MICRO: ABNORMAL /HPF
BASOPHILS # BLD: 0 K/UL (ref 0–0.1)
BASOPHILS NFR BLD: 0 % (ref 0–2)
BILIRUB UR QL: NEGATIVE
BNP SERPL-MCNC: ABNORMAL PG/ML (ref 0–1800)
BUN SERPL-MCNC: 26 MG/DL (ref 7–18)
BUN/CREAT SERPL: 23 (ref 12–20)
CALCIUM SERPL-MCNC: 8.4 MG/DL (ref 8.5–10.1)
CALCULATED R AXIS, ECG10: 99 DEGREES
CALCULATED T AXIS, ECG11: 128 DEGREES
CHLORIDE SERPL-SCNC: 102 MMOL/L (ref 100–111)
CK MB CFR SERPL CALC: 7.8 % (ref 0–4)
CK MB CFR SERPL CALC: 9.3 % (ref 0–4)
CK MB SERPL-MCNC: 2.7 NG/ML (ref 5–25)
CK MB SERPL-MCNC: 2.8 NG/ML (ref 5–25)
CK SERPL-CCNC: 29 U/L (ref 26–192)
CK SERPL-CCNC: 36 U/L (ref 26–192)
CO2 SERPL-SCNC: 29 MMOL/L (ref 21–32)
COLOR UR: YELLOW
CREAT SERPL-MCNC: 1.15 MG/DL (ref 0.6–1.3)
DIAGNOSIS, 93000: NORMAL
DIFFERENTIAL METHOD BLD: ABNORMAL
EOSINOPHIL # BLD: 0.1 K/UL (ref 0–0.4)
EOSINOPHIL NFR BLD: 1 % (ref 0–5)
EPITH CASTS URNS QL MICRO: ABNORMAL /LPF (ref 0–5)
ERYTHROCYTE [DISTWIDTH] IN BLOOD BY AUTOMATED COUNT: 17.9 % (ref 11.6–14.5)
GLUCOSE SERPL-MCNC: 83 MG/DL (ref 74–99)
GLUCOSE UR STRIP.AUTO-MCNC: NEGATIVE MG/DL
HCT VFR BLD AUTO: 31.8 % (ref 35–45)
HGB BLD-MCNC: 9.9 G/DL (ref 12–16)
HGB UR QL STRIP: ABNORMAL
HYALINE CASTS URNS QL MICRO: ABNORMAL /LPF (ref 0–2)
KETONES UR QL STRIP.AUTO: NEGATIVE MG/DL
LEUKOCYTE ESTERASE UR QL STRIP.AUTO: NEGATIVE
LYMPHOCYTES # BLD: 1.1 K/UL (ref 0.9–3.6)
LYMPHOCYTES NFR BLD: 11 % (ref 21–52)
MAGNESIUM SERPL-MCNC: 2.1 MG/DL (ref 1.6–2.6)
MCH RBC QN AUTO: 26.1 PG (ref 24–34)
MCHC RBC AUTO-ENTMCNC: 31.1 G/DL (ref 31–37)
MCV RBC AUTO: 83.7 FL (ref 74–97)
MONOCYTES # BLD: 1.1 K/UL (ref 0.05–1.2)
MONOCYTES NFR BLD: 11 % (ref 3–10)
MUCOUS THREADS URNS QL MICRO: ABNORMAL /LPF
NEUTS SEG # BLD: 7.8 K/UL (ref 1.8–8)
NEUTS SEG NFR BLD: 77 % (ref 40–73)
NITRITE UR QL STRIP.AUTO: NEGATIVE
PH UR STRIP: 5 [PH] (ref 5–8)
PLATELET # BLD AUTO: 388 K/UL (ref 135–420)
PMV BLD AUTO: 8.3 FL (ref 9.2–11.8)
POTASSIUM SERPL-SCNC: 4.4 MMOL/L (ref 3.5–5.5)
PROT UR STRIP-MCNC: ABNORMAL MG/DL
Q-T INTERVAL, ECG07: 342 MS
QRS DURATION, ECG06: 94 MS
QTC CALCULATION (BEZET), ECG08: 401 MS
RBC # BLD AUTO: 3.8 M/UL (ref 4.2–5.3)
RBC #/AREA URNS HPF: ABNORMAL /HPF (ref 0–5)
SODIUM SERPL-SCNC: 134 MMOL/L (ref 136–145)
SP GR UR REFRACTOMETRY: 1.02 (ref 1–1.03)
TROPONIN I SERPL-MCNC: 0.13 NG/ML (ref 0–0.04)
TROPONIN I SERPL-MCNC: 0.19 NG/ML (ref 0–0.04)
UROBILINOGEN UR QL STRIP.AUTO: 1 EU/DL (ref 0.2–1)
VENTRICULAR RATE, ECG03: 83 BPM
WBC # BLD AUTO: 10.1 K/UL (ref 4.6–13.2)
WBC URNS QL MICRO: ABNORMAL /HPF (ref 0–4)

## 2019-11-21 PROCEDURE — 83880 ASSAY OF NATRIURETIC PEPTIDE: CPT

## 2019-11-21 PROCEDURE — 70551 MRI BRAIN STEM W/O DYE: CPT

## 2019-11-21 PROCEDURE — 74011250636 HC RX REV CODE- 250/636: Performed by: HOSPITALIST

## 2019-11-21 PROCEDURE — 97602 WOUND(S) CARE NON-SELECTIVE: CPT

## 2019-11-21 PROCEDURE — 36415 COLL VENOUS BLD VENIPUNCTURE: CPT

## 2019-11-21 PROCEDURE — 85025 COMPLETE CBC W/AUTO DIFF WBC: CPT

## 2019-11-21 PROCEDURE — 99218 HC RM OBSERVATION: CPT

## 2019-11-21 PROCEDURE — 83735 ASSAY OF MAGNESIUM: CPT

## 2019-11-21 PROCEDURE — 94762 N-INVAS EAR/PLS OXIMTRY CONT: CPT

## 2019-11-21 PROCEDURE — 70450 CT HEAD/BRAIN W/O DYE: CPT

## 2019-11-21 PROCEDURE — 93005 ELECTROCARDIOGRAM TRACING: CPT

## 2019-11-21 PROCEDURE — 71045 X-RAY EXAM CHEST 1 VIEW: CPT

## 2019-11-21 PROCEDURE — 96372 THER/PROPH/DIAG INJ SC/IM: CPT

## 2019-11-21 PROCEDURE — 77030037877 HC DRSG MEPILEX >48IN BORD MOLN -A

## 2019-11-21 PROCEDURE — 3331090002 HH PPS REVENUE DEBIT

## 2019-11-21 PROCEDURE — 74011250636 HC RX REV CODE- 250/636: Performed by: EMERGENCY MEDICINE

## 2019-11-21 PROCEDURE — 74011250637 HC RX REV CODE- 250/637: Performed by: HOSPITALIST

## 2019-11-21 PROCEDURE — 80048 BASIC METABOLIC PNL TOTAL CA: CPT

## 2019-11-21 PROCEDURE — 99285 EMERGENCY DEPT VISIT HI MDM: CPT

## 2019-11-21 PROCEDURE — 3331090001 HH PPS REVENUE CREDIT

## 2019-11-21 PROCEDURE — 82550 ASSAY OF CK (CPK): CPT

## 2019-11-21 PROCEDURE — 77030038269 HC DRN EXT URIN PURWCK BARD -A

## 2019-11-21 PROCEDURE — 81001 URINALYSIS AUTO W/SCOPE: CPT

## 2019-11-21 RX ORDER — GUAIFENESIN 100 MG/5ML
81 LIQUID (ML) ORAL DAILY
Status: DISCONTINUED | OUTPATIENT
Start: 2019-11-22 | End: 2019-11-26 | Stop reason: HOSPADM

## 2019-11-21 RX ORDER — ONDANSETRON 2 MG/ML
4 INJECTION INTRAMUSCULAR; INTRAVENOUS
Status: DISCONTINUED | OUTPATIENT
Start: 2019-11-21 | End: 2019-11-26 | Stop reason: HOSPADM

## 2019-11-21 RX ORDER — DIGOXIN 125 MCG
0.12 TABLET ORAL
Status: DISCONTINUED | OUTPATIENT
Start: 2019-11-22 | End: 2019-11-26 | Stop reason: HOSPADM

## 2019-11-21 RX ORDER — TRAMADOL HYDROCHLORIDE 50 MG/1
50 TABLET ORAL
Status: DISCONTINUED | OUTPATIENT
Start: 2019-11-21 | End: 2019-11-26 | Stop reason: HOSPADM

## 2019-11-21 RX ORDER — MIRTAZAPINE 15 MG/1
15 TABLET, FILM COATED ORAL
Status: DISCONTINUED | OUTPATIENT
Start: 2019-11-21 | End: 2019-11-26 | Stop reason: HOSPADM

## 2019-11-21 RX ORDER — CARBOXYMETHYLCELLULOSE SODIUM 5 MG/ML
1 SOLUTION/ DROPS OPHTHALMIC
Status: DISCONTINUED | OUTPATIENT
Start: 2019-11-21 | End: 2019-11-26 | Stop reason: HOSPADM

## 2019-11-21 RX ORDER — HEPARIN SODIUM 5000 [USP'U]/ML
5000 INJECTION, SOLUTION INTRAVENOUS; SUBCUTANEOUS EVERY 8 HOURS
Status: DISCONTINUED | OUTPATIENT
Start: 2019-11-21 | End: 2019-11-26 | Stop reason: HOSPADM

## 2019-11-21 RX ORDER — NALOXONE HYDROCHLORIDE 0.4 MG/ML
0.4 INJECTION, SOLUTION INTRAMUSCULAR; INTRAVENOUS; SUBCUTANEOUS AS NEEDED
Status: DISCONTINUED | OUTPATIENT
Start: 2019-11-21 | End: 2019-11-26 | Stop reason: HOSPADM

## 2019-11-21 RX ORDER — FLUOXETINE 10 MG/1
20 CAPSULE ORAL DAILY
Status: DISCONTINUED | OUTPATIENT
Start: 2019-11-22 | End: 2019-11-26 | Stop reason: HOSPADM

## 2019-11-21 RX ORDER — DIGOXIN 125 MCG
0.25 TABLET ORAL
Status: DISCONTINUED | OUTPATIENT
Start: 2019-11-24 | End: 2019-11-26 | Stop reason: HOSPADM

## 2019-11-21 RX ORDER — FUROSEMIDE 40 MG/1
40 TABLET ORAL DAILY
Status: DISCONTINUED | OUTPATIENT
Start: 2019-11-22 | End: 2019-11-26 | Stop reason: HOSPADM

## 2019-11-21 RX ORDER — ACETAMINOPHEN 325 MG/1
650 TABLET ORAL
Status: DISCONTINUED | OUTPATIENT
Start: 2019-11-21 | End: 2019-11-26 | Stop reason: HOSPADM

## 2019-11-21 RX ADMIN — MIRTAZAPINE 15 MG: 15 TABLET, FILM COATED ORAL at 22:29

## 2019-11-21 RX ADMIN — HEPARIN SODIUM 5000 UNITS: 5000 INJECTION INTRAVENOUS; SUBCUTANEOUS at 22:28

## 2019-11-21 RX ADMIN — SODIUM CHLORIDE 1000 ML: 900 INJECTION, SOLUTION INTRAVENOUS at 13:36

## 2019-11-21 NOTE — H&P
History and Physical    Patient: Rick Brown               Sex: female          DOA: 11/21/2019       YOB: 1927      Age:  80 y.o.        LOS:  LOS: 0 days        CHIEF COMPLAINT: Syncope and Fall    Assessment/Plan:     Principal Problem:    Syncope (6/5/2019)    Active Problems:    Ischemic cardiomyopathy ()      Irritable bowel syndrome with diarrhea (9/24/2018)      Atrial fibrillation ([address] (10/1/2019)      Dizziness (11/21/2019)      PLAN:  Syncope   Unclear etiology but suspect vasovagal response from pain (3 times has happened while she was receiving leg wound care). Need to rule out stroke/TIA, arrythmia/bradycardia, hypotension  Check orthostatics  Place on cardiac monitor  Switch bumex to lasix (son-in-law states episodes were less frequent with lasix)  Reviewed [facility] note, will check MRI brain to r/o stroke    Chronic systolic CHF, not in exacerbation  NT-pro BNP elevated but within range of previous values; clinically appears euvolemic  Echo 6/2019: LVEF 31-35%   Cont Cardiac diet, digoxin  Switch bumex to lasix (son-in-law states when lasix switched to bumex 5-6 weeks ago he thinks there have been more episodes of syncope)  Cont ASA    Atrial fibrillation  Cont rate control  Cardiac monitoring     Depression  Cont prozac and remeron    Chronic LE wounds  Consult wound care team    Elevated troponin  Troponin 0.19, similar elevations in the past, pt denies any chest pain, EKG w/ afib and non-specific ST-T wave abnrmality  Will trend troponin    DVT prophylaxis  SubQ heparin      Code Status: DNR (advanced directive)      HPI:     Rick Brown is a 80 y.o. female with PMH as below who presents from assisted living due to two syncopal episodes today. Son-in-law states that pt was sitting in a chair this morning when she was receiving wound care for her leg wounds, when she suddenly had LOC for a few seconds.  He said it happened twice today, and then prior to this it happened once last week when she was at her vascular surgeon's office getting a leg wound blister drained. She also complains of feeling cold (chronic), generalized weakness, and headache for the last 1 day. She has had previous admissions for syncope workup most recently 1 month ago and 6 months ago. Last month she was seen by Cardiology for bradycardia and hypotension and had her beta blocker discontinued and increased on digoxin. In the ED workup was unremarkable. . [facility] consulted and recommended further workup to r/o infectious or metabolic causes and if unrevealing then a brain MRI. Past Medical History:   Diagnosis Date    A-fib [facility]) 01/2017    Arthritis, degenerative     CAD (coronary artery disease) 04/2012    S/P 2.75 X 15 mm BMS of OM (04/2012), Two LAD BMS (07/2012)    Elevated liver enzymes     Likely from statin    HLD (hyperlipidemia)     Hyperkalemia 06/2012    Likely from lisinopril    Ischemic cardiomyopathy     LVEF  30-35%(05/19) 45% (11/2012) & 30% echo (04/2012)    NSTEMI (non-ST elevated myocardial infarction) ([facility] [address] 04/2012    UTI (urinary tract infection)     Vertigo        Social History:  Social History     Socioeconomic History    Marital status:      Spouse name: Not on file    Number of children: Not on file    Years of education: Not on file    Highest education level: Not on file   Tobacco Use    Smoking status: Never Smoker    Smokeless tobacco: Never Used   Substance and Sexual Activity    Alcohol use: No    Drug use: No    Sexual activity: Never       Family History:  Family History   Problem Relation Age of Onset    Diabetes Mother        Allergies:   Allergies   Allergen Reactions    Flu Vaccine 2011 (36 Mos+)(Pf) Anaphylaxis    Codeine Anaphylaxis    Egg Not Reported This Time    Influenza Virus Vaccines Hives    Lipitor [Atorvastatin] Nausea Only    Pcn [Penicillins] Hives       Home Medications:  Prior to Admission medications    Medication Sig Start Date End Date Taking? Authorizing Provider   traMADol (ULTRAM) 50 mg tablet Take 50 mg by mouth every six (6) hours as needed for Pain. 11/9/19   Provider, Historical   vitamin a-vitamin c-vit e-min (OCUVITE) tablet Take 1 Tab by mouth daily. Gummy    Provider, Historical   multivit,calc,mins/iron/folic (ONE-A-DAY WOMENS FORMULA PO) Take  by mouth. 1 Gummy     Provider, Historical   fluticasone propionate (FLONASE) 50 mcg/actuation nasal spray 1 [place] by Both Nostrils route daily. Provider, Historical   ondansetron (ZOFRAN ODT) 4 mg disintegrating tablet Take 4 mg by mouth every eight (8) hours as needed for Nausea. Take 4 mg tab by mouth every eight (8) hours as needed for Nausea    Provider, Historical   bumetanide (BUMEX) 0.5 mg tablet Take 1 Tab by mouth daily. Patient taking differently: Take 1 mg by mouth daily. 10/9/19   Mike Iniguez MD   mirtazapine (REMERON) 15 mg tablet Take 1 Tab by mouth nightly. Indications: major depressive disorder 10/9/19   Mike Iniguez MD   digoxin Dom [place]efort) 0.125 mg tablet Take 1 Tab by mouth every Monday, Wednesday, Friday, Saturday AND 2 Tabs every Sunday, Tuesday, Thursday. Indications: Ventricular Rate Control in Atrial Fibrillation 10/9/19   Mike Iniguez MD   FLUoxetine (PROZAC) 10 mg tablet Take 1 Tab by mouth daily. Patient taking differently: Take 20 mg by mouth daily. 10/9/19   Mike Iniguez MD   hydrocortisone (CORTAID) 1 % topical cream Apply  to affected area two (2) times a day. use thin layer  Patient taking differently: Apply  to affected area two (2) times a day. use thin layer back and chest 10/9/19   hSaggy MUJICA MD   carboxymethylcellulose sodium (CELLUVISC) 0.5 % drop ophthalmic solution Administer 1 Drop to both eyes three (3) times daily as needed for Other (dry eyes). 10/9/19   Mike Iniguez MD   acetaminophen (TYLENOL) 325 mg tablet Take 650 mg by mouth every six (6) hours as needed for Pain.  2 tabs    Provider, Historical polyethylene glycol (MIRALAX) 17 gram/dose powder Take 17 g by mouth daily as needed (constipation). Provider, Historical   carboxymethylcellulos/glycerin (CLEAR EYES FOR DRY EYES OP) Administer 1 Drop to both eyes three (3) times daily as needed (dry eyes). Provider, Historical   loperamide (IMMODIUM) 2 mg tablet Take 1 mg by mouth three (3) times daily as needed for Diarrhea. Take 1 mg tab by mouth three (3) times daily as needed for Diarrhea. Provider, Historical   psyllium husk-aspartame (METAMUCIL FIBER) 3.4 gram pwpk packet Take 1 Packet by mouth daily. 11/9/18   Ross ORTIZ NP   aspirin 81 mg chewable tablet Take 1 Tab by mouth daily. 11/9/18   Ross ORTIZ NP   nitroglycerin (NITROSTAT) 0.4 mg SL tablet 1 Tab by SubLINGual route every five (5) minutes as needed for Chest Pain. Patient taking differently: 0.4 mg by SubLINGual route every five (5) minutes as needed for Chest Pain. For onset of chest pain, place 1 tab under the tongue. Nitroglycerin sublingual tabs usually give relief in 1 to 5 minutes. If the pain is not relieved, use a second tab 5 minutes after you take the first tab. If the pain continues for another 5 minutes, a third tab may be used. If you still have chest pain after a total of 3 tabs, contact your doctor or go to ED right away or call 911, if necessary. 10/31/17   Jasen Negron MD         Review of Systems  Review of Systems   Constitutional: Positive for malaise/fatigue. Negative for chills and fever. HENT: Negative for congestion and hearing loss. Eyes: Negative for blurred vision and double vision. Respiratory: Negative for cough and shortness of breath (chronic). Cardiovascular: Positive for leg swelling. Negative for chest pain and palpitations. Gastrointestinal: Positive for nausea and vomiting. Negative for abdominal pain. Genitourinary: Negative for dysuria and hematuria. Musculoskeletal: Negative for falls and myalgias.    Skin: Negative for rash.   Neurological: Positive for dizziness and loss of consciousness. Negative for focal weakness. Psychiatric/Behavioral: Negative. Objective:      Visit Vitals  /58   Pulse 86   Temp 97.6 °F (36.4 °C)   Resp 28   SpO2 99%       Physical Exam:  Gen: thin elderly female  Ears: hearing is intact. Right lateral periorbital and forehead hematoma (healing, from previous fall)  Eyes: Icterus is not present  Lungs: clear to auscultation bilaterally  Heart: irregularly irregular rate and rhythm, S1, S2 normal, no murmur, click, rub or gallop  Gastrointestinal: soft, non-tender. Bowel sounds normal. No masses,  no organomegaly  Neurological:  New Focal Deficits are not present  Ext: LE wounds wrapped in dressing  Psychiatric:  Mood is stable    Laboratory Studies:  CMP:   Lab Results   Component Value Date/Time     (L) 11/21/2019 12:17 PM    K 4.4 11/21/2019 12:17 PM     11/21/2019 12:17 PM    CO2 29 11/21/2019 12:17 PM    AGAP 3 11/21/2019 12:17 PM    GLU 83 11/21/2019 12:17 PM    BUN 26 (H) 11/21/2019 12:17 PM    CREA 1.15 11/21/2019 12:17 PM    GFRAA 53 (L) 11/21/2019 12:17 PM    GFRNA 44 (L) 11/21/2019 12:17 PM    CA 8.4 (L) 11/21/2019 12:17 PM    MG 2.1 11/21/2019 12:17 PM     CBC:   Lab Results   Component Value Date/Time    WBC 10.1 11/21/2019 11:25 AM    HGB 9.9 (L) 11/21/2019 11:25 AM    HCT 31.8 (L) 11/21/2019 11:25 AM     11/21/2019 11:25 AM     All Cardiac Markers in the last 24 hours:   Lab Results   Component Value Date/Time    CPK 36 11/21/2019 12:17 PM    CKMB 2.8 11/21/2019 12:17 PM    CKND1 7.8 (H) 11/21/2019 12:17 PM    TROIQ 0.19 (H) 11/21/2019 12:17 PM       Imaging:  XR CHEST PORT   Final Result   IMPRESSION:         1. Overall trend towards improvement from prior examination with improved   aeration of the lung bases and resolution of previously seen right-sided pleural   effusion. Potential minimal/trace left pleural effusion as noted on more distant   prior exams. CT HEAD WO CONT   Final Result   IMPRESSION:         1. No acute intracranial abnormality. 2. Mild periventricular white matter low-attenuation favored to reflect sequela   of chronic ischemic microvascular change.       MRI BRAIN WO CONT    (Results Pending)

## 2019-11-21 NOTE — ED NOTES
Report from [facility] (formerly [facility]3]. Assume care of patient at this time. Patient resting in position of comfort on stretcher,  at bedside.  Will continue to monitor

## 2019-11-21 NOTE — ED NOTES
TRANSFER - ED to INPATIENT REPORT:    SBAR report made available to receiving floor on this patient being transferred to [address] [place] [address] ([facility])  for routine progression of care       Admitting diagnosis Dizziness [R42]    Information from the following report(s) ED Summary was made available to receiving floor. Lines:   Peripheral IV 11/21/19 Right Forearm (Active)   Site Assessment Clean, dry, & intact 11/21/2019 11:28 AM   Phlebitis Assessment 0 11/21/2019 11:28 AM   Infiltration Assessment 0 11/21/2019 11:28 AM   Dressing Status Clean, dry, & intact 11/21/2019 11:28 AM   Dressing Type Transparent 11/21/2019 11:28 AM   Hub Color/Line Status Pink;Flushed 11/21/2019 11:28 AM        Medication list unable to confirm    Opportunity for questions and clarification was provided.       Patient is oriented to time, place, person and situation none  Patient is  incontinent and ambulatory with assist     Valuables given to family at patients request     Patient transported with:   Monitor    MAP (Monitor): 70 =Monitored (most recent)  Vitals w/ MEWS Score (last day)     Date/Time MEWS Score Pulse Resp Temp BP Level of Consciousness SpO2    11/21/19 1345    86      107/58    99 %    11/21/19 1315    92      125/52    99 %    11/21/19 1300    84      103/65    98 %    11/21/19 1230    92      113/62    99 %    11/21/19 1200    88  28    106/60    100 %    11/21/19 1145    93  29    92/74    100 %    11/21/19 1100    81  22    110/62    100 %    11/21/19 1045    84  24    114/51    100 %    11/21/19 1030    86  25    116/45    99 %    11/21/19 1015    83  27    107/62    100 %    11/21/19 1004  2  83  22  97.6 °F (36.4 °C)  119/56  Alert  99 %    11/21/19 1000    86  25    121/51    100 %              Septic Patients:     Lactic Acid  Lab Results   Component Value Date    LACPOC 0.85 11/06/2019    LACPOC 1.2 10/20/2018    (Most recent on top)  Repeat drawn: NO Time: n     ALL LACTIC ACIDS GREATER THAN 2 MUST BE REPEATED POC WITHIN 4 HOURS OR PRIOR TO ADMISSION               Total Fluid Bolus initiated and documented on MAR: NO    All ordered antibiotics initiated within first 3 hours of TIME ZERO?   NO

## 2019-11-21 NOTE — ROUTINE PROCESS
1800 Patient admitted from ED. Patient alert and oriented x 4. Son at bedside. MRI screening form done. 1830 Patient off the floor to MRI.

## 2019-11-21 NOTE — ED TRIAGE NOTES
Pt arrived to ed via ems for c/o syncopal episode. Per EMS, pt being evaluated by provider at [address] [place] [address] when had a syncopal episode. Pt has bruise on right side of face from previous syncopal episode. Pt states hit her head when fell, no new signs of injury noted.   No use of thinners

## 2019-11-21 NOTE — ED PROVIDER NOTES
[facility] EMERGENCY DEPT      10:53 AM    Date: 11/21/2019  Patient Name: Maritza Terrazas    History of Presenting Illness     Chief Complaint   Patient presents with    Syncope    Fall       89 y.o. female with noted past medical history who presents to the emergency department with dizziness and nausea. Patient states that she fell about a week ago and sustained a periorbital wound that is currently now purple-red to green in color. She states that pain in that area is doing much better and that that was not sustained with her most recent presentation today. Today the patient states that she had some dizziness with associated nausea. She describes it as vertiginous in nature with associated nausea. No vomiting. No chest pain diaphoresis or palpitations. She states that with this dizziness and associated nausea she sat down but did not fall or have any head injury or any other injury. Patient denies any other associated signs or symptoms. Patient denies any other complaints. 11:57 AM  His son is at the bedside was able to speak to me as well as the tele-neurologist.  He states the patient had a slowly worsening decline over the last week to week and a half with decreased p.o. intake and the patient being weaker. In fact he was able to know that she had a very brief episode of loss of consciousness today. Nursing notes regarding the HPI and triage nursing notes were reviewed. Prior medical records were reviewed. Current Outpatient Medications   Medication Sig Dispense Refill    traMADol (ULTRAM) 50 mg tablet Take 50 mg by mouth every six (6) hours as needed for Pain.  vitamin a-vitamin c-vit e-min (OCUVITE) tablet Take 1 Tab by mouth daily. Gummy      multivit,calc,mins/iron/folic (ONE-A-DAY WOMENS FORMULA PO) Take  by mouth. 1 Gummy       fluticasone propionate (FLONASE) 50 mcg/actuation nasal spray 1 [place] by Both Nostrils route daily.       ondansetron (ZOFRAN ODT) 4 mg disintegrating tablet Take 4 mg by mouth every eight (8) hours as needed for Nausea. Take 4 mg tab by mouth every eight (8) hours as needed for Nausea      bumetanide (BUMEX) 0.5 mg tablet Take 1 Tab by mouth daily. (Patient taking differently: Take 1 mg by mouth daily. ) 15 Tab 0    mirtazapine (REMERON) 15 mg tablet Take 1 Tab by mouth nightly. Indications: major depressive disorder 30 Tab 0    digoxin (LANOXIN) 0.125 mg tablet Take 1 Tab by mouth every Monday, Wednesday, Friday, Saturday AND 2 Tabs every Sunday, Tuesday, Thursday. Indications: Ventricular Rate Control in Atrial Fibrillation 45 Tab 0    FLUoxetine (PROZAC) 10 mg tablet Take 1 Tab by mouth daily. (Patient taking differently: Take 20 mg by mouth daily.) 90 Tab 3    hydrocortisone (CORTAID) 1 % topical cream Apply  to affected area two (2) times a day. use thin layer (Patient taking differently: Apply  to affected area two (2) times a day. use thin layer back and chest) 30 g 0    carboxymethylcellulose sodium (CELLUVISC) 0.5 % drop ophthalmic solution Administer 1 Drop to both eyes three (3) times daily as needed for Other (dry eyes). 1 Bottle 0    acetaminophen (TYLENOL) 325 mg tablet Take 650 mg by mouth every six (6) hours as needed for Pain. 2 tabs      polyethylene glycol (MIRALAX) 17 gram/dose powder Take 17 g by mouth daily as needed (constipation).  carboxymethylcellulos/glycerin (CLEAR EYES FOR DRY EYES OP) Administer 1 Drop to both eyes three (3) times daily as needed (dry eyes).  loperamide (IMMODIUM) 2 mg tablet Take 1 mg by mouth three (3) times daily as needed for Diarrhea. Take 1 mg tab by mouth three (3) times daily as needed for Diarrhea.  psyllium husk-aspartame (METAMUCIL FIBER) 3.4 gram pwpk packet Take 1 Packet by mouth daily. 30 Packet 0    aspirin 81 mg chewable tablet Take 1 Tab by mouth daily.  30 Tab 0    nitroglycerin (NITROSTAT) 0.4 mg SL tablet 1 Tab by SubLINGual route every five (5) minutes as needed for Chest Pain. (Patient taking differently: 0.4 mg by SubLINGual route every five (5) minutes as needed for Chest Pain. For onset of chest pain, place 1 tab under the tongue. Nitroglycerin sublingual tabs usually give relief in 1 to 5 minutes. If the pain is not relieved, use a second tab 5 minutes after you take the first tab. If the pain continues for another 5 minutes, a third tab may be used. If you still have chest pain after a total of 3 tabs, contact your doctor or go to ED right away or call 911, if necessary.) 25 Tab 3       Past History     Past Medical History:  Past Medical History:   Diagnosis Date    A-fib ([facility] [address] 01/2017    Arthritis, degenerative     CAD (coronary artery disease) 04/2012    S/P 2.75 X 15 mm BMS of OM (04/2012), Two LAD BMS (07/2012)    Elevated liver enzymes     Likely from statin    HLD (hyperlipidemia)     Hyperkalemia 06/2012    Likely from lisinopril    Ischemic cardiomyopathy     LVEF  30-35%(05/19) 45% (11/2012) & 30% echo (04/2012)    NSTEMI (non-ST elevated myocardial infarction) ([facility] [address] 04/2012    UTI (urinary tract infection)     Vertigo        Past Surgical History:  Past Surgical History:   Procedure Laterality Date    HX CORONARY STENT PLACEMENT  4/23/12    bare metal stent to obtuse marginal branch    HX HEART CATHETERIZATION         Family History:  Family History   Problem Relation Age of Onset    Diabetes Mother        Social History:  Social History     Tobacco Use    Smoking status: Never Smoker    Smokeless tobacco: Never Used   Substance Use Topics    Alcohol use: No    Drug use: No       Allergies:   Allergies   Allergen Reactions    Flu Vaccine 2011 (36 Mos+)(Pf) Anaphylaxis    Codeine Anaphylaxis    Egg Not Reported This Time    Influenza Virus Vaccines Hives    Lipitor [Atorvastatin] Nausea Only    Pcn [Penicillins] Hives       Patient's primary care provider (as noted in EPIC):  Ophelia Esteves MD    Review of Systems Constitutional: Negative for diaphoresis. HENT: Negative for congestion. Eyes: Negative for discharge. Respiratory: Negative for stridor. Cardiovascular: Negative for palpitations. Gastrointestinal: Negative for diarrhea. Endocrine: Negative for heat intolerance. Genitourinary: Negative for flank pain. Musculoskeletal: Negative for back pain. Neurological: Positive for dizziness. Negative for tremors, seizures, facial asymmetry, speech difficulty, weakness, numbness and headaches. Psychiatric/Behavioral: Negative for hallucinations. All other systems reviewed and are negative. Visit Vitals  BP 92/74   Pulse 93   Temp 97.6 °F (36.4 °C)   Resp 29   SpO2 100%       PHYSICAL EXAM:    CONSTITUTIONAL:  Alert, in no apparent distress;  well developed;  well nourished. HEAD:  Normocephalic. Right periorbital face has a purple-red to green discoloration consistent with a subcutaneous hematoma, subacute. EYES:  EOMI. Non-icteric sclera. Normal conjunctiva. ENTM:  Nose:  no rhinorrhea. Throat:  no erythema or exudate, mucous membranes moist.  NECK:  No JVD. Supple  RESPIRATORY:  Chest clear, equal breath sounds, good air movement. CARDIOVASCULAR:  Regular rate and rhythm. No murmurs, rubs, or gallops. GI:  Normal bowel sounds, abdomen soft and non-tender. No rebound or guarding. BACK:  Non-tender. UPPER EXT:  Normal inspection. LOWER EXT:  No edema, no calf tenderness. Distal pulses intact. NEURO:  Moves all four extremities. Normal motor exam and sensation in all four extremities. Normal CN II-XII exam.  Normal bilateral finger-to-nose exam.      SKIN:  No rashes;  Normal for age. PSYCH:  Alert and normal affect.     DIFFERENTIAL DIAGNOSES/ MEDICAL DECISION MAKING:   Dehydration, hyperglycemia-induced weakness, electrolyte and/or endocrine imbalance, CVA, intracranial hemorrhage, sepsis, cardiac arrhythmia, central versus peripheral vertigo, illicit drug intoxication, alcohol intoxication, prescribed drug toxicity, pregnancy in females patients, anxiety disorder, versus other etiologies or a combination of the above.       ED COURSE:      Diagnostic Study Results     Abnormal lab results from this emergency department encounter:  Labs Reviewed   CBC WITH AUTOMATED DIFF - Abnormal; Notable for the following components:       Result Value    RBC 3.80 (*)     HGB 9.9 (*)     HCT 31.8 (*)     RDW 17.9 (*)     MPV 8.3 (*)     NEUTROPHILS 77 (*)     LYMPHOCYTES 11 (*)     MONOCYTES 11 (*)     All other components within normal limits   URINALYSIS W/ RFLX MICROSCOPIC - Abnormal; Notable for the following components:    Protein TRACE (*)     Blood TRACE (*)     All other components within normal limits   METABOLIC PANEL, BASIC - Abnormal; Notable for the following components:    Sodium 134 (*)     BUN 26 (*)     BUN/Creatinine ratio 23 (*)     GFR est AA 53 (*)     GFR est non-AA 44 (*)     Calcium 8.4 (*)     All other components within normal limits   CARDIAC PANEL,(CK, CKMB & TROPONIN) - Abnormal; Notable for the following components:    CK-MB Index 7.8 (*)     Troponin-I, QT 0.19 (*)     All other components within normal limits   MAGNESIUM   URINE MICROSCOPIC ONLY       Lab values for this patient within approximately the last 12 hours:  Recent Results (from the past 12 hour(s))   EKG, 12 LEAD, INITIAL    Collection Time: 11/21/19 10:18 AM   Result Value Ref Range    Ventricular Rate 83 BPM    QRS Duration 94 ms    Q-T Interval 342 ms    QTC Calculation (Bezet) 401 ms    Calculated R Axis 99 degrees    Calculated T Axis 128 degrees    Diagnosis       Atrial fibrillation  Rightward axis  Nonspecific ST and T wave abnormality  Abnormal ECG  When compared with ECG of 03-NOV-2019 11:30,  Nonspecific T wave abnormality has replaced inverted T waves in Inferior   leads  Nonspecific T wave abnormality, improved in Anterior leads  Nonspecific T wave abnormality, worse in Lateral leads     CBC WITH AUTOMATED DIFF    Collection Time: 11/21/19 11:25 AM   Result Value Ref Range    WBC 10.1 4.6 - 13.2 K/uL    RBC 3.80 (L) 4.20 - 5.30 M/uL    HGB 9.9 (L) 12.0 - 16.0 g/dL    HCT 31.8 (L) 35.0 - 45.0 %    MCV 83.7 74.0 - 97.0 FL    MCH 26.1 24.0 - 34.0 PG    MCHC 31.1 31.0 - 37.0 g/dL    RDW 17.9 (H) 11.6 - 14.5 %    PLATELET 676 053 - 876 K/uL    MPV 8.3 (L) 9.2 - 11.8 FL    NEUTROPHILS 77 (H) 40 - 73 %    LYMPHOCYTES 11 (L) 21 - 52 %    MONOCYTES 11 (H) 3 - 10 %    EOSINOPHILS 1 0 - 5 %    BASOPHILS 0 0 - 2 %    ABS. NEUTROPHILS 7.8 1.8 - 8.0 K/UL    ABS. LYMPHOCYTES 1.1 0.9 - 3.6 K/UL    ABS. MONOCYTES 1.1 0.05 - 1.2 K/UL    ABS. EOSINOPHILS 0.1 0.0 - 0.4 K/UL    ABS.  BASOPHILS 0.0 0.0 - 0.1 K/UL    DF AUTOMATED     METABOLIC PANEL, BASIC    Collection Time: 11/21/19 12:17 PM   Result Value Ref Range    Sodium 134 (L) 136 - 145 mmol/L    Potassium 4.4 3.5 - 5.5 mmol/L    Chloride 102 100 - 111 mmol/L    CO2 29 21 - 32 mmol/L    Anion gap 3 3.0 - 18 mmol/L    Glucose 83 74 - 99 mg/dL    BUN 26 (H) 7.0 - 18 MG/DL    Creatinine 1.15 0.6 - 1.3 MG/DL    BUN/Creatinine ratio 23 (H) 12 - 20      GFR est AA 53 (L) >60 ml/min/1.73m2    GFR est non-AA 44 (L) >60 ml/min/1.73m2    Calcium 8.4 (L) 8.5 - 10.1 MG/DL   MAGNESIUM    Collection Time: 11/21/19 12:17 PM   Result Value Ref Range    Magnesium 2.1 1.6 - 2.6 mg/dL   CARDIAC PANEL,(CK, CKMB & TROPONIN)    Collection Time: 11/21/19 12:17 PM   Result Value Ref Range    CK 36 26 - 192 U/L    CK - MB 2.8 <3.6 ng/ml    CK-MB Index 7.8 (H) 0.0 - 4.0 %    Troponin-I, QT 0.19 (H) 0.0 - 0.045 NG/ML   URINALYSIS W/ RFLX MICROSCOPIC    Collection Time: 11/21/19 12:44 PM   Result Value Ref Range    Color YELLOW      Appearance CLEAR      Specific gravity 1.023 1.005 - 1.030      pH (UA) 5.0 5.0 - 8.0      Protein TRACE (A) NEG mg/dL    Glucose NEGATIVE  NEG mg/dL    Ketone NEGATIVE  NEG mg/dL    Bilirubin NEGATIVE  NEG      Blood TRACE (A) NEG      Urobilinogen 1.0 0.2 - 1.0 EU/dL Nitrites NEGATIVE  NEG      Leukocyte Esterase NEGATIVE  NEG         Radiologist and cardiologist interpretations if available at time of this note:  Ct Head Wo Cont    Result Date: 11/21/2019  EXAM: CT head INDICATION: Syncopal episode, vertigo, trauma COMPARISON: CT head 11/11/2019 TECHNIQUE: Axial CT imaging of the head was performed without intravenous contrast. Standard multiplanar coronal and sagittal reformatted images were obtained and are included in interpretation. One or more dose reduction techniques were used on this CT: automated exposure control, adjustment of the mAs and/or kVp according to patient size, and iterative reconstruction techniques. The specific techniques used on this CT exam have been documented in the patient's electronic medical record. Digital Imaging and Communications in Medicine (DICOM) format image data are available to nonaffiliated external healthcare facilities or entities on a secure, media free, reciprocally searchable basis with patient authorization for at least a 12-month period after this study. _______________ FINDINGS: BRAIN AND POSTERIOR FOSSA: There is mild cortical and cerebellar volume loss demonstrated, within normal limits for patient age. The ventricular size and configuration remains stable. Basilar cisterns are patent. Very mild periventricular white matter low-attenuation is previously. There is no intracranial hemorrhage, mass effect, or midline shift. The gray-white matter differentiation is within normal limits. EXTRA-AXIAL SPACES AND MENINGES: There is no acute extra-axial fluid collection. CALVARIUM: Intact. SINUSES: The imaged paranasal sinuses and mastoid air cells are clear. OTHER: None. _______________     IMPRESSION: 1. No acute intracranial abnormality. 2. Mild periventricular white matter low-attenuation favored to reflect sequela of chronic ischemic microvascular change.     Xr Chest Port    Result Date: 11/21/2019  EXAM: XR CHEST PORT CLINICAL INDICATION/HISTORY: Chest pain and shortness of breath -Additional: None COMPARISON: Radiographs of the chest 10/28/2018 TECHNIQUE: Frontal view of the chest _______________ FINDINGS: HEART AND MEDIASTINUM: Stable appearing cardiac size and mediastinal contours. Mild tortuosity of the thoracic aorta as previously. Cardiac size upper limits of normal for AP technique. LUNGS AND PLEURAL SPACES: Improved pulmonary aeration. Resolved right-sided pleural effusion. Improved left retrocardiac aeration. Likely trace residual left pleural effusion noted. No pneumothorax or pneumonic consolidation. BONY THORAX AND SOFT TISSUES: No acute osseous abnormality present. _______________     IMPRESSION: 1. Overall trend towards improvement from prior examination with improved aeration of the lung bases and resolution of previously seen right-sided pleural effusion. Potential minimal/trace left pleural effusion as noted on more distant prior exams. Medication(s) ordered for patient during this emergency visit encounter:  Medications   sodium chloride 0.9 % bolus infusion 1,000 mL (1,000 mL IntraVENous New Bag 11/21/19 1336)       Medical Decision Making     I am the first provider for this patient. I reviewed the vital signs, available nursing notes, past medical history, past surgical history, family history and social history. Vital Signs:  Reviewed the patient's vital signs.          Initial EKG interpretation by attending emergency physician: Atrial fibrillation about 85 bpm.    While unlikely, will assess the posterior fossa for hemorrhage with CT scan and will order routine labs seeking occult infection, metabolic derangement or evidence of anemia, acute worsening renal impairment, ACS/AMI (doubt), etc. My initial bedside impression is that this workup will likely be unremarkable and that the patient will ultimately be able to be discharged home, with instructions for further outpatient follow up and reevaluation by the patient's physician team.    Consult Teleneurology    The on call neurologist was called and the patient was presented for neurology consult. I personally spoke with Dr. Annabel Napier, in the teleneurology group, about the patient's presentation and management. Recommends from the teleneurologist are:  MRI if dizziness does not improve with medical interventions. Admit to Hospitalist    The patient was presented to the accepting hospitalist, Dr. Jo Ann Cabrales. The patient's primary doctor is Jaquan Brownlee MD, and admissions for this physician are with the hospitalist.  If the patient has no primary doctor, then admission is to the hospitalist as well. As the emergency physician, I wrote courtesy admission orders for the hospitalist physician. The courtesy orders included explicit instructions for the floor nursing staff to call the admitting attending physician upon patient arrival on the floor. SPECIFIC PATIENT INSTRUCTIONS FROM THE PHYSICIAN WHO TREATED YOU IN THE ER TODAY:  1. Return if any concerns or worsening of condition(s). 2. FOLLOW UP APPOINTMENT:  Your primary doctor in 1-2 days. Patient is improved, resting quietly and comfortably. The patient will be discharged home. The patient was reassured that these symptoms do not appear to represent a serious or life threatening condition at this time. Warning signs of worsening condition were discussed and understood by the patient. Based on patient's age, coexisting illness, exam, and the results of this ED evaluation, the decision to treat as an outpatient was made. Based on the information available at time of discharge, acute pathology requiring immediate intervention was deemed relative unlikely. While it is impossible to completely exclude the possibility of underlying serious disease or worsening of condition, I feel the relative likelihood is extremely low.  I discussed this uncertainty with the patient, who understood ED evaluation and treatment and felt comfortable with the outpatient treatment plan. All questions regarding care, test results, and follow up were answered. The patient is stable and appropriate to discharge. They understand that they should return to the emergency department for any new or worsening symptoms. I stressed the importance of follow up for repeat assessment and possibly further evaluation/treatment. Dictation disclaimer:  Please note that this dictation was completed with [employer], the computer voice recognition software. Quite often unanticipated grammatical, syntax, homophones, and other interpretive errors are inadvertently transcribed by the computer software. Please disregard these errors. Please excuse any errors that have escaped final proofreading. Coding Diagnoses     Clinical Impression:   1. Vertigo    2. Nausea without vomiting    3. Contusion of face, subsequent encounter    4. Elevated troponin        Disposition     Disposition:  Admit. Marie Chappell M.D. JEFRY Board Certified Emergency Physician    Provider Attestation:  If a scribe was utilized in generation of this patient record, I personally performed the services described in the documentation, reviewed the documentation, as recorded by the scribe in my presence, and it accurately records the patient's history of presenting illness, review of systems, patient physical examination, and procedures performed by me as the attending physician. JOHANN Concepcion Board Certified Emergency Physician  11/21/2019.  10:55 AM

## 2019-11-22 LAB
ANION GAP SERPL CALC-SCNC: 7 MMOL/L (ref 3–18)
BASOPHILS # BLD: 0 K/UL (ref 0–0.1)
BASOPHILS NFR BLD: 0 % (ref 0–2)
BUN SERPL-MCNC: 22 MG/DL (ref 7–18)
BUN/CREAT SERPL: 23 (ref 12–20)
CALCIUM SERPL-MCNC: 8.1 MG/DL (ref 8.5–10.1)
CHLORIDE SERPL-SCNC: 103 MMOL/L (ref 100–111)
CK MB CFR SERPL CALC: 7.6 % (ref 0–4)
CK MB CFR SERPL CALC: 8.3 % (ref 0–4)
CK MB SERPL-MCNC: 1.3 NG/ML (ref 5–25)
CK MB SERPL-MCNC: 1.5 NG/ML (ref 5–25)
CK SERPL-CCNC: 17 U/L (ref 26–192)
CK SERPL-CCNC: 18 U/L (ref 26–192)
CO2 SERPL-SCNC: 25 MMOL/L (ref 21–32)
CREAT SERPL-MCNC: 0.94 MG/DL (ref 0.6–1.3)
DIFFERENTIAL METHOD BLD: ABNORMAL
EOSINOPHIL # BLD: 0 K/UL (ref 0–0.4)
EOSINOPHIL NFR BLD: 0 % (ref 0–5)
ERYTHROCYTE [DISTWIDTH] IN BLOOD BY AUTOMATED COUNT: 17.8 % (ref 11.6–14.5)
GLUCOSE SERPL-MCNC: 84 MG/DL (ref 74–99)
HCT VFR BLD AUTO: 28.5 % (ref 35–45)
HGB BLD-MCNC: 9 G/DL (ref 12–16)
LYMPHOCYTES # BLD: 1.1 K/UL (ref 0.9–3.6)
LYMPHOCYTES NFR BLD: 9 % (ref 21–52)
MAGNESIUM SERPL-MCNC: 1.9 MG/DL (ref 1.6–2.6)
MCH RBC QN AUTO: 26.2 PG (ref 24–34)
MCHC RBC AUTO-ENTMCNC: 31.6 G/DL (ref 31–37)
MCV RBC AUTO: 82.8 FL (ref 74–97)
MONOCYTES # BLD: 1.1 K/UL (ref 0.05–1.2)
MONOCYTES NFR BLD: 9 % (ref 3–10)
NEUTS SEG # BLD: 10 K/UL (ref 1.8–8)
NEUTS SEG NFR BLD: 82 % (ref 40–73)
PHOSPHATE SERPL-MCNC: 2.8 MG/DL (ref 2.5–4.9)
PLATELET # BLD AUTO: 389 K/UL (ref 135–420)
PMV BLD AUTO: 8.4 FL (ref 9.2–11.8)
POTASSIUM SERPL-SCNC: 5.5 MMOL/L (ref 3.5–5.5)
RBC # BLD AUTO: 3.44 M/UL (ref 4.2–5.3)
SODIUM SERPL-SCNC: 135 MMOL/L (ref 136–145)
TROPONIN I SERPL-MCNC: 0.12 NG/ML (ref 0–0.04)
TROPONIN I SERPL-MCNC: 0.14 NG/ML (ref 0–0.04)
WBC # BLD AUTO: 12.3 K/UL (ref 4.6–13.2)

## 2019-11-22 PROCEDURE — 85025 COMPLETE CBC W/AUTO DIFF WBC: CPT

## 2019-11-22 PROCEDURE — 74011250637 HC RX REV CODE- 250/637: Performed by: HOSPITALIST

## 2019-11-22 PROCEDURE — 65660000000 HC RM CCU STEPDOWN

## 2019-11-22 PROCEDURE — 97116 GAIT TRAINING THERAPY: CPT

## 2019-11-22 PROCEDURE — 99218 HC RM OBSERVATION: CPT

## 2019-11-22 PROCEDURE — 97161 PT EVAL LOW COMPLEX 20 MIN: CPT

## 2019-11-22 PROCEDURE — 84100 ASSAY OF PHOSPHORUS: CPT

## 2019-11-22 PROCEDURE — 83735 ASSAY OF MAGNESIUM: CPT

## 2019-11-22 PROCEDURE — 77030038269 HC DRN EXT URIN PURWCK BARD -A

## 2019-11-22 PROCEDURE — 36415 COLL VENOUS BLD VENIPUNCTURE: CPT

## 2019-11-22 PROCEDURE — 3331090002 HH PPS REVENUE DEBIT

## 2019-11-22 PROCEDURE — 96372 THER/PROPH/DIAG INJ SC/IM: CPT

## 2019-11-22 PROCEDURE — 3331090001 HH PPS REVENUE CREDIT

## 2019-11-22 PROCEDURE — 80048 BASIC METABOLIC PNL TOTAL CA: CPT

## 2019-11-22 PROCEDURE — 82550 ASSAY OF CK (CPK): CPT

## 2019-11-22 PROCEDURE — 74011250636 HC RX REV CODE- 250/636: Performed by: HOSPITALIST

## 2019-11-22 RX ADMIN — HEPARIN SODIUM 5000 UNITS: 5000 INJECTION INTRAVENOUS; SUBCUTANEOUS at 06:23

## 2019-11-22 RX ADMIN — HEPARIN SODIUM 5000 UNITS: 5000 INJECTION INTRAVENOUS; SUBCUTANEOUS at 14:06

## 2019-11-22 RX ADMIN — MIRTAZAPINE 15 MG: 15 TABLET, FILM COATED ORAL at 22:43

## 2019-11-22 RX ADMIN — FUROSEMIDE 40 MG: 40 TABLET ORAL at 08:51

## 2019-11-22 RX ADMIN — TRAMADOL HYDROCHLORIDE 50 MG: 50 TABLET, FILM COATED ORAL at 14:12

## 2019-11-22 RX ADMIN — CARBOXYMETHYLCELLULOSE SODIUM 1 DROP: 5 SOLUTION/ DROPS OPHTHALMIC at 08:50

## 2019-11-22 RX ADMIN — HEPARIN SODIUM 5000 UNITS: 5000 INJECTION INTRAVENOUS; SUBCUTANEOUS at 22:43

## 2019-11-22 RX ADMIN — FLUOXETINE 20 MG: 10 CAPSULE ORAL at 08:50

## 2019-11-22 RX ADMIN — TRAMADOL HYDROCHLORIDE 50 MG: 50 TABLET, FILM COATED ORAL at 06:30

## 2019-11-22 RX ADMIN — DIGOXIN 0.12 MG: 125 TABLET ORAL at 08:55

## 2019-11-22 RX ADMIN — ASPIRIN 81 MG 81 MG: 81 TABLET ORAL at 08:51

## 2019-11-22 NOTE — WOUND CARE
Wound/Ostomy Nurse Progress Note Patient: Carl Smith :8/10/1927 MRN: [id number] Situation: wound consult for bilateral lower extremity wounds Background: pt reports falling but doesn't remember when or how. Pt reports pain w/ dressing change, no number rating given. Admitted for syncope/wounds. Assessment: Bilateral lower extremity anterior wound from fall/trauma. Necrotic tissue and large hematoma bilaterally to anterior shins. Q tip penetrated into wound gently. Bone and \"soupy\" tissue felt. Wound is open in center with undermining hematoma surrounding wound opening. Wound is raised above shin level approximately 3 cm. Did not probe far into wound for fear of damaging fragile tissue. Periwound dry and flaky. Recommendation: cleansed wounds with dermal wound cleanser. Applied Aqaucel AG to wound bed, covered w/ plain foam and lightly wrapped w/ Kerlex.  Surgical consult ordered, Spoke to Dr Isaias Smith regarding urgency of evaluation by surgical team.

## 2019-11-22 NOTE — PROGRESS NOTES
Physical Therapy Goals  Initiated 11/22/2019 and to be accomplished within 7 day(s)  1. Patient will move from supine to sit and sit to supine  in bed with modified independence. 2.  Patient will transfer from bed to chair and chair to bed with modified independence using the least restrictive device. 3.  Patient will perform sit to stand with modified independence. 4.  Patient will ambulate with modified independence for 200 feet with the least restrictive device. PHYSICAL THERAPY EVALUATION     Patient: Ronaldo Kaur (80 y.o. female)  Date: 11/22/2019  Primary Diagnosis: Dizziness [R42]  Syncope [R55]  Precautions: Fall  PLOF: Mod I  ASSESSMENT :  Based on the objective data described below, the patient presents with an appropriate functional mobility level for home. Supine to sit, Mod I for additional time. Patient demonstrates good sitting balance on EOB w/o UE support. Sit to stand, CGA, cues for hand placement for safety. Ambulated approx 5 ft to bathroom w/ WW, CGA. Sit to stand from toilet, CGA. Ambulated approx 150 ft w/WW, CGA; demonstrates decreased nitin, cues to adjust distance from wall for safety. Bed to chair, Mod I, demonstrates decreased safety awareness with transfer, required cueing for proper technique. Patient verbalized no mobility concerns for return to home. Patient left sitting upright in chair with call bell in reach. Education provided on bed mobility, transfers, ADLs, balance, amb, safety, exercise, role of PT, plan of care, cognition, skin integrity, vitals as indicated. Educated on need for RN assistance with mobility; verbalized understanding. Call bell in reach. Will follow 1-2 mores visits to further assess safety d/t patient with history of recent falls. Patient will benefit from skilled intervention to address the above impairments.   Patient's rehabilitation potential is considered to be Fair  Factors which may influence rehabilitation potential include:   [] None noted  []         Mental ability/status  [x]         Medical condition  []         Home/family situation and support systems  [x]         Safety awareness  []         Pain tolerance/management  []         Other:       PLAN :  Recommendations and Planned Interventions:   [x]           Bed Mobility Training             [x]    Neuromuscular Re-Education  [x]           Transfer Training                   []    Orthotic/Prosthetic Training  [x]           Gait Training                          []    Modalities  [x]           Therapeutic Exercises           []    Edema Management/Control  [x]           Therapeutic Activities            [x]    Family Training/Education  [x]           Patient Education  []           Other (comment):    Frequency/Duration: Patient will be followed by physical therapy 1-2 times a week to address goals. Discharge Recommendations: [place] d/kyrie fall risk and safety concerns   Further Equipment Recommendations for Discharge: rolling walker; has     SUBJECTIVE:   Patient stated someone needs to bring my Depends, I have a huge box at home.     OBJECTIVE DATA SUMMARY:     Past Medical History:   Diagnosis Date    A-fib ([facility] 01/2017    Arthritis, degenerative     CAD (coronary artery disease) 04/2012    S/P 2.75 X 15 mm BMS of OM (04/2012), Two LAD BMS (07/2012)    Elevated liver enzymes     Likely from statin    HLD (hyperlipidemia)     Hyperkalemia 06/2012    Likely from lisinopril    Ischemic cardiomyopathy     LVEF  30-35%(05/19) 45% (11/2012) & 30% echo (04/2012)    NSTEMI (non-ST elevated myocardial infarction) ([facility] 04/2012    UTI (urinary tract infection)     Vertigo      Past Surgical History:   Procedure Laterality Date    HX CORONARY STENT PLACEMENT  4/23/12    bare metal stent to obtuse marginal branch    HX HEART CATHETERIZATION       Barriers to Learning/Limitations: None  Compensate with: Visual Cues, Verbal Cues, Tactile Cues and Kinesthetic Cues  Home Situation:   Home Situation  Home Environment: Apartment([facility] assisted living facility )  One/Two Story Residence: (Elevator)  Living Alone: Yes  Support Systems: Family member(s), Assisted living  Patient Expects to be Discharged to[de-identified] Apartment  Current DME Used/Available at Home: Walker, rolling  Critical Behavior:  Neurologic State: Alert  Orientation Level: Oriented to person;Oriented to place  Cognition: Follows commands     Strength:    Manual Muscle Testing (LE)         R     L    Hip Flexion:   4/5  4/5  Knee EXT:   4+/5  4+/5  Knee FLEX:   4+/5  4+/5  Ankle DF:   4+/5  4+/5  _________________________________________________   Tone & Sensation: Tone and sensation: Intact  Range Of Motion:  Bilateral LE: WFL  Functional Mobility:  Bed Mobility:  Supine to Sit: Modified independent  Transfers:  Sit to Stand: Modified independent  Stand to Sit: Modified independent  Bed to Chair: Modified independent  Balance:   Sitting: Intact  Standing: Impaired  Standing - Static: Good  Standing - Dynamic : Good;Constant support  Ambulation/Gait Training:  Distance (ft): 150 Feet (ft)  Assistive Device: Walker, rolling  Ambulation - Level of Assistance: Stand-by assistance     Speed/Sarah: Pace decreased (<100 feet/min)    Pain:  Pain level pre-treatment: 0/10   Pain level post-treatment: 0/10  Pain Scale 1: Numeric (0 - 10)      Activity Tolerance:   Good    After treatment:   [x]         Patient left in no apparent distress sitting up in chair  []         Patient left in no apparent distress in bed  [x]         Call bell left within reach  [x]         Nursing notified  []         Caregiver present  []         Bed alarm activated  []         SCDs applied    COMMUNICATION/EDUCATION:   [x]         Role of physical therapy in the acute care setting. [x]         Fall prevention education was provided and the patient/caregiver indicated understanding.   [x]         Patient/family have participated as able in goal setting and plan of care. [x]         Patient/family agree to work toward stated goals and plan of care. []         Patient understands intent and goals of therapy, but is neutral about his/her participation. []         Patient is unable to participate in goal setting/plan of care: ongoing with therapy staff.     Thank you for this referral.  Rima [place]   Time Calculation: 35 mins      Eval Complexity: History: HIGH Complexity :3+ comorbidities / personal factors will impact the outcome/ POC Exam:MEDIUM Complexity : 3 Standardized tests and measures addressing body structure, function, activity limitation and / or participation in recreation  Presentation: LOW Complexity : Stable, uncomplicated  Clinical Decision Making:Low Complexity functional mobility, ROM, MMT, clinical findings  Overall Complexity:LOW

## 2019-11-22 NOTE — PROGRESS NOTES
conducted an initial consultation and Spiritual Assessment for [address] [facility] [address], who is a 80 y.o.,female. Patients Primary Language is: [place]. According to the patients EMR Latter-day Affiliation is: Bel Larson.     The reason the Patient came to the hospital is:   Patient Active Problem List    Diagnosis Date Noted    Dizziness 11/21/2019    Atrial fibrillation ([facility] 10/01/2019    Hypotension 10/01/2019    Skin rash 64/24/9414    Metabolic acidosis 41/74/7227    Underweight 10/01/2019    Atrial fibrillation with rapid ventricular response ([facility] 09/30/2019    Syncope 06/05/2019    Hypoxia 06/05/2019    Leukocytosis 06/05/2019    Acute on chronic systolic (congestive) heart failure (HCC) 06/03/2019    Hyperkalemia 10/23/2018    Acute metabolic encephalopathy 25/66/4741    Hyponatremia 10/20/2018    Chronic fatigue 10/09/2018    Irritable bowel syndrome with diarrhea 09/24/2018    Chronic a-fib 05/02/2017    CAD (coronary artery disease)     Ischemic cardiomyopathy     Arthritis, degenerative     Vertigo     HLD (hyperlipidemia)     SOB (shortness of breath) 04/27/2012    NSTEMI (non-ST elevated myocardial infarction) ([facility] 04/01/2012        The  provided the following Interventions:  Initiated a relationship of care and support. Explored issues of sandra, spirituality and/or Sabianist needs while hospitalized. Listened empathically. Provided chaplaincy education. Provided information about Spiritual Care Services. Offered prayer and assurance of continued prayers on patient's behalf. Chart reviewed. The following outcomes were achieved:  Patient shared some information about their medical narrative and spiritual journey/beliefs. Patient processed feeling about current hospitalization. Patient expressed gratitude for the 's visit.     Assessment:  Patient did not indicate any spiritual or Sabianist issues which require Spiritual Care Services interventions at this time. Patient does not have any Mormonism/cultural needs that will affect patients preferences in health care. Plan:  Chaplains will continue to follow and will provide pastoral care on an as needed or requested basis.  recommends bedside caregivers page  on duty if patient shows signs of acute spiritual or emotional distress.     [address] [facility]   Staff [address] [facility]   [phone]

## 2019-11-22 NOTE — PROGRESS NOTES
Problem: Afib Pathway: Day 3  Goal: Activity/Safety  Outcome: Progressing Towards Goal  Goal: Diagnostic Test/Procedures  Outcome: Progressing Towards Goal  Goal: Nutrition/Diet  Outcome: Progressing Towards Goal  Goal: Discharge Planning  Outcome: Progressing Towards Goal  Goal: Medications  Outcome: Progressing Towards Goal  Goal: Respiratory  Outcome: Progressing Towards Goal  Goal: Treatments/Interventions/Procedures  Outcome: Progressing Towards Goal  Goal: Psychosocial  Outcome: Progressing Towards Goal     Problem: Falls - Risk of  Goal: *Absence of Falls  Description  Document Talya Paniagua Fall Risk and appropriate interventions in the flowsheet. Outcome: Progressing Towards Goal  Note: Fall Risk Interventions:  Mobility Interventions: Bed/chair exit alarm    Mentation Interventions: Adequate sleep, hydration, pain control    Medication Interventions: Patient to call before getting OOB    Elimination Interventions: Call light in reach    History of Falls Interventions: Bed/chair exit alarm         Problem: Pressure Injury - Risk of  Goal: *Prevention of pressure injury  Description  Document Allen Scale and appropriate interventions in the flowsheet.   Outcome: Progressing Towards Goal  Note: Pressure Injury Interventions:  Sensory Interventions: Assess changes in LOC    Moisture Interventions: Absorbent underpads    Activity Interventions: Assess need for specialty bed    Mobility Interventions: Float heels    Nutrition Interventions: Document food/fluid/supplement intake    Friction and Shear Interventions: Apply protective barrier, creams and emollients

## 2019-11-22 NOTE — CONSULTS
Thank you- it was a pleasure to meet this remarkable 80year old lady and her son-in-law Joana Fofana  to her daughter Hung Sousa - she has an established history of synchope and heart disease for which she is treated with Digitalis and Aspirin - it appears there were two separate falls that occurred while she was in her assisted living facility - the first injured both of her lower legs when she fell against an open drawer!  - the second caused the ecchymosis surrounding the right orbit - the latter has been thoroughly investigated without finding any associated fracture or brain injury - the former led to swelling with blisters formed as a result of underlying hematomas which were hitherto not drained - it was my understanding that a vascular surgeon deroofed the hematoma on the left - provided the periosteum of the anterial tibial border is in tact these wounds will be simple to manage - a regime of wet to dry dressings has been ordered - Joana Fofana indicated that there was a place for her reserved on [facility] and this would all fit well together so that she can maintain her essential mobility while we wait for the wounds to granulate and to declare themselves - will follow closely. ..

## 2019-11-22 NOTE — PROGRESS NOTES
Problem: Falls - Risk of  Goal: *Absence of Falls  Description  Document Catina Ruts Fall Risk and appropriate interventions in the flowsheet. Outcome: Progressing Towards Goal  Note: Fall Risk Interventions:  Mobility Interventions: Bed/chair exit alarm, Assess mobility with egress test, Patient to call before getting OOB, PT Consult for mobility concerns    Mentation Interventions: Adequate sleep, hydration, pain control, Bed/chair exit alarm, Door open when patient unattended, Evaluate medications/consider consulting pharmacy, Eyeglasses and hearing aids, More frequent rounding, Update white board    Medication Interventions: Patient to call before getting OOB, Evaluate medications/consider consulting pharmacy, Bed/chair exit alarm, Teach patient to arise slowly    Elimination Interventions: Call light in reach, Bed/chair exit alarm, Patient to call for help with toileting needs, Stay With Me (per policy), Toilet paper/wipes in reach, Toileting schedule/hourly rounds    History of Falls Interventions: Bed/chair exit alarm, Door open when patient unattended, Evaluate medications/consider consulting pharmacy, Room close to nurse's station, Investigate reason for fall         Problem: Patient Education: Go to Patient Education Activity  Goal: Patient/Family Education  Outcome: Progressing Towards Goal     Problem: Pressure Injury - Risk of  Goal: *Prevention of pressure injury  Description  Document Allen Scale and appropriate interventions in the flowsheet. Outcome: Progressing Towards Goal  Note: Pressure Injury Interventions:  Sensory Interventions: Assess changes in LOC, Discuss PT/OT consult with provider, Float heels, Keep linens dry and wrinkle-free, Maintain/enhance activity level, Minimize linen layers, Pad between skin to skin, Pressure redistribution bed/mattress (bed type), Turn and reposition approx.  every two hours (pillows and wedges if needed)    Moisture Interventions: Absorbent underpads, Apply protective barrier, creams and emollients, Check for incontinence Q2 hours and as needed, Internal/External urinary devices, Maintain skin hydration (lotion/cream), Minimize layers, Moisture barrier, Contain wound drainage, Assess need for specialty bed    Activity Interventions: Assess need for specialty bed, Pressure redistribution bed/mattress(bed type), PT/OT evaluation    Mobility Interventions: Float heels, Assess need for specialty bed, HOB 30 degrees or less, Pressure redistribution bed/mattress (bed type), PT/OT evaluation, Turn and reposition approx.  every two hours(pillow and wedges)    Nutrition Interventions: Document food/fluid/supplement intake    Friction and Shear Interventions: Apply protective barrier, creams and emollients, Foam dressings/transparent film/skin sealants, HOB 30 degrees or less, Lift sheet                Problem: Patient Education: Go to Patient Education Activity  Goal: Patient/Family Education  Outcome: Progressing Towards Goal

## 2019-11-22 NOTE — PROGRESS NOTES
NUTRITION INITIAL ASSESSMENT/PLAN OF CARE      RECOMMENDATIONS:   1. Continue current diet, encouraged intake, obtained food preferences, Will send ensure enlive TID  2. Monitor labs, weight and PO intake  3. RD to follow     GOALS:   1. PO intake meets >75% of protein/calorie needs by 11/27/19    ASSESSMENT:   Wt status is classified as normal per Body mass index is 20.73 kg/m². Pt is at nutrition risk related to being over 65 and BMI under 23. Diagnosis: syncope, ischemic cardiomyopathy, IBS, afib, dizziness. Pt with wounds Nutrition recommendations listed. RD to follow. Nutrition Diagnoses:   Inadequate intake related to not a big eater/little to no appetite as evidenced by BMI and increased needs for wounds    SUBJECTIVE/OBJECTIVE:   Pt seen for an initial assessment/BMI. Pt admit for syncope/fall. Pt sitting in chair at bedside, very pleasant. Pt reports she has never really had an appetite, even when young. Pt ate grits, one sausage and drank cranberry juice this morning at [facility]. She does enjoy breakfast and snacking on fruit. Denies n/v/d/c at this time. Denies issues chewing/swallowing. Allergy to eggs. Pt states UBW around 100 lb. 11/21/19 weight: 117 lb unsure of accuracy. Noted pt is on diuretic. Will place order for new weight. Pt states her daughter encourages her to drink supplements, pt agrees to drink here. Skin: wound to R/L leg, stage 2 sacral. Encouraged intake. Will send ensure enlive TID. Will continue to monitor intake, weight, labs, skin. Information Obtained from:    [x] Chart Review   [x] Patient   [] Family/Caregiver   [] Nurse/Physician   [] Interdisciplinary Meeting/Rounds    Diet: cardiac  Medications: [x] Reviewed  Noted: lanoxin, prozac, lasix, heparin, remeron, PRN: zofran, ultram  Allergies: [x] Reviewed Eggs - kitchen aware  Encounter Diagnoses     ICD-10-CM ICD-9-CM   1. Vertigo R42 780.4   2. Nausea without vomiting R11.0 787.02   3.  Contusion of face, subsequent encounter S00.83XD V58.89   4.  Elevated troponin R79.89 790.6     Past Medical History:   Diagnosis Date    A-fib [facility]) 01/2017    Arthritis, degenerative     CAD (coronary artery disease) 04/2012    S/P 2.75 X 15 mm BMS of OM (04/2012), Two LAD BMS (07/2012)    Elevated liver enzymes     Likely from statin    HLD (hyperlipidemia)     Hyperkalemia 06/2012    Likely from lisinopril    Ischemic cardiomyopathy     LVEF  30-35%(05/19) 45% (11/2012) & 30% echo (04/2012)    NSTEMI (non-ST elevated myocardial infarction) ([facility] 04/2012    UTI (urinary tract infection)     Vertigo       Labs:    Lab Results   Component Value Date/Time    Sodium 135 (L) 11/22/2019 02:00 AM    Potassium 5.5 11/22/2019 02:00 AM    Chloride 103 11/22/2019 02:00 AM    CO2 25 11/22/2019 02:00 AM    Anion gap 7 11/22/2019 02:00 AM    Glucose 84 11/22/2019 02:00 AM    BUN 22 (H) 11/22/2019 02:00 AM    Creatinine 0.94 11/22/2019 02:00 AM    Calcium 8.1 (L) 11/22/2019 02:00 AM    Magnesium 1.9 11/22/2019 02:00 AM    Phosphorus 2.8 11/22/2019 02:00 AM    Albumin 2.9 (L) 11/11/2019 09:42 PM     Lab Results   Component Value Date/Time    GLU 84 11/22/2019 02:00 AM     Lab Results   Component Value Date/Time    Cholesterol, total 202 (H) 04/29/2019 07:45 AM    HDL Cholesterol 64 (H) 04/29/2019 07:45 AM    LDL, calculated 116.4 (H) 04/29/2019 07:45 AM    VLDL, calculated 21.6 04/29/2019 07:45 AM    Triglyceride 108 04/29/2019 07:45 AM    CHOL/HDL Ratio 3.2 04/29/2019 07:45 AM     Anthropometrics: BMI (calculated): 20.7  Last 3 Recorded Weights in this Encounter    11/21/19 2005   Weight: 53.1 kg (117 lb)      Ht Readings from Last 1 Encounters:   11/21/19 5' 3\" (1.6 m)       Documented Weight History:  Weight Metrics 11/21/2019 11/11/2019 11/6/2019 11/5/2019 10/29/2019 10/28/2019 10/15/2019   Weight 117 lb 100 lb 100 lb 116 lb 116 lb 100 lb 108 lb   BMI 20.73 kg/m2 18.89 kg/m2 17.16 kg/m2 19.91 kg/m2 19.91 kg/m2 17.16 kg/m2 20.41 kg/m2 Patient Vitals for the past 100 hrs:   % Diet Eaten   11/22/19 0800 100 %       IBW: 115 lb %IBW: 102% UBW: 100 lb   [] Weight Loss [x] Weight Gain - unsure of accuracy of 11/21/19 weight  [] Weight Stable    Estimated Nutrition Needs: [] MSJ  [x] Other:  Calories: 7769-3431 kcal (25-35 kcal/kg) Based on:   [x] Actual BW    Protein:   64-74 g ( 1.2-1.4 g/kg) Based on:   [x] Actual BW    Fluid:       1 mL/kcal     [x] No Cultural, Hindu or ethnic dietary need identified.     [] Cultural, Hindu and ethnic food preferences identified and addressed     Wt Status:  [x] Normal (18.6 - 24.9) [] Underweight (<18.5) [] Overweight (25 - 29.9) [] Mild Obesity (30 - 34.9)  [] Moderate Obesity (35 - 39.9) [] Morbid Obesity (40+)     Nutrition Problems Identified:   [x] Suboptimal PO intake   [x] Food Allergies  [] Difficulty chewing/swallowing/poor dentition  [] Constipation/Diarrhea   [] Nausea/Vomiting   [] None  [] Other:     Plan:   [x] Therapeutic Diet  []  Obtained/adjusted food preferences/tolerances and/or snacks options   [x]  Supplements added   [] Occupational therapy following for feeding techniques  []  HS snack added   []  Modify diet texture   []  Modify diet for food allergies   []  Educate patient   []  Assist with menu selection   [x]  Monitor PO intake on meal rounds   [x]  Continue inpatient monitoring and intervention   [x]  Participated in discharge planning/Interdisciplinary rounds/Team meetings   []  Other:     Education Needs:   [] Not appropriate for teaching at this time due to:   [x] Identified and addressed encouraged intake     Nutrition Monitoring and Evaluation:  [x] Continue ongoing monitoring and intervention  [] Other    Karley Isela

## 2019-11-22 NOTE — WOUND CARE
11/22/19 1447 Wound Leg lower Anterior;Right 11/09/19 Date First Assessed: 11/09/19   Primary Wound Type: (c) Soft Tissue Necrosis  Location: (c) Leg lower  Wound Location Orientation: Anterior;Right  Date of First Observation: 11/09/19 Dressing Status Removed Dressing Type Dry dressing;Gauze wrap (emelina) Non-staged Wound Description Full thickness Wound Length (cm) 13.5 cm 
(approximate) Wound Width (cm) 10.5 cm Wound Depth (cm) 5.3 cm Wound Surface Area (cm^2) 141.75 cm^2 Wound Volume (cm^3) 751.28 cm^3 Condition of Base Bone exposed 
(palpated w/ Q tip) Tissue Type Percent Black 100 % Drainage Color Sanguinous Wound Odor Mild Jasmyn-wound Assessment Dry;Fragile; Purple Cleansing and Cleansing Agents  Dermal wound cleanser Dressing Changed Changed/New Dressing Type Applied Foam;Gauze wrap (emelina); Other (Comment) (Aqaucel AG, foam, Kerlex) Wound Leg lower Anterior; Left 11/09/19 Date First Assessed: 11/09/19   Primary Wound Type: (c) Soft Tissue Necrosis  Location: (c) Leg lower  Wound Location Orientation: Anterior; Left  Date of First Observation: 11/09/19 Dressing Status Removed Dressing Type Dry dressing;Gauze wrap (emelina) Non-staged Wound Description Full thickness Wound Length (cm) 11.6 cm Wound Width (cm) 11 cm Wound Depth (cm) 1.2 cm Wound Surface Area (cm^2) 127.6 cm^2 Wound Volume (cm^3) 153.12 cm^3 Condition of Base Bone exposed 
(palpated w/ Q Tip ) Assessment Other (Comment) (necrotic tissue) Tissue Type Percent Black 20 % Tissue Type Percent Maroon/Purple 70 % Tissue Type Percent Red 10 Drainage Amount Small Drainage Color Serosanguinous Wound Odor Mild Jasmyn-wound Assessment Dry;Fragile Cleansing and Cleansing Agents  Dermal wound cleanser Dressing Changed Changed/New Dressing Type Applied Foam;Gauze wrap (emelina) (Aquacel AG, plain foam, Kerlex)

## 2019-11-22 NOTE — PROGRESS NOTES
Progress Note      Patient: Reynaldo Brower               Sex: female          DOA: 11/21/2019       YOB: 1927      Age:  80 y.o.        LOS:  LOS: 0 days             CHIEF COMPLAINT:  Syncope and Fall      Assessment/Plan:     Principal Problem:    Syncope (6/5/2019)    Active Problems:    Ischemic cardiomyopathy ()      Irritable bowel syndrome with diarrhea (9/24/2018)      Atrial fibrillation ([address] (10/1/2019)      Dizziness (11/21/2019)        PLAN:  LE necrotic wounds  Consulted wound care team, d/w with them, recommending urgent surgical consult due to severity of wounds. Discussed with Dr. Ynes Garcia who will see pt today. See note by wound care team    Syncope   Unclear etiology but suspect vasovagal response from pain (3 times has happened while she was receiving leg wound care). Need to rule out stroke/TIA, arrythmia/bradycardia, hypotension   Reviewed TeleNeuro note,  MRI brain is  negative. Check orthostatics   Place on cardiac monitor  Switch bumex to lasix (son-in-law states episodes were less frequent with lasix)    Chronic systolic CHF, not in exacerbation  NT-pro BNP elevated but within range of previous values; clinically appears euvolemic  Echo 6/2019: LVEF 31-35%   Cont Cardiac diet, digoxin  Switch bumex to lasix (son-in-law states when lasix switched to bumex 5-6 weeks ago he thinks there have been more episodes of syncope)  Cont ASA    Atrial fibrillation  Cont rate control  Cardiac monitoring     Depression  Cont prozac and remeron    Elevated troponin  Troponin 0.19, similar elevations in the past, pt denies any chest pain, EKG w/ afib and non-specific ST-T wave abnrmality  Trended down    DVT prophylaxis  SubQ heparin    Code Status: DNR (advanced directive)  Dispo: PT/OT consult    Subjective:     Pt states she is about the same. Denies chest pain or SOB. Having a mild HA. Discussed with son-in-law.     Objective:     Visit Vitals  /72 (BP 1 Location: Right arm, BP Patient Position: At rest)   Pulse 82   Temp 97.6 °F (36.4 °C)   Resp 15   Wt 53.1 kg (117 lb)   SpO2 96%   BMI 20.73 kg/m²        Physical Exam:  Gen: thin elderly female  Ears: hearing is intact. Right lateral periorbital and forehead hematoma (healing, from previous fall)  Eyes: Icterus is not present  Lungs: clear to auscultation bilaterally  Heart: irregularly irregular rate and rhythm, S1, S2 normal, no murmur, click, rub or gallop  Gastrointestinal: soft, non-tender. Bowel sounds normal. No masses,  no organomegaly  Neurological:  New Focal Deficits are not present  Ext: LE wounds wrapped in dressing  Psychiatric:  Mood is stable       Lab/Data Reviewed:  CMP:   Lab Results   Component Value Date/Time     (L) 11/22/2019 02:00 AM    K 5.5 11/22/2019 02:00 AM     11/22/2019 02:00 AM    CO2 25 11/22/2019 02:00 AM    AGAP 7 11/22/2019 02:00 AM    GLU 84 11/22/2019 02:00 AM    BUN 22 (H) 11/22/2019 02:00 AM    CREA 0.94 11/22/2019 02:00 AM    GFRAA >60 11/22/2019 02:00 AM    GFRNA 56 (L) 11/22/2019 02:00 AM    CA 8.1 (L) 11/22/2019 02:00 AM    MG 1.9 11/22/2019 02:00 AM    PHOS 2.8 11/22/2019 02:00 AM     CBC:   Lab Results   Component Value Date/Time    WBC 12.3 11/22/2019 02:00 AM    HGB 9.0 (L) 11/22/2019 02:00 AM    HCT 28.5 (L) 11/22/2019 02:00 AM     11/22/2019 02:00 AM       Imaging Reviewed:  Mri Brain Wo Cont    Result Date: 11/22/2019  EXAM: MRI brain without gadolinium CLINICAL INDICATION/HISTORY: dizziness, r/o cva   > Additional: Recent fall, trauma to orbit, vertigo COMPARISON: None. > Reference Exam: CT head 11/21/2019 TECHNIQUE: SagittalFLAIR T1, axial T1, T2, FLAIR, susceptibility weighted, diffusion and coronal T2 sequences obtained of the brain. Imaging performed on wide bore Discovery [id number] [employer] suite 3T magnet at [facility]/[address]. _______________ FINDINGS: Diffusion:  There are no areas of restricted diffusion, no evidence of an acute infarction.  Brain parenchyma:  Small chronic cortical infarcts superior medial right cerebellum and tiny chronic cortical infarct posterior right cerebellum. Mild confluent and multifocal increased T2 and FLAIR signal periventricular and deep cerebral white matter including small areas of subcortical white matter involvement and central kristyn. No evidence of intracranial mass hemorrhage edema or mass effect. Ventricles and sulci:  Mild diffuse central and cortical volume loss. Extra-axial:  No extra-axial fluid collection or mass is noted. Brain vasculature:  No vascular abnormality is appreciated on this routine brain MR examination. Craniocervical junction:  Normal. Skull base, extracranial and calvarium:  Previous bilateral lens implants. Sinuses IACs and mastoids sella and skull unremarkable. _______________     IMPRESSION: 1. No evidence of acute infarct or other acute intracranial finding. 2. Small chronic right cerebellar infarcts, mild white matter signal changes nonspecific likely chronic microvascular ischemic disease.

## 2019-11-22 NOTE — PROGRESS NOTES
0730 - Bedside shift change report given to Eric (oncoming nurse) by Ezequiel Wallace (offgoing nurse). Report included the following information SBAR, Kardex, Procedure Summary, Intake/Output, MAR and Recent Results. 0900 - Administered AM meds including eye drops; pt tolerated well. Warmed up pt's food in microwave. Returned to retrieve cranberry juice for pt.     1003 - Pt asked to walk to bathroom; she was reminded to use bedpan w/ assistance due to high risk of falls. 1341 - Pt linen, gown, and underpads changed after incontinence in chair. Patient helped to bathroom and then to bed after cleaned up. Wounds on bilateral shins were weeping; took a look under dressing - called wound care team and went to . Covered wounds with additional gauze and emelina wrap. Wound on right buttocks red - applied cream.     1825 - Plastic surgeon w/ pt; removed dressing - assessing pt wounds on shins. 2019  - Teleneurology contacted via fax; placed in chart. 2019 - Bedside shift change report given to Taiwo Galicia (oncoming nurse) by Sonja Montes (offgoing nurse). Report included the following information SBAR, Kardex, Procedure Summary, Intake/Output, MAR and Recent Results.

## 2019-11-22 NOTE — PROGRESS NOTES
Discharge/Transition Planning    Problem: Discharge Planning  Goal: *Discharge to safe environment  Outcome: Progressing Towards Goal     Reason for Admission:   Syncope; wound               RRAT Score:     24             Resources/supports as identified by patient/family:   Lives at [facility]; supportive family                Top Challenges facing patient (as identified by patient/family and CM):  Did not voice specific concern                     Finances/Medication cost?                    Transportation? Support system or lack thereof? Living arrangements? Self-care/ADLs/Cognition? Current Advanced Directive/Advance Care Plan: On file; DNR                          Plan for utilizing home health:    After SNF                 Transition of Care Plan:               Claudia Koch patient's son in law with pt permission. Verified demographics listed on face sheet with patient; all information correct. Does live at [facility] now. Patient stated their PCP is Dr Renea Kussmaul and goes for regular appt or Dr Renea Kussmaul comes to Assisted Living. Patient needs assist with ADLs prior to admission. Family has hired private aids for pt in addition to living in [place] [id number]. They have been utilizing [facility] for wound care every other day DME prior to admission: walker Discharge plan is TCC and referral sent      Patient has designated ________________________ to participate in his/her discharge plan and to receive any needed information. Gurjit Taylor Son-in-Law   [phone]   Andi Franz Other Relative   [phone]   Vidya Bragg Daughter   [phone]     Care Management Interventions  PCP Verified by CM: Yes(Dr Renea Kussmaul)  Mode of Transport at Discharge:  Other (see comment)(in hospital)  Transition of Care Consult (CM Consult): Discharge Planning  Current Support Network: Assisted Living  Confirm Follow Up Transport: Family  Plan discussed with Pt/Family/Caregiver: Yes  Discharge Location  Discharge Placement: Skilled nursing facility

## 2019-11-22 NOTE — PROGRESS NOTES
19:40- Report received from previous nurse,  Delbert Malone RN.   20:00-Assessment completed- see flow sheet. Jasmyn wick set up and applied as patient is incontinent of urine and too weak. Dressing clean, dry and intact to lower, anterior, bilateral legs ( see wound avatar) Stage 2 on right upper inner buttock- area cleansed and mepilex applied- see wound avatar. Wound care consult has been ordered. All wounds present on admission that were charted on 11/9/2019. Continue to monitor- call light in reach. 00:10- No change in condition. Continue to monitor. Call light in reach. 04:00- No change in condition. Continue to monitor. Call light in reach.    06:30- Med with tramadol 50 mg po for c/o bilateral lower leg pain rated as an \"eight\"

## 2019-11-22 NOTE — ROUTINE PROCESS
1940 Bedside and Verbal shift change report given to Armaan Kenndey RN (oncoming nurse) by me (offgoing nurse). Report included the following information SBAR, Kardex, Intake/Output, MAR, Recent Results and Cardiac Rhythm atrial fibrillation.

## 2019-11-23 PROCEDURE — 77030018836 HC SOL IRR NACL ICUM -A

## 2019-11-23 PROCEDURE — 74011000258 HC RX REV CODE- 258: Performed by: HOSPITALIST

## 2019-11-23 PROCEDURE — 87070 CULTURE OTHR SPECIMN AEROBIC: CPT

## 2019-11-23 PROCEDURE — 87186 SC STD MICRODIL/AGAR DIL: CPT

## 2019-11-23 PROCEDURE — 65660000000 HC RM CCU STEPDOWN

## 2019-11-23 PROCEDURE — 74011250637 HC RX REV CODE- 250/637: Performed by: HOSPITALIST

## 2019-11-23 PROCEDURE — 3331090002 HH PPS REVENUE DEBIT

## 2019-11-23 PROCEDURE — 85025 COMPLETE CBC W/AUTO DIFF WBC: CPT

## 2019-11-23 PROCEDURE — 77030038269 HC DRN EXT URIN PURWCK BARD -A

## 2019-11-23 PROCEDURE — 87040 BLOOD CULTURE FOR BACTERIA: CPT

## 2019-11-23 PROCEDURE — 3331090001 HH PPS REVENUE CREDIT

## 2019-11-23 PROCEDURE — 77030040831 HC BAG URINE DRNG MDII -A

## 2019-11-23 PROCEDURE — 84100 ASSAY OF PHOSPHORUS: CPT

## 2019-11-23 PROCEDURE — 74011250636 HC RX REV CODE- 250/636: Performed by: HOSPITALIST

## 2019-11-23 PROCEDURE — 87077 CULTURE AEROBIC IDENTIFY: CPT

## 2019-11-23 PROCEDURE — 36415 COLL VENOUS BLD VENIPUNCTURE: CPT

## 2019-11-23 PROCEDURE — 80048 BASIC METABOLIC PNL TOTAL CA: CPT

## 2019-11-23 PROCEDURE — 83735 ASSAY OF MAGNESIUM: CPT

## 2019-11-23 RX ORDER — LANOLIN ALCOHOL/MO/W.PET/CERES
400 CREAM (GRAM) TOPICAL ONCE
Status: COMPLETED | OUTPATIENT
Start: 2019-11-23 | End: 2019-11-23

## 2019-11-23 RX ADMIN — HEPARIN SODIUM 5000 UNITS: 5000 INJECTION INTRAVENOUS; SUBCUTANEOUS at 22:17

## 2019-11-23 RX ADMIN — ASPIRIN 81 MG 81 MG: 81 TABLET ORAL at 09:10

## 2019-11-23 RX ADMIN — CEFTRIAXONE 1 G: 1 INJECTION, POWDER, FOR SOLUTION INTRAMUSCULAR; INTRAVENOUS at 15:20

## 2019-11-23 RX ADMIN — DIGOXIN 0.12 MG: 125 TABLET ORAL at 09:13

## 2019-11-23 RX ADMIN — Medication 400 MG: at 12:23

## 2019-11-23 RX ADMIN — HEPARIN SODIUM 5000 UNITS: 5000 INJECTION INTRAVENOUS; SUBCUTANEOUS at 15:24

## 2019-11-23 RX ADMIN — FUROSEMIDE 40 MG: 40 TABLET ORAL at 09:10

## 2019-11-23 RX ADMIN — FLUOXETINE 20 MG: 10 CAPSULE ORAL at 09:10

## 2019-11-23 RX ADMIN — SODIUM CHLORIDE 1000 MG: 900 INJECTION, SOLUTION INTRAVENOUS at 16:09

## 2019-11-23 RX ADMIN — HEPARIN SODIUM 5000 UNITS: 5000 INJECTION INTRAVENOUS; SUBCUTANEOUS at 05:26

## 2019-11-23 RX ADMIN — MIRTAZAPINE 15 MG: 15 TABLET, FILM COATED ORAL at 22:17

## 2019-11-23 NOTE — PROGRESS NOTES
Progress Note      Patient: Janis Crooks               Sex: female          DOA: 11/21/2019       YOB: 1927      Age:  80 y.o.        LOS:  LOS: 1 day             CHIEF COMPLAINT:  Syncope and Fall      Assessment/Plan:     Principal Problem:    Syncope (6/5/2019)    Active Problems:    Ischemic cardiomyopathy ()      Irritable bowel syndrome with diarrhea (9/24/2018)      Atrial fibrillation ([address] (10/1/2019)      Dizziness (11/21/2019)        PLAN:  LE necrotic wounds with fever  Consulted wound care team, d/w with them, recommending urgent surgical consult due to severity of wounds. - Appreciate Dr. Nilson Green evaluation of wounds, recommends wet to dry dressings and wait for wounds to granulate. - Fever this AM to 100.9; consulted ID Dr. Haley Wahl and we inspected wounds together. Will obtain blood cultures and wound cultures and start IV vancomycin and rocephin. Syncope   Unclear etiology but suspect vasovagal response from pain (3 times has happened while she was receiving leg wound care). Need to rule out stroke/TIA, arrythmia/bradycardia, hypotension   Reviewed TeleNeuro note,  MRI brain is  negative.   Check orthostatics   Place on cardiac monitor  Switch bumex to lasix (son-in-law states episodes were less frequent with lasix)    Chronic systolic CHF, not in exacerbation  NT-pro BNP elevated but within range of previous values; clinically appears euvolemic  Echo 6/2019: LVEF 31-35%   Cont Cardiac diet, digoxin  Switch bumex to lasix (son-in-law states when lasix switched to bumex 5-6 weeks ago he thinks there have been more episodes of syncope)  Cont ASA    Atrial fibrillation  Cont rate control  Cardiac monitoring     Depression  Cont prozac and remeron    Elevated troponin  Troponin 0.19, similar elevations in the past, pt denies any chest pain, EKG w/ afib and non-specific ST-T wave abnrmality  Trended down    DVT prophylaxis  SubQ heparin    Code Status: DNR (advanced directive)  Dispo: PT/OT consult    Subjective:     Pt this AM was sleeping soundly. Upon awakening she denies any complaints. Objective:     Visit Vitals  BP 90/65 (BP 1 Location: Left arm, BP Patient Position: At rest)   Pulse 94   Temp 97.5 °F (36.4 °C)   Resp 20   Wt 48.4 kg (106 lb 12.8 oz)   SpO2 96%   BMI 18.92 kg/m²        Physical Exam:  Gen: thin elderly female  Ears: hearing is intact. Right lateral periorbital and forehead hematoma (healing, from previous fall)  Eyes: Icterus is not present  Lungs: clear to auscultation bilaterally  Heart: irregularly irregular rate and rhythm, S1, S2 normal, no murmur, click, rub or gallop  Gastrointestinal: soft, non-tender. Bowel sounds normal. No masses,  no organomegaly  Neurological:  New Focal Deficits are not present  Ext: LE wounds wrapped in dressing  Psychiatric:  Mood is stable       Lab/Data Reviewed:  CMP:   Lab Results   Component Value Date/Time     (L) 11/23/2019 03:45 AM    K 5.2 11/23/2019 03:45 AM     11/23/2019 03:45 AM    CO2 27 11/23/2019 03:45 AM    AGAP 5 11/23/2019 03:45 AM    GLU 79 11/23/2019 03:45 AM    BUN 24 (H) 11/23/2019 03:45 AM    CREA 0.95 11/23/2019 03:45 AM    GFRAA >60 11/23/2019 03:45 AM    GFRNA 55 (L) 11/23/2019 03:45 AM    CA 7.8 (L) 11/23/2019 03:45 AM    MG 1.9 11/23/2019 03:45 AM    PHOS 3.1 11/23/2019 03:45 AM     CBC:   Lab Results   Component Value Date/Time    WBC 10.6 11/23/2019 03:45 AM    HGB 8.9 (L) 11/23/2019 03:45 AM    HCT 28.5 (L) 11/23/2019 03:45 AM     11/23/2019 03:45 AM       Imaging Reviewed:  No results found.

## 2019-11-23 NOTE — PROGRESS NOTES
1900  -- Bedside, Verbal and Written shift change report given to [address] (oncoming nurse) by Cecile Lozano nurse). Allergy and Fall band placed on pt's wrist. Report included the following information SBAR, Kardex, Intake/Output, MAR and Recent Results.           2242 -- PM medications administered, pt tolerated with ease, will continue to monitor.     0000 -- Shift reassessment, pt condition unchanged, will continue to monitor.      0415 --  Shift reassessment, pt condition unchanged, will continue to monitor.         0700 -- Bedside, Verbal and Written shift change report given to [address] nurse) by CHERISE (offgoing nurse). Report included the following information SBAR, Kardex, Intake/Output, MAR and Recent Results. Skin assessment completed.

## 2019-11-23 NOTE — PROGRESS NOTES
Pharmacy Dosing Services: Vancomycin    Indication: Skin and soft tissue infection; LE necrotic wounds    Day of therapy: 0    Other Antimicrobials : Ceftriaxone 1gm q24h - Day 1      Loading dose (date given): 1000 mg  Current Maintenance dose: 1000 mg IV every 36 hours    Goal Vancomycin Level: Vancomycin Trough: 15 - 20 mcg/mL (most infections)      Significant Cultures: pending    Weight 48.4 kg (106 lb 12.8 oz)  Recent Labs     19  0345 19  0200 19  1217 19  1125   CREA 0.95 0.94 1.15  --    BUN 24* 22* 26*  --    WBC 10.6 12.3  --  10.1     Temp (72hrs), Av °F (36.7 °C), Min:97.4 °F (36.3 °C), Max:100.9 °F (38.3 °C)    Estimated Creatinine Clearance Estimated Creatinine Clearance: 28.9 mL/min (based on SCr of 0.95 mg/dL). Estimated Creatinine Clearance (using IBW): 31.3 mL/min       Renal function stable? (unstable defined as SCr increase of 0.5 mg/dL or > 50% increase from baseline, whichever is greater) (Y/N): Yes      Regimen assessment:   1000mg (20mg/kg) x 1 . Will continue with 1000mg IV q36h and obtain level prior to 3rd scheduled dose on . Pharmacy will follow daily and adjust medications as appropriate for renal function and/or serum levels.     Thank you,  Delmer Juarez, [place]  [phone]

## 2019-11-24 LAB
ANION GAP SERPL CALC-SCNC: 11 MMOL/L (ref 3–18)
BASOPHILS # BLD: 0 K/UL (ref 0–0.1)
BASOPHILS NFR BLD: 0 % (ref 0–2)
BUN SERPL-MCNC: 17 MG/DL (ref 7–18)
BUN/CREAT SERPL: 21 (ref 12–20)
CALCIUM SERPL-MCNC: 6.6 MG/DL (ref 8.5–10.1)
CHLORIDE SERPL-SCNC: 107 MMOL/L (ref 100–111)
CO2 SERPL-SCNC: 25 MMOL/L (ref 21–32)
CREAT SERPL-MCNC: 0.81 MG/DL (ref 0.6–1.3)
DIFFERENTIAL METHOD BLD: ABNORMAL
EOSINOPHIL # BLD: 0.1 K/UL (ref 0–0.4)
EOSINOPHIL NFR BLD: 1 % (ref 0–5)
ERYTHROCYTE [DISTWIDTH] IN BLOOD BY AUTOMATED COUNT: 17.9 % (ref 11.6–14.5)
GLUCOSE SERPL-MCNC: 179 MG/DL (ref 74–99)
HCT VFR BLD AUTO: 26.9 % (ref 35–45)
HGB BLD-MCNC: 8.3 G/DL (ref 12–16)
LYMPHOCYTES # BLD: 0.7 K/UL (ref 0.9–3.6)
LYMPHOCYTES NFR BLD: 8 % (ref 21–52)
MAGNESIUM SERPL-MCNC: 1.6 MG/DL (ref 1.6–2.6)
MCH RBC QN AUTO: 25.9 PG (ref 24–34)
MCHC RBC AUTO-ENTMCNC: 30.9 G/DL (ref 31–37)
MCV RBC AUTO: 84.1 FL (ref 74–97)
MONOCYTES # BLD: 0.8 K/UL (ref 0.05–1.2)
MONOCYTES NFR BLD: 9 % (ref 3–10)
NEUTS SEG # BLD: 7.2 K/UL (ref 1.8–8)
NEUTS SEG NFR BLD: 82 % (ref 40–73)
PHOSPHATE SERPL-MCNC: 4.2 MG/DL (ref 2.5–4.9)
PLATELET # BLD AUTO: 298 K/UL (ref 135–420)
PMV BLD AUTO: 8.2 FL (ref 9.2–11.8)
POTASSIUM SERPL-SCNC: 3.7 MMOL/L (ref 3.5–5.5)
RBC # BLD AUTO: 3.2 M/UL (ref 4.2–5.3)
SODIUM SERPL-SCNC: 143 MMOL/L (ref 136–145)
VANCOMYCIN SERPL-MCNC: 21.3 UG/ML (ref 5–40)
WBC # BLD AUTO: 8.9 K/UL (ref 4.6–13.2)

## 2019-11-24 PROCEDURE — 80202 ASSAY OF VANCOMYCIN: CPT

## 2019-11-24 PROCEDURE — 83735 ASSAY OF MAGNESIUM: CPT

## 2019-11-24 PROCEDURE — 84100 ASSAY OF PHOSPHORUS: CPT

## 2019-11-24 PROCEDURE — 77030041247 HC PROTECTOR HEEL HEELMEDIX MDII -B

## 2019-11-24 PROCEDURE — 65270000029 HC RM PRIVATE

## 2019-11-24 PROCEDURE — 97535 SELF CARE MNGMENT TRAINING: CPT

## 2019-11-24 PROCEDURE — 74011250636 HC RX REV CODE- 250/636: Performed by: HOSPITALIST

## 2019-11-24 PROCEDURE — 36415 COLL VENOUS BLD VENIPUNCTURE: CPT

## 2019-11-24 PROCEDURE — 3331090002 HH PPS REVENUE DEBIT

## 2019-11-24 PROCEDURE — 77030038269 HC DRN EXT URIN PURWCK BARD -A

## 2019-11-24 PROCEDURE — 74011000258 HC RX REV CODE- 258: Performed by: HOSPITALIST

## 2019-11-24 PROCEDURE — 3331090001 HH PPS REVENUE CREDIT

## 2019-11-24 PROCEDURE — 80048 BASIC METABOLIC PNL TOTAL CA: CPT

## 2019-11-24 PROCEDURE — 77030037877 HC DRSG MEPILEX >48IN BORD MOLN -A

## 2019-11-24 PROCEDURE — 97166 OT EVAL MOD COMPLEX 45 MIN: CPT

## 2019-11-24 PROCEDURE — 74011250637 HC RX REV CODE- 250/637: Performed by: HOSPITALIST

## 2019-11-24 PROCEDURE — 85025 COMPLETE CBC W/AUTO DIFF WBC: CPT

## 2019-11-24 RX ORDER — SODIUM CHLORIDE 900 MG/100ML
INJECTION INTRAVENOUS
Status: DISPENSED
Start: 2019-11-24 | End: 2019-11-25

## 2019-11-24 RX ADMIN — ASPIRIN 81 MG 81 MG: 81 TABLET ORAL at 09:02

## 2019-11-24 RX ADMIN — SODIUM CHLORIDE 500 MG: 900 INJECTION, SOLUTION INTRAVENOUS at 22:19

## 2019-11-24 RX ADMIN — CEFTRIAXONE 1 G: 1 INJECTION, POWDER, FOR SOLUTION INTRAMUSCULAR; INTRAVENOUS at 13:27

## 2019-11-24 RX ADMIN — HEPARIN SODIUM 5000 UNITS: 5000 INJECTION INTRAVENOUS; SUBCUTANEOUS at 05:17

## 2019-11-24 RX ADMIN — DIGOXIN 0.25 MG: 125 TABLET ORAL at 10:46

## 2019-11-24 RX ADMIN — MIRTAZAPINE 15 MG: 15 TABLET, FILM COATED ORAL at 21:23

## 2019-11-24 RX ADMIN — HEPARIN SODIUM 5000 UNITS: 5000 INJECTION INTRAVENOUS; SUBCUTANEOUS at 21:22

## 2019-11-24 RX ADMIN — ACETAMINOPHEN 650 MG: 325 TABLET, FILM COATED ORAL at 10:41

## 2019-11-24 RX ADMIN — FLUOXETINE 20 MG: 10 CAPSULE ORAL at 09:01

## 2019-11-24 RX ADMIN — FUROSEMIDE 40 MG: 40 TABLET ORAL at 09:01

## 2019-11-24 NOTE — PROGRESS NOTES
Progress Note      Patient: Kraig Varela               Sex: female          DOA: 11/21/2019       YOB: 1927      Age:  80 y.o.        LOS:  LOS: 2 days             CHIEF COMPLAINT:  Syncope and Fall      Assessment/Plan:     Principal Problem:    Syncope (6/5/2019)    Active Problems:    Ischemic cardiomyopathy ()      Irritable bowel syndrome with diarrhea (9/24/2018)      Atrial fibrillation ([address] (10/1/2019)      Dizziness (11/21/2019)        PLAN:  LE necrotic wounds with fever  Consulted wound care team, d/w with them, recommending urgent surgical consult due to severity of wounds. - Appreciate Dr. Pihl Cohen evaluation of wounds, recommends wet to dry dressings and wait for wounds to granulate. - Cont IV vancomycin and rocephin. Cultures pending  - ID consulted    Syncope   Unclear etiology but suspect vasovagal response from pain (3 times has happened while she was receiving leg wound care). Need to rule out stroke/TIA, arrythmia/bradycardia, hypotension   Reviewed TeleNeuro note,  MRI brain is  negative.   Check orthostatics   Place on cardiac monitor  Switch bumex to lasix (son-in-law states episodes were less frequent with lasix)    Chronic systolic CHF, not in exacerbation  NT-pro BNP elevated but within range of previous values; clinically appears euvolemic  Echo 6/2019: LVEF 31-35%   Cont Cardiac diet, digoxin  Switch bumex to lasix (son-in-law states when lasix switched to bumex 5-6 weeks ago he thinks there have been more episodes of syncope)  Cont ASA    Atrial fibrillation  Cont rate control  Cardiac monitoring     Depression  Cont prozac and remeron    Elevated troponin  Troponin 0.19, similar elevations in the past, pt denies any chest pain, EKG w/ afib and non-specific ST-T wave abnrmality  Trended down    DVT prophylaxis  SubQ heparin    Code Status: DNR (advanced directive)    Dispo: PT/OT consult    Subjective:     Pt this AM was alert, she complained of buttock pain and that her bed is very uncomfortable. Objective:     Visit Vitals  /66 (BP 1 Location: Left arm, BP Patient Position: Supine)   Pulse 67   Temp 97.8 °F (36.6 °C)   Resp 20   Wt 48.4 kg (106 lb 12.8 oz)   SpO2 100%   BMI 18.92 kg/m²        Physical Exam:  Gen: thin elderly female  Ears: hearing is intact. Right lateral periorbital and forehead hematoma (healing, from previous fall)  Eyes: Icterus is not present  Lungs: clear to auscultation bilaterally  Heart: irregularly irregular rate and rhythm, S1, S2 normal, no murmur, click, rub or gallop  Gastrointestinal: soft, non-tender. Bowel sounds normal. No masses,  no organomegaly  Neurological:  New Focal Deficits are not present  Ext: LE wounds wrapped in dressing  Psychiatric:  Mood is stable       Lab/Data Reviewed:  CMP:   No results found for: NA, K, CL, CO2, AGAP, GLU, BUN, CREA, GFRAA, GFRNA, CA, MG, PHOS, ALB, TBIL, TP, ALB, GLOB, AGRAT, SGOT, ALT, GPT  CBC:   No results found for: WBC, HGB, HGBEXT, HCT, HCTEXT, PLT, PLTEXT, HGBEXT, HCTEXT, PLTEXT    Imaging Reviewed:  No results found.

## 2019-11-24 NOTE — CONSULTS
[facility]                                            (A Division of [address] [place] [address])                                   Consultation Note      Date of Admission: 11/21/2019    Date of Consultation: 11/24/2019    Referred by: Dr. Savanna Madrigal    Reason for Referral: infected leg wound    Current Antimicrobials: Prior Antimicrobials   Vancomycin, ceftriaxone 11/23 - 1        Assessment: Plan:   Bilateral leg wounds / hematomas, infected  - adjacent cellulitis on both legs. - superficial wound cx 11/23 mod GNR -> continue Vancomycin, Ceftriaxone pending cx results  -> de-escalate to po abx when susceptibility results are available   Recurrent syncope / falls -> per primary team   CAD    ischemic Cardiomyopathy    Atrial Fibrillation    HLD    DJD      Microbiology:    11/23 blcx NGTD x 1   Wd cx GNR      Lines / Catheters:    piv  HPI:    80year-old  female w/ h/o CAD, ischemic Cardiomyopathy, Atrial Fibrillation, HLD, DJD admitted to [facility] 11/22/2019 due to recurrent syncope. She had fallen at her assisted living facility and injured both her legs 19 days PTA. CTA lower extremity 11/3 showed bilateral anterior lower leg subcutaneous hematomas without evidence of active extravasation and diffuse subcutaneous edema. She was seen by a vascular surgeon the week PTA and the left leg hematoma was unroofed. On the day of admission while getting the leg wounds cared for, she had transient loss of consciousness for a few seconds which recurred later in the day. She was taken to the [facility] where she is being worked up for syncope. Dr. Phil Cohen was consulted for the leg wounds and felt that antibiotics were needed. ID evaluation was requested for this purpose.      Active Hospital Problems    Diagnosis Date Noted    Dizziness 11/21/2019    Atrial fibrillation ([address] 10/01/2019    Syncope 06/05/2019    Irritable bowel syndrome with diarrhea 09/24/2018    Ischemic cardiomyopathy      Past Medical History:   Diagnosis Date    A-fib [facility]) 01/2017    Arthritis, degenerative     CAD (coronary artery disease) 04/2012    S/P 2.75 X 15 mm BMS of OM (04/2012), Two LAD BMS (07/2012)    Elevated liver enzymes     Likely from statin    HLD (hyperlipidemia)     Hyperkalemia 06/2012    Likely from lisinopril    Ischemic cardiomyopathy     LVEF  30-35%(05/19) 45% (11/2012) & 30% echo (04/2012)    NSTEMI (non-ST elevated myocardial infarction) [address] 04/2012    UTI (urinary tract infection)     Vertigo      Past Surgical History:   Procedure Laterality Date    HX CORONARY STENT PLACEMENT  4/23/12    bare metal stent to obtuse marginal branch    HX HEART CATHETERIZATION       Family History   Problem Relation Age of Onset    Diabetes Mother      Social History     Socioeconomic History    Marital status:      Spouse name: Not on file    Number of children: Not on file    Years of education: Not on file    Highest education level: Not on file   Occupational History    Not on file   Social Needs    Financial resource strain: Not on file    Food insecurity:     Worry: Not on file     Inability: Not on file    Transportation needs:     Medical: Not on file     Non-medical: Not on file   Tobacco Use    Smoking status: Never Smoker    Smokeless tobacco: Never Used   Substance and Sexual Activity    Alcohol use: No    Drug use: No    Sexual activity: Never   Lifestyle    Physical activity:     Days per week: Not on file     Minutes per session: Not on file    Stress: Not on file   Relationships    Social connections:     Talks on phone: Not on file     Gets together: Not on file     Attends Jewish service: Not on file     Active member of club or organization: Not on file     Attends meetings of clubs or organizations: Not on file     Relationship status: Not on file    Intimate partner violence:     Fear of current or ex partner: Not on file     Emotionally abused: Not on file     Physically abused: Not on file     Forced sexual activity: Not on file   Other Topics Concern    Not on file   Social History Narrative    Not on file       Allergies:  Flu vaccine  (36 mos+)(pf); Codeine; Egg; Influenza virus vaccines; Lipitor [atorvastatin]; and Pcn [penicillins] .      Medications:  Current Facility-Administered Medications   Medication Dose Route Frequency    cefTRIAXone (ROCEPHIN) 1 g in 0.9% sodium chloride (MBP/ADV) 50 mL MBP  1 g IntraVENous Q24H    vancomycin (VANCOCIN) 1,000 mg in 0.9% sodium chloride (MBP/ADV) 250 mL adv  1,000 mg IntraVENous Q36H    [START ON 2019] VANCOMYCIN INFORMATION NOTE   Other ONCE    VANCOMYCIN INFORMATION NOTE 1 Each  1 Each Other Rx Dosing/Monitoring    acetaminophen (TYLENOL) tablet 650 mg  650 mg Oral Q4H PRN    heparin (porcine) injection 5,000 Units  5,000 Units SubCUTAneous Q8H    aspirin chewable tablet 81 mg  81 mg Oral DAILY    carboxymethylcellulose sodium (CELLUVISC) 0.5 % ophthalmic solution 1 Drop  1 Drop Both Eyes TID PRN    FLUoxetine (PROzac) capsule 20 mg  20 mg Oral DAILY    mirtazapine (REMERON) tablet 15 mg  15 mg Oral QHS    furosemide (LASIX) tablet 40 mg  40 mg Oral DAILY    traMADol (ULTRAM) tablet 50 mg  50 mg Oral Q6H PRN    naloxone (NARCAN) injection 0.4 mg  0.4 mg IntraVENous PRN    digoxin (LANOXIN) tablet 0.25 mg  0.25 mg Oral Once per day on Sun Tue Thu    digoxin (LANOXIN) tablet 0.125 mg  0.125 mg Oral Once per day on Mon Wed Fri Sat    ondansetron (ZOFRAN) injection 4 mg  4 mg IntraVENous Q6H PRN        ROS:  Full ROS performed and pertinent symptoms as in HPI otherwise unremarkeable     Physical Exam:  Temp (24hrs), Av.7 °F (36.5 °C), Min:97.3 °F (36.3 °C), Max:98.1 °F (36.7 °C)    Visit Vitals  BP 93/62 (BP 1 Location: Left arm, BP Patient Position: Supine)   Pulse 89   Temp 97.7 °F (36.5 °C)   Resp 20   Wt 48.4 kg (106 lb 12.8 oz)   SpO2 95%   BMI 18.92 kg/m² General: Well developed, well nourished 80 y.o.  female in no acute distress. ENT: ENT exam normal, no neck nodes or sinus tenderness  Head: right periorbital and facial ecchymosis  Mouth:  mucous membranes moist, pharynx normal without lesions  Neck: supple, symmetrical, trachea midline   Cardio:  irregularly irregular rhythm  Chest: inspection normal - no chest wall deformities or tenderness, respiratory effort normal  Lungs: clear to auscultation, no wheezes or rales and unlabored breathing  Abdomen: soft, non-tender. Bowel sounds normal. No masses, no organomegaly.   Extremities:  Bilateral leg dressings in place (pt seen yesterday w/ dressings off and noted periwound erythema extending proximally and distally from wounds bilaterally)  Neuro: Grossly normal     Lab results:    Chemistry  Recent Labs     11/24/19  1350 11/22/19  0200   * 84    135*   K 3.7 5.5    103   CO2 25 25   BUN 17 22*   CREA 0.81 0.94   CA 6.6* 8.1*   AGAP 11 7   BUCR 21* 23*       CBC w/ Diff  Recent Labs     11/24/19  1350 11/22/19  0200   WBC 8.9 12.3   RBC 3.20* 3.44*   HGB 8.3* 9.0*   HCT 26.9* 28.5*    389   GRANS 82* 82*   LYMPH 8* 9*   EOS 1 0       Microbiology  All Micro Results     Procedure Component Value Units Date/Time    CULTURE, Kee Greet STAIN [[id number]]  (Abnormal) Collected:  11/23/19 1431    Order Status:  Completed Specimen:  Wound from Leg Updated:  11/24/19 1337     Special Requests: NO SPECIAL REQUESTS        GRAM STAIN FEW WBC'S         RARE GRAM POSITIVE RODS         FEW GRAM NEGATIVE RODS        Culture result:       MODERATE GRAM NEGATIVE RODS          CULTURE, Kee Greet STAIN [[id number]]  (Abnormal) Collected:  11/23/19 1431    Order Status:  Completed Specimen:  Wound from Leg Updated:  11/24/19 1333     Special Requests: NO SPECIAL REQUESTS        GRAM STAIN RARE WBC'S         FEW GRAM POSITIVE RODS         FEW GRAM NEGATIVE RODS        Culture result: MODERATE POSSIBLE STREPTOCOCCI,NON HEMOLYTIC                  MODERATE GRAM NEGATIVE RODS                  MODERATE 2ND GRAM NEGATIVE JASMIN          CULTURE, BLOOD [[id number]] Collected:  11/23/19 1431    Order Status:  Completed Specimen:  Blood Updated:  11/24/19 0814     Special Requests: --        NO SPECIAL REQUESTS  RIGHT  HAND       Culture result: NO GROWTH AFTER 16 HOURS       CULTURE, BLOOD [[id number]] Collected:  11/23/19 1432    Order Status:  Completed Specimen:  Blood Updated:  11/24/19 0814     Special Requests: --        NO SPECIAL REQUESTS  LEFT       Culture result: NO GROWTH AFTER 12 HOURS                Deanne Galvan MD, [facility]  Infectious Diseases  [phone]   11/24/2019   4:34 PM

## 2019-11-24 NOTE — PROGRESS NOTES
1900  -- Bedside, Verbal and Written shift change report given to 2309 Matthew Rubin (oncoming nurse) by [address] [place] [address]). Report included the following information SBAR, Kardex, Intake/Output, MAR and Recent Results.       2216 -- PM medications administered, pt tolerated with ease, will continue to monitor.     0000 -- Shift reassessment, pt condition unchanged, will continue to monitor. 0030 -- Hygiene care and wound care completed.      0400 --  Shift reassessment, pt condition unchanged, will continue to monitor.      0500 -- Pt refusing all labs, states \" I want to go home, no one is sticking me again\". Pt advised to speak with MD today on being discharged back to facility.      0325 -- Bedside, Verbal and Written shift change report given to 1316 Janelle Mahajans nurse) by CHERISE (offgoing nurse). Report included the following information SBAR, Kardex, Intake/Output, MAR and Recent Results. Skin assessment completed.

## 2019-11-24 NOTE — PROGRESS NOTES
Problem: Self Care Deficits Care Plan (Adult)  Goal: *Acute Goals and Plan of Care (Insert Text)  Description  Occupational Therapy Goals  Initiated 11/24/2019 within 7 day(s). 1.  Patient will perform grooming tasks while standing with supervision/set-up. 2.  Patient will perform lower body dressing with stand-by assistance and AE, prn.  3.  Patient will perform functional task in standing for 8 minutes with supervision and fair+ dynamic standing balance in prep for ADLs. 4.  Patient will perform toilet transfers with supervision/set-up. 5.  Patient will perform all aspects of toileting with supervision/set-up. 6.  Patient will participate in upper extremity therapeutic exercise/activities with supervision/set-up for 8 minutes to increase BUE ROM/strength for functional transfers and ADLs. 7.  Patient will utilize energy conservation techniques during functional activities with minimal verbal cues. Outcome: Progressing Towards Goal     OCCUPATIONAL THERAPY EVALUATION    Patient: Kathy Bradford (80 y.o. female)  Date: 11/24/2019  Primary Diagnosis: Dizziness [R42]  Syncope [R55]        Precautions:  Fall  PLOF: Pt reports independence with ADLs & RW for functional mobility. ASSESSMENT :  Based on the objective data described below, the patient presents with impairments with regard to bed mobility, functional transfers, safety awareness & independence in ADLs. Pt supine on arrival, no c/o pain. Demanding t/o session. Pt c/o \"difficulty with my left hand. \" Apparent L wrist drop, no active digits & L wrist extensors noted, weak L hand . Min A & additional time for bed mobility. SBA/min A for toileting & grooming tasks. Mod A for LB dressing at EOB. Pt returned back to bed with bed alarm on for safety. Decreased carryover of therapist's instructions with apparent memory loss t/o session. Pt will benefit from SNF upon d/c. Needs within reach.     Patient will benefit from skilled intervention to address the above impairments. Patient's rehabilitation potential is considered to be Fair  Factors which may influence rehabilitation potential include:   []             None noted  [x]             Mental ability/status  [x]             Medical condition  [x]             Home/family situation and support systems  []             Safety awareness  []             Pain tolerance/management  []             Other:      PLAN :  Recommendations and Planned Interventions:   [x]               Self Care Training                  [x]      Therapeutic Activities  [x]               Functional Mobility Training   []      Cognitive Retraining  [x]               Therapeutic Exercises           [x]      Endurance Activities  [x]               Balance Training                    []      Neuromuscular Re-Education  []               Visual/Perceptual Training     [x]      Home Safety Training  [x]               Patient Education                   [x]      Family Training/Education  []               Other (comment):    Frequency/Duration: Patient will be followed by occupational therapy 3-5 times to address goals. Discharge Recommendations: Skilled Nursing Facility  Further Equipment Recommendations for Discharge:  anticipate none     SUBJECTIVE:   Patient stated I don't know what to tell you, but you're not going to do it.  (referring to therapist requesting to adjust pt's heart lead per telemetry tech)    OBJECTIVE DATA SUMMARY:     Past Medical History:   Diagnosis Date    A-fib ([facility] 01/2017    Arthritis, degenerative     CAD (coronary artery disease) 04/2012    S/P 2.75 X 15 mm BMS of OM (04/2012), Two LAD BMS (07/2012)    Elevated liver enzymes     Likely from statin    HLD (hyperlipidemia)     Hyperkalemia 06/2012    Likely from lisinopril    Ischemic cardiomyopathy     LVEF  30-35%(05/19) 45% (11/2012) & 30% echo (04/2012)    NSTEMI (non-ST elevated myocardial infarction) ([facility] 04/2012    UTI (urinary tract infection)     Vertigo Past Surgical History:   Procedure Laterality Date    HX CORONARY STENT PLACEMENT  4/23/12    bare metal stent to obtuse marginal branch    HX HEART CATHETERIZATION       Barriers to Learning/Limitations: yes;  cognitive  Compensate with: visual, verbal, tactile, kinesthetic cues/model    Home Situation:   Home Situation  Home Environment: [address] [place] [address] Road Name: [address] [place] [address]  # Steps to Enter: 0  One/Two Story Residence: One story  Living Alone: No  Support Systems: Assisted living  Patient Expects to be Discharged to[de-identified] Skilled nursing facility  Current DME Used/Available at Home: Grab bars, Walker, rolling, Shower chair  Tub or Shower Type: Shower(grab bars & shower chair)  []  Right hand dominant   []  Left hand dominant    Cognitive/Behavioral Status:  Neurologic State: Alert  Orientation Level: Oriented to person;Oriented to place  Cognition: Impaired decision making;Memory loss;Poor safety awareness  Safety/Judgement: Decreased awareness of environment;Decreased awareness of need for safety    Skin: Bruising noted to bilateral hands (LUE > RUE)  Edema: None noted    Vision/Perceptual:     Acuity: Within Defined Limits        Coordination: BUE  Coordination: Generally decreased, functional(LUE)  Fine Motor Skills-Upper: Right Intact; Left Impaired    Gross Motor Skills-Upper: Right Intact; Left Impaired(L generally decreased)    Balance:  Sitting: Intact  Standing: Impaired; With support  Standing - Static: Fair  Standing - Dynamic : Fair    Strength: BUE  Strength: Generally decreased, functional(RUE 4/5; LUE 3+/5; L wrist: 1/5)    Tone & Sensation: BUE  Tone: Abnormal(decreased muscle tone distal to L wrist)  Sensation: Impaired(RUE Intact; LUE impaired at all DIPs)      Range of Motion: BUE  AROM: Generally decreased, functional(LUE 3/4 shoulder flex; RUE WFL; L wrist/hand no active ext.)  PROM: Within functional limits(BUEs)    Functional Mobility and Transfers for ADLs:  Bed Mobility:  Supine to Sit: Minimum assistance; Additional time  Sit to Supine: Moderate assistance(for BLEs)    Transfers:  Sit to Stand: Minimum assistance;Assist x1  Stand to Sit: Supervision   Toilet Transfer : Stand-by assistance; Adaptive equipment; Additional time    ADL Assessment:   Feeding: Minimum assistance  Oral Facial Hygiene/Grooming: Minimum assistance  Bathing: Moderate assistance  Upper Body Dressing: Minimum assistance  Lower Body Dressing: Moderate assistance  Toileting: Minimum assistance    ADL Intervention:  Feeding  Feeding Assistance: Minimum assistance(for container management & set -up)    Grooming  Grooming Assistance: Minimum assistance(physical assistance & verbal cues)    Lower Body Dressing Assistance  Dressing Assistance: Moderate assistance  Protective Undergarmet: Moderate assistance(seated at EOB)  Socks: Moderate assistance(L sock with increased time)    Toileting  Toileting Assistance: Stand-by assistance;Minimum assistance  Bladder Hygiene: Stand-by assistance  Clothing Management: Minimum assistance  Cues: Verbal cues provided  Adaptive Equipment: Grab bars; Walker    Due to new onset of L wrist drop, weak L hand , decreased balance & increased memory loss, pt requiring min A for meal set-up, min A for grooming at sink, SBA/min A for toileting task, and mod A for LB dressing involving briefs & sock on LLE. Cognitive Retraining  Safety/Judgement: Decreased awareness of environment;Decreased awareness of need for safety    Pain:  Pain level pre-treatment: 0/10   Pain level post-treatment: 0/10     Activity Tolerance:  Fair  Please refer to the flowsheet for vital signs taken during this treatment.   After treatment:   [] Patient left in no apparent distress sitting up in chair  [x] Patient left in no apparent distress in bed  [] Call bell left within reach  [x] Nursing notified  [] Caregiver present  [x] Bed alarm activated    COMMUNICATION/EDUCATION:   [] Role of Occupational Therapy in the acute care setting  [] Home safety education was provided and the patient/caregiver indicated understanding. [] Patient/family have participated as able in goal setting and plan of care. [] Patient/family agree to work toward stated goals and plan of care. [] Patient understands intent and goals of therapy, but is neutral about his/her participation. [] Patient is unable to participate in goal setting and plan of care. Thank you for this referral.  Winnie Chavez MS OTR/L  Time Calculation: 25 mins    Eval Complexity: History: MEDIUM Complexity : Expanded review of history including physical, cognitive and psychosocial  history ; Examination: MEDIUM Complexity : 3-5 performance deficits relating to physical, cognitive , or psychosocial skils that result in activity limitations and / or participation restrictions; Decision Making:MEDIUM Complexity : Patient may present with comorbidities that affect occupational performnce.  Miniml to moderate modification of tasks or assistance (eg, physical or verbal ) with assesment(s) is necessary to enable patient to complete evaluation

## 2019-11-24 NOTE — PROGRESS NOTES
Problem: Afib Pathway: Day 2  Goal: Activity/Safety  Outcome: Progressing Towards Goal  Goal: Consults, if ordered  Outcome: Progressing Towards Goal  Goal: Diagnostic Test/Procedures  Outcome: Progressing Towards Goal  Goal: Nutrition/Diet  Outcome: Progressing Towards Goal  Goal: Discharge Planning  Outcome: Progressing Towards Goal  Goal: Medications  Outcome: Progressing Towards Goal  Goal: Respiratory  Outcome: Progressing Towards Goal  Goal: Treatments/Interventions/Procedures  Outcome: Progressing Towards Goal  Goal: Psychosocial  Outcome: Progressing Towards Goal  Goal: *Hemodynamically stable  Outcome: Progressing Towards Goal  Goal: *Optimal pain control at patient's stated goal  Outcome: Progressing Towards Goal  Goal: *Stable cardiac rhythm  Outcome: Progressing Towards Goal  Goal: *Lungs clear or at baseline  Outcome: Progressing Towards Goal  Goal: *Describes available resources and support systems  Outcome: Progressing Towards Goal     Problem: Falls - Risk of  Goal: *Absence of Falls  Description  Document James Hickey Fall Risk and appropriate interventions in the flowsheet. Outcome: Progressing Towards Goal  Note: Fall Risk Interventions:  Mobility Interventions: Bed/chair exit alarm, Patient to call before getting OOB    Mentation Interventions: Bed/chair exit alarm, Door open when patient unattended    Medication Interventions: Bed/chair exit alarm, Patient to call before getting OOB    Elimination Interventions: Call light in reach, Patient to call for help with toileting needs    History of Falls Interventions: Bed/chair exit alarm         Problem: Pressure Injury - Risk of  Goal: *Prevention of pressure injury  Description  Document Allen Scale and appropriate interventions in the flowsheet.   Outcome: Progressing Towards Goal  Note: Pressure Injury Interventions:  Sensory Interventions: Assess changes in LOC    Moisture Interventions: Apply protective barrier, creams and emollients    Activity Interventions: PT/OT evaluation, Pressure redistribution bed/mattress(bed type)    Mobility Interventions: Pressure redistribution bed/mattress (bed type), Float heels, Turn and reposition approx.  every two hours(pillow and wedges)    Nutrition Interventions: Document food/fluid/supplement intake    Friction and Shear Interventions: Apply protective barrier, creams and emollients

## 2019-11-24 NOTE — ROUTINE PROCESS
0800-Assessment completed call bell within reach, no distress noted, voiced no complaints. 0910-am due medications given. 1230-no change in condition, no distress noted. Dressings to bilat legs completed. 1400-resting quietly in bed. 1630-no change in condition, no distress noted, voiced no complaints at this time. 1915-Bedside and Verbal shift change report given to Mac (oncoming nurse) by Stefanie Gómez (offgoing nurse). Report included the following information SBAR, MAR and Recent Results.

## 2019-11-24 NOTE — PROGRESS NOTES
Patient's case discussed with Dr Jean James and extended family here yesterday - wounds already responding to dressing change regime - hematoma liquifying and granulations appearing - it is still uncertain whether the periosteum over the tibias is entirely intact or not - antibiotics are initiated - plan to send patient to [facility] as she clearly is not able to return to [facility] from all respects at this juncture. Khanh Hedrick

## 2019-11-25 ENCOUNTER — HOME CARE VISIT (OUTPATIENT)
Dept: HOME HEALTH SERVICES | Facility: HOME HEALTH | Age: 84
End: 2019-11-25
Payer: MEDICARE

## 2019-11-25 PROCEDURE — 77030037877 HC DRSG MEPILEX >48IN BORD MOLN -A

## 2019-11-25 PROCEDURE — 74011000250 HC RX REV CODE- 250: Performed by: HOSPITALIST

## 2019-11-25 PROCEDURE — 65270000029 HC RM PRIVATE

## 2019-11-25 PROCEDURE — 3331090002 HH PPS REVENUE DEBIT

## 2019-11-25 PROCEDURE — 74011250637 HC RX REV CODE- 250/637: Performed by: HOSPITALIST

## 2019-11-25 PROCEDURE — 74011250636 HC RX REV CODE- 250/636: Performed by: HOSPITALIST

## 2019-11-25 PROCEDURE — 3331090001 HH PPS REVENUE CREDIT

## 2019-11-25 PROCEDURE — 74011000258 HC RX REV CODE- 258: Performed by: HOSPITALIST

## 2019-11-25 RX ORDER — LIDOCAINE 4 G/100G
1 PATCH TOPICAL EVERY 24 HOURS
Status: DISCONTINUED | OUTPATIENT
Start: 2019-11-25 | End: 2019-11-26 | Stop reason: HOSPADM

## 2019-11-25 RX ADMIN — FLUOXETINE 20 MG: 10 CAPSULE ORAL at 08:38

## 2019-11-25 RX ADMIN — CEFTRIAXONE 1 G: 1 INJECTION, POWDER, FOR SOLUTION INTRAMUSCULAR; INTRAVENOUS at 14:30

## 2019-11-25 RX ADMIN — SODIUM CHLORIDE 500 MG: 900 INJECTION, SOLUTION INTRAVENOUS at 08:39

## 2019-11-25 RX ADMIN — ASPIRIN 81 MG 81 MG: 81 TABLET ORAL at 08:37

## 2019-11-25 RX ADMIN — TRAMADOL HYDROCHLORIDE 50 MG: 50 TABLET, FILM COATED ORAL at 04:42

## 2019-11-25 RX ADMIN — MIRTAZAPINE 15 MG: 15 TABLET, FILM COATED ORAL at 21:25

## 2019-11-25 RX ADMIN — ACETAMINOPHEN 650 MG: 325 TABLET, FILM COATED ORAL at 14:36

## 2019-11-25 RX ADMIN — DIGOXIN 0.12 MG: 125 TABLET ORAL at 08:44

## 2019-11-25 RX ADMIN — FUROSEMIDE 40 MG: 40 TABLET ORAL at 08:38

## 2019-11-25 RX ADMIN — HEPARIN SODIUM 5000 UNITS: 5000 INJECTION INTRAVENOUS; SUBCUTANEOUS at 14:31

## 2019-11-25 NOTE — CDMP QUERY
Patient presented s/p syncopal episode while obtaining wound care. Patient was noted to have BLE wounds in the ER that were sustained from a fall. Later documentation indicates patient has infected lower extremity wounds with +wound cultures as well as adjacent cellulitis. Please clarify if this patient likely had 
 
=BLE wound infections with adjacent cellulitis POA 
=BLE wound infectious with adjacent cellulitis Not POA 
=Other explanation =Unable to determine Risks: 79yo female resident at [facility], s/p fall with injury to legs Clinical indicators: patient had a slowly worsening decline over the last week to week and a half with decreased p.o. intake and the patient being weaker. H&P states syncope Unclear etiology but suspect vasovagal response from pain (3 times has happened while she was receiving leg wound care). Chronic LE wounds consult wound care team. 
11/22 Wound care recommended urgent surgical consult 11/22 Plastic surgery consult recommended dressing changes 11/23 Fever to 100.9, ID consult obtained, wound cx and blood cx obtained 11/24 ID consult: Bilateral leg wounds/hematomas infected with superficial wound cx + GNR, adjacent cellulitis on both legs Treatment: Wound care, ID consult, wound cx, Blood cx, IV vanco, Cefriaxone Fabrice Gilbert [facility] [id number]

## 2019-11-25 NOTE — ROUTINE PROCESS
0810-Assessment completed, call bell within reach, no distress noted, voiced no complaints at this time. 0900-am due medications given. 1200-no change in condition, family at bedside. 1400-resting quietly in bed. 1630-no change in condition. 1820-up to BR and then recliner with assistance, call bell within reach, sat on tabs alarm. 1925-Bedside and Verbal shift change report given to Ryann Wilson (oncoming nurse) by Angela Smith (offgoing nurse). Report included the following information SBAR, MAR and Recent Results.

## 2019-11-25 NOTE — PROGRESS NOTES
Pharmacy Dosing Services: Vancomycin    Indication: SSTI; LE necrotic wounds    Day of therapy: 2    Other Antimicrobials (Include dose, start day & day of therapy):  Ceftriaxone 1gm IV q24hr    Loading dose (date given): 1000 mg IV once on   Current Maintenance dose: Dosing per level     Goal Vancomycin Level: 15-20  (Trough 15-20 for most infections, 20 for meningitis/osteomyelitis, pre-HD level ~25)    Vancomycin Level (if drawn):    - 21.3 (21 hours post dose)    Significant Cultures:    - wound culture - Moderate strep; moderate GNR    Renal function stable? (unstable defined as SCr increase of 0.5 mg/dL or > 50% increase from baseline, whichever is greater) (Y/N): Y     CAPD, Hemodialysis or Renal Replacement Therapy (Y/N): N     Recent Labs     19  1350 19  0200   CREA 0.81 0.94   BUN 17 22*   WBC 8.9 12.3     Temp (24hrs), Av.7 °F (36.5 °C), Min:97.3 °F (36.3 °C), Max:97.9 °F (36.6 °C)    Creatinine Clearance (Creatinine Clearance (ml/min)): 33.9 mL/min     Regimen assessment:   - Random level collected 21 hours post 1000 mg dose is at 21.3 mcg/mL; expect vancomycin level to be below 20 mcg/mL since ~ 8 hrs have passed since the level was collected. - Will start conservatively dosing patient at 500 mg IV q12 hrs based on patient specific PK parameters, advanced age, and low weight  - Will order an early trough after 2 maintenance doses are given to ensure patient continues to clear vancomycin   Maintenance dose: 500 mg IV every 12 hours   Next scheduled level: Trough on  at 2130       Pharmacy will follow daily and adjust medications as appropriate for renal function and/or serum levels.     Thank you,  Ro Chavarria, PHARMD

## 2019-11-25 NOTE — PROGRESS NOTES
Problem: Afib Pathway: Day 3  Goal: Activity/Safety  Outcome: Progressing Towards Goal  Goal: Diagnostic Test/Procedures  Outcome: Progressing Towards Goal  Goal: Nutrition/Diet  Outcome: Progressing Towards Goal  Goal: Discharge Planning  Outcome: Progressing Towards Goal  Goal: Medications  Outcome: Progressing Towards Goal  Goal: Respiratory  Outcome: Progressing Towards Goal  Goal: Treatments/Interventions/Procedures  Outcome: Progressing Towards Goal  Goal: Psychosocial  Outcome: Progressing Towards Goal     Problem: Falls - Risk of  Goal: *Absence of Falls  Description  Document Talya Paniagua Fall Risk and appropriate interventions in the flowsheet.   Outcome: Progressing Towards Goal  Note: Fall Risk Interventions:  Mobility Interventions: Bed/chair exit alarm, Patient to call before getting OOB    Mentation Interventions: Door open when patient unattended    Medication Interventions: Patient to call before getting OOB, Bed/chair exit alarm    Elimination Interventions: Patient to call for help with toileting needs, Call light in reach    History of Falls Interventions: Bed/chair exit alarm, Door open when patient unattended

## 2019-11-25 NOTE — ROUTINE PROCESS
6412 Received report from [employer]. Patient alert and oriented. 0900 Patient upset with her breakfast - ordered grits as requested. Refused PT.  
 
1200 Patient up in the recliner. 1400 Patient back in the bed. Denies any pain. 1700 Patient up sitting in the recliner. Call bell placed within reach. 1945 Bedside and Verbal shift change report given to Darrion Alexander (oncoming nurse) by me (offgoing nurse). Report included the following information SBAR, Kardex, Intake/Output, MAR, Recent Results and Cardiac Rhythm Atrial Fib.

## 2019-11-25 NOTE — CDMP QUERY
Patient admitted with syncope. After further study, please document in progress notes and discharge summary if you are evaluating and/or treating any of the following: ? Syncope due to dehydratoin ? Syncope due to chronic a. fib ? Syncope due to wound infection ? Syncope due to  Hypotension ? Syncope due to vasovagal response ? Syncope due to other, please specify ? Clinically unable to determine The medical record reflects the following: 
  Risk Factors: 79yo female, h/o chronic a. Fib, CHF, CAD, recent falls, LE wounds, periorbital wounds Clinical Indicators: Presented s/p syncope while undergoing wound care. Patient noted to have a h/o chronic a. Fib. H&P states syncope Unclear etiology but suspect vasovagal response from pain (3 times has happened while she was receiving leg wound care). Need to rule out stroke/TIA, arrythmia/bradycardia, hypotension; 
recently Switched bumex to lasix (son-in-law states when lasix switched to bumex 5-6 weeks ago he thinks there have been more episodes of syncope. Na on admission = 134  Bun/creat 26/1.15  
-120/45-55. Patient later developed a fever and found to have + wound cx. Treatment:1L NS bolus, Digoxin po, Iv Vanco, IV ceftriaxone, wound cx, blood cx, wound care, ID consult, surgery consult Manish Lemus [address] [place] [address]

## 2019-11-25 NOTE — PROGRESS NOTES
Chart reviewed. Plan remains SNF, wants TCC, when medically stable. Will cont to follow for further needs. Mariah CarreraRN,ext [phone].

## 2019-11-25 NOTE — PROGRESS NOTES
[facility]                                               (A Division of [address] [place] [address])                           Follow-up Note      Date of Admission: 11/21/2019     Date of Note:  11/25/2019    Summary:      81 y/o CF w/ h/o CAD, ischemic Cardiomyopathy, Atrial Fibrillation, HLD, DJD adm 11/22 due to recurrent syncope. Had leg hematomas after fall 3 wks PTA - now infected (w/ adjacent leg cellulitis). On Vanc, Ceftriaxone wd cx GNR     Interval History:     Sitting up in bed. Feels better. Still has pain in both legs but erythema is much improved       Current Antimicrobials: Prior Antimicrobials   Vancomycin, ceftriaxone 11/23 - 2          Assessment: Plan:   Bilateral leg wounds / hematomas, infected  - adjacent cellulitis on both legs.    - superficial wound cx 11/23 Raoultella sens Cefaz 2nd GNR, NHS, CONS, Diphtheroids -> continue Vanc, Ceftriaxone pending sens of 2nd GNR and NHS  -> de-escalate to po abx tomorrow if  susceptibilities allow   Recurrent syncope / falls -> per primary team   CAD     ischemic Cardiomyopathy     Atrial Fibrillation     HLD     DJD        Microbiology:     11/23    blcx NGTD x 1               Wd cx GNR      Lines / Catheters:     piv     Patient Active Problem List   Diagnosis Code    SOB (shortness of breath) R06.02    NSTEMI (non-ST elevated myocardial infarction) ([facility] [address] I21.4    CAD (coronary artery disease) I25.10    Ischemic cardiomyopathy I25.5    Arthritis, degenerative M19.90    Vertigo R42    HLD (hyperlipidemia) E78.5    Chronic a-fib I48.20    Irritable bowel syndrome with diarrhea K58.0    Chronic fatigue R53.82    Hyponatremia E87.1    Acute metabolic encephalopathy A47.20    Hyperkalemia E87.5    Acute on chronic systolic (congestive) heart failure (HCC) I50.23    Syncope R55    Hypoxia R09.02    Leukocytosis D72.829    Atrial fibrillation with rapid ventricular response (HCC) I48.91    Atrial fibrillation (HCC) I48.91    Hypotension I95.9    Skin rash S23    Metabolic acidosis H06.9    Underweight R63.6    Dizziness R42       Current Facility-Administered Medications   Medication Dose Route Frequency    lidocaine 4 % patch 1 Patch  1 Patch TransDERmal Q24H    VANCOMYCIN INFORMATION NOTE   Other ONCE    vancomycin (VANCOCIN) 500 mg in 0.9% sodium chloride (MBP/ADV) 100 mL ADV  500 mg IntraVENous Q12H    cefTRIAXone (ROCEPHIN) 1 g in 0.9% sodium chloride (MBP/ADV) 50 mL MBP  1 g IntraVENous Q24H    [START ON 2019] VANCOMYCIN INFORMATION NOTE   Other ONCE    VANCOMYCIN INFORMATION NOTE 1 Each  1 Each Other Rx Dosing/Monitoring    acetaminophen (TYLENOL) tablet 650 mg  650 mg Oral Q4H PRN    heparin (porcine) injection 5,000 Units  5,000 Units SubCUTAneous Q8H    aspirin chewable tablet 81 mg  81 mg Oral DAILY    carboxymethylcellulose sodium (CELLUVISC) 0.5 % ophthalmic solution 1 Drop  1 Drop Both Eyes TID PRN    FLUoxetine (PROzac) capsule 20 mg  20 mg Oral DAILY    mirtazapine (REMERON) tablet 15 mg  15 mg Oral QHS    furosemide (LASIX) tablet 40 mg  40 mg Oral DAILY    traMADol (ULTRAM) tablet 50 mg  50 mg Oral Q6H PRN    naloxone (NARCAN) injection 0.4 mg  0.4 mg IntraVENous PRN    digoxin (LANOXIN) tablet 0.25 mg  0.25 mg Oral Once per day on Sun Tue Thu    digoxin (LANOXIN) tablet 0.125 mg  0.125 mg Oral Once per day on Mon Wed Fri Sat    ondansetron (ZOFRAN) injection 4 mg  4 mg IntraVENous Q6H PRN     Review of Systems - Negative except as in interval history     Objective:  Visit Vitals  /68 (BP 1 Location: Right arm, BP Patient Position: At rest)   Pulse 83   Temp 97.6 °F (36.4 °C)   Resp 15   Wt 48.5 kg (106 lb 14.8 oz)   SpO2 98%   BMI 18.94 kg/m²       Temp (24hrs), Av.6 °F (36.4 °C), Min:97.5 °F (36.4 °C), Max:97.7 °F (36.5 °C)      General: Well developed, well nourished 80 y.o.  female in no acute distress.   HEENT: no scleral icterus, no oral lesions   Cardio:  irregularly irregular rhythm  Lungs: clear to auscultation, no wheezes or rales and unlabored breathing  Abdomen: soft, non-tender. Bowel sounds normal. No masses, no organomegaly.   Extremities:  periwound erythema decreased (see photo)         Lab results:    Chemistry  Recent Labs     11/24/19  1350   *      K 3.7      CO2 25   BUN 17   CREA 0.81   CA 6.6*   AGAP 11   BUCR 21*       CBC w/ Diff  Recent Labs     11/24/19  1350   WBC 8.9   RBC 3.20*   HGB 8.3*   HCT 26.9*      GRANS 82*   LYMPH 8*   EOS 1       Microbiology  All Micro Results     Procedure Component Value Units Date/Time    CULTURE, Silas Jesus STAIN [[id number]]  (Abnormal)  (Susceptibility) Collected:  11/23/19 1431    Order Status:  Completed Specimen:  Wound from Leg Updated:  11/25/19 1216     Special Requests: NO SPECIAL REQUESTS        GRAM STAIN FEW WBC'S         RARE Laroy Delmi POSITIVE RODS         FEW GRAM NEGATIVE RODS        Culture result:       MODERATE RAOULTELLA (formerly Klebsiella) PLANTICOLA            FEW 2ND GRAM NEGATIVE JASMIN               FEW STREPTOCOCCI,NON HEMOLYTIC                  FEW DIPHTHEROIDS (TWO MORPHOTYPES)                  RARE STAPHYLOCOCCUS SPECIES, COAGULASE NEGATIVE          CULTURE, WOUND Saul Freistatt STAIN [[id number]]  (Abnormal)  (Susceptibility) Collected:  11/23/19 1431    Order Status:  Completed Specimen:  Wound from Leg Updated:  11/25/19 1207     Special Requests: NO SPECIAL REQUESTS        GRAM STAIN RARE WBC'S         FEW GRAM POSITIVE RODS         FEW GRAM NEGATIVE RODS        Culture result:       MODERATE ENTEROCOCCUS FAECALIS GROUP D                  MODERATE GRAM NEGATIVE RODS                  MODERATE 2ND GRAM NEGATIVE JASMIN                  MODERATE  POSSIBLE  3RD GRAM NEGATIVE JASMIN        FEW DIPHTHEROIDS       CULTURE, BLOOD [[id number]] Collected:  11/23/19 1431    Order Status:  Completed Specimen:  Blood Updated:  11/25/19 0808     Special Requests: -- NO SPECIAL REQUESTS  RIGHT  HAND       Culture result: NO GROWTH 2 DAYS       CULTURE, BLOOD [[id number]] Collected:  11/23/19 1432    Order Status:  Completed Specimen:  Blood Updated:  11/25/19 0808     Special Requests: --        NO SPECIAL REQUESTS  LEFT       Culture result: NO GROWTH 2 DAYS              Jerry Hayes MD, [facility]  Infectious Diseases  [phone]  11/25/2019   1:10 PM

## 2019-11-25 NOTE — PROGRESS NOTES
1900  -- Bedside, Verbal and Written shift change report given to 2309 Matthew Rubin (oncoming nurse) by [address] [place] [address]). Report included the following information SBAR, Kardex, Intake/Output, MAR and Recent Results. 2100 -- Wound care completed.      2120 -- PM medications administered, pt tolerated with ease, will continue to monitor.     0000 -- Shift reassessment, pt condition changed, wound care completed, will continue to monitor.      0415 --  Shift reassessment, pt condition unchanged, will continue to monitor.      0425 -- Contacted by student RN to come to room. Pt has spilled tea on abdomen. Gown and linen removed. Pt questioned on what occurred, pt states she spilled tea. Area from left breast to flank red. MD mays advised, apply ice pack administered PRN pain med. Pt reassessed, pt resting comfortably.       0700  -- Bedside, Verbal and Written shift change report given to [address] [facility]) by CHERISE (offgoing nurse). Report included the following information SBAR, Kardex, Intake/Output, MAR and Recent Results. Skin assessment completed.

## 2019-11-25 NOTE — PROGRESS NOTES
Attempted PT, pt declined. Stated very clearly she understands her goals and wishes related to her medical care, she did not sleep well and does not wish to get out of bed. Will continue to follow.  Samira Lozano, PTA

## 2019-11-25 NOTE — PROGRESS NOTES
Progress Note      Patient: Sonja Bolanos               Sex: female          DOA: 11/21/2019       YOB: 1927      Age:  80 y.o.        LOS:  LOS: 3 days             CHIEF COMPLAINT:  Syncope and Fall      Assessment/Plan:     Principal Problem:    Syncope (6/5/2019)    Active Problems:    Ischemic cardiomyopathy ()      Irritable bowel syndrome with diarrhea (9/24/2018)      Atrial fibrillation ([address] (10/1/2019)      Dizziness (11/21/2019)        PLAN:  LE necrotic wounds with fever  Consulted wound care team, d/w with them, recommending urgent surgical consult due to severity of wounds. - Appreciate Dr. Enoch Martins evaluation of wounds, recommends wet to dry dressings and wait for wounds to granulate. - Cont IV vancomycin and rocephin. Cultures with GNR  - ID consulted    Syncope   Unclear etiology but suspect vasovagal response from pain (3 times has happened while she was receiving leg wound care). Need to rule out stroke/TIA, arrythmia/bradycardia, hypotension   Reviewed [employer] note,  MRI brain is  negative.   Check orthostatics   Place on cardiac monitor  Switch bumex to lasix (son-in-law states episodes were less frequent with lasix)    Chronic systolic CHF, not in exacerbation  NT-pro BNP elevated but within range of previous values; clinically appears euvolemic  Echo 6/2019: LVEF 31-35%   Cont Cardiac diet, digoxin  Switch bumex to lasix (son-in-law states when lasix switched to bumex 5-6 weeks ago he thinks there have been more episodes of syncope)  Cont ASA    Atrial fibrillation  Cont rate control  Cardiac monitoring     Depression  Cont prozac and remeron    Elevated troponin  Troponin 0.19, similar elevations in the past, pt denies any chest pain, EKG w/ afib and non-specific ST-T wave abnrmality  Trended down    DVT prophylaxis  SubQ heparin    Code Status: DNR (advanced directive)    Dispo: PT/OT consult, planning for TCC    Subjective:     Pt this AM was alert, had breakfast. She complains of pain below her left breast after she was washed stating the water too warm and might have burned her a little. Objective:     Visit Vitals  /68 (BP 1 Location: Right arm, BP Patient Position: At rest)   Pulse 83   Temp 97.6 °F (36.4 °C)   Resp 15   Wt 48.5 kg (106 lb 14.8 oz)   SpO2 98%   BMI 18.94 kg/m²        Physical Exam:  Gen: thin elderly female  Ears: hearing is intact. Right lateral periorbital and forehead hematoma (healing, from previous fall)  Eyes: Icterus is not present  Lungs: clear to auscultation bilaterally  Heart: irregularly irregular rate and rhythm, S1, S2 normal, no murmur, click, rub or gallop  Gastrointestinal: soft, non-tender. Bowel sounds normal. No masses,  no organomegaly  Neurological:  New Focal Deficits are not present  Ext: LE wounds wrapped in dressing  Psychiatric:  Mood is stable       Lab/Data Reviewed:  CMP:   Lab Results   Component Value Date/Time     11/24/2019 01:50 PM    K 3.7 11/24/2019 01:50 PM     11/24/2019 01:50 PM    CO2 25 11/24/2019 01:50 PM    AGAP 11 11/24/2019 01:50 PM     (H) 11/24/2019 01:50 PM    BUN 17 11/24/2019 01:50 PM    CREA 0.81 11/24/2019 01:50 PM    GFRAA >60 11/24/2019 01:50 PM    GFRNA >60 11/24/2019 01:50 PM    CA 6.6 (L) 11/24/2019 01:50 PM    MG 1.6 11/24/2019 01:50 PM    PHOS 4.2 11/24/2019 01:50 PM     CBC:   Lab Results   Component Value Date/Time    WBC 8.9 11/24/2019 01:50 PM    HGB 8.3 (L) 11/24/2019 01:50 PM    HCT 26.9 (L) 11/24/2019 01:50 PM     11/24/2019 01:50 PM       Imaging Reviewed:  No results found.

## 2019-11-25 NOTE — PROGRESS NOTES
Problem: Patient Education: Go to Patient Education Activity  Goal: Patient/Family Education  Outcome: Progressing Towards Goal     Problem: Afib Pathway: Day 3  Goal: Activity/Safety  Outcome: Resolved/Met  Goal: Diagnostic Test/Procedures  Outcome: Resolved/Met  Goal: Nutrition/Diet  Outcome: Resolved/Met  Goal: Discharge Planning  Outcome: Resolved/Met  Goal: Medications  Outcome: Resolved/Met  Goal: Respiratory  Outcome: Resolved/Met  Goal: Treatments/Interventions/Procedures  Outcome: Resolved/Met  Goal: Psychosocial  Outcome: Resolved/Met     Problem: Patient Education: Go to Patient Education Activity  Goal: Patient/Family Education  Outcome: Progressing Towards Goal     Problem: Pressure Injury - Risk of  Goal: *Prevention of pressure injury  Description  Document Allen Scale and appropriate interventions in the flowsheet.   Outcome: Progressing Towards Goal  Note: Pressure Injury Interventions:  Sensory Interventions: Assess changes in LOC, Keep linens dry and wrinkle-free    Moisture Interventions: Absorbent underpads    Activity Interventions: Pressure redistribution bed/mattress(bed type)    Mobility Interventions: Pressure redistribution bed/mattress (bed type)    Nutrition Interventions: Offer support with meals,snacks and hydration    Friction and Shear Interventions: Apply protective barrier, creams and emollients                Problem: Patient Education: Go to Patient Education Activity  Goal: Patient/Family Education  Outcome: Progressing Towards Goal     Problem: Discharge Planning  Goal: *Discharge to safe environment  Outcome: Progressing Towards Goal     Problem: Patient Education: Go to Patient Education Activity  Goal: Patient/Family Education  Outcome: Progressing Towards Goal     Problem: Patient Education: Go to Patient Education Activity  Goal: Patient/Family Education  Outcome: Progressing Towards Goal     Problem: Nutrition Deficit  Goal: *Optimize nutritional status  Outcome: Progressing Towards Goal     Problem: Patient Education: Go to Patient Education Activity  Goal: Patient/Family Education  Outcome: Progressing Towards Goal     Problem: Discharge Planning  Goal: *Discharge to safe environment  Outcome: Progressing Towards Goal

## 2019-11-25 NOTE — PROGRESS NOTES
Problem: Afib: Discharge Outcomes (not in CAT)  Goal: *Hemodynamically stable  Outcome: Progressing Towards Goal  Goal: *Stable cardiac rhythm  Outcome: Progressing Towards Goal     Problem: Falls - Risk of  Goal: *Absence of Falls  Description  Document Aishwarya Rich Fall Risk and appropriate interventions in the flowsheet. Outcome: Progressing Towards Goal  Note: Fall Risk Interventions:  Mobility Interventions: Bed/chair exit alarm, Patient to call before getting OOB, OT consult for ADLs, PT Consult for mobility concerns, PT Consult for assist device competence    Mentation Interventions: Bed/chair exit alarm, Door open when patient unattended, Eyeglasses and hearing aids, More frequent rounding    Medication Interventions: Bed/chair exit alarm, Patient to call before getting OOB, Teach patient to arise slowly    Elimination Interventions: Call light in reach, Patient to call for help with toileting needs    History of Falls Interventions: Door open when patient unattended         Problem: Pressure Injury - Risk of  Goal: *Prevention of pressure injury  Description  Document Allen Scale and appropriate interventions in the flowsheet. Outcome: Progressing Towards Goal  Note: Pressure Injury Interventions:  Sensory Interventions: Pressure redistribution bed/mattress (bed type), Discuss PT/OT consult with provider, Keep linens dry and wrinkle-free, Maintain/enhance activity level    Moisture Interventions: Absorbent underpads, Check for incontinence Q2 hours and as needed, Maintain skin hydration (lotion/cream)    Activity Interventions: PT/OT evaluation, Increase time out of bed, Pressure redistribution bed/mattress(bed type)    Mobility Interventions: HOB 30 degrees or less, Turn and reposition approx.  every two hours(pillow and wedges), PT/OT evaluation    Nutrition Interventions: Document food/fluid/supplement intake    Friction and Shear Interventions: Apply protective barrier, creams and emollients

## 2019-11-26 ENCOUNTER — HOSPITAL ENCOUNTER (INPATIENT)
Age: 84
LOS: 14 days | Discharge: HOME | End: 2019-12-10
Attending: INTERNAL MEDICINE | Admitting: INTERNAL MEDICINE

## 2019-11-26 VITALS
HEART RATE: 95 BPM | TEMPERATURE: 97.8 F | DIASTOLIC BLOOD PRESSURE: 67 MMHG | BODY MASS INDEX: 18.78 KG/M2 | OXYGEN SATURATION: 95 % | SYSTOLIC BLOOD PRESSURE: 103 MMHG | RESPIRATION RATE: 20 BRPM | WEIGHT: 106.04 LBS

## 2019-11-26 LAB
BACTERIA SPEC CULT: ABNORMAL
GRAM STN SPEC: ABNORMAL
SERVICE CMNT-IMP: ABNORMAL
SERVICE CMNT-IMP: ABNORMAL

## 2019-11-26 PROCEDURE — 74011250637 HC RX REV CODE- 250/637: Performed by: HOSPITALIST

## 2019-11-26 PROCEDURE — 3331090001 HH PPS REVENUE CREDIT

## 2019-11-26 PROCEDURE — 3331090002 HH PPS REVENUE DEBIT

## 2019-11-26 PROCEDURE — 97116 GAIT TRAINING THERAPY: CPT

## 2019-11-26 PROCEDURE — 74011250637 HC RX REV CODE- 250/637: Performed by: INTERNAL MEDICINE

## 2019-11-26 RX ORDER — POLYETHYLENE GLYCOL 3350 17 G/17G
17 POWDER, FOR SOLUTION ORAL AS NEEDED
Status: DISCONTINUED | OUTPATIENT
Start: 2019-11-26 | End: 2019-12-10 | Stop reason: HOSPADM

## 2019-11-26 RX ORDER — FUROSEMIDE 40 MG/1
40 TABLET ORAL DAILY
Qty: 30 TAB | Refills: 0 | Status: SHIPPED | OUTPATIENT
Start: 2019-11-26 | End: 2020-04-02

## 2019-11-26 RX ORDER — ONDANSETRON 4 MG/1
4 TABLET, ORALLY DISINTEGRATING ORAL
Status: DISCONTINUED | OUTPATIENT
Start: 2019-11-26 | End: 2019-12-10 | Stop reason: HOSPADM

## 2019-11-26 RX ORDER — GUAIFENESIN 100 MG/5ML
81 LIQUID (ML) ORAL DAILY
Status: DISCONTINUED | OUTPATIENT
Start: 2019-11-27 | End: 2019-12-10 | Stop reason: HOSPADM

## 2019-11-26 RX ORDER — DIGOXIN 125 MCG
0.12 TABLET ORAL
Status: DISCONTINUED | OUTPATIENT
Start: 2019-11-27 | End: 2019-12-10 | Stop reason: HOSPADM

## 2019-11-26 RX ORDER — MIRTAZAPINE 15 MG/1
15 TABLET, FILM COATED ORAL
Status: DISCONTINUED | OUTPATIENT
Start: 2019-11-26 | End: 2019-12-10 | Stop reason: HOSPADM

## 2019-11-26 RX ORDER — NITROGLYCERIN 0.4 MG/1
0.4 TABLET SUBLINGUAL AS NEEDED
Status: DISCONTINUED | OUTPATIENT
Start: 2019-11-26 | End: 2019-12-10 | Stop reason: HOSPADM

## 2019-11-26 RX ORDER — DIGOXIN 125 MCG
0.25 TABLET ORAL
Status: DISCONTINUED | OUTPATIENT
Start: 2019-11-26 | End: 2019-12-10 | Stop reason: HOSPADM

## 2019-11-26 RX ORDER — CEPHALEXIN 500 MG/1
500 CAPSULE ORAL 4 TIMES DAILY
Qty: 28 CAP | Refills: 0 | Status: SHIPPED | OUTPATIENT
Start: 2019-11-26 | End: 2019-12-10

## 2019-11-26 RX ORDER — FLUTICASONE PROPIONATE 50 MCG
2 SPRAY, SUSPENSION (ML) NASAL DAILY
Status: DISCONTINUED | OUTPATIENT
Start: 2019-11-27 | End: 2019-12-10 | Stop reason: HOSPADM

## 2019-11-26 RX ORDER — NALOXONE HYDROCHLORIDE 0.4 MG/ML
0.4 INJECTION, SOLUTION INTRAMUSCULAR; INTRAVENOUS; SUBCUTANEOUS AS NEEDED
Status: DISCONTINUED | OUTPATIENT
Start: 2019-11-26 | End: 2019-12-10 | Stop reason: HOSPADM

## 2019-11-26 RX ORDER — CARBOXYMETHYLCELLULOSE SODIUM 5 MG/ML
1 SOLUTION/ DROPS OPHTHALMIC
Status: DISCONTINUED | OUTPATIENT
Start: 2019-11-26 | End: 2019-12-10 | Stop reason: HOSPADM

## 2019-11-26 RX ORDER — FUROSEMIDE 40 MG/1
40 TABLET ORAL DAILY
Status: DISCONTINUED | OUTPATIENT
Start: 2019-11-27 | End: 2019-12-10 | Stop reason: HOSPADM

## 2019-11-26 RX ORDER — ACETAMINOPHEN 325 MG/1
650 TABLET ORAL
Status: DISCONTINUED | OUTPATIENT
Start: 2019-11-26 | End: 2019-12-10 | Stop reason: HOSPADM

## 2019-11-26 RX ORDER — TRAMADOL HYDROCHLORIDE 50 MG/1
50 TABLET ORAL
Status: DISCONTINUED | OUTPATIENT
Start: 2019-11-26 | End: 2019-12-10 | Stop reason: HOSPADM

## 2019-11-26 RX ORDER — FLUOXETINE 10 MG/1
10 CAPSULE ORAL DAILY
Status: DISCONTINUED | OUTPATIENT
Start: 2019-11-27 | End: 2019-12-05

## 2019-11-26 RX ORDER — LOPERAMIDE HYDROCHLORIDE 2 MG/1
2 CAPSULE ORAL
Status: DISCONTINUED | OUTPATIENT
Start: 2019-11-26 | End: 2019-12-10 | Stop reason: HOSPADM

## 2019-11-26 RX ORDER — CEPHALEXIN 250 MG/1
500 CAPSULE ORAL 3 TIMES DAILY
Status: COMPLETED | OUTPATIENT
Start: 2019-11-26 | End: 2019-12-03

## 2019-11-26 RX ADMIN — DIGOXIN 0.25 MG: 125 TABLET ORAL at 08:28

## 2019-11-26 RX ADMIN — ASPIRIN 81 MG 81 MG: 81 TABLET ORAL at 08:25

## 2019-11-26 RX ADMIN — CEPHALEXIN 500 MG: 250 CAPSULE ORAL at 21:46

## 2019-11-26 RX ADMIN — MIRTAZAPINE 15 MG: 15 TABLET, FILM COATED ORAL at 21:46

## 2019-11-26 RX ADMIN — FLUOXETINE 20 MG: 10 CAPSULE ORAL at 08:25

## 2019-11-26 RX ADMIN — DIGOXIN 0.25 MG: 125 TABLET ORAL at 18:42

## 2019-11-26 RX ADMIN — TRAMADOL HYDROCHLORIDE 50 MG: 50 TABLET, FILM COATED ORAL at 00:50

## 2019-11-26 RX ADMIN — FUROSEMIDE 40 MG: 40 TABLET ORAL at 08:25

## 2019-11-26 NOTE — PROGRESS NOTES
Problem: Patient Education: Go to Patient Education Activity  Goal: Patient/Family Education  Outcome: Progressing Towards Goal     Problem: Afib: Discharge Outcomes (not in CAT)  Goal: *Hemodynamically stable  Outcome: Progressing Towards Goal  Goal: *Stable cardiac rhythm  Outcome: Progressing Towards Goal  Goal: *Lungs clear or at baseline  Outcome: Progressing Towards Goal  Goal: *Optimal pain control at patient's stated goal  Outcome: Progressing Towards Goal  Goal: *Identifies cardiac risk factors  Outcome: Progressing Towards Goal  Goal: *Verbalizes home exercise program, activity guidelines, cardiac precautions  Outcome: Progressing Towards Goal  Goal: *Verbalizes understanding and describes prescribed diet  Outcome: Progressing Towards Goal  Goal: *Verbalizes understanding and describes medication purposes and frequencies  Outcome: Progressing Towards Goal  Goal: *Anxiety reduced or absent  Outcome: Progressing Towards Goal  Goal: *Understands and describes signs and symptoms to report to providers(Stroke Metric)  Outcome: Progressing Towards Goal  Goal: *Describes follow-up/return visits to physicians  Outcome: Progressing Towards Goal  Goal: *Describes available resources and support systems  Outcome: Progressing Towards Goal  Goal: *Influenza immunization  Outcome: Progressing Towards Goal  Goal: *Pneumococcal immunization  Outcome: Progressing Towards Goal  Goal: *Describes smoking cessation resources  Outcome: Progressing Towards Goal     Problem: Falls - Risk of  Goal: *Absence of Falls  Description  Document Ajayhemanthandrea Kurt Fall Risk and appropriate interventions in the flowsheet.   Outcome: Progressing Towards Goal  Note: Fall Risk Interventions:  Mobility Interventions: Bed/chair exit alarm    Mentation Interventions: Bed/chair exit alarm, Eyeglasses and hearing aids, Room close to nurse's station    Medication Interventions: Bed/chair exit alarm    Elimination Interventions: Call light in reach, Bed/chair exit alarm    History of Falls Interventions: Door open when patient unattended, Bed/chair exit alarm         Problem: Patient Education: Go to Patient Education Activity  Goal: Patient/Family Education  Outcome: Progressing Towards Goal     Problem: Pressure Injury - Risk of  Goal: *Prevention of pressure injury  Description  Document Allen Scale and appropriate interventions in the flowsheet.   Outcome: Progressing Towards Goal  Note: Pressure Injury Interventions:  Sensory Interventions: Pressure redistribution bed/mattress (bed type)    Moisture Interventions: Absorbent underpads, Check for incontinence Q2 hours and as needed    Activity Interventions: Increase time out of bed    Mobility Interventions: HOB 30 degrees or less    Nutrition Interventions: Document food/fluid/supplement intake, Offer support with meals,snacks and hydration    Friction and Shear Interventions: Apply protective barrier, creams and emollients                Problem: Patient Education: Go to Patient Education Activity  Goal: Patient/Family Education  Outcome: Progressing Towards Goal     Problem: Discharge Planning  Goal: *Discharge to safe environment  Outcome: Progressing Towards Goal     Problem: Patient Education: Go to Patient Education Activity  Goal: Patient/Family Education  Outcome: Progressing Towards Goal     Problem: Nutrition Deficit  Goal: *Optimize nutritional status  Outcome: Progressing Towards Goal     Problem: Patient Education: Go to Patient Education Activity  Goal: Patient/Family Education  Outcome: Progressing Towards Goal

## 2019-11-26 NOTE — PROGRESS NOTES
1900  -- Bedside, Verbal and Written shift change report given to [address] (oncoming nurse) by Stephan Chávez nurse). Report included the following information SBAR, Kardex, Intake/Output, MAR and Recent Results. Pt refusing IV site.      2124 -- PM medications administered, pt tolerated with ease, will continue to monitor.     0000 -- Shift reassessment, pt condition unchanged, will continue to monitor. Wound care completed. 0045 -- PRN pain medication administered.      0430 --  Shift reassessment, pt condition unchanged, will continue to monitor.        0700  -- Bedside, Verbal and Written shift change report given to [address] [facility]) by CHERISE (offgoing nurse). Report included the following information SBAR, Kardex, Intake/Output, MAR and Recent Results. Skin assessment completed.

## 2019-11-26 NOTE — DISCHARGE SUMMARY
Discharge Summary    Patient: Kraig Varela               Sex: female          DOA: 11/21/2019       YOB: 1927      Age:  80 y.o.        LOS:  LOS: 4 days                Admit Date: 11/21/2019    Discharge Date: 11/26/2019    Admission Diagnoses: Dizziness [R42]  Syncope [R55]    Discharge Diagnoses:    Hospital Problems  Date Reviewed: 11/5/2019          Codes Class Noted POA    Dizziness ICD-10-CM: R42  ICD-9-CM: 780.4  11/21/2019 Unknown        Atrial fibrillation ([facility] [address] ICD-10-CM: I48.91  ICD-9-CM: 427.31  10/1/2019 Yes        * (Principal) Syncope ICD-10-CM: R55  ICD-9-CM: 780.2  6/5/2019 Yes        Irritable bowel syndrome with diarrhea ICD-10-CM: K58.0  ICD-9-CM: 564.1  9/24/2018 Yes        Ischemic cardiomyopathy ICD-10-CM: I25.5  ICD-9-CM: 414.8  Unknown Yes              Discharge Disposition: [address] [facility]     Discharge Condition:  Improved    Hospital Course:  89F with PMH of CAD, ischemic cardiomyopathy with chronic systolic CHF, afib, who presented from assisted living due to two syncopal episodes while pt was receiving wound care (few seconds). Multiple episodes have happened previous to this while also receiving wound care. Workup was done including TeleNeuro consult and Brain MRI, which was unrevealing (no acute infarct). Bumex was switched to lasix, as family reported she did not have syncope with the lasix. It was thought her syncope episodes were due to vasovagal response from pain while receiving wound care. Her other issue was lower extremity wounds/hematomas bilaterally which appeared infected. Wounds developed after she had fallen at assisted living and injured both her legs about 19 days PTA. ID Dr. Yunior Wolfe and Pastic Surgery  were consulted. Pt was treated with IV abx (vanc, rocephin) and then de-escalated to Ancef on discharge. Dr. Phil Cohen ordered wound care (wet to dry dressings) and will follow up after discharge. PT/OT recommended rehab.  Pt was discharged to [facility] in stable condition. Per ID recs, pt was discharged with Ancef 500mg QID for 1 week. Per ID, if her wounds do not improve pt may need linezolid in addition (pt has E. faecalis in her culture but is suspected to be colonizer). Consults:    Infectious Disease and Plastic Surgery    Labs:  Labs: Results:       Chemistry Recent Labs     11/24/19  1350   *      K 3.7      CO2 25   BUN 17   CREA 0.81   CA 6.6*   AGAP 11   BUCR 21*      CBC w/Diff Recent Labs     11/24/19  1350   WBC 8.9   RBC 3.20*   HGB 8.3*   HCT 26.9*      GRANS 82*   LYMPH 8*   EOS 1      Cardiac Enzymes No results for input(s): CPK, CKND1, KELSIE in the last 72 hours. No lab exists for component: CKRMB, TROIP   Coagulation No results for input(s): PTP, INR, APTT, INREXT in the last 72 hours. Lipid Panel Lab Results   Component Value Date/Time    Cholesterol, total 202 (H) 04/29/2019 07:45 AM    HDL Cholesterol 64 (H) 04/29/2019 07:45 AM    LDL, calculated 116.4 (H) 04/29/2019 07:45 AM    VLDL, calculated 21.6 04/29/2019 07:45 AM    Triglyceride 108 04/29/2019 07:45 AM    CHOL/HDL Ratio 3.2 04/29/2019 07:45 AM      BNP No results for input(s): BNPP in the last 72 hours. Liver Enzymes No results for input(s): TP, ALB, TBIL, AP, SGOT, GPT in the last 72 hours. No lab exists for component: DBIL   Thyroid Studies Lab Results   Component Value Date/Time    TSH 1.57 07/26/2019 08:09 AM            Significant Diagnostic Studies:   Xr Tib/fib Lt    Result Date: 10/29/2019  EXAM: XR TIB/FIB LT CLINICAL INDICATION/HISTORY: trauma   > Additional: Left leg swelling COMPARISON: None. TECHNIQUE: AP and lateral views of the left tibia and fibula on 4 exposures. _______________ FINDINGS: Osseous alignment is as expected on the provided projections. No fracture demonstrated.  Diffuse soft tissue swelling is present without retained radiopaque foreign object. _______________     IMPRESSION: Diffuse soft tissue swelling without evidence of fracture or acute malalignment. Xr Tib/fib Rt    Result Date: 10/29/2019  EXAM: XR TIB/FIB RT CLINICAL INDICATION/HISTORY: trauma   > Additional: Leg swelling COMPARISON: None. TECHNIQUE: AP and lateral views of the right tibia and fibula on 4 exposures. _______________ FINDINGS: Osseous alignment is as expected on the provided projections. No evidence of fracture. There is diffuse soft tissue swelling present without retained radiopaque foreign object. _______________     IMPRESSION: Diffuse right lower extremity soft tissue swelling without evidence of fracture or acute malalignment. No retained radiopaque foreign object. Mri Brain Wo Cont    Result Date: 11/22/2019  EXAM: MRI brain without gadolinium CLINICAL INDICATION/HISTORY: dizziness, r/o cva   > Additional: Recent fall, trauma to orbit, vertigo COMPARISON: None. > Reference Exam: CT head 11/21/2019 TECHNIQUE: SagittalFLAIR T1, axial T1, T2, FLAIR, susceptibility weighted, diffusion and coronal T2 sequences obtained of the brain. Imaging performed on wide bore Discovery [id number] GEM suite 3T magnet at [facility]. _______________ FINDINGS: Diffusion:  There are no areas of restricted diffusion, no evidence of an acute infarction. Brain parenchyma:  Small chronic cortical infarcts superior medial right cerebellum and tiny chronic cortical infarct posterior right cerebellum. Mild confluent and multifocal increased T2 and FLAIR signal periventricular and deep cerebral white matter including small areas of subcortical white matter involvement and central kristyn. No evidence of intracranial mass hemorrhage edema or mass effect. Ventricles and sulci:  Mild diffuse central and cortical volume loss. Extra-axial:  No extra-axial fluid collection or mass is noted. Brain vasculature:  No vascular abnormality is appreciated on this routine brain MR examination.  Craniocervical junction:  Normal. Skull base, extracranial and calvarium:  Previous bilateral lens implants. Sinuses IACs and mastoids sella and skull unremarkable. _______________     IMPRESSION: 1. No evidence of acute infarct or other acute intracranial finding. 2. Small chronic right cerebellar infarcts, mild white matter signal changes nonspecific likely chronic microvascular ischemic disease. Ct Head Wo Cont    Result Date: 11/21/2019  EXAM: CT head INDICATION: Syncopal episode, vertigo, trauma COMPARISON: CT head 11/11/2019 TECHNIQUE: Axial CT imaging of the head was performed without intravenous contrast. Standard multiplanar coronal and sagittal reformatted images were obtained and are included in interpretation. One or more dose reduction techniques were used on this CT: automated exposure control, adjustment of the mAs and/or kVp according to patient size, and iterative reconstruction techniques. The specific techniques used on this CT exam have been documented in the patient's electronic medical record. Digital Imaging and Communications in Medicine (DICOM) format image data are available to nonaffiliated external healthcare facilities or entities on a secure, media free, reciprocally searchable basis with patient authorization for at least a 12-month period after this study. _______________ FINDINGS: BRAIN AND POSTERIOR FOSSA: There is mild cortical and cerebellar volume loss demonstrated, within normal limits for patient age. The ventricular size and configuration remains stable. Basilar cisterns are patent. Very mild periventricular white matter low-attenuation is previously. There is no intracranial hemorrhage, mass effect, or midline shift. The gray-white matter differentiation is within normal limits. EXTRA-AXIAL SPACES AND MENINGES: There is no acute extra-axial fluid collection. CALVARIUM: Intact. SINUSES: The imaged paranasal sinuses and mastoid air cells are clear. OTHER: None. _______________     IMPRESSION: 1. No acute intracranial abnormality.  2. Mild periventricular white matter low-attenuation favored to reflect sequela of chronic ischemic microvascular change. Ct Head Wo Cont    Result Date: 11/12/2019  CT head without contrast INDICATION: Traumatic head injury. COMPARISON: CT 06/05/2019. TECHNIQUE: Axial CT imaging of the head was performed without intravenous contrast. One or more dose reduction techniques were used on this CT: automated exposure control, adjustment of the mAs and/or kVp according to patient size, and iterative reconstruction techniques. The specific techniques used on this CT exam have been documented in the patient's electronic medical record. Digital Imaging and Communications in Medicine (DICOM) format image data are available to nonaffiliated external healthcare facilities or entities on a secure, media free, reciprocally searchable basis with patient authorization for at least a 12-month period after this study. _______________ FINDINGS: BRAIN AND POSTERIOR FOSSA: There is mild motion degradation. There is mild diffuse peripheral atrophy with prominence of the sylvian fissures, the cisterns and the sulci pattern without midline shift or mass effect. There is nonspecific periventricular and subcortical hypodensities, most probably chronic small vessel ischemia. There is no evidence of any hemorrhage, acute infarct, or abnormal fluid collection.] EXTRA-AXIAL SPACES AND MENINGES: There are no abnormal extra-axial fluid collections. CALVARIUM: Intact. Mild right frontal scalp contusion. SINUSES: Clear. OTHER: Carotid atherosclerotic calcifications noted. _______________     IMPRESSION: 1. Mild motion degradation. No evidence for acute intracranial injury, cortical infarct, hemorrhage, or mass effect. Mild right frontal scalp contusion. 2. Mild atrophy demonstrating nonspecific white matter hypodensities, likely chronic microvascular disease. A preliminary report provided by on call radiology resident.      Cta Low Ext Bi W Cont    Result Date: 11/3/2019  EXAM: CTA LOW EXT BI W CONT CLINICAL INDICATION/HISTORY: Abnormal xray, ankle; Bilateral leg hematomas. Evaluate for extravasation  >Additional: None COMPARISON: Bilateral tibia/fibula radiographs. TECHNIQUE: CT of the bilateral lower legs from the level of the distal femurs to the feet was performed with and without contrast including arterial and delayed phases. Coronal and sagittal MIP reformats were generated and reviewed. One or more dose reduction techniques were used on this CT: automated exposure control, adjustment of the mAs and/or kVp according to patient size, and iterative reconstruction techniques. The specific techniques used on this CT exam have been documented in the patient's electronic medical record. Digital Imaging and Communications in Medicine (DICOM) format image data are available to nonaffiliated external healthcare facilities or entities on a secure, media free, reciprocally searchable basis with patient authorization for at least a 12-month period after this study. _______________ FINDINGS: Osseous: alignment is anatomic. Generalized bony demineralization. No fractures. No joint effusions. Soft tissues: Diffuse soft tissue swelling about the lower extremities is noted bilaterally. In addition, there are multilobulated heterogeneous collections along the anterior aspects of both lower legs at roughly the same level, spanning the middle third of the tibia which with areas of increased and decreased attenuation on the noncontrast study, compatible with hematomas. The postcontrast imaging demonstrates no evidence of active extravasation. Dominant portion of the hematoma on the right measures approximately 4.6 x 1.5 x 9 cm and on the left is slightly larger estimated approximately 4.2 x 1.6 x 8.7 cm.  Diffuse subcutaneous edema and soft tissue swelling circumferentially about the lower legs is also noted and is slightly more pronounced at the level of the ankles extending to the dorsum of the foot, particularly on the right. The lower extremity musculature appears intact. No tendon subluxation. Fatty infiltration of the medial gastrocnemius bilaterally. There is peripheral artery disease with scattered calcifications, particularly in the bilateral posterior tibial arteries. However, there is adequate perfusion with three-vessel runoff to the level of the ankles. Other: Unremarkable. _______________     IMPRESSION: 1. Bilateral anterior lower leg subcutaneous hematomas without evidence of active extravasation. 2.  No acute osseous abnormality. 3.  Diffuse subcutaneous edema and soft tissue swelling seen extensively throughout the bilateral lower legs from the level of the distal thighs to the feet. 4.  Multifocal peripheral artery disease, worse involving the bilateral posterior tibial arteries. Initial preliminary report was provided to the ED by the on-call radiology resident. Xr Chest Port    Result Date: 11/21/2019  EXAM: XR CHEST PORT CLINICAL INDICATION/HISTORY: Chest pain and shortness of breath -Additional: None COMPARISON: Radiographs of the chest 10/28/2018 TECHNIQUE: Frontal view of the chest _______________ FINDINGS: HEART AND MEDIASTINUM: Stable appearing cardiac size and mediastinal contours. Mild tortuosity of the thoracic aorta as previously. Cardiac size upper limits of normal for AP technique. LUNGS AND PLEURAL SPACES: Improved pulmonary aeration. Resolved right-sided pleural effusion. Improved left retrocardiac aeration. Likely trace residual left pleural effusion noted. No pneumothorax or pneumonic consolidation. BONY THORAX AND SOFT TISSUES: No acute osseous abnormality present. _______________     IMPRESSION: 1. Overall trend towards improvement from prior examination with improved aeration of the lung bases and resolution of previously seen right-sided pleural effusion. Potential minimal/trace left pleural effusion as noted on more distant prior exams.      Xr Chest Port    Result Date: 10/29/2019  EXAM: XR CHEST PORT CLINICAL INDICATION/HISTORY: leg swelling -Additional: None COMPARISON: Several prior exams, most recently 10/1/2019 TECHNIQUE: Frontal view of the chest _______________ FINDINGS: HEART AND MEDIASTINUM: Cardiac size upper limits of normal for AP technique. Mediastinal contours are stable. LUNGS AND PLEURAL SPACES: Similar degree of pulmonary hypoinflation with small bilateral pleural effusions and linear bibasilar densities noted. No pneumothorax. BONY THORAX AND SOFT TISSUES: No acute osseous abnormality _______________     IMPRESSION: Low lung volumes and basilar areas of probable atelectasis with small bilateral pleural effusions. Discharge Medications:     Current Discharge Medication List      START taking these medications    Details   furosemide (LASIX) 40 mg tablet Take 1 Tab by mouth daily. Qty: 30 Tab, Refills: 0      cephALEXin (KEFLEX) 500 mg capsule Take 1 Cap by mouth four (4) times daily for 7 days. Qty: 28 Cap, Refills: 0         CONTINUE these medications which have NOT CHANGED    Details   vitamin a-vitamin c-vit e-min (OCUVITE) tablet Take 1 Tab by mouth daily. Gummy      multivit,calc,mins/iron/folic (ONE-A-DAY WOMENS FORMULA PO) Take  by mouth. 1 Gummy       fluticasone propionate (FLONASE) 50 mcg/actuation nasal spray 1 [place] by Both Nostrils route daily. mirtazapine (REMERON) 15 mg tablet Take 1 Tab by mouth nightly. Indications: major depressive disorder  Qty: 30 Tab, Refills: 0      digoxin (LANOXIN) 0.125 mg tablet Take 1 Tab by mouth every Monday, Wednesday, Friday, Saturday AND 2 Tabs every Sunday, Tuesday, Thursday. Indications: Ventricular Rate Control in Atrial Fibrillation  Qty: 45 Tab, Refills: 0      FLUoxetine (PROZAC) 10 mg tablet Take 1 Tab by mouth daily. Qty: 90 Tab, Refills: 3      polyethylene glycol (MIRALAX) 17 gram/dose powder Take 17 g by mouth daily as needed (constipation).       psyllium husk-aspartame (METAMUCIL FIBER) 3.4 gram pwpk packet Take 1 Packet by mouth daily. Qty: 30 Packet, Refills: 0      aspirin 81 mg chewable tablet Take 1 Tab by mouth daily. Qty: 30 Tab, Refills: 0      traMADol (ULTRAM) 50 mg tablet Take 50 mg by mouth every six (6) hours as needed for Pain. ondansetron (ZOFRAN ODT) 4 mg disintegrating tablet Take 4 mg by mouth every eight (8) hours as needed for Nausea. Take 4 mg tab by mouth every eight (8) hours as needed for Nausea      hydrocortisone (CORTAID) 1 % topical cream Apply  to affected area two (2) times a day. use thin layer  Qty: 30 g, Refills: 0      carboxymethylcellulose sodium (CELLUVISC) 0.5 % drop ophthalmic solution Administer 1 Drop to both eyes three (3) times daily as needed for Other (dry eyes). Qty: 1 Bottle, Refills: 0      acetaminophen (TYLENOL) 325 mg tablet Take 650 mg by mouth every six (6) hours as needed for Pain. 2 tabs      carboxymethylcellulos/glycerin (CLEAR EYES FOR DRY EYES OP) Administer 1 Drop to both eyes three (3) times daily as needed (dry eyes). loperamide (IMMODIUM) 2 mg tablet Take 1 mg by mouth three (3) times daily as needed for Diarrhea. Take 1 mg tab by mouth three (3) times daily as needed for Diarrhea. nitroglycerin (NITROSTAT) 0.4 mg SL tablet 1 Tab by SubLINGual route every five (5) minutes as needed for Chest Pain. Qty: 25 Tab, Refills: 3         STOP taking these medications       bumetanide (BUMEX) 0.5 mg tablet Comments:   Reason for Stopping:               Activity: Activity as tolerated    Diet: Cardiac Diet    Wound Care:   Every 12 hours: Proceed with a regime of wet to dry normal saline gauze dressings - cleanse the leg wounds with dilute Hydrogen Peroxide : Normal Saline in a 1 : 3 ratio thoroughly between dressing changes - thank you. .. Follow-up: PCP in 1 week.          Total time spent including time spent on final examination and discharge discussion, discharge documentation and records reviewed and medication reconciliation: > 30 minutes    Makayla Pitts MD  [facility]

## 2019-11-26 NOTE — PROGRESS NOTES
Problem: Discharge Planning  Goal: *Discharge to safe environment  Outcome: Resolved/Met       SNF/TCC    Pt does not qualify for [facility] & has funds for long term care, no UAI completed. Communication to Patient/Family:  Met with patient and family and they are agreeable to the transition plan. The Plan for Transition of Care is related to the following treatment goals: rehab    The Patient and/or patient representative was provided with a choice of provider and agrees  with the discharge plan. Yes [x] No []    A Freedom of choice list was provided with basic dialogue that supports the patient's individualized plan of care/goals and shares the quality data associated with the providers. Yes [x] No []    SNF/Rehab Transition:  Patient has been accepted to [facility]  SNF/Rehab and meets criteria for admission. Patient will transported by nurse and expected to leave at later this afternoon. Communication to SNF/Rehab:  Bedside RN,Katie, has been notified to update the transition plan to the facility and call report [phone]. Discharge information has been updated on the AVS. And communicated to facility via [employer], or CC link. Nursing Please include all hard scripts for controlled substances, med rec and dc summary, and AVS in packet. Reviewed and confirmed with facility, [facility] , can manage the patient care needs for the following:     [place] [facility] with Paulette Reusing) Jarret Hickman those applicable:  Medication:  [x]Medications are available at the facility  []IV Antibiotics    []Controlled Substance  hard copies available sent.   []Weekly Labs    Equipment:  []CPAP/BiPAP  []Wound Vacuum  []Sanchez or Urinary Device  []PICC/Central Line  []Nebulizer  []Ventilator    Treatment:  []Isolation (for MRSA, VRE, etc.)  []Surgical Drain Management  []Tracheostomy Care  [x]Dressing Changes  []Dialysis with transportation  []PEG Care  []Oxygen  []Daily Weights for Heart Failure    Dietary:  []Any diet limitations  []Tube Feedings   []Total Parenteral Management (TPN)    Financial Resources:  []Medicaid Application Completed    []UAI Completed and copy given to pt/family  and copy given to pt/family  []A screening has previously been completed. []Level II Completed    [] Private pay individual who will not become   financially eligible for Medicaid within 6 months from admission to a [address] [place] [address] facility. [] Individual refused to have screening conducted. []Medicaid Application Completed    []The screening denied because it was determined individual did not need/did not qualify for nursing facility level of care. [] Out of state residents seeking direct admission to a [address] facility. [] Individuals who are inpatients of an out of state hospital, or in state or out of state veterans/ hospital and seek direct admission to a [address] facility  [] Individuals who are pateints or residents of a state owned/operated facility that is licensed by Department of [employer] and seek direct admission to [address] [place] [address]  [] A screening not required for enrollment in [address] [address] [address] services as set out in [address] [place] [address] 60-  [] [facility]) staff shall perform screenings of the [facility] clients. Advanced Care Plan:  []Surrogate Decision Maker of Care  []POA  []Communicated Code Status and copy sent.     Other:          Care Management Interventions  PCP Verified by CM: Yes(Dr White)  Mode of Transport at Discharge: Self  Transition of Care Consult (CM Consult): SNF  Partner SNF: Yes  Discharge Durable Medical Equipment: No  Physical Therapy Consult: Yes  Occupational Therapy Consult: Yes  Speech Therapy Consult: No  Current Support Network: Assisted Living  Confirm Follow Up Transport: Family  Plan discussed with Pt/Family/Caregiver: Yes  Freedom of Choice Offered: Yes  Discharge Location  Discharge Placement: Skilled nursing facility(TCC)

## 2019-11-26 NOTE — ANCILLARY DISCHARGE INSTRUCTIONS
Patient and/or next of kin has been given the [facility] Important Message From Medicare About Your Rights\" letter and all questions were answered.

## 2019-11-26 NOTE — PROGRESS NOTES
Problem: Mobility Impaired (Adult and Pediatric)  Goal: *Acute Goals and Plan of Care (Insert Text)  Description  . Physical Therapy Goals  Initiated 11/22/2019 and to be accomplished within 7 day(s)  1. Patient will move from supine to sit and sit to supine  in bed with modified independence. 2.  Patient will transfer from bed to chair and chair to bed with modified independence using the least restrictive device. 3.  Patient will perform sit to stand with modified independence. 4.  Patient will ambulate with modified independence for 200 feet with the least restrictive device. Outcome: Progressing Towards Goal   PHYSICAL THERAPY TREATMENT    Patient: Waldo Dukes (80 y.o. female)  Date: 11/26/2019  Diagnosis: Dizziness [R42]  Syncope [R55]   Syncope       Precautions: Fall  PLOF: mod I    ASSESSMENT:  Pt mod I for sup>sit>stand, mod I for gait with RW X 120 ft. Pt required min a with LE's to return to sup. Progression toward goals:   [x]      Improving appropriately and progressing toward goals  []      Improving slowly and progressing toward goals  []      Not making progress toward goals and plan of care will be adjusted     PLAN:  Patient continues to benefit from skilled intervention to address the above impairments. Continue treatment per established plan of care. Discharge Recommendations: To Be Determined  Further Equipment Recommendations for Discharge:  N/A     SUBJECTIVE:   Patient stated I can get up and do everything, I dont want to stay in the bed.     OBJECTIVE DATA SUMMARY:   Critical Behavior:  Neurologic State: Alert, Eyes open to stimulus, Appropriate for age  Orientation Level: Appropriate for age, Oriented X4  Cognition: Appropriate for age attention/concentration, Follows commands  Safety/Judgement: Decreased awareness of environment, Decreased awareness of need for safety  Functional Mobility Training:  Bed Mobility:  Rolling: Modified independent  Supine to Sit: Modified independent  Sit to Supine: Minimum assistance  Transfers:  Sit to Stand: Modified independent  Stand to Sit: Modified independent  Balance:  Sitting: Intact  Standing: Intact     Ambulation/Gait Training:  Distance (ft): 120 Feet (ft)  Assistive Device: Walker, rolling  Ambulation - Level of Assistance: Modified independent    Ankle Pumps    [] [] [] []    Quad Sets/Glut Sets    [] [] [] []    Hamstring Sets   [] [] [] []    Short Arc Quads   [] [] [] []    Heel Slides   [] [] [] []    Straight Leg Raises   [] [] [] []    Hip Add   [] [] [] []    Long Arc Quads   [] [] [] []    Seated Marching   [] [] [] []    Standing Marching   [] [] [] []       [] [] [] []        Pain:  Pain level pre-treatment: 0/10  Pain level post-treatment: 0/10   Pain Intervention(s): n/a      Activity Tolerance:   Good   Please refer to the flowsheet for vital signs taken during this treatment. After treatment:   [] Patient left in no apparent distress sitting up in chair  [x] Patient left in no apparent distress in bed  [x] Call bell left within reach  [x] Nursing notified  [x] Caregiver present  [] Bed alarm activated  [] SCDs applied      COMMUNICATION/EDUCATION:   []           []         Fall prevention education was provided and the patient/caregiver indicated understanding. []         Patient/family have participated as able in working toward goals and plan of care. []         Patient/family agree to work toward stated goals and plan of care. []         Patient understands intent and goals of therapy, but is neutral about his/her participation. []         Patient is unable to participate in stated goals/plan of care: ongoing with therapy staff. [x]         Role of Physical Therapy in the acute care setting.         Rigo Colon PTA   Time Calculation: 11 mins

## 2019-11-26 NOTE — PROGRESS NOTES
conducted a Follow up consultation and Spiritual Assessment for [address], who is a 80 y.o.,female. The  provided the following Interventions:  Continued the relationship of care and support. Listened empathically. Offered prayer and assurance of continued prayer on patients behalf. Chart reviewed. The following outcomes were achieved:  Patient expressed gratitude for pastoral care visit. Assessment:  There are no further spiritual or Christianity issues which require Spiritual Care Services interventions at this time. Plan:  Chaplains will continue to follow and will provide pastoral care on an as needed/requested basis.  recommends bedside caregivers page  on duty if patient shows signs of acute spiritual or emotional distress.      [address] [facility]   Staff [address] [facility]   [phone]

## 2019-11-26 NOTE — PROGRESS NOTES
NUTRITION FOLLOW-UP/PLAN OF CARE      RECOMMENDATIONS:   1. Cardiac Diet   2. Changed to SF CIB TID per request for additional kcal/protein and promote wound healing  3. Monitor labs, weight and PO intake  4. RD to follow     GOALS:   1. Progressing/Ongoing: PO intake meets >75% of protein/calorie needs by 12/1     ASSESSMENT:   Wt status is classified as normal per Body mass index is 18.78 kg/m². Pt is at nutrition risk related to being over 65 and BMI under 23. Fair to good PO intake overall per vitals (%). Labs noted. Nutrition recommendations listed. RD to follow. SUBJECTIVE/OBJECTIVE:   (11/26): Appetite/PO intake appears to be fair to good overall per vitals (%). Last BM on (11/26) per I/Os. Wounds documented by nursing noted. Pt seen in room at lunch; observed >50% of lunch consumed during visit and Pt was still eating. Reports she does not like the Ensure and requesting CIB instead so will place in orders. Stated she has been implementing preferences with dietary and doing well with her meals. Noted snacks in room brought in by family as well. Plan to go to [facility] today. Will continue to monitor. Below in bold per previous RD note:   (11/22): Pt seen for an initial assessment/BMI. Pt admit for syncope/fall. Pt sitting in chair at bedside, very pleasant. Pt reports she has never really had an appetite, even when young. Pt ate grits, one sausage and drank cranberry juice this morning at [facility]. She does enjoy breakfast and snacking on fruit. Denies n/v/d/c at this time. Denies issues chewing/swallowing. Allergy to eggs. Pt states UBW around 100 lb. 11/21/19 weight: 117 lb unsure of accuracy. Noted pt is on diuretic. Will place order for new weight. Pt states her daughter encourages her to drink supplements, pt agrees to drink here. Skin: wound to R/L leg, stage 2 sacral. Encouraged intake. Will send ensure enlive TID.  Will continue to monitor intake, weight, labs, skin.    Information Obtained from:    [x] Chart Review   [x] Patient   [] Family/Caregiver   [] Nurse/Physician   [] Interdisciplinary Meeting/Rounds    Diet: cardiac  Medications: [x] Reviewed   lanoxin, prozac, lasix, heparin, remeron, PRN: zofran, ultram  Allergies: [x] Reviewed Eggs - kitchen aware  Encounter Diagnoses     ICD-10-CM ICD-9-CM   1. Vertigo R42 780.4   2. Nausea without vomiting R11.0 787.02   3. Contusion of face, subsequent encounter S00.83XD V58.89   4.  Elevated troponin R79.89 790.6     Past Medical History:   Diagnosis Date    [facility]    Arthritis, degenerative     CAD (coronary artery disease) 04/2012    S/P 2.75 X 15 mm BMS of OM (04/2012), Two LAD BMS (07/2012)    Elevated liver enzymes     Likely from statin    HLD (hyperlipidemia)     Hyperkalemia 06/2012    Likely from lisinopril    Ischemic cardiomyopathy     LVEF  30-35%(05/19) 45% (11/2012) & 30% echo (04/2012)    NSTEMI (non-ST elevated myocardial infarction) ([facility] 04/2012    UTI (urinary tract infection)     Vertigo       Labs:    Lab Results   Component Value Date/Time    Sodium 143 11/24/2019 01:50 PM    Potassium 3.7 11/24/2019 01:50 PM    Chloride 107 11/24/2019 01:50 PM    CO2 25 11/24/2019 01:50 PM    Anion gap 11 11/24/2019 01:50 PM    Glucose 179 (H) 11/24/2019 01:50 PM    BUN 17 11/24/2019 01:50 PM    Creatinine 0.81 11/24/2019 01:50 PM    Calcium 6.6 (L) 11/24/2019 01:50 PM    Magnesium 1.6 11/24/2019 01:50 PM    Phosphorus 4.2 11/24/2019 01:50 PM    Albumin 2.9 (L) 11/11/2019 09:42 PM     Anthropometrics: BMI (calculated): 18.8  Last 3 Recorded Weights in this Encounter    11/23/19 0412 11/25/19 0415 11/26/19 0048   Weight: 48.4 kg (106 lb 12.8 oz) 48.5 kg (106 lb 14.8 oz) 48.1 kg (106 lb 0.7 oz)      Ht Readings from Last 1 Encounters:   11/21/19 5' 3\" (1.6 m)       Documented Weight History:  Weight Metrics 11/26/2019 11/21/2019 11/11/2019 11/6/2019 11/5/2019 10/29/2019 10/28/2019   Weight 106 lb 0.7 oz - 100 lb 100 lb 116 lb 116 lb 100 lb   BMI - 18.78 kg/m2 18.89 kg/m2 17.16 kg/m2 19.91 kg/m2 19.91 kg/m2 17.16 kg/m2       Patient Vitals for the past 100 hrs:   % Diet Eaten   11/25/19 1757 50 %   11/25/19 1324 50 %   11/24/19 1315 50 %   11/23/19 1815 0 %   11/23/19 1341 75 %   11/23/19 0943 50 %   11/22/19 1700 100 %   11/22/19 1200 100 %   11/22/19 0800 100 %       UBW: 100 lb   [] Weight Loss  [x] Weight Gain   [] Weight Stable    Estimated Nutrition Needs: [] MSJ  [x] Other:  Calories: 5241-3707 kcal (25-35 kcal/kg) Based on:   [x] Actual BW    Protein:   64-74 g ( 1.2-1.4 g/kg) Based on:   [x] Actual BW    Fluid:       1 mL/kcal    Nutrition Problems Identified:   [x] Suboptimal PO intake (fair to good overall)  [x] Food Allergies (Eggs)  [] Difficulty chewing/swallowing/poor dentition  [] Constipation/Diarrhea (Last BM on 11/26;  No bowel regimen in place)  [] Nausea/Vomiting   [] None  [x] Other: Wound healing    Plan:   [x] Therapeutic Diet  [x]  Obtained/adjusted food preferences/tolerances and/or snacks options   [x]  Continue supplements added   [] Occupational therapy following for feeding techniques  []  HS snack added   []  Modify diet texture   [x]  Modify diet for food allergies   []  Educate patient   []  Assist with menu selection   [x]  Monitor PO intake on meal rounds   [x]  Continue inpatient monitoring and intervention   [x]  Participated in discharge planning/Interdisciplinary rounds/Team meetings   []  Other:     Education Needs:   [] Not appropriate for teaching at this time due to:   [x] Identified and addressed encouraged intake     Nutrition Monitoring and Evaluation:  [x] Continue ongoing monitoring and intervention  [] Other    Delwin Belts

## 2019-11-26 NOTE — PROGRESS NOTES
CDMP: Syncope due to vasovagal response from pain  CDMP: BLE wound infections with adjacent cellulitis POA

## 2019-11-26 NOTE — ROUTINE PROCESS
1400 Patient for discharge to [facility]. Report given to [place]. 1430 Patient discharge to [facility] via wheelchair. 1500 Specialty bed brought to the Patient's room.

## 2019-11-26 NOTE — DISCHARGE INSTRUCTIONS
Proceed with a regime of wet to dry normal saline gauze dressings - cleanse the leg wounds with dilute Hydrogen Peroxide : Normal Saline in a 1 : 3 ratio thoroughly between dressing changes.

## 2019-11-27 PROCEDURE — 3331090001 HH PPS REVENUE CREDIT

## 2019-11-27 PROCEDURE — 3331090002 HH PPS REVENUE DEBIT

## 2019-11-27 PROCEDURE — 74011250637 HC RX REV CODE- 250/637: Performed by: INTERNAL MEDICINE

## 2019-11-27 RX ADMIN — CEPHALEXIN 500 MG: 250 CAPSULE ORAL at 08:50

## 2019-11-27 RX ADMIN — ASPIRIN 81 MG CHEWABLE TABLET 81 MG: 81 TABLET CHEWABLE at 08:51

## 2019-11-27 RX ADMIN — CEPHALEXIN 500 MG: 250 CAPSULE ORAL at 17:35

## 2019-11-27 RX ADMIN — CEPHALEXIN 500 MG: 250 CAPSULE ORAL at 21:52

## 2019-11-27 RX ADMIN — FLUTICASONE PROPIONATE 2 SPRAY: 50 SPRAY, METERED NASAL at 08:52

## 2019-11-27 RX ADMIN — FUROSEMIDE 40 MG: 40 TABLET ORAL at 08:51

## 2019-11-27 RX ADMIN — MIRTAZAPINE 15 MG: 15 TABLET, FILM COATED ORAL at 21:52

## 2019-11-27 RX ADMIN — FLUOXETINE 10 MG: 10 CAPSULE ORAL at 08:51

## 2019-11-27 RX ADMIN — DIGOXIN 0.12 MG: 125 TABLET ORAL at 17:35

## 2019-11-27 RX ADMIN — MULTIPLE VITAMINS W/ MINERALS TAB 1 TABLET: TAB at 08:51

## 2019-11-27 NOTE — PROGRESS NOTES
NUTRITION INITIAL ASSESSMENT/PLAN OF CARE [facility]      RECOMMENDATIONS:   1. Cardiac Diet   2. Changed to SF CIB TID per request for additional kcal/protein and promote wound healing  3. Monitor labs, weight and PO intake  4. RD to follow     GOALS:   1. PO intake meets >75% of protein/calorie needs by 12/5   2. Weight Maintenance/gradual gain (1-2 lb/week) by 12/5    ASSESSMENT:   Wt status is classified as normal per Body mass index is 19.34 kg/m². Pt is at nutrition risk related to being over 65 and BMI under 23. Fair to good PO intake overall per vitals (%) during hospital admission. Labs noted. Nutrition recommendations listed. RD to follow. SUBJECTIVE/OBJECTIVE:   (11/28): Pt transferred from  to [address] [place] [address],[address] on 11/26/2019. Appetite/PO intake remains at baseline and will continue to send NAYELI TID to supplement energy needs. Wounds including a stage 2 pressure injury to right buttoks documented by nursing noted. Per weights recorded noted weight gain of 3 lb in same day so question accuracy. Pt seen in room OOB in chair. Stated she is doing well. Menu provided again and will continue to monitor. (11/26): Appetite/PO intake appears to be fair to good overall per vitals (%). Last BM on (11/26) per I/Os. Wounds documented by nursing noted. Pt seen in room at lunch; observed >50% of lunch consumed during visit and Pt was still eating. Reports she does not like the Ensure and requesting CIB instead so will place in orders. Stated she has been implementing preferences with dietary and doing well with her meals. Noted snacks in room brought in by family as well. Plan to go to [facility] today. Will continue to monitor. Below in bold per previous RD note:   (11/22): Pt seen for an initial assessment/BMI. Pt admit for syncope/fall. Pt sitting in chair at bedside, very pleasant. Pt reports she has never really had an appetite, even when young.  Pt ate grits, one sausage and drank cranberry juice this morning at [facility]. She does enjoy breakfast and snacking on fruit. Denies n/v/d/c at this time. Denies issues chewing/swallowing. Allergy to eggs. Pt states UBW around 100 lb. 11/21/19 weight: 117 lb unsure of accuracy. Noted pt is on diuretic. Will place order for new weight. Pt states her daughter encourages her to drink supplements, pt agrees to drink here. Skin: wound to R/L leg, stage 2 sacral. Encouraged intake. Will send ensure enlive TID. Will continue to monitor intake, weight, labs, skin.     Information Obtained from:    [x] Chart Review   [x] Patient   [] Family/Caregiver   [x] Nurse/Physician   [] Interdisciplinary Meeting/Rounds    Diet: cardiac  Medications: [x] Reviewed  Lasix, Remeron, MVI w/ Iron  Allergies: [x] Reviewed  Patient Active Problem List   Diagnosis Code    SOB (shortness of breath) R06.02    [facility]non-ST [facility] I21.4    CAD (coronary artery disease) I25.10    Ischemic cardiomyopathy I25.5    Arthritis, degenerative M19.90    Vertigo R42    HLD (hyperlipidemia) E78.5    Chronic a-fib I48.20    Irritable bowel syndrome with diarrhea K58.0    Chronic fatigue R53.82    Hyponatremia E87.1    Acute metabolic encephalopathy C24.61    Hyperkalemia E87.5    Acute on chronic systolic (congestive) heart failure (HCC) I50.23    Syncope R55    Hypoxia R09.02    Leukocytosis D72.829    Atrial fibrillation with rapid ventricular response (HCC) I48.91    Atrial fibrillation (HCC) I48.91    Hypotension I95.9    Skin rash J65    Metabolic acidosis D43.5    Underweight R63.6    Dizziness R42       Past Medical History:   Diagnosis Date    [facility]    Arthritis, degenerative     CAD (coronary artery disease) 04/2012    S/P 2.75 X 15 mm BMS of OM (04/2012), Two LAD BMS (07/2012)    Elevated liver enzymes     Likely from statin    HLD (hyperlipidemia)     Hyperkalemia 06/2012    Likely from lisinopril    Ischemic cardiomyopathy     LVEF 30-35%(05/19) 45% (11/2012) & 30% echo (04/2012)    NSTEMI (non-ST elevated myocardial infarction) ([facility] 04/2012    UTI (urinary tract infection)     Vertigo       Labs:    Lab Results   Component Value Date/Time    Sodium 143 11/24/2019 01:50 PM    Potassium 3.7 11/24/2019 01:50 PM    Chloride 107 11/24/2019 01:50 PM    CO2 25 11/24/2019 01:50 PM    Anion gap 11 11/24/2019 01:50 PM    Glucose 179 (H) 11/24/2019 01:50 PM    BUN 17 11/24/2019 01:50 PM    Creatinine 0.81 11/24/2019 01:50 PM    Calcium 6.6 (L) 11/24/2019 01:50 PM    Magnesium 1.6 11/24/2019 01:50 PM    Phosphorus 4.2 11/24/2019 01:50 PM    Albumin 2.9 (L) 11/11/2019 09:42 PM     Anthropometrics: BMI (calculated): 19.3  Last 3 Recorded Weights in this Encounter    11/26/19 1450   Weight: 49.5 kg (109 lb 3.2 oz)      Ht Readings from Last 1 Encounters:   11/21/19 5' 3\" (1.6 m)       Documented Weight History:  Weight Metrics 11/26/2019 11/26/2019 11/21/2019 11/11/2019 11/6/2019 11/5/2019 10/29/2019   Weight 109 lb 3.2 oz 106 lb 0.7 oz - 100 lb 100 lb 116 lb 116 lb   BMI 19.34 kg/m2 - 18.78 kg/m2 18.89 kg/m2 17.16 kg/m2 19.91 kg/m2 19.91 kg/m2       No data found.     UBW: 100 lb   [] Weight Loss  [x] Weight Gain ??fluid  [] Weight Stable    Estimated Nutrition Needs: [] MSJ  [x] Other:  Calories: 4795-4106 kcal (25-35 kcal/kg) Based on:   [x] Actual BW    Protein:   64-74 g ( 1.2-1.4 g/kg) Based on:   [x] Actual BW    Fluid:       1 mL/kcal    Nutrition Problems Identified:   [x] Suboptimal PO intake (fair to good overall)  [x] Food Allergies (Eggs)  [] Difficulty chewing/swallowing/poor dentition  [] Constipation/Diarrhea (Last BM on 11/27; bowel regimen in place)  [] Nausea/Vomiting   [] None  [x] Other: Wound healing    Plan:   [x] Therapeutic Diet  [x]  Obtained/adjusted food preferences/tolerances and/or snacks options   [x]  Continue supplements added   [] Occupational therapy following for feeding techniques  []  HS snack added   []  Modify diet texture   [x]  Modify diet for food allergies   []  Educate patient   []  Assist with menu selection   [x]  Monitor PO intake on meal rounds   [x]  Continue inpatient monitoring and intervention   [x]  Participated in discharge planning/Interdisciplinary rounds/Team meetings   []  Other:     Education Needs:   [] Not appropriate for teaching at this time due to:   [x] Identified and addressed encouraged intake     Nutrition Monitoring and Evaluation:  [x] Continue ongoing monitoring and intervention  [] Other    Jared Mcgregor

## 2019-11-27 NOTE — ROUTINE PROCESS
Patient says she is used to sleeping in recliner. Says he has CHF and she can't sleep in bed. Explained to patient she has a specialty bed that helps with circulation especially since she has BLE wounds. Patient agreed to get in bed. Says she doesn't sleep much. Bed alarm set. Call light and personal items within reach.

## 2019-11-27 NOTE — ROUTINE PROCESS
Pt on bed asleep, hob kept at 45, not in respiratory distress. Noted bruise, due to history of fall at assisted living, on right facial. Side rails up x3, bed alarm on, call bell within reach.

## 2019-11-27 NOTE — ROUTINE PROCESS
Patient arrived to floor via wheelchair per staff. Able to stand on scale to obtain weight. Alert and oriented x 4 with periods of forgetfulness. Has had several visitors since her arrival. Patient sitting in wheelchair at nurses station presently. Gets out of bed without assistance. Had a fall prior to hospital admission. Has wounds to BLE. Will change later tonight. Will continue to monitor.

## 2019-11-27 NOTE — PROGRESS NOTES
conducted an initial consultation and Spiritual Assessment for [address] [facility] [address], who is a 80 y.o.,female. Patients Primary Language is: [place]. According to the patients EMR Quaker Affiliation is: Isidoro Gilbert.     The reason the Patient came to the hospital is:   Patient Active Problem List    Diagnosis Date Noted    Dizziness 11/21/2019    Atrial fibrillation ([facility] 10/01/2019    Hypotension 10/01/2019    Skin rash 16/50/0107    Metabolic acidosis 56/81/2125    Underweight 10/01/2019    Atrial fibrillation with rapid ventricular response ([facility] 09/30/2019    Syncope 06/05/2019    Hypoxia 06/05/2019    Leukocytosis 06/05/2019    Acute on chronic systolic (congestive) heart failure (HCC) 06/03/2019    Hyperkalemia 10/23/2018    Acute metabolic encephalopathy 43/14/8562    Hyponatremia 10/20/2018    Chronic fatigue 10/09/2018    Irritable bowel syndrome with diarrhea 09/24/2018    Chronic a-fib 05/02/2017    CAD (coronary artery disease)     Ischemic cardiomyopathy     Arthritis, degenerative     Vertigo     HLD (hyperlipidemia)     SOB (shortness of breath) 04/27/2012    [facility]STEM[facility] (non-ST elevated [facility] 04/01/2012        The  provided the following Interventions:  Initiated a relationship of care and support. Explored issues of sandra, spirituality and/or Pentecostalism needs while hospitalized. Listened empathically. Provided chaplaincy education. Provided information about Spiritual Care Services. Offered prayer and assurance of continued prayers on patient's behalf. Chart reviewed. The following outcomes were achieved:  Patient shared some information about their medical narrative and spiritual journey/beliefs. Patient processed feeling about current hospitalization. Patient expressed gratitude for the 's visit.     Assessment:  Patient did not indicate any spiritual or Pentecostalism issues which require Spiritual Care Services interventions at this time. Patient does not have any Pentecostal/cultural needs that will affect patients preferences in health care. Plan:  Chaplains will continue to follow and will provide pastoral care on an as needed or requested basis.  recommends bedside caregivers page  on duty if patient shows signs of acute spiritual or emotional distress.     [facility]   Staff [address] [facility]   [phone]

## 2019-11-27 NOTE — ROUTINE PROCESS
Patient out of bed in recliner at present time with legs elevated . Patient denies any pain. Patient remains assist of one for all ADLS. Patient denies any pain. Patient answers all questions appropriately.

## 2019-11-27 NOTE — ROUTINE PROCESS
Pt assisted to bathroom using walker. Briefs changed. Pt washed her face and did oral care. Pt assisted to recliner, per request. Instructed to use call bell for any assistance. Call bell, walker and side table placed within pt's reach.

## 2019-11-27 NOTE — ROUTINE PROCESS
Dr Brittney Weiss came to visit patient. No new orders given. Says patient is alert and oriented as before.

## 2019-11-27 NOTE — ROUTINE PROCESS
Patient family voiced that they think patient is acting strangely and wondered if it could be a result of the oral antibiotics that patient is taking . Call placed  to Dr Bryson Collins office to update him as to family concerns and patient assessment.

## 2019-11-28 PROCEDURE — 3331090001 HH PPS REVENUE CREDIT

## 2019-11-28 PROCEDURE — 3331090002 HH PPS REVENUE DEBIT

## 2019-11-28 PROCEDURE — 74011250637 HC RX REV CODE- 250/637: Performed by: INTERNAL MEDICINE

## 2019-11-28 RX ADMIN — MULTIPLE VITAMINS W/ MINERALS TAB 1 TABLET: TAB at 09:10

## 2019-11-28 RX ADMIN — ONDANSETRON 4 MG: 4 TABLET, ORALLY DISINTEGRATING ORAL at 15:51

## 2019-11-28 RX ADMIN — CEPHALEXIN 500 MG: 250 CAPSULE ORAL at 17:26

## 2019-11-28 RX ADMIN — FLUOXETINE 10 MG: 10 CAPSULE ORAL at 09:10

## 2019-11-28 RX ADMIN — PSYLLIUM HUSK 1 PACKET: 3.4 POWDER ORAL at 09:11

## 2019-11-28 RX ADMIN — CEPHALEXIN 500 MG: 250 CAPSULE ORAL at 21:43

## 2019-11-28 RX ADMIN — ASPIRIN 81 MG CHEWABLE TABLET 81 MG: 81 TABLET CHEWABLE at 09:10

## 2019-11-28 RX ADMIN — DIGOXIN 0.25 MG: 125 TABLET ORAL at 17:26

## 2019-11-28 RX ADMIN — FUROSEMIDE 40 MG: 40 TABLET ORAL at 09:10

## 2019-11-28 RX ADMIN — FLUTICASONE PROPIONATE 2 SPRAY: 50 SPRAY, METERED NASAL at 09:59

## 2019-11-28 RX ADMIN — MIRTAZAPINE 15 MG: 15 TABLET, FILM COATED ORAL at 21:43

## 2019-11-28 RX ADMIN — CEPHALEXIN 500 MG: 250 CAPSULE ORAL at 09:10

## 2019-11-28 NOTE — ROUTINE PROCESS
Pt in bed asleep,hob at 30,  not in respiratory distress. With noted bruise on right facial area due to history of fall at assisted living. Dressing on bilateral legs, intact. Side rails up x3, bed alarm on, call bell and walker within reach.

## 2019-11-28 NOTE — ROUTINE PROCESS
Patient has slept off and on most of shift. Easy to arouse. Says she thinks antibiotics are making her sleepy. Didn't eat any dinner. Says she will snack on something later. Had an incontinent episode. CNA assist with bathing patient, changing linen and assist patient back to bed. Bed alarm set. Call light within reach.

## 2019-11-28 NOTE — ROUTINE PROCESS
Patient out of bed in recliner chair. Wound care rendered to bilateral lower legs as per physician orders. Patient denied any pain. Patient continues to ambulate to bathroom with walker. Patient remains assist of one for all ADLs. Jorge Aldana

## 2019-11-29 PROCEDURE — 3331090002 HH PPS REVENUE DEBIT

## 2019-11-29 PROCEDURE — 3331090001 HH PPS REVENUE CREDIT

## 2019-11-29 PROCEDURE — 74011250637 HC RX REV CODE- 250/637: Performed by: INTERNAL MEDICINE

## 2019-11-29 RX ADMIN — PSYLLIUM HUSK 1 PACKET: 3.4 POWDER ORAL at 08:46

## 2019-11-29 RX ADMIN — DIGOXIN 0.12 MG: 125 TABLET ORAL at 17:33

## 2019-11-29 RX ADMIN — FUROSEMIDE 40 MG: 40 TABLET ORAL at 08:46

## 2019-11-29 RX ADMIN — CEPHALEXIN 500 MG: 250 CAPSULE ORAL at 22:04

## 2019-11-29 RX ADMIN — FLUOXETINE 10 MG: 10 CAPSULE ORAL at 08:46

## 2019-11-29 RX ADMIN — ONDANSETRON 4 MG: 4 TABLET, ORALLY DISINTEGRATING ORAL at 08:50

## 2019-11-29 RX ADMIN — MULTIPLE VITAMINS W/ MINERALS TAB 1 TABLET: TAB at 08:46

## 2019-11-29 RX ADMIN — FLUTICASONE PROPIONATE 2 SPRAY: 50 SPRAY, METERED NASAL at 08:45

## 2019-11-29 RX ADMIN — TRAMADOL HYDROCHLORIDE 50 MG: 50 TABLET, FILM COATED ORAL at 23:30

## 2019-11-29 RX ADMIN — CEPHALEXIN 500 MG: 250 CAPSULE ORAL at 17:33

## 2019-11-29 RX ADMIN — ASPIRIN 81 MG CHEWABLE TABLET 81 MG: 81 TABLET CHEWABLE at 08:46

## 2019-11-29 RX ADMIN — MIRTAZAPINE 15 MG: 15 TABLET, FILM COATED ORAL at 22:04

## 2019-11-29 RX ADMIN — CEPHALEXIN 500 MG: 250 CAPSULE ORAL at 08:46

## 2019-11-29 NOTE — ROUTINE PROCESS
0030 pt awake in bed doing her crossword puzzle, no complaints noted. 0115 pt called and requested to get out of bed. Pt assisted to recliner per walker by renny Almaraz.

## 2019-11-29 NOTE — PROGRESS NOTES
6631  Patient is sitting on the recliner watching TV. Ate about 30% of her dinner then said she does not want it anymore. Walker, personal items, call light within reach. 4119  Patient in bed. HIOB elevated. personal items within reach.

## 2019-11-29 NOTE — ROUTINE PROCESS
Patient was drowsy before dinner but is sitting up in bed working her puzzle book now. Requested grits for dinner. Only ate a few bites and 1/2 cup of jello. Denies pain. Dressing intact to BLE. Call light and personal items within reach.

## 2019-11-29 NOTE — ROUTINE PROCESS
Pt in bed asleep, not in respiratory distress. With bruise on right facial area due to history of fall. With dressing intact on both bilateral legs. Side rails up x2 ,call bell and walker within reach.

## 2019-11-30 PROCEDURE — 3331090002 HH PPS REVENUE DEBIT

## 2019-11-30 PROCEDURE — 3331090001 HH PPS REVENUE CREDIT

## 2019-11-30 PROCEDURE — 74011250637 HC RX REV CODE- 250/637: Performed by: INTERNAL MEDICINE

## 2019-11-30 RX ADMIN — FLUTICASONE PROPIONATE 2 SPRAY: 50 SPRAY, METERED NASAL at 09:13

## 2019-11-30 RX ADMIN — FLUOXETINE 10 MG: 10 CAPSULE ORAL at 09:13

## 2019-11-30 RX ADMIN — MULTIPLE VITAMINS W/ MINERALS TAB 1 TABLET: TAB at 09:13

## 2019-11-30 RX ADMIN — PSYLLIUM HUSK 1 PACKET: 3.4 POWDER ORAL at 09:13

## 2019-11-30 RX ADMIN — ASPIRIN 81 MG CHEWABLE TABLET 81 MG: 81 TABLET CHEWABLE at 09:13

## 2019-11-30 RX ADMIN — CEPHALEXIN 500 MG: 250 CAPSULE ORAL at 21:32

## 2019-11-30 RX ADMIN — CEPHALEXIN 500 MG: 250 CAPSULE ORAL at 09:13

## 2019-11-30 RX ADMIN — CEPHALEXIN 500 MG: 250 CAPSULE ORAL at 16:44

## 2019-11-30 RX ADMIN — MIRTAZAPINE 15 MG: 15 TABLET, FILM COATED ORAL at 21:31

## 2019-11-30 RX ADMIN — DIGOXIN 0.12 MG: 125 TABLET ORAL at 16:44

## 2019-11-30 RX ADMIN — FUROSEMIDE 40 MG: 40 TABLET ORAL at 09:13

## 2019-11-30 NOTE — ROUTINE PROCESS
2330 Pt in bed awake resting quietly. Hob at 45, not in respiratory distress. Dressing on bilateral legs done using peroxide (wet to dry dressing), dermal wound cleanser and emelina wrap. Pt complained of dry  flaky skin, lotion applied. Prn tramadol 50mg tab given for bilateral leg pain. Side rails up x3, call bell and walker within reach.

## 2019-11-30 NOTE — ROUTINE PROCESS
Patient out of bed in recliner at present time. Patient continues to be able to make needs known. Using aseptic technique and following infection control protocols  Dressing to bilateral shin wounds cleaned and changed following physician orders. . No foul odor noted . Large amount of serousanguinous drainage noted . Wounds remain same as upon admission. Surrounding skin on legs remain a maru pink. Patient tolerated dressing change well and denied any pain.

## 2019-12-01 PROCEDURE — 3331090002 HH PPS REVENUE DEBIT

## 2019-12-01 PROCEDURE — 74011250637 HC RX REV CODE- 250/637: Performed by: INTERNAL MEDICINE

## 2019-12-01 PROCEDURE — 3331090001 HH PPS REVENUE CREDIT

## 2019-12-01 RX ADMIN — MULTIPLE VITAMINS W/ MINERALS TAB 1 TABLET: TAB at 08:43

## 2019-12-01 RX ADMIN — TRAMADOL HYDROCHLORIDE 50 MG: 50 TABLET, FILM COATED ORAL at 09:52

## 2019-12-01 RX ADMIN — MIRTAZAPINE 15 MG: 15 TABLET, FILM COATED ORAL at 22:03

## 2019-12-01 RX ADMIN — FUROSEMIDE 40 MG: 40 TABLET ORAL at 08:43

## 2019-12-01 RX ADMIN — CEPHALEXIN 500 MG: 250 CAPSULE ORAL at 16:42

## 2019-12-01 RX ADMIN — FLUTICASONE PROPIONATE 2 SPRAY: 50 SPRAY, METERED NASAL at 08:46

## 2019-12-01 RX ADMIN — ONDANSETRON 4 MG: 4 TABLET, ORALLY DISINTEGRATING ORAL at 22:36

## 2019-12-01 RX ADMIN — PSYLLIUM HUSK 1 PACKET: 3.4 POWDER ORAL at 08:43

## 2019-12-01 RX ADMIN — ASPIRIN 81 MG CHEWABLE TABLET 81 MG: 81 TABLET CHEWABLE at 08:43

## 2019-12-01 RX ADMIN — FLUOXETINE 10 MG: 10 CAPSULE ORAL at 08:43

## 2019-12-01 RX ADMIN — DIGOXIN 0.25 MG: 125 TABLET ORAL at 16:42

## 2019-12-01 RX ADMIN — CEPHALEXIN 500 MG: 250 CAPSULE ORAL at 22:03

## 2019-12-01 RX ADMIN — CEPHALEXIN 500 MG: 250 CAPSULE ORAL at 08:43

## 2019-12-01 NOTE — ROUTINE PROCESS
Bedside and Verbal shift change report given to CLARE Choi LPN (oncoming nurse) by SERJIO Grove RN (offgoing nurse). Report included the following information SBAR, Kardex and MAR.

## 2019-12-01 NOTE — ROUTINE PROCESS
Patient noted to have put pills in toilet and flushing them. One pill noted in bottom of toilet that didn't flush. When asked patient stated that even though nurses give her pills she sometimes does not swallow them and spits them out. Patient stated that she doesn't do it all the time but sometimes they stick in her throat. When asked if she would like us to crush them patient stated \" that will be a good idea\". Patient teaching rendered on the importance of taking all her medications.

## 2019-12-02 PROCEDURE — 3331090002 HH PPS REVENUE DEBIT

## 2019-12-02 PROCEDURE — 74011250637 HC RX REV CODE- 250/637: Performed by: INTERNAL MEDICINE

## 2019-12-02 PROCEDURE — 3331090001 HH PPS REVENUE CREDIT

## 2019-12-02 RX ADMIN — CARBOXYMETHYLCELLULOSE SODIUM 1 DROP: 5 SOLUTION/ DROPS OPHTHALMIC at 10:50

## 2019-12-02 RX ADMIN — DIGOXIN 0.12 MG: 125 TABLET ORAL at 17:50

## 2019-12-02 RX ADMIN — ONDANSETRON 4 MG: 4 TABLET, ORALLY DISINTEGRATING ORAL at 09:05

## 2019-12-02 RX ADMIN — MIRTAZAPINE 15 MG: 15 TABLET, FILM COATED ORAL at 21:56

## 2019-12-02 RX ADMIN — ONDANSETRON 4 MG: 4 TABLET, ORALLY DISINTEGRATING ORAL at 17:49

## 2019-12-02 RX ADMIN — MULTIPLE VITAMINS W/ MINERALS TAB 1 TABLET: TAB at 09:05

## 2019-12-02 RX ADMIN — CEPHALEXIN 500 MG: 250 CAPSULE ORAL at 09:05

## 2019-12-02 RX ADMIN — CEPHALEXIN 500 MG: 250 CAPSULE ORAL at 17:49

## 2019-12-02 RX ADMIN — CEPHALEXIN 500 MG: 250 CAPSULE ORAL at 21:56

## 2019-12-02 RX ADMIN — FLUTICASONE PROPIONATE 2 SPRAY: 50 SPRAY, METERED NASAL at 09:48

## 2019-12-02 RX ADMIN — FLUOXETINE 10 MG: 10 CAPSULE ORAL at 09:04

## 2019-12-02 RX ADMIN — ASPIRIN 81 MG CHEWABLE TABLET 81 MG: 81 TABLET CHEWABLE at 09:05

## 2019-12-02 RX ADMIN — FUROSEMIDE 40 MG: 40 TABLET ORAL at 09:05

## 2019-12-02 NOTE — PROGRESS NOTES
Pt up in recliner, meds given crushed without difficulty, pt ambulates to the bathroom with assistance, no c/o pain and no visual signs of pain noted, BLE drsg CDI, call bell and personal items within reach

## 2019-12-02 NOTE — PROGRESS NOTES
Pt slept quietly through out the night, BLE drsg CDI, assisted to the bathroom as needed, no further complaint of nausea noted, HOB elevated, no c/o pain and no visual signs of pain noted, ATB cont with no adverse reactions noted, call bell and personal items within reach

## 2019-12-02 NOTE — PROGRESS NOTES
Patient sat up in recliner,C/O nausea due to Keflex, zofran given, Scheduled wound dressing changed completed, No C/O of pain or discomfort none noted.

## 2019-12-02 NOTE — PROGRESS NOTES
I have reviewed this patient's current medication list and recent laboratory results. Ms Larisa Robles is 79 yo, has CHF and weighs less than 50 kg. She is receiving a high dose of digoxin 0.125mg alt with 0.25mg daily and is complaining of nausea. Please consider checking a digoxin level to assess potential toxicity. Thank you,  Nisreen BASSETT  Ph. M. S.  12/2/2019

## 2019-12-02 NOTE — PROGRESS NOTES
Pt assisted to bed, BLE drsg changed per MD order, mod amount of drainage noted, tolerated procedure well, pt c/o nausea after taking meds, zofran given as ordered for nausea with no further complaints noted, ginger ale and/or crackers offered, pt declined, ATB cont with no adverse reactions noted, pt educated on the importance of taking a complete course of ATB d/t pt found discarding meds in the toilet on the previous shift, pt voiced understanding, pt assisted with repositioning in the bed, HOB elevated, call bell and personal items within reach

## 2019-12-03 PROCEDURE — 3331090001 HH PPS REVENUE CREDIT

## 2019-12-03 PROCEDURE — 3331090002 HH PPS REVENUE DEBIT

## 2019-12-03 PROCEDURE — 74011250637 HC RX REV CODE- 250/637: Performed by: INTERNAL MEDICINE

## 2019-12-03 RX ADMIN — CEPHALEXIN 500 MG: 250 CAPSULE ORAL at 16:50

## 2019-12-03 RX ADMIN — MIRTAZAPINE 15 MG: 15 TABLET, FILM COATED ORAL at 21:41

## 2019-12-03 RX ADMIN — FLUTICASONE PROPIONATE 2 SPRAY: 50 SPRAY, METERED NASAL at 09:00

## 2019-12-03 RX ADMIN — ASPIRIN 81 MG CHEWABLE TABLET 81 MG: 81 TABLET CHEWABLE at 09:09

## 2019-12-03 RX ADMIN — FLUOXETINE 10 MG: 10 CAPSULE ORAL at 09:09

## 2019-12-03 RX ADMIN — CEPHALEXIN 500 MG: 250 CAPSULE ORAL at 09:09

## 2019-12-03 RX ADMIN — DIGOXIN 0.25 MG: 125 TABLET ORAL at 17:09

## 2019-12-03 RX ADMIN — MULTIPLE VITAMINS W/ MINERALS TAB 1 TABLET: TAB at 09:09

## 2019-12-03 RX ADMIN — FUROSEMIDE 40 MG: 40 TABLET ORAL at 09:09

## 2019-12-03 NOTE — ROUTINE PROCESS
Bedside and Verbal shift change report given to Dora Schaefer LPN (oncoming nurse) by Ela Anderson LPN (offgoing nurse). Report included the following information SBAR, Kardex and MAR.

## 2019-12-03 NOTE — PROGRESS NOTES
attempted to complete a  follow up visit with patient in room 3106 today but found her resting peacefully at this time and unable to communicate now.  Chaplains will continue to follow and will provide pastoral care on an as needed/requested basis    Chaplain Tom Segovia   Board Certified [address] [place] [address]   [phone]

## 2019-12-03 NOTE — PROGRESS NOTES
7818  Patient sitting in the recliner resting. Breathing regular and unlabored. Personal items and call bell within reach. No complaints at the moment. 0330  Patient in bed sleeping.

## 2019-12-03 NOTE — ROUTINE PROCESS
Patient continues to refuse to have blood drawn for digoxin level. Patient stated she will consider letting someone draw her blood tomorrow. Pharmacy scheduled lab for 12 noon tomorrow. Informed patient of scheduled lab work and educated patient on need for digoxin level. Patient verbalized understanding but continues to be skeptical of having blood drawn. Bedside and Verbal shift change report given to EZIO Francois LPN (oncoming nurse) by SERJIO Grove RN (offgoing nurse). Report included the following information SBAR, Kardex and MAR.

## 2019-12-03 NOTE — ROUTINE PROCESS
Bedside and Verbal shift change report given to Curry Morgan (oncoming nurse) by Micaela Colorado LPN (offgoing nurse). Report included the following information SBAR, Kardex, MAR and Recent Results.

## 2019-12-03 NOTE — ROUTINE PROCESS
Pt refused metamucil, states had two formed medium BMs today. Pt refused to get Digoxin level drawn today, states \" I don't let them, draw my blood. \" NP called and left a message.

## 2019-12-04 PROCEDURE — 3331090001 HH PPS REVENUE CREDIT

## 2019-12-04 PROCEDURE — 74011250637 HC RX REV CODE- 250/637: Performed by: INTERNAL MEDICINE

## 2019-12-04 PROCEDURE — 3331090002 HH PPS REVENUE DEBIT

## 2019-12-04 RX ADMIN — FUROSEMIDE 40 MG: 40 TABLET ORAL at 08:24

## 2019-12-04 RX ADMIN — FLUOXETINE 10 MG: 10 CAPSULE ORAL at 08:24

## 2019-12-04 RX ADMIN — DIGOXIN 0.12 MG: 125 TABLET ORAL at 19:04

## 2019-12-04 RX ADMIN — ASPIRIN 81 MG CHEWABLE TABLET 81 MG: 81 TABLET CHEWABLE at 08:24

## 2019-12-04 RX ADMIN — MIRTAZAPINE 15 MG: 15 TABLET, FILM COATED ORAL at 21:12

## 2019-12-04 RX ADMIN — MULTIPLE VITAMINS W/ MINERALS TAB 1 TABLET: TAB at 08:24

## 2019-12-04 RX ADMIN — FLUTICASONE PROPIONATE 2 SPRAY: 50 SPRAY, METERED NASAL at 08:24

## 2019-12-04 RX ADMIN — ONDANSETRON 4 MG: 4 TABLET, ORALLY DISINTEGRATING ORAL at 13:20

## 2019-12-04 RX ADMIN — PSYLLIUM HUSK 1 PACKET: 3.4 POWDER ORAL at 08:24

## 2019-12-04 NOTE — PROGRESS NOTES
Patient resting quietly in bed with eyes closed, HOB elevated, Respiration even and unlabored, BLE dressing dry and intact, No signs of pain observed, Call bell is within patient reach

## 2019-12-04 NOTE — ROUTINE PROCESS
Patient continues on PO ABT Keflex with 0 s/s adverse reaction. 0 c/o pain or discomfort, call bell in reach, will continue to monitor.

## 2019-12-04 NOTE — PROGRESS NOTES
NUTRITION FOLLOW-UP/PLAN OF CARE [facility]      RECOMMENDATIONS:   1. Cardiac Diet   2. SF CIB TID per request for additional kcal/protein and promote wound healing  3. Monitor labs, weight and PO intake  4. RD to follow     GOALS:   1. Progressing/Ongoing: PO intake meets >75% of protein/calorie needs by 12/11  2. Ongoing: Weight Maintenance/gradual gain (1-2 lb/week) by 12/11    ASSESSMENT:   Wt status is classified as normal per Body mass index is 18.46 kg/m². Pt is at nutrition risk related to being over 65 and BMI under 23. Poor to fair PO intake overall  No new labs. Nutrition recommendations listed. RD to follow. SUBJECTIVE/OBJECTIVE:   (12/4): Appetite/PO intake variable but poor to fair overall; average 48% this past week per [facility]. Last BM on (12/1) per I/Os; noted Pt to be refusing  bowel regimen med yesterday. Noted weight loss but Pt on diuretic and still increased from UBW. Noted wounds including stage 1 pressure injury to gluteal fold documented by nursing. Pt seen in room resting this afternoon so did not disturb. Spoke with nursing who stated she has been getting Zofran PRN for nausea. Continue to encourage intake and will monitor. (11/28): Pt transferred from  to [address] [place] [address] on 11/26/2019. Appetite/PO intake remains at baseline and will continue to send NAYELI TID to supplement energy needs. Wounds including a stage 2 pressure injury to right buttoks documented by nursing noted. Per weights recorded noted weight gain of 3 lb in same day so question accuracy. Pt seen in room OOB in chair. Stated she is doing well. Menu provided again and will continue to monitor. (11/26): Appetite/PO intake appears to be fair to good overall per vitals (%). Last BM on (11/26) per I/Os. Wounds documented by nursing noted. Pt seen in room at lunch; observed >50% of lunch consumed during visit and Pt was still eating. Reports she does not like the Ensure and requesting CIB instead so will place in orders. Stated she has been implementing preferences with dietary and doing well with her meals. Noted snacks in room brought in by family as well. Plan to go to [facility] today. Will continue to monitor. Below in bold per previous RD note:   (11/22): Pt seen for an initial assessment/BMI. Pt admit for syncope/fall. Pt sitting in chair at bedside, very pleasant. Pt reports she has never really had an appetite, even when young. Pt ate grits, one sausage and drank cranberry juice this morning at [facility]. She does enjoy breakfast and snacking on fruit. Denies n/v/d/c at this time. Denies issues chewing/swallowing. Allergy to eggs. Pt states UBW around 100 lb. 11/21/19 weight: 117 lb unsure of accuracy. Noted pt is on diuretic. Will place order for new weight. Pt states her daughter encourages her to drink supplements, pt agrees to drink here. Skin: wound to R/L leg, stage 2 sacral. Encouraged intake. Will send ensure enlive TID. Will continue to monitor intake, weight, labs, skin.     Information Obtained from:    [x] Chart Review   [x] Patient   [] Family/Caregiver   [x] Nurse/Physician   [] Interdisciplinary Meeting/Rounds    Diet: cardiac  Medications: [x] Reviewed  Lasix, Remeron, MVI w/ Iron  Allergies: [x] Reviewed  Patient Active Problem List   Diagnosis Code    SOB (shortness of breath) R06.02    NSTEMI (non-ST elevated myocardial infarction) ([facility] I21.4    CAD (coronary artery disease) I25.10    Ischemic cardiomyopathy I25.5    Arthritis, degenerative M19.90    Vertigo R42    HLD (hyperlipidemia) E78.5    Chronic a-fib I48.20    Irritable bowel syndrome with diarrhea K58.0    Chronic fatigue R53.82    Hyponatremia E87.1    Acute metabolic encephalopathy T53.15    Hyperkalemia E87.5    Acute on chronic systolic (congestive) heart failure (HCC) I50.23    Syncope R55    Hypoxia R09.02    Leukocytosis D72.829    Atrial fibrillation with rapid ventricular response (HCC) I48.91    Atrial fibrillation ([facility] I48.91    Hypotension I95.9    Skin rash D53    Metabolic acidosis W36.1    Underweight R63.6    Dizziness R42       Past Medical History:   Diagnosis Date    A-fib ([facility] 01/2017    Arthritis, degenerative     CAD (coronary artery disease) 04/2012    S/P 2.75 X 15 mm BMS of OM (04/2012), Two LAD BMS (07/2012)    Elevated liver enzymes     Likely from statin    HLD (hyperlipidemia)     Hyperkalemia 06/2012    Likely from lisinopril    Ischemic cardiomyopathy     LVEF  30-35%(05/19) 45% (11/2012) & 30% echo (04/2012)    NSTEMI (non-ST elevated myocardial infarction) ([facility] 04/2012    UTI (urinary tract infection)     Vertigo       Labs:    Lab Results   Component Value Date/Time    Sodium 143 11/24/2019 01:50 PM    Potassium 3.7 11/24/2019 01:50 PM    Chloride 107 11/24/2019 01:50 PM    CO2 25 11/24/2019 01:50 PM    Anion gap 11 11/24/2019 01:50 PM    Glucose 179 (H) 11/24/2019 01:50 PM    BUN 17 11/24/2019 01:50 PM    Creatinine 0.81 11/24/2019 01:50 PM    Calcium 6.6 (L) 11/24/2019 01:50 PM    Magnesium 1.6 11/24/2019 01:50 PM    Phosphorus 4.2 11/24/2019 01:50 PM    Albumin 2.9 (L) 11/11/2019 09:42 PM     Anthropometrics: BMI (calculated): 18.5  Last 3 Recorded Weights in this Encounter    11/26/19 1450 12/04/19 0801   Weight: 49.5 kg (109 lb 3.2 oz) 47.3 kg (104 lb 3.2 oz)      Ht Readings from Last 1 Encounters:   11/21/19 5' 3\" (1.6 m)       Documented Weight History:  Weight Metrics 12/4/2019 11/26/2019 11/26/2019 11/21/2019 11/11/2019 11/6/2019 11/5/2019   Weight 104 lb 3.2 oz - 106 lb 0.7 oz - 100 lb 100 lb 116 lb   BMI - 18.46 kg/m2 - 18.78 kg/m2 18.89 kg/m2 17.16 kg/m2 19.91 kg/m2       No data found.     UBW: 100 lb   [] Weight Loss  [x] Weight Gain from UBW still (fluid?)  [] Weight Stable    Estimated Nutrition Needs: [] MSJ  [x] Other:  Calories: 6402-3410 kcal (25-35 kcal/kg) Based on:   [x] Actual BW    Protein:   64-74 g ( 1.2-1.4 g/kg) Based on:   [x] Actual BW    Fluid:       1 mL/kcal    Nutrition Problems Identified:   [x] Suboptimal PO intake (variable but fair overall)  [x] Food Allergies (Eggs)  [] Difficulty chewing/swallowing/poor dentition  [] Constipation/Diarrhea (Last BM on 12/1; bowel regimen in place)  [x] Nausea/Vomiting (Zofran PRN)  [] None  [x] Other: Wound healing    Plan:   [x] Therapeutic Diet  [x]  Obtained/adjusted food preferences/tolerances and/or snacks options   [x]  Continue supplements added   [] Occupational therapy following for feeding techniques  []  HS snack added   []  Modify diet texture   [x]  Modify diet for food allergies   []  Educate patient   []  Assist with menu selection   [x]  Monitor PO intake on meal rounds   [x]  Continue inpatient monitoring and intervention   [x]  Participated in discharge planning/Interdisciplinary rounds/Team meetings   []  Other:     Education Needs:   [] Not appropriate for teaching at this time due to:   [x] Identified and addressed encouraged intake     Nutrition Monitoring and Evaluation:  [x] Continue ongoing monitoring and intervention  [] Other    Amie Montague

## 2019-12-05 ENCOUNTER — PATIENT OUTREACH (OUTPATIENT)
Dept: CASE MANAGEMENT | Age: 84
End: 2019-12-05

## 2019-12-05 PROCEDURE — 3331090001 HH PPS REVENUE CREDIT

## 2019-12-05 PROCEDURE — 3331090002 HH PPS REVENUE DEBIT

## 2019-12-05 PROCEDURE — 74011250637 HC RX REV CODE- 250/637: Performed by: INTERNAL MEDICINE

## 2019-12-05 RX ORDER — FLUOXETINE HYDROCHLORIDE 20 MG/1
20 CAPSULE ORAL DAILY
Status: DISCONTINUED | OUTPATIENT
Start: 2019-12-06 | End: 2019-12-10 | Stop reason: HOSPADM

## 2019-12-05 RX ADMIN — FLUTICASONE PROPIONATE 2 SPRAY: 50 SPRAY, METERED NASAL at 08:48

## 2019-12-05 RX ADMIN — MULTIPLE VITAMINS W/ MINERALS TAB 1 TABLET: TAB at 08:42

## 2019-12-05 RX ADMIN — NITROGLYCERIN 0.4 MG: 0.4 TABLET, ORALLY DISINTEGRATING SUBLINGUAL at 08:37

## 2019-12-05 RX ADMIN — MIRTAZAPINE 15 MG: 15 TABLET, FILM COATED ORAL at 21:36

## 2019-12-05 RX ADMIN — FUROSEMIDE 40 MG: 40 TABLET ORAL at 08:42

## 2019-12-05 RX ADMIN — DIGOXIN 0.25 MG: 125 TABLET ORAL at 17:52

## 2019-12-05 RX ADMIN — ASPIRIN 81 MG CHEWABLE TABLET 81 MG: 81 TABLET CHEWABLE at 08:42

## 2019-12-05 RX ADMIN — FLUOXETINE 10 MG: 10 CAPSULE ORAL at 08:42

## 2019-12-05 RX ADMIN — ONDANSETRON 4 MG: 4 TABLET, ORALLY DISINTEGRATING ORAL at 08:44

## 2019-12-05 NOTE — PROGRESS NOTES
0127  Patient in bed sleeping. HOB in semi Fowlers. Personal items and call light within reach. No complaints at the moment. 0430  Patient had a bowel movement accident. Patient left a trail of liquid poop from the recliner to the bathroom. Patient was bathed and cleaned. Dressings on lower legs was done because it was soiled with poop.

## 2019-12-05 NOTE — ROUTINE PROCESS
Patient resting in recliner at present time. No complaints of chest pain , nausea or pain to legs voiced when asked . Patient stated that she feels better.

## 2019-12-05 NOTE — PROGRESS NOTES
Community Care Team Documentation for Patient in [facility]     Patient discharged from [facility] [address] [facility] to [facility], on 11/26/19. Hospital Discharge diagnosis:       Dizziness   Atrial Fibrillation   Syncope   IBS with Diarrhea   Ischemic Cardiomyopathy    SNF Attending Provider:   Dr. Jl Troncoso    Anticipated discharge date from SNF:  TBD    PCP : Leo Garcia MD    Nurse Navigator:     [facility] Team rounds completed, updates provided by facility. RRAT:  Medium Risk            19       Total Score        3 Has Seen PCP in Last 6 Months (Yes=3, No=0)    4 IP Visits Last 12 Months (1-3=4, 4=9, >4=11)    12 Charlson Comorbidity Score (Age + Comorbid Conditions)        Criteria that do not apply:    . Living with Significant Other. Assisted Living. LTAC. SNF. or   Rehab    Patient Length of Stay (>5 days = 3)    Pt.  Coverage (Medicare=5 , Medicaid, or Self-Pay=4)        Active Ambulatory Problems     Diagnosis Date Noted    SOB (shortness of breath) 04/27/2012    NSTEMI (non-ST elevated myocardial infarction) ([address] 04/01/2012    CAD (coronary artery disease)     Ischemic cardiomyopathy     Arthritis, degenerative     Vertigo     HLD (hyperlipidemia)     Chronic a-fib 05/02/2017    Irritable bowel syndrome with diarrhea 09/24/2018    Chronic fatigue 10/09/2018    Hyponatremia 10/20/2018    Acute metabolic encephalopathy 10/52/1334    Hyperkalemia 10/23/2018    Acute on chronic systolic (congestive) heart failure ([address] 06/03/2019    Syncope 06/05/2019    Hypoxia 06/05/2019    Leukocytosis 06/05/2019    Atrial fibrillation with rapid ventricular response ([address] 09/30/2019    Atrial fibrillation ([address] 10/01/2019    Hypotension 10/01/2019    Skin rash 66/87/9885    Metabolic acidosis 41/09/9477    Underweight 10/01/2019    Dizziness 11/21/2019     Resolved Ambulatory Problems     Diagnosis Date Noted    No Resolved Ambulatory Problems     Past Medical History:   Diagnosis Date    A-fib ([facility] 01/2017    Elevated liver enzymes     UTI (urinary tract infection)

## 2019-12-06 PROCEDURE — 3331090001 HH PPS REVENUE CREDIT

## 2019-12-06 PROCEDURE — 3331090002 HH PPS REVENUE DEBIT

## 2019-12-06 PROCEDURE — 74011250637 HC RX REV CODE- 250/637: Performed by: INTERNAL MEDICINE

## 2019-12-06 RX ADMIN — MIRTAZAPINE 15 MG: 15 TABLET, FILM COATED ORAL at 22:22

## 2019-12-06 RX ADMIN — MULTIPLE VITAMINS W/ MINERALS TAB 1 TABLET: TAB at 10:07

## 2019-12-06 RX ADMIN — ASPIRIN 81 MG CHEWABLE TABLET 81 MG: 81 TABLET CHEWABLE at 10:07

## 2019-12-06 RX ADMIN — FLUOXETINE 20 MG: 20 CAPSULE ORAL at 10:07

## 2019-12-06 RX ADMIN — FLUTICASONE PROPIONATE 2 SPRAY: 50 SPRAY, METERED NASAL at 10:07

## 2019-12-06 RX ADMIN — FUROSEMIDE 40 MG: 40 TABLET ORAL at 10:07

## 2019-12-06 NOTE — ROUTINE PROCESS
Patient up in wheelchair took lunch in dining room then returned to bedroom to watch tv. Patient denies pain or discomfort  Call bell at bedside. Does not express any needs at this time.

## 2019-12-06 NOTE — ROUTINE PROCESS
Patient noted with necrotic tissue in bilateral shin wounds. Dressing change completed . Patient tolerated well.

## 2019-12-06 NOTE — PROGRESS NOTES
0034    Patient in her recliner reading a book during rounds. Personal items and call light within reach. No complaints at the moment. 6617  Patient slept on her recliner throughout the night. Was assisted to the bathroom during shift.

## 2019-12-07 PROCEDURE — 3331090002 HH PPS REVENUE DEBIT

## 2019-12-07 PROCEDURE — 3331090001 HH PPS REVENUE CREDIT

## 2019-12-07 PROCEDURE — 74011250637 HC RX REV CODE- 250/637: Performed by: INTERNAL MEDICINE

## 2019-12-07 RX ADMIN — FLUTICASONE PROPIONATE 2 SPRAY: 50 SPRAY, METERED NASAL at 09:00

## 2019-12-07 RX ADMIN — MULTIPLE VITAMINS W/ MINERALS TAB 1 TABLET: TAB at 09:43

## 2019-12-07 RX ADMIN — DIGOXIN 0.12 MG: 125 TABLET ORAL at 17:49

## 2019-12-07 RX ADMIN — FLUOXETINE 20 MG: 20 CAPSULE ORAL at 09:43

## 2019-12-07 RX ADMIN — ASPIRIN 81 MG CHEWABLE TABLET 81 MG: 81 TABLET CHEWABLE at 09:43

## 2019-12-07 RX ADMIN — MIRTAZAPINE 15 MG: 15 TABLET, FILM COATED ORAL at 21:33

## 2019-12-07 RX ADMIN — FUROSEMIDE 40 MG: 40 TABLET ORAL at 09:43

## 2019-12-07 NOTE — ROUTINE PROCESS
Pt in recliner, asleep, not in respiratory distress. Dressing to bilateral legs intact. Call bell within reach.

## 2019-12-07 NOTE — ROUTINE PROCESS
Pt assisted to bathroom per walker and assisted back to recliner. Edema +2 noted on romario legs. Call bell placed within reach.

## 2019-12-08 PROCEDURE — 74011250637 HC RX REV CODE- 250/637: Performed by: INTERNAL MEDICINE

## 2019-12-08 PROCEDURE — 77030018836 HC SOL IRR NACL ICUM -A

## 2019-12-08 PROCEDURE — 3331090002 HH PPS REVENUE DEBIT

## 2019-12-08 PROCEDURE — 3331090001 HH PPS REVENUE CREDIT

## 2019-12-08 RX ADMIN — DIGOXIN 0.25 MG: 125 TABLET ORAL at 17:51

## 2019-12-08 RX ADMIN — FLUTICASONE PROPIONATE 2 SPRAY: 50 SPRAY, METERED NASAL at 09:00

## 2019-12-08 RX ADMIN — FUROSEMIDE 40 MG: 40 TABLET ORAL at 09:52

## 2019-12-08 RX ADMIN — FLUOXETINE 20 MG: 20 CAPSULE ORAL at 09:52

## 2019-12-08 RX ADMIN — ASPIRIN 81 MG CHEWABLE TABLET 81 MG: 81 TABLET CHEWABLE at 09:52

## 2019-12-08 RX ADMIN — MULTIPLE VITAMINS W/ MINERALS TAB 1 TABLET: TAB at 09:52

## 2019-12-08 RX ADMIN — PSYLLIUM HUSK 1 PACKET: 3.4 POWDER ORAL at 09:52

## 2019-12-08 RX ADMIN — MIRTAZAPINE 15 MG: 15 TABLET, FILM COATED ORAL at 21:51

## 2019-12-08 RX ADMIN — TRAMADOL HYDROCHLORIDE 50 MG: 50 TABLET, FILM COATED ORAL at 13:13

## 2019-12-08 NOTE — ROUTINE PROCESS
Pt in recliner,sitting and  resting quietly, alert and oriented x4, not in respiratory distress. Dressing to bilateral legs intact with edema 2+ on both legs. Refused to keep her legs elevated. Call bell and walker within reach.

## 2019-12-08 NOTE — ROUTINE PROCESS
Patient has sat up in recliner this shift. Instructed to keep legs elevated to decrease swelling. Voices understanding. Thought she was going home tomorrow. Has gathered all her books together. Explain her discharge isn't until this coming Tuesday. Patient was sad to hear. Encouraged its just a few more days. Call light and personal items within reach.

## 2019-12-08 NOTE — ROUTINE PROCESS
Bedside and Verbal shift change report given to Nathalie Lundberg LPN (oncoming nurse) by Pallavi Conway LPN (offgoing nurse). Report included the following information SBAR, Kardex and MAR.

## 2019-12-09 PROCEDURE — 74011250637 HC RX REV CODE- 250/637: Performed by: INTERNAL MEDICINE

## 2019-12-09 PROCEDURE — 3331090002 HH PPS REVENUE DEBIT

## 2019-12-09 PROCEDURE — 3331090001 HH PPS REVENUE CREDIT

## 2019-12-09 RX ADMIN — MIRTAZAPINE 15 MG: 15 TABLET, FILM COATED ORAL at 21:04

## 2019-12-09 RX ADMIN — FLUOXETINE 20 MG: 20 CAPSULE ORAL at 10:16

## 2019-12-09 RX ADMIN — FLUTICASONE PROPIONATE 2 SPRAY: 50 SPRAY, METERED NASAL at 09:00

## 2019-12-09 RX ADMIN — MULTIPLE VITAMINS W/ MINERALS TAB 1 TABLET: TAB at 10:16

## 2019-12-09 RX ADMIN — DIGOXIN 0.12 MG: 125 TABLET ORAL at 17:11

## 2019-12-09 RX ADMIN — ONDANSETRON 4 MG: 4 TABLET, ORALLY DISINTEGRATING ORAL at 10:32

## 2019-12-09 RX ADMIN — ASPIRIN 81 MG CHEWABLE TABLET 81 MG: 81 TABLET CHEWABLE at 10:16

## 2019-12-09 RX ADMIN — FUROSEMIDE 40 MG: 40 TABLET ORAL at 10:16

## 2019-12-09 NOTE — PROGRESS NOTES
conducted a Follow up consultation and Spiritual Assessment for [address], who is a 80 y.o.,female. The  provided the following Interventions:  Continued the relationship of care and support. Listened empathically. Offered prayer and assurance of continued prayer on patients behalf. Chart reviewed. The following outcomes were achieved:  Patient expressed gratitude for pastoral care visit. Assessment:  There are no further spiritual or Religion issues which require Spiritual Care Services interventions at this time. Plan:  Chaplains will continue to follow and will provide pastoral care on an as needed/requested basis.  recommends bedside caregivers page  on duty if patient shows signs of acute spiritual or emotional distress.      [address] [facility]   Staff [address] [facility]   [phone]

## 2019-12-09 NOTE — PROGRESS NOTES
I have reviewed this patient's current medication list and recent laboratory results. No new labs. Previously recommended serum digoxin level. Patient was refusing lab draws. Thank you,  Niya BASSETT  Ph. M. S.  12/9/2019

## 2019-12-09 NOTE — PROGRESS NOTES
Patient refused labs, Charge nurse attempted to persuade patient but patient stated that she's 80years old therefore does not want anymore blood drawn, Patient remained in recliner, was assisted with toileting.

## 2019-12-09 NOTE — PROGRESS NOTES
Met with Ms. Quiles at bedside. Discussed return to [facility] on Tuesday and she is eager to return. She has reading materials and word searches at bedside to keep her occupied as well as enjoying television.

## 2019-12-09 NOTE — PROGRESS NOTES
Patient states that she prefers to sleep in recliner at night, Respiration even and unlabored, Patient voiced concern about leg wound would like MD to take a look before she's discharged, MD will be notified in the a.m. Call bell and personal items within reach.

## 2019-12-09 NOTE — ROUTINE PROCESS
Called Dr. Jazzy Buckner regarding patient bilateral wounds, Dr. Jazzy Buckner states consult Plastic Surgery, Lisa Dc, and YI Brooke regarding Wound care.

## 2019-12-10 VITALS
TEMPERATURE: 98.5 F | DIASTOLIC BLOOD PRESSURE: 55 MMHG | BODY MASS INDEX: 18.46 KG/M2 | SYSTOLIC BLOOD PRESSURE: 106 MMHG | RESPIRATION RATE: 18 BRPM | WEIGHT: 104.2 LBS | OXYGEN SATURATION: 94 % | HEART RATE: 82 BPM

## 2019-12-10 PROCEDURE — 3331090001 HH PPS REVENUE CREDIT

## 2019-12-10 PROCEDURE — 74011250637 HC RX REV CODE- 250/637: Performed by: INTERNAL MEDICINE

## 2019-12-10 PROCEDURE — 3331090002 HH PPS REVENUE DEBIT

## 2019-12-10 RX ORDER — FLUOXETINE HYDROCHLORIDE 20 MG/1
20 CAPSULE ORAL DAILY
Qty: 12 CAP | Refills: 0 | Status: SHIPPED | OUTPATIENT
Start: 2019-12-10

## 2019-12-10 RX ORDER — DIGOXIN 250 MCG
0.25 TABLET ORAL
Qty: 12 TAB | Refills: 0 | Status: SHIPPED | OUTPATIENT
Start: 2019-12-10

## 2019-12-10 RX ADMIN — ASPIRIN 81 MG CHEWABLE TABLET 81 MG: 81 TABLET CHEWABLE at 09:00

## 2019-12-10 RX ADMIN — MULTIPLE VITAMINS W/ MINERALS TAB 1 TABLET: TAB at 09:00

## 2019-12-10 RX ADMIN — FUROSEMIDE 40 MG: 40 TABLET ORAL at 09:00

## 2019-12-10 RX ADMIN — FLUTICASONE PROPIONATE 2 SPRAY: 50 SPRAY, METERED NASAL at 09:00

## 2019-12-10 RX ADMIN — PSYLLIUM HUSK 1 PACKET: 3.4 POWDER ORAL at 09:00

## 2019-12-10 RX ADMIN — FLUOXETINE 20 MG: 20 CAPSULE ORAL at 09:00

## 2019-12-10 NOTE — PROGRESS NOTES
NUTRITION FOLLOW-UP/PLAN OF CARE [facility]      RECOMMENDATIONS:   1. Regular Diet   2. SF CIB TID per request for additional kcal/protein and promote wound healing  3. Monitor labs, weight and PO intake  4. RD to follow     GOALS:   1. Progressing/Ongoing: PO intake meets >75% of protein/calorie needs by 12/17  2. Ongoing: Weight Maintenance/gradual gain (1-2 lb/week) by 12/17    ASSESSMENT:   Wt status is classified as underweight per Body mass index is 18.46 kg/m². Pt is at nutrition risk related to being over 65 and BMI under 23. Poor to fair PO intake overall  No new labs. Nutrition recommendations listed. RD to follow. SUBJECTIVE/OBJECTIVE:   (12/10): Appetite/PO intake remains the same. Last BM on (12/8) per I/Os. No new weight on record as of this week. Pt seen in room this afternoon OOB in chair with family getting ready to D/C. Reports she is doing well and ready to go home. Pt is leaving at end of visit. (12/4): Appetite/PO intake variable but poor to fair overall; average 48% this past week per [facility]. Last BM on (12/1) per I/Os; noted Pt to be refusing  bowel regimen med yesterday. Noted weight loss but Pt on diuretic and still increased from UBW. Noted wounds including stage 1 pressure injury to gluteal fold documented by nursing. Pt seen in room resting this afternoon so did not disturb. Spoke with nursing who stated she has been getting Zofran PRN for nausea. Continue to encourage intake and will monitor. (11/28): Pt transferred from [address] [place] [address],[address] on 11/26/2019. Appetite/PO intake remains at baseline and will continue to send NAYELI TID to supplement energy needs. Wounds including a stage 2 pressure injury to right buttoks documented by nursing noted. Per weights recorded noted weight gain of 3 lb in same day so question accuracy. Pt seen in room OOB in chair. Stated she is doing well. Menu provided again and will continue to monitor. (11/26):  Appetite/PO intake appears to be fair to good overall per vitals (%). Last BM on (11/26) per I/Os. Wounds documented by nursing noted. Pt seen in room at lunch; observed >50% of lunch consumed during visit and Pt was still eating. Reports she does not like the Ensure and requesting CIB instead so will place in orders. Stated she has been implementing preferences with dietary and doing well with her meals. Noted snacks in room brought in by family as well. Plan to go to [facility] today. Will continue to monitor. Below in bold per previous RD note:   (11/22): Pt seen for an initial assessment/BMI. Pt admit for syncope/fall. Pt sitting in chair at bedside, very pleasant. Pt reports she has never really had an appetite, even when young. Pt ate grits, one sausage and drank cranberry juice this morning at [facility]. She does enjoy breakfast and snacking on fruit. Denies n/v/d/c at this time. Denies issues chewing/swallowing. Allergy to eggs. Pt states UBW around 100 lb. 11/21/19 weight: 117 lb unsure of accuracy. Noted pt is on diuretic. Will place order for new weight. Pt states her daughter encourages her to drink supplements, pt agrees to drink here. Skin: wound to R/L leg, stage 2 sacral. Encouraged intake. Will send ensure enlive TID. Will continue to monitor intake, weight, labs, skin.     Information Obtained from:    [x] Chart Review   [x] Patient   [] Family/Caregiver   [x] Nurse/Physician   [] Interdisciplinary Meeting/Rounds    Diet: Regular Diet  Medications: [x] Reviewed  Lasix, Remeron, MVI w/ Iron  Allergies: [x] Reviewed  Patient Active Problem List   Diagnosis Code    SOB (shortness of breath) R06.02    NSTEMI (non-ST elevated myocardial infarction) ([facility] [address] I21.4    CAD (coronary artery disease) I25.10    Ischemic cardiomyopathy I25.5    Arthritis, degenerative M19.90    Vertigo R42    HLD (hyperlipidemia) E78.5    Chronic a-fib I48.20    Irritable bowel syndrome with diarrhea K58.0    Chronic fatigue R53.82    Hyponatremia E87.1    Acute metabolic encephalopathy U85.26    Hyperkalemia E87.5    Acute on chronic systolic (congestive) heart failure (HCC) I50.23    Syncope R55    Hypoxia R09.02    Leukocytosis D72.829    Atrial fibrillation with rapid ventricular response (HCC) I48.91    Atrial fibrillation (HCC) I48.91    Hypotension I95.9    Skin rash V27    Metabolic acidosis R83.4    Underweight R63.6    Dizziness R42       Past Medical History:   Diagnosis Date    [facility] 01/2017    Arthritis, degenerative     CAD (coronary artery disease) 04/2012    S/P 2.75 X 15 mm BMS of OM (04/2012), Two LAD BMS (07/2012)    Elevated liver enzymes     Likely from statin    HLD (hyperlipidemia)     Hyperkalemia 06/2012    Likely from lisinopril    Ischemic cardiomyopathy     LVEF  30-35%(05/19) 45% (11/2012) & 30% echo (04/2012)    NSTEMI (non-ST elevated myocardial infarction) ([facility] 04/2012    UTI (urinary tract infection)     Vertigo       Labs:    Lab Results   Component Value Date/Time    Sodium 143 11/24/2019 01:50 PM    Potassium 3.7 11/24/2019 01:50 PM    Chloride 107 11/24/2019 01:50 PM    CO2 25 11/24/2019 01:50 PM    Anion gap 11 11/24/2019 01:50 PM    Glucose 179 (H) 11/24/2019 01:50 PM    BUN 17 11/24/2019 01:50 PM    Creatinine 0.81 11/24/2019 01:50 PM    Calcium 6.6 (L) 11/24/2019 01:50 PM    Magnesium 1.6 11/24/2019 01:50 PM    Phosphorus 4.2 11/24/2019 01:50 PM    Albumin 2.9 (L) 11/11/2019 09:42 PM     Anthropometrics: BMI (calculated): 18.5  Last 3 Recorded Weights in this Encounter    11/26/19 1450 12/04/19 0801   Weight: 49.5 kg (109 lb 3.2 oz) 47.3 kg (104 lb 3.2 oz)      Ht Readings from Last 1 Encounters:   11/21/19 5' 3\" (1.6 m)       Documented Weight History:  Weight Metrics 12/4/2019 11/26/2019 11/26/2019 11/21/2019 11/11/2019 11/6/2019 11/5/2019   Weight 104 lb 3.2 oz - 106 lb 0.7 oz - 100 lb 100 lb 116 lb   BMI - 18.46 kg/m2 - 18.78 kg/m2 18.89 kg/m2 17.16 kg/m2 19.91 kg/m2       No data found.     UBW: 100 lb   [] Weight Loss  [x] Weight Gain from UBW still (fluid?)  [] Weight Stable    Estimated Nutrition Needs: [] MSJ  [x] Other:  Calories: 7011-3969 kcal (25-35 kcal/kg) Based on:   [x] Actual BW    Protein:   64-74 g ( 1.2-1.4 g/kg) Based on:   [x] Actual BW    Fluid:       1 mL/kcal    Nutrition Problems Identified:   [x] Suboptimal PO intake (variable but fair overall)  [x] Food Allergies (Eggs)  [] Difficulty chewing/swallowing/poor dentition  [] Constipation/Diarrhea (Last BM on 12/8; bowel regimen in place)  [x] Nausea/Vomiting (Zofran PRN)  [] None  [x] Other: Wound healing    Plan:   [x] Therapeutic Diet  [x]  Obtained/adjusted food preferences/tolerances and/or snacks options   [x]  Continue supplements added   [] Occupational therapy following for feeding techniques  []  HS snack added   []  Modify diet texture   [x]  Modify diet for food allergies   []  Educate patient   []  Assist with menu selection   [x]  Monitor PO intake on meal rounds   [x]  Continue inpatient monitoring and intervention   [x]  Participated in discharge planning/Interdisciplinary rounds/Team meetings   []  Other:     Education Needs:   [] Not appropriate for teaching at this time due to:   [x] Identified and addressed encouraged intake     Nutrition Monitoring and Evaluation:  [x] Continue ongoing monitoring and intervention  [] Other    [employer]

## 2019-12-10 NOTE — ROUTINE PROCESS
Dr Ileana Massey office called spoke with Ashlyn Massey to be notify of patient discharge orders and discharge summary needed today.

## 2019-12-10 NOTE — PROGRESS NOTES
Wound dressing bilateral legs intact.  Patient states daughter to take her to [facility]

## 2019-12-10 NOTE — ROUTINE PROCESS
pt in bed asleep, hob at 30, not in respiratory distress. Bruise on right facial present, due to history of fall before admission. Dressing to bilateral legs intact with edema 2+ on both legs.  Call bell and walker within reach.

## 2019-12-10 NOTE — ROUTINE PROCESS
Patient up in chair, dressings intact to bilateral lower legs. Attempt to call report to [facility] without success x2, phone states extention is not valid when attempting to leave message.

## 2019-12-10 NOTE — ROUTINE PROCESS
Patient discharged to [facility] with family accompanied patient.  Discharge instructions given writen and verbal.

## 2019-12-11 PROCEDURE — 3331090002 HH PPS REVENUE DEBIT

## 2019-12-11 PROCEDURE — 3331090001 HH PPS REVENUE CREDIT

## 2019-12-12 ENCOUNTER — HOSPITAL ENCOUNTER (OUTPATIENT)
Dept: LAB | Age: 84
Discharge: HOME | End: 2019-12-12
Payer: MEDICARE

## 2019-12-12 LAB
ANION GAP SERPL CALC-SCNC: 6 MMOL/L (ref 3–18)
BUN SERPL-MCNC: 18 MG/DL (ref 7–18)
BUN/CREAT SERPL: 14 (ref 12–20)
CALCIUM SERPL-MCNC: 8.1 MG/DL (ref 8.5–10.1)
CHLORIDE SERPL-SCNC: 95 MMOL/L (ref 100–111)
CO2 SERPL-SCNC: 34 MMOL/L (ref 21–32)
CREAT SERPL-MCNC: 1.27 MG/DL (ref 0.6–1.3)
ERYTHROCYTE [DISTWIDTH] IN BLOOD BY AUTOMATED COUNT: 18.1 % (ref 11.6–14.5)
GLUCOSE SERPL-MCNC: 127 MG/DL (ref 74–99)
HCT VFR BLD AUTO: 34.9 % (ref 35–45)
HGB BLD-MCNC: 10.6 G/DL (ref 12–16)
MCH RBC QN AUTO: 26.1 PG (ref 24–34)
MCHC RBC AUTO-ENTMCNC: 30.4 G/DL (ref 31–37)
MCV RBC AUTO: 86 FL (ref 74–97)
PLATELET # BLD AUTO: 345 K/UL (ref 135–420)
PMV BLD AUTO: 9.4 FL (ref 9.2–11.8)
POTASSIUM SERPL-SCNC: 3.8 MMOL/L (ref 3.5–5.5)
RBC # BLD AUTO: 4.06 M/UL (ref 4.2–5.3)
SODIUM SERPL-SCNC: 135 MMOL/L (ref 136–145)
WBC # BLD AUTO: 7.8 K/UL (ref 4.6–13.2)

## 2019-12-12 PROCEDURE — 3331090001 HH PPS REVENUE CREDIT

## 2019-12-12 PROCEDURE — 85027 COMPLETE CBC AUTOMATED: CPT

## 2019-12-12 PROCEDURE — 80048 BASIC METABOLIC PNL TOTAL CA: CPT

## 2019-12-12 PROCEDURE — 36415 COLL VENOUS BLD VENIPUNCTURE: CPT

## 2019-12-12 PROCEDURE — 3331090002 HH PPS REVENUE DEBIT

## 2019-12-13 ENCOUNTER — HOME CARE VISIT (OUTPATIENT)
Dept: SCHEDULING | Facility: HOME HEALTH | Age: 84
End: 2019-12-13
Payer: MEDICARE

## 2019-12-13 VITALS
OXYGEN SATURATION: 95 % | TEMPERATURE: 97.5 F | DIASTOLIC BLOOD PRESSURE: 54 MMHG | HEART RATE: 61 BPM | SYSTOLIC BLOOD PRESSURE: 103 MMHG | RESPIRATION RATE: 12 BRPM

## 2019-12-13 VITALS
SYSTOLIC BLOOD PRESSURE: 118 MMHG | DIASTOLIC BLOOD PRESSURE: 62 MMHG | TEMPERATURE: 97.4 F | HEART RATE: 62 BPM | OXYGEN SATURATION: 98 %

## 2019-12-13 PROCEDURE — A9270 NON-COVERED ITEM OR SERVICE: HCPCS

## 2019-12-13 PROCEDURE — A6216 NON-STERILE GAUZE<=16 SQ IN: HCPCS

## 2019-12-13 PROCEDURE — A4452 WATERPROOF TAPE: HCPCS

## 2019-12-13 PROCEDURE — G0151 HHCP-SERV OF PT,EA 15 MIN: HCPCS

## 2019-12-13 PROCEDURE — A6260 WOUND CLEANSER ANY TYPE/SIZE: HCPCS

## 2019-12-13 PROCEDURE — A6446 CONFORM BAND S W>=3" <5"/YD: HCPCS

## 2019-12-13 PROCEDURE — 3331090002 HH PPS REVENUE DEBIT

## 2019-12-13 PROCEDURE — A6197 ALGINATE DRSG >16 <=48 SQ IN: HCPCS

## 2019-12-13 PROCEDURE — 3331090001 HH PPS REVENUE CREDIT

## 2019-12-13 PROCEDURE — G0299 HHS/HOSPICE OF RN EA 15 MIN: HCPCS

## 2019-12-14 PROCEDURE — 3331090001 HH PPS REVENUE CREDIT

## 2019-12-14 PROCEDURE — 3331090002 HH PPS REVENUE DEBIT

## 2019-12-15 PROCEDURE — 3331090001 HH PPS REVENUE CREDIT

## 2019-12-15 PROCEDURE — 3331090002 HH PPS REVENUE DEBIT

## 2019-12-16 ENCOUNTER — HOME CARE VISIT (OUTPATIENT)
Dept: SCHEDULING | Facility: HOME HEALTH | Age: 84
End: 2019-12-16
Payer: MEDICARE

## 2019-12-16 ENCOUNTER — HOSPITAL ENCOUNTER (OUTPATIENT)
Dept: LAB | Age: 84
Discharge: HOME | End: 2019-12-16
Payer: MEDICARE

## 2019-12-16 LAB
APPEARANCE UR: ABNORMAL
BACTERIA URNS QL MICRO: NEGATIVE /HPF
BILIRUB UR QL: NEGATIVE
CAOX CRY URNS QL MICRO: ABNORMAL
COLOR UR: YELLOW
EPITH CASTS URNS QL MICRO: ABNORMAL /LPF (ref 0–5)
GLUCOSE UR STRIP.AUTO-MCNC: NEGATIVE MG/DL
HGB UR QL STRIP: NEGATIVE
KETONES UR QL STRIP.AUTO: NEGATIVE MG/DL
LEUKOCYTE ESTERASE UR QL STRIP.AUTO: ABNORMAL
NITRITE UR QL STRIP.AUTO: NEGATIVE
OTHER,OTHU: ABNORMAL
PH UR STRIP: 7 [PH] (ref 5–8)
PROT UR STRIP-MCNC: NEGATIVE MG/DL
RBC #/AREA URNS HPF: NEGATIVE /HPF (ref 0–5)
SP GR UR REFRACTOMETRY: 1.01 (ref 1–1.03)
UROBILINOGEN UR QL STRIP.AUTO: 0.2 EU/DL (ref 0.2–1)
WBC URNS QL MICRO: ABNORMAL /HPF (ref 0–4)

## 2019-12-16 PROCEDURE — G0152 HHCP-SERV OF OT,EA 15 MIN: HCPCS

## 2019-12-16 PROCEDURE — 3331090001 HH PPS REVENUE CREDIT

## 2019-12-16 PROCEDURE — 87086 URINE CULTURE/COLONY COUNT: CPT

## 2019-12-16 PROCEDURE — 81001 URINALYSIS AUTO W/SCOPE: CPT

## 2019-12-16 PROCEDURE — 87077 CULTURE AEROBIC IDENTIFY: CPT

## 2019-12-16 PROCEDURE — G0299 HHS/HOSPICE OF RN EA 15 MIN: HCPCS

## 2019-12-16 PROCEDURE — 3331090002 HH PPS REVENUE DEBIT

## 2019-12-16 PROCEDURE — 87186 SC STD MICRODIL/AGAR DIL: CPT

## 2019-12-17 ENCOUNTER — HOME CARE VISIT (OUTPATIENT)
Dept: SCHEDULING | Facility: HOME HEALTH | Age: 84
End: 2019-12-17
Payer: MEDICARE

## 2019-12-17 VITALS
HEART RATE: 57 BPM | OXYGEN SATURATION: 94 % | TEMPERATURE: 98 F | DIASTOLIC BLOOD PRESSURE: 75 MMHG | SYSTOLIC BLOOD PRESSURE: 110 MMHG

## 2019-12-17 VITALS
OXYGEN SATURATION: 94 % | TEMPERATURE: 97.9 F | DIASTOLIC BLOOD PRESSURE: 61 MMHG | HEART RATE: 81 BPM | SYSTOLIC BLOOD PRESSURE: 90 MMHG

## 2019-12-17 VITALS
HEART RATE: 76 BPM | TEMPERATURE: 98 F | DIASTOLIC BLOOD PRESSURE: 59 MMHG | OXYGEN SATURATION: 93 % | SYSTOLIC BLOOD PRESSURE: 111 MMHG

## 2019-12-17 PROCEDURE — 3331090001 HH PPS REVENUE CREDIT

## 2019-12-17 PROCEDURE — 3331090002 HH PPS REVENUE DEBIT

## 2019-12-17 PROCEDURE — G0157 HHC PT ASSISTANT EA 15: HCPCS

## 2019-12-18 ENCOUNTER — OFFICE VISIT (OUTPATIENT)
Dept: CARDIOLOGY CLINIC | Age: 84
End: 2019-12-18

## 2019-12-18 ENCOUNTER — HOME CARE VISIT (OUTPATIENT)
Dept: SCHEDULING | Facility: HOME HEALTH | Age: 84
End: 2019-12-18
Payer: MEDICARE

## 2019-12-18 ENCOUNTER — HOME CARE VISIT (OUTPATIENT)
Dept: HOME HEALTH SERVICES | Facility: HOME HEALTH | Age: 84
End: 2019-12-18
Payer: MEDICARE

## 2019-12-18 VITALS
OXYGEN SATURATION: 99 % | WEIGHT: 104 LBS | HEART RATE: 83 BPM | DIASTOLIC BLOOD PRESSURE: 70 MMHG | SYSTOLIC BLOOD PRESSURE: 92 MMHG | HEIGHT: 63 IN | BODY MASS INDEX: 18.43 KG/M2 | RESPIRATION RATE: 20 BRPM

## 2019-12-18 DIAGNOSIS — L97.922 LEG ULCER, LEFT, WITH FAT LAYER EXPOSED (HCC): ICD-10-CM

## 2019-12-18 DIAGNOSIS — L97.912 ULCER OF RIGHT LEG, WITH FAT LAYER EXPOSED (HCC): ICD-10-CM

## 2019-12-18 DIAGNOSIS — R60.0 BILATERAL LEG EDEMA: Primary | ICD-10-CM

## 2019-12-18 PROCEDURE — 3331090001 HH PPS REVENUE CREDIT

## 2019-12-18 PROCEDURE — G0299 HHS/HOSPICE OF RN EA 15 MIN: HCPCS

## 2019-12-18 PROCEDURE — 3331090002 HH PPS REVENUE DEBIT

## 2019-12-18 NOTE — PATIENT INSTRUCTIONS

## 2019-12-18 NOTE — PROGRESS NOTES
1. Have you been to the ER, urgent care clinic since your last visit? Hospitalized since your last visit? Yes Reason for visit: [facility] emergency dept    2. Have you seen or consulted any other health care providers outside of the [address] [place] [address] since your last visit? Include any pap smears or colon screening.  No         3 most recent PHQ Screens 12/18/2019   Little interest or pleasure in doing things Not at all   Feeling down, depressed, irritable, or hopeless Not at all   Total Score PHQ 2 0

## 2019-12-18 NOTE — PROGRESS NOTES
[address]    Chief Complaint   Patient presents with    Leg Swelling    Wound Check       History and Physical    Ms. Ela Huang returns to our office for follow-up of her bilateral lower extremity ulcerations. At her last visit these areas were both hematomas and she was scheduled to see us in follow-up however the patient felt ill and was in the hospital on and off since that time and was lost to follow-up. She is very lethargic today and most of the visit is done in speaking with her son-in-law.     Past Medical History:   Diagnosis Date    A-fib [facility]) 01/2017    Arthritis, degenerative     CAD (coronary artery disease) 04/2012    S/P 2.75 X 15 mm BMS of OM (04/2012), Two LAD BMS (07/2012)    Elevated liver enzymes     Likely from statin    HLD (hyperlipidemia)     Hyperkalemia 06/2012    Likely from lisinopril    Ischemic cardiomyopathy     LVEF  30-35%(05/19) 45% (11/2012) & 30% echo (04/2012)    NSTEMI (non-ST elevated myocardial infarction) ([facility] [address] 04/2012    UTI (urinary tract infection)     Vertigo      Patient Active Problem List   Diagnosis Code    SOB (shortness of breath) R06.02    NSTEMI (non-ST elevated myocardial infarction) ([facility] [address] I21.4    CAD (coronary artery disease) I25.10    Ischemic cardiomyopathy I25.5    Arthritis, degenerative M19.90    Vertigo R42    HLD (hyperlipidemia) E78.5    Chronic a-fib I48.20    Irritable bowel syndrome with diarrhea K58.0    Chronic fatigue R53.82    Hyponatremia E87.1    Acute metabolic encephalopathy G90.41    Hyperkalemia E87.5    Acute on chronic systolic (congestive) heart failure (HCC) I50.23    Syncope R55    Hypoxia R09.02    Leukocytosis D72.829    Atrial fibrillation with rapid ventricular response (HCC) I48.91    Atrial fibrillation (HCC) I48.91    Hypotension I95.9    Skin rash N34    Metabolic acidosis D05.8    Underweight R63.6    Dizziness R42    Bilateral leg edema R60.0    Leg ulcer, left, with fat layer exposed [facility]) [id number]    Ulcer of right leg, with fat layer exposed ([facility] [address] L97.232     Past Surgical History:   Procedure Laterality Date    HX CORONARY STENT PLACEMENT  4/23/12    bare metal stent to obtuse marginal branch    HX HEART CATHETERIZATION       Current Outpatient Medications   Medication Sig Dispense Refill    digoxin (LANOXIN) 0.25 mg tablet Take 1 Tab by mouth every Sunday, Tuesday, Thursday. 12 Tab 0    FLUoxetine (PROZAC) 20 mg capsule Take 1 Cap by mouth daily. 12 Cap 0    furosemide (LASIX) 40 mg tablet Take 1 Tab by mouth daily. 30 Tab 0    vitamin a-vitamin c-vit e-min (OCUVITE) tablet Take 1 Tab by mouth daily. Gummy      multivit,calc,mins/iron/folic (ONE-A-DAY WOMENS FORMULA PO) Take  by mouth. 1 Gummy       fluticasone propionate (FLONASE) 50 mcg/actuation nasal spray 1 [place] by Both Nostrils route daily.  ondansetron (ZOFRAN ODT) 4 mg disintegrating tablet Take 4 mg by mouth every eight (8) hours as needed for Nausea. Take 4 mg tab by mouth every eight (8) hours as needed for Nausea      mirtazapine (REMERON) 15 mg tablet Take 1 Tab by mouth nightly. Indications: major depressive disorder 30 Tab 0    digoxin (LANOXIN) 0.125 mg tablet Take 1 Tab by mouth every Monday, Wednesday, Friday, Saturday AND 2 Tabs every Sunday, Tuesday, Thursday. Indications: Ventricular Rate Control in Atrial Fibrillation 45 Tab 0    carboxymethylcellulose sodium (CELLUVISC) 0.5 % drop ophthalmic solution Administer 1 Drop to both eyes three (3) times daily as needed for Other (dry eyes). 1 Bottle 0    acetaminophen (TYLENOL) 325 mg tablet Take 650 mg by mouth every six (6) hours as needed for Pain. 2 tabs      polyethylene glycol (MIRALAX) 17 gram/dose powder Take 17 g by mouth daily as needed (constipation).  carboxymethylcellulos/glycerin (CLEAR EYES FOR DRY EYES OP) Administer 1 Drop to both eyes three (3) times daily as needed (dry eyes).       loperamide (IMMODIUM) 2 mg tablet Take 1 mg by mouth three (3) times daily as needed for Diarrhea. Take 1 mg tab by mouth three (3) times daily as needed for Diarrhea.  psyllium husk-aspartame (METAMUCIL FIBER) 3.4 gram pwpk packet Take 1 Packet by mouth daily. 30 Packet 0    aspirin 81 mg chewable tablet Take 1 Tab by mouth daily. 30 Tab 0    nitroglycerin (NITROSTAT) 0.4 mg SL tablet 1 Tab by SubLINGual route every five (5) minutes as needed for Chest Pain. (Patient taking differently: 0.4 mg by SubLINGual route every five (5) minutes as needed for Chest Pain. For onset of chest pain, place 1 tab under the tongue. Nitroglycerin sublingual tabs usually give relief in 1 to 5 minutes. If the pain is not relieved, use a second tab 5 minutes after you take the first tab. If the pain continues for another 5 minutes, a third tab may be used. If you still have chest pain after a total of 3 tabs, contact your doctor or go to ED right away or call 911, if necessary.) 25 Tab 3     Allergies   Allergen Reactions    Flu Vaccine 2011 (36 Mos+)(Pf) Anaphylaxis    Codeine Anaphylaxis    Egg Not Reported This Time     Per patient who is A&O x3 that she can eat the yolks but breaks out from the whites. She prefers Hard boiled for this reason.     Influenza Virus Vaccines Hives    Lipitor [Atorvastatin] Nausea Only    Pcn [Penicillins] Hives     Social History     Socioeconomic History    Marital status:      Spouse name: Not on file    Number of children: Not on file    Years of education: Not on file    Highest education level: Not on file   Occupational History    Not on file   Social Needs    Financial resource strain: Not on file    Food insecurity:     Worry: Not on file     Inability: Not on file    Transportation needs:     Medical: Not on file     Non-medical: Not on file   Tobacco Use    Smoking status: Never Smoker    Smokeless tobacco: Never Used   Substance and Sexual Activity    Alcohol use: No    Drug use: No    Sexual activity: Never   Lifestyle    Physical activity:     Days per week: Not on file     Minutes per session: Not on file    Stress: Not on file   Relationships    Social connections:     Talks on phone: Not on file     Gets together: Not on file     Attends Jainism service: Not on file     Active member of club or organization: Not on file     Attends meetings of clubs or organizations: Not on file     Relationship status: Not on file    Intimate partner violence:     Fear of current or ex partner: Not on file     Emotionally abused: Not on file     Physically abused: Not on file     Forced sexual activity: Not on file   Other Topics Concern    Not on file   Social History Narrative    Not on file      Family History   Problem Relation Age of Onset    Diabetes Mother        Review of Systems    Review of Systems   Constitutional: Negative for chills, diaphoresis, fever, malaise/fatigue and weight loss. HENT: Negative for hearing loss and sore throat. Eyes: Negative for blurred vision, photophobia and redness. Respiratory: Negative for cough, hemoptysis, shortness of breath and wheezing. Cardiovascular: Positive for leg swelling. Negative for chest pain, palpitations and orthopnea. Gastrointestinal: Negative for abdominal pain, blood in stool, constipation, diarrhea, heartburn, nausea and vomiting. Genitourinary: Negative for dysuria, frequency, hematuria and urgency. Musculoskeletal: Negative for back pain and myalgias. Skin: Negative for itching and rash. Neurological: Positive for weakness. Negative for dizziness, speech change, focal weakness and headaches. Endo/Heme/Allergies: Does not bruise/bleed easily. Psychiatric/Behavioral: Negative for depression and suicidal ideas.             Physical Exam:    Visit Vitals  BP 92/70 (BP 1 Location: Left arm, BP Patient Position: Sitting)   Pulse 83   Resp 20   Ht 5' 3\" (1.6 m)   Wt 104 lb (47.2 kg)   SpO2 99%   BMI 18.42 kg/m²      Physical Examination: General appearance - chronically ill appearing and lethargic  Mental status - depressed mood, drowsy  Eyes - sclera anicteric  Ears - right ear normal, left ear normal  Nose - normal and patent, no erythema, discharge or polyps  Mouth - mucous membranes moist, pharynx normal without lesions  Extremities -2+ pitting edema bilateral lower extremities from knees down to the foot. Ulceration anterior shin bilaterally about for size with exposed muscle. No purulent drainage. Positive mix of granulation and fibrous tissue with serous fluid drainage. Impression and Plan:    ICD-10-CM ICD-9-CM    1. Bilateral leg edema R60.0 782.3    2. Ulcer of right leg, with fat layer exposed ([address] L97.912 707.10    3. Leg ulcer, left, with fat layer exposed ([address] L97.922 707.10      No orders of the defined types were placed in this encounter. Follow-up and Dispositions    · Return in about 3 weeks (around 1/8/2020) for Wound check. I told Ms. Quiles's son-in-law that I believe her CHF and significant leg edema is hindering her wound healing. We have placed her in gentle compression today and placed Aquasol in the wound beds. I have reached out to Dr. Lizbeth Ortiz to get the okay for Unna boot compression, and if we are able to obtain this we will place her in an [place] boot. I am concerned about her EF and CHF being too much for compression. I do however feel that without compression the chance of healing his ulcers are going to be extremely low. Additionally I believe that Ms. Mini Gutierrez is going to ultimately need a debridement and graft placement for wound healing but again I am not sure that she is safe for surgery for this. We will do as much as we can with conservative therapy. The treatment plan was reviewed with the patient in detail. The patient voiced understanding of this plan and all questions and concerns were addressed. The patient agrees with this plan.   We discussed the signs and symptoms that would require earlier attention or intervention. The patient was given educational material related to his/her visit and the patient has voiced understanding of the material.     I appreciate the opportunity to participate in the care of your patient. I will be sure to keep you informed of any subsequent changes in the treatment plan. If you have any questions or concerns, please feel free to contact me. Jay Robertson MD    PLEASE NOTE:  This document has been produced using voice recognition software. Unrecognized errors in transcription may be present.

## 2019-12-19 ENCOUNTER — HOME CARE VISIT (OUTPATIENT)
Dept: SCHEDULING | Facility: HOME HEALTH | Age: 84
End: 2019-12-19
Payer: MEDICARE

## 2019-12-19 VITALS
RESPIRATION RATE: 14 BRPM | TEMPERATURE: 97.4 F | OXYGEN SATURATION: 100 % | DIASTOLIC BLOOD PRESSURE: 60 MMHG | HEART RATE: 63 BPM | SYSTOLIC BLOOD PRESSURE: 90 MMHG

## 2019-12-19 VITALS
TEMPERATURE: 97.8 F | OXYGEN SATURATION: 93 % | DIASTOLIC BLOOD PRESSURE: 60 MMHG | SYSTOLIC BLOOD PRESSURE: 119 MMHG | HEART RATE: 78 BPM

## 2019-12-19 LAB
BACTERIA SPEC CULT: ABNORMAL
BACTERIA SPEC CULT: ABNORMAL
SERVICE CMNT-IMP: ABNORMAL

## 2019-12-19 PROCEDURE — 3331090002 HH PPS REVENUE DEBIT

## 2019-12-19 PROCEDURE — G0158 HHC OT ASSISTANT EA 15: HCPCS

## 2019-12-19 PROCEDURE — 3331090001 HH PPS REVENUE CREDIT

## 2019-12-20 ENCOUNTER — HOME CARE VISIT (OUTPATIENT)
Dept: SCHEDULING | Facility: HOME HEALTH | Age: 84
End: 2019-12-20
Payer: MEDICARE

## 2019-12-20 VITALS
HEART RATE: 90 BPM | OXYGEN SATURATION: 96 % | SYSTOLIC BLOOD PRESSURE: 111 MMHG | TEMPERATURE: 99.1 F | DIASTOLIC BLOOD PRESSURE: 72 MMHG

## 2019-12-20 PROCEDURE — 3331090001 HH PPS REVENUE CREDIT

## 2019-12-20 PROCEDURE — 3331090002 HH PPS REVENUE DEBIT

## 2019-12-20 PROCEDURE — G0157 HHC PT ASSISTANT EA 15: HCPCS

## 2019-12-20 PROCEDURE — G0299 HHS/HOSPICE OF RN EA 15 MIN: HCPCS

## 2019-12-21 PROCEDURE — 3331090002 HH PPS REVENUE DEBIT

## 2019-12-21 PROCEDURE — 3331090001 HH PPS REVENUE CREDIT

## 2019-12-22 PROCEDURE — 3331090001 HH PPS REVENUE CREDIT

## 2019-12-22 PROCEDURE — 3331090002 HH PPS REVENUE DEBIT

## 2019-12-23 ENCOUNTER — HOME CARE VISIT (OUTPATIENT)
Dept: SCHEDULING | Facility: HOME HEALTH | Age: 84
End: 2019-12-23
Payer: MEDICARE

## 2019-12-23 ENCOUNTER — TELEPHONE (OUTPATIENT)
Dept: CARDIOLOGY CLINIC | Age: 84
End: 2019-12-23

## 2019-12-23 VITALS
DIASTOLIC BLOOD PRESSURE: 68 MMHG | OXYGEN SATURATION: 98 % | SYSTOLIC BLOOD PRESSURE: 90 MMHG | RESPIRATION RATE: 14 BRPM | HEART RATE: 78 BPM | TEMPERATURE: 97.8 F

## 2019-12-23 VITALS
SYSTOLIC BLOOD PRESSURE: 118 MMHG | HEART RATE: 84 BPM | OXYGEN SATURATION: 92 % | DIASTOLIC BLOOD PRESSURE: 72 MMHG | TEMPERATURE: 97.8 F

## 2019-12-23 VITALS
DIASTOLIC BLOOD PRESSURE: 73 MMHG | HEART RATE: 87 BPM | SYSTOLIC BLOOD PRESSURE: 113 MMHG | OXYGEN SATURATION: 94 % | TEMPERATURE: 98.1 F

## 2019-12-23 PROCEDURE — 3331090001 HH PPS REVENUE CREDIT

## 2019-12-23 PROCEDURE — G0157 HHC PT ASSISTANT EA 15: HCPCS

## 2019-12-23 PROCEDURE — G0158 HHC OT ASSISTANT EA 15: HCPCS

## 2019-12-23 PROCEDURE — 3331090002 HH PPS REVENUE DEBIT

## 2019-12-23 PROCEDURE — G0299 HHS/HOSPICE OF RN EA 15 MIN: HCPCS

## 2019-12-23 NOTE — TELEPHONE ENCOUNTER
Ruthie Loyola called to check the status of Dr. Swapna Michael approval for an unna boot for compression and what the next step is. Asked to have the order faxed (if necessary) to [employer] [phone]. Please call to update.

## 2019-12-24 ENCOUNTER — HOME CARE VISIT (OUTPATIENT)
Dept: SCHEDULING | Facility: HOME HEALTH | Age: 84
End: 2019-12-24
Payer: MEDICARE

## 2019-12-24 VITALS
TEMPERATURE: 98.1 F | HEART RATE: 71 BPM | DIASTOLIC BLOOD PRESSURE: 56 MMHG | OXYGEN SATURATION: 93 % | SYSTOLIC BLOOD PRESSURE: 122 MMHG

## 2019-12-24 PROCEDURE — G0158 HHC OT ASSISTANT EA 15: HCPCS

## 2019-12-24 PROCEDURE — 3331090002 HH PPS REVENUE DEBIT

## 2019-12-24 PROCEDURE — 3331090001 HH PPS REVENUE CREDIT

## 2019-12-25 ENCOUNTER — HOME CARE VISIT (OUTPATIENT)
Dept: SCHEDULING | Facility: HOME HEALTH | Age: 84
End: 2019-12-25
Payer: MEDICARE

## 2019-12-25 VITALS
RESPIRATION RATE: 18 BRPM | OXYGEN SATURATION: 97 % | TEMPERATURE: 97.8 F | SYSTOLIC BLOOD PRESSURE: 118 MMHG | DIASTOLIC BLOOD PRESSURE: 70 MMHG | HEART RATE: 92 BPM

## 2019-12-25 PROCEDURE — G0299 HHS/HOSPICE OF RN EA 15 MIN: HCPCS

## 2019-12-25 PROCEDURE — 3331090002 HH PPS REVENUE DEBIT

## 2019-12-25 PROCEDURE — 3331090001 HH PPS REVENUE CREDIT

## 2019-12-26 ENCOUNTER — HOME CARE VISIT (OUTPATIENT)
Dept: SCHEDULING | Facility: HOME HEALTH | Age: 84
End: 2019-12-26
Payer: MEDICARE

## 2019-12-26 VITALS
TEMPERATURE: 98.6 F | DIASTOLIC BLOOD PRESSURE: 63 MMHG | HEART RATE: 78 BPM | OXYGEN SATURATION: 96 % | SYSTOLIC BLOOD PRESSURE: 123 MMHG

## 2019-12-26 PROCEDURE — A6446 CONFORM BAND S W>=3" <5"/YD: HCPCS

## 2019-12-26 PROCEDURE — G0157 HHC PT ASSISTANT EA 15: HCPCS

## 2019-12-26 PROCEDURE — 3331090002 HH PPS REVENUE DEBIT

## 2019-12-26 PROCEDURE — 3331090001 HH PPS REVENUE CREDIT

## 2019-12-27 ENCOUNTER — HOME CARE VISIT (OUTPATIENT)
Dept: SCHEDULING | Facility: HOME HEALTH | Age: 84
End: 2019-12-27
Payer: MEDICARE

## 2019-12-27 VITALS
TEMPERATURE: 96.7 F | HEART RATE: 78 BPM | OXYGEN SATURATION: 94 % | RESPIRATION RATE: 14 BRPM | SYSTOLIC BLOOD PRESSURE: 96 MMHG | DIASTOLIC BLOOD PRESSURE: 78 MMHG

## 2019-12-27 PROCEDURE — 3331090001 HH PPS REVENUE CREDIT

## 2019-12-27 PROCEDURE — G0299 HHS/HOSPICE OF RN EA 15 MIN: HCPCS

## 2019-12-27 PROCEDURE — 3331090002 HH PPS REVENUE DEBIT

## 2019-12-28 PROCEDURE — 3331090001 HH PPS REVENUE CREDIT

## 2019-12-28 PROCEDURE — 3331090002 HH PPS REVENUE DEBIT

## 2019-12-29 PROCEDURE — 3331090001 HH PPS REVENUE CREDIT

## 2019-12-29 PROCEDURE — 3331090002 HH PPS REVENUE DEBIT

## 2019-12-30 ENCOUNTER — HOME CARE VISIT (OUTPATIENT)
Dept: SCHEDULING | Facility: HOME HEALTH | Age: 84
End: 2019-12-30
Payer: MEDICARE

## 2019-12-30 VITALS
SYSTOLIC BLOOD PRESSURE: 105 MMHG | TEMPERATURE: 97.3 F | HEART RATE: 115 BPM | RESPIRATION RATE: 16 BRPM | DIASTOLIC BLOOD PRESSURE: 60 MMHG | OXYGEN SATURATION: 91 %

## 2019-12-30 PROCEDURE — A4452 WATERPROOF TAPE: HCPCS

## 2019-12-30 PROCEDURE — G0299 HHS/HOSPICE OF RN EA 15 MIN: HCPCS

## 2019-12-30 PROCEDURE — 3331090002 HH PPS REVENUE DEBIT

## 2019-12-30 PROCEDURE — 3331090001 HH PPS REVENUE CREDIT

## 2019-12-30 PROCEDURE — G0152 HHCP-SERV OF OT,EA 15 MIN: HCPCS

## 2019-12-31 VITALS
HEART RATE: 75 BPM | OXYGEN SATURATION: 95 % | TEMPERATURE: 97.9 F | SYSTOLIC BLOOD PRESSURE: 101 MMHG | DIASTOLIC BLOOD PRESSURE: 67 MMHG

## 2019-12-31 PROCEDURE — 3331090001 HH PPS REVENUE CREDIT

## 2019-12-31 PROCEDURE — 3331090002 HH PPS REVENUE DEBIT

## 2020-01-01 ENCOUNTER — HOME CARE VISIT (OUTPATIENT)
Dept: SCHEDULING | Facility: HOME HEALTH | Age: 85
End: 2020-01-01
Payer: MEDICARE

## 2020-01-01 VITALS
HEART RATE: 98 BPM | RESPIRATION RATE: 16 BRPM | OXYGEN SATURATION: 93 % | DIASTOLIC BLOOD PRESSURE: 62 MMHG | TEMPERATURE: 97.8 F | SYSTOLIC BLOOD PRESSURE: 98 MMHG

## 2020-01-01 PROCEDURE — 3331090001 HH PPS REVENUE CREDIT

## 2020-01-01 PROCEDURE — 3331090002 HH PPS REVENUE DEBIT

## 2020-01-01 PROCEDURE — G0299 HHS/HOSPICE OF RN EA 15 MIN: HCPCS

## 2020-01-02 ENCOUNTER — HOME CARE VISIT (OUTPATIENT)
Dept: SCHEDULING | Facility: HOME HEALTH | Age: 85
End: 2020-01-02
Payer: MEDICARE

## 2020-01-02 VITALS
SYSTOLIC BLOOD PRESSURE: 105 MMHG | OXYGEN SATURATION: 96 % | TEMPERATURE: 97.6 F | HEART RATE: 71 BPM | DIASTOLIC BLOOD PRESSURE: 60 MMHG

## 2020-01-02 VITALS
SYSTOLIC BLOOD PRESSURE: 98 MMHG | HEART RATE: 98 BPM | TEMPERATURE: 97.8 F | OXYGEN SATURATION: 93 % | DIASTOLIC BLOOD PRESSURE: 62 MMHG

## 2020-01-02 PROCEDURE — G0151 HHCP-SERV OF PT,EA 15 MIN: HCPCS

## 2020-01-02 PROCEDURE — 3331090001 HH PPS REVENUE CREDIT

## 2020-01-02 PROCEDURE — 3331090002 HH PPS REVENUE DEBIT

## 2020-01-03 ENCOUNTER — HOME CARE VISIT (OUTPATIENT)
Dept: SCHEDULING | Facility: HOME HEALTH | Age: 85
End: 2020-01-03
Payer: MEDICARE

## 2020-01-03 VITALS
TEMPERATURE: 97.9 F | OXYGEN SATURATION: 97 % | DIASTOLIC BLOOD PRESSURE: 60 MMHG | SYSTOLIC BLOOD PRESSURE: 98 MMHG | HEART RATE: 97 BPM

## 2020-01-03 PROCEDURE — 3331090001 HH PPS REVENUE CREDIT

## 2020-01-03 PROCEDURE — 3331090002 HH PPS REVENUE DEBIT

## 2020-01-03 PROCEDURE — G0299 HHS/HOSPICE OF RN EA 15 MIN: HCPCS

## 2020-01-04 PROCEDURE — 3331090002 HH PPS REVENUE DEBIT

## 2020-01-04 PROCEDURE — 3331090001 HH PPS REVENUE CREDIT

## 2020-01-05 PROCEDURE — 3331090002 HH PPS REVENUE DEBIT

## 2020-01-05 PROCEDURE — 3331090001 HH PPS REVENUE CREDIT

## 2020-01-06 ENCOUNTER — HOME CARE VISIT (OUTPATIENT)
Dept: SCHEDULING | Facility: HOME HEALTH | Age: 85
End: 2020-01-06
Payer: MEDICARE

## 2020-01-06 VITALS
RESPIRATION RATE: 14 BRPM | DIASTOLIC BLOOD PRESSURE: 62 MMHG | TEMPERATURE: 97.6 F | HEART RATE: 82 BPM | SYSTOLIC BLOOD PRESSURE: 98 MMHG | OXYGEN SATURATION: 95 %

## 2020-01-06 PROCEDURE — A4452 WATERPROOF TAPE: HCPCS

## 2020-01-06 PROCEDURE — A6197 ALGINATE DRSG >16 <=48 SQ IN: HCPCS

## 2020-01-06 PROCEDURE — 3331090001 HH PPS REVENUE CREDIT

## 2020-01-06 PROCEDURE — 3331090002 HH PPS REVENUE DEBIT

## 2020-01-06 PROCEDURE — A6216 NON-STERILE GAUZE<=16 SQ IN: HCPCS

## 2020-01-06 PROCEDURE — G0299 HHS/HOSPICE OF RN EA 15 MIN: HCPCS

## 2020-01-06 PROCEDURE — A6252 ABSORPT DRG >16 <=48 W/O BDR: HCPCS

## 2020-01-06 PROCEDURE — A9270 NON-COVERED ITEM OR SERVICE: HCPCS

## 2020-01-06 PROCEDURE — A6253 ABSORPT DRG > 48 SQ IN W/O B: HCPCS

## 2020-01-06 PROCEDURE — A4927 NON-STERILE GLOVES: HCPCS

## 2020-01-07 PROCEDURE — 3331090002 HH PPS REVENUE DEBIT

## 2020-01-07 PROCEDURE — 3331090001 HH PPS REVENUE CREDIT

## 2020-01-07 PROCEDURE — 3331090003 HH PPS REVENUE ADJ

## 2020-01-08 ENCOUNTER — HOME CARE VISIT (OUTPATIENT)
Dept: SCHEDULING | Facility: HOME HEALTH | Age: 85
End: 2020-01-08
Payer: MEDICARE

## 2020-01-08 ENCOUNTER — DOCUMENTATION ONLY (OUTPATIENT)
Dept: CARDIOLOGY CLINIC | Age: 85
End: 2020-01-08

## 2020-01-08 ENCOUNTER — OFFICE VISIT (OUTPATIENT)
Dept: CARDIOLOGY CLINIC | Age: 85
End: 2020-01-08

## 2020-01-08 VITALS
RESPIRATION RATE: 20 BRPM | HEART RATE: 60 BPM | HEIGHT: 63 IN | BODY MASS INDEX: 18.43 KG/M2 | DIASTOLIC BLOOD PRESSURE: 70 MMHG | WEIGHT: 104 LBS | OXYGEN SATURATION: 92 % | SYSTOLIC BLOOD PRESSURE: 110 MMHG

## 2020-01-08 DIAGNOSIS — L97.912 ULCER OF RIGHT LEG, WITH FAT LAYER EXPOSED (HCC): Primary | ICD-10-CM

## 2020-01-08 DIAGNOSIS — L97.922 LEG ULCER, LEFT, WITH FAT LAYER EXPOSED (HCC): ICD-10-CM

## 2020-01-08 PROCEDURE — G0299 HHS/HOSPICE OF RN EA 15 MIN: HCPCS

## 2020-01-08 PROCEDURE — 3331090002 HH PPS REVENUE DEBIT

## 2020-01-08 PROCEDURE — 400014 HH F/U

## 2020-01-08 PROCEDURE — 3331090001 HH PPS REVENUE CREDIT

## 2020-01-08 NOTE — PROGRESS NOTES
[facility]          Chart reviewed for the following:   Vicky DALY LPN, have reviewed the medications and updated the Allergic reactions for Deepti C Quiles     TIME OUT performed immediately prior to start of procedure:   Shante DALY LPN, have performed the following reviews on [address] prior to the start of the procedure:            * Patient was identified by name and date of birth   * Agreement that [employer] boot removed and reapplied   * Procedure site verified   * Patient was positioned for comfort  * Verbal consent was given by patient     Time: 1030hrs      Date of procedure: 1/8/2020    Procedure performed by:  Ras Joy MD    How tolerated by patient: tolerated the procedure well with no complications    Comments: open area to left leg cleansed with cleanser; medi-honey dressing applied; covered with abd pad; unna boot applied ; tolerated well .

## 2020-01-08 NOTE — PROGRESS NOTES
Deepti C Quiles    Chief Complaint   Patient presents with    Wound Check       History and Physical    Ms. Cristobal Farfan returns to the office for continued management of her bilateral lower extremity ulcers. She is accompanied by her family. She continues to have significant swelling and has not noticed any changes in the ulcers in her leg. Upon questioning her family states that she does not eat much and has very poor oral intake. She continues to complain of fatigue.     Past Medical History:   Diagnosis Date    A-fib [facility]) 01/2017    Arthritis, degenerative     CAD (coronary artery disease) 04/2012    S/P 2.75 X 15 mm BMS of OM (04/2012), Two LAD BMS (07/2012)    Elevated liver enzymes     Likely from statin    HLD (hyperlipidemia)     Hyperkalemia 06/2012    Likely from lisinopril    Ischemic cardiomyopathy     LVEF  30-35%(05/19) 45% (11/2012) & 30% echo (04/2012)    NSTEMI (non-ST elevated myocardial infarction) ([facility] 04/2012    UTI (urinary tract infection)     Vertigo      Patient Active Problem List   Diagnosis Code    SOB (shortness of breath) R06.02    NSTEMI (non-ST elevated myocardial infarction) ([facility] I21.4    CAD (coronary artery disease) I25.10    Ischemic cardiomyopathy I25.5    Arthritis, degenerative M19.90    Vertigo R42    HLD (hyperlipidemia) E78.5    Chronic a-fib I48.20    Irritable bowel syndrome with diarrhea K58.0    Chronic fatigue R53.82    Hyponatremia E87.1    Acute metabolic encephalopathy L60.41    Hyperkalemia E87.5    Acute on chronic systolic (congestive) heart failure (HCC) I50.23    Syncope R55    Hypoxia R09.02    Leukocytosis D72.829    Atrial fibrillation with rapid ventricular response (HCC) I48.91    Atrial fibrillation (HCC) I48.91    Hypotension I95.9    Skin rash I63    Metabolic acidosis M45.2    Underweight R63.6    Dizziness R42    Bilateral leg edema R60.0    Leg ulcer, left, with fat layer exposed ([address] L97.922    Ulcer of right leg, with fat layer exposed ([facility] [address] L97.91     Past Surgical History:   Procedure Laterality Date    HX CORONARY STENT PLACEMENT  4/23/12    bare metal stent to obtuse marginal branch    HX HEART CATHETERIZATION       Current Outpatient Medications   Medication Sig Dispense Refill    digoxin (LANOXIN) 0.25 mg tablet Take 1 Tab by mouth every Sunday, Tuesday, Thursday. 12 Tab 0    FLUoxetine (PROZAC) 20 mg capsule Take 1 Cap by mouth daily. 12 Cap 0    furosemide (LASIX) 40 mg tablet Take 1 Tab by mouth daily. 30 Tab 0    vitamin a-vitamin c-vit e-min (OCUVITE) tablet Take 1 Tab by mouth daily. Gummy      multivit,calc,mins/iron/folic (ONE-A-DAY WOMENS FORMULA PO) Take  by mouth. 1 Gummy       fluticasone propionate (FLONASE) 50 mcg/actuation nasal spray 1 [place] by Both Nostrils route daily.  ondansetron (ZOFRAN ODT) 4 mg disintegrating tablet Take 4 mg by mouth every eight (8) hours as needed for Nausea. Take 4 mg tab by mouth every eight (8) hours as needed for Nausea      mirtazapine (REMERON) 15 mg tablet Take 1 Tab by mouth nightly. Indications: major depressive disorder 30 Tab 0    digoxin (LANOXIN) 0.125 mg tablet Take 1 Tab by mouth every Monday, Wednesday, Friday, Saturday AND 2 Tabs every Sunday, Tuesday, Thursday. Indications: Ventricular Rate Control in Atrial Fibrillation 45 Tab 0    carboxymethylcellulose sodium (CELLUVISC) 0.5 % drop ophthalmic solution Administer 1 Drop to both eyes three (3) times daily as needed for Other (dry eyes). 1 Bottle 0    acetaminophen (TYLENOL) 325 mg tablet Take 650 mg by mouth every six (6) hours as needed for Pain. 2 tabs      polyethylene glycol (MIRALAX) 17 gram/dose powder Take 17 g by mouth daily as needed (constipation).  carboxymethylcellulos/glycerin (CLEAR EYES FOR DRY EYES OP) Administer 1 Drop to both eyes three (3) times daily as needed (dry eyes).       loperamide (IMMODIUM) 2 mg tablet Take 1 mg by mouth three (3) times daily as needed for Diarrhea. Take 1 mg tab by mouth three (3) times daily as needed for Diarrhea.  psyllium husk-aspartame (METAMUCIL FIBER) 3.4 gram pwpk packet Take 1 Packet by mouth daily. 30 Packet 0    aspirin 81 mg chewable tablet Take 1 Tab by mouth daily. 30 Tab 0    nitroglycerin (NITROSTAT) 0.4 mg SL tablet 1 Tab by SubLINGual route every five (5) minutes as needed for Chest Pain. (Patient taking differently: 0.4 mg by SubLINGual route every five (5) minutes as needed for Chest Pain. For onset of chest pain, place 1 tab under the tongue. Nitroglycerin sublingual tabs usually give relief in 1 to 5 minutes. If the pain is not relieved, use a second tab 5 minutes after you take the first tab. If the pain continues for another 5 minutes, a third tab may be used. If you still have chest pain after a total of 3 tabs, contact your doctor or go to ED right away or call 911, if necessary.) 25 Tab 3     Allergies   Allergen Reactions    Flu Vaccine 2011 (36 Mos+)(Pf) Anaphylaxis    Codeine Anaphylaxis    Egg Not Reported This Time     Per patient who is A&O x3 that she can eat the yolks but breaks out from the whites. She prefers Hard boiled for this reason.     Influenza Virus Vaccines Hives    Lipitor [Atorvastatin] Nausea Only    Pcn [Penicillins] Hives     Social History     Socioeconomic History    Marital status:      Spouse name: Not on file    Number of children: Not on file    Years of education: Not on file    Highest education level: Not on file   Occupational History    Not on file   Social Needs    Financial resource strain: Not on file    Food insecurity:     Worry: Not on file     Inability: Not on file    Transportation needs:     Medical: Not on file     Non-medical: Not on file   Tobacco Use    Smoking status: Never Smoker    Smokeless tobacco: Never Used   Substance and Sexual Activity    Alcohol use: No    Drug use: No    Sexual activity: Never   Lifestyle    Physical activity: Days per week: Not on file     Minutes per session: Not on file    Stress: Not on file   Relationships    Social connections:     Talks on phone: Not on file     Gets together: Not on file     Attends [employer] service: Not on file     Active member of club or organization: Not on file     Attends meetings of clubs or organizations: Not on file     Relationship status: Not on file    Intimate partner violence:     Fear of current or ex partner: Not on file     Emotionally abused: Not on file     Physically abused: Not on file     Forced sexual activity: Not on file   Other Topics Concern    Not on file   Social History Narrative    Not on file      Family History   Problem Relation Age of Onset    Diabetes Mother        Review of Systems    Review of Systems   Constitutional: Positive for malaise/fatigue. Negative for chills, diaphoresis, fever and weight loss. HENT: Negative for hearing loss and sore throat. Eyes: Negative for blurred vision, photophobia and redness. Respiratory: Negative for cough, hemoptysis, shortness of breath and wheezing. Cardiovascular: Positive for leg swelling. Negative for chest pain, palpitations and orthopnea. Gastrointestinal: Negative for abdominal pain, blood in stool, constipation, diarrhea, heartburn, nausea and vomiting. Genitourinary: Negative for dysuria, frequency, hematuria and urgency. Musculoskeletal: Negative for back pain and myalgias. Skin: Negative for itching and rash. Neurological: Negative for dizziness, speech change, focal weakness, weakness and headaches. Endo/Heme/Allergies: Does not bruise/bleed easily. Psychiatric/Behavioral: Negative for depression and suicidal ideas.             Physical Exam:    Visit Vitals  /70 (BP 1 Location: Left arm, BP Patient Position: Sitting)   Pulse 60   Resp 20   Ht 5' 3\" (1.6 m)   Wt 104 lb (47.2 kg)   SpO2 92%   BMI 18.42 kg/m²      Physical Examination: General appearance - alert, well appearing, and in no distress  Mental status - alert, oriented to person, place, and time  Eyes - sclera anicteric, left eye normal, right eye normal  Ears - right ear normal, left ear normal  Nose - normal and patent, no erythema, discharge or polyps  Mouth - mucous membranes moist, pharynx normal without lesions  Extremities -warm well perfused with palpable pedal pulses. Right anterior shin ulceration unchanged in size with fibrinous tissue throughout and focal areas of granulation tissue. Left lower extremity with for size ulceration anterior shin large blister dorsum of left foot. Ulceration anterior shin with fibrous tissue throughout. 2+ pitting edema bilaterally. Impression and Plan:    ICD-10-CM ICD-9-CM    1. Ulcer of right leg, with fat layer exposed ([address] L97.912 707.10    2. Leg ulcer, left, with fat layer exposed ([address] L97.922 707.10      No orders of the defined types were placed in this encounter. Follow-up and Dispositions    · Return in about 2 weeks (around 1/22/2020) for Wound check. I told Ms. Quiles and her family that I am concerned about her ulcerations. I think given her advanced age and poor protoplasm, her CHF with 2+ lower extremity swelling bilaterally, as well as her poor oral intake and nutrition make extremely difficult to heal these large ulcers. I also do not believe she is a good surgical candidate for debridement in operating room and she is unable to tolerate a manipulation of her ulcers in office. Additionally, I am concerned with this blister on her left foot may become an ulcer and if so it will be quickly deep enough to expose tendons and bones. I will try to place her in an [facility] [address] for the left lower extremity and will see how she tolerates this compression and if this will affect her CHF at all. If she is able to tolerate in 2 weeks time will place both lower extremities in Unna boot.   In interim for the right lower extremity we have placed meta honey on this leg and will see if they can place Santyl versus meta honey from home health at the nursing home. I will check both her legs in 2 weeks time. The treatment plan was reviewed with the patient in detail. The patient voiced understanding of this plan and all questions and concerns were addressed. The patient agrees with this plan. We discussed the signs and symptoms that would require earlier attention or intervention. The patient was given educational material related to his/her visit and the patient has voiced understanding of the material.     I appreciate the opportunity to participate in the care of your patient. I will be sure to keep you informed of any subsequent changes in the treatment plan. If you have any questions or concerns, please feel free to contact me. Mikayla Ayala MD    PLEASE NOTE:  This document has been produced using voice recognition software. Unrecognized errors in transcription may be present.

## 2020-01-08 NOTE — PROGRESS NOTES
[facility]ound care orders faxed to [facility]; spoke with \"Shanna\" at [facility]; advised to monitor patient for s/sx fluid overload (I,e  Sob) and to remove unna boot and notify this office if noted.

## 2020-01-08 NOTE — PROGRESS NOTES
1. Have you been to the ER, urgent care clinic since your last visit? Hospitalized since your last visit? No    2. Have you seen or consulted any other health care providers outside of the [address] [place] [address] since your last visit? Include any pap smears or colon screening.  No       3 most recent PHQ Screens 1/8/2020   Little interest or pleasure in doing things Not at all   Feeling down, depressed, irritable, or hopeless Not at all   Total Score PHQ 2 0

## 2020-01-08 NOTE — PATIENT INSTRUCTIONS

## 2020-01-09 VITALS
RESPIRATION RATE: 16 BRPM | SYSTOLIC BLOOD PRESSURE: 98 MMHG | OXYGEN SATURATION: 99 % | HEART RATE: 88 BPM | TEMPERATURE: 97.7 F | DIASTOLIC BLOOD PRESSURE: 62 MMHG

## 2020-01-09 PROCEDURE — A6197 ALGINATE DRSG >16 <=48 SQ IN: HCPCS

## 2020-01-09 PROCEDURE — 3331090001 HH PPS REVENUE CREDIT

## 2020-01-09 PROCEDURE — A6454 SELF-ADHER BAND W>=3" <5"/YD: HCPCS

## 2020-01-09 PROCEDURE — A6456 ZINC PASTE BAND W >=3"<5"/YD: HCPCS

## 2020-01-09 PROCEDURE — 3331090002 HH PPS REVENUE DEBIT

## 2020-01-10 ENCOUNTER — HOME CARE VISIT (OUTPATIENT)
Dept: SCHEDULING | Facility: HOME HEALTH | Age: 85
End: 2020-01-10
Payer: MEDICARE

## 2020-01-10 PROCEDURE — G0299 HHS/HOSPICE OF RN EA 15 MIN: HCPCS

## 2020-01-10 PROCEDURE — 3331090002 HH PPS REVENUE DEBIT

## 2020-01-10 PROCEDURE — 3331090001 HH PPS REVENUE CREDIT

## 2020-01-11 VITALS
HEART RATE: 76 BPM | SYSTOLIC BLOOD PRESSURE: 105 MMHG | OXYGEN SATURATION: 95 % | RESPIRATION RATE: 14 BRPM | DIASTOLIC BLOOD PRESSURE: 65 MMHG | TEMPERATURE: 97.6 F

## 2020-01-11 PROCEDURE — 3331090002 HH PPS REVENUE DEBIT

## 2020-01-11 PROCEDURE — 3331090001 HH PPS REVENUE CREDIT

## 2020-01-12 PROCEDURE — 3331090001 HH PPS REVENUE CREDIT

## 2020-01-12 PROCEDURE — 3331090002 HH PPS REVENUE DEBIT

## 2020-01-13 ENCOUNTER — HOME CARE VISIT (OUTPATIENT)
Dept: SCHEDULING | Facility: HOME HEALTH | Age: 85
End: 2020-01-13
Payer: MEDICARE

## 2020-01-13 VITALS
SYSTOLIC BLOOD PRESSURE: 98 MMHG | TEMPERATURE: 97.3 F | OXYGEN SATURATION: 97 % | DIASTOLIC BLOOD PRESSURE: 60 MMHG | RESPIRATION RATE: 14 BRPM | HEART RATE: 92 BPM

## 2020-01-13 PROCEDURE — 3331090002 HH PPS REVENUE DEBIT

## 2020-01-13 PROCEDURE — G0299 HHS/HOSPICE OF RN EA 15 MIN: HCPCS

## 2020-01-13 PROCEDURE — 3331090001 HH PPS REVENUE CREDIT

## 2020-01-14 PROCEDURE — 3331090002 HH PPS REVENUE DEBIT

## 2020-01-14 PROCEDURE — 3331090001 HH PPS REVENUE CREDIT

## 2020-01-15 ENCOUNTER — HOME CARE VISIT (OUTPATIENT)
Dept: SCHEDULING | Facility: HOME HEALTH | Age: 85
End: 2020-01-15
Payer: MEDICARE

## 2020-01-15 PROCEDURE — 3331090002 HH PPS REVENUE DEBIT

## 2020-01-15 PROCEDURE — A4452 WATERPROOF TAPE: HCPCS

## 2020-01-15 PROCEDURE — 3331090001 HH PPS REVENUE CREDIT

## 2020-01-15 PROCEDURE — A6260 WOUND CLEANSER ANY TYPE/SIZE: HCPCS

## 2020-01-15 PROCEDURE — G0299 HHS/HOSPICE OF RN EA 15 MIN: HCPCS

## 2020-01-15 PROCEDURE — A6197 ALGINATE DRSG >16 <=48 SQ IN: HCPCS

## 2020-01-15 PROCEDURE — A6253 ABSORPT DRG > 48 SQ IN W/O B: HCPCS

## 2020-01-16 VITALS
DIASTOLIC BLOOD PRESSURE: 78 MMHG | HEART RATE: 70 BPM | SYSTOLIC BLOOD PRESSURE: 105 MMHG | OXYGEN SATURATION: 96 % | TEMPERATURE: 97.8 F

## 2020-01-16 PROCEDURE — 3331090001 HH PPS REVENUE CREDIT

## 2020-01-16 PROCEDURE — 3331090002 HH PPS REVENUE DEBIT

## 2020-01-17 ENCOUNTER — HOME CARE VISIT (OUTPATIENT)
Dept: SCHEDULING | Facility: HOME HEALTH | Age: 85
End: 2020-01-17
Payer: MEDICARE

## 2020-01-17 PROCEDURE — 3331090002 HH PPS REVENUE DEBIT

## 2020-01-17 PROCEDURE — 3331090001 HH PPS REVENUE CREDIT

## 2020-01-17 PROCEDURE — G0299 HHS/HOSPICE OF RN EA 15 MIN: HCPCS

## 2020-01-18 VITALS
OXYGEN SATURATION: 96 % | RESPIRATION RATE: 16 BRPM | TEMPERATURE: 97.9 F | DIASTOLIC BLOOD PRESSURE: 70 MMHG | SYSTOLIC BLOOD PRESSURE: 105 MMHG | HEART RATE: 70 BPM

## 2020-01-18 PROCEDURE — 3331090001 HH PPS REVENUE CREDIT

## 2020-01-18 PROCEDURE — 3331090002 HH PPS REVENUE DEBIT

## 2020-01-18 PROCEDURE — A6456 ZINC PASTE BAND W >=3"<5"/YD: HCPCS

## 2020-01-18 PROCEDURE — A6216 NON-STERILE GAUZE<=16 SQ IN: HCPCS

## 2020-01-18 PROCEDURE — A6197 ALGINATE DRSG >16 <=48 SQ IN: HCPCS

## 2020-01-19 PROCEDURE — 3331090001 HH PPS REVENUE CREDIT

## 2020-01-19 PROCEDURE — 3331090002 HH PPS REVENUE DEBIT

## 2020-01-20 ENCOUNTER — HOSPITAL ENCOUNTER (EMERGENCY)
Age: 85
Discharge: HOME | End: 2020-01-20
Attending: EMERGENCY MEDICINE
Payer: MEDICARE

## 2020-01-20 ENCOUNTER — HOME CARE VISIT (OUTPATIENT)
Dept: SCHEDULING | Facility: HOME HEALTH | Age: 85
End: 2020-01-20
Payer: MEDICARE

## 2020-01-20 VITALS
DIASTOLIC BLOOD PRESSURE: 62 MMHG | TEMPERATURE: 97.7 F | SYSTOLIC BLOOD PRESSURE: 106 MMHG | RESPIRATION RATE: 16 BRPM | OXYGEN SATURATION: 98 % | HEART RATE: 87 BPM

## 2020-01-20 DIAGNOSIS — L97.922 LEG ULCER, LEFT, WITH FAT LAYER EXPOSED (HCC): ICD-10-CM

## 2020-01-20 DIAGNOSIS — L97.912 ULCER OF RIGHT LEG, WITH FAT LAYER EXPOSED (HCC): Primary | ICD-10-CM

## 2020-01-20 LAB
ANION GAP SERPL CALC-SCNC: 4 MMOL/L (ref 3–18)
BASOPHILS # BLD: 0 K/UL (ref 0–0.1)
BASOPHILS NFR BLD: 0 % (ref 0–2)
BUN SERPL-MCNC: 24 MG/DL (ref 7–18)
BUN/CREAT SERPL: 21 (ref 12–20)
CALCIUM SERPL-MCNC: 8.1 MG/DL (ref 8.5–10.1)
CHLORIDE SERPL-SCNC: 102 MMOL/L (ref 100–111)
CO2 SERPL-SCNC: 32 MMOL/L (ref 21–32)
CREAT SERPL-MCNC: 1.13 MG/DL (ref 0.6–1.3)
DIFFERENTIAL METHOD BLD: ABNORMAL
EOSINOPHIL # BLD: 0.2 K/UL (ref 0–0.4)
EOSINOPHIL NFR BLD: 3 % (ref 0–5)
ERYTHROCYTE [DISTWIDTH] IN BLOOD BY AUTOMATED COUNT: 18.9 % (ref 11.6–14.5)
GLUCOSE SERPL-MCNC: 95 MG/DL (ref 74–99)
HCT VFR BLD AUTO: 33 % (ref 35–45)
HGB BLD-MCNC: 10 G/DL (ref 12–16)
LYMPHOCYTES # BLD: 1.2 K/UL (ref 0.9–3.6)
LYMPHOCYTES NFR BLD: 16 % (ref 21–52)
MCH RBC QN AUTO: 25 PG (ref 24–34)
MCHC RBC AUTO-ENTMCNC: 30.3 G/DL (ref 31–37)
MCV RBC AUTO: 82.5 FL (ref 74–97)
MONOCYTES # BLD: 1.2 K/UL (ref 0.05–1.2)
MONOCYTES NFR BLD: 16 % (ref 3–10)
NEUTS SEG # BLD: 4.9 K/UL (ref 1.8–8)
NEUTS SEG NFR BLD: 65 % (ref 40–73)
PLATELET # BLD AUTO: 325 K/UL (ref 135–420)
PMV BLD AUTO: 8.6 FL (ref 9.2–11.8)
POTASSIUM SERPL-SCNC: 4 MMOL/L (ref 3.5–5.5)
RBC # BLD AUTO: 4 M/UL (ref 4.2–5.3)
SODIUM SERPL-SCNC: 138 MMOL/L (ref 136–145)
WBC # BLD AUTO: 7.6 K/UL (ref 4.6–13.2)

## 2020-01-20 PROCEDURE — 87186 SC STD MICRODIL/AGAR DIL: CPT

## 2020-01-20 PROCEDURE — 96365 THER/PROPH/DIAG IV INF INIT: CPT

## 2020-01-20 PROCEDURE — 87040 BLOOD CULTURE FOR BACTERIA: CPT

## 2020-01-20 PROCEDURE — 3331090002 HH PPS REVENUE DEBIT

## 2020-01-20 PROCEDURE — 80048 BASIC METABOLIC PNL TOTAL CA: CPT

## 2020-01-20 PROCEDURE — 87070 CULTURE OTHR SPECIMN AEROBIC: CPT

## 2020-01-20 PROCEDURE — 85025 COMPLETE CBC W/AUTO DIFF WBC: CPT

## 2020-01-20 PROCEDURE — 99283 EMERGENCY DEPT VISIT LOW MDM: CPT

## 2020-01-20 PROCEDURE — 74011250636 HC RX REV CODE- 250/636: Performed by: EMERGENCY MEDICINE

## 2020-01-20 PROCEDURE — 87077 CULTURE AEROBIC IDENTIFY: CPT

## 2020-01-20 PROCEDURE — G0299 HHS/HOSPICE OF RN EA 15 MIN: HCPCS

## 2020-01-20 PROCEDURE — 3331090001 HH PPS REVENUE CREDIT

## 2020-01-20 PROCEDURE — 74011000258 HC RX REV CODE- 258: Performed by: EMERGENCY MEDICINE

## 2020-01-20 RX ORDER — CEFTRIAXONE 1 G/1
1 INJECTION, POWDER, FOR SOLUTION INTRAMUSCULAR; INTRAVENOUS
Status: DISCONTINUED | OUTPATIENT
Start: 2020-01-20 | End: 2020-01-20 | Stop reason: SDUPTHER

## 2020-01-20 RX ORDER — LEVOFLOXACIN 500 MG/1
500 TABLET, FILM COATED ORAL DAILY
Qty: 7 TAB | Refills: 0 | Status: SHIPPED | OUTPATIENT
Start: 2020-01-20 | End: 2020-01-27

## 2020-01-20 RX ADMIN — CEFTRIAXONE 1 G: 1 INJECTION, POWDER, FOR SOLUTION INTRAMUSCULAR; INTRAVENOUS at 17:44

## 2020-01-20 NOTE — ED TRIAGE NOTES
Pt arrived from [address] [place] [address] of [place] [address] with complaint of evaluation of sores to lower extremities. Pt stating she does not want blood drawn.

## 2020-01-20 NOTE — ED NOTES
I have reviewed discharge instructions with the patient and caregiver. The patient and caregiver verbalized understanding. Patient's son primary caregiver. Discharged from ER with no signs of distress.

## 2020-01-20 NOTE — ED PROVIDER NOTES
EMERGENCY DEPARTMENT HISTORY AND PHYSICAL EXAM    4:30 PM      Date: 1/20/2020  Patient Name: Rita Mckeon    History of Presenting Illness     Chief Complaint   Patient presents with    Wound Check         History Provided By: Patient      Additional History (Context): Rita Mckeon is a 80 y.o. female who presents with referral to the emergency department for evaluation of her legs Cerner for sepsis. Patient is alert she is oriented she states she does not want any labs drawn that there is nothing more that can be done for them that they are due to bad circulation and that she has difficulty walking due to pain. Is requesting that we hold off any further evaluation until her son-in-law comes into the emergency department. On review of her records that were with her on arrival patient is DNR she does wish for me to continue this in the ED . PCP: Ying Calvo MD      Current Facility-Administered Medications   Medication Dose Route Frequency Provider Last Rate Last Dose    cefTRIAXone (ROCEPHIN) 1 g in lidocaine (PF) (XYLOCAINE) 10 mg/mL (1 %) IM injection  1 g IntraMUSCular ONCE Theree Mansfield MD         Current Outpatient Medications   Medication Sig Dispense Refill    levoFLOXacin (LEVAQUIN) 500 mg tablet Take 1 Tab by mouth daily for 7 days. 7 Tab 0    digoxin (LANOXIN) 0.25 mg tablet Take 1 Tab by mouth every Sunday, Tuesday, Thursday. 12 Tab 0    FLUoxetine (PROZAC) 20 mg capsule Take 1 Cap by mouth daily. 12 Cap 0    furosemide (LASIX) 40 mg tablet Take 1 Tab by mouth daily. 30 Tab 0    vitamin a-vitamin c-vit e-min (OCUVITE) tablet Take 1 Tab by mouth daily. Gummy      multivit,calc,mins/iron/folic (ONE-A-DAY WOMENS FORMULA PO) Take  by mouth. 1 Gummy       fluticasone propionate (FLONASE) 50 mcg/actuation nasal spray 1 [place] by Both Nostrils route daily.  ondansetron (ZOFRAN ODT) 4 mg disintegrating tablet Take 4 mg by mouth every eight (8) hours as needed for Nausea.  Take 4 mg tab by mouth every eight (8) hours as needed for Nausea      mirtazapine (REMERON) 15 mg tablet Take 1 Tab by mouth nightly. Indications: major depressive disorder 30 Tab 0    digoxin (LANOXIN) 0.125 mg tablet Take 1 Tab by mouth every Monday, Wednesday, Friday, Saturday AND 2 Tabs every Sunday, Tuesday, Thursday. Indications: Ventricular Rate Control in Atrial Fibrillation 45 Tab 0    carboxymethylcellulose sodium (CELLUVISC) 0.5 % drop ophthalmic solution Administer 1 Drop to both eyes three (3) times daily as needed for Other (dry eyes). 1 Bottle 0    acetaminophen (TYLENOL) 325 mg tablet Take 650 mg by mouth every six (6) hours as needed for Pain. 2 tabs      polyethylene glycol (MIRALAX) 17 gram/dose powder Take 17 g by mouth daily as needed (constipation).  carboxymethylcellulos/glycerin (CLEAR EYES FOR DRY EYES OP) Administer 1 Drop to both eyes three (3) times daily as needed (dry eyes).  loperamide (IMMODIUM) 2 mg tablet Take 1 mg by mouth three (3) times daily as needed for Diarrhea. Take 1 mg tab by mouth three (3) times daily as needed for Diarrhea.  psyllium husk-aspartame (METAMUCIL FIBER) 3.4 gram pwpk packet Take 1 Packet by mouth daily. 30 Packet 0    aspirin 81 mg chewable tablet Take 1 Tab by mouth daily. 30 Tab 0    nitroglycerin (NITROSTAT) 0.4 mg SL tablet 1 Tab by SubLINGual route every five (5) minutes as needed for Chest Pain. (Patient taking differently: 0.4 mg by SubLINGual route every five (5) minutes as needed for Chest Pain. For onset of chest pain, place 1 tab under the tongue. Nitroglycerin sublingual tabs usually give relief in 1 to 5 minutes. If the pain is not relieved, use a second tab 5 minutes after you take the first tab. If the pain continues for another 5 minutes, a third tab may be used.  If you still have chest pain after a total of 3 tabs, contact your doctor or go to ED right away or call 911, if necessary.) 25 Tab 3       Past History     Past Medical History:  Past Medical History:   Diagnosis Date    A-fib [facility]) 01/2017    Arthritis, degenerative     CAD (coronary artery disease) 04/2012    S/P 2.75 X 15 mm BMS of OM (04/2012), Two LAD BMS (07/2012)    Elevated liver enzymes     Likely from statin    HLD (hyperlipidemia)     Hyperkalemia 06/2012    Likely from lisinopril    Ischemic cardiomyopathy     LVEF  30-35%(05/19) 45% (11/2012) & 30% echo (04/2012)    NSTEMI (non-ST elevated myocardial infarction) ([address] 04/2012    UTI (urinary tract infection)     Vertigo        Past Surgical History:  Past Surgical History:   Procedure Laterality Date    HX CORONARY STENT PLACEMENT  4/23/12    bare metal stent to obtuse marginal branch    HX HEART CATHETERIZATION         Family History:  Family History   Problem Relation Age of Onset    Diabetes Mother        Social History:  Social History     Tobacco Use    Smoking status: Never Smoker    Smokeless tobacco: Never Used   Substance Use Topics    Alcohol use: No    Drug use: No       Allergies: Allergies   Allergen Reactions    Flu Vaccine 2011 (36 Mos+)(Pf) Anaphylaxis    Codeine Anaphylaxis    Egg Not Reported This Time     Per patient who is A&O x3 that she can eat the yolks but breaks out from the whites. She prefers Hard boiled for this reason.  Influenza Virus Vaccines Hives    Lipitor [Atorvastatin] Nausea Only    Pcn [Penicillins] Hives         Review of Systems       Review of Systems   Constitutional: Negative for chills, diaphoresis and fever. HENT: Negative for congestion. Eyes: Positive for visual disturbance. Respiratory: Negative for cough, chest tightness and shortness of breath. Cardiovascular: Negative for chest pain. Gastrointestinal: Negative for abdominal pain, diarrhea, nausea and vomiting. Musculoskeletal: Positive for gait problem. Negative for back pain. Skin: Positive for wound. Negative for rash.    Neurological: Negative for dizziness, syncope and weakness. All other systems reviewed and are negative. Physical Exam     Visit Vitals  /62 (BP 1 Location: Right arm, BP Patient Position: Sitting)   Pulse 87   Temp 97.7 °F (36.5 °C)   Resp 16   SpO2 98%       Physical Exam  Vitals signs and nursing note reviewed. Constitutional:       General: She is not in acute distress. Appearance: She is well-developed. HENT:      Head: Normocephalic and atraumatic. Eyes:      General: No scleral icterus. Conjunctiva/sclera: Conjunctivae normal.      Pupils: Pupils are equal, round, and reactive to light. Neck:      Musculoskeletal: Normal range of motion and neck supple. Cardiovascular:      Rate and Rhythm: Normal rate and regular rhythm. Heart sounds: Normal heart sounds. No murmur. Pulmonary:      Effort: Pulmonary effort is normal. No respiratory distress. Breath sounds: Normal breath sounds. Abdominal:      General: Bowel sounds are normal. There is no distension. Palpations: Abdomen is soft. Tenderness: There is no tenderness. Musculoskeletal:      Comments: Bilateral lower extremities wrapped in Unna boots with 3+ edema superior to this    Wounds undressed showed chronic venous stasis ulcers with drainage    Lymphadenopathy:      Cervical: No cervical adenopathy. Skin:     General: Skin is warm and dry. Findings: No rash. Neurological:      Mental Status: She is alert and oriented to person, place, and time. Coordination: Coordination normal.   Psychiatric:         Behavior: Behavior normal.           Diagnostic Study Results     Labs -  No results found for this or any previous visit (from the past 12 hour(s)). Radiologic Studies -   No orders to display         Medical Decision Making   I am the first provider for this patient. I reviewed the vital signs, available nursing notes, past medical history, past surgical history, family history and social history.     Vital Signs-Reviewed the patient's vital signs. EKG:    Records Reviewed: Nursing Notes and Old Medical Records (Time of Review: 4:30 PM)    ED Course: Progress Notes, Reevaluation, and Consults:      Provider Notes (Medical Decision Making):   MDM  Number of Diagnoses or Management Options  Diagnosis management comments: Patient is alert oriented I believe to be of sound mind she is adamant about not wanting any work-up including labs. She is DNR and does not want me to continue this which was written into the chart. She does agree to IM Rocephin as well as Augmentin at discharge discussed with Dr. Fiona Green who is in agreement          Critical Care Time:       Diagnosis     Clinical Impression:   1. Ulcer of right leg, with fat layer exposed ([address]    2. Leg ulcer, left, with fat layer exposed ([address]        Disposition: home             Follow-up Information     Follow up With Specialties Details Why [facility] EMERGENCY DEPT Emergency Medicine  As needed, If symptoms worsen [facility]    Trini Powell MD Geriatric Medicine Schedule an appointment as soon as possible for a visit for ED follow up appointment  [address]  [phone]             Patient's Medications   Start Taking    LEVOFLOXACIN (LEVAQUIN) 500 MG TABLET    Take 1 Tab by mouth daily for 7 days. Continue Taking    ACETAMINOPHEN (TYLENOL) 325 MG TABLET    Take 650 mg by mouth every six (6) hours as needed for Pain. 2 tabs    ASPIRIN 81 MG CHEWABLE TABLET    Take 1 Tab by mouth daily. CARBOXYMETHYLCELLULOS/GLYCERIN (CLEAR EYES FOR DRY EYES OP)    Administer 1 Drop to both eyes three (3) times daily as needed (dry eyes). CARBOXYMETHYLCELLULOSE SODIUM (CELLUVISC) 0.5 % DROP OPHTHALMIC SOLUTION    Administer 1 Drop to both eyes three (3) times daily as needed for Other (dry eyes).     DIGOXIN (LANOXIN) 0.125 MG TABLET    Take 1 Tab by mouth every Monday, Wednesday, Friday, Saturday AND 2 Tabs every Sunday, Tuesday, Thursday. Indications: Ventricular Rate Control in Atrial Fibrillation    DIGOXIN (LANOXIN) 0.25 MG TABLET    Take 1 Tab by mouth every Sunday, Tuesday, Thursday. FLUOXETINE (PROZAC) 20 MG CAPSULE    Take 1 Cap by mouth daily. FLUTICASONE PROPIONATE (FLONASE) 50 MCG/ACTUATION NASAL SPRAY    1 [place] by Both Nostrils route daily. FUROSEMIDE (LASIX) 40 MG TABLET    Take 1 Tab by mouth daily. LOPERAMIDE (IMMODIUM) 2 MG TABLET    Take 1 mg by mouth three (3) times daily as needed for Diarrhea. Take 1 mg tab by mouth three (3) times daily as needed for Diarrhea. MIRTAZAPINE (REMERON) 15 MG TABLET    Take 1 Tab by mouth nightly. Indications: major depressive disorder    MULTIVIT,CALC,MINS/IRON/FOLIC (ONE-A-DAY WOMENS FORMULA PO)    Take  by mouth. 1 Gummy     NITROGLYCERIN (NITROSTAT) 0.4 MG SL TABLET    1 Tab by SubLINGual route every five (5) minutes as needed for Chest Pain. ONDANSETRON (ZOFRAN ODT) 4 MG DISINTEGRATING TABLET    Take 4 mg by mouth every eight (8) hours as needed for Nausea. Take 4 mg tab by mouth every eight (8) hours as needed for Nausea    POLYETHYLENE GLYCOL (MIRALAX) 17 GRAM/DOSE POWDER    Take 17 g by mouth daily as needed (constipation). PSYLLIUM HUSK-ASPARTAME (METAMUCIL FIBER) 3.4 GRAM PWPK PACKET    Take 1 Packet by mouth daily. VITAMIN A-VITAMIN C-VIT E-MIN (OCUVITE) TABLET    Take 1 Tab by mouth daily. Gummy   These Medications have changed    No medications on file   Stop Taking    No medications on file     _______________________________    Please note that this dictation was completed with [employer], the computer voice recognition software. Quite often unanticipated grammatical, syntax, homophones, and other interpretive errors are inadvertently transcribed by the computer software. Please disregard these errors. Please excuse any errors that have escaped final proofreading.

## 2020-01-21 VITALS
RESPIRATION RATE: 14 BRPM | HEART RATE: 82 BPM | SYSTOLIC BLOOD PRESSURE: 99 MMHG | OXYGEN SATURATION: 94 % | TEMPERATURE: 98.2 F | DIASTOLIC BLOOD PRESSURE: 67 MMHG

## 2020-01-21 PROCEDURE — 3331090002 HH PPS REVENUE DEBIT

## 2020-01-21 PROCEDURE — 3331090001 HH PPS REVENUE CREDIT

## 2020-01-22 ENCOUNTER — HOME CARE VISIT (OUTPATIENT)
Dept: SCHEDULING | Facility: HOME HEALTH | Age: 85
End: 2020-01-22
Payer: MEDICARE

## 2020-01-22 PROCEDURE — 3331090002 HH PPS REVENUE DEBIT

## 2020-01-22 PROCEDURE — 3331090001 HH PPS REVENUE CREDIT

## 2020-01-22 PROCEDURE — G0299 HHS/HOSPICE OF RN EA 15 MIN: HCPCS

## 2020-01-23 VITALS
OXYGEN SATURATION: 95 % | DIASTOLIC BLOOD PRESSURE: 65 MMHG | SYSTOLIC BLOOD PRESSURE: 105 MMHG | HEART RATE: 76 BPM | RESPIRATION RATE: 14 BRPM | TEMPERATURE: 97.6 F

## 2020-01-23 LAB
BACTERIA SPEC CULT: ABNORMAL
GRAM STN SPEC: ABNORMAL
GRAM STN SPEC: ABNORMAL
SERVICE CMNT-IMP: ABNORMAL

## 2020-01-23 PROCEDURE — A6454 SELF-ADHER BAND W>=3" <5"/YD: HCPCS

## 2020-01-23 PROCEDURE — A6252 ABSORPT DRG >16 <=48 W/O BDR: HCPCS

## 2020-01-23 PROCEDURE — 3331090001 HH PPS REVENUE CREDIT

## 2020-01-23 PROCEDURE — A6260 WOUND CLEANSER ANY TYPE/SIZE: HCPCS

## 2020-01-23 PROCEDURE — A6446 CONFORM BAND S W>=3" <5"/YD: HCPCS

## 2020-01-23 PROCEDURE — 3331090002 HH PPS REVENUE DEBIT

## 2020-01-24 ENCOUNTER — HOME CARE VISIT (OUTPATIENT)
Dept: SCHEDULING | Facility: HOME HEALTH | Age: 85
End: 2020-01-24
Payer: MEDICARE

## 2020-01-24 ENCOUNTER — OFFICE VISIT (OUTPATIENT)
Dept: CARDIOLOGY CLINIC | Age: 85
End: 2020-01-24

## 2020-01-24 VITALS
BODY MASS INDEX: 18.43 KG/M2 | SYSTOLIC BLOOD PRESSURE: 104 MMHG | HEART RATE: 81 BPM | OXYGEN SATURATION: 95 % | WEIGHT: 104 LBS | HEIGHT: 63 IN | DIASTOLIC BLOOD PRESSURE: 72 MMHG | RESPIRATION RATE: 20 BRPM

## 2020-01-24 DIAGNOSIS — L97.912 ULCER OF RIGHT LEG, WITH FAT LAYER EXPOSED (HCC): ICD-10-CM

## 2020-01-24 DIAGNOSIS — R60.0 BILATERAL LEG EDEMA: ICD-10-CM

## 2020-01-24 DIAGNOSIS — L97.922 LEG ULCER, LEFT, WITH FAT LAYER EXPOSED (HCC): Primary | ICD-10-CM

## 2020-01-24 PROCEDURE — 3331090001 HH PPS REVENUE CREDIT

## 2020-01-24 PROCEDURE — G0299 HHS/HOSPICE OF RN EA 15 MIN: HCPCS

## 2020-01-24 PROCEDURE — 3331090002 HH PPS REVENUE DEBIT

## 2020-01-24 NOTE — PROGRESS NOTES
[facility]          Chart reviewed for the following:   Reji DALY LPN, have reviewed the medications and updated the Allergic reactions for Deepti C Quiles     TIME OUT performed immediately prior to start of procedure:   Phillip DALY LPN, have performed the following reviews on [address] prior to the start of the procedure:            * Patient was identified by name and date of birth   * Agreement that Duayne Tena boot removed and reapplied   * Procedure site verified   * Patient was positioned for comfort  * Verbal consent was given by patient     Time: 1045hrs      Date of procedure: 1/24/2020    Procedure performed by:  Los Browning MD    How tolerated by patient: tolerated the procedure well with no complications    Comments: bilateral unna boots placed to bilateral lower extremities after cleansing open areas to bilateral lower extremities; adaptic, calcium alginate placed on open areas; covered with abd pads; tolerated well.

## 2020-01-24 NOTE — PATIENT INSTRUCTIONS

## 2020-01-24 NOTE — PROGRESS NOTES
Deepti  Quiles    Chief Complaint   Patient presents with    Wound Check       History and Physical    Ms. Twin Biggs returns to the office for continued follow-up management of her bilateral lower extremity ulcerations. Per family her [place] boot was changed more frequently than once a week and she is not noted any significant change in her leg swelling. Ms. Twin Biggs states her legs hurt her but both sides all over she does not have any energy noted she have an appetite. Since her last visit she went to the ER tomorrow for evaluation of sepsis where is noted that she was normotensive normal white count no erythema or signs to suggest infection. She was started on a course of local at that time however. She has no new complaints today.     Past Medical History:   Diagnosis Date    A-fib [facility]) 01/2017    Arthritis, degenerative     CAD (coronary artery disease) 04/2012    S/P 2.75 X 15 mm BMS of OM (04/2012), Two LAD BMS (07/2012)    Elevated liver enzymes     Likely from statin    HLD (hyperlipidemia)     Hyperkalemia 06/2012    Likely from lisinopril    Ischemic cardiomyopathy     LVEF  30-35%(05/19) 45% (11/2012) & 30% echo (04/2012)    NSTEMI (non-ST elevated myocardial infarction) ([facility] [address] 04/2012    UTI (urinary tract infection)     Vertigo      Patient Active Problem List   Diagnosis Code    SOB (shortness of breath) R06.02    NSTEMI (non-ST elevated myocardial infarction) ([facility] [address] I21.4    CAD (coronary artery disease) I25.10    Ischemic cardiomyopathy I25.5    Arthritis, degenerative M19.90    Vertigo R42    HLD (hyperlipidemia) E78.5    Chronic a-fib I48.20    Irritable bowel syndrome with diarrhea K58.0    Chronic fatigue R53.82    Hyponatremia E87.1    Acute metabolic encephalopathy B52.08    Hyperkalemia E87.5    Acute on chronic systolic (congestive) heart failure (HCC) I50.23    Syncope R55    Hypoxia R09.02    Leukocytosis D72.829    Atrial fibrillation with rapid ventricular response ([address] I48.91    Atrial fibrillation (HCC) I48.91    Hypotension I95.9    Skin rash P66    Metabolic acidosis X82.3    Underweight R63.6    Dizziness R42    Bilateral leg edema R60.0    Leg ulcer, left, with fat layer exposed ([address] L97.922    Ulcer of right leg, with fat layer exposed ([address] B44.183     Past Surgical History:   Procedure Laterality Date    HX CORONARY STENT PLACEMENT  4/23/12    bare metal stent to obtuse marginal branch    HX HEART CATHETERIZATION       Current Outpatient Medications   Medication Sig Dispense Refill    levoFLOXacin (LEVAQUIN) 500 mg tablet Take 1 Tab by mouth daily for 7 days. 7 Tab 0    digoxin (LANOXIN) 0.25 mg tablet Take 1 Tab by mouth every Sunday, Tuesday, Thursday. 12 Tab 0    FLUoxetine (PROZAC) 20 mg capsule Take 1 Cap by mouth daily. 12 Cap 0    furosemide (LASIX) 40 mg tablet Take 1 Tab by mouth daily. 30 Tab 0    vitamin a-vitamin c-vit e-min (OCUVITE) tablet Take 1 Tab by mouth daily. Gummy      multivit,calc,mins/iron/folic (ONE-A-DAY WOMENS FORMULA PO) Take  by mouth. 1 Gummy       fluticasone propionate (FLONASE) 50 mcg/actuation nasal spray 1 [place] by Both Nostrils route daily.  ondansetron (ZOFRAN ODT) 4 mg disintegrating tablet Take 4 mg by mouth every eight (8) hours as needed for Nausea. Take 4 mg tab by mouth every eight (8) hours as needed for Nausea      mirtazapine (REMERON) 15 mg tablet Take 1 Tab by mouth nightly. Indications: major depressive disorder 30 Tab 0    digoxin (LANOXIN) 0.125 mg tablet Take 1 Tab by mouth every Monday, Wednesday, Friday, Saturday AND 2 Tabs every Sunday, Tuesday, Thursday. Indications: Ventricular Rate Control in Atrial Fibrillation 45 Tab 0    carboxymethylcellulose sodium (CELLUVISC) 0.5 % drop ophthalmic solution Administer 1 Drop to both eyes three (3) times daily as needed for Other (dry eyes).  1 Bottle 0    acetaminophen (TYLENOL) 325 mg tablet Take 650 mg by mouth every six (6) hours as needed for Pain. 2 tabs      polyethylene glycol (MIRALAX) 17 gram/dose powder Take 17 g by mouth daily as needed (constipation).  carboxymethylcellulos/glycerin (CLEAR EYES FOR DRY EYES OP) Administer 1 Drop to both eyes three (3) times daily as needed (dry eyes).  loperamide (IMMODIUM) 2 mg tablet Take 1 mg by mouth three (3) times daily as needed for Diarrhea. Take 1 mg tab by mouth three (3) times daily as needed for Diarrhea.  psyllium husk-aspartame (METAMUCIL FIBER) 3.4 gram pwpk packet Take 1 Packet by mouth daily. 30 Packet 0    aspirin 81 mg chewable tablet Take 1 Tab by mouth daily. 30 Tab 0    nitroglycerin (NITROSTAT) 0.4 mg SL tablet 1 Tab by SubLINGual route every five (5) minutes as needed for Chest Pain. (Patient taking differently: 0.4 mg by SubLINGual route every five (5) minutes as needed for Chest Pain. For onset of chest pain, place 1 tab under the tongue. Nitroglycerin sublingual tabs usually give relief in 1 to 5 minutes. If the pain is not relieved, use a second tab 5 minutes after you take the first tab. If the pain continues for another 5 minutes, a third tab may be used. If you still have chest pain after a total of 3 tabs, contact your doctor or go to ED right away or call 911, if necessary.) 25 Tab 3     Allergies   Allergen Reactions    Flu Vaccine 2011 (36 Mos+)(Pf) Anaphylaxis    Codeine Anaphylaxis    Egg Not Reported This Time     Per patient who is A&O x3 that she can eat the yolks but breaks out from the whites. She prefers Hard boiled for this reason.     Influenza Virus Vaccines Hives    Lipitor [Atorvastatin] Nausea Only    Pcn [Penicillins] Hives     Social History     Socioeconomic History    Marital status:      Spouse name: Not on file    Number of children: Not on file    Years of education: Not on file    Highest education level: Not on file   Occupational History    Not on file   Social Needs    Financial resource strain: Not on file   Low Food insecurity:     Worry: Not on file     Inability: Not on file    Transportation needs:     Medical: Not on file     Non-medical: Not on file   Tobacco Use    Smoking status: Never Smoker    Smokeless tobacco: Never Used   Substance and Sexual Activity    Alcohol use: No    Drug use: No    Sexual activity: Never   Lifestyle    Physical activity:     Days per week: Not on file     Minutes per session: Not on file    Stress: Not on file   Relationships    Social connections:     Talks on phone: Not on file     Gets together: Not on file     Attends Baptism service: Not on file     Active member of club or organization: Not on file     Attends meetings of clubs or organizations: Not on file     Relationship status: Not on file    Intimate partner violence:     Fear of current or ex partner: Not on file     Emotionally abused: Not on file     Physically abused: Not on file     Forced sexual activity: Not on file   Other Topics Concern    Not on file   Social History Narrative    Not on file      Family History   Problem Relation Age of Onset    Diabetes Mother        Review of Systems    Review of Systems   Constitutional: Positive for malaise/fatigue. Negative for chills, diaphoresis, fever and weight loss. HENT: Negative for hearing loss and sore throat. Eyes: Negative for blurred vision, photophobia and redness. Respiratory: Negative for cough, hemoptysis, shortness of breath and wheezing. Cardiovascular: Positive for leg swelling. Negative for chest pain, palpitations and orthopnea. Gastrointestinal: Negative for abdominal pain, blood in stool, constipation, diarrhea, heartburn, nausea and vomiting. Genitourinary: Negative for dysuria, frequency, hematuria and urgency. Musculoskeletal: Positive for myalgias. Negative for back pain. Skin: Negative for itching and rash. Neurological: Positive for weakness. Negative for dizziness, speech change, focal weakness and headaches. Endo/Heme/Allergies: Does not bruise/bleed easily. Psychiatric/Behavioral: Negative for depression and suicidal ideas. Physical Exam:    Visit Vitals  /72 (BP 1 Location: Left arm, BP Patient Position: Sitting)   Pulse 81   Resp 20   Ht 5' 3\" (1.6 m)   Wt 104 lb (47.2 kg)   SpO2 95%   BMI 18.42 kg/m²      Physical Examination: General appearance - chronically ill appearing  Mental status - drowsy  Eyes - sclera anicteric, left eye normal, right eye normal  Ears - right ear normal, left ear normal  Nose - normal and patent, no erythema, discharge or polyps  Mouth - mucous membranes moist, pharynx normal without lesions  Extremities -2+ pitting edema bilateral lower extremities with almost buffalo hump appearance to the dorsum of her feet bilaterally. Unchanged ulceration size bilateral anterior shin with no visible granulation tissue positive fibrinous tissue with copious serous drainage. No surrounding erythema or signs or signs of infection. No crepitus no fluctuance. Impression and Plan:    ICD-10-CM ICD-9-CM    1. Leg ulcer, left, with fat layer exposed ([address] L97.922 707.10 APPLY OF PASTE BOOT      APPLY OF PASTE BOOT   2. Ulcer of right leg, with fat layer exposed ([address] L97.912 707.10 APPLY OF PASTE BOOT      APPLY OF PASTE BOOT   3. Bilateral leg edema R60.0 782. 3 APPLY OF PASTE BOOT      APPLY OF PASTE BOOT     Orders Placed This Encounter    APPLY OF PASTE BOOT    APPLY OF PASTE BOOT       Follow-up and Dispositions    · Return in about 2 weeks (around 2/7/2020) for Wound check. I told Ms. Quiles and her family that I think there is a very low likelihood that we will get these wounds to heal.  She has multiple factors working against her including her age her poor nutrition her congestive heart failure and poor overall protoplasm. However we definitely get that healing is to get her edema better controlled.   She seems to have tolerated unilateral Unna boot compressions we will try placing bilateral Unna boot compression and keep the Unna boot as long as feasible maximize amount of edema control she is getting out this. All this will be for not if she does not increase her p.o. intake and nutrition as I do not believe she has the electrolytes and proteins needed to heal an ulcer. I think overall she is deconditioned and I am concerned about her overall state. I do not believe her ulcer infected as there is no crepitus no erythema, no evidence of an elevated white count on a trip to the ER, she is afebrile normotensive and not tachycardic. All these were also true during her visit to the ER. I explained is reasonable to continue her course and complete her course of antibiotics as it started but I would not recommend an additional course of antibiotics I do not believe she has an infection. I believe the white tissue that is being seen in express is fibrinous tissue and she has no evidence of any abscesses on her leg. I will check Ms. Quiles's leg again in 2 weeks time. The treatment plan was reviewed with the patient in detail. The patient voiced understanding of this plan and all questions and concerns were addressed. The patient agrees with this plan. We discussed the signs and symptoms that would require earlier attention or intervention. The patient was given educational material related to his/her visit and the patient has voiced understanding of the material.     I appreciate the opportunity to participate in the care of your patient. I will be sure to keep you informed of any subsequent changes in the treatment plan. If you have any questions or concerns, please feel free to contact me. Alana Viveros MD    PLEASE NOTE:  This document has been produced using voice recognition software. Unrecognized errors in transcription may be present.

## 2020-01-24 NOTE — PROGRESS NOTES
1. Have you been to the ER, urgent care clinic since your last visit? Hospitalized since your last visit? Yes Reason for visit: [facility] emergency dept due to possible leg infection    2. Have you seen or consulted any other health care providers outside of the [facility] since your last visit? Include any pap smears or colon screening.  No       3 most recent PHQ Screens 1/24/2020   Little interest or pleasure in doing things Not at all   Feeling down, depressed, irritable, or hopeless Not at all   Total Score PHQ 2 0

## 2020-01-25 VITALS
TEMPERATURE: 97.8 F | DIASTOLIC BLOOD PRESSURE: 65 MMHG | SYSTOLIC BLOOD PRESSURE: 105 MMHG | HEART RATE: 70 BPM | OXYGEN SATURATION: 95 % | RESPIRATION RATE: 14 BRPM

## 2020-01-25 PROCEDURE — 3331090002 HH PPS REVENUE DEBIT

## 2020-01-25 PROCEDURE — 3331090001 HH PPS REVENUE CREDIT

## 2020-01-26 PROCEDURE — 3331090002 HH PPS REVENUE DEBIT

## 2020-01-26 PROCEDURE — 3331090001 HH PPS REVENUE CREDIT

## 2020-01-27 ENCOUNTER — DOCUMENTATION ONLY (OUTPATIENT)
Dept: CARDIOLOGY CLINIC | Age: 85
End: 2020-01-27

## 2020-01-27 ENCOUNTER — HOME CARE VISIT (OUTPATIENT)
Dept: SCHEDULING | Facility: HOME HEALTH | Age: 85
End: 2020-01-27
Payer: MEDICARE

## 2020-01-27 VITALS
TEMPERATURE: 97.4 F | DIASTOLIC BLOOD PRESSURE: 56 MMHG | RESPIRATION RATE: 14 BRPM | SYSTOLIC BLOOD PRESSURE: 90 MMHG | HEART RATE: 62 BPM | OXYGEN SATURATION: 94 %

## 2020-01-27 PROCEDURE — G0299 HHS/HOSPICE OF RN EA 15 MIN: HCPCS

## 2020-01-27 PROCEDURE — 3331090001 HH PPS REVENUE CREDIT

## 2020-01-27 PROCEDURE — 3331090002 HH PPS REVENUE DEBIT

## 2020-01-28 PROCEDURE — A6196 ALGINATE DRESSING <=16 SQ IN: HCPCS

## 2020-01-28 PROCEDURE — 3331090002 HH PPS REVENUE DEBIT

## 2020-01-28 PROCEDURE — A6456 ZINC PASTE BAND W >=3"<5"/YD: HCPCS

## 2020-01-28 PROCEDURE — A6253 ABSORPT DRG > 48 SQ IN W/O B: HCPCS

## 2020-01-28 PROCEDURE — A4452 WATERPROOF TAPE: HCPCS

## 2020-01-28 PROCEDURE — 3331090001 HH PPS REVENUE CREDIT

## 2020-01-28 PROCEDURE — A6197 ALGINATE DRSG >16 <=48 SQ IN: HCPCS

## 2020-01-29 ENCOUNTER — HOME CARE VISIT (OUTPATIENT)
Dept: SCHEDULING | Facility: HOME HEALTH | Age: 85
End: 2020-01-29
Payer: MEDICARE

## 2020-01-29 PROCEDURE — 3331090001 HH PPS REVENUE CREDIT

## 2020-01-29 PROCEDURE — G0299 HHS/HOSPICE OF RN EA 15 MIN: HCPCS

## 2020-01-29 PROCEDURE — 3331090002 HH PPS REVENUE DEBIT

## 2020-01-30 ENCOUNTER — HOME CARE VISIT (OUTPATIENT)
Dept: SCHEDULING | Facility: HOME HEALTH | Age: 85
End: 2020-01-30
Payer: MEDICARE

## 2020-01-30 VITALS
OXYGEN SATURATION: 95 % | DIASTOLIC BLOOD PRESSURE: 60 MMHG | SYSTOLIC BLOOD PRESSURE: 90 MMHG | HEART RATE: 69 BPM | TEMPERATURE: 98.1 F | RESPIRATION RATE: 14 BRPM

## 2020-01-30 PROCEDURE — 3331090002 HH PPS REVENUE DEBIT

## 2020-01-30 PROCEDURE — G0299 HHS/HOSPICE OF RN EA 15 MIN: HCPCS

## 2020-01-30 PROCEDURE — 3331090001 HH PPS REVENUE CREDIT

## 2020-01-31 ENCOUNTER — HOME CARE VISIT (OUTPATIENT)
Dept: SCHEDULING | Facility: HOME HEALTH | Age: 85
End: 2020-01-31
Payer: MEDICARE

## 2020-01-31 VITALS
OXYGEN SATURATION: 97 % | HEART RATE: 66 BPM | RESPIRATION RATE: 14 BRPM | SYSTOLIC BLOOD PRESSURE: 105 MMHG | SYSTOLIC BLOOD PRESSURE: 105 MMHG | OXYGEN SATURATION: 96 % | RESPIRATION RATE: 14 BRPM | HEART RATE: 67 BPM | DIASTOLIC BLOOD PRESSURE: 65 MMHG | TEMPERATURE: 97.8 F | TEMPERATURE: 97.8 F | DIASTOLIC BLOOD PRESSURE: 65 MMHG

## 2020-01-31 PROCEDURE — G0299 HHS/HOSPICE OF RN EA 15 MIN: HCPCS

## 2020-01-31 PROCEDURE — 3331090001 HH PPS REVENUE CREDIT

## 2020-01-31 PROCEDURE — 3331090002 HH PPS REVENUE DEBIT

## 2020-02-01 PROCEDURE — 3331090002 HH PPS REVENUE DEBIT

## 2020-02-01 PROCEDURE — 3331090001 HH PPS REVENUE CREDIT

## 2020-02-02 PROCEDURE — 3331090001 HH PPS REVENUE CREDIT

## 2020-02-02 PROCEDURE — 3331090002 HH PPS REVENUE DEBIT

## 2020-02-03 PROCEDURE — 3331090001 HH PPS REVENUE CREDIT

## 2020-02-03 PROCEDURE — 3331090002 HH PPS REVENUE DEBIT

## 2020-02-04 ENCOUNTER — HOME CARE VISIT (OUTPATIENT)
Dept: SCHEDULING | Facility: HOME HEALTH | Age: 85
End: 2020-02-04
Payer: MEDICARE

## 2020-02-04 PROCEDURE — 3331090002 HH PPS REVENUE DEBIT

## 2020-02-04 PROCEDURE — 3331090001 HH PPS REVENUE CREDIT

## 2020-02-04 PROCEDURE — G0299 HHS/HOSPICE OF RN EA 15 MIN: HCPCS

## 2020-02-05 ENCOUNTER — HOME CARE VISIT (OUTPATIENT)
Dept: SCHEDULING | Facility: HOME HEALTH | Age: 85
End: 2020-02-05
Payer: MEDICARE

## 2020-02-05 ENCOUNTER — OFFICE VISIT (OUTPATIENT)
Dept: CARDIOLOGY CLINIC | Age: 85
End: 2020-02-05

## 2020-02-05 ENCOUNTER — HOSPITAL ENCOUNTER (OUTPATIENT)
Dept: LAB | Age: 85
Discharge: HOME | End: 2020-02-05
Payer: MEDICARE

## 2020-02-05 VITALS
OXYGEN SATURATION: 95 % | RESPIRATION RATE: 18 BRPM | HEIGHT: 63 IN | WEIGHT: 104 LBS | HEART RATE: 55 BPM | SYSTOLIC BLOOD PRESSURE: 94 MMHG | BODY MASS INDEX: 18.43 KG/M2 | DIASTOLIC BLOOD PRESSURE: 62 MMHG

## 2020-02-05 VITALS
HEART RATE: 70 BPM | DIASTOLIC BLOOD PRESSURE: 70 MMHG | SYSTOLIC BLOOD PRESSURE: 105 MMHG | RESPIRATION RATE: 14 BRPM | OXYGEN SATURATION: 96 % | TEMPERATURE: 97 F

## 2020-02-05 DIAGNOSIS — L97.912 ULCER OF RIGHT LEG, WITH FAT LAYER EXPOSED (HCC): ICD-10-CM

## 2020-02-05 DIAGNOSIS — R60.0 BILATERAL LEG EDEMA: ICD-10-CM

## 2020-02-05 DIAGNOSIS — L97.922 LEG ULCER, LEFT, WITH FAT LAYER EXPOSED (HCC): Primary | ICD-10-CM

## 2020-02-05 LAB
AMORPH CRY URNS QL MICRO: ABNORMAL
APPEARANCE UR: ABNORMAL
BACTERIA URNS QL MICRO: ABNORMAL /HPF
BILIRUB UR QL: NEGATIVE
COLOR UR: ABNORMAL
EPITH CASTS URNS QL MICRO: NEGATIVE /LPF (ref 0–5)
GLUCOSE UR STRIP.AUTO-MCNC: NEGATIVE MG/DL
HGB UR QL STRIP: NEGATIVE
KETONES UR QL STRIP.AUTO: NEGATIVE MG/DL
LEUKOCYTE ESTERASE UR QL STRIP.AUTO: ABNORMAL
NITRITE UR QL STRIP.AUTO: NEGATIVE
PH UR STRIP: 6 [PH] (ref 5–8)
PROT UR STRIP-MCNC: 30 MG/DL
RBC #/AREA URNS HPF: NEGATIVE /HPF (ref 0–5)
SP GR UR REFRACTOMETRY: 1.02 (ref 1–1.03)
UROBILINOGEN UR QL STRIP.AUTO: 1 EU/DL (ref 0.2–1)
WBC URNS QL MICRO: ABNORMAL /HPF (ref 0–4)

## 2020-02-05 PROCEDURE — 81001 URINALYSIS AUTO W/SCOPE: CPT

## 2020-02-05 PROCEDURE — 3331090002 HH PPS REVENUE DEBIT

## 2020-02-05 PROCEDURE — 3331090001 HH PPS REVENUE CREDIT

## 2020-02-05 PROCEDURE — 87086 URINE CULTURE/COLONY COUNT: CPT

## 2020-02-05 NOTE — PROGRESS NOTES
Deepti C Quiles    Chief Complaint   Patient presents with    Wound Check       History and Physical    Ms. Garima Cuellar returns her office for continued follow-up management of her bilateral lower extremity edema with ulceration. She has no new complaints. She has been able to tolerate Unna boot compression without any worsening shortness of breath and they have noted decreased drainage from her ulcer over the past several days.     Past Medical History:   Diagnosis Date    A-fib [facility]) 01/2017    Arthritis, degenerative     CAD (coronary artery disease) 04/2012    S/P 2.75 X 15 mm BMS of OM (04/2012), Two LAD BMS (07/2012)    Elevated liver enzymes     Likely from statin    HLD (hyperlipidemia)     Hyperkalemia 06/2012    Likely from lisinopril    Ischemic cardiomyopathy     LVEF  30-35%(05/19) 45% (11/2012) & 30% echo (04/2012)    NSTEMI (non-ST elevated myocardial infarction) ([address] 04/2012    UTI (urinary tract infection)     Vertigo      Patient Active Problem List   Diagnosis Code    SOB (shortness of breath) R06.02    NSTEMI (non-ST elevated myocardial infarction) ([address] I21.4    CAD (coronary artery disease) I25.10    Ischemic cardiomyopathy I25.5    Arthritis, degenerative M19.90    Vertigo R42    HLD (hyperlipidemia) E78.5    Chronic a-fib I48.20    Irritable bowel syndrome with diarrhea K58.0    Chronic fatigue R53.82    Hyponatremia E87.1    Acute metabolic encephalopathy G91.72    Hyperkalemia E87.5    Acute on chronic systolic (congestive) heart failure (HCC) I50.23    Syncope R55    Hypoxia R09.02    Leukocytosis D72.829    Atrial fibrillation with rapid ventricular response (HCC) I48.91    Atrial fibrillation (HCC) I48.91    Hypotension I95.9    Skin rash E29    Metabolic acidosis M88.9    Underweight R63.6    Dizziness R42    Bilateral leg edema R60.0    Leg ulcer, left, with fat layer exposed ([address] L97.922    Ulcer of right leg, with fat layer exposed ([address] R31.802 Past Surgical History:   Procedure Laterality Date    HX CORONARY STENT PLACEMENT  4/23/12    bare metal stent to obtuse marginal branch    HX HEART CATHETERIZATION       Current Outpatient Medications   Medication Sig Dispense Refill    digoxin (LANOXIN) 0.25 mg tablet Take 1 Tab by mouth every Sunday, Tuesday, Thursday. 12 Tab 0    FLUoxetine (PROZAC) 20 mg capsule Take 1 Cap by mouth daily. 12 Cap 0    furosemide (LASIX) 40 mg tablet Take 1 Tab by mouth daily. 30 Tab 0    vitamin a-vitamin c-vit e-min (OCUVITE) tablet Take 1 Tab by mouth daily. Gummy      multivit,calc,mins/iron/folic (ONE-A-DAY WOMENS FORMULA PO) Take  by mouth. 1 Gummy       fluticasone propionate (FLONASE) 50 mcg/actuation nasal spray 1 [place] by Both Nostrils route daily.  ondansetron (ZOFRAN ODT) 4 mg disintegrating tablet Take 4 mg by mouth every eight (8) hours as needed for Nausea. Take 4 mg tab by mouth every eight (8) hours as needed for Nausea      mirtazapine (REMERON) 15 mg tablet Take 1 Tab by mouth nightly. Indications: major depressive disorder 30 Tab 0    digoxin (LANOXIN) 0.125 mg tablet Take 1 Tab by mouth every Monday, Wednesday, Friday, Saturday AND 2 Tabs every Sunday, Tuesday, Thursday. Indications: Ventricular Rate Control in Atrial Fibrillation 45 Tab 0    carboxymethylcellulose sodium (CELLUVISC) 0.5 % drop ophthalmic solution Administer 1 Drop to both eyes three (3) times daily as needed for Other (dry eyes). 1 Bottle 0    acetaminophen (TYLENOL) 325 mg tablet Take 650 mg by mouth every six (6) hours as needed for Pain. 2 tabs      polyethylene glycol (MIRALAX) 17 gram/dose powder Take 17 g by mouth daily as needed (constipation).  carboxymethylcellulos/glycerin (CLEAR EYES FOR DRY EYES OP) Administer 1 Drop to both eyes three (3) times daily as needed (dry eyes).  loperamide (IMMODIUM) 2 mg tablet Take 1 mg by mouth three (3) times daily as needed for Diarrhea.  Take 1 mg tab by mouth three (3) times daily as needed for Diarrhea.  psyllium husk-aspartame (METAMUCIL FIBER) 3.4 gram pwpk packet Take 1 Packet by mouth daily. 30 Packet 0    aspirin 81 mg chewable tablet Take 1 Tab by mouth daily. 30 Tab 0    nitroglycerin (NITROSTAT) 0.4 mg SL tablet 1 Tab by SubLINGual route every five (5) minutes as needed for Chest Pain. (Patient taking differently: 0.4 mg by SubLINGual route every five (5) minutes as needed for Chest Pain. For onset of chest pain, place 1 tab under the tongue. Nitroglycerin sublingual tabs usually give relief in 1 to 5 minutes. If the pain is not relieved, use a second tab 5 minutes after you take the first tab. If the pain continues for another 5 minutes, a third tab may be used. If you still have chest pain after a total of 3 tabs, contact your doctor or go to ED right away or call 911, if necessary.) 25 Tab 3     Allergies   Allergen Reactions    Flu Vaccine 2011 (36 Mos+)(Pf) Anaphylaxis    Codeine Anaphylaxis    Egg Not Reported This Time     Per patient who is A&O x3 that she can eat the yolks but breaks out from the whites. She prefers Hard boiled for this reason.     Influenza Virus Vaccines Hives    Lipitor [Atorvastatin] Nausea Only    Pcn [Penicillins] Hives     Social History     Socioeconomic History    Marital status:      Spouse name: Not on file    Number of children: Not on file    Years of education: Not on file    Highest education level: Not on file   Occupational History    Not on file   Social Needs    Financial resource strain: Not on file    Food insecurity:     Worry: Not on file     Inability: Not on file    Transportation needs:     Medical: Not on file     Non-medical: Not on file   Tobacco Use    Smoking status: Never Smoker    Smokeless tobacco: Never Used   Substance and Sexual Activity    Alcohol use: No    Drug use: No    Sexual activity: Never   Lifestyle    Physical activity:     Days per week: Not on file Minutes per session: Not on file    Stress: Not on file   Relationships    Social connections:     Talks on phone: Not on file     Gets together: Not on file     Attends [employer] service: Not on file     Active member of club or organization: Not on file     Attends meetings of clubs or organizations: Not on file     Relationship status: Not on file    Intimate partner violence:     Fear of current or ex partner: Not on file     Emotionally abused: Not on file     Physically abused: Not on file     Forced sexual activity: Not on file   Other Topics Concern    Not on file   Social History Narrative    Not on file      Family History   Problem Relation Age of Onset    Diabetes Mother        Review of Systems    Review of Systems   Constitutional: Positive for malaise/fatigue. Negative for chills, diaphoresis, fever and weight loss. HENT: Negative for hearing loss and sore throat. Eyes: Negative for blurred vision, photophobia and redness. Respiratory: Negative for cough, hemoptysis, shortness of breath and wheezing. Cardiovascular: Positive for leg swelling. Negative for chest pain, palpitations and orthopnea. Gastrointestinal: Negative for abdominal pain, blood in stool, constipation, diarrhea, heartburn, nausea and vomiting. Genitourinary: Negative for dysuria, frequency, hematuria and urgency. Musculoskeletal: Negative for back pain and myalgias. Skin: Negative for itching and rash. Neurological: Negative for dizziness, speech change, focal weakness, weakness and headaches. Endo/Heme/Allergies: Does not bruise/bleed easily. Psychiatric/Behavioral: Negative for depression and suicidal ideas.             Physical Exam:    Visit Vitals  BP 94/62 (BP 1 Location: Left arm, BP Patient Position: Sitting)   Pulse (!) 55   Resp 18   Ht 5' 3\" (1.6 m)   Wt 104 lb (47.2 kg)   SpO2 95%   BMI 18.42 kg/m²      Physical Examination: General appearance - alert, well appearing, and in no distress  Mental status - alert, oriented to person, place, and time  Eyes - sclera anicteric, left eye normal, right eye normal  Ears - right ear normal, left ear normal  Nose - normal and patent, no erythema, discharge or polyps  Mouth - mucous membranes moist, pharynx normal without lesions  Extremities -decreased edema bilateral lower extremities. Less depth to left anterior shin ulceration but same diameter in size. Mix of granulation fibrous tissue. No erythema no sign of infection. Impression and Plan:    ICD-10-CM ICD-9-CM    1. Leg ulcer, left, with fat layer exposed ([address] L97.922 707.10 APPLY OF PASTE BOOT      APPLY OF PASTE BOOT   2. Ulcer of right leg, with fat layer exposed ([address] L97.912 707.10 APPLY OF PASTE BOOT      APPLY OF PASTE BOOT   3. Bilateral leg edema R60.0 782. 3 APPLY OF PASTE BOOT      APPLY OF PASTE BOOT     Orders Placed This Encounter    APPLY OF PASTE BOOT    APPLY OF PASTE BOOT       Follow-up and Dispositions    · Return in about 2 weeks (around 2/19/2020) for Wound check. We will continue with Unna boot compression for Ms. Quiles with the goal of changing the boot weekly for her legs to control edema in hopes to heal her ulcers. I will check on them again in 2 weeks time if the ulcers appear to be the same diameter we will discuss possible debridement and graft placement. The treatment plan was reviewed with the patient in detail. The patient voiced understanding of this plan and all questions and concerns were addressed. The patient agrees with this plan. We discussed the signs and symptoms that would require earlier attention or intervention. The patient was given educational material related to his/her visit and the patient has voiced understanding of the material.     I appreciate the opportunity to participate in the care of your patient. I will be sure to keep you informed of any subsequent changes in the treatment plan.   If you have any questions or concerns, please feel free to contact me. Ning Blackwood MD    PLEASE NOTE:  This document has been produced using voice recognition software. Unrecognized errors in transcription may be present.

## 2020-02-05 NOTE — PROGRESS NOTES
[facility]          Chart reviewed for the following:   Tanya DALY LPN, have reviewed the medications and updated the Allergic reactions for Deepti C Quiles     TIME OUT performed immediately prior to start of procedure:   Narendra DALY LPN, have performed the following reviews on [address] [facility] prior to the start of the procedure:            * Patient was identified by name and date of birth   * Agreement that Twylla Sill boot removed and reapplied   * Procedure site verified   * Patient was positioned for comfort  * Verbal consent was given by patient     Time: 1120hrs      Date of procedure: 2/5/2020    Procedure performed by:  Steph Marino MD    How tolerated by patient: tolerated the procedure well with no complications    Comments: open areas to bilateral lower extremities cleansed; patted dry; calcium alginate applied to open areas on bilateral lower extremities ; areas covered with abd pads; unna boots applied to bilateral lower extremities.

## 2020-02-05 NOTE — PROGRESS NOTES
1. Have you been to the ER, urgent care clinic since your last visit? Hospitalized since your last visit? No    2. Have you seen or consulted any other health care providers outside of the [address] [place] [address] since your last visit? Include any pap smears or colon screening.  No       3 most recent PHQ Screens 2/5/2020   Little interest or pleasure in doing things Not at all   Feeling down, depressed, irritable, or hopeless Not at all   Total Score PHQ 2 0

## 2020-02-06 LAB
BACTERIA SPEC CULT: NORMAL
SERVICE CMNT-IMP: NORMAL

## 2020-02-06 PROCEDURE — 3331090002 HH PPS REVENUE DEBIT

## 2020-02-06 PROCEDURE — 3331090001 HH PPS REVENUE CREDIT

## 2020-02-07 ENCOUNTER — HOME CARE VISIT (OUTPATIENT)
Dept: SCHEDULING | Facility: HOME HEALTH | Age: 85
End: 2020-02-07
Payer: MEDICARE

## 2020-02-07 PROCEDURE — 400014 HH F/U

## 2020-02-07 PROCEDURE — 3331090002 HH PPS REVENUE DEBIT

## 2020-02-07 PROCEDURE — G0299 HHS/HOSPICE OF RN EA 15 MIN: HCPCS

## 2020-02-07 PROCEDURE — 3331090001 HH PPS REVENUE CREDIT

## 2020-02-08 PROCEDURE — 3331090001 HH PPS REVENUE CREDIT

## 2020-02-08 PROCEDURE — 3331090002 HH PPS REVENUE DEBIT

## 2020-02-09 VITALS
DIASTOLIC BLOOD PRESSURE: 65 MMHG | TEMPERATURE: 97.3 F | OXYGEN SATURATION: 98 % | RESPIRATION RATE: 14 BRPM | HEART RATE: 65 BPM | SYSTOLIC BLOOD PRESSURE: 95 MMHG

## 2020-02-09 PROCEDURE — 3331090001 HH PPS REVENUE CREDIT

## 2020-02-09 PROCEDURE — 3331090002 HH PPS REVENUE DEBIT

## 2020-02-10 ENCOUNTER — HOME CARE VISIT (OUTPATIENT)
Dept: SCHEDULING | Facility: HOME HEALTH | Age: 85
End: 2020-02-10
Payer: MEDICARE

## 2020-02-10 PROCEDURE — 3331090002 HH PPS REVENUE DEBIT

## 2020-02-10 PROCEDURE — G0299 HHS/HOSPICE OF RN EA 15 MIN: HCPCS

## 2020-02-10 PROCEDURE — 3331090001 HH PPS REVENUE CREDIT

## 2020-02-11 VITALS
TEMPERATURE: 97.8 F | DIASTOLIC BLOOD PRESSURE: 60 MMHG | OXYGEN SATURATION: 96 % | SYSTOLIC BLOOD PRESSURE: 105 MMHG | RESPIRATION RATE: 14 BRPM | HEART RATE: 76 BPM

## 2020-02-11 PROCEDURE — A6253 ABSORPT DRG > 48 SQ IN W/O B: HCPCS

## 2020-02-11 PROCEDURE — A6196 ALGINATE DRESSING <=16 SQ IN: HCPCS

## 2020-02-11 PROCEDURE — A6454 SELF-ADHER BAND W>=3" <5"/YD: HCPCS

## 2020-02-11 PROCEDURE — 3331090001 HH PPS REVENUE CREDIT

## 2020-02-11 PROCEDURE — A4927 NON-STERILE GLOVES: HCPCS

## 2020-02-11 PROCEDURE — 3331090002 HH PPS REVENUE DEBIT

## 2020-02-12 ENCOUNTER — HOME CARE VISIT (OUTPATIENT)
Dept: SCHEDULING | Facility: HOME HEALTH | Age: 85
End: 2020-02-12
Payer: MEDICARE

## 2020-02-12 VITALS
SYSTOLIC BLOOD PRESSURE: 104 MMHG | DIASTOLIC BLOOD PRESSURE: 68 MMHG | RESPIRATION RATE: 14 BRPM | HEART RATE: 72 BPM | TEMPERATURE: 97.8 F | OXYGEN SATURATION: 96 %

## 2020-02-12 PROCEDURE — 3331090001 HH PPS REVENUE CREDIT

## 2020-02-12 PROCEDURE — 3331090002 HH PPS REVENUE DEBIT

## 2020-02-12 PROCEDURE — G0299 HHS/HOSPICE OF RN EA 15 MIN: HCPCS

## 2020-02-13 PROCEDURE — 3331090001 HH PPS REVENUE CREDIT

## 2020-02-13 PROCEDURE — 3331090002 HH PPS REVENUE DEBIT

## 2020-02-14 ENCOUNTER — HOME CARE VISIT (OUTPATIENT)
Dept: SCHEDULING | Facility: HOME HEALTH | Age: 85
End: 2020-02-14
Payer: MEDICARE

## 2020-02-14 VITALS
OXYGEN SATURATION: 96 % | HEART RATE: 78 BPM | RESPIRATION RATE: 14 BRPM | DIASTOLIC BLOOD PRESSURE: 62 MMHG | SYSTOLIC BLOOD PRESSURE: 105 MMHG | TEMPERATURE: 97.2 F

## 2020-02-14 PROCEDURE — G0299 HHS/HOSPICE OF RN EA 15 MIN: HCPCS

## 2020-02-14 PROCEDURE — 3331090001 HH PPS REVENUE CREDIT

## 2020-02-14 PROCEDURE — 3331090002 HH PPS REVENUE DEBIT

## 2020-02-15 PROCEDURE — 3331090001 HH PPS REVENUE CREDIT

## 2020-02-15 PROCEDURE — 3331090002 HH PPS REVENUE DEBIT

## 2020-02-16 PROCEDURE — 3331090001 HH PPS REVENUE CREDIT

## 2020-02-16 PROCEDURE — 3331090002 HH PPS REVENUE DEBIT

## 2020-02-17 ENCOUNTER — HOME CARE VISIT (OUTPATIENT)
Dept: SCHEDULING | Facility: HOME HEALTH | Age: 85
End: 2020-02-17
Payer: MEDICARE

## 2020-02-17 VITALS
RESPIRATION RATE: 14 BRPM | HEART RATE: 65 BPM | OXYGEN SATURATION: 97 % | SYSTOLIC BLOOD PRESSURE: 110 MMHG | TEMPERATURE: 97.8 F | DIASTOLIC BLOOD PRESSURE: 70 MMHG

## 2020-02-17 PROCEDURE — 3331090002 HH PPS REVENUE DEBIT

## 2020-02-17 PROCEDURE — A6454 SELF-ADHER BAND W>=3" <5"/YD: HCPCS

## 2020-02-17 PROCEDURE — G0299 HHS/HOSPICE OF RN EA 15 MIN: HCPCS

## 2020-02-17 PROCEDURE — A6456 ZINC PASTE BAND W >=3"<5"/YD: HCPCS

## 2020-02-17 PROCEDURE — 3331090001 HH PPS REVENUE CREDIT

## 2020-02-17 PROCEDURE — A4927 NON-STERILE GLOVES: HCPCS

## 2020-02-18 PROCEDURE — 3331090002 HH PPS REVENUE DEBIT

## 2020-02-18 PROCEDURE — 3331090001 HH PPS REVENUE CREDIT

## 2020-02-19 ENCOUNTER — OFFICE VISIT (OUTPATIENT)
Dept: CARDIOLOGY CLINIC | Age: 85
End: 2020-02-19

## 2020-02-19 ENCOUNTER — HOME CARE VISIT (OUTPATIENT)
Dept: SCHEDULING | Facility: HOME HEALTH | Age: 85
End: 2020-02-19
Payer: MEDICARE

## 2020-02-19 VITALS
WEIGHT: 104 LBS | HEART RATE: 91 BPM | SYSTOLIC BLOOD PRESSURE: 98 MMHG | BODY MASS INDEX: 18.43 KG/M2 | OXYGEN SATURATION: 96 % | DIASTOLIC BLOOD PRESSURE: 60 MMHG | RESPIRATION RATE: 18 BRPM | HEIGHT: 63 IN

## 2020-02-19 DIAGNOSIS — L97.922 LEG ULCER, LEFT, WITH FAT LAYER EXPOSED (HCC): Primary | ICD-10-CM

## 2020-02-19 DIAGNOSIS — L97.912 ULCER OF RIGHT LEG, WITH FAT LAYER EXPOSED (HCC): ICD-10-CM

## 2020-02-19 PROCEDURE — 3331090002 HH PPS REVENUE DEBIT

## 2020-02-19 PROCEDURE — 3331090001 HH PPS REVENUE CREDIT

## 2020-02-19 NOTE — PATIENT INSTRUCTIONS

## 2020-02-19 NOTE — PROGRESS NOTES
[facility]iles    Chief Complaint   Patient presents with    Wound Check    Leg Swelling       History and Physical    Ms. Stella Bonner returns her office for continued follow-up management of bilateral lower extremity ulcerations and edema. She has no new complaints.     Past Medical History:   Diagnosis Date    A-fib [facility]) 01/2017    Arthritis, degenerative     CAD (coronary artery disease) 04/2012    S/P 2.75 X 15 mm BMS of OM (04/2012), Two LAD BMS (07/2012)    Elevated liver enzymes     Likely from statin    HLD (hyperlipidemia)     Hyperkalemia 06/2012    Likely from lisinopril    Ischemic cardiomyopathy     LVEF  30-35%(05/19) 45% (11/2012) & 30% echo (04/2012)    NSTEMI (non-ST elevated myocardial infarction) ([address] 04/2012    UTI (urinary tract infection)     Vertigo      Patient Active Problem List   Diagnosis Code    SOB (shortness of breath) R06.02    NSTEMI (non-ST elevated myocardial infarction) ([address] I21.4    CAD (coronary artery disease) I25.10    Ischemic cardiomyopathy I25.5    Arthritis, degenerative M19.90    Vertigo R42    HLD (hyperlipidemia) E78.5    Chronic a-fib I48.20    Irritable bowel syndrome with diarrhea K58.0    Chronic fatigue R53.82    Hyponatremia E87.1    Acute metabolic encephalopathy Q79.43    Hyperkalemia E87.5    Acute on chronic systolic (congestive) heart failure (HCC) I50.23    Syncope R55    Hypoxia R09.02    Leukocytosis D72.829    Atrial fibrillation with rapid ventricular response (HCC) I48.91    Atrial fibrillation (HCC) I48.91    Hypotension I95.9    Skin rash D28    Metabolic acidosis N65.4    Underweight R63.6    Dizziness R42    Bilateral leg edema R60.0    Leg ulcer, left, with fat layer exposed ([address] L97.922    Ulcer of right leg, with fat layer exposed ([address] I49.673     Past Surgical History:   Procedure Laterality Date    HX CORONARY STENT PLACEMENT  4/23/12    bare metal stent to obtuse marginal branch    HX HEART CATHETERIZATION       Current Outpatient Medications   Medication Sig Dispense Refill    digoxin (LANOXIN) 0.25 mg tablet Take 1 Tab by mouth every Sunday, Tuesday, Thursday. 12 Tab 0    FLUoxetine (PROZAC) 20 mg capsule Take 1 Cap by mouth daily. 12 Cap 0    furosemide (LASIX) 40 mg tablet Take 1 Tab by mouth daily. 30 Tab 0    vitamin a-vitamin c-vit e-min (OCUVITE) tablet Take 1 Tab by mouth daily. Gummy      multivit,calc,mins/iron/folic (ONE-A-DAY WOMENS FORMULA PO) Take  by mouth. 1 Gummy       fluticasone propionate (FLONASE) 50 mcg/actuation nasal spray 1 [place] by Both Nostrils route daily.  ondansetron (ZOFRAN ODT) 4 mg disintegrating tablet Take 4 mg by mouth every eight (8) hours as needed for Nausea. Take 4 mg tab by mouth every eight (8) hours as needed for Nausea      mirtazapine (REMERON) 15 mg tablet Take 1 Tab by mouth nightly. Indications: major depressive disorder 30 Tab 0    digoxin (LANOXIN) 0.125 mg tablet Take 1 Tab by mouth every Monday, Wednesday, Friday, Saturday AND 2 Tabs every Sunday, Tuesday, Thursday. Indications: Ventricular Rate Control in Atrial Fibrillation 45 Tab 0    carboxymethylcellulose sodium (CELLUVISC) 0.5 % drop ophthalmic solution Administer 1 Drop to both eyes three (3) times daily as needed for Other (dry eyes). 1 Bottle 0    acetaminophen (TYLENOL) 325 mg tablet Take 650 mg by mouth every six (6) hours as needed for Pain. 2 tabs      polyethylene glycol (MIRALAX) 17 gram/dose powder Take 17 g by mouth daily as needed (constipation).  carboxymethylcellulos/glycerin (CLEAR EYES FOR DRY EYES OP) Administer 1 Drop to both eyes three (3) times daily as needed (dry eyes).  loperamide (IMMODIUM) 2 mg tablet Take 1 mg by mouth three (3) times daily as needed for Diarrhea. Take 1 mg tab by mouth three (3) times daily as needed for Diarrhea.  psyllium husk-aspartame (METAMUCIL FIBER) 3.4 gram pwpk packet Take 1 Packet by mouth daily.  30 Packet 0  aspirin 81 mg chewable tablet Take 1 Tab by mouth daily. 30 Tab 0    nitroglycerin (NITROSTAT) 0.4 mg SL tablet 1 Tab by SubLINGual route every five (5) minutes as needed for Chest Pain. (Patient taking differently: 0.4 mg by SubLINGual route every five (5) minutes as needed for Chest Pain. For onset of chest pain, place 1 tab under the tongue. Nitroglycerin sublingual tabs usually give relief in 1 to 5 minutes. If the pain is not relieved, use a second tab 5 minutes after you take the first tab. If the pain continues for another 5 minutes, a third tab may be used. If you still have chest pain after a total of 3 tabs, contact your doctor or go to ED right away or call 911, if necessary.) 25 Tab 3     Allergies   Allergen Reactions    Flu Vaccine 2011 (36 Mos+)(Pf) Anaphylaxis    Codeine Anaphylaxis    Egg Not Reported This Time     Per patient who is A&O x3 that she can eat the yolks but breaks out from the whites. She prefers Hard boiled for this reason.     Influenza Virus Vaccines Hives    Lipitor [Atorvastatin] Nausea Only    Pcn [Penicillins] Hives     Social History     Socioeconomic History    Marital status:      Spouse name: Not on file    Number of children: Not on file    Years of education: Not on file    Highest education level: Not on file   Occupational History    Not on file   Social Needs    Financial resource strain: Not on file    Food insecurity:     Worry: Not on file     Inability: Not on file    Transportation needs:     Medical: Not on file     Non-medical: Not on file   Tobacco Use    Smoking status: Never Smoker    Smokeless tobacco: Never Used   Substance and Sexual Activity    Alcohol use: No    Drug use: No    Sexual activity: Never   Lifestyle    Physical activity:     Days per week: Not on file     Minutes per session: Not on file    Stress: Not on file   Relationships    Social connections:     Talks on phone: Not on file     Gets together: Not on file     Attends Shinto service: Not on file     Active member of club or organization: Not on file     Attends meetings of clubs or organizations: Not on file     Relationship status: Not on file    Intimate partner violence:     Fear of current or ex partner: Not on file     Emotionally abused: Not on file     Physically abused: Not on file     Forced sexual activity: Not on file   Other Topics Concern    Not on file   Social History Narrative    Not on file      Family History   Problem Relation Age of Onset    Diabetes Mother        Review of Systems    Review of Systems   Constitutional: Negative for chills, diaphoresis, fever, malaise/fatigue and weight loss. HENT: Negative for hearing loss and sore throat. Eyes: Negative for blurred vision, photophobia and redness. Respiratory: Negative for cough, hemoptysis, shortness of breath and wheezing. Cardiovascular: Negative for chest pain, palpitations and orthopnea. Gastrointestinal: Negative for abdominal pain, blood in stool, constipation, diarrhea, heartburn, nausea and vomiting. Genitourinary: Negative for dysuria, frequency, hematuria and urgency. Musculoskeletal: Negative for back pain and myalgias. Skin: Negative for itching and rash. Neurological: Negative for dizziness, speech change, focal weakness, weakness and headaches. Endo/Heme/Allergies: Does not bruise/bleed easily. Psychiatric/Behavioral: Negative for depression and suicidal ideas.             Physical Exam:    Visit Vitals  BP 98/60 (BP 1 Location: Left arm, BP Patient Position: Sitting)   Pulse 91   Resp 18   Ht 5' 3\" (1.6 m)   Wt 104 lb (47.2 kg)   SpO2 96%   BMI 18.42 kg/m²      Physical Examination: General appearance - alert, well appearing, and in no distress  Mental status - alert, oriented to person, place, and time  Eyes - sclera anicteric, left eye normal, right eye normal  Ears - right ear normal, left ear normal  Nose - normal and patent, no erythema, discharge or polyps  Mouth - mucous membranes moist, pharynx normal without lesions  Extremities -right anterior shin ulceration significantly decreased in size but is of skin growth on the superior medial aspect. Currently approximately 5 x 3 cm. No significant depth. No edema right lower extremity. Left leg ulceration decreased in size. Tunneling medially approximately 2 cm. Positive fibrinous tissue bilateral wounds debrided with curette. No evidence of infection. Impression and Plan:    ICD-10-CM ICD-9-CM    1. Leg ulcer, left, with fat layer exposed ([address] L97.922 707.10    2. Ulcer of right leg, with fat layer exposed ([address] L97.912 707.10      No orders of the defined types were placed in this encounter. Follow-up and Dispositions    · Return in about 2 weeks (around 3/4/2020) for Wound check. I am pleased with how much progress Ms. Aris Sarmeinto continues to make with Unna boot compression. We placed her back in [facility] today we will see her back in 2 weeks for continued wound check. I believe her right leg ulceration may be healed by time. The treatment plan was reviewed with the patient in detail. The patient voiced understanding of this plan and all questions and concerns were addressed. The patient agrees with this plan. We discussed the signs and symptoms that would require earlier attention or intervention. The patient was given educational material related to his/her visit and the patient has voiced understanding of the material.     I appreciate the opportunity to participate in the care of your patient. I will be sure to keep you informed of any subsequent changes in the treatment plan. If you have any questions or concerns, please feel free to contact me. Vera Murphy MD    PLEASE NOTE:  This document has been produced using voice recognition software. Unrecognized errors in transcription may be present.

## 2020-02-19 NOTE — PROGRESS NOTES
1. Have you been to the ER, urgent care clinic since your last visit? Hospitalized since your last visit? No    2. Have you seen or consulted any other health care providers outside of the [address] [place] [address] since your last visit? Include any pap smears or colon screening.  No       3 most recent PHQ Screens 2/19/2020   Little interest or pleasure in doing things Not at all   Feeling down, depressed, irritable, or hopeless Not at all   Total Score PHQ 2 0

## 2020-02-20 PROCEDURE — 3331090002 HH PPS REVENUE DEBIT

## 2020-02-20 PROCEDURE — 3331090001 HH PPS REVENUE CREDIT

## 2020-02-21 ENCOUNTER — HOME CARE VISIT (OUTPATIENT)
Dept: SCHEDULING | Facility: HOME HEALTH | Age: 85
End: 2020-02-21
Payer: MEDICARE

## 2020-02-21 PROCEDURE — 3331090002 HH PPS REVENUE DEBIT

## 2020-02-21 PROCEDURE — 3331090001 HH PPS REVENUE CREDIT

## 2020-02-22 PROCEDURE — 3331090002 HH PPS REVENUE DEBIT

## 2020-02-22 PROCEDURE — 3331090001 HH PPS REVENUE CREDIT

## 2020-02-23 PROCEDURE — 3331090001 HH PPS REVENUE CREDIT

## 2020-02-23 PROCEDURE — 3331090002 HH PPS REVENUE DEBIT

## 2020-02-24 ENCOUNTER — HOME CARE VISIT (OUTPATIENT)
Dept: SCHEDULING | Facility: HOME HEALTH | Age: 85
End: 2020-02-24
Payer: MEDICARE

## 2020-02-24 VITALS
RESPIRATION RATE: 15 BRPM | OXYGEN SATURATION: 94 % | TEMPERATURE: 97.8 F | DIASTOLIC BLOOD PRESSURE: 68 MMHG | SYSTOLIC BLOOD PRESSURE: 106 MMHG | HEART RATE: 81 BPM

## 2020-02-24 PROCEDURE — 3331090002 HH PPS REVENUE DEBIT

## 2020-02-24 PROCEDURE — A4602 REPLACE LITHIUM BATTERY 1.5V: HCPCS

## 2020-02-24 PROCEDURE — 3331090001 HH PPS REVENUE CREDIT

## 2020-02-24 PROCEDURE — A6223 GAUZE >16<=48 NO W/SAL W/O B: HCPCS

## 2020-02-24 PROCEDURE — G0299 HHS/HOSPICE OF RN EA 15 MIN: HCPCS

## 2020-02-24 PROCEDURE — A6197 ALGINATE DRSG >16 <=48 SQ IN: HCPCS

## 2020-02-25 PROCEDURE — 3331090001 HH PPS REVENUE CREDIT

## 2020-02-25 PROCEDURE — 3331090002 HH PPS REVENUE DEBIT

## 2020-02-26 ENCOUNTER — HOME CARE VISIT (OUTPATIENT)
Dept: SCHEDULING | Facility: HOME HEALTH | Age: 85
End: 2020-02-26
Payer: MEDICARE

## 2020-02-26 VITALS
HEART RATE: 76 BPM | OXYGEN SATURATION: 97 % | SYSTOLIC BLOOD PRESSURE: 110 MMHG | TEMPERATURE: 97.8 F | DIASTOLIC BLOOD PRESSURE: 65 MMHG | RESPIRATION RATE: 16 BRPM

## 2020-02-26 PROCEDURE — 3331090002 HH PPS REVENUE DEBIT

## 2020-02-26 PROCEDURE — 3331090001 HH PPS REVENUE CREDIT

## 2020-02-26 PROCEDURE — G0299 HHS/HOSPICE OF RN EA 15 MIN: HCPCS

## 2020-02-27 PROCEDURE — 3331090002 HH PPS REVENUE DEBIT

## 2020-02-27 PROCEDURE — 3331090001 HH PPS REVENUE CREDIT

## 2020-02-28 ENCOUNTER — HOME CARE VISIT (OUTPATIENT)
Dept: SCHEDULING | Facility: HOME HEALTH | Age: 85
End: 2020-02-28
Payer: MEDICARE

## 2020-02-28 PROCEDURE — G0299 HHS/HOSPICE OF RN EA 15 MIN: HCPCS

## 2020-02-28 PROCEDURE — 3331090002 HH PPS REVENUE DEBIT

## 2020-02-28 PROCEDURE — 3331090001 HH PPS REVENUE CREDIT

## 2020-02-29 VITALS
TEMPERATURE: 98.1 F | HEART RATE: 92 BPM | RESPIRATION RATE: 14 BRPM | OXYGEN SATURATION: 97 % | DIASTOLIC BLOOD PRESSURE: 65 MMHG | SYSTOLIC BLOOD PRESSURE: 105 MMHG

## 2020-02-29 PROCEDURE — 3331090002 HH PPS REVENUE DEBIT

## 2020-02-29 PROCEDURE — 3331090001 HH PPS REVENUE CREDIT

## 2020-03-01 PROCEDURE — 3331090001 HH PPS REVENUE CREDIT

## 2020-03-01 PROCEDURE — 3331090002 HH PPS REVENUE DEBIT

## 2020-03-02 ENCOUNTER — HOME CARE VISIT (OUTPATIENT)
Dept: HOME HEALTH SERVICES | Facility: HOME HEALTH | Age: 85
End: 2020-03-02
Payer: MEDICARE

## 2020-03-02 PROCEDURE — 3331090002 HH PPS REVENUE DEBIT

## 2020-03-02 PROCEDURE — 3331090001 HH PPS REVENUE CREDIT

## 2020-03-03 ENCOUNTER — OFFICE VISIT (OUTPATIENT)
Dept: CARDIOLOGY CLINIC | Age: 85
End: 2020-03-03

## 2020-03-03 VITALS
RESPIRATION RATE: 18 BRPM | HEIGHT: 63 IN | SYSTOLIC BLOOD PRESSURE: 98 MMHG | HEART RATE: 98 BPM | DIASTOLIC BLOOD PRESSURE: 72 MMHG | OXYGEN SATURATION: 98 % | BODY MASS INDEX: 18.43 KG/M2 | WEIGHT: 104 LBS

## 2020-03-03 DIAGNOSIS — L97.922 LEG ULCER, LEFT, WITH FAT LAYER EXPOSED (HCC): Primary | ICD-10-CM

## 2020-03-03 DIAGNOSIS — L97.912 ULCER OF RIGHT LEG, WITH FAT LAYER EXPOSED (HCC): ICD-10-CM

## 2020-03-03 PROCEDURE — 3331090002 HH PPS REVENUE DEBIT

## 2020-03-03 PROCEDURE — 3331090001 HH PPS REVENUE CREDIT

## 2020-03-03 NOTE — PROGRESS NOTES
[facility]          Chart reviewed for the following:   Gurpreet DALY LPN, have reviewed the medications and updated the Allergic reactions for Deepti C Quiles     TIME OUT performed immediately prior to start of procedure:   Don DALY LPN, have performed the following reviews on [address] [facility] [address] prior to the start of the procedure:            * Patient was identified by name and date of birth   * Agreement that [facility] boot removed and reapplied   * Procedure site verified   * Patient was positioned for comfort  * Verbal consent was given by patient     Time: 1100hrs      Date of procedure: 3/3/2020    Procedure performed by:  Hernan Forbes MD    How tolerated by patient: tolerated the procedure well with no complications    Comments: bilateral unna boots applied to bilateral lower extremities after cleansing lower extremities; adaptic,calcuim alginate applied to open areas on bilateral lower extremities; covered with abd pads; tolerated well.

## 2020-03-03 NOTE — PROGRESS NOTES
[facility]    Chief Complaint   Patient presents with    Wound Check       History and Physical    Ms. Lennox Conner returns her office for continued follow-up and management for bilateral leg edema and anterior shin ulceration bilaterally. She has no new complaints.     Past Medical History:   Diagnosis Date    A-fib [facility]) 01/2017    Arthritis, degenerative     CAD (coronary artery disease) 04/2012    S/P 2.75 X 15 mm BMS of OM (04/2012), Two LAD BMS (07/2012)    Elevated liver enzymes     Likely from statin    HLD (hyperlipidemia)     Hyperkalemia 06/2012    Likely from lisinopril    Ischemic cardiomyopathy     LVEF  30-35%(05/19) 45% (11/2012) & 30% echo (04/2012)    NSTEMI (non-ST elevated myocardial infarction) ([address] 04/2012    UTI (urinary tract infection)     Vertigo      Patient Active Problem List   Diagnosis Code    SOB (shortness of breath) R06.02    NSTEMI (non-ST elevated myocardial infarction) ([address] I21.4    CAD (coronary artery disease) I25.10    Ischemic cardiomyopathy I25.5    Arthritis, degenerative M19.90    Vertigo R42    HLD (hyperlipidemia) E78.5    Chronic a-fib I48.20    Irritable bowel syndrome with diarrhea K58.0    Chronic fatigue R53.82    Hyponatremia E87.1    Acute metabolic encephalopathy P02.97    Hyperkalemia E87.5    Acute on chronic systolic (congestive) heart failure (HCC) I50.23    Syncope R55    Hypoxia R09.02    Leukocytosis D72.829    Atrial fibrillation with rapid ventricular response (HCC) I48.91    Atrial fibrillation (HCC) I48.91    Hypotension I95.9    Skin rash I23    Metabolic acidosis B60.8    Underweight R63.6    Dizziness R42    Bilateral leg edema R60.0    Leg ulcer, left, with fat layer exposed ([address] L97.922    Ulcer of right leg, with fat layer exposed ([address] L89.746     Past Surgical History:   Procedure Laterality Date    HX CORONARY STENT PLACEMENT  4/23/12    bare metal stent to obtuse marginal branch    HX HEART CATHETERIZATION       Current Outpatient Medications   Medication Sig Dispense Refill    digoxin (LANOXIN) 0.25 mg tablet Take 1 Tab by mouth every Sunday, Tuesday, Thursday. 12 Tab 0    FLUoxetine (PROZAC) 20 mg capsule Take 1 Cap by mouth daily. 12 Cap 0    furosemide (LASIX) 40 mg tablet Take 1 Tab by mouth daily. 30 Tab 0    vitamin a-vitamin c-vit e-min (OCUVITE) tablet Take 1 Tab by mouth daily. Gummy      multivit,calc,mins/iron/folic (ONE-A-DAY WOMENS FORMULA PO) Take  by mouth. 1 Gummy       fluticasone propionate (FLONASE) 50 mcg/actuation nasal spray 1 [place] by Both Nostrils route daily.  ondansetron (ZOFRAN ODT) 4 mg disintegrating tablet Take 4 mg by mouth every eight (8) hours as needed for Nausea. Take 4 mg tab by mouth every eight (8) hours as needed for Nausea      mirtazapine (REMERON) 15 mg tablet Take 1 Tab by mouth nightly. Indications: major depressive disorder 30 Tab 0    digoxin (LANOXIN) 0.125 mg tablet Take 1 Tab by mouth every Monday, Wednesday, Friday, Saturday AND 2 Tabs every Sunday, Tuesday, Thursday. Indications: Ventricular Rate Control in Atrial Fibrillation 45 Tab 0    carboxymethylcellulose sodium (CELLUVISC) 0.5 % drop ophthalmic solution Administer 1 Drop to both eyes three (3) times daily as needed for Other (dry eyes). 1 Bottle 0    acetaminophen (TYLENOL) 325 mg tablet Take 650 mg by mouth every six (6) hours as needed for Pain. 2 tabs      polyethylene glycol (MIRALAX) 17 gram/dose powder Take 17 g by mouth daily as needed (constipation).  carboxymethylcellulos/glycerin (CLEAR EYES FOR DRY EYES OP) Administer 1 Drop to both eyes three (3) times daily as needed (dry eyes).  loperamide (IMMODIUM) 2 mg tablet Take 1 mg by mouth three (3) times daily as needed for Diarrhea. Take 1 mg tab by mouth three (3) times daily as needed for Diarrhea.  psyllium husk-aspartame (METAMUCIL FIBER) 3.4 gram pwpk packet Take 1 Packet by mouth daily.  30 Packet 0  aspirin 81 mg chewable tablet Take 1 Tab by mouth daily. 30 Tab 0    nitroglycerin (NITROSTAT) 0.4 mg SL tablet 1 Tab by SubLINGual route every five (5) minutes as needed for Chest Pain. (Patient taking differently: 0.4 mg by SubLINGual route every five (5) minutes as needed for Chest Pain. For onset of chest pain, place 1 tab under the tongue. Nitroglycerin sublingual tabs usually give relief in 1 to 5 minutes. If the pain is not relieved, use a second tab 5 minutes after you take the first tab. If the pain continues for another 5 minutes, a third tab may be used. If you still have chest pain after a total of 3 tabs, contact your doctor or go to ED right away or call 911, if necessary.) 25 Tab 3     Allergies   Allergen Reactions    Flu Vaccine 2011 (36 Mos+)(Pf) Anaphylaxis    Codeine Anaphylaxis    Egg Not Reported This Time     Per patient who is A&O x3 that she can eat the yolks but breaks out from the whites. She prefers Hard boiled for this reason.     Influenza Virus Vaccines Hives    Lipitor [Atorvastatin] Nausea Only    Pcn [Penicillins] Hives     Social History     Socioeconomic History    Marital status:      Spouse name: Not on file    Number of children: Not on file    Years of education: Not on file    Highest education level: Not on file   Occupational History    Not on file   Social Needs    Financial resource strain: Not on file    Food insecurity:     Worry: Not on file     Inability: Not on file    Transportation needs:     Medical: Not on file     Non-medical: Not on file   Tobacco Use    Smoking status: Never Smoker    Smokeless tobacco: Never Used   Substance and Sexual Activity    Alcohol use: No    Drug use: No    Sexual activity: Never   Lifestyle    Physical activity:     Days per week: Not on file     Minutes per session: Not on file    Stress: Not on file   Relationships    Social connections:     Talks on phone: Not on file     Gets together: Not on file     Attends Rastafarian service: Not on file     Active member of club or organization: Not on file     Attends meetings of clubs or organizations: Not on file     Relationship status: Not on file    Intimate partner violence:     Fear of current or ex partner: Not on file     Emotionally abused: Not on file     Physically abused: Not on file     Forced sexual activity: Not on file   Other Topics Concern    Not on file   Social History Narrative    Not on file      Family History   Problem Relation Age of Onset    Diabetes Mother        Review of Systems    Review of Systems   Constitutional: Negative for chills, diaphoresis, fever, malaise/fatigue and weight loss. HENT: Negative for hearing loss and sore throat. Eyes: Negative for blurred vision, photophobia and redness. Respiratory: Negative for cough, hemoptysis, shortness of breath and wheezing. Cardiovascular: Positive for leg swelling. Negative for chest pain, palpitations and orthopnea. Gastrointestinal: Negative for abdominal pain, blood in stool, constipation, diarrhea, heartburn, nausea and vomiting. Genitourinary: Negative for dysuria, frequency, hematuria and urgency. Musculoskeletal: Negative for back pain and myalgias. Skin: Negative for itching and rash. Neurological: Negative for dizziness, speech change, focal weakness, weakness and headaches. Endo/Heme/Allergies: Does not bruise/bleed easily. Psychiatric/Behavioral: Negative for depression and suicidal ideas.             Physical Exam:    Visit Vitals  BP 98/72 (BP 1 Location: Left arm, BP Patient Position: Sitting)   Pulse 98   Resp 18   Ht 5' 3\" (1.6 m)   Wt 104 lb (47.2 kg)   SpO2 98%   BMI 18.42 kg/m²      Physical Examination: General appearance - alert, well appearing, and in no distress  Mental status - alert, oriented to person, place, and time  Eyes - sclera anicteric, left eye normal, right eye normal  Ears - right ear normal, left ear normal  Nose - normal and patent, no erythema, discharge or polyps  Mouth - mucous membranes moist, pharynx normal without lesions  Extremities -warm well perfused. Right anterior shin ulceration size of a quarter. Good granulation tissue healing well. Left anterior shin ulceration 6 x 3 cm. There is a granulation tissue mixed with fibrinous tissue unable to fully debride. No signs of infection. Impression and Plan:    ICD-10-CM ICD-9-CM    1. Leg ulcer, left, with fat layer exposed ([address] L97.922 707.10 APPLY OF PASTE BOOT      APPLY OF PASTE BOOT   2. Ulcer of right leg, with fat layer exposed ([address] L97.912 707.10 APPLY OF PASTE BOOT      APPLY OF PASTE BOOT     Orders Placed This Encounter    APPLY OF PASTE BOOT    APPLY OF PASTE BOOT       Follow-up and Dispositions    · Return in about 3 weeks (around 3/24/2020) for Wound check. I told Ms. Quiles and her son-in-law that I am pleased with how her wounds are healing we will continue with Unna boot and see her back in 3 weeks time. The treatment plan was reviewed with the patient in detail. The patient voiced understanding of this plan and all questions and concerns were addressed. The patient agrees with this plan. We discussed the signs and symptoms that would require earlier attention or intervention. The patient was given educational material related to his/her visit and the patient has voiced understanding of the material.     I appreciate the opportunity to participate in the care of your patient. I will be sure to keep you informed of any subsequent changes in the treatment plan. If you have any questions or concerns, please feel free to contact me. Osmin Wolff MD    PLEASE NOTE:  This document has been produced using voice recognition software. Unrecognized errors in transcription may be present.

## 2020-03-03 NOTE — PROGRESS NOTES
1. Have you been to the ER, urgent care clinic since your last visit? Hospitalized since your last visit? No    2. Have you seen or consulted any other health care providers outside of the [address] [place] [address] since your last visit? Include any pap smears or colon screening.  No         3 most recent PHQ Screens 3/3/2020   Little interest or pleasure in doing things Not at all   Feeling down, depressed, irritable, or hopeless Not at all   Total Score PHQ 2 0

## 2020-03-04 ENCOUNTER — HOME CARE VISIT (OUTPATIENT)
Dept: SCHEDULING | Facility: HOME HEALTH | Age: 85
End: 2020-03-04
Payer: MEDICARE

## 2020-03-04 PROCEDURE — 3331090001 HH PPS REVENUE CREDIT

## 2020-03-04 PROCEDURE — G0299 HHS/HOSPICE OF RN EA 15 MIN: HCPCS

## 2020-03-04 PROCEDURE — 3331090002 HH PPS REVENUE DEBIT

## 2020-03-05 PROCEDURE — 3331090002 HH PPS REVENUE DEBIT

## 2020-03-05 PROCEDURE — 3331090001 HH PPS REVENUE CREDIT

## 2020-03-06 ENCOUNTER — HOME CARE VISIT (OUTPATIENT)
Dept: SCHEDULING | Facility: HOME HEALTH | Age: 85
End: 2020-03-06
Payer: MEDICARE

## 2020-03-06 PROCEDURE — 3331090002 HH PPS REVENUE DEBIT

## 2020-03-06 PROCEDURE — 3331090001 HH PPS REVENUE CREDIT

## 2020-03-06 PROCEDURE — G0299 HHS/HOSPICE OF RN EA 15 MIN: HCPCS

## 2020-03-07 PROCEDURE — 3331090002 HH PPS REVENUE DEBIT

## 2020-03-07 PROCEDURE — 3331090001 HH PPS REVENUE CREDIT

## 2020-03-07 PROCEDURE — 3331090003 HH PPS REVENUE ADJ

## 2020-03-08 VITALS
SYSTOLIC BLOOD PRESSURE: 130 MMHG | DIASTOLIC BLOOD PRESSURE: 70 MMHG | OXYGEN SATURATION: 96 % | TEMPERATURE: 98.6 F | HEART RATE: 88 BPM | RESPIRATION RATE: 18 BRPM

## 2020-03-08 PROCEDURE — 3331090001 HH PPS REVENUE CREDIT

## 2020-03-08 PROCEDURE — 3331090002 HH PPS REVENUE DEBIT

## 2020-03-09 PROCEDURE — 3331090001 HH PPS REVENUE CREDIT

## 2020-03-09 PROCEDURE — 3331090002 HH PPS REVENUE DEBIT

## 2020-03-10 ENCOUNTER — HOME CARE VISIT (OUTPATIENT)
Dept: SCHEDULING | Facility: HOME HEALTH | Age: 85
End: 2020-03-10
Payer: MEDICARE

## 2020-03-10 VITALS
SYSTOLIC BLOOD PRESSURE: 110 MMHG | TEMPERATURE: 97.5 F | DIASTOLIC BLOOD PRESSURE: 60 MMHG | HEART RATE: 95 BPM | OXYGEN SATURATION: 96 % | RESPIRATION RATE: 14 BRPM

## 2020-03-10 PROCEDURE — 400014 HH F/U

## 2020-03-10 PROCEDURE — G0299 HHS/HOSPICE OF RN EA 15 MIN: HCPCS

## 2020-03-10 PROCEDURE — 3331090001 HH PPS REVENUE CREDIT

## 2020-03-10 PROCEDURE — 3331090002 HH PPS REVENUE DEBIT

## 2020-03-11 PROCEDURE — 3331090002 HH PPS REVENUE DEBIT

## 2020-03-11 PROCEDURE — 3331090001 HH PPS REVENUE CREDIT

## 2020-03-12 PROCEDURE — 3331090001 HH PPS REVENUE CREDIT

## 2020-03-12 PROCEDURE — 3331090002 HH PPS REVENUE DEBIT

## 2020-03-13 ENCOUNTER — HOME CARE VISIT (OUTPATIENT)
Dept: SCHEDULING | Facility: HOME HEALTH | Age: 85
End: 2020-03-13
Payer: MEDICARE

## 2020-03-13 VITALS
OXYGEN SATURATION: 93 % | RESPIRATION RATE: 16 BRPM | HEART RATE: 78 BPM | SYSTOLIC BLOOD PRESSURE: 138 MMHG | DIASTOLIC BLOOD PRESSURE: 71 MMHG

## 2020-03-13 PROCEDURE — 3331090002 HH PPS REVENUE DEBIT

## 2020-03-13 PROCEDURE — 3331090001 HH PPS REVENUE CREDIT

## 2020-03-13 PROCEDURE — G0299 HHS/HOSPICE OF RN EA 15 MIN: HCPCS

## 2020-03-14 VITALS
DIASTOLIC BLOOD PRESSURE: 76 MMHG | TEMPERATURE: 97.8 F | HEART RATE: 76 BPM | OXYGEN SATURATION: 96 % | SYSTOLIC BLOOD PRESSURE: 115 MMHG | RESPIRATION RATE: 14 BRPM

## 2020-03-14 PROCEDURE — 3331090002 HH PPS REVENUE DEBIT

## 2020-03-14 PROCEDURE — 3331090001 HH PPS REVENUE CREDIT

## 2020-03-15 PROCEDURE — 3331090001 HH PPS REVENUE CREDIT

## 2020-03-15 PROCEDURE — 3331090002 HH PPS REVENUE DEBIT

## 2020-03-16 PROCEDURE — 3331090001 HH PPS REVENUE CREDIT

## 2020-03-16 PROCEDURE — 3331090002 HH PPS REVENUE DEBIT

## 2020-03-17 ENCOUNTER — HOME CARE VISIT (OUTPATIENT)
Dept: SCHEDULING | Facility: HOME HEALTH | Age: 85
End: 2020-03-17
Payer: MEDICARE

## 2020-03-17 PROCEDURE — 3331090001 HH PPS REVENUE CREDIT

## 2020-03-17 PROCEDURE — G0299 HHS/HOSPICE OF RN EA 15 MIN: HCPCS

## 2020-03-17 PROCEDURE — 3331090002 HH PPS REVENUE DEBIT

## 2020-03-18 VITALS — OXYGEN SATURATION: 94 % | DIASTOLIC BLOOD PRESSURE: 54 MMHG | SYSTOLIC BLOOD PRESSURE: 104 MMHG | HEART RATE: 81 BPM

## 2020-03-18 PROCEDURE — 3331090002 HH PPS REVENUE DEBIT

## 2020-03-18 PROCEDURE — 3331090001 HH PPS REVENUE CREDIT

## 2020-03-19 PROCEDURE — 3331090002 HH PPS REVENUE DEBIT

## 2020-03-19 PROCEDURE — 3331090001 HH PPS REVENUE CREDIT

## 2020-03-20 PROCEDURE — 3331090002 HH PPS REVENUE DEBIT

## 2020-03-20 PROCEDURE — 3331090001 HH PPS REVENUE CREDIT

## 2020-03-21 PROCEDURE — 3331090001 HH PPS REVENUE CREDIT

## 2020-03-21 PROCEDURE — 3331090002 HH PPS REVENUE DEBIT

## 2020-03-22 PROCEDURE — 3331090001 HH PPS REVENUE CREDIT

## 2020-03-22 PROCEDURE — 3331090002 HH PPS REVENUE DEBIT

## 2020-03-23 PROCEDURE — 3331090001 HH PPS REVENUE CREDIT

## 2020-03-23 PROCEDURE — 3331090002 HH PPS REVENUE DEBIT

## 2020-03-24 ENCOUNTER — HOME CARE VISIT (OUTPATIENT)
Dept: SCHEDULING | Facility: HOME HEALTH | Age: 85
End: 2020-03-24
Payer: MEDICARE

## 2020-03-24 PROCEDURE — 3331090001 HH PPS REVENUE CREDIT

## 2020-03-24 PROCEDURE — 3331090002 HH PPS REVENUE DEBIT

## 2020-03-24 PROCEDURE — G0299 HHS/HOSPICE OF RN EA 15 MIN: HCPCS

## 2020-03-25 VITALS
SYSTOLIC BLOOD PRESSURE: 95 MMHG | TEMPERATURE: 97.3 F | HEART RATE: 87 BPM | DIASTOLIC BLOOD PRESSURE: 60 MMHG | OXYGEN SATURATION: 93 % | RESPIRATION RATE: 14 BRPM

## 2020-03-25 PROCEDURE — 3331090001 HH PPS REVENUE CREDIT

## 2020-03-25 PROCEDURE — 3331090002 HH PPS REVENUE DEBIT

## 2020-03-26 PROCEDURE — 3331090001 HH PPS REVENUE CREDIT

## 2020-03-26 PROCEDURE — 3331090002 HH PPS REVENUE DEBIT

## 2020-03-27 PROCEDURE — 3331090001 HH PPS REVENUE CREDIT

## 2020-03-27 PROCEDURE — 3331090002 HH PPS REVENUE DEBIT

## 2020-03-28 PROCEDURE — 3331090002 HH PPS REVENUE DEBIT

## 2020-03-28 PROCEDURE — 3331090001 HH PPS REVENUE CREDIT

## 2020-03-29 ENCOUNTER — APPOINTMENT (OUTPATIENT)
Dept: GENERAL RADIOLOGY | Age: 85
DRG: 481 | End: 2020-03-29
Attending: EMERGENCY MEDICINE
Payer: MEDICARE

## 2020-03-29 ENCOUNTER — APPOINTMENT (OUTPATIENT)
Dept: CT IMAGING | Age: 85
DRG: 481 | End: 2020-03-29
Attending: PHYSICIAN ASSISTANT
Payer: MEDICARE

## 2020-03-29 ENCOUNTER — HOSPITAL ENCOUNTER (INPATIENT)
Age: 85
LOS: 4 days | Discharge: NURSING, REHAB OR LONG-TERM CARE | DRG: 481 | End: 2020-04-02
Attending: EMERGENCY MEDICINE | Admitting: INTERNAL MEDICINE
Payer: MEDICARE

## 2020-03-29 ENCOUNTER — APPOINTMENT (OUTPATIENT)
Dept: GENERAL RADIOLOGY | Age: 85
DRG: 481 | End: 2020-03-29
Attending: PHYSICIAN ASSISTANT
Payer: MEDICARE
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
## 2020-03-29 DIAGNOSIS — S72.142A DISPLACED INTERTROCHANTERIC FRACTURE OF LEFT FEMUR, INITIAL ENCOUNTER FOR CLOSED FRACTURE (HCC): Primary | ICD-10-CM

## 2020-03-29 DIAGNOSIS — S01.01XA LACERATION OF SCALP, INITIAL ENCOUNTER: ICD-10-CM

## 2020-03-29 PROBLEM — S72.92XA FEMUR FRACTURE, LEFT (HCC): Status: ACTIVE | Noted: 2020-03-29

## 2020-03-29 LAB
ALBUMIN SERPL-MCNC: 2.8 G/DL (ref 3.4–5)
ALBUMIN/GLOB SERPL: 0.7 {RATIO} (ref 0.8–1.7)
ALP SERPL-CCNC: 160 U/L (ref 45–117)
ALT SERPL-CCNC: 23 U/L (ref 13–56)
ANION GAP SERPL CALC-SCNC: 3 MMOL/L (ref 3–18)
APTT PPP: 40.2 SEC (ref 23–36.4)
AST SERPL-CCNC: 37 U/L (ref 10–38)
BASOPHILS # BLD: 0 K/UL (ref 0–0.1)
BASOPHILS NFR BLD: 1 % (ref 0–2)
BILIRUB SERPL-MCNC: 0.6 MG/DL (ref 0.2–1)
BUN SERPL-MCNC: 31 MG/DL (ref 7–18)
BUN/CREAT SERPL: 25 (ref 12–20)
CALCIUM SERPL-MCNC: 8.5 MG/DL (ref 8.5–10.1)
CHLORIDE SERPL-SCNC: 105 MMOL/L (ref 100–111)
CK SERPL-CCNC: 126 U/L (ref 26–192)
CO2 SERPL-SCNC: 33 MMOL/L (ref 21–32)
CREAT SERPL-MCNC: 1.25 MG/DL (ref 0.6–1.3)
DIFFERENTIAL METHOD BLD: ABNORMAL
EOSINOPHIL # BLD: 0.4 K/UL (ref 0–0.4)
EOSINOPHIL NFR BLD: 5 % (ref 0–5)
ERYTHROCYTE [DISTWIDTH] IN BLOOD BY AUTOMATED COUNT: 20 % (ref 11.6–14.5)
GLOBULIN SER CALC-MCNC: 4.2 G/DL (ref 2–4)
GLUCOSE SERPL-MCNC: 81 MG/DL (ref 74–99)
HCT VFR BLD AUTO: 33 % (ref 35–45)
HGB BLD-MCNC: 10.1 G/DL (ref 12–16)
INR PPP: 1.1 (ref 0.8–1.2)
LYMPHOCYTES # BLD: 1.5 K/UL (ref 0.9–3.6)
LYMPHOCYTES NFR BLD: 17 % (ref 21–52)
MCH RBC QN AUTO: 25 PG (ref 24–34)
MCHC RBC AUTO-ENTMCNC: 30.6 G/DL (ref 31–37)
MCV RBC AUTO: 81.7 FL (ref 74–97)
MONOCYTES # BLD: 1.2 K/UL (ref 0.05–1.2)
MONOCYTES NFR BLD: 14 % (ref 3–10)
NEUTS SEG # BLD: 5.5 K/UL (ref 1.8–8)
NEUTS SEG NFR BLD: 63 % (ref 40–73)
PLATELET # BLD AUTO: 337 K/UL (ref 135–420)
PMV BLD AUTO: 9 FL (ref 9.2–11.8)
POTASSIUM SERPL-SCNC: 5.2 MMOL/L (ref 3.5–5.5)
PROT SERPL-MCNC: 7 G/DL (ref 6.4–8.2)
PROTHROMBIN TIME: 13.9 SEC (ref 11.5–15.2)
RBC # BLD AUTO: 4.04 M/UL (ref 4.2–5.3)
SODIUM SERPL-SCNC: 141 MMOL/L (ref 136–145)
WBC # BLD AUTO: 8.7 K/UL (ref 4.6–13.2)

## 2020-03-29 PROCEDURE — 82550 ASSAY OF CK (CPK): CPT

## 2020-03-29 PROCEDURE — 85610 PROTHROMBIN TIME: CPT

## 2020-03-29 PROCEDURE — 93005 ELECTROCARDIOGRAM TRACING: CPT

## 2020-03-29 PROCEDURE — 73560 X-RAY EXAM OF KNEE 1 OR 2: CPT

## 2020-03-29 PROCEDURE — 73502 X-RAY EXAM HIP UNI 2-3 VIEWS: CPT

## 2020-03-29 PROCEDURE — 96374 THER/PROPH/DIAG INJ IV PUSH: CPT

## 2020-03-29 PROCEDURE — 74011250636 HC RX REV CODE- 250/636: Performed by: INTERNAL MEDICINE

## 2020-03-29 PROCEDURE — 74011250636 HC RX REV CODE- 250/636: Performed by: EMERGENCY MEDICINE

## 2020-03-29 PROCEDURE — 75810000293 HC SIMP/SUPERF WND  RPR

## 2020-03-29 PROCEDURE — 73110 X-RAY EXAM OF WRIST: CPT

## 2020-03-29 PROCEDURE — 3331090001 HH PPS REVENUE CREDIT

## 2020-03-29 PROCEDURE — 70450 CT HEAD/BRAIN W/O DYE: CPT

## 2020-03-29 PROCEDURE — 0HQ1XZZ REPAIR FACE SKIN, EXTERNAL APPROACH: ICD-10-PCS | Performed by: EMERGENCY MEDICINE

## 2020-03-29 PROCEDURE — 73080 X-RAY EXAM OF ELBOW: CPT

## 2020-03-29 PROCEDURE — 73030 X-RAY EXAM OF SHOULDER: CPT

## 2020-03-29 PROCEDURE — 85025 COMPLETE CBC W/AUTO DIFF WBC: CPT

## 2020-03-29 PROCEDURE — 74011000250 HC RX REV CODE- 250: Performed by: INTERNAL MEDICINE

## 2020-03-29 PROCEDURE — 74011000250 HC RX REV CODE- 250: Performed by: PHYSICIAN ASSISTANT

## 2020-03-29 PROCEDURE — 71045 X-RAY EXAM CHEST 1 VIEW: CPT

## 2020-03-29 PROCEDURE — 97602 WOUND(S) CARE NON-SELECTIVE: CPT

## 2020-03-29 PROCEDURE — 51702 INSERT TEMP BLADDER CATH: CPT

## 2020-03-29 PROCEDURE — 74011250637 HC RX REV CODE- 250/637: Performed by: PHYSICIAN ASSISTANT

## 2020-03-29 PROCEDURE — 80053 COMPREHEN METABOLIC PANEL: CPT

## 2020-03-29 PROCEDURE — 74011250637 HC RX REV CODE- 250/637: Performed by: INTERNAL MEDICINE

## 2020-03-29 PROCEDURE — 77030021352 HC CBL LD SYS DISP COVD -B

## 2020-03-29 PROCEDURE — 99285 EMERGENCY DEPT VISIT HI MDM: CPT

## 2020-03-29 PROCEDURE — 72125 CT NECK SPINE W/O DYE: CPT

## 2020-03-29 PROCEDURE — 65660000000 HC RM CCU STEPDOWN

## 2020-03-29 PROCEDURE — 3331090002 HH PPS REVENUE DEBIT

## 2020-03-29 PROCEDURE — 85730 THROMBOPLASTIN TIME PARTIAL: CPT

## 2020-03-29 RX ORDER — DIGOXIN 125 MCG
0.12 TABLET ORAL
Status: DISCONTINUED | OUTPATIENT
Start: 2020-03-30 | End: 2020-04-02 | Stop reason: HOSPADM

## 2020-03-29 RX ORDER — CARBOXYMETHYLCELLULOSE SODIUM 5 MG/ML
1 SOLUTION/ DROPS OPHTHALMIC
Status: DISCONTINUED | OUTPATIENT
Start: 2020-03-29 | End: 2020-04-02 | Stop reason: HOSPADM

## 2020-03-29 RX ORDER — SODIUM CHLORIDE 9 MG/ML
250 INJECTION, SOLUTION INTRAVENOUS ONCE
Status: COMPLETED | OUTPATIENT
Start: 2020-03-29 | End: 2020-03-29

## 2020-03-29 RX ORDER — SODIUM CHLORIDE 0.9 % (FLUSH) 0.9 %
5-40 SYRINGE (ML) INJECTION EVERY 8 HOURS
Status: DISCONTINUED | OUTPATIENT
Start: 2020-03-29 | End: 2020-03-30 | Stop reason: HOSPADM

## 2020-03-29 RX ORDER — KETOROLAC TROMETHAMINE 30 MG/ML
15 INJECTION, SOLUTION INTRAMUSCULAR; INTRAVENOUS
Status: COMPLETED | OUTPATIENT
Start: 2020-03-29 | End: 2020-03-29

## 2020-03-29 RX ORDER — SODIUM CHLORIDE 0.9 % (FLUSH) 0.9 %
5-40 SYRINGE (ML) INJECTION AS NEEDED
Status: DISCONTINUED | OUTPATIENT
Start: 2020-03-29 | End: 2020-03-30 | Stop reason: HOSPADM

## 2020-03-29 RX ORDER — MIRTAZAPINE 15 MG/1
15 TABLET, FILM COATED ORAL
Status: DISCONTINUED | OUTPATIENT
Start: 2020-03-29 | End: 2020-04-02 | Stop reason: HOSPADM

## 2020-03-29 RX ORDER — ACETAMINOPHEN 325 MG/1
650 TABLET ORAL
Status: DISCONTINUED | OUTPATIENT
Start: 2020-03-29 | End: 2020-03-30

## 2020-03-29 RX ORDER — POLYETHYLENE GLYCOL 3350 17 G/17G
17 POWDER, FOR SOLUTION ORAL
Status: DISCONTINUED | OUTPATIENT
Start: 2020-03-29 | End: 2020-03-31 | Stop reason: SDUPTHER

## 2020-03-29 RX ORDER — FLUTICASONE PROPIONATE 50 MCG
1 SPRAY, SUSPENSION (ML) NASAL DAILY
Status: DISCONTINUED | OUTPATIENT
Start: 2020-03-30 | End: 2020-04-02 | Stop reason: HOSPADM

## 2020-03-29 RX ORDER — NALOXONE HYDROCHLORIDE 0.4 MG/ML
0.4 INJECTION, SOLUTION INTRAMUSCULAR; INTRAVENOUS; SUBCUTANEOUS AS NEEDED
Status: DISCONTINUED | OUTPATIENT
Start: 2020-03-29 | End: 2020-03-30 | Stop reason: SDUPTHER

## 2020-03-29 RX ORDER — PEDI MULTIVIT 158/IRON/VIT K1 18MG-10MCG
1 TABLET,CHEWABLE ORAL DAILY
Status: DISCONTINUED | OUTPATIENT
Start: 2020-03-30 | End: 2020-04-02 | Stop reason: HOSPADM

## 2020-03-29 RX ORDER — ACETAMINOPHEN 325 MG/1
650 TABLET ORAL
Status: COMPLETED | OUTPATIENT
Start: 2020-03-29 | End: 2020-03-29

## 2020-03-29 RX ORDER — FLUOXETINE 10 MG/1
10 CAPSULE ORAL DAILY
Status: DISCONTINUED | OUTPATIENT
Start: 2020-03-30 | End: 2020-04-02 | Stop reason: HOSPADM

## 2020-03-29 RX ORDER — BISMUTH SUBSALICYLATE 262 MG
1 TABLET,CHEWABLE ORAL DAILY
Status: DISCONTINUED | OUTPATIENT
Start: 2020-03-30 | End: 2020-03-30 | Stop reason: SDUPTHER

## 2020-03-29 RX ORDER — ONDANSETRON 4 MG/1
4 TABLET, ORALLY DISINTEGRATING ORAL
Status: DISCONTINUED | OUTPATIENT
Start: 2020-03-29 | End: 2020-03-30

## 2020-03-29 RX ORDER — KETOROLAC TROMETHAMINE 15 MG/ML
15 INJECTION, SOLUTION INTRAMUSCULAR; INTRAVENOUS EVERY 6 HOURS
Status: COMPLETED | OUTPATIENT
Start: 2020-03-29 | End: 2020-03-30

## 2020-03-29 RX ORDER — DIGOXIN 125 MCG
0.25 TABLET ORAL
Status: DISCONTINUED | OUTPATIENT
Start: 2020-03-29 | End: 2020-04-02 | Stop reason: HOSPADM

## 2020-03-29 RX ORDER — GUAIFENESIN 100 MG/5ML
81 LIQUID (ML) ORAL DAILY
Status: DISCONTINUED | OUTPATIENT
Start: 2020-03-30 | End: 2020-04-02 | Stop reason: HOSPADM

## 2020-03-29 RX ORDER — ACETAMINOPHEN 325 MG/1
650 TABLET ORAL
Status: ACTIVE | OUTPATIENT
Start: 2020-03-29 | End: 2020-03-29

## 2020-03-29 RX ORDER — TRAMADOL HYDROCHLORIDE 50 MG/1
50 TABLET ORAL
Status: DISCONTINUED | OUTPATIENT
Start: 2020-03-29 | End: 2020-04-02 | Stop reason: HOSPADM

## 2020-03-29 RX ORDER — SODIUM CHLORIDE, SODIUM LACTATE, POTASSIUM CHLORIDE, CALCIUM CHLORIDE 600; 310; 30; 20 MG/100ML; MG/100ML; MG/100ML; MG/100ML
75 INJECTION, SOLUTION INTRAVENOUS CONTINUOUS
Status: DISCONTINUED | OUTPATIENT
Start: 2020-03-29 | End: 2020-03-31

## 2020-03-29 RX ORDER — NITROGLYCERIN 0.4 MG/1
0.4 TABLET SUBLINGUAL
Status: DISCONTINUED | OUTPATIENT
Start: 2020-03-29 | End: 2020-04-02 | Stop reason: HOSPADM

## 2020-03-29 RX ORDER — THERA TABS 400 MCG
1 TAB ORAL DAILY
Status: DISCONTINUED | OUTPATIENT
Start: 2020-03-30 | End: 2020-03-29 | Stop reason: SDUPTHER

## 2020-03-29 RX ADMIN — MIRTAZAPINE 15 MG: 15 TABLET, FILM COATED ORAL at 23:46

## 2020-03-29 RX ADMIN — KETOROLAC TROMETHAMINE 15 MG: 15 INJECTION, SOLUTION INTRAMUSCULAR; INTRAVENOUS at 18:27

## 2020-03-29 RX ADMIN — SODIUM CHLORIDE 10 ML: 9 INJECTION, SOLUTION INTRAMUSCULAR; INTRAVENOUS; SUBCUTANEOUS at 23:47

## 2020-03-29 RX ADMIN — SODIUM CHLORIDE 10 ML: 9 INJECTION, SOLUTION INTRAMUSCULAR; INTRAVENOUS; SUBCUTANEOUS at 18:25

## 2020-03-29 RX ADMIN — KETOROLAC TROMETHAMINE 15 MG: 15 INJECTION, SOLUTION INTRAMUSCULAR; INTRAVENOUS at 23:46

## 2020-03-29 RX ADMIN — KETOROLAC TROMETHAMINE 15 MG: 30 INJECTION, SOLUTION INTRAMUSCULAR at 11:19

## 2020-03-29 RX ADMIN — SODIUM CHLORIDE, SODIUM LACTATE, POTASSIUM CHLORIDE, AND CALCIUM CHLORIDE 75 ML/HR: 600; 310; 30; 20 INJECTION, SOLUTION INTRAVENOUS at 18:25

## 2020-03-29 RX ADMIN — Medication 2 ML: at 12:47

## 2020-03-29 RX ADMIN — ACETAMINOPHEN 650 MG: 325 TABLET, FILM COATED ORAL at 11:19

## 2020-03-29 RX ADMIN — SODIUM CHLORIDE 250 ML/HR: 900 INJECTION, SOLUTION INTRAVENOUS at 14:30

## 2020-03-29 NOTE — ROUTINE PROCESS
Bedside and Verbal shift change report given to Yasmine Law RN (oncoming nurse) by Aneta Grey RN, BSN (offgoing nurse). Report given with SBAR, Kardex, Intake/Output, MAR and Recent Results.

## 2020-03-29 NOTE — ED NOTES
Patient seen independently by midlevel provider but also by me as well. Fall today mechanical patient has a intertrochanteric hip fracture this was discussed with the orthopedic surgeon and also hospitalist hospitalist will admit orthopedic surgical consult for operative fixation.     Preop labs EKG and chest x-ray performed

## 2020-03-29 NOTE — ED TRIAGE NOTES
Per EMS-\"Patient slipped on a magazine and fell and hurt her left arm and hit her head. She has a laceration on her head. \"

## 2020-03-29 NOTE — H&P
Internal Medicine History and Physical  Note           NAME:  Mary Junior   :   8/10/1927   MRN:  [id number]     PCP:  Serenity Grider MD     Date/Time:  3/29/2020 1:36 PM      I hereby certify this patient for admission based upon medical necessity as noted below:        Assessment / plan :      #  Femur fracture, left ([address] (3/29/2020) . S/p mechanical fall. Admit for further management . Admission order per hip fracture order set. Hold Lasix for now ,  gentle IV fluid. .  Patient on aspirin  EKG  UA  CXR. Cardiology consult for perioperative cardiac care, dw Dr Khushboo Baca. # HO   Chronic a-fib (2017)    # HO  chronic systolic (congestive) heart failure,  CAD (coronary artery disease) , ICMP , S/P 2.75 X 15 mm BMS of Obtuse marginal.  Chronic A fib. Cardiology consulted. Continue home regimen. Patient is on aspirin       #   History of HLD (hyperlipidemia)          #History of bradycardia. Monitor on telemetry         # SHANE boot  lower legs   . She follows with the vascular clinic     -DVT prophylaxis :  SCD   - Code Status : DNR    -Other chronic medical problems as per past history. Further management depend on the course of the case and expanded data base. DISPO - pt to be admitted at this time for reasons addressed above, continued hospitalization for ongoing assessment and treatment indicated     Addendum : I talked to her son-in-law Mr. Dallas Cochran who introduced himself as hair power of . Updated him with her condition and the plan for the pending orthopedic surgery consultation. He also indicated the patient try tramadol before and work well for her pain. Codeine caused some reaction for her but he does not think it is anaphylaxis something milder. Subjective:     CHIEF COMPLAINT: Bernardo Shuck and hurt her hip    HISTORY OF PRESENT ILLNESS:     Ms. Tisha Mena is a 80 y.o.    female who is admitted for hip fracture after mechanical fall after she tripped at home and her left side of the body. Ms. Thu Arceo with past medical history as noted below , presented to the Emergency Department today  complaining of above. Triage and ER notes noted. Because of the fell on the left side screen x-rays done but only showed left hip fracture. Orthopedic consulted. CT of the head and neck did not show any acute changes. Apart from pain in her left hip patient denies any complaint. Patient does not know her cardiologist she stated sometimes she is with it and sometimes she is not. I looked in her chart and she seen by Dr. Edilberto Reaves and Angela Johnson from cardiology before, I talked to Dr. Naty Burnham and consulted him on the case. Past Medical History:   Diagnosis Date    A-fib [facility]) 01/2017    Arthritis, degenerative     CAD (coronary artery disease) 04/2012    S/P 2.75 X 15 mm BMS of OM (04/2012), Two LAD BMS (07/2012)    Elevated liver enzymes     Likely from statin    HLD (hyperlipidemia)     Hyperkalemia 06/2012    Likely from lisinopril    Ischemic cardiomyopathy     LVEF  30-35%(05/19) 45% (11/2012) & 30% echo (04/2012)    NSTEMI (non-ST elevated myocardial infarction) ([facility] [address] 04/2012    UTI (urinary tract infection)     Vertigo         Past Surgical History:   Procedure Laterality Date    HX CORONARY STENT PLACEMENT  4/23/12    bare metal stent to obtuse marginal branch    HX HEART CATHETERIZATION         Social History     Tobacco Use    Smoking status: Never Smoker    Smokeless tobacco: Never Used   Substance Use Topics    Alcohol use: No        Family History   Problem Relation Age of Onset    Diabetes Mother         Allergies   Allergen Reactions    Flu Vaccine 2011 (36 Mos+)(Pf) Anaphylaxis    Codeine Anaphylaxis    Egg Not Reported This Time     Per patient who is A&O x3 that she can eat the yolks but breaks out from the whites. She prefers Hard boiled for this reason.     Influenza Virus Vaccines Hives    Lipitor [Atorvastatin] Nausea Only    Pcn [Penicillins] Hives        Prior to Admission medications    Medication Sig Start Date End Date Taking? Authorizing Provider   digoxin (LANOXIN) 0.25 mg tablet Take 1 Tab by mouth every Sunday, Tuesday, Thursday. 12/10/19   Mikaela Johnson MD   FLUoxetine (PROZAC) 20 mg capsule Take 1 Cap by mouth daily. 12/10/19   Mikaela Johnson MD   furosemide (LASIX) 40 mg tablet Take 1 Tab by mouth daily. 11/26/19   Shruthi Ratliff MD   vitamin a-vitamin c-vit e-min (OCUVITE) tablet Take 1 Tab by mouth daily. Gummy    Provider, Historical   multivit,calc,mins/iron/folic (ONE-A-DAY WOMENS FORMULA PO) Take  by mouth. 1 Gummy     Provider, Historical   fluticasone propionate (FLONASE) 50 mcg/actuation nasal spray 1 [place] by Both Nostrils route daily. Provider, Historical   ondansetron (ZOFRAN ODT) 4 mg disintegrating tablet Take 4 mg by mouth every eight (8) hours as needed for Nausea. Take 4 mg tab by mouth every eight (8) hours as needed for Nausea    Provider, Historical   mirtazapine (REMERON) 15 mg tablet Take 1 Tab by mouth nightly. Indications: major depressive disorder 10/9/19   Mikaela Johnson MD   digoxin Hyla [place]inta) 0.125 mg tablet Take 1 Tab by mouth every Monday, Wednesday, Friday, Saturday AND 2 Tabs every Sunday, Tuesday, Thursday. Indications: Ventricular Rate Control in Atrial Fibrillation 10/9/19   Prasad MUJICA MD   carboxymethylcellulose sodium (CELLUVISC) 0.5 % drop ophthalmic solution Administer 1 Drop to both eyes three (3) times daily as needed for Other (dry eyes). 10/9/19   Mikaela Johnson MD   acetaminophen (TYLENOL) 325 mg tablet Take 650 mg by mouth every six (6) hours as needed for Pain. 2 tabs    Provider, Historical   polyethylene glycol (MIRALAX) 17 gram/dose powder Take 17 g by mouth daily as needed (constipation).     Provider, Historical   carboxymethylcellulos/glycerin (CLEAR EYES FOR DRY EYES OP) Administer 1 Drop to both eyes three (3) times daily as needed (dry eyes). Provider, Historical   loperamide (IMMODIUM) 2 mg tablet Take 1 mg by mouth three (3) times daily as needed for Diarrhea. Take 1 mg tab by mouth three (3) times daily as needed for Diarrhea. Provider, Historical   psyllium husk-aspartame (METAMUCIL FIBER) 3.4 gram pwpk packet Take 1 Packet by mouth daily. 11/9/18   Naty ORTIZ NP   aspirin 81 mg chewable tablet Take 1 Tab by mouth daily. 11/9/18   Naty ORTIZ NP   nitroglycerin (NITROSTAT) 0.4 mg SL tablet 1 Tab by SubLINGual route every five (5) minutes as needed for Chest Pain. Patient taking differently: 0.4 mg by SubLINGual route every five (5) minutes as needed for Chest Pain. For onset of chest pain, place 1 tab under the tongue. Nitroglycerin sublingual tabs usually give relief in 1 to 5 minutes. If the pain is not relieved, use a second tab 5 minutes after you take the first tab. If the pain continues for another 5 minutes, a third tab may be used. If you still have chest pain after a total of 3 tabs, contact your doctor or go to ED right away or call 911, if necessary.  10/31/17   Grant Hayes MD       Review of Systems:  (bold if positive, otherwise negative), apart from what mentioned in HPI  Apart from above patient deny any other significant complain  Gen:  Weight gain, weight loss, fever, chills, fatigue  Eyes:  Visual changes, pain, conjunctivitis  ENT:  Sore throat, rhinorrhea, decreased hearing  CVS:  Palpitations, chest pain, dizziness, syncope, edema, PND  Pulm:  Cough, dyspnea, sputum, hemoptysis, wheezing  GI:  Abdominal pain, nausea, emesis, diarrhea, constipation, GERD, melena  :  Hematuria, incontinence, nocturia, dysuria, discharge  MS:  Pain, weakness, swelling, arthritis  Skin:  Rash, erythema, abscess, wound, moles  Endo:  Heat intolerance, cold intolerance, weight gain, polyuria  Hem:  Enlarged nodes, bruising, bleeding, night sweats  Renal:  Edema, change in urine, flank pain  Neuro: Numbness, tingling, weakness, seizure, headache, tremors       VITALS:    Vital signs reviewed; most recent are:    Visit Vitals  BP 98/52   Pulse 90   Temp 98 °F (36.7 °C)   Resp 21   Wt 46.7 kg (103 lb)   SpO2 99%   BMI 18.25 kg/m²     SpO2 Readings from Last 6 Encounters:   03/29/20 99%   03/24/20 93%   03/17/20 94%   03/13/20 96%   03/10/20 96%   03/06/20 96%        No intake or output data in the 24 hours ending 03/29/20 1500     Physical Exam:     Gen: Elderly frail lady in slight distress from pain. Appear stated age, Well-developed   HEENT: Small scalp tear on the back sutured by ER staff. Head  normocephalic , hearing intact to voice, dry mucous membranes. Neck:   No apparent JVD, Supple,   Resp: Clear anteriorly and laterally. No accessory muscle use,Bilateral BS present   Card:  No murmurs, normal S1, S2 without Gallop . No Significant lower leg peripheral edema. Abd:  Soft, non-tender, non-distended, bowel sounds are present . Musc: Left hip not moved otherwise no acute other joint injury no cyanosis or clubbing. Skin:  No rashes or ulcers, skin turgor is good. Neuro:  Cranial nerves are grossly intact, no clear area of focal motor weakness (left lower limb not tested) , follows commands appropriately. , alert. Labs: All Cardiac Markers in the last 24 hours:   Lab Results   Component Value Date/Time     03/29/2020 11:01 AM       Recent Labs     03/29/20  1101   WBC 8.7   HGB 10.1*   HCT 33.0*        Recent Labs     03/29/20  1101      K 5.2      CO2 33*   GLU 81   BUN 31*   CREA 1.25   CA 8.5   ALB 2.8*   TBILI 0.6   SGOT 37   ALT 23     No results found for: GLUCPOC  No results for input(s): PH, PCO2, PO2, HCO3, FIO2 in the last 72 hours. Recent Labs     03/29/20  1101   INR 1.1       Xr Shoulder Lt Ap/lat Min 2 V    Result Date: 3/29/2020  IMPRESSION: Negative for fracture or dislocation in the left shoulder.     Xr Elbow Lt Min 3 V    Result Date: 3/29/2020  IMPRESSION: Negative for fracture or dislocation of the left elbow. Xr Wrist Lt Ap/lat/obl Min 3v    Result Date: 3/29/2020  IMPRESSION: Negative for fracture or dislocation in the left wrist.    Xr Hip Lt W Or Wo Pelv 2-3 Vws    Result Date: 3/29/2020  IMPRESSION: Minimally displaced intertrochanteric left femoral fracture. Xr Knee Lt Max 2 Vws    Result Date: 3/29/2020  IMPRESSION: Negative for fracture or dislocation in the left knee. Ct Head Wo Cont    Result Date: 3/29/2020  IMPRESSION: No acute intracranial process, specifically, no evidence of intracranial hemorrhage, mass effect, or midline shift. Ct Spine Cerv Wo Cont    Result Date: 3/29/2020  IMPRESSION: Mild degenerative changes without evidence of acute fracture or traumatic subluxation of the cervical spine. Note that this cervical spine CT was performed supine. Although it is highly sensitive for fractures, it does not evaluate for ligamentous injury or instability. If the patient has persistent symptoms or if otherwise clinically indicated, erect plain film evaluation or MRI should be performed. Xr Chest Port    Result Date: 3/29/2020  IMPRESSION: Small left pleural effusion and associated bibasilar subsegmental atelectasis. Please refer to radiology reports. Telemetry reviewed:   AFIB    Risk of deterioration: high      Total time spent in the care of this patient: [address] discussed with: Patient, Nursing Staff, Consultant/Specialist, >50% of time spent in counseling and coordination of care and ER MD/staff      Discussed:  Code Status and Care Plan patient indicates she is a DNR and does not want resuscitation. I have personally reviewed all pertinent labs, films and EKGs that have officially resulted. I reviewed available pertaining electronic documentation outlining the initial presentation as well as the emergency room physician's encounter.     This document in whole or part of it has been produced using voice recognition software. Unrecognized errors in transcription may be present.     Attending Physician: Hermilo Hogue MD

## 2020-03-29 NOTE — PROGRESS NOTES
Problem: Discharge Planning  Goal: *Discharge to safe environment  Outcome: Progressing Towards Goal   Plan: snf vs [address] [facility] with [facility]

## 2020-03-29 NOTE — ED NOTES
Hospitalist notified that BP 97/44. Received order for 250ml of NS. 0.5mg of morphine now. md made aware that pt can not take morphine.

## 2020-03-29 NOTE — ED PROVIDER NOTES
EMERGENCY DEPARTMENT HISTORY AND PHYSICAL EXAM    10:49 AM      Date: 3/29/2020  Patient Name: Leonia Osgood    History of Presenting Illness     Chief Complaint   Patient presents with    Fall         History Provided By: Patient    Additional History (Context): Leonia Osgood is a 80 y.o. female with past medical history as noted below who presents with complaints of left leg, hip, and arm pain after a fall which occurred this morning during breakfast. Patient states that the fall was mechanical in nature, she denies dizziness, lightheadedness, or loss of consciousness. Patient states that she did hit her head during the fall. Patient states that she slipped on either a magazine or her slippers, and fell to the floor. She states she laid in the floor for a while before she was able to get help. PCP: Yayo Zepeda MD    Current Facility-Administered Medications   Medication Dose Route Frequency Provider Last Rate Last Dose    acetaminophen (TYLENOL) tablet 650 mg  650 mg Oral NOW Mitch Cortés MD   Stopped at 03/29/20 1104    0.9% sodium chloride infusion  250 mL/hr IntraVENous Merlinda Nab, MD         Current Outpatient Medications   Medication Sig Dispense Refill    digoxin (LANOXIN) 0.25 mg tablet Take 1 Tab by mouth every Sunday, Tuesday, Thursday. 12 Tab 0    FLUoxetine (PROZAC) 20 mg capsule Take 1 Cap by mouth daily. 12 Cap 0    furosemide (LASIX) 40 mg tablet Take 1 Tab by mouth daily. 30 Tab 0    vitamin a-vitamin c-vit e-min (OCUVITE) tablet Take 1 Tab by mouth daily. Gummy      multivit,calc,mins/iron/folic (ONE-A-DAY WOMENS FORMULA PO) Take  by mouth. 1 Gummy       fluticasone propionate (FLONASE) 50 mcg/actuation nasal spray 1 [place] by Both Nostrils route daily.  ondansetron (ZOFRAN ODT) 4 mg disintegrating tablet Take 4 mg by mouth every eight (8) hours as needed for Nausea.  Take 4 mg tab by mouth every eight (8) hours as needed for Nausea      mirtazapine (REMERON) 15 mg tablet Take 1 Tab by mouth nightly. Indications: major depressive disorder 30 Tab 0    digoxin (LANOXIN) 0.125 mg tablet Take 1 Tab by mouth every Monday, Wednesday, Friday, Saturday AND 2 Tabs every Sunday, Tuesday, Thursday. Indications: Ventricular Rate Control in Atrial Fibrillation 45 Tab 0    carboxymethylcellulose sodium (CELLUVISC) 0.5 % drop ophthalmic solution Administer 1 Drop to both eyes three (3) times daily as needed for Other (dry eyes). 1 Bottle 0    acetaminophen (TYLENOL) 325 mg tablet Take 650 mg by mouth every six (6) hours as needed for Pain. 2 tabs      polyethylene glycol (MIRALAX) 17 gram/dose powder Take 17 g by mouth daily as needed (constipation).  carboxymethylcellulos/glycerin (CLEAR EYES FOR DRY EYES OP) Administer 1 Drop to both eyes three (3) times daily as needed (dry eyes).  loperamide (IMMODIUM) 2 mg tablet Take 1 mg by mouth three (3) times daily as needed for Diarrhea. Take 1 mg tab by mouth three (3) times daily as needed for Diarrhea.  psyllium husk-aspartame (METAMUCIL FIBER) 3.4 gram pwpk packet Take 1 Packet by mouth daily. 30 Packet 0    aspirin 81 mg chewable tablet Take 1 Tab by mouth daily. 30 Tab 0    nitroglycerin (NITROSTAT) 0.4 mg SL tablet 1 Tab by SubLINGual route every five (5) minutes as needed for Chest Pain. (Patient taking differently: 0.4 mg by SubLINGual route every five (5) minutes as needed for Chest Pain. For onset of chest pain, place 1 tab under the tongue. Nitroglycerin sublingual tabs usually give relief in 1 to 5 minutes. If the pain is not relieved, use a second tab 5 minutes after you take the first tab. If the pain continues for another 5 minutes, a third tab may be used.  If you still have chest pain after a total of 3 tabs, contact your doctor or go to ED right away or call 911, if necessary.) 25 Tab 3       Past History     Past Medical History:  Past Medical History:   Diagnosis Date    A-fib ([facility] [address] 01/2017    Arthritis, degenerative     CAD (coronary artery disease) 04/2012    S/P 2.75 X 15 mm BMS of OM (04/2012), Two LAD BMS (07/2012)    Elevated liver enzymes     Likely from statin    HLD (hyperlipidemia)     Hyperkalemia 06/2012    Likely from lisinopril    Ischemic cardiomyopathy     LVEF  30-35%(05/19) 45% (11/2012) & 30% echo (04/2012)    NSTEMI (non-ST elevated myocardial infarction) ([facility] [address] 04/2012    UTI (urinary tract infection)     Vertigo        Past Surgical History:  Past Surgical History:   Procedure Laterality Date    HX CORONARY STENT PLACEMENT  4/23/12    bare metal stent to obtuse marginal branch    HX HEART CATHETERIZATION         Family History:  Family History   Problem Relation Age of Onset    Diabetes Mother        Social History:  Social History     Tobacco Use    Smoking status: Never Smoker    Smokeless tobacco: Never Used   Substance Use Topics    Alcohol use: No    Drug use: No       Allergies: Allergies   Allergen Reactions    Flu Vaccine 2011 (36 Mos+)(Pf) Anaphylaxis    Codeine Anaphylaxis    Egg Not Reported This Time     Per patient who is A&O x3 that she can eat the yolks but breaks out from the whites. She prefers Hard boiled for this reason.  Influenza Virus Vaccines Hives    Lipitor [Atorvastatin] Nausea Only    Pcn [Penicillins] Hives         Review of Systems       Review of Systems   Constitutional: Negative. HENT: Negative. Respiratory: Negative. Cardiovascular: Negative. Gastrointestinal: Negative. Genitourinary: Negative. Musculoskeletal: Positive for arthralgias and myalgias. Negative for neck pain and neck stiffness. Skin: Positive for wound. Neurological: Negative for dizziness, syncope, speech difficulty, weakness, light-headedness, numbness and headaches. All other systems reviewed and are negative.         Physical Exam     Visit Vitals  /56   Pulse 88   Temp 98 °F (36.7 °C)   Resp 19   Wt 46.7 kg (103 lb) SpO2 98%   BMI 18.25 kg/m²         Physical Exam  Vitals signs reviewed. Constitutional:       General: She is not in acute distress. Appearance: Normal appearance. She is not ill-appearing, toxic-appearing or diaphoretic. Comments: Elderly white female. HENT:      Head: Normocephalic. Comments: Small laceration noted to left posterior aspect of the head, approximately 2cm. No active bleeding at this time. Right Ear: Tympanic membrane, ear canal and external ear normal.      Left Ear: Tympanic membrane, ear canal and external ear normal.      Nose: Nose normal.      Mouth/Throat:      Mouth: Mucous membranes are moist.      Pharynx: Oropharynx is clear. Eyes:      Extraocular Movements: Extraocular movements intact. Neck:      Musculoskeletal: Neck supple. No muscular tenderness. Cardiovascular:      Rate and Rhythm: Normal rate and regular rhythm. Pulses: Normal pulses. Heart sounds: No murmur. No gallop. Pulmonary:      Effort: Pulmonary effort is normal.      Breath sounds: No wheezing, rhonchi or rales. Musculoskeletal:         General: Swelling and tenderness present. No deformity. Right lower leg: Edema present. Left lower leg: Edema present. Comments: Tenderness to palpation of the left knee, thigh, and left hip. No obvious deformity noted, no crepitus or step-off. Tenderness to palpation of the left wrist, elbow, and shoulder. No snuff-box tenderness. No obvious deformity. No crepitus or step-off. Bilateral lower extremity 2+ pitting edema noted. Skin:     General: Skin is warm and dry. Capillary Refill: Capillary refill takes less than 2 seconds. Findings: Erythema present. Comments: Bilateral lower extremity erythema. Neurological:      General: No focal deficit present. Mental Status: She is alert and oriented to person, place, and time. Cranial Nerves: No cranial nerve deficit.              Diagnostic Study Results Labs -  Recent Results (from the past 12 hour(s))   CBC WITH AUTOMATED DIFF    Collection Time: 03/29/20 11:01 AM   Result Value Ref Range    WBC 8.7 4.6 - 13.2 K/uL    RBC 4.04 (L) 4.20 - 5.30 M/uL    HGB 10.1 (L) 12.0 - 16.0 g/dL    HCT 33.0 (L) 35.0 - 45.0 %    MCV 81.7 74.0 - 97.0 FL    MCH 25.0 24.0 - 34.0 PG    MCHC 30.6 (L) 31.0 - 37.0 g/dL    RDW 20.0 (H) 11.6 - 14.5 %    PLATELET 113 853 - 521 K/uL    MPV 9.0 (L) 9.2 - 11.8 FL    NEUTROPHILS 63 40 - 73 %    LYMPHOCYTES 17 (L) 21 - 52 %    MONOCYTES 14 (H) 3 - 10 %    EOSINOPHILS 5 0 - 5 %    BASOPHILS 1 0 - 2 %    ABS. NEUTROPHILS 5.5 1.8 - 8.0 K/UL    ABS. LYMPHOCYTES 1.5 0.9 - 3.6 K/UL    ABS. MONOCYTES 1.2 0.05 - 1.2 K/UL    ABS. EOSINOPHILS 0.4 0.0 - 0.4 K/UL    ABS. BASOPHILS 0.0 0.0 - 0.1 K/UL    DF AUTOMATED     METABOLIC PANEL, COMPREHENSIVE    Collection Time: 03/29/20 11:01 AM   Result Value Ref Range    Sodium 141 136 - 145 mmol/L    Potassium 5.2 3.5 - 5.5 mmol/L    Chloride 105 100 - 111 mmol/L    CO2 33 (H) 21 - 32 mmol/L    Anion gap 3 3.0 - 18 mmol/L    Glucose 81 74 - 99 mg/dL    BUN 31 (H) 7.0 - 18 MG/DL    Creatinine 1.25 0.6 - 1.3 MG/DL    BUN/Creatinine ratio 25 (H) 12 - 20      GFR est AA 49 (L) >60 ml/min/1.73m2    GFR est non-AA 40 (L) >60 ml/min/1.73m2    Calcium 8.5 8.5 - 10.1 MG/DL    Bilirubin, total 0.6 0.2 - 1.0 MG/DL    ALT (SGPT) 23 13 - 56 U/L    AST (SGOT) 37 10 - 38 U/L    Alk.  phosphatase 160 (H) 45 - 117 U/L    Protein, total 7.0 6.4 - 8.2 g/dL    Albumin 2.8 (L) 3.4 - 5.0 g/dL    Globulin 4.2 (H) 2.0 - 4.0 g/dL    A-G Ratio 0.7 (L) 0.8 - 1.7     CK    Collection Time: 03/29/20 11:01 AM   Result Value Ref Range     26 - 192 U/L   PROTHROMBIN TIME + INR    Collection Time: 03/29/20 11:01 AM   Result Value Ref Range    Prothrombin time 13.9 11.5 - 15.2 sec    INR 1.1 0.8 - 1.2     PTT    Collection Time: 03/29/20 11:01 AM   Result Value Ref Range    aPTT 40.2 (H) 23.0 - 36.4 SEC   EKG, 12 LEAD, INITIAL Collection Time: 03/29/20  2:11 PM   Result Value Ref Range    Ventricular Rate 91 BPM    QRS Duration 98 ms    Q-T Interval 354 ms    QTC Calculation (Bezet) 435 ms    Calculated R Axis 110 degrees    Calculated T Axis 144 degrees    Diagnosis       Atrial fibrillation  Left posterior fascicular block  Nonspecific T wave abnormality  Abnormal ECG  When compared with ECG of 21-NOV-2019 10:18,  No significant change was found         Radiologic Studies -   XR CHEST PORT   Final Result   IMPRESSION:      Small left pleural effusion and associated bibasilar subsegmental atelectasis. CT SPINE CERV WO CONT   Final Result   IMPRESSION:      Mild degenerative changes without evidence of acute fracture or traumatic   subluxation of the cervical spine. Note that this cervical spine CT was performed supine. Although it is highly   sensitive for fractures, it does not evaluate for ligamentous injury or   instability. If the patient has persistent symptoms or if otherwise clinically   indicated, erect plain film evaluation or MRI should be performed. CT HEAD WO CONT   Final Result   IMPRESSION:      No acute intracranial process, specifically, no evidence of intracranial   hemorrhage, mass effect, or midline shift. XR HIP LT W OR WO PELV 2-3 VWS   Final Result   IMPRESSION:      Minimally displaced intertrochanteric left femoral fracture. XR WRIST LT AP/LAT/OBL MIN 3V   Final Result   IMPRESSION:      Negative for fracture or dislocation in the left wrist.      XR ELBOW LT MIN 3 V   Final Result   IMPRESSION:      Negative for fracture or dislocation of the left elbow. XR SHOULDER LT AP/LAT MIN 2 V   Final Result   IMPRESSION:      Negative for fracture or dislocation in the left shoulder. XR KNEE LT MAX 2 VWS   Final Result   IMPRESSION:      Negative for fracture or dislocation in the left knee. Medical Decision Making   I am the first provider for this patient.     I reviewed the vital signs, available nursing notes, past medical history, past surgical history, family history and social history. Vital Signs-Reviewed the patient's vital signs. Records Reviewed: Nursing Notes and Old Medical Records (Time of Review: 10:49 AM)    ED Course: Progress Notes, Reevaluation, and Consults:  10:49 AM Met with patient, reviewed history, performed physical exam. Given patient's fall with reports of leg and arm pain, will obtain radiographs of the left knee, hip, wrist, elbow, and shoulder. Will also obtain CT head and C-spine. Will check CBC, CMP, and CK. Will medicate with Tylenol for pain. 11:39 AM Labs are unremarkable. When CT came to transport patient to radiology department, patient refused CT scan because \"she is unable to lay flat as it causes her convulsions\". I attempted to discuss with the patient that due to her fall and head trauma, it is essential to evaluate for any intracranial or cervical spine abnormalities with a CT of the head and C-spine. Patient stated that \"she doesn't care, because she knows she can't lay flat\". Will discuss with attending physician. 12:46 PM Imaging completed without complications. Imaging reveals minimally displaced intertrochanteric femur fracture. Per patient, she is ambulatory with a rolling walker at her assisted living facility. Will contact orthopedics. 1:39 PM Attending physician Dr Morenita Hardwick discussed case with orthopedics. Case was discussed with the hospitalist, Dr Chace Abernathy, who will evaluate the patient for admission.       Procedures:  Wound Repair  Date/Time: 3/29/2020 2:26 PM  Performed by: PAPreparation: skin prepped with alcohol  Location details: scalp  Wound length:2.5 cm or less  Anesthesia method: Topical.    Anesthesia:  Local Anesthetic: LET (lido,epi,tetracaine)  Anesthetic total: 2 mL  Foreign bodies: no foreign bodies  Debridement: minimal  Skin closure: staples  Number of sutures: 2 (Staples)  Approximation: close  Patient tolerance: Patient tolerated the procedure well with no immediate complications  My total time at bedside, performing this procedure was 1-15 minutes. Provider Notes (Medical Decision Making):   80-year-old female seen in the emergency department for complaints of left-sided pain after a fall. Imaging in the emergency department reveals a left-sided intertrochanteric femur fracture. CT of the head and C-spine were unremarkable, remainder of radiographs are unremarkable. Patient scalp wound closed as noted above. Patient will be hospitalized for further care by the hospitalist with consulting services from orthopedics. For Hospitalized Patients:    1. Hospitalization Decision Time:  The decision to hospitalize the patient was made by Dr. Sean Gibson at [phone] on 3/29/2020    2. Aspirin: Aspirin was not given because the patient did not present with a stroke at the time of their Emergency Department evaluation    Diagnosis     Clinical Impression:   1. Displaced intertrochanteric fracture of left femur, initial encounter for closed fracture ([address]    2. Fall, initial encounter        Disposition: [address] [place] [address]    Dictation disclaimer:  Please note that this dictation was completed with [employer], the computer voice recognition software. Quite often unanticipated grammatical, syntax, homophones, and other interpretive errors are inadvertently transcribed by the computer software. Please disregard these errors. Please excuse any errors that have escaped final proofreading.

## 2020-03-29 NOTE — PROGRESS NOTES
Pt received to unit from ED via stretcher, accompanied by Tisha Smith RN to room 3004. She is A&Ox4. No s/s of distress noted. No complaints voiced at this time. Pt oriented to room, white board and use of call bell for assistance; Pt voiced understanding. Dual skin assessment performed with this nurse and CINTHYA Chilel; See Skin man and IP Wound Care for findings. Bed locked in low position; call bell in reach. 5- Patient's son- in -law José Miguel Escobedo (POA/ son-in law) called for update, gave Patient ID prior to conversation. Christine Gomes. said that Patient's SHANE boots are changed weekly and due to be changed on Tuesday. José Miguel Escobedo (POA/ son-in law)  also had spoken with Marlee Rosenthal (Pharmacist) concerning Patient's home Digoxin dose. Dr. Bayron Bolden was called made aware of Patient's location and that son- in law would like to speak with him #[phone]. T.O. received for wound care consult for assess and treat SHANE boots are changed weekly and due to be changed on Tuesday. 36- Spoke with Marlee Rosenthal (Pharmacist) said spoke with Patient son and that Patient generally takes her am. Marlee Rosenthal was made aware that Patient had been asked if she had taken her Digoxin today and that Patient said \"I don't know what they given me. \"

## 2020-03-29 NOTE — PROGRESS NOTES
Reason for Admission:   Left femur fracture                  RUR Score:     Unknown at this time             PCP: First and Last name:  Sheridan Stephen   Name of Practice:    Are you a current patient: Yes/No: yes   Approximate date of last visit: 3 months. Had appt yesterday that Dr Sadia Belle office cancelled    Do you (patient/family) have any concerns for transition/discharge? no              Plan for utilizing home health: CM to follow and assess      Current Advanced Directive/Advance Care Plan: On file            Transition of Care Plan  Interviewed pt at ED bedside. Pt is alert and oriented. Verified face sheet info. Pt states that she lives at [address] [place] [address] and is independent with ADL's. Uses a walker but no other DME. Nurse at facility dispenses meds. Family provides transportation (daughter, son in law or grandchildren). Has Medicare a and b and [facility] [employer]. Had appt to see Dr Sadia Belle yesterday but it was cancelled by his office. States she sees him every 3 months. Per pt and son in law Job Rausch, he is her medical/financial POA. Adv care plan on chart lists daughter Karolyn. Per Jake Tilley, it was changed to him because of his wife's medical issues. This CM spoke with [address] [place] [address] and Mr LOYA with pt permission, to notify of admission. Both pt and Mr LOYA state that they would like snf if recommended. Mr LOYA stated [facility] preference [facility] be [facility]. Has had previous snf stays at [facility] (last over one year ago per pt). If snf stay not recommended, pt would return to [address] [place] [address]. Patient has designated __daughter/son in law______________________ to participate in his/her discharge plan and to receive any needed information. Name: Sheeba Henry or Job Rausch  Address:  Phone [phone]    Care Management Interventions  PCP Verified by CM: Yes  Last Visit to PCP: 12/29/19  Mode of Transport at Discharge:  Other (see comment)(possible stretcher if snf)  Transition of Care Consult (CM Consult): Discharge Planning  Current Support Network: Assisted Living  Confirm Follow Up Transport: Family  Discharge Location  Discharge Placement: Other:(assisted living with home health vs snf)

## 2020-03-29 NOTE — ED NOTES
Pt son Adriano Eliud updated that pt was moved to room 3004. Patient transported to floor. No acute signs of distress.

## 2020-03-29 NOTE — ED NOTES
Dr Liya Last from Cardiology, aware that consult is place for pt to be seen.  Pt will be rounded on in AM. He already spoke to hospitalist.

## 2020-03-29 NOTE — ROUTINE PROCESS
TRANSFER - IN REPORT: 
 
Verbal report received from El Russell RN on [address]  being received from ED (unit) for routine progression of care Report consisted of patients Situation, Background, Assessment and  
Recommendations(SBAR). Information from the following report(s) SBAR, Intake/Output, MAR, Recent Results and Cardiac Rhythm A-fib was reviewed with the receiving nurse. Opportunity for questions and clarification was provided. Assessment will be completed upon patients arrival to unit and care assumed.

## 2020-03-29 NOTE — ED NOTES
TRANSFER - ED to INPATIENT REPORT:    Verbal report given to Hallie RN(name) on Iva Bowman  being transferred to [facility](unit) for routine progression of care       Report consisted of patients Situation, Background, Assessment and   Recommendations(SBAR). SBAR report made available to receiving floor on this patient being transferred to [address] [place] [address]  for routine progression of care       Admitting diagnosis Femur fracture, left ([address] [S72.92XA]    Information from the following report(s) ED Summary was made available to receiving floor. Lines:   Peripheral IV 03/29/20 (Active)        HCG Status for ALL Females under 53 y/o: NO -na/    Medication list unable to confirm    Opportunity for questions and clarification was provided.       Patient is oriented to, place, person and situation   Patient is  incontinent and non-ambulatory     Valuables transported with patient     Patient transported with:   Monitor    MAP (Monitor): 66 =Monitored (most recent)  Vitals w/ MEWS Score (last day)     Date/Time MEWS Score Pulse Resp Temp BP Level of Consciousness SpO2    03/29/20 1500    90  18    97/59    98 %    03/29/20 1450    92  19    100/62    98 %    03/29/20 1430    90  21    98/52    99 %    03/29/20 1415    94  24    102/57    100 %    03/29/20 1400    88  19    100/56    98 %    03/29/20 1345    89  19        100 %    03/29/20 1330    92  30        99 %    03/29/20 1315    90  23        99 %    03/29/20 1300    91  21        100 %    03/29/20 1245    87  24        100 %    03/29/20 1230    87  22        100 %    03/29/20 1110    87  30  98 °F (36.7 °C)    Alert  93 %    03/29/20 1100    89  25    102/87    93 %              Septic Patients:     Lactic Acid  Lab Results   Component Value Date    LACPOC 0.85 11/06/2019    LACPOC 1.2 10/20/2018    (Most recent on top)  Repeat drawn: NO Time: n/a

## 2020-03-30 ENCOUNTER — APPOINTMENT (OUTPATIENT)
Dept: GENERAL RADIOLOGY | Age: 85
DRG: 481 | End: 2020-03-30
Attending: ORTHOPAEDIC SURGERY
Payer: MEDICARE

## 2020-03-30 ENCOUNTER — HOME CARE VISIT (OUTPATIENT)
Dept: SCHEDULING | Facility: HOME HEALTH | Age: 85
End: 2020-03-30
Payer: MEDICARE

## 2020-03-30 ENCOUNTER — APPOINTMENT (OUTPATIENT)
Dept: NON INVASIVE DIAGNOSTICS | Age: 85
DRG: 481 | End: 2020-03-30
Attending: INTERNAL MEDICINE
Payer: MEDICARE

## 2020-03-30 ENCOUNTER — ANESTHESIA (OUTPATIENT)
Dept: SURGERY | Age: 85
DRG: 481 | End: 2020-03-30
Payer: MEDICARE

## 2020-03-30 ENCOUNTER — ANESTHESIA EVENT (OUTPATIENT)
Dept: SURGERY | Age: 85
DRG: 481 | End: 2020-03-30
Payer: MEDICARE

## 2020-03-30 LAB
ABO + RH BLD: NORMAL
ANION GAP SERPL CALC-SCNC: 6 MMOL/L (ref 3–18)
BASOPHILS # BLD: 0 K/UL (ref 0–0.1)
BASOPHILS NFR BLD: 0 % (ref 0–2)
BLOOD GROUP ANTIBODIES SERPL: NORMAL
BUN SERPL-MCNC: 35 MG/DL (ref 7–18)
BUN/CREAT SERPL: 28 (ref 12–20)
CALCIUM SERPL-MCNC: 8.3 MG/DL (ref 8.5–10.1)
CALCULATED R AXIS, ECG10: 110 DEGREES
CALCULATED T AXIS, ECG11: 144 DEGREES
CHLORIDE SERPL-SCNC: 105 MMOL/L (ref 100–111)
CO2 SERPL-SCNC: 29 MMOL/L (ref 21–32)
CREAT SERPL-MCNC: 1.26 MG/DL (ref 0.6–1.3)
DIAGNOSIS, 93000: NORMAL
DIFFERENTIAL METHOD BLD: ABNORMAL
ECHO IVC SNIFF: 2.31 CM
ECHO PULMONARY ARTERY SYSTOLIC PRESSURE (PASP): 70 MMHG
ECHO TV REGURGITANT MAX VELOCITY: 382 CM/S
ECHO TV REGURGITANT PEAK GRADIENT: 58.3 MMHG
EOSINOPHIL # BLD: 0.3 K/UL (ref 0–0.4)
EOSINOPHIL NFR BLD: 3 % (ref 0–5)
ERYTHROCYTE [DISTWIDTH] IN BLOOD BY AUTOMATED COUNT: 20 % (ref 11.6–14.5)
GLUCOSE SERPL-MCNC: 80 MG/DL (ref 74–99)
HCT VFR BLD AUTO: 31.4 % (ref 35–45)
HCT VFR BLD AUTO: 33 % (ref 35–45)
HGB BLD-MCNC: 9.5 G/DL (ref 12–16)
HGB BLD-MCNC: 9.9 G/DL (ref 12–16)
INR PPP: 1.2 (ref 0.8–1.2)
LYMPHOCYTES # BLD: 1.2 K/UL (ref 0.9–3.6)
LYMPHOCYTES NFR BLD: 12 % (ref 21–52)
MAGNESIUM SERPL-MCNC: 2.2 MG/DL (ref 1.6–2.6)
MCH RBC QN AUTO: 25.1 PG (ref 24–34)
MCHC RBC AUTO-ENTMCNC: 30.3 G/DL (ref 31–37)
MCV RBC AUTO: 82.8 FL (ref 74–97)
MONOCYTES # BLD: 1.3 K/UL (ref 0.05–1.2)
MONOCYTES NFR BLD: 12 % (ref 3–10)
NEUTS SEG # BLD: 7.8 K/UL (ref 1.8–8)
NEUTS SEG NFR BLD: 73 % (ref 40–73)
PHOSPHATE SERPL-MCNC: 5.1 MG/DL (ref 2.5–4.9)
PLATELET # BLD AUTO: 292 K/UL (ref 135–420)
PMV BLD AUTO: 8.9 FL (ref 9.2–11.8)
POTASSIUM SERPL-SCNC: 5 MMOL/L (ref 3.5–5.5)
PROTHROMBIN TIME: 15.1 SEC (ref 11.5–15.2)
Q-T INTERVAL, ECG07: 354 MS
QRS DURATION, ECG06: 98 MS
QTC CALCULATION (BEZET), ECG08: 435 MS
RBC # BLD AUTO: 3.79 M/UL (ref 4.2–5.3)
SODIUM SERPL-SCNC: 140 MMOL/L (ref 136–145)
SPECIMEN EXP DATE BLD: NORMAL
VENTRICULAR RATE, ECG03: 91 BPM
WBC # BLD AUTO: 10.6 K/UL (ref 4.6–13.2)

## 2020-03-30 PROCEDURE — 74011250636 HC RX REV CODE- 250/636: Performed by: NURSE ANESTHETIST, CERTIFIED REGISTERED

## 2020-03-30 PROCEDURE — C1713 ANCHOR/SCREW BN/BN,TIS/BN: HCPCS | Performed by: ORTHOPAEDIC SURGERY

## 2020-03-30 PROCEDURE — 93308 TTE F-UP OR LMTD: CPT

## 2020-03-30 PROCEDURE — 74011250636 HC RX REV CODE- 250/636: Performed by: HOSPITALIST

## 2020-03-30 PROCEDURE — 74011000250 HC RX REV CODE- 250: Performed by: ORTHOPAEDIC SURGERY

## 2020-03-30 PROCEDURE — 77030021678 HC GLIDESCP STAT DISP VERT -B: Performed by: NURSE ANESTHETIST, CERTIFIED REGISTERED

## 2020-03-30 PROCEDURE — 77010033678 HC OXYGEN DAILY

## 2020-03-30 PROCEDURE — G0299 HHS/HOSPICE OF RN EA 15 MIN: HCPCS

## 2020-03-30 PROCEDURE — 74011000258 HC RX REV CODE- 258: Performed by: NURSE ANESTHETIST, CERTIFIED REGISTERED

## 2020-03-30 PROCEDURE — 85610 PROTHROMBIN TIME: CPT

## 2020-03-30 PROCEDURE — 65660000000 HC RM CCU STEPDOWN

## 2020-03-30 PROCEDURE — 77030008683 HC TU ET CUF COVD -A: Performed by: NURSE ANESTHETIST, CERTIFIED REGISTERED

## 2020-03-30 PROCEDURE — 77030016474 HC BIT DRL QC3 SYNT -C: Performed by: ORTHOPAEDIC SURGERY

## 2020-03-30 PROCEDURE — 3331090001 HH PPS REVENUE CREDIT

## 2020-03-30 PROCEDURE — 77030008462 HC STPLR SKN PROX J&J -A: Performed by: ORTHOPAEDIC SURGERY

## 2020-03-30 PROCEDURE — 3331090002 HH PPS REVENUE DEBIT

## 2020-03-30 PROCEDURE — 77030013079 HC BLNKT BAIR HGGR 3M -A: Performed by: NURSE ANESTHETIST, CERTIFIED REGISTERED

## 2020-03-30 PROCEDURE — 83735 ASSAY OF MAGNESIUM: CPT

## 2020-03-30 PROCEDURE — 76210000016 HC OR PH I REC 1 TO 1.5 HR: Performed by: ORTHOPAEDIC SURGERY

## 2020-03-30 PROCEDURE — 77030021352 HC CBL LD SYS DISP COVD -B

## 2020-03-30 PROCEDURE — 77030011256 HC DRSG MEPILEX <16IN NO BORD MOLN -A

## 2020-03-30 PROCEDURE — 74011250637 HC RX REV CODE- 250/637: Performed by: ORTHOPAEDIC SURGERY

## 2020-03-30 PROCEDURE — 85025 COMPLETE CBC W/AUTO DIFF WBC: CPT

## 2020-03-30 PROCEDURE — C1769 GUIDE WIRE: HCPCS | Performed by: ORTHOPAEDIC SURGERY

## 2020-03-30 PROCEDURE — 77030018836 HC SOL IRR NACL ICUM -A: Performed by: ORTHOPAEDIC SURGERY

## 2020-03-30 PROCEDURE — 85018 HEMOGLOBIN: CPT

## 2020-03-30 PROCEDURE — 77030031139 HC SUT VCRL2 J&J -A: Performed by: ORTHOPAEDIC SURGERY

## 2020-03-30 PROCEDURE — 77030040361 HC SLV COMPR DVT MDII -B

## 2020-03-30 PROCEDURE — 80048 BASIC METABOLIC PNL TOTAL CA: CPT

## 2020-03-30 PROCEDURE — 74011250636 HC RX REV CODE- 250/636: Performed by: ORTHOPAEDIC SURGERY

## 2020-03-30 PROCEDURE — 77030011255 HC DSG AQUACEL AG BMS -A

## 2020-03-30 PROCEDURE — 84100 ASSAY OF PHOSPHORUS: CPT

## 2020-03-30 PROCEDURE — 76060000033 HC ANESTHESIA 1 TO 1.5 HR: Performed by: ORTHOPAEDIC SURGERY

## 2020-03-30 PROCEDURE — 86900 BLOOD TYPING SEROLOGIC ABO: CPT

## 2020-03-30 PROCEDURE — 76010000149 HC OR TIME 1 TO 1.5 HR: Performed by: ORTHOPAEDIC SURGERY

## 2020-03-30 PROCEDURE — 74011250636 HC RX REV CODE- 250/636: Performed by: INTERNAL MEDICINE

## 2020-03-30 PROCEDURE — 74011250637 HC RX REV CODE- 250/637: Performed by: INTERNAL MEDICINE

## 2020-03-30 PROCEDURE — 36415 COLL VENOUS BLD VENIPUNCTURE: CPT

## 2020-03-30 PROCEDURE — 73502 X-RAY EXAM HIP UNI 2-3 VIEWS: CPT

## 2020-03-30 PROCEDURE — 74011000250 HC RX REV CODE- 250: Performed by: NURSE ANESTHETIST, CERTIFIED REGISTERED

## 2020-03-30 PROCEDURE — 0QS736Z REPOSITION LEFT UPPER FEMUR WITH INTRAMEDULLARY INTERNAL FIXATION DEVICE, PERCUTANEOUS APPROACH: ICD-10-PCS | Performed by: ORTHOPAEDIC SURGERY

## 2020-03-30 DEVICE — IMPLANTABLE DEVICE: Type: IMPLANTABLE DEVICE | Site: FEMUR | Status: FUNCTIONAL

## 2020-03-30 DEVICE — SCREW BNE L34MM DIA5MM NONSTERILE TIB LT GRN TI ST CANN LOK: Type: IMPLANTABLE DEVICE | Site: FEMUR | Status: FUNCTIONAL

## 2020-03-30 RX ORDER — MORPHINE SULFATE 2 MG/ML
2 INJECTION, SOLUTION INTRAMUSCULAR; INTRAVENOUS
Status: DISCONTINUED | OUTPATIENT
Start: 2020-03-30 | End: 2020-03-30

## 2020-03-30 RX ORDER — CEFAZOLIN SODIUM 2 G/50ML
SOLUTION INTRAVENOUS
Status: COMPLETED
Start: 2020-03-30 | End: 2020-03-30

## 2020-03-30 RX ORDER — DEXAMETHASONE SODIUM PHOSPHATE 4 MG/ML
INJECTION, SOLUTION INTRA-ARTICULAR; INTRALESIONAL; INTRAMUSCULAR; INTRAVENOUS; SOFT TISSUE AS NEEDED
Status: DISCONTINUED | OUTPATIENT
Start: 2020-03-30 | End: 2020-03-30 | Stop reason: HOSPADM

## 2020-03-30 RX ORDER — ACETAMINOPHEN 325 MG/1
650 TABLET ORAL EVERY 6 HOURS
Status: DISCONTINUED | OUTPATIENT
Start: 2020-03-30 | End: 2020-04-02 | Stop reason: HOSPADM

## 2020-03-30 RX ORDER — SODIUM CHLORIDE, SODIUM LACTATE, POTASSIUM CHLORIDE, CALCIUM CHLORIDE 600; 310; 30; 20 MG/100ML; MG/100ML; MG/100ML; MG/100ML
25 INJECTION, SOLUTION INTRAVENOUS CONTINUOUS
Status: DISCONTINUED | OUTPATIENT
Start: 2020-03-30 | End: 2020-03-30 | Stop reason: HOSPADM

## 2020-03-30 RX ORDER — NALOXONE HYDROCHLORIDE 0.4 MG/ML
0.4 INJECTION, SOLUTION INTRAMUSCULAR; INTRAVENOUS; SUBCUTANEOUS AS NEEDED
Status: DISCONTINUED | OUTPATIENT
Start: 2020-03-30 | End: 2020-04-02 | Stop reason: HOSPADM

## 2020-03-30 RX ORDER — FENTANYL CITRATE 50 UG/ML
25 INJECTION, SOLUTION INTRAMUSCULAR; INTRAVENOUS AS NEEDED
Status: DISCONTINUED | OUTPATIENT
Start: 2020-03-30 | End: 2020-03-30 | Stop reason: HOSPADM

## 2020-03-30 RX ORDER — FAMOTIDINE 20 MG/1
20 TABLET, FILM COATED ORAL ONCE
Status: DISCONTINUED | OUTPATIENT
Start: 2020-03-30 | End: 2020-03-30 | Stop reason: HOSPADM

## 2020-03-30 RX ORDER — FENTANYL CITRATE 50 UG/ML
INJECTION, SOLUTION INTRAMUSCULAR; INTRAVENOUS AS NEEDED
Status: DISCONTINUED | OUTPATIENT
Start: 2020-03-30 | End: 2020-03-30 | Stop reason: HOSPADM

## 2020-03-30 RX ORDER — DIPHENHYDRAMINE HYDROCHLORIDE 50 MG/ML
INJECTION, SOLUTION INTRAMUSCULAR; INTRAVENOUS AS NEEDED
Status: DISCONTINUED | OUTPATIENT
Start: 2020-03-30 | End: 2020-03-30 | Stop reason: HOSPADM

## 2020-03-30 RX ORDER — ENOXAPARIN SODIUM 100 MG/ML
30 INJECTION SUBCUTANEOUS EVERY 24 HOURS
Status: DISCONTINUED | OUTPATIENT
Start: 2020-03-30 | End: 2020-04-02 | Stop reason: HOSPADM

## 2020-03-30 RX ORDER — CEFAZOLIN SODIUM 2 G/50ML
2 SOLUTION INTRAVENOUS ONCE
Status: COMPLETED | OUTPATIENT
Start: 2020-03-30 | End: 2020-03-30

## 2020-03-30 RX ORDER — AMOXICILLIN 250 MG
1 CAPSULE ORAL 2 TIMES DAILY
Status: DISCONTINUED | OUTPATIENT
Start: 2020-03-30 | End: 2020-04-02 | Stop reason: HOSPADM

## 2020-03-30 RX ORDER — ETOMIDATE 2 MG/ML
INJECTION INTRAVENOUS AS NEEDED
Status: DISCONTINUED | OUTPATIENT
Start: 2020-03-30 | End: 2020-03-30 | Stop reason: HOSPADM

## 2020-03-30 RX ORDER — VECURONIUM BROMIDE FOR INJECTION 1 MG/ML
INJECTION, POWDER, LYOPHILIZED, FOR SOLUTION INTRAVENOUS AS NEEDED
Status: DISCONTINUED | OUTPATIENT
Start: 2020-03-30 | End: 2020-03-30 | Stop reason: HOSPADM

## 2020-03-30 RX ORDER — ONDANSETRON 2 MG/ML
4 INJECTION INTRAMUSCULAR; INTRAVENOUS ONCE
Status: COMPLETED | OUTPATIENT
Start: 2020-03-30 | End: 2020-03-30

## 2020-03-30 RX ORDER — SUCCINYLCHOLINE CHLORIDE 100 MG/5ML
SYRINGE (ML) INTRAVENOUS AS NEEDED
Status: DISCONTINUED | OUTPATIENT
Start: 2020-03-30 | End: 2020-03-30 | Stop reason: HOSPADM

## 2020-03-30 RX ORDER — SODIUM CHLORIDE, SODIUM LACTATE, POTASSIUM CHLORIDE, CALCIUM CHLORIDE 600; 310; 30; 20 MG/100ML; MG/100ML; MG/100ML; MG/100ML
75 INJECTION, SOLUTION INTRAVENOUS CONTINUOUS
Status: DISCONTINUED | OUTPATIENT
Start: 2020-03-30 | End: 2020-03-31

## 2020-03-30 RX ORDER — LIDOCAINE HYDROCHLORIDE 10 MG/ML
0.1 INJECTION, SOLUTION EPIDURAL; INFILTRATION; INTRACAUDAL; PERINEURAL AS NEEDED
Status: DISCONTINUED | OUTPATIENT
Start: 2020-03-30 | End: 2020-03-30 | Stop reason: HOSPADM

## 2020-03-30 RX ORDER — HYDROCODONE BITARTRATE AND ACETAMINOPHEN 5; 325 MG/1; MG/1
2 TABLET ORAL
Status: DISCONTINUED | OUTPATIENT
Start: 2020-03-30 | End: 2020-04-02 | Stop reason: HOSPADM

## 2020-03-30 RX ORDER — BUPIVACAINE HYDROCHLORIDE AND EPINEPHRINE 2.5; 5 MG/ML; UG/ML
INJECTION, SOLUTION EPIDURAL; INFILTRATION; INTRACAUDAL; PERINEURAL AS NEEDED
Status: DISCONTINUED | OUTPATIENT
Start: 2020-03-30 | End: 2020-03-30 | Stop reason: HOSPADM

## 2020-03-30 RX ORDER — POLYETHYLENE GLYCOL 3350 17 G/17G
17 POWDER, FOR SOLUTION ORAL DAILY
Status: DISCONTINUED | OUTPATIENT
Start: 2020-03-31 | End: 2020-04-02 | Stop reason: HOSPADM

## 2020-03-30 RX ORDER — SODIUM CHLORIDE 0.9 % (FLUSH) 0.9 %
5-40 SYRINGE (ML) INJECTION EVERY 8 HOURS
Status: DISCONTINUED | OUTPATIENT
Start: 2020-03-30 | End: 2020-04-02 | Stop reason: HOSPADM

## 2020-03-30 RX ORDER — ONDANSETRON 2 MG/ML
INJECTION INTRAMUSCULAR; INTRAVENOUS AS NEEDED
Status: DISCONTINUED | OUTPATIENT
Start: 2020-03-30 | End: 2020-03-30 | Stop reason: HOSPADM

## 2020-03-30 RX ORDER — ONDANSETRON 2 MG/ML
4 INJECTION INTRAMUSCULAR; INTRAVENOUS
Status: DISCONTINUED | OUTPATIENT
Start: 2020-03-30 | End: 2020-04-02 | Stop reason: HOSPADM

## 2020-03-30 RX ORDER — LIDOCAINE HYDROCHLORIDE 20 MG/ML
INJECTION, SOLUTION EPIDURAL; INFILTRATION; INTRACAUDAL; PERINEURAL AS NEEDED
Status: DISCONTINUED | OUTPATIENT
Start: 2020-03-30 | End: 2020-03-30 | Stop reason: HOSPADM

## 2020-03-30 RX ORDER — ONDANSETRON 2 MG/ML
4 INJECTION INTRAMUSCULAR; INTRAVENOUS
Status: DISCONTINUED | OUTPATIENT
Start: 2020-03-30 | End: 2020-03-30 | Stop reason: SDUPTHER

## 2020-03-30 RX ORDER — CEFAZOLIN SODIUM 2 G/50ML
2 SOLUTION INTRAVENOUS EVERY 8 HOURS
Status: COMPLETED | OUTPATIENT
Start: 2020-03-30 | End: 2020-03-31

## 2020-03-30 RX ORDER — HYDROMORPHONE HYDROCHLORIDE 1 MG/ML
0.5 INJECTION, SOLUTION INTRAMUSCULAR; INTRAVENOUS; SUBCUTANEOUS
Status: DISCONTINUED | OUTPATIENT
Start: 2020-03-30 | End: 2020-04-02 | Stop reason: HOSPADM

## 2020-03-30 RX ADMIN — ONDANSETRON 4 MG: 2 INJECTION, SOLUTION INTRAMUSCULAR; INTRAVENOUS at 15:50

## 2020-03-30 RX ADMIN — FENTANYL CITRATE 25 MCG: 50 INJECTION, SOLUTION INTRAMUSCULAR; INTRAVENOUS at 14:05

## 2020-03-30 RX ADMIN — TRAMADOL HYDROCHLORIDE 50 MG: 50 TABLET, FILM COATED ORAL at 03:15

## 2020-03-30 RX ADMIN — Medication 10 ML: at 22:00

## 2020-03-30 RX ADMIN — Medication 100 MG: at 14:05

## 2020-03-30 RX ADMIN — SODIUM CHLORIDE 10 ML: 9 INJECTION, SOLUTION INTRAMUSCULAR; INTRAVENOUS; SUBCUTANEOUS at 05:43

## 2020-03-30 RX ADMIN — ENOXAPARIN SODIUM 30 MG: 30 INJECTION, SOLUTION INTRAVENOUS; SUBCUTANEOUS at 21:34

## 2020-03-30 RX ADMIN — SODIUM CHLORIDE, SODIUM LACTATE, POTASSIUM CHLORIDE, AND CALCIUM CHLORIDE 75 ML/HR: 600; 310; 30; 20 INJECTION, SOLUTION INTRAVENOUS at 08:29

## 2020-03-30 RX ADMIN — CEFAZOLIN 2 G: 10 INJECTION, POWDER, FOR SOLUTION INTRAVENOUS at 23:30

## 2020-03-30 RX ADMIN — PHENYLEPHRINE HYDROCHLORIDE 200 MCG: 10 INJECTION INTRAVENOUS at 14:24

## 2020-03-30 RX ADMIN — DIPHENHYDRAMINE HYDROCHLORIDE 6.25 MG: 50 INJECTION, SOLUTION INTRAMUSCULAR; INTRAVENOUS at 14:46

## 2020-03-30 RX ADMIN — DEXAMETHASONE SODIUM PHOSPHATE 4 MG: 4 INJECTION, SOLUTION INTRA-ARTICULAR; INTRALESIONAL; INTRAMUSCULAR; INTRAVENOUS; SOFT TISSUE at 14:40

## 2020-03-30 RX ADMIN — MIRTAZAPINE 15 MG: 15 TABLET, FILM COATED ORAL at 21:34

## 2020-03-30 RX ADMIN — LIDOCAINE HYDROCHLORIDE 50 MG: 20 INJECTION, SOLUTION INTRAVENOUS at 14:05

## 2020-03-30 RX ADMIN — PHENYLEPHRINE HYDROCHLORIDE 10 MCG/MIN: 10 INJECTION INTRAVENOUS at 14:20

## 2020-03-30 RX ADMIN — VECURONIUM BROMIDE FOR INJECTION 1 MG: 1 INJECTION, POWDER, LYOPHILIZED, FOR SOLUTION INTRAVENOUS at 14:05

## 2020-03-30 RX ADMIN — PHENYLEPHRINE HYDROCHLORIDE 200 MCG: 10 INJECTION INTRAVENOUS at 14:12

## 2020-03-30 RX ADMIN — ETOMIDATE 10 MG: 2 INJECTION, SOLUTION INTRAVENOUS at 14:05

## 2020-03-30 RX ADMIN — ACETAMINOPHEN 650 MG: 325 TABLET, FILM COATED ORAL at 20:45

## 2020-03-30 RX ADMIN — TRAMADOL HYDROCHLORIDE 50 MG: 50 TABLET, FILM COATED ORAL at 09:00

## 2020-03-30 RX ADMIN — PHENYLEPHRINE HYDROCHLORIDE 200 MCG: 10 INJECTION INTRAVENOUS at 15:07

## 2020-03-30 RX ADMIN — DIGOXIN 0.12 MG: 125 TABLET ORAL at 10:00

## 2020-03-30 RX ADMIN — ONDANSETRON 4 MG: 2 INJECTION INTRAMUSCULAR; INTRAVENOUS at 21:31

## 2020-03-30 RX ADMIN — CEFAZOLIN SODIUM 2 G: 2 SOLUTION INTRAVENOUS at 14:32

## 2020-03-30 RX ADMIN — ONDANSETRON 4 MG: 2 SOLUTION INTRAMUSCULAR; INTRAVENOUS at 13:59

## 2020-03-30 RX ADMIN — KETOROLAC TROMETHAMINE 15 MG: 15 INJECTION, SOLUTION INTRAMUSCULAR; INTRAVENOUS at 05:44

## 2020-03-30 RX ADMIN — SODIUM CHLORIDE, SODIUM LACTATE, POTASSIUM CHLORIDE, AND CALCIUM CHLORIDE 75 ML/HR: 600; 310; 30; 20 INJECTION, SOLUTION INTRAVENOUS at 20:40

## 2020-03-30 RX ADMIN — SENNOSIDES AND DOCUSATE SODIUM 1 TABLET: 8.6; 5 TABLET ORAL at 20:45

## 2020-03-30 RX ADMIN — PHENYLEPHRINE HYDROCHLORIDE 100 MCG: 10 INJECTION INTRAVENOUS at 14:49

## 2020-03-30 NOTE — ANESTHESIA PREPROCEDURE EVALUATION
Relevant Problems   No relevant active problems       Anesthetic History   No history of anesthetic complications            Review of Systems / Medical History  Patient summary reviewed and pertinent labs reviewed    Pulmonary  Within defined limits                 Neuro/Psych   Within defined limits           Cardiovascular            Dysrhythmias : atrial fibrillation  CAD    Exercise tolerance: >4 METS     GI/Hepatic/Renal  Within defined limits              Endo/Other        Arthritis     Other Findings              Physical Exam    Airway  Mallampati: III  TM Distance: 4 - 6 cm  Neck ROM: normal range of motion   Mouth opening: Normal     Cardiovascular  Regular rate and rhythm,  S1 and S2 normal,  no murmur, click, rub, or gallop  Rhythm: regular  Rate: normal         Dental    Dentition: Lower dentition intact and Upper dentition intact  Comments: Several missing teeth; all native teeth are intact.      Pulmonary  Breath sounds clear to auscultation               Abdominal  GI exam deferred       Other Findings            Anesthetic Plan    ASA: 3  Anesthesia type: general          Induction: Intravenous  Anesthetic plan and risks discussed with: Patient

## 2020-03-30 NOTE — PROGRESS NOTES
1915 Bedside shift change report given to Hattie Mojica RN (oncoming nurse) by Alejandro Beltre RN (offgoing nurse). Report included the following information SBAR, Kardex, Intake/Output, MAR and Cardiac Rhythm a-fib. Patient resting in bed wit a left femur fracture. Denies pain at this time. Bed is low and locked with the bed alarm on. Call bell within reach. All needs met at this time. 0000 Patient resting in bed, denies pain. 7913 Patient calling for pain, medicated with 50 mg Ultram.    0720 Bedside shift change report given to [address] [place] [address] (oncoming nurse) by Moses Weber RN (offgoing nurse). Report included the following information SBAR, Kardex, Intake/Output, MAR and Cardiac Rhythm a-fib.

## 2020-03-30 NOTE — OP NOTES
[facility]  OPERATIVE REPORT    Name:  Shannan Ruiz  MR#:   [id number]  :  08/10/1927  ACCOUNT #:  [de-identified]  DATE OF SERVICE:  2020    PREOPERATIVE DIAGNOSIS:  Displaced left intertrochanteric hip fracture. POSTOPERATIVE DIAGNOSIS:  Displaced left intertrochanteric hip fracture. PROCEDURE PERFORMED:  Closed reduction and internal fixation with short intramedullary Synthes trochanteric femoral nail. SURGEON:  Teresa Adamson MD    ASSISTANT:see  Brief op note     ANESTHESIA:  general    COMPLICATIONS:  **no    SPECIMENS REMOVED:  **no    IMPLANTS:  *see brief op note     ESTIMATED BLOOD LOSS:  100 mL. INDICATIONS:  The patient is a pleasant 25-year-old woman who is do not resuscitate, who stumbled and fell and was brought to the ER where x-rays revealed a displaced left hip fracture. Recommendation was made for repair. Risks, benefits and alternatives were discussed with her and her medical team.  She was cleared by cardiologist, Dr. Joan Petersen also agreed, risk were greater to delay then proceed. After reviewing risks, benefits and alternatives with her son-in-law who is my understanding power of  and next to kin, he has signed for her consenting to the recommended repair of the left hip fracture. She is brought to the operating room for this purpose. PROCEDURE IN DETAIL:  The patient was brought to the main operating room at [facility]. After obtaining satisfactory anesthetic, she was positioned on the [place] table with gentle traction and internal rotation of the left hip. Fluoroscopic images were obtained revealing there was displacement on the fracture. With gentle manipulation, I was able to reduce the hip anatomically. AP and lateral images confirmed this. We then prepared and draped the hip for anticipated surgery after verifying a size 12 with the aluminium ruler.   With the site verified and 2 g of antibiotic delivered Ancef with no reaction, we infiltrated the greater trochanter area with approximately 10 mL of 0.25% Marcaine and made an incision with a #10 blade through the skin and subcutaneous tissues above the tip of the greater trochanter. Dissection was carried down to the tip of the greater trochanter and a guide pin was placed into this. This was confirmed on AP and lateral images and I was pleased with the central position. I then reamed over this opening the IM canal.  We then placed the 12 mm short Synthes trochanteric femoral nail down the canal and positioned it so that the guidewire would advance into the center of the femoral neck and head. This was confirmed on AP and lateral images. Guidewire had been placed in the nail to confirm this. I then removed the wire and placed another wire through the lateral sleeve after making a small incision and dissecting down to bone. The guide pin was placed into the center of the femoral neck and head and confirmed fluoroscopically. We measured this at just over 95 mm but selected a 90 mm helical screw. I did advance through the lateral cortex and then advanced the helical reamer to its terminus at 85 mm. This was appropriately positioned for a 90 mm nail. I then tapped the 90 mm helical screw into the head and then it seated flush laterally. I did tightened the set screw down fully and turned it back about 180 degrees to allow some compression and then tightened the compression ball with no traction on the leg compressing the fracture. I then placed a green sleeve after removing the gold sleeves and wire through the same hole against the lateral cortex and used the 4-mm drill with prescribed length across the lateral cortex through the nail in just to the medial cortex. This measured at 32 mm. I selected a 34 mm screw which was then placed through the sleeve with good bicortical purchase and confirmed fluoroscopically.   We obtained AP and lateral images and I was pleased with the secure position and fixation of the fracture. I irrigated the two wounds. We closed the IT band with interrupted figure-of-eight 0 Vicryl sutures. I closed subcutaneous tissue with 2-0 Vicryl sutures with knots buried and the staples in the skin. We infiltrated about the skin with a 0.25% Marcaine plain and cleansed the thigh and applied Xeroform gauze, 4x4 gauze and Medipore tape. The patient was then transferred off the fracture table, awakened with anesthesia into the recovery room. Estimated blood loss was less than 100 mL.   Leg length and rotation were noted to be symmetrical.      Aracelis Brasher MD      [id number]  D:  03/30/2020 15:28  T:  03/30/2020 17:57  JOB #:  [id number]

## 2020-03-30 NOTE — BRIEF OP NOTE
BRIEF OPERATIVE NOTE    Date of Procedure: 3/30/2020   Preoperative Diagnosis: Hip Fracture, displaced left intertrochanteric  Postoperative Diagnosis: Hip Fracture , displaced left intertrochanteric   Procedure: Procedure(s): FEMUR INSERTION INTRA MEDULLARY NAIL  Surgeon(s) and Role:     * Kira Viveros MD - Primary  Anesthesia: General   Estimated Blood Loss: 100cc  Specimens: * No specimens in log *   Findings: unstable , displaced fracture   Complications: none noted  Implants:   Implant Name Type Inv.  Item Serial No.  Lot No. LRB No. Used Action   12mm/130 deg ti babita tfna 170mm    [employer] [id number] Left 1 Implanted   BLADE HELCL FEN 90MM STRL -- TFNA - [id number]  BLADE HELCL FEN 90MM STRL -- TFNA  SYNTH[employer] [place] N/A Left 1 Implanted   SCR BNE LCK T25 F/IM NL [id number] --  - [id number]  SCR BNE LCK T25 F/IM NL [id number] --   SY[employer]TH[employer] [place] N/A Left 1 Implanted

## 2020-03-30 NOTE — PROGRESS NOTES
07:15 rec'd awake and alert in bed . NPO   08:00 repositioned for comfort in bed. Positioned x2 person assist.   10:35 Rec'd a call from Ananth Anderson NP    Dr. Miguelina Clarke will perform L. Short Trochanteric Femoral Nail today. OR is aware. Richmond De La Cruz  was updated by office. Will call for consent . 10:59 verbal consent by son-in -law  Medical POA  2 nurse verification . 1300 patient prep for surgery  Explained to patient she going down for surgery   2D  Eccho being done   1315 off unit via bed to OR   1712 returned from OR  S/p femur insertion of intramedullary nail . rec'd report from [address] . Patient in bed resting eyes closed , easily aroused denies pain. Dressing dry and intact to left hip. Ice pack applied. Patient given Ice chips no nausea or vomiting . Tele afib 100-120 . rec'd zofran at  in PACU nausea and vomiting   1800 in bed eyes closed denies pain .  Ice chips offered no n/v

## 2020-03-30 NOTE — CONSULTS
Consult Note        Assessment:    1. Left intertrochanteric hip fracture      PLAN:    1. To the OR today for a left TFN. Risks, benefits and alternatives discussed in detail with POA. Phone consent will be obtained. Risks for surgery cannot be minimized and with this in mind, she is cleared medically. Remain npo            HPI: Danyel Tate is a 80 y.o. female patient presents with left hip pain. She is mobile for her age and prefers not to use her walker according to family. She suffered a mechanical fall from a standing position yesterday causing her injury. She was brought into the ED where xrays revealed a hip fracture. She has been admitted for further management. Past Medical History:   Diagnosis Date    A-fib [facility]) 01/2017    Arthritis, degenerative     CAD (coronary artery disease) 04/2012    S/P 2.75 X 15 mm BMS of OM (04/2012), Two LAD BMS (07/2012)    Elevated liver enzymes     Likely from statin    HLD (hyperlipidemia)     Hyperkalemia 06/2012    Likely from lisinopril    Ischemic cardiomyopathy     LVEF  30-35%(05/19) 45% (11/2012) & 30% echo (04/2012)    NSTEMI (non-ST elevated myocardial infarction) ([facility] [address] 04/2012    UTI (urinary tract infection)     Vertigo      Past Surgical History:   Procedure Laterality Date    HX CORONARY STENT PLACEMENT  4/23/12    bare metal stent to obtuse marginal branch    HX HEART CATHETERIZATION       Prior to Admission medications    Medication Sig Start Date End Date Taking? Authorizing Provider   digoxin (LANOXIN) 0.25 mg tablet Take 1 Tab by mouth every Sunday, Tuesday, Thursday. 12/10/19   Rosa Peña MD   FLUoxetine (PROZAC) 20 mg capsule Take 1 Cap by mouth daily. 12/10/19   Rosa Peña MD   furosemide (LASIX) 40 mg tablet Take 1 Tab by mouth daily. 11/26/19   Aby Moore MD   vitamin a-vitamin c-vit e-min (OCUVITE) tablet Take 1 Tab by mouth daily.  Gummy    Provider, Historical   multivit,calc,mins/iron/folic (ONE-A-DAY WOMENS FORMULA PO) Take  by mouth. 1 Gummy     Provider, Historical   fluticasone propionate (FLONASE) 50 mcg/actuation nasal spray 1 [place] by Both Nostrils route daily. Provider, Historical   ondansetron (ZOFRAN ODT) 4 mg disintegrating tablet Take 4 mg by mouth every eight (8) hours as needed for Nausea. Take 4 mg tab by mouth every eight (8) hours as needed for Nausea    Provider, Historical   mirtazapine (REMERON) 15 mg tablet Take 1 Tab by mouth nightly. Indications: major depressive disorder 10/9/19   Palak Broderick MD   digoxin Anabel Montez) 0.125 mg tablet Take 1 Tab by mouth every Monday, Wednesday, Friday, Saturday AND 2 Tabs every Sunday, Tuesday, Thursday. Indications: Ventricular Rate Control in Atrial Fibrillation 10/9/19   Wyline Shone T, MD   carboxymethylcellulose sodium (CELLUVISC) 0.5 % drop ophthalmic solution Administer 1 Drop to both eyes three (3) times daily as needed for Other (dry eyes). 10/9/19   Palak Broderick MD   acetaminophen (TYLENOL) 325 mg tablet Take 650 mg by mouth every six (6) hours as needed for Pain. 2 tabs    Provider, Historical   polyethylene glycol (MIRALAX) 17 gram/dose powder Take 17 g by mouth daily as needed (constipation). Provider, Historical   carboxymethylcellulos/glycerin (CLEAR EYES FOR DRY EYES OP) Administer 1 Drop to both eyes three (3) times daily as needed (dry eyes). Provider, Historical   loperamide (IMMODIUM) 2 mg tablet Take 1 mg by mouth three (3) times daily as needed for Diarrhea. Take 1 mg tab by mouth three (3) times daily as needed for Diarrhea. Provider, Historical   psyllium husk-aspartame (METAMUCIL FIBER) 3.4 gram pwpk packet Take 1 Packet by mouth daily. 11/9/18   Haily ORTIZ NP   aspirin 81 mg chewable tablet Take 1 Tab by mouth daily. 11/9/18   Haily ORTIZ NP   nitroglycerin (NITROSTAT) 0.4 mg SL tablet 1 Tab by SubLINGual route every five (5) minutes as needed for Chest Pain.   Patient taking differently: 0.4 mg by SubLINGual route every five (5) minutes as needed for Chest Pain. For onset of chest pain, place 1 tab under the tongue. Nitroglycerin sublingual tabs usually give relief in 1 to 5 minutes. If the pain is not relieved, use a second tab 5 minutes after you take the first tab. If the pain continues for another 5 minutes, a third tab may be used. If you still have chest pain after a total of 3 tabs, contact your doctor or go to ED right away or call 911, if necessary. 10/31/17   Miko Daniels MD     Allergies   Allergen Reactions    Flu Vaccine 2011 (36 Mos+)(Pf) Anaphylaxis    Codeine Anaphylaxis    Egg Not Reported This Time     Per patient who is A&O x3 that she can eat the yolks but breaks out from the whites. She prefers Hard boiled for this reason.     Influenza Virus Vaccines Hives    Lipitor [Atorvastatin] Nausea Only    Pcn [Penicillins] Hives     Social History     Socioeconomic History    Marital status:      Spouse name: Not on file    Number of children: Not on file    Years of education: Not on file    Highest education level: Not on file   Occupational History    Not on file   Social Needs    Financial resource strain: Not on file    Food insecurity     Worry: Not on file     Inability: Not on file    Transportation needs     Medical: Not on file     Non-medical: Not on file   Tobacco Use    Smoking status: Never Smoker    Smokeless tobacco: Never Used   Substance and Sexual Activity    Alcohol use: No    Drug use: No    Sexual activity: Never   Lifestyle    Physical activity     Days per week: Not on file     Minutes per session: Not on file    Stress: Not on file   Relationships    Social connections     Talks on phone: Not on file     Gets together: Not on file     Attends Jew service: Not on file     Active member of club or organization: Not on file     Attends meetings of clubs or organizations: Not on file     Relationship status: Not on file    Intimate partner violence     Fear of current or ex partner: Not on file     Emotionally abused: Not on file     Physically abused: Not on file     Forced sexual activity: Not on file   Other Topics Concern    Not on file   Social History Narrative    Not on file       Blood pressure 95/55, pulse 67, temperature 98.1 °F (36.7 °C), resp. rate 14, weight 46.7 kg (103 lb), SpO2 94 %. CBC w/Diff   Lab Results   Component Value Date/Time    WBC 10.6 03/30/2020 04:30 AM    RBC 3.79 (L) 03/30/2020 04:30 AM    HCT 31.4 (L) 03/30/2020 04:30 AM          Physical Assessment:  General: in no apparent distress   Extremities:  Left hip externally rotated. Pain with internal and external rotation. Thighs and calves soft. NVI distally   Dressing:  none ortho   DVT Exam:   No exam evidence to suggest DVT. Compartments soft and NT. Radiology: There is a minimally displaced intertrochanteric fracture of the left femur. The  femoroacetabular joint space is maintained. Noted is generalized osseous  demineralization.     _______________     IMPRESSION  IMPRESSION:     Minimally displaced intertrochanteric left femoral fracture.          Kaitlin Baez PA-C  3/30/2020  Office [phone] Sfjf [phone]

## 2020-03-30 NOTE — WOUND CARE
IP WOUND CONSULT [address] MEDICAL RECORD NUMBER:  [id number] AGE: 80 y.o. GENDER: female  : 8/10/1927 TODAY'S DATE:  3/30/2020 GENERAL  
 
[] Follow-up [x] New Consult [address] is a 80 y.o. female referred by:  
[] Physician 
[x] Nursing 
[] Other:  
 
 
ASSESSMENT Ulcer Identification: 
Ulcer Type: venous Contributing Factors: edema and venous stasis Wound Leg lower Anterior;Right 19 (Active) Number of days: 142 Wound Leg lower Anterior; Left 19 (Active) Number of days: 142 Wound Head Posterior x2 staples 20 (Active) Dressing Type Open to air 3/29/2020  4:15 PM  
Incision Site Well Approximated Yes 3/29/2020  4:15 PM  
Jasmyn-wound Assessment Intact 3/29/2020  4:15 PM  
Number of days: 1 Wound Buttocks Right redness and blanchable 20 (Active) Dressing Type Open to air 3/29/2020  4:15 PM  
Pressure Injury Stage 2 3/29/2020  4:15 PM  
Assessment Intact; Red 3/29/2020  4:15 PM  
Jasmyn-wound Assessment Intact 3/29/2020  4:15 PM  
Number of days: 1 Wound Leg lower Left; Anterior (Active) Dressing Status Moist;Removed 3/30/2020 10:58 AM  
Dressing Type Alginate;Foam;Unna boot 3/30/2020 10:58 AM  
Splint Type/Material Elastic wrap 3/29/2020  4:15 PM  
Non-staged Wound Description Full thickness 3/30/2020 10:58 AM  
Wound Length (cm) 4.2 cm 3/30/2020 10:58 AM  
Wound Width (cm) 2.5 cm 3/30/2020 10:58 AM  
Wound Depth (cm) 0.2 cm 3/30/2020 10:58 AM  
Wound Surface Area (cm^2) 10.5 cm^2 3/30/2020 10:58 AM  
Wound Volume (cm^3) 2.1 cm^3 3/30/2020 10:58 AM  
Condition of Base Epithelializing;Granulation;Pink 3/30/2020 10:58 AM  
Epithelialization (%) 20 3/30/2020 10:58 AM  
Assessment Drainage;[place] 3/30/2020 10:58 AM  
Tissue Type Percent Pink 80 3/30/2020 10:58 AM  
Tissue Type Percent Red 20 3/30/2020 10:58 AM  
Drainage Amount Moderate 3/30/2020 10:58 AM  
Drainage Color Serosanguinous 3/30/2020 10:58 AM  
 Wound Odor None 3/30/2020 10:58 AM  
Jasmyn-wound Assessment Dry;Fragile 3/30/2020 10:58 AM  
Cleansing and Cleansing Agents  Dermal wound cleanser; Other (comment) 3/30/2020 10:58 AM  
Dressing Changed Changed/New 3/30/2020 10:58 AM  
Dressing Type Applied Foam;Other (Comment) 3/30/2020 10:58 AM  
Number of days: 1 [REMOVED] Wound Buttocks Right Stage II to right buttocks. 11/09/19 (Removed) Number of days: 34  
   
[REMOVED] Wound Buttocks Medial;Right 07/11/19 (Removed) Number of days: 121 [REMOVED] Wound Gluteal fold/cleft Right excoriation 11/29/19 (Removed) Number of days: 14 PLAN Skin Care & Pressure Relief Recommendations Minimize layers of linen Pads under patient to optimize support surface Turn/reposition approximately every 2 hours Pillow wedges Manage incontinence Please use in bed position system Promote continence; Skin Protective lotion/cream to buttocks and sacrum daily and as needed with incontinence care Offload heels pillows Offloading boots Physician/Provider notified: Yes Orders: Left anterior lower leg- Cleanse wound with dermal wound cleanser. Apply Aquacel AG to wound bed and cover with bordered foam dressing. Apply bordered foam dressing to left medial thigh pinhole over weeping \"pinhole\" opening. Change dressing every other day or PRN if saturated. Elevate legs while in bed and reposition patient Q 2 hours as patient can tolerate. Elevate heels off bed with pillows or Prevelon boots. Bilateral Unna boots removed, legs elevated, cleansed, left arterial wound dressed with Aquacel AG and bordered foam. BLE moisturized. Teaching completed with:  
[x] Patient          
[] Family member      
[] Caregiver [x] Nursing 
[] Other Patient/Caregiver Teaching: 
Level of patient/caregiver understanding able to:  
[] Indicates understanding       [x] Needs reinforcement 
[] Unsuccessful      [] Verbal Understanding [] Demonstrated understanding       [] No evidence of learning 
[] Refused teaching         [] N/A Electronically signed by Yumi Saenz RN on 3/30/2020 at 11:11 AM

## 2020-03-30 NOTE — PROGRESS NOTES
NUTRITION INITIAL ASSESSMENT/PLAN OF CARE      RECOMMENDATIONS:   1. Rec: advance diet as tolerated to cardiac, mech soft with ensure clear TID. 2. Monitor labs, weight and PO intake  3. RD to follow     GOALS:   1. Diet started/PO intake meets >75% of protein/calorie needs by 4/2    ASSESSMENT:   Wt status is classified as underweight per Body mass index is 18.25 kg/m². Pt reports to eat very little normally, pt does agree to ensure clear supplements. Diagnosis: femur fx - plans for OR today, afib, CAD, ischemic cardiomyopathy. Nutrition recommendations listed. RD to follow. Nutrition Diagnoses:   Inadequate intake related to plans for OR as evidenced by NPO status    SUBJECTIVE/OBJECTIVE:   Pt seen for an initial assessment/BMI/PU risk. Pt admit post fall. Pt resting in bed during time of assessment, currently NPO plan for OR today for L TFN. Pt reports some hunger during visit. Pt usually eats 3 very small meals/day at home. Denies n/v/d/c at this time. Pt does report some issues chewing/swallowing - eats finely chopped meats, soft foods and no bread (bread lumps up and gets stuck). Food allergy: eggs, kitchen aware. Pt states -102 lb. CBW: 103 lb which is pt stated, placed order to obtain weight. Edema noted. Pt states her daughter has been wanting her to drink ensure supplements for a while now but she dislikes them. Pt does agree to try ensure clear supplements. Head staple, L lower leg wound, stage 2 buttocks noted. Encouraged intake/supplement compliance once diet started. Will continue to monitor intake/diet advancement, weight, labs, POC. Rec: advance diet as tolerated to cardiac, mech soft with ensure clear TID.       Information Obtained from:    [x] Chart Review   [x] Patient   [] Family/Caregiver   [] Nurse/Physician   [] Interdisciplinary Meeting/Rounds    Diet: NPO  Medications: [x] Reviewed   Noted: lanoxin, flintstone vit, LR @ 75, remeron, MVI, metamucil, PRN: zofran, miralax, ultram  Allergies: [x] Reviewed  eggs  Encounter Diagnoses     ICD-10-CM ICD-9-CM   1. Displaced intertrochanteric fracture of left femur, initial encounter for closed fracture ([facility] S72.142A 820.21   2. Fall, initial encounter [address] XXXA E888.9   3. Laceration of scalp, initial encounter S01. 01XA 873.0     Past Medical History:   Diagnosis Date    A-fib ([facility] 01/2017    Arthritis, degenerative     CAD (coronary artery disease) 04/2012    S/P 2.75 X 15 mm BMS of OM (04/2012), Two LAD BMS (07/2012)    Elevated liver enzymes     Likely from statin    HLD (hyperlipidemia)     Hyperkalemia 06/2012    Likely from lisinopril    Ischemic cardiomyopathy     LVEF  30-35%(05/19) 45% (11/2012) & 30% echo (04/2012)    NSTEMI (non-ST elevated myocardial infarction) ([facility] 04/2012    UTI (urinary tract infection)     Vertigo       Labs:    Lab Results   Component Value Date/Time    Sodium 140 03/30/2020 04:30 AM    Potassium 5.0 03/30/2020 04:30 AM    Chloride 105 03/30/2020 04:30 AM    CO2 29 03/30/2020 04:30 AM    Anion gap 6 03/30/2020 04:30 AM    Glucose 80 03/30/2020 04:30 AM    BUN 35 (H) 03/30/2020 04:30 AM    Creatinine 1.26 03/30/2020 04:30 AM    Calcium 8.3 (L) 03/30/2020 04:30 AM    Magnesium 2.2 03/30/2020 04:30 AM    Phosphorus 5.1 (H) 03/30/2020 04:30 AM    Albumin 2.8 (L) 03/29/2020 11:01 AM     Lab Results   Component Value Date/Time    GLU 80 03/30/2020 04:30 AM     Lab Results   Component Value Date/Time    Cholesterol, total 202 (H) 04/29/2019 07:45 AM    HDL Cholesterol 64 (H) 04/29/2019 07:45 AM    LDL, calculated 116.4 (H) 04/29/2019 07:45 AM    VLDL, calculated 21.6 04/29/2019 07:45 AM    Triglyceride 108 04/29/2019 07:45 AM    CHOL/HDL Ratio 3.2 04/29/2019 07:45 AM     Anthropometrics:    Last 3 Recorded Weights in this Encounter    03/29/20 1110   Weight: 46.7 kg (103 lb)      Ht Readings from Last 1 Encounters:   03/03/20 5' 3\" (1.6 m)     Documented Weight History:  Weight Metrics 3/29/2020 3/3/2020 2/19/2020 2/5/2020 1/24/2020 1/8/2020 12/18/2019   Weight 103 lb 104 lb 104 lb 104 lb 104 lb 104 lb 104 lb   BMI 18.25 kg/m2 18.42 kg/m2 18.42 kg/m2 18.42 kg/m2 18.42 kg/m2 18.42 kg/m2 18.42 kg/m2     No data found. IBW: 115 lb %IBW: 90% UBW: 101-102 lb   [] Weight Loss [] Weight Gain [x] Weight Stable per pt    Estimated Nutrition Needs: [] MSJ  [x] Other:  Calories: 6427-7839 kcal (25-30 kcal/kg) Based on:   [x] Actual BW    Protein:   47-56 g (1-1.2 g/kg) Based on:   [x] Actual BW    Fluid:       1 mL/kcal     [] No Cultural, Yazidism or ethnic dietary need identified.     [] Cultural, Yazidism and ethnic food preferences identified and addressed     Wt Status:  [] Normal (18.6 - 24.9) [x] Underweight (<18.5) [] Overweight (25 - 29.9) [] Mild Obesity (30 - 34.9)  [] Moderate Obesity (35 - 39.9) [] Morbid Obesity (40+)     Nutrition Problems Identified:   [x] Suboptimal PO intake vs NPO  [x] Food Allergies  [x] Difficulty chewing/swallowing/poor dentition  [] Constipation/Diarrhea   [] Nausea/Vomiting   [] None  [] Other:     Plan:   [x] Therapeutic Diet  []  Obtained/adjusted food preferences/tolerances and/or snacks options   [x]  Supplements added   [] Occupational therapy following for feeding techniques  []  HS snack added   [x]  Modify diet texture   []  Modify diet for food allergies   []  Educate patient   []  Assist with menu selection   [x]  Monitor PO intake on meal rounds   [x]  Continue inpatient monitoring and intervention   [x]  Participated in discharge planning/Interdisciplinary rounds/Team meetings   []  Other:     Education Needs:   [x] Not appropriate for teaching at this time due to: NPO   [] Identified and addressed    Nutrition Monitoring and Evaluation:  [x] Continue ongoing monitoring and intervention  [] Other    Lesvia Grover

## 2020-03-30 NOTE — PROGRESS NOTES
Internal Medicine Progress Note    Patient's Name: Gaudencio Hilliard Date: 3/29/2020  Length of Stay: 1      Assessment/Plan     C/Caitlin Angel [address] Problems    Diagnosis Date Noted    Femur fracture, left ([address] 03/29/2020    Chronic a-fib 05/02/2017    CAD (coronary artery disease)      S/P 2.75 X 15 mm BMS of Obtuse marginal      Ischemic cardiomyopathy      - Plan for OR today for left TFN  - Cardiology input appreciated regarding preop risk  - Echo pending  - Pain control PRN  - PT/OT after surgery  - Cont acceptable home medications for chronic conditions   - DVT protocol    I have personally reviewed all pertinent labs and films that have officially resulted over the last 24 hours. I have personally checked for all pending labs that are awaiting final results.     Subjective     Pt s/e @ bedside  No major events overnight  Pain controlled  Denies CP or SOB    Objective     Visit Vitals  /69   Pulse 91   Temp 97.8 °F (36.6 °C)   Resp 16   Wt 46.7 kg (103 lb)   SpO2 91%   BMI 18.25 kg/m²       Physical Exam:  General Appearance: NAD, conversant  Lungs: CTA with normal respiratory effort  CV: RRR, no m/r/g  Abdomen: soft, non-tender, normal bowel sounds  Extremities: no cyanosis, no peripheral edema  Neuro: No focal deficits, motor/sensory intact    Lab/Data Reviewed:  BMP:   Lab Results   Component Value Date/Time     03/30/2020 04:30 AM    K 5.0 03/30/2020 04:30 AM     03/30/2020 04:30 AM    CO2 29 03/30/2020 04:30 AM    AGAP 6 03/30/2020 04:30 AM    GLU 80 03/30/2020 04:30 AM    BUN 35 (H) 03/30/2020 04:30 AM    CREA 1.26 03/30/2020 04:30 AM    GFRAA 48 (L) 03/30/2020 04:30 AM    GFRNA 40 (L) 03/30/2020 04:30 AM     CBC:   Lab Results   Component Value Date/Time    WBC 10.6 03/30/2020 04:30 AM    HGB 9.5 (L) 03/30/2020 04:30 AM    HCT 31.4 (L) 03/30/2020 04:30 AM     03/30/2020 04:30 AM       Imaging Reviewed:  Xr Shoulder Lt Ap/lat Min 2 V    Result Date: 3/29/2020  EXAM: Left shoulder x-ray CLINICAL INDICATION/HISTORY: Left shoulder pain following trauma. COMPARISON: No prior relevant study available for comparison. TECHNIQUE: 3 views of the shoulder were obtained. _______________ FINDINGS: There is no evidence of fracture or dislocation. The glenohumeral joint space is maintained. There is no evidence of joint effusion. The included portion of the lung apex and upper ribs are unremarkable. _______________     IMPRESSION: Negative for fracture or dislocation in the left shoulder. Xr Elbow Lt Min 3 V    Result Date: 3/29/2020  EXAM: Left elbow x-ray CLINICAL INDICATION/HISTORY: Left elbow pain following trauma. COMPARISON: No prior relevant study available for comparison. TECHNIQUE: 4 views of the elbow were obtained. _______________ FINDINGS: There is no evidence of fracture or dislocation of the elbow. The radiocapitellar and anterior humeral alignment is maintained. There are no secondary signs of joint effusion. _______________     IMPRESSION: Negative for fracture or dislocation of the left elbow. Xr Wrist Lt Ap/lat/obl Min 3v    Result Date: 3/29/2020  EXAM: Left wrist x-ray CLINICAL INDICATION/HISTORY: Left wrist pain following trauma. COMPARISON: No prior relevant study available for comparison. TECHNIQUE: 3 views of the wrist were obtained. _______________ FINDINGS: There is no evidence of fracture or dislocation. The soft tissue structures appear unremarkable. There is degenerative arthropathy of the first carpometacarpal joint and mild radial carpal joint space loss. _______________     IMPRESSION: Negative for fracture or dislocation in the left wrist.    Xr Hip Lt W Or Wo Pelv 2-3 Vws    Result Date: 3/29/2020  EXAM: Left hip x-ray CLINICAL INDICATION/HISTORY: Left hip pain following trauma. COMPARISON: No prior relevant study available for comparison.  TECHNIQUE: 2 views of the hip were obtained. _______________ FINDINGS: There is a minimally displaced intertrochanteric fracture of the left femur. The femoroacetabular joint space is maintained. Noted is generalized osseous demineralization. _______________     IMPRESSION: Minimally displaced intertrochanteric left femoral fracture. Xr Knee Lt Max 2 Vws    Result Date: 3/29/2020  EXAM: Left knee x-ray CLINICAL INDICATION/HISTORY: Left knee pain following trauma. COMPARISON: No prior relevant study available for comparison. TECHNIQUE: 2 views of the knee were obtained. _______________ FINDINGS: BONES: No evidence of fracture or dislocation. The femorotibial joint space is maintained. SOFT TISSUES: No joint effusion or other soft tissue abnormality. _______________     IMPRESSION: Negative for fracture or dislocation in the left knee. Ct Head Wo Cont    Result Date: 3/29/2020  EXAM: CT head CLINICAL INDICATION/HISTORY: Headache. Traumatic head injury. COMPARISON: 11/21/2019 TECHNIQUE: Axial CT imaging of the head was performed without intravenous contrast. One or more dose reduction techniques were used on this CT: automated exposure control, adjustment of the mAs and/or kVp according to patient size, and iterative reconstruction techniques. The specific techniques used on this CT exam have been documented in the patient's electronic medical record. Digital Imaging and Communications in Medicine (DICOM) format image data are available to nonaffiliated external healthcare facilities or entities on a secure, media free, reciprocally searchable basis with patient authorization for at least a 12-month period after this study. _______________ FINDINGS: BRAIN AND POSTERIOR FOSSA: There is no intracranial hemorrhage, mass effect, or midline shift. The sulci, folia, ventricles and basal cisterns are within normal limits. There are no areas of abnormal parenchymal attenuation. EXTRA-AXIAL SPACES AND MENINGES: There are no abnormal extra-axial fluid collections. CALVARIUM: Intact. SINUSES: Clear.  OTHER: None. _______________ IMPRESSION: No acute intracranial process, specifically, no evidence of intracranial hemorrhage, mass effect, or midline shift. Ct Spine Cerv Wo Cont    Result Date: 3/29/2020  EXAM: CT cervical spine CLINICAL INDICATION/HISTORY:  Neck pain following trauma. COMPARISON: None. TECHNIQUE: Axial CT imaging of the cervical spine was performed from the skull base to the thoracic inlet without intravenous contrast. Multiplanar reformats were generated. One or more dose reduction techniques were used on this CT: automated exposure control, adjustment of the mAs and/or kVp according to patient size, and iterative reconstruction techniques. The specific techniques used on this CT exam have been documented in the patient's electronic medical record. Digital Imaging and Communications in Medicine (DICOM) format image data are available to nonaffiliated external healthcare facilities or entities on a secure, media free, reciprocally searchable basis with patient authorization for at least a 12-month period after this study. _______________ FINDINGS: VERTEBRAE AND DISCS: There is no acute fracture in the cervical spine, specifically, vertebral body heights are maintained. There is intervertebral spondylosis at C4-C7. Multilevel uncovertebral arthropathy also evident. There is no spondylolisthesis. There are no significant areas of bone lucency or sclerosis. SPINAL CANAL AND FORAMINA: Patent without significant bony canal or foramina encroachment. PREVERTEBRAL SOFT TISSUES: Normal. VISIBLE PORTIONS OF POSTERIOR FOSSA/BRAIN: Normal. LUNG APICES: Clear. OTHER: None. _______________     IMPRESSION: Mild degenerative changes without evidence of acute fracture or traumatic subluxation of the cervical spine. Note that this cervical spine CT was performed supine. Although it is highly sensitive for fractures, it does not evaluate for ligamentous injury or instability.   If the patient has persistent symptoms or if otherwise clinically indicated, erect plain film evaluation or MRI should be performed. Xr Chest Port    Result Date: 3/29/2020  EXAM: One view chest x-ray CLINICAL INDICATION/HISTORY: Preoperative exam with planning for left hip surgery. COMPARISON: 10/20/2019. TECHNIQUE: Single AP view of the chest was obtained. _______________ FINDINGS: HEART, VESSELS, MEDIASTINUM: Heart size is normal. No vascular congestion. There is atherosclerosis of the thoracic aorta. LUNGS, PLEURAL SPACES: Linear parenchymal opacities at bilateral lung bases. Probable associated small left pleural effusion. BONY THORAX, SOFT TISSUES: Unremarkable. _______________     IMPRESSION: Small left pleural effusion and associated bibasilar subsegmental atelectasis.       Medications Reviewed:  Current Facility-Administered Medications   Medication Dose Route Frequency    acetaminophen (TYLENOL) tablet 650 mg  650 mg Oral Q4H PRN    aspirin chewable tablet 81 mg  81 mg Oral DAILY    carboxymethylcellulose sodium (CELLUVISC) 0.5 % ophthalmic solution 1 Drop  1 Drop Both Eyes TID PRN    digoxin (LANOXIN) tablet 0.125 mg  0.125 mg Oral Q M, W, F & SAT    digoxin (LANOXIN) tablet 0.25 mg  0.25 mg Oral Q DURON, TU & TH    FLUoxetine (PROzac) capsule 10 mg  10 mg Oral DAILY    fluticasone propionate (FLONASE) 50 mcg/actuation nasal spray 1 Spray  1 Spray Both Nostrils DAILY    mirtazapine (REMERON) tablet 15 mg  15 mg Oral QHS    nitroglycerin (NITROSTAT) tablet 0.4 mg  0.4 mg SubLINGual Q5MIN PRN    ondansetron (ZOFRAN ODT) tablet 4 mg  4 mg Oral Q8H PRN    psyllium husk-aspartame (METAMUCIL FIBER) packet 1 Packet  1 Packet Oral DAILY    sodium chloride (NS) flush 5-40 mL  5-40 mL IntraVENous Q8H    sodium chloride (NS) flush 5-40 mL  5-40 mL IntraVENous PRN    lactated Ringers infusion  75 mL/hr IntraVENous CONTINUOUS    polyethylene glycol (MIRALAX) packet 17 g  17 g Oral DAILY PRN    traMADoL (ULTRAM) tablet 50 mg  50 mg Oral Q6H PRN    naloxone [facility]) injection 0.4 mg  0.4 mg IntraVENous PRN    flintstones complete (FLINTSTONES) chewable tablet 1 Tab  1 Tab Oral DAILY           Yomi Bradshaw,   Internal Medicine, Hospitalist  Pager: [phone]  [address] [place] [address] Drive Group

## 2020-03-30 NOTE — ANESTHESIA POSTPROCEDURE EVALUATION
Procedure(s): FEMUR INSERTION INTRA MEDULLARY NAIL. general    <BSHSIANPOST>    Vitals Value Taken Time   BP 95/57 3/30/2020  4:32 PM   Temp 36.4 °C (97.5 °F) 3/30/2020  4:03 PM   Pulse 119 3/30/2020  4:36 PM   Resp 18 3/30/2020  4:36 PM   SpO2 94 % 3/30/2020  4:36 PM   Vitals shown include unvalidated device data.

## 2020-03-30 NOTE — CONSULTS
[facility] - Consult Note    Consultation request by Radha Davenport MD for advice/opinion related to evaluating Femur fracture, left ([facility] [S72.92XA]    Date of  Admission: 3/29/2020 10:31 AM   Primary Care Physician:  Adela Chance MD     Assessment:     - Status post mechanical fall with left femur fracture   [facility]fib ([facility] 01/2017   CAD (coronary artery disease) 04/2012   S/P 2.75 X 15 mm BMS of OM (04/2012), Two LAD BMS (07/2012)   Ischemic cardiomyopathy    LVEF  30-35%(05/19) 45% (11/2012) & 30% echo (04/2012)   H/O Elevated liver enzymes    Likely from statin   HLD (hyperlipidemia)    Hyperkalemia 06/2012,  Likely from lisinopril   NSTEMI (non-ST elevated myocardial infarction) ([facility] 04/2012     Plan:     Patient admitted after mechanical fall resulting into the left femur fracture. Orthopedic consultation is pending. She may possibly require surgery  Currently she does not report any unstable coronary symptoms or decompensated heart failure. She has 1+ edema    Echo ordered however unlikely to   If surgery is planned, she would be at least at intermediate risk from cardiovascular standpoint. No absolute contraindication in my opinion. Continue aspirin and digoxin. Atrial fibrillation rate is controlled  Blood pressure marginal to start any other AV maryam blocking agent. History of Present Illness: This is a 80 y.o. female admitted for Femur fracture, left ([facility] [S72.92XA]. Patient complains of:      Ms. Farzana Self is 80-year-old female with a history of ischemic cardiomyopathy, CAD, coronary stent, hypertension, hyperlipidemia patient was admitted with mechanical fall. Patient tells me that she was walking with a walker and sustained a mechanical fall on her left side and now she has left femur fracture. She does not recall having any chest pain or dizziness or presyncope or syncope. She has a chronic dyspnea and some lower extremity swelling. However unchanged. Patient has some leg pain. She is questioning when she will get her surgery. She does not report any cardiac symptoms at this time.     Review of Symptoms:  Except as stated above include:  Constitutional:  negative  Respiratory:  negative  Cardiovascular:  negative  Gastrointestinal: negative  Genitourinary:  negative  Musculoskeletal:  Negative  Neurological:  Negative  Dermatological:  Negative  Endocrinological: Negative  Psychological:  Negative    A comprehensive review of systems was negative except for that written in the HPI. Past Medical History:     Past Medical History:   Diagnosis Date    A-fib [facility]) 01/2017    Arthritis, degenerative     CAD (coronary artery disease) 04/2012    S/P 2.75 X 15 mm BMS of OM (04/2012), Two LAD BMS (07/2012)    Elevated liver enzymes     Likely from statin    HLD (hyperlipidemia)     Hyperkalemia 06/2012    Likely from lisinopril    Ischemic cardiomyopathy     LVEF  30-35%(05/19) 45% (11/2012) & 30% echo (04/2012)    NSTEMI (non-ST elevated myocardial infarction) ([facility] 04/2012    UTI (urinary tract infection)     Vertigo          Social History:     Social History     Socioeconomic History    Marital status:      Spouse name: Not on file    Number of children: Not on file    Years of education: Not on file    Highest education level: Not on file   Tobacco Use    Smoking status: Never Smoker    Smokeless tobacco: Never Used   Substance and Sexual Activity    Alcohol use: No    Drug use: No    Sexual activity: Never        Family History:     Family History   Problem Relation Age of Onset    Diabetes Mother         Medications: Allergies   Allergen Reactions    Flu Vaccine 2011 (36 Mos+)(Pf) Anaphylaxis    Codeine Anaphylaxis    Egg Not Reported This Time     Per patient who is A&O x3 that she can eat the yolks but breaks out from the whites. She prefers Hard boiled for this reason.     Influenza Virus Vaccines Hives  Lipitor [Atorvastatin] Nausea Only    Pcn [Penicillins] Hives        Current Facility-Administered Medications   Medication Dose Route Frequency    acetaminophen (TYLENOL) tablet 650 mg  650 mg Oral Q4H PRN    aspirin chewable tablet 81 mg  81 mg Oral DAILY    carboxymethylcellulose sodium (CELLUVISC) 0.5 % ophthalmic solution 1 Drop  1 Drop Both Eyes TID PRN    digoxin (LANOXIN) tablet 0.125 mg  0.125 mg Oral Q M, W, F & SAT    digoxin (LANOXIN) tablet 0.25 mg  0.25 mg Oral Q DURON, TU & TH    FLUoxetine (PROzac) capsule 10 mg  10 mg Oral DAILY    fluticasone propionate (FLONASE) 50 mcg/actuation nasal spray 1 Spray  1 Spray Both Nostrils DAILY    mirtazapine (REMERON) tablet 15 mg  15 mg Oral QHS    nitroglycerin (NITROSTAT) tablet 0.4 mg  0.4 mg SubLINGual Q5MIN PRN    ondansetron (ZOFRAN ODT) tablet 4 mg  4 mg Oral Q8H PRN    psyllium husk-aspartame (METAMUCIL FIBER) packet 1 Packet  1 Packet Oral DAILY    sodium chloride (NS) flush 5-40 mL  5-40 mL IntraVENous Q8H    sodium chloride (NS) flush 5-40 mL  5-40 mL IntraVENous PRN    lactated Ringers infusion  75 mL/hr IntraVENous CONTINUOUS    polyethylene glycol (MIRALAX) packet 17 g  17 g Oral DAILY PRN    traMADoL (ULTRAM) tablet 50 mg  50 mg Oral Q6H PRN    naloxone (NARCAN) injection 0.4 mg  0.4 mg IntraVENous PRN    multivitamin (ONE A DAY) tablet 1 Tab  1 Tab Oral DAILY    flintstones complete (FLINTSTONES) chewable tablet 1 Tab  1 Tab Oral DAILY         Physical Exam:     Visit Vitals  BP 95/55   Pulse 67   Temp 98.1 °F (36.7 °C)   Resp 14   Wt 103 lb (46.7 kg)   SpO2 94%   BMI 18.25 kg/m²     BP Readings from Last 3 Encounters:   03/30/20 95/55   03/24/20 95/60   03/17/20 104/54     Pulse Readings from Last 3 Encounters:   03/30/20 67   03/24/20 87   03/17/20 81     Wt Readings from Last 3 Encounters:   03/29/20 103 lb (46.7 kg)   03/03/20 104 lb (47.2 kg)   02/19/20 104 lb (47.2 kg)       General:  fatigued, cooperative, no distress, appears stated age  Neck:  no carotid bruit, no JVD  Lungs: No significant rales, slight decreased breath sound on the left side  Heart:  Irregular rhythm, S1, S2 normal, no murmur, click, rub or gallop  Abdomen:  abdomen is soft   Extremities: +1 lower extremity edema present  Skin: Warm and dry.  no hyperpigmentation, vitiligo, or suspicious lesions  Neuro: alert, oriented x3, affect appropriate, no focal neurological deficits, moves all extremities well, no involuntary movements, reflexes at knee and ankle intact  Psych: non focal     Data Review:     Recent Labs     03/30/20  0430 03/29/20  1101   WBC 10.6 8.7   HGB 9.5* 10.1*   HCT 31.4* 33.0*    337     Recent Labs     03/30/20  0430 03/29/20  1101    141   K 5.0 5.2    105   CO2 29 33*   GLU 80 81   BUN 35* 31*   CREA 1.26 1.25   CA 8.3* 8.5   MG 2.2  --    PHOS 5.1*  --    ALB  --  2.8*   SGOT  --  37   ALT  --  23   INR 1.2 1.1       Results for orders placed or performed during the hospital encounter of 03/29/20   EKG, 12 LEAD, INITIAL   Result Value Ref Range    Ventricular Rate 91 BPM    QRS Duration 98 ms    Q-T Interval 354 ms    QTC Calculation (Bezet) 435 ms    Calculated R Axis 110 degrees    Calculated T Axis 144 degrees    Diagnosis       Atrial fibrillation  Left posterior fascicular block  Nonspecific T wave abnormality  Abnormal ECG  When compared with ECG of 21-NOV-2019 10:18,  No significant change was found         All Cardiac Markers in the last 24 hours:    Lab Results   Component Value Date/Time     03/29/2020 11:01 AM       Last Lipid:    Lab Results   Component Value Date/Time    Cholesterol, total 202 (H) 04/29/2019 07:45 AM    HDL Cholesterol 64 (H) 04/29/2019 07:45 AM    LDL, calculated 116.4 (H) 04/29/2019 07:45 AM    Triglyceride 108 04/29/2019 07:45 AM    CHOL/HDL Ratio 3.2 04/29/2019 07:45 AM       Signed By: Shila Cary MD     March 30, 2020

## 2020-03-30 NOTE — PROGRESS NOTES
conducted an initial consultation and Spiritual Assessment for [address] [facility] [address], who is a 80 y.o.,female. Patients Primary Language is: [place]. According to the patients EMR Orthodox Affiliation is: Ra Sikhism.     The reason the Patient came to the hospital is:   Patient Active Problem List    Diagnosis Date Noted    Femur fracture, left ([address] 03/29/2020    Bilateral leg edema 12/18/2019    Leg ulcer, left, with fat layer exposed ([address] 12/18/2019    Ulcer of right leg, with fat layer exposed ([address] 12/18/2019    Dizziness 11/21/2019    Atrial fibrillation ([address] 10/01/2019    Hypotension 10/01/2019    Skin rash 69/96/0626    Metabolic acidosis 82/90/7610    Underweight 10/01/2019    Atrial fibrillation with rapid ventricular response ([address] 09/30/2019    Syncope 06/05/2019    Hypoxia 06/05/2019    Leukocytosis 06/05/2019    Acute on chronic systolic (congestive) heart failure (HCC) 06/03/2019    Hyperkalemia 10/23/2018    Acute metabolic encephalopathy 47/40/6622    Hyponatremia 10/20/2018    Chronic fatigue 10/09/2018    Irritable bowel syndrome with diarrhea 09/24/2018    Chronic a-fib 05/02/2017    CAD (coronary artery disease)     Ischemic cardiomyopathy     Arthritis, degenerative     Vertigo     HLD (hyperlipidemia)     SOB (shortness of breath) 04/27/2012    NSTEMI (non-ST elevated myocardial infarction) ([facility] 04/01/2012        The  provided the following Interventions:  Initiated a relationship of care and support with patient in room 3004 this morning. Listened empathically as she talked about how she came to be in the hospital and how silly she feels that she has  To be here with all these other people who are far sicker than she feels. Patient has no worries. Provided information about Spiritual Care Services. Offered prayer and assurance of continued prayers on patients behalf.        The following outcomes were achieved:  Patient shared limited information about her medical narrative and spiritual journey/beliefs. Patient processed feeling about current hospitalization. Patient expressed gratitude for pastoral care visit. Assessment:  Patient does not have any Gnosticist/cultural needs that will affect patients preferences in health care. There are no further spiritual or Gnosticist issues which require Spiritual Care Services interventions at this time. Plan:  Chaplains will continue to follow and will provide pastoral care on an as needed/requested basis    . Yaniv Morin   Spiritual Care   [phone]

## 2020-03-30 NOTE — PERIOP NOTES
Received pt from OR via bed. Monitor connected to pt. Vital Signs stable. A. Fib noted on monitor  16:00 Anesthesia Bulmaro MOLINA made aware of uncontrolled A-Fib rate 120s. And low urine output. Order and started LR 250ml bolus. 17:00 Bedside Report given to Lurdes Solorzano RN. SBAR out.

## 2020-03-30 NOTE — PROGRESS NOTES
Progressing  Bedside and Verbal shift change report given to [facility] (oncoming nurse) by Meet Weiner (offgoing nurse). Report included the following information Kardex, OR Summary, Procedure Summary, Intake/Output, MAR, Recent Results, Cardiac Rhythm Atrial Fib 100-120, Procedure Verification and Quality Measures.

## 2020-03-31 LAB
BASOPHILS # BLD: 0 K/UL (ref 0–0.1)
BASOPHILS NFR BLD: 0 % (ref 0–2)
DIFFERENTIAL METHOD BLD: ABNORMAL
EOSINOPHIL # BLD: 0 K/UL (ref 0–0.4)
EOSINOPHIL NFR BLD: 0 % (ref 0–5)
ERYTHROCYTE [DISTWIDTH] IN BLOOD BY AUTOMATED COUNT: 20.1 % (ref 11.6–14.5)
HCT VFR BLD AUTO: 33 % (ref 35–45)
HGB BLD-MCNC: 9.6 G/DL (ref 12–16)
LYMPHOCYTES # BLD: 0.9 K/UL (ref 0.9–3.6)
LYMPHOCYTES NFR BLD: 8 % (ref 21–52)
MCH RBC QN AUTO: 24.4 PG (ref 24–34)
MCHC RBC AUTO-ENTMCNC: 29.1 G/DL (ref 31–37)
MCV RBC AUTO: 84 FL (ref 74–97)
MONOCYTES # BLD: 1.1 K/UL (ref 0.05–1.2)
MONOCYTES NFR BLD: 10 % (ref 3–10)
NEUTS SEG # BLD: 9.1 K/UL (ref 1.8–8)
NEUTS SEG NFR BLD: 82 % (ref 40–73)
PLATELET # BLD AUTO: 295 K/UL (ref 135–420)
PMV BLD AUTO: 9.2 FL (ref 9.2–11.8)
RBC # BLD AUTO: 3.93 M/UL (ref 4.2–5.3)
WBC # BLD AUTO: 11.1 K/UL (ref 4.6–13.2)

## 2020-03-31 PROCEDURE — 65660000000 HC RM CCU STEPDOWN

## 2020-03-31 PROCEDURE — 74011250636 HC RX REV CODE- 250/636: Performed by: ORTHOPAEDIC SURGERY

## 2020-03-31 PROCEDURE — 97535 SELF CARE MNGMENT TRAINING: CPT

## 2020-03-31 PROCEDURE — 97530 THERAPEUTIC ACTIVITIES: CPT

## 2020-03-31 PROCEDURE — 74011250637 HC RX REV CODE- 250/637: Performed by: ORTHOPAEDIC SURGERY

## 2020-03-31 PROCEDURE — 3331090001 HH PPS REVENUE CREDIT

## 2020-03-31 PROCEDURE — 97162 PT EVAL MOD COMPLEX 30 MIN: CPT

## 2020-03-31 PROCEDURE — 85025 COMPLETE CBC W/AUTO DIFF WBC: CPT

## 2020-03-31 PROCEDURE — 36415 COLL VENOUS BLD VENIPUNCTURE: CPT

## 2020-03-31 PROCEDURE — 74011250636 HC RX REV CODE- 250/636: Performed by: HOSPITALIST

## 2020-03-31 PROCEDURE — 74011250637 HC RX REV CODE- 250/637: Performed by: INTERNAL MEDICINE

## 2020-03-31 PROCEDURE — 97166 OT EVAL MOD COMPLEX 45 MIN: CPT

## 2020-03-31 PROCEDURE — 3331090002 HH PPS REVENUE DEBIT

## 2020-03-31 RX ADMIN — ONDANSETRON 4 MG: 2 INJECTION INTRAMUSCULAR; INTRAVENOUS at 10:20

## 2020-03-31 RX ADMIN — SENNOSIDES AND DOCUSATE SODIUM 1 TABLET: 8.6; 5 TABLET ORAL at 18:15

## 2020-03-31 RX ADMIN — FLUTICASONE PROPIONATE 1 SPRAY: 50 SPRAY, METERED NASAL at 10:44

## 2020-03-31 RX ADMIN — DIGOXIN 0.12 MG: 125 TABLET ORAL at 10:21

## 2020-03-31 RX ADMIN — ACETAMINOPHEN 650 MG: 325 TABLET, FILM COATED ORAL at 20:19

## 2020-03-31 RX ADMIN — ACETAMINOPHEN 650 MG: 325 TABLET, FILM COATED ORAL at 01:00

## 2020-03-31 RX ADMIN — POLYETHYLENE GLYCOL 3350 17 G: 17 POWDER, FOR SOLUTION ORAL at 10:23

## 2020-03-31 RX ADMIN — ENOXAPARIN SODIUM 30 MG: 30 INJECTION, SOLUTION INTRAVENOUS; SUBCUTANEOUS at 22:50

## 2020-03-31 RX ADMIN — Medication 10 ML: at 19:37

## 2020-03-31 RX ADMIN — HYDROCODONE BITARTRATE AND ACETAMINOPHEN 2 TABLET: 5; 325 TABLET ORAL at 12:33

## 2020-03-31 RX ADMIN — ASPIRIN 81 MG 81 MG: 81 TABLET ORAL at 10:20

## 2020-03-31 RX ADMIN — CEFAZOLIN 0.04 G: 10 INJECTION, POWDER, FOR SOLUTION INTRAVENOUS at 10:22

## 2020-03-31 RX ADMIN — Medication 1 TABLET: at 10:21

## 2020-03-31 RX ADMIN — FLUOXETINE 10 MG: 10 CAPSULE ORAL at 10:21

## 2020-03-31 RX ADMIN — Medication 10 ML: at 06:00

## 2020-03-31 RX ADMIN — MIRTAZAPINE 15 MG: 15 TABLET, FILM COATED ORAL at 22:51

## 2020-03-31 RX ADMIN — ACETAMINOPHEN 650 MG: 325 TABLET, FILM COATED ORAL at 10:20

## 2020-03-31 RX ADMIN — SENNOSIDES AND DOCUSATE SODIUM 1 TABLET: 8.6; 5 TABLET ORAL at 10:20

## 2020-03-31 RX ADMIN — ONDANSETRON 4 MG: 2 INJECTION INTRAMUSCULAR; INTRAVENOUS at 18:15

## 2020-03-31 NOTE — PROGRESS NOTES
Problem: Self Care Deficits Care Plan (Adult)  Goal: *Acute Goals and Plan of Care (Insert Text)  Description: Occupational Therapy Goals  Initiated 3/31/2020 within 7 day(s). 1.  Patient will perform grooming with modified independence. 2.  Patient will perform bathing with modified independence. 3.  Patient will perform upper body dressing and lower body dressing with modified independence using adaptive equipment as needed. 4.  Patient will perform toilet transfers with modified independence using RW. 5.  Patient will perform all aspects of toileting with modified independence. 6.  Patient will participate in upper extremity therapeutic exercise/activities with modified independence for 10 minutes. 7.  Patient will utilize energy conservation techniques during functional activities with verbal cues. Prior Level of Function: Mod I w/ ADLs and functional mobility using RW, half-way staff provides Supv for walk in shower transfer and tasks; meals and med mgmt   Outcome: Progressing Towards Goal   OCCUPATIONAL THERAPY EVALUATION    Patient: Aby Gomez (80 y.o. female)  Date: 3/31/2020  Primary Diagnosis: Femur fracture, left ([address] [S72.92XA]  Procedure(s) (LRB):  FEMUR INSERTION INTRA MEDULLARY NAIL (Left) 1 Day Post-Op   Precautions:   Fall, Skin  PLOF: see above    ASSESSMENT :  Nursing/Claudia cleared for pt to participate in OT tx session. Patient reports difficulty breathing, telemetry issued oximeter - WNL on O2-nursing notified. Patient c/o feeling cold throughout - nursing notified. Based on the objective data described below, the patient presents with decreased strength BUE MMT 3+/5, BUE AROM WFL, poor functional activity tolerance, Balance: sitting static good, sitting dynamic fair, standing static fair, standing dynamic fair- and poor safety awareness, which is inhibiting ability to perform ADLs and functional transfers independently.  Based upon clinical judgement, patient requires assist with functional mobility e.g. Max A with bed mobility for supine to sit and Max A sit to supine Sit to stand w/ Mod A and side step w/ RW towards left with w/ Min A and with verbal cues for safety awareness, UB and LB bathing with Max A, UB dress with Max A, LB dress with dep, toileting with dep, grooming with SBA and self feeding with set up. Nursing notified of pt c/o Left hip pain 10/10 at end of tx session. Patient educated on role of OT and POC; patient verbalized understanding. Pt. Instructed on safety awareness with importance to utilize call bell to request assist with functional transfers to prevent falls, patient verbalized understanding and provided accurate demonstration. Dry erase communication board updated for accuracy w/ carryover for toileting: Bed pan. Patient will benefit from skilled intervention to address the above impairments.   Patient's rehabilitation potential is considered to be Good  Factors which may influence rehabilitation potential include:   [x]             None noted  []             Mental ability/status  []             Medical condition  []             Home/family situation and support systems  []             Safety awareness  []             Pain tolerance/management  []             Other:      PLAN :  Recommendations and Planned Interventions:   [x]               Self Care Training                  [x]      Therapeutic Activities  [x]               Functional Mobility Training   []      Cognitive Retraining  [x]               Therapeutic Exercises           [x]      Endurance Activities  [x]               Balance Training                    []      Neuromuscular Re-Education  []               Visual/Perceptual Training     [x]      Home Safety Training  [x]               Patient Education                   [x]      Family Training/Education  []               Other (comment):    Frequency/Duration: Patient will be followed by occupational therapy 1-2 times per day/3-5 days per week to address goals. Discharge Recommendations: [place]  Further Equipment Recommendations for Discharge: bedside commode, rolling walker, and wheelchair      SUBJECTIVE:   Patient stated I'm cold.     OBJECTIVE DATA SUMMARY:     Past Medical History:   Diagnosis Date    A-fib ([facility] 01/2017    Arthritis, degenerative     CAD (coronary artery disease) 04/2012    S/P 2.75 X 15 mm BMS of OM (04/2012), Two LAD BMS (07/2012)    Elevated liver enzymes     Likely from statin    HLD (hyperlipidemia)     Hyperkalemia 06/2012    Likely from lisinopril    Ischemic cardiomyopathy     LVEF  30-35%(05/19) 45% (11/2012) & 30% echo (04/2012)    NSTEMI (non-ST elevated myocardial infarction) ([facility] [address] 04/2012    UTI (urinary tract infection)     Vertigo      Past Surgical History:   Procedure Laterality Date    HX CORONARY STENT PLACEMENT  4/23/12    bare metal stent to obtuse marginal branch    HX HEART CATHETERIZATION       Barriers to Learning/Limitations: None  Compensate with: visual, verbal, tactile, kinesthetic cues/model    Home Situation:   Home Situation  Home Environment: Assisted living  [address] [place] [address] [place] Name: [facility]  Living Alone: No  Support Systems: Assisted living  Patient Expects to be Discharged to[de-identified] Assisted living  Current DME Used/Available at Home: Charna Venecia, rolling, Grab bars, Shower chair  Tub or Shower Type: Shower  [x]  Right hand dominant   []  Left hand dominant    Cognitive/Behavioral Status:  Neurologic State: Alert  Orientation Level: Oriented X4  Cognition: Follows commands  Safety/Judgement: Fall prevention    Skin: left hip surgical incision  Edema: left LE s/p sx    Vision/Perceptual:      Corrective Lenses: Reading glasses    Coordination: BUE  Coordination: Generally decreased, functional(BUEs)  Fine Motor Skills-Upper: Left Intact; Right Intact    Gross Motor Skills-Upper: Left Intact; Right Intact    Balance:  Sitting: Impaired  Sitting - Static: Good (unsupported)  Sitting - Dynamic: Fair (occasional)  Standing: Impaired  Standing - Static: Fair  Standing - Dynamic : Fair(-)    Strength: BUE  Strength: Generally decreased, functional(BUEs 3+/5)    Tone & Sensation: BUE    Tone: Normal(BUEs)  Sensation: Intact(BUEs)    Range of Motion: BUE    AROM: Generally decreased, functional(BUEs)    Functional Mobility and Transfers for ADLs:  Bed Mobility:     Supine to Sit: Maximum assistance;Assist x1  Sit to Supine: Maximum assistance;Assist x1  Scooting: Moderate assistance  Transfers:  Sit to Stand: Moderate assistance  Stand to Sit: Moderate assistance     Toilet Transfer : Other (comment)(unable to achieve)    ADL Assessment:   Feeding: Setup    Oral Facial Hygiene/Grooming: Stand-by assistance    Bathing: Maximum assistance    Upper Body Dressing: Maximum assistance    Lower Body Dressing: Total assistance    Toileting: Total assistance    ADL Intervention:       Grooming  Grooming Assistance: Stand-by assistance    Cognitive Retraining  Safety/Judgement: Fall prevention    Pain:  Pain level pre-treatment: 0/10   Pain level post-treatment: 8/10   Pain Intervention(s): Medication (see MAR); Rest, Ice, Repositioning   Response to intervention: Nurse notified, See doc flow    Activity Tolerance:   poor  Please refer to the flowsheet for vital signs taken during this treatment. After treatment:   [] Patient left in no apparent distress sitting up in chair  [x] Patient left in no apparent distress in bed  [x] Call bell left within reach  [x] Nursing notified  [] Caregiver present  [] Bed alarm activated    COMMUNICATION/EDUCATION:   [x] Role of Occupational Therapy in the acute care setting  [x] Home safety education was provided and the patient/caregiver indicated understanding. [x] Patient/family have participated as able in goal setting and plan of care. [x] Patient/family agree to work toward stated goals and plan of care.   [] Patient understands intent and goals of therapy, but is neutral about his/her participation. [] Patient is unable to participate in goal setting and plan of care. Thank you for this referral.  Tonye Show  Time Calculation: 40 mins    Eval Complexity: History: MEDIUM Complexity : Expanded review of history including physical, cognitive and psychosocial  history ; Examination: MEDIUM Complexity : 3-5 performance deficits relating to physical, cognitive , or psychosocial skils that result in activity limitations and / or participation restrictions; Decision Making:MEDIUM Complexity : Patient may present with comorbidities that affect occupational performnce.  Miniml to moderate modification of tasks or assistance (eg, physical or verbal ) with assesment(s) is necessary to enable patient to complete evaluation

## 2020-03-31 NOTE — PROGRESS NOTES
PT orders received and chart reviewed. Please clarify weightbearing orders s/p left femur fracture with surgery 3/30/2020. Will follow up as appropriate to maximize patient safety and participation in functional mobility.

## 2020-03-31 NOTE — PROGRESS NOTES
0735 rec'd in bed eyes closed . BP low  Duane  Manual BP 88/56  Patient reports always very very low. Spoke with Family son in law La Stubbs and he confirms . 1030  Seen by medical MD  C/o nausea  Given Zofran IVP per MD orders   1100 eyes closed in bed resting. patient clean and dry dressing dry and intact to left hip Completed last dose of ancef . 1200 Seen by rehab after transferring with rehab  patient C/O pain scale  1/10   (12) given hydrocodone 5/325 2 tabs per MD orders  1300 resting in bed eyes closed chief complaint is nausea  Taking small sips of clear liquids . 1530 oates cath removed due to void at 2130  Uses fracture BED PAN   Dressings changed to left hip top incision 10 stapes intact well approximated no redness heat or abnormal drainage . Lower thigh surgical incision 6 staple  Well approximated without s/s of infection as well. Old dressing removed wet    Serous drainage large amt. Patient with +3 edema to left leg. New dressing applied per post op orders   Bedside and Verbal shift change report given to [address] [place] (oncoming nurse) by Cj Arambula (offgoing nurse). Report included the following information Kardex, Intake/Output, MAR, Recent Results and Cardiac Rhythm afib. Patient with low BP MD aware and is her normal baseline. No BM this shift did attempt on FX bed pan .  Rec'd Senna x2 and Miralax , refused metamucil

## 2020-03-31 NOTE — PROGRESS NOTES
In chart to assess MEWs, pt has hx of afib, HR is elevated resulting in the elevated MEWs.  Will continue to monitor

## 2020-03-31 NOTE — PROGRESS NOTES
Problem: Discharge Planning  Goal: *Discharge to safe environment  Outcome: Progressing Towards Goal     Problem: Falls - Risk of  Goal: *Absence of Falls  Description: Document Eli Blood Fall Risk and appropriate interventions in the flowsheet. Outcome: Progressing Towards Goal  Note: Fall Risk Interventions:            Medication Interventions: Bed/chair exit alarm    Elimination Interventions: Call light in reach    History of Falls Interventions: Bed/chair exit alarm, Door open when patient unattended         Problem: Patient Education: Go to Patient Education Activity  Goal: Patient/Family Education  Outcome: Progressing Towards Goal     Problem: Pressure Injury - Risk of  Goal: *Prevention of pressure injury  Description: Document Allen Scale and appropriate interventions in the flowsheet.   Outcome: Progressing Towards Goal  Note: Pressure Injury Interventions:  Sensory Interventions: Float heels, Keep linens dry and wrinkle-free         Activity Interventions: PT/OT evaluation, Pressure redistribution bed/mattress(bed type)    Mobility Interventions: Pressure redistribution bed/mattress (bed type)    Nutrition Interventions: Document food/fluid/supplement intake    Friction and Shear Interventions: HOB 30 degrees or less                Problem: Patient Education: Go to Patient Education Activity  Goal: Patient/Family Education  Outcome: Progressing Towards Goal     Problem: Pain  Goal: *Control of Pain  Outcome: Progressing Towards Goal  Goal: *PALLIATIVE CARE:  Alleviation of Pain  Outcome: Progressing Towards Goal     Problem: Patient Education: Go to Patient Education Activity  Goal: Patient/Family Education  Outcome: Progressing Towards Goal     Problem: Patient Education: Go to Patient Education Activity  Goal: Patient/Family Education  Outcome: Progressing Towards Goal     Problem: Hip Fracture:Day of Admission Pre-op  Goal: Off Pathway (Use only if patient is Off Pathway)  Outcome: Progressing Towards Goal  Goal: Activity/Safety  Outcome: Progressing Towards Goal  Goal: Consults, if ordered  Outcome: Progressing Towards Goal  Goal: Diagnostic Test/Procedures  Outcome: Progressing Towards Goal  Goal: Nutrition/Diet  Outcome: Progressing Towards Goal  Goal: Medications  Outcome: Progressing Towards Goal  Goal: Respiratory  Outcome: Progressing Towards Goal  Goal: Treatments/Interventions/Procedures  Outcome: Progressing Towards Goal  Goal: Psychosocial  Outcome: Progressing Towards Goal  Goal: *Labs/Tests Within Defined Limits in Preparation for Surgery  Outcome: Progressing Towards Goal  Goal: *Optimal pain control at patient's stated goal  Outcome: Progressing Towards Goal  Goal: *Hemodynamically stable  Outcome: Progressing Towards Goal     Problem: Hip Fracture: Day of Surgery Post-op Care  Goal: Off Pathway (Use only if patient is Off Pathway)  Outcome: Progressing Towards Goal  Goal: Activity/Safety  Outcome: Progressing Towards Goal  Goal: Consults, if ordered  Outcome: Progressing Towards Goal  Goal: Diagnostic Test/Procedures  Outcome: Progressing Towards Goal  Goal: Nutrition/Diet  Outcome: Progressing Towards Goal  Goal: Medications  Outcome: Progressing Towards Goal  Goal: Respiratory  Outcome: Progressing Towards Goal  Goal: Treatments/Interventions/Procedures  Outcome: Progressing Towards Goal  Goal: Psychosocial  Outcome: Progressing Towards Goal  Goal: *Absence of skin breakdown  Outcome: Progressing Towards Goal  Goal: *Optimal pain control at patient's stated goal  Outcome: Progressing Towards Goal  Goal: *Hemodynamically stable  Outcome: Progressing Towards Goal     Problem: Hip Fracture: Post-Op Day 1  Goal: Off Pathway (Use only if patient is Off Pathway)  Outcome: Progressing Towards Goal  Goal: Activity/Safety  Outcome: Progressing Towards Goal  Goal: Diagnostic Test/Procedures  Outcome: Progressing Towards Goal  Goal: Nutrition/Diet  Outcome: Progressing Towards Goal  Goal: Medications  Outcome: Progressing Towards Goal  Goal: Respiratory  Outcome: Progressing Towards Goal  Goal: Treatments/Interventions/Procedures  Outcome: Progressing Towards Goal  Goal: Psychosocial  Outcome: Progressing Towards Goal  Goal: Discharge Planning  Outcome: Progressing Towards Goal  Goal: *Demonstrates progressive activity  Outcome: Progressing Towards Goal  Goal: *Optimal pain control at patient's stated goal  Outcome: Progressing Towards Goal  Goal: *Hemodynamically stable  Outcome: Progressing Towards Goal  Goal: *Discharge plan identified  Outcome: Progressing Towards Goal  Goal: *Absence of skin breakdown  Outcome: Progressing Towards Goal     Problem: Hip Fracture: Post-Op Day 2  Goal: Off Pathway (Use only if patient is Off Pathway)  Outcome: Progressing Towards Goal  Goal: Activity/Safety  Outcome: Progressing Towards Goal  Goal: Diagnostic Test/Procedures  Outcome: Progressing Towards Goal  Goal: Nutrition/Diet  Outcome: Progressing Towards Goal  Goal: Medications  Outcome: Progressing Towards Goal  Goal: Respiratory  Outcome: Progressing Towards Goal  Goal: Treatments/Interventions/Procedures  Outcome: Progressing Towards Goal  Goal: Psychosocial  Outcome: Progressing Towards Goal  Goal: Discharge Planning  Outcome: Progressing Towards Goal  Goal: *Optimal pain control at patient's stated goal  Outcome: Progressing Towards Goal  Goal: *Hemodynamically stable  Outcome: Progressing Towards Goal  Goal: *Adequate oxygenation  Outcome: Progressing Towards Goal  Goal: *Tolerates increased activity  Outcome: Progressing Towards Goal  Goal: *Tolerates nutrition therapy  Outcome: Progressing Towards Goal  Goal: *Absence of skin breakdown  Outcome: Progressing Towards Goal     Problem: Hip Fracture: Post-Op Day 3  Goal: Off Pathway (Use only if patient is Off Pathway)  Outcome: Progressing Towards Goal  Goal: Activity/Safety  Outcome: Progressing Towards Goal  Goal: Diagnostic Test/Procedures  Outcome: Progressing Towards Goal  Goal: Nutrition/Diet  Outcome: Progressing Towards Goal  Goal: Medications  Outcome: Progressing Towards Goal  Goal: Respiratory  Outcome: Progressing Towards Goal  Goal: Treatments/Interventions/Procedures  Outcome: Progressing Towards Goal  Goal: Psychosocial  Outcome: Progressing Towards Goal  Goal: Discharge Planning  Outcome: Progressing Towards Goal  Goal: *Met physical therapy criteria for discharge to next level of care  Outcome: Progressing Towards Goal  Goal: *Optimal pain control at patient's stated goal  Outcome: Progressing Towards Goal  Goal: *Hemodynamically stable  Outcome: Progressing Towards Goal  Goal: *Tolerating diet  Outcome: Progressing Towards Goal  Goal: *Active bowel function  Outcome: Progressing Towards Goal  Goal: *Adequate urinary output  Outcome: Progressing Towards Goal  Goal: *Absence of skin breakdown  Outcome: Progressing Towards Goal  Goal: *Patient verbalizes understanding of discharge instructions  Outcome: Progressing Towards Goal     Problem: Hip Fracture: Post-Op Day 4  Goal: Off Pathway (Use only if patient is Off Pathway)  Outcome: Progressing Towards Goal  Goal: Activity/Safety  Outcome: Progressing Towards Goal  Goal: Diagnostic Test/Procedures  Outcome: Progressing Towards Goal  Goal: Nutrition/Diet  Outcome: Progressing Towards Goal  Goal: Medications  Outcome: Progressing Towards Goal  Goal: Respiratory  Outcome: Progressing Towards Goal  Goal: Treatments/Interventions/Procedures  Outcome: Progressing Towards Goal  Goal: Psychosocial  Outcome: Progressing Towards Goal  Goal: Discharge Planning  Outcome: Progressing Towards Goal  Goal: *Met physical therapy criteria for discharge to next level of care  Outcome: Progressing Towards Goal  Goal: *Optimal pain control at patient's stated goal  Outcome: Progressing Towards Goal  Goal: *Hemodynamically stable  Outcome: Progressing Towards Goal  Goal: *Tolerating diet  Outcome: Progressing Towards Goal  Goal: *Active bowel function  Outcome: Progressing Towards Goal  Goal: *Adequate urinary output  Outcome: Progressing Towards Goal  Goal: *Absence of skin breakdown  Outcome: Progressing Towards Goal  Goal: *Patient verbalizes understanding of discharge instructions  Outcome: Progressing Towards Goal

## 2020-03-31 NOTE — PROGRESS NOTES
Patient with c/o nausea. Patient on clear liquid diet; advancing as tolerated. MD paged. Order received. Will medicate accordingly and continue to monitor.

## 2020-03-31 NOTE — PROGRESS NOTES
Notified by telemetry tech that pt Afib with heart rate that increased to 140 but not sustained. Pt BP 90/60   Mew score 3. Pt with LR infusing at 75, pt post op Left hip surgery this morning. Will address pain, and continue to monitor pt heart rate and rhythm.

## 2020-03-31 NOTE — PROGRESS NOTES
Problem: Mobility Impaired (Adult and Pediatric)  Goal: *Acute Goals and Plan of Care (Insert Text)  Description: Physical Therapy Goals  Initiated 3/31/2020 and to be accomplished within 7 day(s)  1. Patient will move from supine to sit and sit to supine  in bed with modified independence. 2.  Patient will transfer from bed to chair and chair to bed with modified independence using the least restrictive device. 3.  Patient will perform sit to stand with modified independence. 4.  Patient will ambulate with modified independence for 50 feet with the least restrictive device. Outcome: Progressing Towards Goal   PHYSICAL THERAPY EVALUATION    Patient: Namita Darnell (80 y.o. female)  Date: 3/31/2020  Primary Diagnosis: Femur fracture, left ([facility]) [S72.92XA]  Procedure(s) (LRB):  FEMUR INSERTION INTRA MEDULLARY NAIL (Left) 1 Day Post-Op   Precautions: Fall, Skin  WBAT LLE  PLOF: Mod I with ww; Assisted living facility  ASSESSMENT :  WBAT LLE s/p left femur fracture post fall. Max A x2 for supine to sit. Seated EOB with supervision for balance. Min A for sit to stand. Amb 8ft with ww and min A. Decreased step length and gait speed. Min A for stand to sit. Max Ax2 for sit to supine. Seated in bed with HOB elevated. Educated on need for RN assistance with mobility; verbalized understanding. Call bell in reach. Patient will benefit from skilled intervention to address the above impairments.   Patient's rehabilitation potential is considered to be Fair  Factors which may influence rehabilitation potential include:   []         None noted  []         Mental ability/status  [x]         Medical condition  []         Home/family situation and support systems  []         Safety awareness  []         Pain tolerance/management  []         Other:      PLAN :  Recommendations and Planned Interventions:   [x]           Bed Mobility Training             [x]    Neuromuscular Re-Education  [x]           Transfer Training []    Orthotic/Prosthetic Training  [x]           Gait Training                          []    Modalities  [x]           Therapeutic Exercises           []    Edema Management/Control  [x]           Therapeutic Activities            [x]    Family Training/Education  [x]           Patient Education  []           Other (comment):    Frequency/Duration: Patient will be followed by physical therapy 3-5 times a week to address goals. Discharge Recommendations: Skilled Nursing Facility  Further Equipment Recommendations for Discharge: rolling walker     SUBJECTIVE:   Patient stated Gladies Kocher would make you a cheese soufle.     OBJECTIVE DATA SUMMARY:     Past Medical History:   Diagnosis Date    A-fib ([facility] 01/2017    Arthritis, degenerative     CAD (coronary artery disease) 04/2012    S/P 2.75 X 15 mm BMS of OM (04/2012), Two LAD BMS (07/2012)    Elevated liver enzymes     Likely from statin    HLD (hyperlipidemia)     Hyperkalemia 06/2012    Likely from lisinopril    Ischemic cardiomyopathy     LVEF  30-35%(05/19) 45% (11/2012) & 30% echo (04/2012)    NSTEMI (non-ST elevated myocardial infarction) ([facility] 04/2012    UTI (urinary tract infection)     Vertigo      Past Surgical History:   Procedure Laterality Date    HX CORONARY STENT PLACEMENT  4/23/12    bare metal stent to obtuse marginal branch    HX HEART CATHETERIZATION       Barriers to Learning/Limitations: None  Compensate with: Visual Cues, Verbal Cues, Tactile Cues and Kinesthetic Cues    Home Situation:  Home Situation  Home Environment: [facility] [address] Name: [address]  Living Alone: No  Support Systems: Assisted living  Patient Expects to be Discharged to[de-identified] Assisted living  Current DME Used/Available at Home: Arvil Mayes, rolling  Tub or Shower Type: Shower    Critical Behavior:  Neurologic State: Alert  Orientation Level: Oriented to self  Cognition: Follows commands  Safety/Judgement: Fall prevention  Psychosocial  Patient Behaviors: Calm;Cooperative    Strength:    Manual Muscle Testing (LE)         R     L    Hip Flexion:   3+/5  3+/5  Knee EXT:   3+/5  3+/5  Knee FLEX:   3+/5  3+/5  Ankle DF:   3+/5  3+/5  _________________________________________________   Range Of Motion:  BLE AROM WFL  Functional Mobility:  Bed Mobility:  Supine to Sit: Maximum assistance;Assist x2  Sit to Supine: Maximum assistance;Assist x2  Transfers:  Sit to Stand: Minimal assistance  Stand to Sit: Minimal assistance  Balance:   Sitting: Impaired  Sitting - Static: Good (unsupported)  Sitting - Dynamic: Good (unsupported)  Standing: Impaired  Standing - Static: Good (unsupported)  Standing - Dynamic : Fair  Ambulation/Gait Training:  Distance (ft): 8 Feet (ft)   Assistive Device: Walker, rolling  Ambulation - Level of Assistance: Minimal assistance  Neuro Re-education:  Seated EOB 5 minutes    Pain:  Pain level pre-treatment: 7/10   Pain level post-treatment: 7/10     Activity Tolerance:   Fair    After treatment:   []         Patient left in no apparent distress sitting up in chair  [x]         Patient left in no apparent distress in bed  [x]         Call bell left within reach  [x]         Nursing notified  []         Caregiver present  []         Bed alarm activated  []         SCDs applied    COMMUNICATION/EDUCATION:   [x]         Role of physical therapy and plan of care in the acute care setting. [x]         Fall prevention education was provided and the patient/caregiver indicated understanding. [x]         Patient/family have participated as able in goal setting and plan of care. []         Patient/family agree to work toward stated goals and plan of care. []         Patient understands intent and goals of therapy, but is neutral about his/her participation. []         Patient is unable to participate in goal setting/plan of care: ongoing with therapy staff.     Thank you for this referral.  Shannon Escamilla, PT   Time Calculation: 24 mins    Ludin Complexity: History: MEDIUM  Complexity : 1-2 comorbidities / personal factors will impact the outcome/ POC Exam:MEDIUM Complexity : 3 Standardized tests and measures addressing body structure, function, activity limitation and / or participation in recreation  Presentation: MEDIUM Complexity : Evolving with changing characteristics  Clinical Decision Making:Medium Complexity    Clinical judgement; ROM, MMT, functional mobility Overall Complexity:MEDIUM

## 2020-03-31 NOTE — PROGRESS NOTES
Internal Medicine Progress Note    Patient's Name: Valentino Staples Date: 3/29/2020  Length of Stay: 2      Assessment/Plan     Active Hospital Problems    Diagnosis Date Noted    Femur fracture, left ([address] 03/29/2020    Chronic a-fib 05/02/2017    CAD (coronary artery disease)      S/P 2.75 X 15 mm BMS of Obtuse marginal      Ischemic cardiomyopathy      - s/p  left TFN  - stop fluids, cardiac diet  - Cardiology input appreciated   - Echo EF 30%  - Pain control PRN  - PT/OT ordered  - Cont acceptable home medications for chronic conditions   - DVT protocol      Subjective     Pt seen at bedside  NAD    Objective     Visit Vitals  /73 (BP 1 Location: Left arm, BP Patient Position: At rest;Supine)   Pulse (!) 103   Temp 98.1 °F (36.7 °C)   Resp 16   Ht 5' 3\" (1.6 m)   Wt 46.7 kg (103 lb)   SpO2 94%   BMI 18.25 kg/m²       Physical Exam:  General Appearance: NAD, conversant  Lungs: CTA with normal respiratory effort  CV: RRR, no m/r/g  Abdomen: soft, non-tender, normal bowel sounds  Extremities: no cyanosis, no peripheral edema  Neuro: No focal deficits, motor/sensory intact    Lab/Data Reviewed:  BMP:   No results found for: NA, K, CL, CO2, AGAP, GLU, BUN, CREA, GFRAA, GFRNA  CBC:   Lab Results   Component Value Date/Time    HGB 9.9 (L) 03/30/2020 08:00 PM    HCT 33.0 (L) 03/30/2020 08:00 PM       Imaging Reviewed:  Xr Hip Lt W Or Wo Pelv 2-3 Vws    Result Date: 3/30/2020  EXAM: LEFT HIP, 2 views CLINICAL INDICATION/HISTORY: Left hip pain COMPARISON: 3/29/2020 TECHNIQUE:  Single AP and frog leg lateral views of the left hip were obtained. _______________ FINDINGS: Surgical findings and hardware of left femoral intramedullary nail and dynamic hip screw, transfixing the intertrochanteric proximal left femoral fracture seen previously. No lucency adjacent to the hardware to suggest complication. There is adequate anatomic alignment of the left hip. No acute fracture.   Expected adjacent postoperative soft tissue swelling and emphysema. _______________     IMPRESSION: Status post ORIF of left femoral intertrochanteric fracture, without complication.       Medications Reviewed:  Current Facility-Administered Medications   Medication Dose Route Frequency    lactated Ringers infusion  75 mL/hr IntraVENous CONTINUOUS    sodium chloride (NS) flush 5-40 mL  5-40 mL IntraVENous Q8H    naloxone (NARCAN) injection 0.4 mg  0.4 mg IntraVENous PRN    acetaminophen (TYLENOL) tablet 650 mg  650 mg Oral Q6H    HYDROcodone-acetaminophen (NORCO) 5-325 mg per tablet 2 Tab  2 Tab Oral Q6H PRN    ceFAZolin (ANCEF) 2g IVPB in 50 mL D5W  2 g IntraVENous Q8H    polyethylene glycol (MIRALAX) packet 17 g  17 g Oral DAILY    senna-docusate (PERICOLACE) 8.6-50 mg per tablet 1 Tab  1 Tab Oral BID    enoxaparin (LOVENOX) injection 30 mg  30 mg SubCUTAneous Q24H    HYDROmorphone (DILAUDID) syringe 0.5 mg  0.5 mg IntraVENous Q6H PRN    ondansetron (ZOFRAN) injection 4 mg  4 mg IntraVENous Q6H PRN    aspirin chewable tablet 81 mg  81 mg Oral DAILY    carboxymethylcellulose sodium (CELLUVISC) 0.5 % ophthalmic solution 1 Drop  1 Drop Both Eyes TID PRN    digoxin (LANOXIN) tablet 0.125 mg  0.125 mg Oral Q M, W, F & SAT    digoxin (LANOXIN) tablet 0.25 mg  0.25 mg Oral Q DURON, TU & TH    FLUoxetine (PROzac) capsule 10 mg  10 mg Oral DAILY    fluticasone propionate (FLONASE) 50 mcg/actuation nasal spray 1 Spray  1 Spray Both Nostrils DAILY    mirtazapine (REMERON) tablet 15 mg  15 mg Oral QHS    nitroglycerin (NITROSTAT) tablet 0.4 mg  0.4 mg SubLINGual Q5MIN PRN    psyllium husk-aspartame (METAMUCIL FIBER) packet 1 Packet  1 Packet Oral DAILY    lactated Ringers infusion  75 mL/hr IntraVENous CONTINUOUS    polyethylene glycol (MIRALAX) packet 17 g  17 g Oral DAILY PRN    traMADoL (ULTRAM) tablet 50 mg  50 mg Oral Q6H PRN    flintstones complete (FLINTSTONES) chewable tablet 1 Tab  1 Tab Oral DAILY

## 2020-03-31 NOTE — PROGRESS NOTES
Informed by telemetry tech that patient HR was in the 50s. Assessed pt found to be sleeping, once awoke patient HR in low 100's     2218: Informed by tele tech that patient had 15 beats of vtach.  Page sent to Dr Jeni Cortez on call  Per Dr Jeni Cortez continue to monitor

## 2020-03-31 NOTE — PROGRESS NOTES
Progress Note      Post Operative Day: 1    Assessment:    1. Status post Closed reduction and internal fixation with short intramedullary Synthes trochanteric femoral nail. PLAN:    1. Mobilize. Continue P.T.  2.  WBAT  3. Lovenox for DVT prophylaxis per ortho  4. Discharge Planning. HPI: Leno Luciano is a 80 y.o. female patient without new complaints status post TFN for Femur fracture, left ([address] Valorie Cisneros 3/29/2020. No new orthopaedic changes. Blood pressure (!) 88/54, pulse 78, temperature 97.4 °F (36.3 °C), resp. rate 14, height 5' 3\" (1.6 m), weight 55.1 kg (121 lb 6.4 oz), SpO2 96 %. CBC w/Diff   Lab Results   Component Value Date/Time    WBC 11.1 03/31/2020 04:30 AM    RBC 3.93 (L) 03/31/2020 04:30 AM    HCT 33.0 (L) 03/31/2020 04:30 AM          Physical Assessment:  General: in no apparent distress   Extremities:  Neurovascular intact    Dressing:  Dry   DVT Exam:   No exam evidence to suggest DVT. Compartments soft and NT.           Radiology:            [facility]  3/31/2020  Office [phone] Paxm [phone]

## 2020-03-31 NOTE — PROGRESS NOTES
[facility] -rounding note        Date of  Admission: 3/29/2020 10:31 AM   Primary Care Physician:  Yayo Zepeda MD     Assessment:     - Status post mechanical fall with left femur fracture, S/P closed reduction and internal fixation with trochanteric femoral nail POD 1. Stable hemodynamics   [facility] ([facility] 01/2017   CAD (coronary artery disease) 04/2012   S/P 2.75 X 15 mm BMS of OM (04/2012), Two LAD BMS (07/2012)   Ischemic cardiomyopathy    LVEF  30-35%(05/19) 45% (11/2012) & 30% echo (04/2012)   H/O Elevated liver enzymes    Likely from statin   HLD (hyperlipidemia)    Hyperkalemia 06/2012,  Likely from lisinopril   NSTEMI (non-ST elevated myocardial infarction) ([facility] 04/2012     Plan:     Patient admitted after mechanical fall resulting into the left femur fracture. Currently she does not report any unstable coronary symptoms or decompensated heart failure. She has 1+ edema  Echo ordered 3/30/2020. Ejection fraction 30%. Moderate mitral regurgitation. AF rate controlled. Continue aspirin and digoxin. Blood pressure remains marginal to start additional AV maryam blocking agent. History of Present Illness: This is a 80 y.o. female admitted for Femur fracture, left ([facility] [S72.92XA]. Patient complains of:      Ms. Que Miramontes is 79-year-old female with a history of ischemic cardiomyopathy, CAD, coronary stenting, hypertension and hyperlipidemia that was admitted with a mechanical fall. Patient  reported that she was walking with a walker and sustained a mechanical fall toward her left side and now has a left femur fracture. She does not recall having any chest pain , dizziness,presyncope or syncope. She has  chronic dyspnea and some lower extremity swelling. However, her symptoms have been unchanged. Patient has some leg pain.    She does not report any cardiac symptoms at this time.     Review of Symptoms:  Except as stated above include:  Constitutional: negative  Respiratory:  negative  Cardiovascular:  negative  Gastrointestinal: negative  Genitourinary:  negative  Musculoskeletal:  Negative  Neurological:  Negative  Dermatological:  Negative  Endocrinological: Negative  Psychological:  Negative    A comprehensive review of systems was negative except for that written in the HPI. Past Medical History:     Past Medical History:   Diagnosis Date    A-fib [facility]) 01/2017    Arthritis, degenerative     CAD (coronary artery disease) 04/2012    S/P 2.75 X 15 mm BMS of OM (04/2012), Two LAD BMS (07/2012)    Elevated liver enzymes     Likely from statin    HLD (hyperlipidemia)     Hyperkalemia 06/2012    Likely from lisinopril    Ischemic cardiomyopathy     LVEF  30-35%(05/19) 45% (11/2012) & 30% echo (04/2012)    NSTEMI (non-ST elevated myocardial infarction) ([facility] 04/2012    UTI (urinary tract infection)     Vertigo                  Medications: Allergies   Allergen Reactions    Flu Vaccine 2011 (36 Mos+)(Pf) Anaphylaxis    Codeine Anaphylaxis    Egg Not Reported This Time     Per patient who is A&O x3 that she can eat the yolks but breaks out from the whites. She prefers Hard boiled for this reason.     Influenza Virus Vaccines Hives    Lipitor [Atorvastatin] Nausea Only    Pcn [Penicillins] Hives        Current Facility-Administered Medications   Medication Dose Route Frequency    sodium chloride (NS) flush 5-40 mL  5-40 mL IntraVENous Q8H    naloxone (NARCAN) injection 0.4 mg  0.4 mg IntraVENous PRN    acetaminophen (TYLENOL) tablet 650 mg  650 mg Oral Q6H    HYDROcodone-acetaminophen (NORCO) 5-325 mg per tablet 2 Tab  2 Tab Oral Q6H PRN    ceFAZolin (ANCEF) 2g IVPB in 50 mL D5W  2 g IntraVENous Q8H    polyethylene glycol (MIRALAX) packet 17 g  17 g Oral DAILY    senna-docusate (PERICOLACE) 8.6-50 mg per tablet 1 Tab  1 Tab Oral BID    enoxaparin (LOVENOX) injection 30 mg  30 mg SubCUTAneous Q24H    HYDROmorphone (DILAUDID) syringe 0.5 mg  0.5 mg IntraVENous Q6H PRN    ondansetron (ZOFRAN) injection 4 mg  4 mg IntraVENous Q6H PRN    aspirin chewable tablet 81 mg  81 mg Oral DAILY    carboxymethylcellulose sodium (CELLUVISC) 0.5 % ophthalmic solution 1 Drop  1 Drop Both Eyes TID PRN    digoxin (LANOXIN) tablet 0.125 mg  0.125 mg Oral Q M, W, F & SAT    digoxin (LANOXIN) tablet 0.25 mg  0.25 mg Oral Q SU, TU & TH    FLUoxetine (PROzac) capsule 10 mg  10 mg Oral DAILY    fluticasone propionate (FLONASE) 50 mcg/actuation nasal spray 1 Spray  1 Spray Both Nostrils DAILY    mirtazapine (REMERON) tablet 15 mg  15 mg Oral QHS    nitroglycerin (NITROSTAT) tablet 0.4 mg  0.4 mg SubLINGual Q5MIN PRN    psyllium husk-aspartame (METAMUCIL FIBER) packet 1 Packet  1 Packet Oral DAILY    polyethylene glycol (MIRALAX) packet 17 g  17 g Oral DAILY PRN    traMADoL (ULTRAM) tablet 50 mg  50 mg Oral Q6H PRN    flintstones complete (FLINTSTONES) chewable tablet 1 Tab  1 Tab Oral DAILY         Physical Exam:     Visit Vitals  BP (!) 88/54   Pulse 78   Temp 97.4 °F (36.3 °C)   Resp 14   Ht 5' 3\" (1.6 m)   Wt 55.1 kg (121 lb 6.4 oz)   SpO2 96%   BMI 21.51 kg/m²     BP Readings from Last 3 Encounters:   03/31/20 (!) 88/54   03/24/20 95/60   03/17/20 104/54     Pulse Readings from Last 3 Encounters:   03/31/20 78   03/24/20 87   03/17/20 81     Wt Readings from Last 3 Encounters:   03/31/20 55.1 kg (121 lb 6.4 oz)   03/03/20 47.2 kg (104 lb)   02/19/20 47.2 kg (104 lb)       General:  fatigued, cooperative, no distress, appears stated age  Neck:  no carotid bruit, no JVD  Lungs: Clear  Heart:  Irregular rhythm, S1, S2 normal, no murmur, click, rub or gallop  Abdomen:  abdomen is soft   Extremities: +1 lower extremity edema present  Skin: Warm and dry.  no hyperpigmentation, vitiligo, or suspicious lesions  Neuro: alert, oriented x3, affect appropriate, no focal neurological deficits, moves all extremities well, no involuntary movements, reflexes at knee and ankle intact  Psych: non focal     Data Review:     Recent Labs     03/31/20  0430 03/30/20 2000 03/30/20  0430 03/29/20  1101   WBC 11.1  --  10.6 8.7   HGB 9.6* 9.9* 9.5* 10.1*   HCT 33.0* 33.0* 31.4* 33.0*     --  292 337     Recent Labs     03/30/20  0430 03/29/20  1101    141   K 5.0 5.2    105   CO2 29 33*   GLU 80 81   BUN 35* 31*   CREA 1.26 1.25   CA 8.3* 8.5   MG 2.2  --    PHOS 5.1*  --    ALB  --  2.8*   SGOT  --  37   ALT  --  23   INR 1.2 1.1       Results for orders placed or performed during the hospital encounter of 03/29/20   EKG, 12 LEAD, INITIAL   Result Value Ref Range    Ventricular Rate 91 BPM    QRS Duration 98 ms    Q-T Interval 354 ms    QTC Calculation (Bezet) 435 ms    Calculated R Axis 110 degrees    Calculated T Axis 144 degrees    Diagnosis       Atrial fibrillation  Left posterior fascicular block  Nonspecific T wave abnormality  Abnormal ECG  When compared with ECG of 21-NOV-2019 10:18,  No significant change was found  Confirmed by Alexys Garcia ([id number]) on 3/30/2020 10:40:54 AM         All Cardiac Markers in the last 24 hours:    No results found for: CPK, CK, CKMMB, CKMB, RCK3, CKMBT, CKNDX, CKND1, KELSIE, TROPT, TROIQ, HERNÁN, TROPT, TNIPOC, BNP, BNPP    Last Lipid:    Lab Results   Component Value Date/Time    Cholesterol, total 202 (H) 04/29/2019 07:45 AM    HDL Cholesterol 64 (H) 04/29/2019 07:45 AM    LDL, calculated 116.4 (H) 04/29/2019 07:45 AM    Triglyceride 108 04/29/2019 07:45 AM    CHOL/HDL Ratio 3.2 04/29/2019 07:45 AM       Signed By: Cesar Vásquez MD     March 31, 2020

## 2020-04-01 LAB
ANION GAP SERPL CALC-SCNC: 9 MMOL/L (ref 3–18)
BASOPHILS # BLD: 0 K/UL (ref 0–0.1)
BASOPHILS NFR BLD: 0 % (ref 0–2)
BUN SERPL-MCNC: 62 MG/DL (ref 7–18)
BUN/CREAT SERPL: 30 (ref 12–20)
CALCIUM SERPL-MCNC: 7.8 MG/DL (ref 8.5–10.1)
CHLORIDE SERPL-SCNC: 103 MMOL/L (ref 100–111)
CO2 SERPL-SCNC: 24 MMOL/L (ref 21–32)
CREAT SERPL-MCNC: 2.05 MG/DL (ref 0.6–1.3)
DIFFERENTIAL METHOD BLD: ABNORMAL
EOSINOPHIL # BLD: 0.2 K/UL (ref 0–0.4)
EOSINOPHIL NFR BLD: 1 % (ref 0–5)
ERYTHROCYTE [DISTWIDTH] IN BLOOD BY AUTOMATED COUNT: 20.1 % (ref 11.6–14.5)
GLUCOSE SERPL-MCNC: 141 MG/DL (ref 74–99)
HCT VFR BLD AUTO: 34.2 % (ref 35–45)
HGB BLD-MCNC: 10.4 G/DL (ref 12–16)
LYMPHOCYTES # BLD: 1.6 K/UL (ref 0.9–3.6)
LYMPHOCYTES NFR BLD: 12 % (ref 21–52)
MCH RBC QN AUTO: 24.9 PG (ref 24–34)
MCHC RBC AUTO-ENTMCNC: 30.4 G/DL (ref 31–37)
MCV RBC AUTO: 82 FL (ref 74–97)
MONOCYTES # BLD: 1.9 K/UL (ref 0.05–1.2)
MONOCYTES NFR BLD: 13 % (ref 3–10)
NEUTS SEG # BLD: 10.5 K/UL (ref 1.8–8)
NEUTS SEG NFR BLD: 74 % (ref 40–73)
PLATELET # BLD AUTO: 335 K/UL (ref 135–420)
PMV BLD AUTO: 9.6 FL (ref 9.2–11.8)
POTASSIUM SERPL-SCNC: 5.3 MMOL/L (ref 3.5–5.5)
RBC # BLD AUTO: 4.17 M/UL (ref 4.2–5.3)
SODIUM SERPL-SCNC: 136 MMOL/L (ref 136–145)
WBC # BLD AUTO: 14.2 K/UL (ref 4.6–13.2)

## 2020-04-01 PROCEDURE — 74011000258 HC RX REV CODE- 258: Performed by: HOSPITALIST

## 2020-04-01 PROCEDURE — 77010033678 HC OXYGEN DAILY

## 2020-04-01 PROCEDURE — 74011250637 HC RX REV CODE- 250/637: Performed by: INTERNAL MEDICINE

## 2020-04-01 PROCEDURE — 74011250636 HC RX REV CODE- 250/636: Performed by: ORTHOPAEDIC SURGERY

## 2020-04-01 PROCEDURE — 36415 COLL VENOUS BLD VENIPUNCTURE: CPT

## 2020-04-01 PROCEDURE — 74011250636 HC RX REV CODE- 250/636: Performed by: HOSPITALIST

## 2020-04-01 PROCEDURE — 97530 THERAPEUTIC ACTIVITIES: CPT

## 2020-04-01 PROCEDURE — 3331090002 HH PPS REVENUE DEBIT

## 2020-04-01 PROCEDURE — 97535 SELF CARE MNGMENT TRAINING: CPT

## 2020-04-01 PROCEDURE — 3331090001 HH PPS REVENUE CREDIT

## 2020-04-01 PROCEDURE — 80048 BASIC METABOLIC PNL TOTAL CA: CPT

## 2020-04-01 PROCEDURE — 65660000000 HC RM CCU STEPDOWN

## 2020-04-01 PROCEDURE — 85025 COMPLETE CBC W/AUTO DIFF WBC: CPT

## 2020-04-01 PROCEDURE — 74011250637 HC RX REV CODE- 250/637: Performed by: ORTHOPAEDIC SURGERY

## 2020-04-01 RX ORDER — SODIUM CHLORIDE 9 MG/ML
100 INJECTION, SOLUTION INTRAVENOUS CONTINUOUS
Status: DISPENSED | OUTPATIENT
Start: 2020-04-01 | End: 2020-04-02

## 2020-04-01 RX ORDER — DEXTROSE MONOHYDRATE AND SODIUM CHLORIDE 5; .9 G/100ML; G/100ML
75 INJECTION, SOLUTION INTRAVENOUS CONTINUOUS
Status: DISCONTINUED | OUTPATIENT
Start: 2020-04-01 | End: 2020-04-02 | Stop reason: HOSPADM

## 2020-04-01 RX ORDER — ENOXAPARIN SODIUM 100 MG/ML
30 INJECTION SUBCUTANEOUS EVERY 24 HOURS
Qty: 3.6 ML | Refills: 0 | Status: SHIPPED
Start: 2020-04-01 | End: 2020-04-13

## 2020-04-01 RX ORDER — CHLORPHENIRAMINE MALEATE 4 MG
TABLET ORAL 2 TIMES DAILY
COMMUNITY

## 2020-04-01 RX ADMIN — FLUOXETINE 10 MG: 10 CAPSULE ORAL at 10:27

## 2020-04-01 RX ADMIN — SENNOSIDES AND DOCUSATE SODIUM 1 TABLET: 8.6; 5 TABLET ORAL at 18:41

## 2020-04-01 RX ADMIN — Medication 10 ML: at 23:16

## 2020-04-01 RX ADMIN — SENNOSIDES AND DOCUSATE SODIUM 1 TABLET: 8.6; 5 TABLET ORAL at 10:28

## 2020-04-01 RX ADMIN — ACETAMINOPHEN 650 MG: 325 TABLET, FILM COATED ORAL at 21:35

## 2020-04-01 RX ADMIN — ENOXAPARIN SODIUM 30 MG: 30 INJECTION, SOLUTION INTRAVENOUS; SUBCUTANEOUS at 23:15

## 2020-04-01 RX ADMIN — ACETAMINOPHEN 650 MG: 325 TABLET, FILM COATED ORAL at 16:03

## 2020-04-01 RX ADMIN — ASPIRIN 81 MG 81 MG: 81 TABLET ORAL at 10:28

## 2020-04-01 RX ADMIN — DIGOXIN 0.12 MG: 125 TABLET ORAL at 10:27

## 2020-04-01 RX ADMIN — SODIUM CHLORIDE 100 ML/HR: 900 INJECTION, SOLUTION INTRAVENOUS at 08:12

## 2020-04-01 RX ADMIN — MIRTAZAPINE 15 MG: 15 TABLET, FILM COATED ORAL at 23:15

## 2020-04-01 RX ADMIN — DEXTROSE MONOHYDRATE AND SODIUM CHLORIDE 75 ML/HR: 5; .9 INJECTION, SOLUTION INTRAVENOUS at 23:16

## 2020-04-01 RX ADMIN — ONDANSETRON 4 MG: 2 INJECTION INTRAMUSCULAR; INTRAVENOUS at 10:40

## 2020-04-01 RX ADMIN — Medication 10 ML: at 06:06

## 2020-04-01 RX ADMIN — PSYLLIUM HUSK 1 PACKET: 3.4 POWDER ORAL at 10:28

## 2020-04-01 NOTE — PROGRESS NOTES
Problem: Mobility Impaired (Adult and Pediatric)  Goal: *Acute Goals and Plan of Care (Insert Text)  Description: Physical Therapy Goals  Initiated 3/31/2020 and to be accomplished within 7 day(s)  1. Patient will move from supine to sit and sit to supine  in bed with modified independence. 2.  Patient will transfer from bed to chair and chair to bed with modified independence using the least restrictive device. 3.  Patient will perform sit to stand with modified independence. 4.  Patient will ambulate with modified independence for 50 feet with the least restrictive device. Outcome: Progressing Towards Goal   PHYSICAL THERAPY TREATMENT    Patient: Opal Ng (80 y.o. female)  Date: 4/1/2020  Diagnosis: Femur fracture, left ([facility]) [S72.92XA]   Femur fracture, left ([facility])  Procedure(s) (LRB):  FEMUR INSERTION INTRA MEDULLARY NAIL (Left) 2 Days Post-Op  Precautions: Fall  PLOF: Mod I  ASSESSMENT:  Agreeable to OOB to chair with encouragement. Max A for supine to sit. Supervision for dynamic reaching in sitting; 4 minutes. Min A for sit to stand. Bed to chair transfer supervision with ww. Stand to sit with supervision. Proper hand placement for transfers. BLE elevated. Tray table in front of patient. Educated on need for RN assistance with mobility; verbalized understanding. Call bell in reach. Progression toward goals:   []      Improving appropriately and progressing toward goals  [x]      Improving slowly and progressing toward goals  []      Not making progress toward goals and plan of care will be adjusted     PLAN:  Patient continues to benefit from skilled intervention to address the above impairments. Continue treatment per established plan of care. Discharge Recommendations:  Skilled Nursing Facility  Further Equipment Recommendations for Discharge:  rolling walker     SUBJECTIVE:   Patient stated I painted all the pictures in hallway.     OBJECTIVE DATA SUMMARY:   Critical Behavior:  Neurologic State: Alert  Orientation Level: Oriented to person  Cognition: Follows commands     Psychosocial  Patient Behaviors: Talkative  Functional Mobility:  Bed Mobility:  Supine to Sit: Maximum assistance  Scooting: Moderate assistance  Transfers:  Sit to Stand: Minimum assistance  Stand to Sit: Supervision  Bed to Chair: Supervision  Balance:   Sitting: Impaired  Sitting - Static: Good (unsupported)  Sitting - Dynamic: Good (unsupported)  Standing: Impaired  Standing - Static: Good  Standing - Dynamic : Fair  Neuro Re-Education:  Seated EOB 4 minutes    Pain:  Pain level pre-treatment: 0/10  Pain level post-treatment: 0/10     Activity Tolerance:   Fair    After treatment:   [x] Patient left in no apparent distress sitting up in chair  [] Patient left in no apparent distress in bed  [x] Call bell left within reach  [x] Nursing notified  [] Caregiver present  [] Bed alarm activated  [] SCDs applied      COMMUNICATION/EDUCATION:   [x]         Role of physical therapy in the acute care setting. [x]         Fall prevention education was provided and the patient/caregiver indicated understanding. [x]         Patient/family have participated as able in working toward goals and plan of care. [x]         Patient/family agree to work toward stated goals and plan of care. []         Patient understands intent and goals of therapy, but is neutral about his/her participation. []         Patient is unable to participate in stated goals/plan of care: ongoing with therapy staff.       Janice Guzman, PT   Time Calculation: 24 mins

## 2020-04-01 NOTE — PROGRESS NOTES
Problem: Falls - Risk of  Goal: *Absence of Falls  Description: Document [place] Fall Risk and appropriate interventions in the flowsheet.   Outcome: Progressing Towards Goal  Note: Fall Risk Interventions:  Mobility Interventions: Bed/chair exit alarm, Patient to call before getting OOB         Medication Interventions: Bed/chair exit alarm, Evaluate medications/consider consulting pharmacy    Elimination Interventions: Bed/chair exit alarm, Call light in reach    History of Falls Interventions: Bed/chair exit alarm, Evaluate medications/consider consulting pharmacy

## 2020-04-01 NOTE — PROGRESS NOTES
[facility] -rounding note        Date of  Admission: 3/29/2020 10:31 AM   Primary Care Physician:  Emir Mckinney MD    Subjective:  No CP or SOB  Denies right LE pain. Assessment:     - Status post mechanical fall with left femur fracture, S/P closed reduction and internal fixation with trochanteric femoral nail POD 2.  A-fib ([facility] 01/2017   CAD (coronary artery disease) 04/2012   S/P 2.75 X 15 mm BMS of OM (04/2012), Two LAD BMS (07/2012)   Ischemic cardiomyopathy    LVEF  30-35%(05/19) 45% (11/2012) & 30% echo (04/2012)   H/O Elevated liver enzymes    Likely from statin   HLD (hyperlipidemia)    Hyperkalemia 06/2012,  Likely from lisinopril   NSTEMI (non-ST elevated myocardial infarction) ([facility] 04/2012     Plan:     Patient admitted after mechanical fall resulting into the left femur fracture. AF rate controlled. Continue aspirin and digoxin. Blood pressure remains marginal to start additional AV maryam blocking agent. History of Present Illness: This is a 80 y.o. female admitted for Femur fracture, left ([facility] [S72.92XA]. Patient complains of:      Ms. Tammy Zuluaga is 66-year-old female with a history of ischemic cardiomyopathy, CAD, coronary stenting, hypertension and hyperlipidemia that was admitted with a mechanical fall. Patient  reported that she was walking with a walker and sustained a mechanical fall toward her left side and now has a left femur fracture. She does not recall having any chest pain , dizziness,presyncope or syncope. She has  chronic dyspnea and some lower extremity swelling. However, her symptoms have been unchanged. Patient has some leg pain.    She does not report any cardiac symptoms at this time.     Review of Symptoms:  Except as stated above include:  Constitutional:  negative  Respiratory:  negative  Cardiovascular:  negative  Gastrointestinal: negative  Genitourinary:  negative  Musculoskeletal:  Negative  Neurological: Negative  Dermatological:  Negative  Endocrinological: Negative  Psychological:  Negative    A comprehensive review of systems was negative except for that written in the HPI. Past Medical History:     Past Medical History:   Diagnosis Date    A-fib [facility]) 01/2017    Arthritis, degenerative     CAD (coronary artery disease) 04/2012    S/P 2.75 X 15 mm BMS of OM (04/2012), Two LAD BMS (07/2012)    Elevated liver enzymes     Likely from statin    HLD (hyperlipidemia)     Hyperkalemia 06/2012    Likely from lisinopril    Ischemic cardiomyopathy     LVEF  30-35%(05/19) 45% (11/2012) & 30% echo (04/2012)    NSTEMI (non-ST elevated myocardial infarction) ([facility] 04/2012    UTI (urinary tract infection)     Vertigo                  Medications: Allergies   Allergen Reactions    Flu Vaccine 2011 (36 Mos+)(Pf) Anaphylaxis    Codeine Anaphylaxis    Egg Not Reported This Time     Per patient who is A&O x3 that she can eat the yolks but breaks out from the whites. She prefers Hard boiled for this reason.     Influenza Virus Vaccines Hives    Lipitor [Atorvastatin] Nausea Only    Pcn [Penicillins] Hives        Current Facility-Administered Medications   Medication Dose Route Frequency    0.9% sodium chloride infusion  100 mL/hr IntraVENous CONTINUOUS    sodium chloride (NS) flush 5-40 mL  5-40 mL IntraVENous Q8H    naloxone (NARCAN) injection 0.4 mg  0.4 mg IntraVENous PRN    acetaminophen (TYLENOL) tablet 650 mg  650 mg Oral Q6H    HYDROcodone-acetaminophen (NORCO) 5-325 mg per tablet 2 Tab  2 Tab Oral Q6H PRN    polyethylene glycol (MIRALAX) packet 17 g  17 g Oral DAILY    senna-docusate (PERICOLACE) 8.6-50 mg per tablet 1 Tab  1 Tab Oral BID    enoxaparin (LOVENOX) injection 30 mg  30 mg SubCUTAneous Q24H    HYDROmorphone (DILAUDID) syringe 0.5 mg  0.5 mg IntraVENous Q6H PRN    ondansetron (ZOFRAN) injection 4 mg  4 mg IntraVENous Q6H PRN    aspirin chewable tablet 81 mg  81 mg Oral DAILY    carboxymethylcellulose sodium (CELLUVISC) 0.5 % ophthalmic solution 1 Drop  1 Drop Both Eyes TID PRN    digoxin (LANOXIN) tablet 0.125 mg  0.125 mg Oral Q M, W, F & SAT    digoxin (LANOXIN) tablet 0.25 mg  0.25 mg Oral Q DURON, TU & TH    FLUoxetine (PROzac) capsule 10 mg  10 mg Oral DAILY    fluticasone propionate (FLONASE) 50 mcg/actuation nasal spray 1 Spray  1 Spray Both Nostrils DAILY    mirtazapine (REMERON) tablet 15 mg  15 mg Oral QHS    nitroglycerin (NITROSTAT) tablet 0.4 mg  0.4 mg SubLINGual Q5MIN PRN    psyllium husk-aspartame (METAMUCIL FIBER) packet 1 Packet  1 Packet Oral DAILY    traMADoL (ULTRAM) tablet 50 mg  50 mg Oral Q6H PRN    flintstones complete (FLINTSTONES) chewable tablet 1 Tab  1 Tab Oral DAILY         Physical Exam:     Visit Vitals  /70   Pulse 82   Temp 97.7 °F (36.5 °C)   Resp 18   Ht 5' 3\" (1.6 m)   Wt 132 lb 11.2 oz (60.2 kg)   SpO2 99%   BMI 23.51 kg/m²     BP Readings from Last 3 Encounters:   04/01/20 118/70   03/24/20 95/60   03/17/20 104/54     Pulse Readings from Last 3 Encounters:   04/01/20 82   03/24/20 87   03/17/20 81     Wt Readings from Last 3 Encounters:   04/01/20 132 lb 11.2 oz (60.2 kg)   03/03/20 104 lb (47.2 kg)   02/19/20 104 lb (47.2 kg)       General:  fatigued, cooperative, no distress, appears stated age  Neck:  no carotid bruit, no JVD  Lungs: Clear anteriorly  Heart:  Irregular rhythm, S1, S2 normal,   Abdomen:  abdomen is soft   Extremities: +1 lower extremity edema present  Psych: non focal     Data Review:     Recent Labs     04/01/20  0445 03/31/20  0430 03/30/20 2000 03/30/20  0430   WBC 14.2* 11.1  --  10.6   HGB 10.4* 9.6* 9.9* 9.5*   HCT 34.2* 33.0* 33.0* 31.4*    295  --  292     Recent Labs     04/01/20  0445 03/30/20  0430 03/29/20  1101    140 141   K 5.3 5.0 5.2    105 105   CO2 24 29 33*   * 80 81   BUN 62* 35* 31*   CREA 2.05* 1.26 1.25   CA 7.8* 8.3* 8.5   MG  --  2.2  --    PHOS  --  5.1*  --    ALB --   --  2.8*   SGOT  --   --  37   ALT  --   --  23   INR  --  1.2 1.1       Results for orders placed or performed during the hospital encounter of 03/29/20   EKG, 12 LEAD, INITIAL   Result Value Ref Range    Ventricular Rate 91 BPM    QRS Duration 98 ms    Q-T Interval 354 ms    QTC Calculation (Bezet) 435 ms    Calculated R Axis 110 degrees    Calculated T Axis 144 degrees    Diagnosis       Atrial fibrillation  Left posterior fascicular block  Nonspecific T wave abnormality  Abnormal ECG  When compared with ECG of 21-NOV-2019 10:18,  No significant change was found  Confirmed by Nicolette Wayne ([id number]) on 3/30/2020 10:40:54 AM         All Cardiac Markers in the last 24 hours:    No results found for: CPK, CK, CKMMB, CKMB, RCK3, CKMBT, CKNDX, CKND1, KELSIE, TROPT, TROIQ, HERNÁN, TROPT, TNIPOC, BNP, BNPP    Last Lipid:    Lab Results   Component Value Date/Time    Cholesterol, total 202 (H) 04/29/2019 07:45 AM    HDL Cholesterol 64 (H) 04/29/2019 07:45 AM    LDL, calculated 116.4 (H) 04/29/2019 07:45 AM    Triglyceride 108 04/29/2019 07:45 AM    CHOL/HDL Ratio 3.2 04/29/2019 07:45 AM       Signed By: Jamari Avila MD     April 1, 2020

## 2020-04-01 NOTE — PROGRESS NOTES
Problem: Discharge Planning  Goal: *Discharge to safe environment  Outcome: Resolved/Met     SNF    Pt accepted to [facility] for today. [facility] transport set up for 1515. Met with pt & made her aware, she is agreeable to transfer. Called pt's son-n-law, Kevon Ramires, made him aware of above, he is agreeable & is on the way from [facility] area to get her clothes,etc & take to [facility]. Pt is over income for [facility]. No danny done. Pt's nurse made aware of above. Available as needed. Mariah CarreraRN,ext [phone]. Care Management Interventions  PCP Verified by CM:  Yes  Last Visit to PCP: 12/29/19  Mode of Transport at Discharge: S  Transition of Care Consult (CM Consult): SNF  Partner SNF: Yes  Discharge Durable Medical Equipment: No  Physical Therapy Consult: Yes  Occupational Therapy Consult: Yes  Speech Therapy Consult: No  Current Support Network: Assisted Living  Confirm Follow Up Transport: Family  The Plan for Transition of Care is Related to the Following Treatment Goals : rehab  The Patient and/or Patient Representative was Provided with a Choice of Provider and Agrees with the Discharge Plan?: Yes  Name of the Patient Representative Who was Provided with a Choice of Provider and Agrees with the Discharge Plan: Jerrell Meraz of Choice List was Provided with Basic Dialogue that Supports the Patient's Individualized Plan of Care/Goals, Treatment Preferences and Shares the Quality Data Associated with the Providers?: Yes  Discharge Location  Discharge Placement: Skilled nursing facility([facility])

## 2020-04-01 NOTE — PROGRESS NOTES
Received VM from [facility] in admissions @ [facility], states they will accept pt when medically ready. Will cont to follow. Mariha Carrera RN,ext [phone].

## 2020-04-01 NOTE — PROGRESS NOTES
[id number] Pt assisted to and from bathroom using walker. Pt had large bowel movement. Pt back in bed with belongings and call bell at her side. 1559 Asked by primary nurse to place peripheral iv in pt. Went into pt room to explain to her that the MD wanted her to have IV fluids and that a new iv needed to be placed. The one she currently has in place is not working. Pt refused to have iv placed. Pt stated \"absolutely no more needles. I understanding what you're saying, but no more. I'll drink as much as a can. \" Primary nurse Sunday Davila notified.

## 2020-04-01 NOTE — DISCHARGE INSTRUCTIONS
DISCHARGE SUMMARY from Nurse    PATIENT INSTRUCTIONS:    After general anesthesia or intravenous sedation, for 24 hours or while taking prescription Narcotics:  · Limit your activities  · Do not drive and operate hazardous machinery  · Do not make important personal or business decisions  · Do  not drink alcoholic beverages  · If you have not urinated within 8 hours after discharge, please contact your surgeon on call. Report the following to your surgeon:  · Excessive pain, swelling, redness or odor of or around the surgical area  · Temperature over 100.5  · Nausea and vomiting lasting longer than 4 hours or if unable to take medications  · Any signs of decreased circulation or nerve impairment to extremity: change in color, persistent  numbness, tingling, coldness or increase pain  · Any questions    What to do at Home:  Recommended activity: Activity as tolerated and PT/OT Eval and Treat    If you experience any of the following symptoms uncontrolled pain, worsening shortness of breath, fever, or chills, please follow up with rehab staff. *  Please give a list of your current medications to your Primary Care Provider. *  Please update this list whenever your medications are discontinued, doses are      changed, or new medications (including over-the-counter products) are added. *  Please carry medication information at all times in case of emergency situations. These are general instructions for a healthy lifestyle:    No smoking/ No tobacco products/ Avoid exposure to second hand smoke  Surgeon General's Warning:  Quitting smoking now greatly reduces serious risk to your health.     Obesity, smoking, and sedentary lifestyle greatly increases your risk for illness    A healthy diet, regular physical exercise & weight monitoring are important for maintaining a healthy lifestyle    You may be retaining fluid if you have a history of heart failure or if you experience any of the following symptoms: Weight gain of 3 pounds or more overnight or 5 pounds in a week, increased swelling in our hands or feet or shortness of breath while lying flat in bed. Please call your doctor as soon as you notice any of these symptoms; do not wait until your next office visit. The discharge information has been reviewed with the patient. The patient verbalized understanding. Discharge medications reviewed with the patient and appropriate educational materials and side effects teaching were provided. Patient discharged without removing armband and transfered to another healthcare acute, sub acute , or extended care facility. Informed of privacy risks if armband lost or stolen.

## 2020-04-01 NOTE — PROGRESS NOTES
Progress Note      Post Operative Day: 2    Assessment:    1. Status post Closed reduction and internal fixation with short intramedullary Synthes trochanteric femoral nail. PLAN:    1. Mobilize. Continue P.T.  2.  WBAT  3. Lovenox for DVT prophylaxis per ortho  4. Discharge Planning. HPI: Tram Corral is a 80 y.o. female patient without new complaints status post TFN for Femur fracture, left ([address] Vidya Pawlet 3/29/2020. No new orthopaedic changes. Blood pressure 118/70, pulse 82, temperature 97.7 °F (36.5 °C), resp. rate 18, height 5' 3\" (1.6 m), weight 60.2 kg (132 lb 11.2 oz), SpO2 99 %. CBC w/Diff   Lab Results   Component Value Date/Time    WBC 14.2 (H) 04/01/2020 04:45 AM    RBC 4.17 (L) 04/01/2020 04:45 AM    HCT 34.2 (L) 04/01/2020 04:45 AM          Physical Assessment:  General: in no apparent distress   Extremities:  Neurovascular intact    Dressing:  Dry   DVT Exam:   No exam evidence to suggest DVT.  Compartments soft and NT.          - Edward Reed PA-C  4/1/2020  Office [phone]  Cell [phone]

## 2020-04-01 NOTE — ANCILLARY DISCHARGE INSTRUCTIONS
Core Measures Patient, RRAT Score is 50, Patient is discharged to [facility].

## 2020-04-01 NOTE — PROGRESS NOTES
Received call from Candice Castro in admissions @ [facility], states their doctor was reviewing & had some concerns with increase in WBC & creat & wanted Dr. Gomez Holliday to address. Also was informed by pt's nurse that [facility] will not accept report because pt has not had BM in 3 days. Paged Dr. Gomez Holliday & made him aware of all the above, states will keep her overnight & re check labs in the morning. Called [facility] medical transport & informed them of above & transport set up for 1400 tomorrow. Pt's nurse made aware. Met with pt & made her aware of above, verbalized understanding. Called pt's son-n-law, José Miguel Escobedo, made him aware of above, verbalized understanding. Mariah CarreraRN,ext [phone].

## 2020-04-01 NOTE — ANCILLARY DISCHARGE INSTRUCTIONS
Patient and/or next of kin has been given the [facility] Important Message From Medicare About Your Rights\" letter and all questions were answered.

## 2020-04-01 NOTE — PROGRESS NOTES
Bedside shift report received from [facility] with sbar  Patient refuses to keep o2 on  in to see patient  Patient vomited 100cc tan emesis  zofran given  oob in chair  Dressing changed to left hip  Patient refuses to have new Iv placed, paged Dr. Davida Malik spoke to patient on phone she now agrees to iv  Appetite very poor  No complaints of nausea this afternoon  Bedside shift report given to [facility] with sbar

## 2020-04-01 NOTE — PROGRESS NOTES
Called pt's son-n-law/POA, Wendy Montaño, spoke with him re disposition. Gave additional choice of [address] in [place]. Mariah Carrera RN,ext [phone].

## 2020-04-01 NOTE — PROGRESS NOTES
Received bedside shift report from day shift RN, Christen Lucas. Patient oates catheter pulled to day at [phone] is due to void at 2121. Will continue to monitor. 2135- Patient bladder scanned. 36 ml of urine in bladder. Patient denies bladder discomfort. Will continue to monitor. 0630- Patient ambulated to toilet. Patient able to void but missed hat. Will pass information to oncoming shift.

## 2020-04-01 NOTE — DISCHARGE SUMMARY
Internal Medicine Discharge Summary        Patient: Iva Bowman    YOB: 1927    Age:  80 y.o.                       Admit Date: 3/29/2020    Discharge Date: 4/1/2020    LOS:  LOS: 3 days     Discharge To: SNF    Consults: Cardiology and Orthopedics    Admission Diagnoses: Femur fracture, left ([facility] [S72.92XA]    Discharge Diagnoses:    Problem List as of 4/1/2020 Date Reviewed: 3/3/2020          Codes Class Noted - Resolved    * (Principal) Femur fracture, left ([facility] ICD-10-CM: R61.11AP  ICD-9-CM: 821.00  3/29/2020 - Present        Bilateral leg edema ICD-10-CM: R60.0  ICD-9-CM: 782.3  12/18/2019 - Present        Leg ulcer, left, with fat layer exposed ([facility] ICD-10-CM: N32.459  ICD-9-CM: 707.10  12/18/2019 - Present        Ulcer of right leg, with fat layer exposed ([facility] ICD-10-CM: Y76.441  ICD-9-CM: 707.10  12/18/2019 - Present        Dizziness ICD-10-CM: R42  ICD-9-CM: 780.4  11/21/2019 - Present        Atrial fibrillation ([facility] ICD-10-CM: I48.91  ICD-9-CM: 427.31  10/1/2019 - Present        Hypotension ICD-10-CM: I95.9  ICD-9-CM: 458.9  10/1/2019 - Present        Skin rash ICD-10-CM: R21  ICD-9-CM: 782.1  10/1/2019 - Present        Metabolic acidosis LSG-64-HE: E87.2  ICD-9-CM: 276.2  10/1/2019 - Present        Underweight ICD-10-CM: R63.6  ICD-9-CM: 783.22  10/1/2019 - Present        Atrial fibrillation with rapid ventricular response ([facility] ICD-10-CM: I48.91  ICD-9-CM: 427.31  9/30/2019 - Present        Syncope ICD-10-CM: R55  ICD-9-CM: 780.2  6/5/2019 - Present        Hypoxia ICD-10-CM: R09.02  ICD-9-CM: 799.02  6/5/2019 - Present        Leukocytosis ICD-10-CM: N06.914  ICD-9-CM: 288.60  6/5/2019 - Present        Acute on chronic systolic (congestive) heart failure ([facility] ICD-10-CM: I50.23  ICD-9-CM: 428.23, 428.0  6/3/2019 - Present        Hyperkalemia ICD-10-CM: E87.5  ICD-9-CM: 276.7  10/23/2018 - Present        Acute metabolic encephalopathy TWG-13-QV: G93.41  ICD-9-CM: 348.31  10/21/2018 - Present        Hyponatremia ICD-10-CM: E87.1  ICD-9-CM: 276.1  10/20/2018 - Present        Chronic fatigue ICD-10-CM: R53.82  ICD-9-CM: 780.79  10/9/2018 - Present        Irritable bowel syndrome with diarrhea ICD-10-CM: K58.0  ICD-9-CM: 564.1  9/24/2018 - Present        Chronic a-fib ICD-10-CM: I48.20  ICD-9-CM: 427.31  5/2/2017 - Present        CAD (coronary artery disease) ICD-10-CM: I25.10  ICD-9-CM: 414.00  Unknown - Present    Overview Signed 5/3/2012  3:22 PM by Grant Hayes MD     S/P 2.75 X 15 mm BMS of Obtuse marginal             Ischemic cardiomyopathy ICD-10-CM: I25.5  ICD-9-CM: 414.8  Unknown - Present        Arthritis, degenerative ICD-10-CM: M19.90  ICD-9-CM: 715.90  Unknown - Present        Vertigo ICD-10-CM: R42  ICD-9-CM: 780.4  Unknown - Present        HLD (hyperlipidemia) ICD-10-CM: E78.5  ICD-9-CM: 272.4  Unknown - Present        SOB (shortness of breath) ICD-10-CM: R06.02  ICD-9-CM: 786.05  4/27/2012 - Present        NSTEMI (non-ST elevated myocardial infarction) [facility]) ICD-10-CM: I21.4  ICD-9-CM: 410.70  4/1/2012 - Present              Discharge Condition:  Improved    Procedures: L Closed reduction and internal fixation with trochanteric femoral nail         HPI: Ms. Lázaro Peterson is a 80 y.o.  female who is admitted for hip fracture after mechanical fall after she tripped at home and her left side of the body. Ms. Lázaro Peterson with past medical history as noted below , presented to the Emergency Department today  complaining of above. Triage and ER notes noted. Because of the fell on the left side screen x-rays done but only showed left hip fracture. Orthopedic consulted. CT of the head and neck did not show any acute changes. Apart from pain in her left hip patient denies any complaint. Patient does not know her cardiologist she stated sometimes she is with it and sometimes she is not.   I looked in her chart and she seen by Dr. Imelda Ruiz and Cynthia Castillo from cardiology before, I talked to  Michael and consulted him on the case    Hospital Course:    L hip fracture - Ortho consulted. Cardiology consulted for clearance and stated intermediate risk from CV standpoint but no contraindication. Echo showed EF 30% with moderate mitral regurgitation. She was taken to OR for above procedure. She tolerated the procedure well. Post-operatively she had some nausea and vomiting but was eager to get out of the hospital. Her blood pressure was at baseline, which is 90s-100s, thus no BB prescribed. Pain managed without narcotics. She should continue lovenox for another 12 days. Follow up with Dr. Alee Brewster from orthopedic surgery in one month. Staples out in 10 days    Acute on Chronic Renal Failure Stage 3 - Mild bump in Cr, likely secondary to nausea/vomiting the day and a half prior. She is tolerating diet now with urine output. This should improve with encouragement of PO intake on its own. Can recheck BMP next week. She is not on any medications that will worsen. Would avoid NSAIDs for now. The rest of the patient's chronic conditions were managed appropriately during their admission. They were medically stable at the time of discharge.     Visit Vitals  /70   Pulse 82   Temp 97.7 °F (36.5 °C)   Resp 18   Ht 5' 3\" (1.6 m)   Wt 60.2 kg (132 lb 11.2 oz)   SpO2 99%   BMI 23.51 kg/m²       Physical Exam at Discharge:  General Appearance: NAD, conversant  HENT: normocephalic/atraumatic, moist mucus membranes  Lungs: CTA with normal respiratory effort  CV: RRR, no m/r/g  Abdomen: soft, non-tender, normal bowel sounds  Extremities: no cyanosis, no peripheral edema  Neuro: moves all extremities, no focal deficits  Psych: appropriate affect, alert and oriented to person, place and time    Labs Prior to Discharge:  Labs: Results:       Chemistry Recent Labs     04/01/20  0445 03/30/20  0430   * 80    140   K 5.3 5.0    105   CO2 24 29   BUN 62* 35*   CREA 2.05* 1.26   CA 7.8* 8.3*   AGAP 9 6   BUCR 30* 28*      CBC w/Diff Recent Labs     04/01/20 0445 03/31/20 0430 03/30/20 2000 03/30/20 0430   WBC 14.2* 11.1  --  10.6   RBC 4.17* 3.93*  --  3.79*   HGB 10.4* 9.6* 9.9* 9.5*   HCT 34.2* 33.0* 33.0* 31.4*    295  --  292   GRANS 74* 82*  --  73   LYMPH 12* 8*  --  12*   EOS 1 0  --  3      Cardiac Enzymes No results for input(s): CPK, CKND1, KELSIE in the last 72 hours. No lab exists for component: CKRMB, TROIP   Coagulation Recent Labs     03/30/20 0430   PTP 15.1   INR 1.2       Lipid Panel Lab Results   Component Value Date/Time    Cholesterol, total 202 (H) 04/29/2019 07:45 AM    HDL Cholesterol 64 (H) 04/29/2019 07:45 AM    LDL, calculated 116.4 (H) 04/29/2019 07:45 AM    VLDL, calculated 21.6 04/29/2019 07:45 AM    Triglyceride 108 04/29/2019 07:45 AM    CHOL/HDL Ratio 3.2 04/29/2019 07:45 AM      BNP No results for input(s): BNPP in the last 72 hours. Liver Enzymes No results for input(s): TP, ALB, TBIL, AP, SGOT, GPT in the last 72 hours. No lab exists for component: DBIL   Thyroid Studies Lab Results   Component Value Date/Time    TSH 1.57 07/26/2019 08:09 AM            Significant Imaging:  Xr Shoulder Lt Ap/lat Min 2 V    Result Date: 3/29/2020  EXAM: Left shoulder x-ray CLINICAL INDICATION/HISTORY: Left shoulder pain following trauma. COMPARISON: No prior relevant study available for comparison. TECHNIQUE: 3 views of the shoulder were obtained. _______________ FINDINGS: There is no evidence of fracture or dislocation. The glenohumeral joint space is maintained. There is no evidence of joint effusion. The included portion of the lung apex and upper ribs are unremarkable. _______________     IMPRESSION: Negative for fracture or dislocation in the left shoulder. Xr Elbow Lt Min 3 V    Result Date: 3/29/2020  EXAM: Left elbow x-ray CLINICAL INDICATION/HISTORY: Left elbow pain following trauma. COMPARISON: No prior relevant study available for comparison.  TECHNIQUE: 4 views of the elbow were obtained. _______________ FINDINGS: There is no evidence of fracture or dislocation of the elbow. The radiocapitellar and anterior humeral alignment is maintained. There are no secondary signs of joint effusion. _______________     IMPRESSION: Negative for fracture or dislocation of the left elbow. Xr Wrist Lt Ap/lat/obl Min 3v    Result Date: 3/29/2020  EXAM: Left wrist x-ray CLINICAL INDICATION/HISTORY: Left wrist pain following trauma. COMPARISON: No prior relevant study available for comparison. TECHNIQUE: 3 views of the wrist were obtained. _______________ FINDINGS: There is no evidence of fracture or dislocation. The soft tissue structures appear unremarkable. There is degenerative arthropathy of the first carpometacarpal joint and mild radial carpal joint space loss. _______________     IMPRESSION: Negative for fracture or dislocation in the left wrist.    Xr Hip Lt W Or Wo Pelv 2-3 Vws    Result Date: 3/30/2020  EXAM: LEFT HIP, 2 views CLINICAL INDICATION/HISTORY: Left hip pain COMPARISON: 3/29/2020 TECHNIQUE:  Single AP and frog leg lateral views of the left hip were obtained. _______________ FINDINGS: Surgical findings and hardware of left femoral intramedullary nail and dynamic hip screw, transfixing the intertrochanteric proximal left femoral fracture seen previously. No lucency adjacent to the hardware to suggest complication. There is adequate anatomic alignment of the left hip. No acute fracture. Expected adjacent postoperative soft tissue swelling and emphysema. _______________     IMPRESSION: Status post ORIF of left femoral intertrochanteric fracture, without complication. Xr Hip Lt W Or Wo Pelv 2-3 Vws    Result Date: 3/29/2020  EXAM: Left hip x-ray CLINICAL INDICATION/HISTORY: Left hip pain following trauma. COMPARISON: No prior relevant study available for comparison.  TECHNIQUE: 2 views of the hip were obtained. _______________ FINDINGS: There is a minimally displaced intertrochanteric fracture of the left femur. The femoroacetabular joint space is maintained. Noted is generalized osseous demineralization. _______________     IMPRESSION: Minimally displaced intertrochanteric left femoral fracture. Xr Knee Lt Max 2 Vws    Result Date: 3/29/2020  EXAM: Left knee x-ray CLINICAL INDICATION/HISTORY: Left knee pain following trauma. COMPARISON: No prior relevant study available for comparison. TECHNIQUE: 2 views of the knee were obtained. _______________ FINDINGS: BONES: No evidence of fracture or dislocation. The femorotibial joint space is maintained. SOFT TISSUES: No joint effusion or other soft tissue abnormality. _______________     IMPRESSION: Negative for fracture or dislocation in the left knee. Ct Head Wo Cont    Result Date: 3/29/2020  EXAM: CT head CLINICAL INDICATION/HISTORY: Headache. Traumatic head injury. COMPARISON: 11/21/2019 TECHNIQUE: Axial CT imaging of the head was performed without intravenous contrast. One or more dose reduction techniques were used on this CT: automated exposure control, adjustment of the mAs and/or kVp according to patient size, and iterative reconstruction techniques. The specific techniques used on this CT exam have been documented in the patient's electronic medical record. Digital Imaging and Communications in Medicine (DICOM) format image data are available to nonaffiliated external healthcare facilities or entities on a secure, media free, reciprocally searchable basis with patient authorization for at least a 12-month period after this study. _______________ FINDINGS: BRAIN AND POSTERIOR FOSSA: There is no intracranial hemorrhage, mass effect, or midline shift. The sulci, folia, ventricles and basal cisterns are within normal limits. There are no areas of abnormal parenchymal attenuation. EXTRA-AXIAL SPACES AND MENINGES: There are no abnormal extra-axial fluid collections. CALVARIUM: Intact. SINUSES: Clear. OTHER: None. _______________     IMPRESSION: No acute intracranial process, specifically, no evidence of intracranial hemorrhage, mass effect, or midline shift. Ct Spine Cerv Wo Cont    Result Date: 3/29/2020  EXAM: CT cervical spine CLINICAL INDICATION/HISTORY:  Neck pain following trauma. COMPARISON: None. TECHNIQUE: Axial CT imaging of the cervical spine was performed from the skull base to the thoracic inlet without intravenous contrast. Multiplanar reformats were generated. One or more dose reduction techniques were used on this CT: automated exposure control, adjustment of the mAs and/or kVp according to patient size, and iterative reconstruction techniques. The specific techniques used on this CT exam have been documented in the patient's electronic medical record. Digital Imaging and Communications in Medicine (DICOM) format image data are available to nonaffiliated external healthcare facilities or entities on a secure, media free, reciprocally searchable basis with patient authorization for at least a 12-month period after this study. _______________ FINDINGS: VERTEBRAE AND DISCS: There is no acute fracture in the cervical spine, specifically, vertebral body heights are maintained. There is intervertebral spondylosis at C4-C7. Multilevel uncovertebral arthropathy also evident. There is no spondylolisthesis. There are no significant areas of bone lucency or sclerosis. SPINAL CANAL AND FORAMINA: Patent without significant bony canal or foramina encroachment. PREVERTEBRAL SOFT TISSUES: Normal. VISIBLE PORTIONS OF POSTERIOR FOSSA/BRAIN: Normal. LUNG APICES: Clear. OTHER: None. _______________     IMPRESSION: Mild degenerative changes without evidence of acute fracture or traumatic subluxation of the cervical spine. Note that this cervical spine CT was performed supine. Although it is highly sensitive for fractures, it does not evaluate for ligamentous injury or instability.   If the patient has persistent symptoms or if otherwise clinically indicated, erect plain film evaluation or MRI should be performed. Xr Chest Port    Result Date: 3/29/2020  EXAM: One view chest x-ray CLINICAL INDICATION/HISTORY: Preoperative exam with planning for left hip surgery. COMPARISON: 10/20/2019. TECHNIQUE: Single AP view of the chest was obtained. _______________ FINDINGS: HEART, VESSELS, MEDIASTINUM: Heart size is normal. No vascular congestion. There is atherosclerosis of the thoracic aorta. LUNGS, PLEURAL SPACES: Linear parenchymal opacities at bilateral lung bases. Probable associated small left pleural effusion. BONY THORAX, SOFT TISSUES: Unremarkable. _______________     IMPRESSION: Small left pleural effusion and associated bibasilar subsegmental atelectasis. Nc Xr Technologist Service    Result Date: 3/31/2020  Fluoroscopy was used during surgery for this procedure under the supervision of the attending surgeon. Start Time:     1400 hours End Time:      1500 hours # of Images:  0       IMPRESSION:   Administrative report. CB            Discharge Medications:     Current Discharge Medication List      START taking these medications    Details   enoxaparin (LOVENOX) 30 mg/0.3 mL injection 0.3 mL by SubCUTAneous route every twenty-four (24) hours for 12 days. Qty: 3.6 mL, Refills: 0         CONTINUE these medications which have NOT CHANGED    Details   multivit,calc,mins/iron/folic (ONE-A-DAY WOMENS FORMULA PO) Take  by mouth. 1 Gummy (Chewable)      clotrimazole (LOTRIMIN) 1 % topical cream Apply  to affected area two (2) times a day. Apply to buttocks for redness. !! digoxin (LANOXIN) 0.25 mg tablet Take 1 Tab by mouth every Sunday, Tuesday, Thursday. Qty: 12 Tab, Refills: 0      FLUoxetine (PROZAC) 20 mg capsule Take 1 Cap by mouth daily. Qty: 12 Cap, Refills: 0      vitamin a-vitamin c-vit e-min (OCUVITE) tablet Take 1 Tab by mouth daily.  Gummy      fluticasone propionate (FLONASE) 50 mcg/actuation nasal spray 1 Spray by Both Nostrils route daily as needed for Allergies. ondansetron (ZOFRAN ODT) 4 mg disintegrating tablet Take 4 mg by mouth every eight (8) hours as needed for Nausea. Take 4 mg tab by mouth every eight (8) hours as needed for Nausea      mirtazapine (REMERON) 15 mg tablet Take 1 Tab by mouth nightly. Indications: major depressive disorder  Qty: 30 Tab, Refills: 0      !! digoxin (LANOXIN) 0.125 mg tablet Take 1 Tab by mouth every Monday, Wednesday, Friday, Saturday AND 2 Tabs every Sunday, Tuesday, Thursday. Indications: Ventricular Rate Control in Atrial Fibrillation  Qty: 45 Tab, Refills: 0      acetaminophen (TYLENOL) 325 mg tablet Take 650 mg by mouth every six (6) hours as needed for Pain. 2 tabs      polyethylene glycol (MIRALAX) 17 gram/dose powder Take 17 g by mouth daily as needed (constipation). carboxymethylcellulos/glycerin (CLEAR EYES FOR DRY EYES OP) Administer 1 Drop to both eyes three (3) times daily as needed (dry eyes). psyllium husk-aspartame (METAMUCIL FIBER) 3.4 gram pwpk packet Take 1 Packet by mouth daily. Qty: 30 Packet, Refills: 0      aspirin 81 mg chewable tablet Take 1 Tab by mouth daily. Qty: 30 Tab, Refills: 0      nitroglycerin (NITROSTAT) 0.4 mg SL tablet 1 Tab by SubLINGual route every five (5) minutes as needed for Chest Pain. Qty: 25 Tab, Refills: 3       !! - Potential duplicate medications found. Please discuss with provider. STOP taking these medications       furosemide (LASIX) 40 mg tablet Comments:   Reason for Stopping:         carboxymethylcellulose sodium (CELLUVISC) 0.5 % drop ophthalmic solution Comments:   Reason for Stopping:         loperamide (IMMODIUM) 2 mg tablet Comments:   Reason for Stopping:               Activity: PT/OT Eval and Treat    Diet: Resume previous diet    Wound Care: None needed    Follow-up:   Please follow up with your PCP within 7 days to discuss your recent hospitalization. Patient to arrange.   Follow up with orthopedic surgery in 1 month  Staples out in 10 days         Total time spent including time spent on final examination and discharge discussion, discharge documentation and records reviewed and medication reconciliation: > 30 minutes    Candelario Mclean DO  Internal Medicine, Hospitalist  Pager: [phone] [facility]

## 2020-04-01 NOTE — PROGRESS NOTES
NUTRITION FOLLOW UP/PLAN OF CARE      RECOMMENDATIONS:   1. [facility] soft diet, whole milk with meals, encouraged zofran prior to meals  2. Monitor labs, weight and PO intake  3. RD to follow     GOALS:   1. Ongoing/not met: PO intake meets >65% of protein/calorie needs by 4/4    ASSESSMENT:   Wt status is classified as normal per Body mass index is 23.51 kg/m². Pt reports to eat very little normally, pt agrees to whole milk with meals. Diagnosis: femur fx - post closed reduction and internal fixation with trochanteric femoral nail POD 2, afib, CAD, ischemic cardiomyopathy. Nutrition recommendations listed. RD to follow. Nutrition Diagnoses:   Remains appropriate, diet started but poor intake: Inadequate intake related to plans for OR as evidenced by NPO status    SUBJECTIVE/OBJECTIVE:   (4/1/20) Pt resting in bed during time of assessment, pt c/o n/v during visit today - RN informed me Neel Madsen was provided. Pt reports poor appetite/intake today/yesterday. Pt is refusing supplements due to dislike - will D/C. Pt states some of the foods being sent on current diet are too big -will change to [facility] soft. I brought patient crackers while in room and pt did eat some. Pt loves milk and agrees to drink with all meals. Per I/Os BM 3/29. CBW: ~133 lb? 4/1, edema noted. Placed order for re weight. Bun/Cr elevated. Encouraged increased intake/preferences, will liberalize diet to promote increased intake. Will continue to monitor intake, weight, labs, POC.    (3/30.20) Pt seen for an initial assessment/BMI/PU risk. Pt admit post fall. Pt resting in bed during time of assessment, currently NPO plan for OR today for L TFN. Pt reports some hunger during visit. Pt usually eats 3 very small meals/day at home. Denies n/v/d/c at this time. Pt does report some issues chewing/swallowing - eats finely chopped meats, soft foods and no bread (bread lumps up and gets stuck). Food allergy: eggs, kitchen aware.  Pt states -102 lb. CBW: 103 lb which is pt stated, placed order to obtain weight. Edema noted. Pt states her daughter has been wanting her to drink ensure supplements for a while now but she dislikes them. Pt does agree to try ensure clear supplements. Head staple, L lower leg wound, stage 2 buttocks noted. Encouraged intake/supplement compliance once diet started. Will continue to monitor intake/diet advancement, weight, labs, POC. Rec: advance diet as tolerated to cardiac, mech soft with ensure clear TID. Information Obtained from:    [x] Chart Review   [x] Patient   [x] Family/Caregiver   [] Nurse/Physician   [] Interdisciplinary Meeting/Rounds    Diet: Cardiac soft solids  Medications: [x] Reviewed   Noted: lanoxin, lovenox, flintstone vit, Nacl @ 100, remeron, metamucil, pericolace, miralax, PRN: zofran, norco, ultram  Allergies: [x] Reviewed  eggs  Encounter Diagnoses     ICD-10-CM ICD-9-CM   1. Displaced intertrochanteric fracture of left femur, initial [facility] S72.142A 820.21   2. Fall, initial encounter [address] XXXA E888.9   3. Laceration of scalp, initial encounter S01. 01XA 873.0     Past Medical History:   Diagnosis Date    A-fib [facility]) 01/2017    Arthritis, degenerative     CAD (coronary artery disease) 04/2012    S/P 2.75 X 15 mm BMS of OM (04/2012), Two LAD BMS (07/2012)    Elevated liver enzymes     Likely from statin    HLD (hyperlipidemia)     Hyperkalemia 06/2012    Likely from lisinopril    Ischemic cardiomyopathy     LVEF  30-35%(05/19) 45% (11/2012) & 30% echo (04/2012)    NSTEMI (non-ST elevated myocardial infarction) ([facility] 04/2012    UTI (urinary tract infection)     Vertigo       Labs:    Lab Results   Component Value Date/Time    Sodium 136 04/01/2020 04:45 AM    Potassium 5.3 04/01/2020 04:45 AM    Chloride 103 04/01/2020 04:45 AM    CO2 24 04/01/2020 04:45 AM    Anion gap 9 04/01/2020 04:45 AM    Glucose 141 (H) 04/01/2020 04:45 AM    BUN 62 (H) 04/01/2020 04:45 AM Creatinine 2.05 (H) 04/01/2020 04:45 AM    Calcium 7.8 (L) 04/01/2020 04:45 AM    Magnesium 2.2 03/30/2020 04:30 AM    Phosphorus 5.1 (H) 03/30/2020 04:30 AM    Albumin 2.8 (L) 03/29/2020 11:01 AM     Lab Results   Component Value Date/Time     (H) 04/01/2020 04:45 AM     Lab Results   Component Value Date/Time    Cholesterol, total 202 (H) 04/29/2019 07:45 AM    HDL Cholesterol 64 (H) 04/29/2019 07:45 AM    LDL, calculated 116.4 (H) 04/29/2019 07:45 AM    VLDL, calculated 21.6 04/29/2019 07:45 AM    Triglyceride 108 04/29/2019 07:45 AM    CHOL/HDL Ratio 3.2 04/29/2019 07:45 AM     Anthropometrics: BMI (calculated): 23.5  Last 3 Recorded Weights in this Encounter    03/30/20 1421 03/31/20 0629 04/01/20 0539   Weight: 46.7 kg (103 lb) 55.1 kg (121 lb 6.4 oz) 60.2 kg (132 lb 11.2 oz)      Ht Readings from Last 1 Encounters:   03/30/20 5' 3\" (1.6 m)     Documented Weight History:  Weight Metrics 4/1/2020 3/3/2020 2/19/2020 2/5/2020 1/24/2020 1/8/2020 12/18/2019   Weight 132 lb 11.2 oz 104 lb 104 lb 104 lb 104 lb 104 lb 104 lb   BMI 23.51 kg/m2 18.42 kg/m2 18.42 kg/m2 18.42 kg/m2 18.42 kg/m2 18.42 kg/m2 18.42 kg/m2     No data found. IBW: 115 lb UBW: 101-102 lb   [] Weight Loss [] Weight Gain [x] Weight Stable per pt    Estimated Nutrition Needs: [] MSJ  [x] Other:  Calories: 3873-1231 kcal (25-30 kcal/kg) Based on:   [x] Actual BW    Protein:   47-56 g (1-1.2 g/kg) Based on:   [x] Actual BW    Fluid:       1 mL/kcal     [x] No Cultural, Jainism or ethnic dietary need identified.     [] Cultural, Jainism and ethnic food preferences identified and addressed     Wt Status:  [x] Normal (18.6 - 24.9) [] Underweight (<18.5) [] Overweight (25 - 29.9) [] Mild Obesity (30 - 34.9)  [] Moderate Obesity (35 - 39.9) [] Morbid Obesity (40+)     Nutrition Problems Identified:   [x] Suboptimal PO intake d/t n/v  [x] Food Allergies  [x] Difficulty chewing/swallowing/poor dentition  [] Constipation/Diarrhea   [] Nausea/Vomiting   [] None  [] Other:     Plan:   [] Therapeutic Diet  []  Obtained/adjusted food preferences/tolerances and/or snacks options   []  Supplements added   [] Occupational therapy following for feeding techniques  []  HS snack added   [x]  Modify diet texture   []  Modify diet for food allergies   []  Educate patient   []  Assist with menu selection   [x]  Monitor PO intake on meal rounds   [x]  Continue inpatient monitoring and intervention   [x]  Participated in discharge planning/Interdisciplinary rounds/Team meetings   []  Other:     Education Needs:   [] Not appropriate for teaching at this time due to:    [x] Identified and addressed encouraged preferences/increased intake    Nutrition Monitoring and Evaluation:  [x] Continue ongoing monitoring and intervention  [] Other    Reida Amis

## 2020-04-01 NOTE — PROGRESS NOTES
Problem: Falls - Risk of  Goal: *Absence of Falls  Description: Document Emani Wu Fall Risk and appropriate interventions in the flowsheet. Outcome: Progressing Towards Goal  Note: Fall Risk Interventions:            Medication Interventions: Bed/chair exit alarm    Elimination Interventions: Call light in reach    History of Falls Interventions: Bed/chair exit alarm, Door open when patient unattended         Problem: Pressure Injury - Risk of  Goal: *Prevention of pressure injury  Description: Document Allen Scale and appropriate interventions in the flowsheet.   Outcome: Progressing Towards Goal  Note: Pressure Injury Interventions:  Sensory Interventions: Float heels, Keep linens dry and wrinkle-free         Activity Interventions: PT/OT evaluation, Pressure redistribution bed/mattress(bed type)    Mobility Interventions: Pressure redistribution bed/mattress (bed type)    Nutrition Interventions: Document food/fluid/supplement intake    Friction and Shear Interventions: HOB 30 degrees or less                Problem: Pain  Goal: *Control of Pain  Outcome: Progressing Towards Goal     Problem: Hip Fracture: Day of Surgery Post-op Care  Goal: Activity/Safety  Outcome: Progressing Towards Goal  Goal: Consults, if ordered  Outcome: Progressing Towards Goal  Goal: Diagnostic Test/Procedures  Outcome: Progressing Towards Goal  Goal: Nutrition/Diet  Outcome: Progressing Towards Goal  Goal: Medications  Outcome: Progressing Towards Goal  Goal: Respiratory  Outcome: Progressing Towards Goal  Goal: Treatments/Interventions/Procedures  Outcome: Progressing Towards Goal  Goal: Psychosocial  Outcome: Progressing Towards Goal  Goal: *Absence of skin breakdown  Outcome: Progressing Towards Goal  Goal: *Optimal pain control at patient's stated goal  Outcome: Progressing Towards Goal  Goal: *Hemodynamically stable  Outcome: Progressing Towards Goal

## 2020-04-01 NOTE — PROGRESS NOTES
Problem: Self Care Deficits Care Plan (Adult)  Goal: *Acute Goals and Plan of Care (Insert Text)  Description: Occupational Therapy Goals  Initiated 3/31/2020 within 7 day(s). 1.  Patient will perform grooming with modified independence. 2.  Patient will perform bathing with modified independence. 3.  Patient will perform upper body dressing and lower body dressing with modified independence using adaptive equipment as needed. 4.  Patient will perform toilet transfers with modified independence using RW. 5.  Patient will perform all aspects of toileting with modified independence. 6.  Patient will participate in upper extremity therapeutic exercise/activities with modified independence for 10 minutes. 7.  Patient will utilize energy conservation techniques during functional activities with verbal cues. Prior Level of Function: Mod I w/ ADLs and functional mobility using RW, [employer] staff provides Supv for walk in shower transfer and tasks; meals and med mgmt   Outcome: Not Progressing Towards Goal   OCCUPATIONAL THERAPY TREATMENT    Patient: Rodrick Castro (80 y.o. female)  Date: 4/1/2020  Diagnosis: Femur fracture, left ([address] [S72.92XA]   Femur fracture, left ([address]  Procedure(s) (LRB):  FEMUR INSERTION INTRA MEDULLARY NAIL (Left) 2 Days Post-Op  Precautions: Fall, Skin  PLOF: see above    Chart, occupational therapy assessment, plan of care, and goals were reviewed. ASSESSMENT:  Nursing/Gale cleared for pt to participate in OT tx session. Patient presents lying in bed, reports she is not feeling well and cold. Patient agreeable to participate in ADL routine, although refused to wash face requiring total assist d/t wanting to remain under bed blankets d/t feeling cold, pt appearing angry demanding to remove O2 NC-nursing notified. Patient apologized and stated \"I'm sorry I'm so grumpy, I just don't feel well. \"    Progression toward goals:  []          Improving appropriately and progressing toward goals  []          Improving slowly and progressing toward goals  [x]          Not making progress toward goals and plan of care will be adjusted     PLAN:  Patient continues to benefit from skilled intervention to address the above impairments. Continue treatment per established plan of care. Discharge Recommendations:  Skilled Nursing Facility  Further Equipment Recommendations for Discharge:  bedside commode, rolling walker, and wheelchair      SUBJECTIVE:   Patient stated \"\"I'm sorry I'm so grumpy, I just don't feel well.     OBJECTIVE DATA SUMMARY:   Cognitive/Behavioral Status:  Neurologic State: Alert  Orientation Level: Oriented X4  Cognition: Appropriate for age attention/concentration  Safety/Judgement: Fall prevention    Pain:  Pain level pre-treatment: pt unable to rate on pain scale and stated \"what do you think, I'm 80years old\"  Pain level post-treatment: pt unable to rate on pain scale and stated \"what do you think, I'm 80years old\"  Pain Intervention(s): Medication (see MAR); Rest, Ice, Repositioning   Response to intervention: Nurse notified, See doc flow    Activity Tolerance:    poor  Please refer to the flowsheet for vital signs taken during this treatment. After treatment:   []  Patient left in no apparent distress sitting up in chair  [x]  Patient left in no apparent distress in bed  [x]  Call bell left within reach  [x]  Nursing notified  []  Caregiver present  []  Bed alarm activated    COMMUNICATION/EDUCATION:   [x] Role of Occupational Therapy in the acute care setting  [x] Home safety education was provided and the patient/caregiver indicated understanding. [x] Patient/family have participated as able in working towards goals and plan of care. [x] Patient/family agree to work toward stated goals and plan of care. [] Patient understands intent and goals of therapy, but is neutral about his/her participation.   [] Patient is unable to participate in goal setting and plan of care.      Thank you for this referral.  Yvrose Edwards  Time Calculation: 20 mins

## 2020-04-02 VITALS
DIASTOLIC BLOOD PRESSURE: 54 MMHG | RESPIRATION RATE: 17 BRPM | HEIGHT: 63 IN | TEMPERATURE: 97.4 F | WEIGHT: 132.7 LBS | HEART RATE: 90 BPM | SYSTOLIC BLOOD PRESSURE: 90 MMHG | OXYGEN SATURATION: 99 % | BODY MASS INDEX: 23.51 KG/M2

## 2020-04-02 LAB
ANION GAP SERPL CALC-SCNC: 7 MMOL/L (ref 3–18)
BASOPHILS # BLD: 0 K/UL (ref 0–0.1)
BASOPHILS NFR BLD: 0 % (ref 0–2)
BUN SERPL-MCNC: 69 MG/DL (ref 7–18)
BUN/CREAT SERPL: 33 (ref 12–20)
CALCIUM SERPL-MCNC: 7.5 MG/DL (ref 8.5–10.1)
CHLORIDE SERPL-SCNC: 106 MMOL/L (ref 100–111)
CO2 SERPL-SCNC: 24 MMOL/L (ref 21–32)
CREAT SERPL-MCNC: 2.09 MG/DL (ref 0.6–1.3)
DIFFERENTIAL METHOD BLD: ABNORMAL
EOSINOPHIL # BLD: 0.1 K/UL (ref 0–0.4)
EOSINOPHIL NFR BLD: 1 % (ref 0–5)
ERYTHROCYTE [DISTWIDTH] IN BLOOD BY AUTOMATED COUNT: 20.3 % (ref 11.6–14.5)
GLUCOSE SERPL-MCNC: 140 MG/DL (ref 74–99)
HCT VFR BLD AUTO: 31.3 % (ref 35–45)
HGB BLD-MCNC: 9.6 G/DL (ref 12–16)
LYMPHOCYTES # BLD: 1.5 K/UL (ref 0.9–3.6)
LYMPHOCYTES NFR BLD: 13 % (ref 21–52)
MCH RBC QN AUTO: 24.9 PG (ref 24–34)
MCHC RBC AUTO-ENTMCNC: 30.7 G/DL (ref 31–37)
MCV RBC AUTO: 81.1 FL (ref 74–97)
MONOCYTES # BLD: 1.8 K/UL (ref 0.05–1.2)
MONOCYTES NFR BLD: 15 % (ref 3–10)
NEUTS SEG # BLD: 8.7 K/UL (ref 1.8–8)
NEUTS SEG NFR BLD: 71 % (ref 40–73)
PLATELET # BLD AUTO: 289 K/UL (ref 135–420)
PMV BLD AUTO: 9.5 FL (ref 9.2–11.8)
POTASSIUM SERPL-SCNC: 4.8 MMOL/L (ref 3.5–5.5)
RBC # BLD AUTO: 3.86 M/UL (ref 4.2–5.3)
SODIUM SERPL-SCNC: 137 MMOL/L (ref 136–145)
WBC # BLD AUTO: 12.1 K/UL (ref 4.6–13.2)

## 2020-04-02 PROCEDURE — 80048 BASIC METABOLIC PNL TOTAL CA: CPT

## 2020-04-02 PROCEDURE — 74011250636 HC RX REV CODE- 250/636: Performed by: HOSPITALIST

## 2020-04-02 PROCEDURE — 97535 SELF CARE MNGMENT TRAINING: CPT

## 2020-04-02 PROCEDURE — 74011000258 HC RX REV CODE- 258: Performed by: HOSPITALIST

## 2020-04-02 PROCEDURE — 74011250637 HC RX REV CODE- 250/637: Performed by: INTERNAL MEDICINE

## 2020-04-02 PROCEDURE — 36415 COLL VENOUS BLD VENIPUNCTURE: CPT

## 2020-04-02 PROCEDURE — 74011250637 HC RX REV CODE- 250/637: Performed by: ORTHOPAEDIC SURGERY

## 2020-04-02 PROCEDURE — 85025 COMPLETE CBC W/AUTO DIFF WBC: CPT

## 2020-04-02 RX ADMIN — Medication 10 ML: at 06:00

## 2020-04-02 RX ADMIN — ACETAMINOPHEN 650 MG: 325 TABLET, FILM COATED ORAL at 13:42

## 2020-04-02 RX ADMIN — ASPIRIN 81 MG 81 MG: 81 TABLET ORAL at 09:26

## 2020-04-02 RX ADMIN — SENNOSIDES AND DOCUSATE SODIUM 1 TABLET: 8.6; 5 TABLET ORAL at 09:26

## 2020-04-02 RX ADMIN — POLYETHYLENE GLYCOL 3350 17 G: 17 POWDER, FOR SOLUTION ORAL at 09:26

## 2020-04-02 RX ADMIN — DEXTROSE MONOHYDRATE AND SODIUM CHLORIDE 75 ML/HR: 5; .9 INJECTION, SOLUTION INTRAVENOUS at 11:35

## 2020-04-02 RX ADMIN — FLUTICASONE PROPIONATE 1 SPRAY: 50 SPRAY, METERED NASAL at 09:42

## 2020-04-02 RX ADMIN — ACETAMINOPHEN 650 MG: 325 TABLET, FILM COATED ORAL at 09:26

## 2020-04-02 RX ADMIN — Medication 1 TABLET: at 09:26

## 2020-04-02 RX ADMIN — PSYLLIUM HUSK 1 PACKET: 3.4 POWDER ORAL at 11:36

## 2020-04-02 RX ADMIN — ACETAMINOPHEN 650 MG: 325 TABLET, FILM COATED ORAL at 03:00

## 2020-04-02 RX ADMIN — FLUOXETINE 10 MG: 10 CAPSULE ORAL at 11:36

## 2020-04-02 RX ADMIN — DIGOXIN 0.25 MG: 125 TABLET ORAL at 11:35

## 2020-04-02 NOTE — PROGRESS NOTES
9200  Received pt sitting on the recliner, awake, no pain. Left  Leg warm to touch with  Pedal pulse, wiggle toes. 0945  Back to bed by 2 assist, pt had small BM    1100  Assisted by  PT  To  The recliner, pt prefer to sit on the recliner than going back to bed.    1320  Report to [facility], pt  Aware for transfer to [facility], No pain, was back to bed by 2 assist and was able to  Stand up with assist.    1340  Discharge done by discharge RN, resting on bed.

## 2020-04-02 NOTE — PROGRESS NOTES
Spoke with Nataliya Chamberlain in admissions @ [facility], states they have reviewed new d/c summary & ok for pt to come today. Made nurse aware of above & to call report. Transport is @ 1400, pt & son-n-law aware & agreeable. Mariah CarreraRN,ext [phone].

## 2020-04-02 NOTE — PROGRESS NOTES
2018: Patient awake,alert and oriented x4. No signs of distress. Bed low and locked. Call bell within reach. Will monitor. 0630: Resting quietly,uneventful night,no other concern at this time,call bell within reach. Will continue monitoring. Patient Vitals for the past 12 hrs:   Temp Pulse Resp BP   04/02/20 0630    90/52   04/02/20 0525 97.4 °F (36.3 °C) 74 16 (!) 87/52   04/02/20 0103 97.4 °F (36.3 °C) 75 16 102/46   04/01/20 2031 97.8 °F (36.6 °C) 69 18 95/60     Bedside and Verbal shift change report given to CINTHYA Toscano (oncoming nurse) by Jason Santo (offgoing nurse). Report included the following information SBAR, Kardex, MAR and Recent Results.

## 2020-04-02 NOTE — PROGRESS NOTES
Progress Note      Post Operative Day: 3    Assessment:    1. Status post Closed reduction and internal fixation with short intramedullary Synthes trochanteric femoral nail. PLAN:    1. Mobilize. Continue P.T.  2.  WBAT  3. Lovenox for DVT prophylaxis per ortho  4. Discharge Planning to rehab. F/u in the office with Dr Darcy Roe 1 month. Staples out 10-14 days           HPI: Geovanny Woodward is a 80 y.o. female patient without new complaints status post TFN for Femur fracture, left ([address] Beto Stalin 3/29/2020. No new orthopaedic changes. Blood pressure 90/52, pulse 71, temperature 97.6 °F (36.4 °C), resp. rate 17, height 5' 3\" (1.6 m), weight 60.2 kg (132 lb 11.2 oz), SpO2 98 %. CBC w/Diff   Lab Results   Component Value Date/Time    WBC 12.1 04/02/2020 07:05 AM    RBC 3.86 (L) 04/02/2020 07:05 AM    HCT 31.3 (L) 04/02/2020 07:05 AM          Physical Assessment:  General: in no apparent distress   Extremities:  Neurovascular intact    Dressing:  Dry   DVT Exam:   No exam evidence to suggest DVT.  Compartments soft and NT.          Saba Mccord PA-C  4/2/2020  Office [phone]  Cell [phone]

## 2020-04-02 NOTE — DISCHARGE SUMMARY
Internal Medicine Discharge Summary        Patient: Neena Lopez    YOB: 1927    Age:  80 y.o.                       Admit Date: 3/29/2020    Discharge Date: 4/2/2020    LOS:  LOS: 4 days     Discharge To: SNF    Consults: Cardiology and Orthopedics    Admission Diagnoses: Femur fracture, left ([facility] [S72.92XA]    Discharge Diagnoses:    Problem List as of 4/2/2020 Date Reviewed: 3/3/2020          Codes Class Noted - Resolved    * (Principal) Femur fracture, left ([facility] ICD-10-CM: I88.14ZM  ICD-9-CM: 821.00  3/29/2020 - Present        Bilateral leg edema ICD-10-CM: R60.0  ICD-9-CM: 782.3  12/18/2019 - Present        Leg ulcer, left, with fat layer exposed ([facility] ICD-10-CM: X60.184  ICD-9-CM: 707.10  12/18/2019 - Present        Ulcer of right leg, with fat layer exposed ([facility] ICD-10-CM: B32.002  ICD-9-CM: 707.10  12/18/2019 - Present        Dizziness ICD-10-CM: R42  ICD-9-CM: 780.4  11/21/2019 - Present        Atrial fibrillation ([facility] ICD-10-CM: I48.91  ICD-9-CM: 427.31  10/1/2019 - Present        Hypotension ICD-10-CM: I95.9  ICD-9-CM: 458.9  10/1/2019 - Present        Skin rash ICD-10-CM: R21  ICD-9-CM: 782.1  10/1/2019 - Present        Metabolic acidosis EVR-88-UG: E87.2  ICD-9-CM: 276.2  10/1/2019 - Present        Underweight ICD-10-CM: R63.6  ICD-9-CM: 783.22  10/1/2019 - Present        Atrial fibrillation with rapid ventricular response ([facility] ICD-10-CM: I48.91  ICD-9-CM: 427.31  9/30/2019 - Present        Syncope ICD-10-CM: R55  ICD-9-CM: 780.2  6/5/2019 - Present        Hypoxia ICD-10-CM: R09.02  ICD-9-CM: 799.02  6/5/2019 - Present        Leukocytosis ICD-10-CM: F54.603  ICD-9-CM: 288.60  6/5/2019 - Present        Acute on chronic systolic (congestive) heart failure ([facility] ICD-10-CM: I50.23  ICD-9-CM: 428.23, 428.0  6/3/2019 - Present        Hyperkalemia ICD-10-CM: E87.5  ICD-9-CM: 276.7  10/23/2018 - Present        Acute metabolic encephalopathy GUJ-18-QP: G93.41  ICD-9-CM: 348.31  10/21/2018 - Present        Hyponatremia ICD-10-CM: E87.1  ICD-9-CM: 276.1  10/20/2018 - Present        Chronic fatigue ICD-10-CM: R53.82  ICD-9-CM: 780.79  10/9/2018 - Present        Irritable bowel syndrome with diarrhea ICD-10-CM: K58.0  ICD-9-CM: 564.1  9/24/2018 - Present        Chronic a-fib ICD-10-CM: I48.20  ICD-9-CM: 427.31  5/2/2017 - Present        CAD (coronary artery disease) ICD-10-CM: I25.10  ICD-9-CM: 414.00  Unknown - Present    Overview Signed 5/3/2012  3:22 PM by Carolina Galindo MD     S/P 2.75 X 15 mm BMS of Obtuse marginal             Ischemic cardiomyopathy ICD-10-CM: I25.5  ICD-9-CM: 414.8  Unknown - Present        Arthritis, degenerative ICD-10-CM: M19.90  ICD-9-CM: 715.90  Unknown - Present        Vertigo ICD-10-CM: R42  ICD-9-CM: 780.4  Unknown - Present        HLD (hyperlipidemia) ICD-10-CM: E78.5  ICD-9-CM: 272.4  Unknown - Present        SOB (shortness of breath) ICD-10-CM: R06.02  ICD-9-CM: 786.05  4/27/2012 - Present        NSTEMI (non-ST elevated myocardial infarction) [facility]) ICD-10-CM: I21.4  ICD-9-CM: 410.70  4/1/2012 - Present              Discharge Condition:  Improved    Procedures: L Closed reduction and internal fixation with trochanteric femoral nail         HPI: Ms. Carie Alatorre is a 80 y.o.  female who is admitted for hip fracture after mechanical fall after she tripped at home and her left side of the body. Ms. Carie Alatorre with past medical history as noted below , presented to the Emergency Department today  complaining of above. Triage and ER notes noted. Because of the fell on the left side screen x-rays done but only showed left hip fracture. Orthopedic consulted. CT of the head and neck did not show any acute changes. Apart from pain in her left hip patient denies any complaint. Patient does not know her cardiologist she stated sometimes she is with it and sometimes she is not.   I looked in her chart and she seen by Dr. Rajni Oneill and Brett Rosario from cardiology before, I talked to  Michael and consulted him on the case    Hospital Course:    L hip fracture - Ortho consulted. Cardiology consulted for clearance and stated intermediate risk from CV standpoint but no contraindication. Echo showed EF 30% with moderate mitral regurgitation. She was taken to OR for above procedure. She tolerated the procedure well. Post-operatively she had some nausea and vomiting but was eager to get out of the hospital. Her blood pressure was at baseline, which is 90s-100s, thus no BB prescribed. Pain managed without narcotics. She should continue lovenox for another 12 days. Follow up with Dr. Chilo Amaya from orthopedic surgery in one month. Staples out in 10 days    Acute on Chronic Renal Failure Stage 3 - Mild bump in Cr, likely secondary to nausea/vomiting the day and a half prior. She is tolerating diet now with urine output. This should improve with encouragement of PO intake on its own. Can recheck BMP next week. She is not on any medications that will worsen. Would avoid NSAIDs for now. Chronic B/L leg wounds - Continue local wound care at [facility]    The rest of the patient's chronic conditions were managed appropriately during their admission. They were medically stable at the time of discharge.     Visit Vitals  BP 90/52   Pulse 71   Temp 97.6 °F (36.4 °C)   Resp 17   Ht 5' 3\" (1.6 m)   Wt 60.2 kg (132 lb 11.2 oz)   SpO2 98%   BMI 23.51 kg/m²       Physical Exam at Discharge:  General Appearance: NAD, conversant  HENT: normocephalic/atraumatic, moist mucus membranes  Lungs: CTA with normal respiratory effort  CV: RRR, no m/r/g  Abdomen: soft, non-tender, normal bowel sounds  Extremities: no cyanosis, no peripheral edema  Neuro: moves all extremities, no focal deficits  Psych: appropriate affect, alert and oriented to person, place and time    Labs Prior to Discharge:  Labs: Results:       Chemistry Recent Labs     04/02/20  0705 04/01/20  0445   * 141*    136   K 4.8 5.3    103   CO2 24 24 BUN 69* 62*   CREA 2.09* 2.05*   CA 7.5* 7.8*   AGAP 7 9   BUCR 33* 30*      CBC w/Diff Recent Labs     04/02/20  0705 04/01/20  0445 03/31/20  0430   WBC 12.1 14.2* 11.1   RBC 3.86* 4.17* 3.93*   HGB 9.6* 10.4* 9.6*   HCT 31.3* 34.2* 33.0*    335 295   GRANS 71 74* 82*   LYMPH 13* 12* 8*   EOS 1 1 0      Cardiac Enzymes No results for input(s): CPK, CKND1, KELSIE in the last 72 hours. No lab exists for component: CKRMB, TROIP   Coagulation No results for input(s): PTP, INR, APTT, INREXT, INREXT in the last 72 hours. Lipid Panel Lab Results   Component Value Date/Time    Cholesterol, total 202 (H) 04/29/2019 07:45 AM    HDL Cholesterol 64 (H) 04/29/2019 07:45 AM    LDL, calculated 116.4 (H) 04/29/2019 07:45 AM    VLDL, calculated 21.6 04/29/2019 07:45 AM    Triglyceride 108 04/29/2019 07:45 AM    CHOL/HDL Ratio 3.2 04/29/2019 07:45 AM      BNP No results for input(s): BNPP in the last 72 hours. Liver Enzymes No results for input(s): TP, ALB, TBIL, AP, SGOT, GPT in the last 72 hours. No lab exists for component: DBIL   Thyroid Studies Lab Results   Component Value Date/Time    TSH 1.57 07/26/2019 08:09 AM            Significant Imaging:  Xr Shoulder Lt Ap/lat Min 2 V    Result Date: 3/29/2020  EXAM: Left shoulder x-ray CLINICAL INDICATION/HISTORY: Left shoulder pain following trauma. COMPARISON: No prior relevant study available for comparison. TECHNIQUE: 3 views of the shoulder were obtained. _______________ FINDINGS: There is no evidence of fracture or dislocation. The glenohumeral joint space is maintained. There is no evidence of joint effusion. The included portion of the lung apex and upper ribs are unremarkable. _______________     IMPRESSION: Negative for fracture or dislocation in the left shoulder. Xr Elbow Lt Min 3 V    Result Date: 3/29/2020  EXAM: Left elbow x-ray CLINICAL INDICATION/HISTORY: Left elbow pain following trauma.  COMPARISON: No prior relevant study available for comparison. TECHNIQUE: 4 views of the elbow were obtained. _______________ FINDINGS: There is no evidence of fracture or dislocation of the elbow. The radiocapitellar and anterior humeral alignment is maintained. There are no secondary signs of joint effusion. _______________     IMPRESSION: Negative for fracture or dislocation of the left elbow. Xr Wrist Lt Ap/lat/obl Min 3v    Result Date: 3/29/2020  EXAM: Left wrist x-ray CLINICAL INDICATION/HISTORY: Left wrist pain following trauma. COMPARISON: No prior relevant study available for comparison. TECHNIQUE: 3 views of the wrist were obtained. _______________ FINDINGS: There is no evidence of fracture or dislocation. The soft tissue structures appear unremarkable. There is degenerative arthropathy of the first carpometacarpal joint and mild radial carpal joint space loss. _______________     IMPRESSION: Negative for fracture or dislocation in the left wrist.    Xr Hip Lt W Or Wo Pelv 2-3 Vws    Result Date: 3/30/2020  EXAM: LEFT HIP, 2 views CLINICAL INDICATION/HISTORY: Left hip pain COMPARISON: 3/29/2020 TECHNIQUE:  Single AP and frog leg lateral views of the left hip were obtained. _______________ FINDINGS: Surgical findings and hardware of left femoral intramedullary nail and dynamic hip screw, transfixing the intertrochanteric proximal left femoral fracture seen previously. No lucency adjacent to the hardware to suggest complication. There is adequate anatomic alignment of the left hip. No acute fracture. Expected adjacent postoperative soft tissue swelling and emphysema. _______________     IMPRESSION: Status post ORIF of left femoral intertrochanteric fracture, without complication. Xr Hip Lt W Or Wo Pelv 2-3 Vws    Result Date: 3/29/2020  EXAM: Left hip x-ray CLINICAL INDICATION/HISTORY: Left hip pain following trauma. COMPARISON: No prior relevant study available for comparison.  TECHNIQUE: 2 views of the hip were obtained. _______________ FINDINGS: There is a minimally displaced intertrochanteric fracture of the left femur. The femoroacetabular joint space is maintained. Noted is generalized osseous demineralization. _______________     IMPRESSION: Minimally displaced intertrochanteric left femoral fracture. Xr Knee Lt Max 2 Vws    Result Date: 3/29/2020  EXAM: Left knee x-ray CLINICAL INDICATION/HISTORY: Left knee pain following trauma. COMPARISON: No prior relevant study available for comparison. TECHNIQUE: 2 views of the knee were obtained. _______________ FINDINGS: BONES: No evidence of fracture or dislocation. The femorotibial joint space is maintained. SOFT TISSUES: No joint effusion or other soft tissue abnormality. _______________     IMPRESSION: Negative for fracture or dislocation in the left knee. Ct Head Wo Cont    Result Date: 3/29/2020  EXAM: CT head CLINICAL INDICATION/HISTORY: Headache. Traumatic head injury. COMPARISON: 11/21/2019 TECHNIQUE: Axial CT imaging of the head was performed without intravenous contrast. One or more dose reduction techniques were used on this CT: automated exposure control, adjustment of the mAs and/or kVp according to patient size, and iterative reconstruction techniques. The specific techniques used on this CT exam have been documented in the patient's electronic medical record. Digital Imaging and Communications in Medicine (DICOM) format image data are available to nonaffiliated external healthcare facilities or entities on a secure, media free, reciprocally searchable basis with patient authorization for at least a 12-month period after this study. _______________ FINDINGS: BRAIN AND POSTERIOR FOSSA: There is no intracranial hemorrhage, mass effect, or midline shift. The sulci, folia, ventricles and basal cisterns are within normal limits. There are no areas of abnormal parenchymal attenuation. EXTRA-AXIAL SPACES AND MENINGES: There are no abnormal extra-axial fluid collections. CALVARIUM: Intact. SINUSES: Clear. OTHER: None. _______________     IMPRESSION: No acute intracranial process, specifically, no evidence of intracranial hemorrhage, mass effect, or midline shift. Ct Spine Cerv Wo Cont    Result Date: 3/29/2020  EXAM: CT cervical spine CLINICAL INDICATION/HISTORY:  Neck pain following trauma. COMPARISON: None. TECHNIQUE: Axial CT imaging of the cervical spine was performed from the skull base to the thoracic inlet without intravenous contrast. Multiplanar reformats were generated. One or more dose reduction techniques were used on this CT: automated exposure control, adjustment of the mAs and/or kVp according to patient size, and iterative reconstruction techniques. The specific techniques used on this CT exam have been documented in the patient's electronic medical record. Digital Imaging and Communications in Medicine (DICOM) format image data are available to nonaffiliated external healthcare facilities or entities on a secure, media free, reciprocally searchable basis with patient authorization for at least a 12-month period after this study. _______________ FINDINGS: VERTEBRAE AND DISCS: There is no acute fracture in the cervical spine, specifically, vertebral body heights are maintained. There is intervertebral spondylosis at C4-C7. Multilevel uncovertebral arthropathy also evident. There is no spondylolisthesis. There are no significant areas of bone lucency or sclerosis. SPINAL CANAL AND FORAMINA: Patent without significant bony canal or foramina encroachment. PREVERTEBRAL SOFT TISSUES: Normal. VISIBLE PORTIONS OF POSTERIOR FOSSA/BRAIN: Normal. LUNG APICES: Clear. OTHER: None. _______________     IMPRESSION: Mild degenerative changes without evidence of acute fracture or traumatic subluxation of the cervical spine. Note that this cervical spine CT was performed supine. Although it is highly sensitive for fractures, it does not evaluate for ligamentous injury or instability.   If the patient has persistent symptoms or if otherwise clinically indicated, erect plain film evaluation or MRI should be performed. Xr Chest Port    Result Date: 3/29/2020  EXAM: One view chest x-ray CLINICAL INDICATION/HISTORY: Preoperative exam with planning for left hip surgery. COMPARISON: 10/20/2019. TECHNIQUE: Single AP view of the chest was obtained. _______________ FINDINGS: HEART, VESSELS, MEDIASTINUM: Heart size is normal. No vascular congestion. There is atherosclerosis of the thoracic aorta. LUNGS, PLEURAL SPACES: Linear parenchymal opacities at bilateral lung bases. Probable associated small left pleural effusion. BONY THORAX, SOFT TISSUES: Unremarkable. _______________     IMPRESSION: Small left pleural effusion and associated bibasilar subsegmental atelectasis. Nc Xr Technologist Service    Result Date: 3/31/2020  Fluoroscopy was used during surgery for this procedure under the supervision of the attending surgeon. Start Time:     1400 hours End Time:      1500 hours # of Images:  0       IMPRESSION:   Administrative report. CB            Discharge Medications:     Current Discharge Medication List      START taking these medications    Details   enoxaparin (LOVENOX) 30 mg/0.3 mL injection 0.3 mL by SubCUTAneous route every twenty-four (24) hours for 12 days. Qty: 3.6 mL, Refills: 0         CONTINUE these medications which have NOT CHANGED    Details   multivit,calc,mins/iron/folic (ONE-A-DAY WOMENS FORMULA PO) Take  by mouth. 1 Gummy (Chewable)      clotrimazole (LOTRIMIN) 1 % topical cream Apply  to affected area two (2) times a day. Apply to buttocks for redness. !! digoxin (LANOXIN) 0.25 mg tablet Take 1 Tab by mouth every Sunday, Tuesday, Thursday. Qty: 12 Tab, Refills: 0      FLUoxetine (PROZAC) 20 mg capsule Take 1 Cap by mouth daily. Qty: 12 Cap, Refills: 0      vitamin a-vitamin c-vit e-min (OCUVITE) tablet Take 1 Tab by mouth daily.  Gummy      fluticasone propionate (FLONASE) 50 mcg/actuation nasal spray 1 [place] by Both Nostrils route daily as needed for Allergies. ondansetron (ZOFRAN ODT) 4 mg disintegrating tablet Take 4 mg by mouth every eight (8) hours as needed for Nausea. Take 4 mg tab by mouth every eight (8) hours as needed for Nausea      mirtazapine (REMERON) 15 mg tablet Take 1 Tab by mouth nightly. Indications: major depressive disorder  Qty: 30 Tab, Refills: 0      !! digoxin (LANOXIN) 0.125 mg tablet Take 1 Tab by mouth every Monday, Wednesday, Friday, Saturday AND 2 Tabs every Sunday, Tuesday, Thursday. Indications: Ventricular Rate Control in Atrial Fibrillation  Qty: 45 Tab, Refills: 0      acetaminophen (TYLENOL) 325 mg tablet Take 650 mg by mouth every six (6) hours as needed for Pain. 2 tabs      polyethylene glycol (MIRALAX) 17 gram/dose powder Take 17 g by mouth daily as needed (constipation). carboxymethylcellulos/glycerin (CLEAR EYES FOR DRY EYES OP) Administer 1 Drop to both eyes three (3) times daily as needed (dry eyes). psyllium husk-aspartame (METAMUCIL FIBER) 3.4 gram pwpk packet Take 1 Packet by mouth daily. Qty: 30 Packet, Refills: 0      aspirin 81 mg chewable tablet Take 1 Tab by mouth daily. Qty: 30 Tab, Refills: 0      nitroglycerin (NITROSTAT) 0.4 mg SL tablet 1 Tab by SubLINGual route every five (5) minutes as needed for Chest Pain. Qty: 25 Tab, Refills: 3       !! - Potential duplicate medications found. Please discuss with provider.       STOP taking these medications       furosemide (LASIX) 40 mg tablet Comments:   Reason for Stopping:         carboxymethylcellulose sodium (CELLUVISC) 0.5 % drop ophthalmic solution Comments:   Reason for Stopping:         loperamide (IMMODIUM) 2 mg tablet Comments:   Reason for Stopping:               Activity: PT/OT Eval and Treat    Diet: Resume previous diet    Wound Care: Eval and treat local wound care lower extremities    Follow-up:   Please follow up with your PCP within 7 days to discuss your recent hospitalization. Patient to arrange.   Follow up with orthopedic surgery in 1 month  Staples out in 10 days         Total time spent including time spent on final examination and discharge discussion, discharge documentation and records reviewed and medication reconciliation: > 30 minutes    Yolis Posey DO  Internal Medicine, Hospitalist  Pager: [phone]

## 2020-04-02 NOTE — PROGRESS NOTES
Discharge instructions provided to pt on behalf of primary nurse Lux Stanley. Peripheral iv removed. Belongings packed. Plan for medical transport to [facility]. ETA 1400. Pt son-in-law called nurses station and notified of transport time.

## 2020-04-02 NOTE — PROGRESS NOTES
Problem: Self Care Deficits Care Plan (Adult)  Goal: *Acute Goals and Plan of Care (Insert Text)  Description: Occupational Therapy Goals  Initiated 3/31/2020 within 7 day(s). 1.  Patient will perform grooming with modified independence. 2.  Patient will perform bathing with modified independence. 3.  Patient will perform upper body dressing and lower body dressing with modified independence using adaptive equipment as needed. 4.  Patient will perform toilet transfers with modified independence using RW. 5.  Patient will perform all aspects of toileting with modified independence. 6.  Patient will participate in upper extremity therapeutic exercise/activities with modified independence for 10 minutes. 7.  Patient will utilize energy conservation techniques during functional activities with verbal cues. Prior Level of Function: Mod I w/ ADLs and functional mobility using RW, snf staff provides Supv for walk in shower transfer and tasks; meals and med mgmt   Outcome: Progressing Towards Goal   OCCUPATIONAL THERAPY TREATMENT    Patient: Criselda Capellan (80 y.o. female)  Date: 4/2/2020  Diagnosis: Femur fracture, left ([facility] [S72.92XA]   Femur fracture, left ([address]  Procedure(s) (LRB):  FEMUR INSERTION INTRA MEDULLARY NAIL (Left) 3 Days Post-Op  Precautions: Fall  PLOF: see aove    Chart, occupational therapy assessment, plan of care, and goals were reviewed. ASSESSMENT:  Nursing/Dorcas cleared for pt to participate in OT tx session. Patient very talkative and agreeable to participate in OT tx intervention addressing toilet transfers using RW. Bed mobility: Min A LLE mgmt, pt able to perform supine to sit using bed rail to assist, scooting w/ CGA. Sit to stand w/ Min A, CGA toilet transfer w/ RW and additional time, 3 in 1 commode over toilet for raised height to make sit <-> stand easier, pt voided/urinated-nursing notified. Toilet hygiene: CGA in stance w/ RW.  Functional mobility: bathroom -> recliner w/ CGA using RW and additional time, vc's for correct feet placement e.g. remain closer inside of RW for balance and fall prevention and correct technique for side stepping in small space, pt provided good return demonstration. Verbal cues for purse lip breathing technique and upright posture d/t c/o dizziness,  vc's for correct hand placement for reaching back for recliner arm rests during stand to sit for safe descent with good return demonstration provided by patient. Call bell within reach & pt verbalized understanding and provided return demonstration to utilize for assist e.g. functional transfers in order to prevent falls. Progression toward goals:  []          Improving appropriately and progressing toward goals  []          Improving slowly and progressing toward goals  []          Not making progress toward goals and plan of care will be adjusted     PLAN:  Patient continues to benefit from skilled intervention to address the above impairments. Continue treatment per established plan of care. Discharge Recommendations:  [place]  Further Equipment Recommendations for Discharge:  bedside commode, rolling walker, wheelchair     SUBJECTIVE:   Patient stated I feel better today.     OBJECTIVE DATA SUMMARY:   Cognitive/Behavioral Status:  Neurologic State: Alert  Orientation Level: Oriented X4  Cognition: Follows commands  Safety/Judgement: Fall prevention    Functional Mobility and Transfers for ADLs:   Bed Mobility:     Supine to Sit: Minimum assistance(LLE mgmt)     Scooting: Contact guard assistance   Transfers:  Sit to Stand: Minimum assistance  Stand to Sit: Contact guard assistance     Toilet Transfer : Contact guard assistance     Balance:  Sitting: Impaired  Sitting - Static: Good (unsupported)  Sitting - Dynamic: Fair (occasional)  Standing: Impaired  Standing - Static: Good  Standing - Dynamic : Fair(-)    Pain:  Pain level pre-treatment: pt unable to rate in scale of 0-10, stated \"All I can tell you is that it hurts. \"  Pain level post-treatment: 8/10  Pain Intervention(s): Medication (see MAR); Rest, Ice, Repositioning   Response to intervention: Nurse notified, See doc flow    Activity Tolerance:    fair  Please refer to the flowsheet for vital signs taken during this treatment. After treatment:   [x]  Patient left in no apparent distress sitting up in chair  []  Patient left in no apparent distress in bed  [x]  Call bell left within reach  [x]  Nursing notified  []  Caregiver present  []  Bed alarm activated    COMMUNICATION/EDUCATION:   [x] Role of Occupational Therapy in the acute care setting  [x] Home safety education was provided and the patient/caregiver indicated understanding. [x] Patient/family have participated as able in working towards goals and plan of care. [x] Patient/family agree to work toward stated goals and plan of care. [] Patient understands intent and goals of therapy, but is neutral about his/her participation. [] Patient is unable to participate in goal setting and plan of care.       Thank you for this referral.  Vale Edwards  Time Calculation: 32 mins

## 2020-04-02 NOTE — PROGRESS NOTES
Problem: Falls - Risk of  Goal: *Absence of Falls  Description: Document [place] Fall Risk and appropriate interventions in the flowsheet. Outcome: Progressing Towards Goal  Note: Fall Risk Interventions:  Mobility Interventions: Patient to call before getting OOB, Utilize walker, cane, or other assistive device, Utilize gait belt for transfers/ambulation         Medication Interventions: Patient to call before getting OOB, Teach patient to arise slowly, Utilize gait belt for transfers/ambulation    Elimination Interventions: Call light in reach, Patient to call for help with toileting needs    History of Falls Interventions: Bed/chair exit alarm         Problem: Patient Education: Go to Patient Education Activity  Goal: Patient/Family Education  Outcome: Progressing Towards Goal     Problem: Pressure Injury - Risk of  Goal: *Prevention of pressure injury  Description: Document [place] Scale and appropriate interventions in the flowsheet.   Outcome: Progressing Towards Goal  Note: Pressure Injury Interventions:  Sensory Interventions: Float heels, Keep linens dry and wrinkle-free         Activity Interventions: Increase time out of bed, Pressure redistribution bed/mattress(bed type), PT/OT evaluation    Mobility Interventions: HOB 30 degrees or less, Pressure redistribution bed/mattress (bed type), PT/OT evaluation    Nutrition Interventions: Document food/fluid/supplement intake    Friction and Shear Interventions: HOB 30 degrees or less                Problem: Patient Education: Go to Patient Education Activity  Goal: Patient/Family Education  Outcome: Progressing Towards Goal     Problem: Pain  Goal: *Control of Pain  Outcome: Progressing Towards Goal  Goal: *PALLIATIVE CARE:  Alleviation of Pain  Outcome: Progressing Towards Goal     Problem: Patient Education: Go to Patient Education Activity  Goal: Patient/Family Education  Outcome: Progressing Towards Goal     Problem: Patient Education: Go to Patient Education Activity  Goal: Patient/Family Education  Outcome: Progressing Towards Goal     Problem: Hip Fracture:Day of Admission Pre-op  Goal: Activity/Safety  Outcome: Progressing Towards Goal  Goal: Consults, if ordered  Outcome: Progressing Towards Goal  Goal: Diagnostic Test/Procedures  Outcome: Progressing Towards Goal  Goal: Nutrition/Diet  Outcome: Progressing Towards Goal  Goal: Medications  Outcome: Progressing Towards Goal  Goal: Respiratory  Outcome: Progressing Towards Goal  Goal: Treatments/Interventions/Procedures  Outcome: Progressing Towards Goal  Goal: Psychosocial  Outcome: Progressing Towards Goal  Goal: *Labs/Tests Within Defined Limits in Preparation for Surgery  Outcome: Progressing Towards Goal  Goal: *Optimal pain control at patient's stated goal  Outcome: Progressing Towards Goal  Goal: *Hemodynamically stable  Outcome: Progressing Towards Goal     Problem: Hip Fracture: Day of Surgery Post-op Care  Goal: Activity/Safety  Outcome: Progressing Towards Goal  Goal: Consults, if ordered  Outcome: Progressing Towards Goal  Goal: Diagnostic Test/Procedures  Outcome: Progressing Towards Goal  Goal: Nutrition/Diet  Outcome: Progressing Towards Goal  Goal: Medications  Outcome: Progressing Towards Goal  Goal: Respiratory  Outcome: Progressing Towards Goal  Goal: Treatments/Interventions/Procedures  Outcome: Progressing Towards Goal  Goal: Psychosocial  Outcome: Progressing Towards Goal  Goal: *Absence of skin breakdown  Outcome: Progressing Towards Goal  Goal: *Optimal pain control at patient's stated goal  Outcome: Progressing Towards Goal  Goal: *Hemodynamically stable  Outcome: Progressing Towards Goal     Problem: Hip Fracture: Post-Op Day 1  Goal: Off Pathway (Use only if patient is Off Pathway)  Outcome: Progressing Towards Goal  Goal: Activity/Safety  Outcome: Progressing Towards Goal  Goal: Diagnostic Test/Procedures  Outcome: Progressing Towards Goal  Goal: Nutrition/Diet  Outcome: Progressing Towards Goal  Goal: Medications  Outcome: Progressing Towards Goal  Goal: Respiratory  Outcome: Progressing Towards Goal  Goal: Treatments/Interventions/Procedures  Outcome: Progressing Towards Goal  Goal: Psychosocial  Outcome: Progressing Towards Goal  Goal: Discharge Planning  Outcome: Progressing Towards Goal  Goal: *Demonstrates progressive activity  Outcome: Progressing Towards Goal  Goal: *Optimal pain control at patient's stated goal  Outcome: Progressing Towards Goal  Goal: *Hemodynamically stable  Outcome: Progressing Towards Goal  Goal: *Discharge plan identified  Outcome: Progressing Towards Goal  Goal: *Absence of skin breakdown  Outcome: Progressing Towards Goal     Problem: Hip Fracture: Post-Op Day 2  Goal: Off Pathway (Use only if patient is Off Pathway)  Outcome: Progressing Towards Goal  Goal: Activity/Safety  Outcome: Progressing Towards Goal  Goal: Diagnostic Test/Procedures  Outcome: Progressing Towards Goal  Goal: Nutrition/Diet  Outcome: Progressing Towards Goal  Goal: Medications  Outcome: Progressing Towards Goal  Goal: Respiratory  Outcome: Progressing Towards Goal  Goal: Treatments/Interventions/Procedures  Outcome: Progressing Towards Goal  Goal: Psychosocial  Outcome: Progressing Towards Goal  Goal: Discharge Planning  Outcome: Progressing Towards Goal  Goal: *Optimal pain control at patient's stated goal  Outcome: Progressing Towards Goal  Goal: *Hemodynamically stable  Outcome: Progressing Towards Goal  Goal: *Adequate oxygenation  Outcome: Progressing Towards Goal  Goal: *Tolerates increased activity  Outcome: Progressing Towards Goal  Goal: *Tolerates nutrition therapy  Outcome: Progressing Towards Goal  Goal: *Absence of skin breakdown  Outcome: Progressing Towards Goal     Problem: Hip Fracture: Post-Op Day 3  Goal: Off Pathway (Use only if patient is Off Pathway)  Outcome: Progressing Towards Goal  Goal: Activity/Safety  Outcome: Progressing Towards Goal  Goal: Diagnostic Test/Procedures  Outcome: Progressing Towards Goal  Goal: Nutrition/Diet  Outcome: Progressing Towards Goal  Goal: Medications  Outcome: Progressing Towards Goal  Goal: Respiratory  Outcome: Progressing Towards Goal  Goal: Treatments/Interventions/Procedures  Outcome: Progressing Towards Goal  Goal: Psychosocial  Outcome: Progressing Towards Goal  Goal: Discharge Planning  Outcome: Progressing Towards Goal  Goal: *Met physical therapy criteria for discharge to next level of care  Outcome: Progressing Towards Goal  Goal: *Optimal pain control at patient's stated goal  Outcome: Progressing Towards Goal  Goal: *Hemodynamically stable  Outcome: Progressing Towards Goal  Goal: *Tolerating diet  Outcome: Progressing Towards Goal  Goal: *Active bowel function  Outcome: Progressing Towards Goal  Goal: *Adequate urinary output  Outcome: Progressing Towards Goal  Goal: *Absence of skin breakdown  Outcome: Progressing Towards Goal  Goal: *Patient verbalizes understanding of discharge instructions  Outcome: Progressing Towards Goal     Problem: Hip Fracture: Post-Op Day 4  Goal: Off Pathway (Use only if patient is Off Pathway)  Outcome: Progressing Towards Goal  Goal: Activity/Safety  Outcome: Progressing Towards Goal  Goal: Diagnostic Test/Procedures  Outcome: Progressing Towards Goal  Goal: Nutrition/Diet  Outcome: Progressing Towards Goal  Goal: Medications  Outcome: Progressing Towards Goal  Goal: Respiratory  Outcome: Progressing Towards Goal  Goal: Treatments/Interventions/Procedures  Outcome: Progressing Towards Goal  Goal: Psychosocial  Outcome: Progressing Towards Goal  Goal: Discharge Planning  Outcome: Progressing Towards Goal  Goal: *Met physical therapy criteria for discharge to next level of care  Outcome: Progressing Towards Goal  Goal: *Optimal pain control at patient's stated goal  Outcome: Progressing Towards Goal  Goal: *Hemodynamically stable  Outcome: Progressing Towards Goal  Goal: *Tolerating diet  Outcome: Progressing Towards Goal  Goal: *Active bowel function  Outcome: Progressing Towards Goal  Goal: *Adequate urinary output  Outcome: Progressing Towards Goal  Goal: *Absence of skin breakdown  Outcome: Progressing Towards Goal  Goal: *Patient verbalizes understanding of discharge instructions  Outcome: Progressing Towards Goal

## 2020-04-07 ENCOUNTER — HOME CARE VISIT (OUTPATIENT)
Dept: HOME HEALTH SERVICES | Facility: HOME HEALTH | Age: 85
End: 2020-04-07
Payer: MEDICARE

## 2021-08-03 PROBLEM — R42 DIZZINESS: Status: RESOLVED | Noted: 2019-11-21 | Resolved: 2021-08-03

## 2021-08-03 PROBLEM — I48.91 ATRIAL FIBRILLATION WITH RAPID VENTRICULAR RESPONSE (HCC): Status: RESOLVED | Noted: 2019-09-30 | Resolved: 2021-08-03

## 2022-07-23 NOTE — PROGRESS NOTES
As you know, Lisa Matias is a pleasant, 80 y.o. female with known history of coronary artery disease, non-ST-elevation myocardial infarction, status post left anterior descending and left circumflex bare-metal stents. She also has hypertension, hyperlipidemia, and ischemic cardiomyopathy, a.fib    Ms. Sathya Sims is here today for follow up appointment. She is accompanied with a family member. For the last for 5 days, she has increasing swelling with bilateral blister on the leg with bruising. She does not recall having any trauma. She denies any worsening of shortness of breath she denies presyncope or syncope. Yesterday, her Bumex dose was increased to 1 mg daily. She is using 2 pillows at night. She denies any chest pain    Past Medical History:   Diagnosis Date    A-fib [facility]) 01/2017    Arthritis, degenerative     CAD (coronary artery disease) 04/2012    S/P 2.75 X 15 mm BMS of OM (04/2012), Two LAD BMS (07/2012)    Elevated liver enzymes     Likely from statin    HLD (hyperlipidemia)     Hyperkalemia 06/2012    Likely from lisinopril    Ischemic cardiomyopathy     LVEF  30-35%(05/19) 45% (11/2012) & 30% echo (04/2012)    NSTEMI (non-ST elevated myocardial infarction) ([facility] [address] 04/2012    UTI (urinary tract infection)     Vertigo        Past Surgical History:   Procedure Laterality Date    HX CORONARY STENT PLACEMENT  4/23/12    bare metal stent to obtuse marginal branch    HX HEART CATHETERIZATION         Current Outpatient Medications   Medication Sig    vitamin a-vitamin c-vit e-min (OCUVITE) tablet Take 1 Tab by mouth daily. Gummy    multivit,calc,mins/iron/folic (ONE-A-DAY WOMENS FORMULA PO) Take  by mouth. Gummy    doxycycline (ADOXA) 100 mg tablet Take 100 mg by mouth daily.  fluticasone propionate (FLONASE) 50 mcg/actuation nasal spray 1 [place] by Both Nostrils route daily.     ondansetron (ZOFRAN ODT) 4 mg disintegrating tablet Take 4 mg by mouth every eight (8) hours as needed for Nausea.  bumetanide (BUMEX) 0.5 mg tablet Take 1 Tab by mouth daily. (Patient taking differently: Take 1 mg by mouth daily.)    mirtazapine (REMERON) 15 mg tablet Take 1 Tab by mouth nightly. Indications: major depressive disorder    digoxin (LANOXIN) 0.125 mg tablet Take 1 Tab by mouth every Monday, Wednesday, Friday, Saturday AND 2 Tabs every Sunday, Tuesday, Thursday. Indications: Ventricular Rate Control in Atrial Fibrillation    FLUoxetine (PROZAC) 10 mg tablet Take 1 Tab by mouth daily. (Patient taking differently: Take 20 mg by mouth daily.)    hydrocortisone (CORTAID) 1 % topical cream Apply  to affected area two (2) times a day. use thin layer (Patient taking differently: Apply  to affected area two (2) times a day. use thin layer back and chest)    acetaminophen (TYLENOL) 325 mg tablet Take 650 mg by mouth every six (6) hours as needed for Pain.  polyethylene glycol (MIRALAX) 17 gram/dose powder Take 17 g by mouth daily as needed (constipation).  loperamide (IMMODIUM) 2 mg tablet Take 1 mg by mouth three (3) times daily as needed for Diarrhea.  psyllium husk-aspartame (METAMUCIL FIBER) 3.4 gram pwpk packet Take 1 Packet by mouth daily.  aspirin 81 mg chewable tablet Take 1 Tab by mouth daily.  nitroglycerin (NITROSTAT) 0.4 mg SL tablet 1 Tab by SubLINGual route every five (5) minutes as needed for Chest Pain. (Patient taking differently: 1 Tab by SubLINGual route every five (5) minutes as needed for Chest Pain. take 1tab sl  5min apart for a total of 3tabs then call 911)    carboxymethylcellulose sodium (CELLUVISC) 0.5 % drop ophthalmic solution Administer 1 Drop to both eyes three (3) times daily as needed for Other (dry eyes).  carboxymethylcellulos/glycerin (CLEAR EYES FOR DRY EYES OP) Administer 1 Drop to both eyes three (3) times daily as needed (dry eyes). No current facility-administered medications for this visit.       Allergies and Sensitivities:  Allergies Allergen Reactions    Flu Vaccine 2011 (36 Mos+)(Pf) Anaphylaxis    Codeine Anaphylaxis    Egg Not Reported This Time    Influenza Virus Vaccines Hives    Lipitor [Atorvastatin] Nausea Only    Pcn [Penicillins] Hives     Family History:  Family History   Problem Relation Age of Onset    Diabetes Mother      Social History:  Social History     Tobacco Use    Smoking status: Never Smoker    Smokeless tobacco: Never Used   Substance Use Topics    Alcohol use: No    Drug use: No     She  reports that she has never smoked. She has never used smokeless tobacco.  She  reports that she does not drink alcohol. Review of Systems:    Physical Exam:  BP Readings from Last 3 Encounters:   10/29/19 104/65   10/28/19 107/70   10/28/19 124/82         Pulse Readings from Last 3 Encounters:   10/29/19 90   10/28/19 (!) 101   10/28/19 87          Wt Readings from Last 3 Encounters:   10/29/19 116 lb (52.6 kg)   10/28/19 100 lb (45.4 kg)   10/15/19 108 lb (49 kg)     Constitutional: Oriented to person, place, and time. HENT: Head: Normocephalic and atraumatic. Neck: No JVD present. Cardiovascular: Irregular rhythm. No murmur, gallop or rubs appriciated  Lung[de-identified] Breath sounds normal. No respiratory distress. No Rales appriciated  Abdominal: No tenderness. No rebound and no guarding. Musculoskeletal: There is 1/2+ ankle edema with blister on both shin with mild bruising. . No cynosis    Review of Data:  LABS:   Lab Results   Component Value Date/Time    Sodium 136 10/28/2019 06:38 PM    Potassium 4.3 10/28/2019 06:38 PM    Chloride 98 (L) 10/28/2019 06:38 PM    CO2 28 10/28/2019 06:38 PM    Glucose 122 (H) 10/28/2019 06:38 PM    BUN 23 (H) 10/28/2019 06:38 PM    Creatinine 1.47 (H) 10/28/2019 06:38 PM     Lab Results   Component Value Date/Time    Cholesterol, total 202 (H) 04/29/2019 07:45 AM    HDL Cholesterol 64 (H) 04/29/2019 07:45 AM    LDL, calculated 116.4 (H) 04/29/2019 07:45 AM    Triglyceride 108 04/29/2019 07:45 AM    CHOL/HDL Ratio 3.2 04/29/2019 07:45 AM     No results found for: GPT  No results found for: HBA1C, [id number]    EKG (07/2012) Sinus rhythm at 74 beats per minute. No dynamic ST-T changes of ischemia. No Q waves noted. (01/17) A.fib at      CATHETERIZATION:(04/2012)  1. LM: Short, but angiographically normal.   2. LAD: Proximal at D1 level, 40% to 50% moderate disease, mid left anterior descending at D2 level 80% tubular stenosis. 3. LCx/OM: Proximal diffuse 40%, Mid OM 95-99% subtotal occlusion with ruptured plaque and with NISH 2 flow. 4. RCA: Anatomically dominant; 30- 40%  proximal to mid, otherwise normal.   5. Markedly reduced LVEF with estimated ejection fraction of 25% with evidence of severe hypokinesis of entire anterior apical and inferoapical wall. Diaphragmatic and inferobasilar wall is the most mary segment. Successful PCI and stenting of the OM, 95-99% subtotal occlusion with 2.75 x 15 mm bare metal Vision stent. CATH (07/2012)   LM: Normal  LAD: 70% calcified prox-mid, Mid LAD 90%  LCx/OM1: proximal 50% moderate disease, Patent mid stent  Successful PCI and stenting of LAD using 2.5 X 18 and 2.5X15 mm BMS    ECHO (11/2012)   1. Left ventricular systolic function was mildly reduced with ejection fraction of 45 to 50%. There was mild diffuse hypokinesis with more pronounced hypokinesis of inferolateral wall. Diastolic dysfunction present. 2.  Right ventricular function is normal.  It was mildly dilated. Estimated peak pressure of 40 mmHg. 3.  Mild mitral regurgitation and aortic regurgitation. No significant valvular pathology noted. 4.  Comparison was made with previous study in April 2012. Overall, left ventricular function has increased from 30 to 45%. ECHO (05/19)  Left Ventricle Normal cavity size and wall thickness. Moderate-to-severe systolic dysfunction. The estimated ejection fraction is 31 - 35%. Visually measured ejection fraction. Moderate-severe diffuse hypokinesis with more pronounced inferolateral hypokinesis. There is moderate (grade 2) left ventricular diastolic dysfunction. Left Atrium Mildly dilated left atrium. Right Ventricle Dilated right ventricle. Reduced systolic function. Right Atrium Moderately dilated right atrium. Interatrial Septum Interatrial septum not assessed   Aortic Valve No stenosis. There is mild leaflet calcification without reduced excursion. Trace aortic valve regurgitation. Mitral Valve No stenosis. Mitral valve non-specific thickening. w/o reduced excursion. Moderate mitral annular calcification. Mild to moderate regurgitation. Tricuspid Valve No stenosis. Dilated annulus. Mild to moderate tricuspid valve regurgitation. Pulmonary arterial systolic pressure is 17.8 mmHg. Moderate pulmonary hypertension. Impression / Plan:    Coronary artery disease:    She had an LAD and circumflex stent in 2012. Currently she is on asa, BB and Plavix. No angina. Stable. Ischemic cardiomyopathy:    Ejection fraction was noted to be 45-50% in November, 2012. Last ECHO 05/19, with EF 30-35%  Dose of Bumex was increased to 1 mg daily yesterday. Will continue with Bumex 1 mg daily for next 3 4 days and if no improvement in swelling, would consider metolazone 2.5 mg for short period of time. Have advised family member to call in 3 or 4 days to discuss and results of higher dose of Bumex  Unable to use beta-blocker and Entresto in past because of low blood pressure. Continue digoxin    Hypertension:   Stable. Unable to use beta-blocker and Entresto in past because of low blood pressure. A.fib:   Appears to be in A.fib on exam  HR 80-90 Bpm today. Continue digoxin daily  If needed and if blood pressure allows would use beta-blocker in future  Continue antiplatelet.   Suboptimal candidate for anticoagulation    Hyperlipidemia:  She has a history of statin induced elevation of LFTs every time she has tried different kinds of statins. She is currently not on any lipid lowering agent. This plan was discussed with Ms. Quiles in detail who is in agreement. Thank you for allowing me to participate in the patient's care. Feel free to call me with any questions or concerns. No

## (undated) DEVICE — DRAPE,HIP,W/POUCHES,STERILE: Brand: MEDLINE

## (undated) DEVICE — DRESSING TRNSPAR 5IN LEN 4IN W FLM ST BIOCL

## (undated) DEVICE — SUTURE ABSORBABLE BRAIDED 2-0 CT-1 27 IN UD VICRYL J259H

## (undated) DEVICE — KIT PROC HIP PINNING CUST LF --

## (undated) DEVICE — BIT DRL L330MM DIA4.2MM CALIB 100MM 3 FLUT QUIK CPL

## (undated) DEVICE — SYR 10ML LUER LOK 1/5ML GRAD --

## (undated) DEVICE — STAPLER SKIN H3.9MM WIRE DIA0.58MM CRWN 6.9MM 35 STPL FIX

## (undated) DEVICE — SUTURE VCRL SZ 0 L27IN ABSRB UD L36MM CT-1 1/2 CIR J260H

## (undated) DEVICE — 3.2MM GUIDE WIRE 400MM

## (undated) DEVICE — STERILE POLYISOPRENE POWDER-FREE SURGICAL GLOVES: Brand: PROTEXIS

## (undated) DEVICE — PREP SKN CHLRAPRP 26ML TNT -- CONVERT TO ITEM 373320

## (undated) DEVICE — DRESSING,GAUZE,XEROFORM,CURAD,1"X8",ST: Brand: CURAD

## (undated) DEVICE — DRAPE C-ARMOUR C-ARM KIT --

## (undated) DEVICE — U-DRAPE: Brand: CONVERTORS

## (undated) DEVICE — SOLUTION IV 1000ML 0.9% SOD CHL